# Patient Record
Sex: MALE | Race: WHITE
[De-identification: names, ages, dates, MRNs, and addresses within clinical notes are randomized per-mention and may not be internally consistent; named-entity substitution may affect disease eponyms.]

---

## 2023-06-12 ENCOUNTER — HOSPITAL ENCOUNTER
Age: 58
End: 2023-06-12
Payer: MEDICARE

## 2023-06-12 DIAGNOSIS — M79.672: Primary | ICD-10-CM

## 2023-06-12 DIAGNOSIS — L97.411: ICD-10-CM

## 2023-06-12 DIAGNOSIS — E11.621: ICD-10-CM

## 2023-06-12 DIAGNOSIS — L97.422: ICD-10-CM

## 2023-06-12 PROCEDURE — 11043 DBRDMT MUSC&/FSCA 1ST 20/<: CPT

## 2023-06-12 PROCEDURE — 73630 X-RAY EXAM OF FOOT: CPT

## 2023-06-12 NOTE — XR_ITS
[address] [place] [address] [id number] 
     [phone] 
  
  
Patient Name: 
SYED DIA 
  
MRN: [id number]    YOB: 1965    Sex: M 
Assigned Patient Location:  
Current Patient Location:  
Accession/Order Number: [id number] 
Exam Date: 6/12/2023  14:46    Report Date: 6/13/2023  09:09 
  
At the request of: 
CAROL MOORE   
  
Procedure:  XR foot LT min 3V 
  
PROCEDURE: XR foot LT min 3V 
  
HISTORY: LEFT FOOT PAIN , new soft tissue wounds 
  
COMPARISON: None. 
  
FINDINGS: 
BONES:Prior resection of the distal aspect of the second third toes. Resection  
  
of the fifth toe and majority of the metatarsal.  
SOFT TISSUES:Prominent soft tissue swelling surrounding the ankle and foot.  
Subcutaneous air within plantar soft tissues at base of calcaneus. 
EFFUSION:None visible.  
OTHER: Negative.  
  
IMPRESSION: 
  
1. Marked soft tissue swelling surrounding the ankle and foot along with  
subcutaneous air/ulceration plantar aspect of calcaneus. 
2. Stable surgical changes. No appreciable osteomyelitis. 
  
  
Electronically authenticated by: REUBEN ANGELA   Date: 6/13/2023  09:09

## 2023-06-20 ENCOUNTER — HOSPITAL ENCOUNTER
Dept: HOSPITAL 101 - WC | Age: 58
Discharge: HOME | End: 2023-06-20
Payer: MEDICARE

## 2023-06-20 DIAGNOSIS — M24.571: ICD-10-CM

## 2023-06-20 DIAGNOSIS — R60.9: ICD-10-CM

## 2023-06-20 DIAGNOSIS — E11.42: ICD-10-CM

## 2023-06-20 DIAGNOSIS — T81.89XD: ICD-10-CM

## 2023-06-20 DIAGNOSIS — R09.89: ICD-10-CM

## 2023-06-20 DIAGNOSIS — I82.4Z9: ICD-10-CM

## 2023-06-20 DIAGNOSIS — L97.411: ICD-10-CM

## 2023-06-20 DIAGNOSIS — M86.671: ICD-10-CM

## 2023-06-20 DIAGNOSIS — E11.621: Primary | ICD-10-CM

## 2023-06-20 DIAGNOSIS — R68.89: ICD-10-CM

## 2023-06-20 DIAGNOSIS — M24.574: ICD-10-CM

## 2023-06-20 DIAGNOSIS — M14.679: ICD-10-CM

## 2023-06-20 DIAGNOSIS — G61.82: ICD-10-CM

## 2023-06-20 DIAGNOSIS — E11.65: ICD-10-CM

## 2023-06-20 DIAGNOSIS — L97.422: ICD-10-CM

## 2023-06-20 DIAGNOSIS — M86.471: ICD-10-CM

## 2023-06-20 DIAGNOSIS — M25.571: ICD-10-CM

## 2023-06-20 DIAGNOSIS — M19.071: ICD-10-CM

## 2023-06-20 DIAGNOSIS — M21.541: ICD-10-CM

## 2023-06-20 PROCEDURE — 11042 DBRDMT SUBQ TIS 1ST 20SQCM/<: CPT

## 2023-06-30 ENCOUNTER — HOSPITAL ENCOUNTER
Dept: HOSPITAL 101 - WC | Age: 58
Discharge: HOME | End: 2023-06-30
Payer: MEDICARE

## 2023-06-30 DIAGNOSIS — R09.89: ICD-10-CM

## 2023-06-30 DIAGNOSIS — M24.574: ICD-10-CM

## 2023-06-30 DIAGNOSIS — M19.071: ICD-10-CM

## 2023-06-30 DIAGNOSIS — R68.89: ICD-10-CM

## 2023-06-30 DIAGNOSIS — M21.541: ICD-10-CM

## 2023-06-30 DIAGNOSIS — I82.4Z9: ICD-10-CM

## 2023-06-30 DIAGNOSIS — R60.9: ICD-10-CM

## 2023-06-30 DIAGNOSIS — M25.571: ICD-10-CM

## 2023-06-30 DIAGNOSIS — L97.411: ICD-10-CM

## 2023-06-30 DIAGNOSIS — E11.621: Primary | ICD-10-CM

## 2023-06-30 DIAGNOSIS — L97.422: ICD-10-CM

## 2023-06-30 DIAGNOSIS — E11.42: ICD-10-CM

## 2023-06-30 DIAGNOSIS — M86.671: ICD-10-CM

## 2023-06-30 DIAGNOSIS — M86.471: ICD-10-CM

## 2023-06-30 DIAGNOSIS — M24.571: ICD-10-CM

## 2023-06-30 DIAGNOSIS — E11.65: ICD-10-CM

## 2023-06-30 DIAGNOSIS — M14.679: ICD-10-CM

## 2023-06-30 PROCEDURE — 11042 DBRDMT SUBQ TIS 1ST 20SQCM/<: CPT

## 2023-07-21 ENCOUNTER — HOSPITAL ENCOUNTER
Dept: HOSPITAL 101 - WC | Age: 58
Discharge: HOME | End: 2023-07-21
Payer: MEDICARE

## 2023-07-21 DIAGNOSIS — L97.411: ICD-10-CM

## 2023-07-21 DIAGNOSIS — E11.621: Primary | ICD-10-CM

## 2023-07-21 DIAGNOSIS — E11.69: ICD-10-CM

## 2023-07-21 DIAGNOSIS — M86.471: ICD-10-CM

## 2023-07-21 DIAGNOSIS — L97.422: ICD-10-CM

## 2023-07-21 PROCEDURE — 11042 DBRDMT SUBQ TIS 1ST 20SQCM/<: CPT

## 2023-10-05 ENCOUNTER — APPOINTMENT (OUTPATIENT)
Dept: GENERAL RADIOLOGY | Age: 58
DRG: 871 | End: 2023-10-05
Attending: INTERNAL MEDICINE
Payer: MEDICARE

## 2023-10-05 ENCOUNTER — APPOINTMENT (OUTPATIENT)
Dept: ULTRASOUND IMAGING | Age: 58
DRG: 871 | End: 2023-10-05
Attending: INTERNAL MEDICINE
Payer: MEDICARE

## 2023-10-05 ENCOUNTER — APPOINTMENT (OUTPATIENT)
Age: 58
DRG: 871 | End: 2023-10-05
Attending: INTERNAL MEDICINE
Payer: MEDICARE

## 2023-10-05 ENCOUNTER — HOSPITAL ENCOUNTER (INPATIENT)
Age: 58
LOS: 6 days | Discharge: HOME | DRG: 871 | End: 2023-10-11
Attending: INTERNAL MEDICINE | Admitting: INTERNAL MEDICINE
Payer: MEDICARE

## 2023-10-05 ENCOUNTER — APPOINTMENT (OUTPATIENT)
Dept: VASCULAR LAB | Age: 58
DRG: 871 | End: 2023-10-05
Attending: INTERNAL MEDICINE
Payer: MEDICARE

## 2023-10-05 DIAGNOSIS — I20.0 UNSTABLE ANGINA (HCC): ICD-10-CM

## 2023-10-05 DIAGNOSIS — R79.89 D-DIMER, ELEVATED: ICD-10-CM

## 2023-10-05 DIAGNOSIS — I25.10 CAD, MULTIPLE VESSEL: ICD-10-CM

## 2023-10-05 DIAGNOSIS — I21.4 NSTEMI (NON-ST ELEVATED MYOCARDIAL INFARCTION) (HCC): Primary | ICD-10-CM

## 2023-10-05 PROBLEM — Z86.39 HX OF TYPE 2 DIABETES MELLITUS: Status: ACTIVE | Noted: 2023-10-05

## 2023-10-05 PROBLEM — I10 BENIGN ESSENTIAL HTN: Status: ACTIVE | Noted: 2023-10-05

## 2023-10-05 PROBLEM — G47.33 OSA (OBSTRUCTIVE SLEEP APNEA): Status: ACTIVE | Noted: 2023-10-05

## 2023-10-05 PROBLEM — E11.42 TYPE 2 DM WITH DIABETIC NEUROPATHY AFFECTING BOTH SIDES OF BODY (HCC): Status: ACTIVE | Noted: 2023-10-05

## 2023-10-05 PROBLEM — E66.09 CLASS 2 OBESITY DUE TO EXCESS CALORIES WITH BODY MASS INDEX (BMI) OF 35.0 TO 35.9 IN ADULT: Status: ACTIVE | Noted: 2023-10-05

## 2023-10-05 PROBLEM — F17.200 CURRENT SMOKER: Status: ACTIVE | Noted: 2023-10-05

## 2023-10-05 PROBLEM — E87.20 METABOLIC ACIDOSIS: Status: ACTIVE | Noted: 2023-10-05

## 2023-10-05 PROBLEM — S88.119A BELOW KNEE AMPUTATION (HCC): Status: ACTIVE | Noted: 2023-10-05

## 2023-10-05 PROBLEM — N17.9 AKI (ACUTE KIDNEY INJURY) (HCC): Status: ACTIVE | Noted: 2023-10-05

## 2023-10-05 PROBLEM — E66.812 CLASS 2 OBESITY DUE TO EXCESS CALORIES WITH BODY MASS INDEX (BMI) OF 35.0 TO 35.9 IN ADULT: Status: ACTIVE | Noted: 2023-10-05

## 2023-10-05 PROBLEM — A41.9 SEPSIS (HCC): Status: ACTIVE | Noted: 2023-10-05

## 2023-10-05 PROBLEM — K21.9 GERD (GASTROESOPHAGEAL REFLUX DISEASE): Status: ACTIVE | Noted: 2023-10-05

## 2023-10-05 PROBLEM — Z89.512 HX OF BKA, LEFT (HCC): Status: ACTIVE | Noted: 2023-10-05

## 2023-10-05 LAB
ALBUMIN SERPL-MCNC: 1.6 G/DL (ref 3.5–5.2)
ALBUMIN/GLOB SERPL: 0.5 {RATIO} (ref 1–2.5)
ALP SERPL-CCNC: 60 U/L (ref 40–129)
ALT SERPL-CCNC: 25 U/L (ref 5–41)
AMPHET UR QL SCN: NEGATIVE
ANION GAP SERPL CALCULATED.3IONS-SCNC: 10 MMOL/L (ref 9–17)
ANION GAP SERPL CALCULATED.3IONS-SCNC: 11 MMOL/L (ref 9–17)
ANION GAP SERPL CALCULATED.3IONS-SCNC: 12 MMOL/L (ref 9–17)
ANTI-XA UNFRAC HEPARIN: 0.3 IU/L
ANTI-XA UNFRAC HEPARIN: 0.45 IU/L
ANTI-XA UNFRAC HEPARIN: 0.47 IU/L
AST SERPL-CCNC: 53 U/L
BARBITURATES UR QL SCN: NEGATIVE
BASOPHILS # BLD: 0 K/UL (ref 0–0.2)
BASOPHILS NFR BLD: 0 % (ref 0–2)
BENZODIAZ UR QL: NEGATIVE
BILIRUB SERPL-MCNC: 0.4 MG/DL (ref 0.3–1.2)
BILIRUB UR QL STRIP: NEGATIVE
BNP SERPL-MCNC: 9114 PG/ML
BUN SERPL-MCNC: 35 MG/DL (ref 6–20)
BUN SERPL-MCNC: 37 MG/DL (ref 6–20)
BUN SERPL-MCNC: 37 MG/DL (ref 6–20)
C3 SERPL-MCNC: 104 MG/DL (ref 90–180)
C4 SERPL-MCNC: 27 MG/DL (ref 10–40)
CA-I BLD-SCNC: 1.09 MMOL/L (ref 1.13–1.33)
CALCIUM SERPL-MCNC: 7 MG/DL (ref 8.6–10.4)
CALCIUM SERPL-MCNC: 7 MG/DL (ref 8.6–10.4)
CALCIUM SERPL-MCNC: 7.3 MG/DL (ref 8.6–10.4)
CANNABINOIDS UR QL SCN: NEGATIVE
CASTS #/AREA URNS LPF: NORMAL /LPF (ref 0–2)
CASTS #/AREA URNS LPF: NORMAL /LPF (ref 0–2)
CHLORIDE SERPL-SCNC: 102 MMOL/L (ref 98–107)
CHLORIDE SERPL-SCNC: 104 MMOL/L (ref 98–107)
CHLORIDE SERPL-SCNC: 106 MMOL/L (ref 98–107)
CHLORIDE UR-SCNC: 27 MMOL/L
CHOLEST SERPL-MCNC: 107 MG/DL
CHOLESTEROL/HDL RATIO: 3.3
CK SERPL-CCNC: 1930 U/L (ref 39–308)
CLARITY UR: ABNORMAL
CO2 SERPL-SCNC: 16 MMOL/L (ref 20–31)
CO2 SERPL-SCNC: 18 MMOL/L (ref 20–31)
CO2 SERPL-SCNC: 18 MMOL/L (ref 20–31)
COCAINE UR QL SCN: NEGATIVE
COLOR UR: YELLOW
CREAT SERPL-MCNC: 1.8 MG/DL (ref 0.7–1.2)
CREAT SERPL-MCNC: 1.8 MG/DL (ref 0.7–1.2)
CREAT SERPL-MCNC: 2.1 MG/DL (ref 0.7–1.2)
CREAT UR-MCNC: 103 MG/DL (ref 39–259)
CRP SERPL HS-MCNC: 304.2 MG/L (ref 0–5)
D DIMER PPP FEU-MCNC: 2.23 UG/ML FEU (ref 0–0.57)
ECHO BSA: 2.52 M2
EKG ATRIAL RATE: 93 BPM
EKG P AXIS: 61 DEGREES
EKG P-R INTERVAL: 170 MS
EKG Q-T INTERVAL: 398 MS
EKG QRS DURATION: 146 MS
EKG QTC CALCULATION (BAZETT): 494 MS
EKG R AXIS: -66 DEGREES
EKG T AXIS: 41 DEGREES
EKG VENTRICULAR RATE: 93 BPM
EOSINOPHIL # BLD: 0 K/UL (ref 0–0.4)
EOSINOPHILS RELATIVE PERCENT: 0 % (ref 1–4)
EPI CELLS #/AREA URNS HPF: NORMAL /HPF (ref 0–5)
ERYTHROCYTE [DISTWIDTH] IN BLOOD BY AUTOMATED COUNT: 15.5 % (ref 11.8–14.4)
ERYTHROCYTE [SEDIMENTATION RATE] IN BLOOD BY PHOTOMETRIC METHOD: 56 MM/HR (ref 0–20)
EST. AVERAGE GLUCOSE BLD GHB EST-MCNC: 128 MG/DL
FENTANYL UR QL: POSITIVE
FREE KAPPA/LAMBDA RATIO: 1.28 (ref 0.26–1.65)
GFR SERPL CREATININE-BSD FRML MDRD: 36 ML/MIN/1.73M2
GFR SERPL CREATININE-BSD FRML MDRD: 43 ML/MIN/1.73M2
GFR SERPL CREATININE-BSD FRML MDRD: 43 ML/MIN/1.73M2
GLUCOSE BLD-MCNC: 126 MG/DL (ref 75–110)
GLUCOSE BLD-MCNC: 131 MG/DL (ref 75–110)
GLUCOSE BLD-MCNC: 138 MG/DL (ref 75–110)
GLUCOSE BLD-MCNC: 149 MG/DL (ref 75–110)
GLUCOSE BLD-MCNC: 161 MG/DL (ref 75–110)
GLUCOSE SERPL-MCNC: 133 MG/DL (ref 70–99)
GLUCOSE SERPL-MCNC: 138 MG/DL (ref 70–99)
GLUCOSE SERPL-MCNC: 142 MG/DL (ref 70–99)
GLUCOSE UR STRIP-MCNC: ABNORMAL MG/DL
HBA1C MFR BLD: 6.1 % (ref 4–6)
HCT VFR BLD AUTO: 37 % (ref 40.7–50.3)
HDLC SERPL-MCNC: 32 MG/DL
HGB BLD-MCNC: 12.2 G/DL (ref 13–17)
HGB UR QL STRIP.AUTO: ABNORMAL
IMM GRANULOCYTES # BLD AUTO: 0.18 K/UL (ref 0–0.3)
IMM GRANULOCYTES NFR BLD: 1 %
INR PPP: 1.6
KAPPA LC FREE SER-MCNC: 60.4 MG/L (ref 3.7–19.4)
KETONES UR STRIP-MCNC: NEGATIVE MG/DL
LACTIC ACID, WHOLE BLOOD: 1.3 MMOL/L (ref 0.7–2.1)
LAMBDA LC FREE SERPL-MCNC: 47.2 MG/L (ref 5.7–26.3)
LDLC SERPL CALC-MCNC: 51 MG/DL (ref 0–130)
LEUKOCYTE ESTERASE UR QL STRIP: NEGATIVE
LIPASE SERPL-CCNC: 8 U/L (ref 13–60)
LYMPHOCYTES NFR BLD: 0.7 K/UL (ref 1–4.8)
LYMPHOCYTES RELATIVE PERCENT: 4 % (ref 24–44)
MAGNESIUM SERPL-MCNC: 1.7 MG/DL (ref 1.6–2.6)
MCH RBC QN AUTO: 30 PG (ref 25.2–33.5)
MCHC RBC AUTO-ENTMCNC: 33 G/DL (ref 28.4–34.8)
MCV RBC AUTO: 91.1 FL (ref 82.6–102.9)
METHADONE UR QL: NEGATIVE
MONOCYTES NFR BLD: 0.35 K/UL (ref 0.1–0.8)
MONOCYTES NFR BLD: 2 % (ref 1–7)
MORPHOLOGY: ABNORMAL
MRSA, DNA, NASAL: NEGATIVE
NEUTROPHILS NFR BLD: 93 % (ref 36–66)
NEUTS SEG NFR BLD: 16.37 K/UL (ref 1.8–7.7)
NITRITE UR QL STRIP: NEGATIVE
NRBC BLD-RTO: 0 PER 100 WBC
OPIATES UR QL SCN: NEGATIVE
OSMOLALITY UR: 464 MOSM/KG (ref 80–1300)
OXYCODONE UR QL SCN: POSITIVE
PARTIAL THROMBOPLASTIN TIME: 52 SEC (ref 23–36.5)
PCP UR QL SCN: NEGATIVE
PH UR STRIP: 5.5 [PH] (ref 5–8)
PHOSPHATE SERPL-MCNC: 4.5 MG/DL (ref 2.5–4.5)
PLATELET # BLD AUTO: 171 K/UL (ref 138–453)
PMV BLD AUTO: 9.5 FL (ref 8.1–13.5)
POTASSIUM SERPL-SCNC: 4.1 MMOL/L (ref 3.7–5.3)
POTASSIUM SERPL-SCNC: 4.3 MMOL/L (ref 3.7–5.3)
POTASSIUM SERPL-SCNC: 4.3 MMOL/L (ref 3.7–5.3)
POTASSIUM, UR: 52.2 MMOL/L
PROCALCITONIN SERPL-MCNC: 10.93 NG/ML
PROT SERPL-MCNC: 5.1 G/DL (ref 6.4–8.3)
PROT UR STRIP-MCNC: ABNORMAL MG/DL
PROTHROMBIN TIME: 18.5 SEC (ref 11.7–14.9)
RBC # BLD AUTO: 4.06 M/UL (ref 4.21–5.77)
RBC #/AREA URNS HPF: NORMAL /HPF (ref 0–2)
SODIUM SERPL-SCNC: 130 MMOL/L (ref 135–144)
SODIUM SERPL-SCNC: 133 MMOL/L (ref 135–144)
SODIUM SERPL-SCNC: 134 MMOL/L (ref 135–144)
SODIUM UR-SCNC: 36 MMOL/L
SP GR UR STRIP: 1.02 (ref 1–1.03)
SPECIMEN DESCRIPTION: NORMAL
TEST INFORMATION: ABNORMAL
TOTAL PROTEIN, URINE: 600 MG/DL
TRIGL SERPL-MCNC: 118 MG/DL
TROPONIN I SERPL HS-MCNC: 118 NG/L (ref 0–22)
TROPONIN I SERPL HS-MCNC: 136 NG/L (ref 0–22)
TROPONIN I SERPL HS-MCNC: 149 NG/L (ref 0–22)
TROPONIN I SERPL HS-MCNC: 174 NG/L (ref 0–22)
TSH SERPL DL<=0.05 MIU/L-ACNC: 1.69 UIU/ML (ref 0.3–5)
URINE TOTAL PROTEIN CREATININE RATIO: 5.83 (ref 0–0.2)
UROBILINOGEN UR STRIP-ACNC: NORMAL EU/DL (ref 0–1)
WBC #/AREA URNS HPF: NORMAL /HPF (ref 0–5)
WBC OTHER # BLD: 17.6 K/UL (ref 3.5–11.3)

## 2023-10-05 PROCEDURE — 6360000002 HC RX W HCPCS

## 2023-10-05 PROCEDURE — 86160 COMPLEMENT ANTIGEN: CPT

## 2023-10-05 PROCEDURE — 82330 ASSAY OF CALCIUM: CPT

## 2023-10-05 PROCEDURE — 82550 ASSAY OF CK (CPK): CPT

## 2023-10-05 PROCEDURE — 93005 ELECTROCARDIOGRAM TRACING: CPT

## 2023-10-05 PROCEDURE — 80061 LIPID PANEL: CPT

## 2023-10-05 PROCEDURE — 84484 ASSAY OF TROPONIN QUANT: CPT

## 2023-10-05 PROCEDURE — 99222 1ST HOSP IP/OBS MODERATE 55: CPT | Performed by: INTERNAL MEDICINE

## 2023-10-05 PROCEDURE — 6360000002 HC RX W HCPCS: Performed by: STUDENT IN AN ORGANIZED HEALTH CARE EDUCATION/TRAINING PROGRAM

## 2023-10-05 PROCEDURE — 85610 PROTHROMBIN TIME: CPT

## 2023-10-05 PROCEDURE — 84155 ASSAY OF PROTEIN SERUM: CPT

## 2023-10-05 PROCEDURE — 87641 MR-STAPH DNA AMP PROBE: CPT

## 2023-10-05 PROCEDURE — 76770 US EXAM ABDO BACK WALL COMP: CPT

## 2023-10-05 PROCEDURE — 82436 ASSAY OF URINE CHLORIDE: CPT

## 2023-10-05 PROCEDURE — 51798 US URINE CAPACITY MEASURE: CPT

## 2023-10-05 PROCEDURE — 84300 ASSAY OF URINE SODIUM: CPT

## 2023-10-05 PROCEDURE — 81001 URINALYSIS AUTO W/SCOPE: CPT

## 2023-10-05 PROCEDURE — 86038 ANTINUCLEAR ANTIBODIES: CPT

## 2023-10-05 PROCEDURE — 2500000003 HC RX 250 WO HCPCS

## 2023-10-05 PROCEDURE — C9113 INJ PANTOPRAZOLE SODIUM, VIA: HCPCS

## 2023-10-05 PROCEDURE — 86140 C-REACTIVE PROTEIN: CPT

## 2023-10-05 PROCEDURE — 84165 PROTEIN E-PHORESIS SERUM: CPT

## 2023-10-05 PROCEDURE — 83735 ASSAY OF MAGNESIUM: CPT

## 2023-10-05 PROCEDURE — 86225 DNA ANTIBODY NATIVE: CPT

## 2023-10-05 PROCEDURE — 82570 ASSAY OF URINE CREATININE: CPT

## 2023-10-05 PROCEDURE — 87086 URINE CULTURE/COLONY COUNT: CPT

## 2023-10-05 PROCEDURE — 83521 IG LIGHT CHAINS FREE EACH: CPT

## 2023-10-05 PROCEDURE — 6370000000 HC RX 637 (ALT 250 FOR IP): Performed by: STUDENT IN AN ORGANIZED HEALTH CARE EDUCATION/TRAINING PROGRAM

## 2023-10-05 PROCEDURE — 80048 BASIC METABOLIC PNL TOTAL CA: CPT

## 2023-10-05 PROCEDURE — 84156 ASSAY OF PROTEIN URINE: CPT

## 2023-10-05 PROCEDURE — 84100 ASSAY OF PHOSPHORUS: CPT

## 2023-10-05 PROCEDURE — 86334 IMMUNOFIX E-PHORESIS SERUM: CPT

## 2023-10-05 PROCEDURE — 85652 RBC SED RATE AUTOMATED: CPT

## 2023-10-05 PROCEDURE — 80307 DRUG TEST PRSMV CHEM ANLYZR: CPT

## 2023-10-05 PROCEDURE — 84443 ASSAY THYROID STIM HORMONE: CPT

## 2023-10-05 PROCEDURE — 85379 FIBRIN DEGRADATION QUANT: CPT

## 2023-10-05 PROCEDURE — 2580000003 HC RX 258

## 2023-10-05 PROCEDURE — 80053 COMPREHEN METABOLIC PANEL: CPT

## 2023-10-05 PROCEDURE — 85730 THROMBOPLASTIN TIME PARTIAL: CPT

## 2023-10-05 PROCEDURE — 2000000000 HC ICU R&B

## 2023-10-05 PROCEDURE — 70450 CT HEAD/BRAIN W/O DYE: CPT

## 2023-10-05 PROCEDURE — 84133 ASSAY OF URINE POTASSIUM: CPT

## 2023-10-05 PROCEDURE — 85520 HEPARIN ASSAY: CPT

## 2023-10-05 PROCEDURE — 84145 PROCALCITONIN (PCT): CPT

## 2023-10-05 PROCEDURE — 83690 ASSAY OF LIPASE: CPT

## 2023-10-05 PROCEDURE — 71045 X-RAY EXAM CHEST 1 VIEW: CPT

## 2023-10-05 PROCEDURE — 93010 ELECTROCARDIOGRAM REPORT: CPT | Performed by: INTERNAL MEDICINE

## 2023-10-05 PROCEDURE — 36415 COLL VENOUS BLD VENIPUNCTURE: CPT

## 2023-10-05 PROCEDURE — 83036 HEMOGLOBIN GLYCOSYLATED A1C: CPT

## 2023-10-05 PROCEDURE — 85025 COMPLETE CBC W/AUTO DIFF WBC: CPT

## 2023-10-05 PROCEDURE — 6370000000 HC RX 637 (ALT 250 FOR IP)

## 2023-10-05 PROCEDURE — 99291 CRITICAL CARE FIRST HOUR: CPT | Performed by: INTERNAL MEDICINE

## 2023-10-05 PROCEDURE — 72125 CT NECK SPINE W/O DYE: CPT

## 2023-10-05 PROCEDURE — 83605 ASSAY OF LACTIC ACID: CPT

## 2023-10-05 PROCEDURE — 83880 ASSAY OF NATRIURETIC PEPTIDE: CPT

## 2023-10-05 PROCEDURE — 82947 ASSAY GLUCOSE BLOOD QUANT: CPT

## 2023-10-05 PROCEDURE — 83935 ASSAY OF URINE OSMOLALITY: CPT

## 2023-10-05 PROCEDURE — 93970 EXTREMITY STUDY: CPT

## 2023-10-05 PROCEDURE — 93970 EXTREMITY STUDY: CPT | Performed by: SURGERY

## 2023-10-05 PROCEDURE — 87040 BLOOD CULTURE FOR BACTERIA: CPT

## 2023-10-05 RX ORDER — ONDANSETRON 4 MG/1
4 TABLET, ORALLY DISINTEGRATING ORAL EVERY 8 HOURS PRN
Status: DISCONTINUED | OUTPATIENT
Start: 2023-10-05 | End: 2023-10-11 | Stop reason: HOSPADM

## 2023-10-05 RX ORDER — HEPARIN SODIUM 1000 [USP'U]/ML
4000 INJECTION, SOLUTION INTRAVENOUS; SUBCUTANEOUS ONCE
Status: DISCONTINUED | OUTPATIENT
Start: 2023-10-05 | End: 2023-10-11 | Stop reason: HOSPADM

## 2023-10-05 RX ORDER — SODIUM CHLORIDE 9 MG/ML
INJECTION, SOLUTION INTRAVENOUS CONTINUOUS
Status: DISCONTINUED | OUTPATIENT
Start: 2023-10-05 | End: 2023-10-06

## 2023-10-05 RX ORDER — HEPARIN SODIUM 10000 [USP'U]/100ML
5-30 INJECTION, SOLUTION INTRAVENOUS CONTINUOUS
Status: DISCONTINUED | OUTPATIENT
Start: 2023-10-05 | End: 2023-10-11 | Stop reason: HOSPADM

## 2023-10-05 RX ORDER — INSULIN LISPRO 100 [IU]/ML
0-4 INJECTION, SOLUTION INTRAVENOUS; SUBCUTANEOUS NIGHTLY
Status: DISCONTINUED | OUTPATIENT
Start: 2023-10-05 | End: 2023-10-11 | Stop reason: HOSPADM

## 2023-10-05 RX ORDER — ALBUTEROL SULFATE 2.5 MG/3ML
2.5 SOLUTION RESPIRATORY (INHALATION) EVERY 4 HOURS PRN
Status: DISCONTINUED | OUTPATIENT
Start: 2023-10-05 | End: 2023-10-11 | Stop reason: HOSPADM

## 2023-10-05 RX ORDER — SODIUM CHLORIDE 0.9 % (FLUSH) 0.9 %
5-40 SYRINGE (ML) INJECTION PRN
Status: DISCONTINUED | OUTPATIENT
Start: 2023-10-05 | End: 2023-10-11 | Stop reason: HOSPADM

## 2023-10-05 RX ORDER — ACETAMINOPHEN 650 MG/1
650 SUPPOSITORY RECTAL EVERY 6 HOURS PRN
Status: DISCONTINUED | OUTPATIENT
Start: 2023-10-05 | End: 2023-10-09 | Stop reason: SDUPTHER

## 2023-10-05 RX ORDER — INSULIN LISPRO 100 [IU]/ML
0-8 INJECTION, SOLUTION INTRAVENOUS; SUBCUTANEOUS
Status: DISCONTINUED | OUTPATIENT
Start: 2023-10-05 | End: 2023-10-11 | Stop reason: HOSPADM

## 2023-10-05 RX ORDER — FENTANYL CITRATE 50 UG/ML
25 INJECTION, SOLUTION INTRAMUSCULAR; INTRAVENOUS EVERY 8 HOURS PRN
Status: DISCONTINUED | OUTPATIENT
Start: 2023-10-05 | End: 2023-10-06

## 2023-10-05 RX ORDER — ATORVASTATIN CALCIUM 40 MG/1
40 TABLET, FILM COATED ORAL NIGHTLY
Status: DISCONTINUED | OUTPATIENT
Start: 2023-10-05 | End: 2023-10-11 | Stop reason: HOSPADM

## 2023-10-05 RX ORDER — MAGNESIUM SULFATE 1 G/100ML
1000 INJECTION INTRAVENOUS ONCE
Status: COMPLETED | OUTPATIENT
Start: 2023-10-05 | End: 2023-10-05

## 2023-10-05 RX ORDER — SODIUM CHLORIDE 9 MG/ML
INJECTION, SOLUTION INTRAVENOUS PRN
Status: DISCONTINUED | OUTPATIENT
Start: 2023-10-05 | End: 2023-10-11 | Stop reason: HOSPADM

## 2023-10-05 RX ORDER — POLYETHYLENE GLYCOL 3350 17 G/17G
17 POWDER, FOR SOLUTION ORAL DAILY PRN
Status: DISCONTINUED | OUTPATIENT
Start: 2023-10-05 | End: 2023-10-11 | Stop reason: HOSPADM

## 2023-10-05 RX ORDER — ASPIRIN 81 MG/1
81 TABLET, CHEWABLE ORAL DAILY
Status: DISCONTINUED | OUTPATIENT
Start: 2023-10-05 | End: 2023-10-11 | Stop reason: HOSPADM

## 2023-10-05 RX ORDER — FENTANYL CITRATE 50 UG/ML
25 INJECTION, SOLUTION INTRAMUSCULAR; INTRAVENOUS ONCE
Status: COMPLETED | OUTPATIENT
Start: 2023-10-05 | End: 2023-10-05

## 2023-10-05 RX ORDER — SODIUM CHLORIDE 0.9 % (FLUSH) 0.9 %
5-40 SYRINGE (ML) INJECTION EVERY 12 HOURS SCHEDULED
Status: DISCONTINUED | OUTPATIENT
Start: 2023-10-05 | End: 2023-10-11 | Stop reason: HOSPADM

## 2023-10-05 RX ORDER — ATORVASTATIN CALCIUM 40 MG/1
40 TABLET, FILM COATED ORAL DAILY
Status: DISCONTINUED | OUTPATIENT
Start: 2023-10-05 | End: 2023-10-05

## 2023-10-05 RX ORDER — ACETAMINOPHEN 325 MG/1
650 TABLET ORAL EVERY 6 HOURS PRN
Status: DISCONTINUED | OUTPATIENT
Start: 2023-10-05 | End: 2023-10-09 | Stop reason: SDUPTHER

## 2023-10-05 RX ORDER — FENTANYL CITRATE 50 UG/ML
50 INJECTION, SOLUTION INTRAMUSCULAR; INTRAVENOUS ONCE
Status: COMPLETED | OUTPATIENT
Start: 2023-10-05 | End: 2023-10-05

## 2023-10-05 RX ORDER — HEPARIN SODIUM 1000 [USP'U]/ML
4000 INJECTION, SOLUTION INTRAVENOUS; SUBCUTANEOUS PRN
Status: DISCONTINUED | OUTPATIENT
Start: 2023-10-05 | End: 2023-10-11 | Stop reason: HOSPADM

## 2023-10-05 RX ORDER — CALCIUM GLUCONATE 20 MG/ML
2000 INJECTION, SOLUTION INTRAVENOUS ONCE
Status: DISCONTINUED | OUTPATIENT
Start: 2023-10-05 | End: 2023-10-05

## 2023-10-05 RX ORDER — CALCIUM GLUCONATE 20 MG/ML
1000 INJECTION, SOLUTION INTRAVENOUS ONCE
Status: COMPLETED | OUTPATIENT
Start: 2023-10-05 | End: 2023-10-05

## 2023-10-05 RX ORDER — ONDANSETRON 2 MG/ML
4 INJECTION INTRAMUSCULAR; INTRAVENOUS EVERY 6 HOURS PRN
Status: DISCONTINUED | OUTPATIENT
Start: 2023-10-05 | End: 2023-10-11 | Stop reason: HOSPADM

## 2023-10-05 RX ORDER — METRONIDAZOLE 500 MG/100ML
500 INJECTION, SOLUTION INTRAVENOUS EVERY 8 HOURS
Status: DISCONTINUED | OUTPATIENT
Start: 2023-10-05 | End: 2023-10-06

## 2023-10-05 RX ORDER — HEPARIN SODIUM 1000 [USP'U]/ML
2000 INJECTION, SOLUTION INTRAVENOUS; SUBCUTANEOUS PRN
Status: DISCONTINUED | OUTPATIENT
Start: 2023-10-05 | End: 2023-10-11 | Stop reason: HOSPADM

## 2023-10-05 RX ORDER — MORPHINE SULFATE 2 MG/ML
1 INJECTION, SOLUTION INTRAMUSCULAR; INTRAVENOUS ONCE
Status: COMPLETED | OUTPATIENT
Start: 2023-10-05 | End: 2023-10-05

## 2023-10-05 RX ORDER — DEXTROSE MONOHYDRATE 100 MG/ML
INJECTION, SOLUTION INTRAVENOUS CONTINUOUS PRN
Status: DISCONTINUED | OUTPATIENT
Start: 2023-10-05 | End: 2023-10-11 | Stop reason: HOSPADM

## 2023-10-05 RX ORDER — CIPROFLOXACIN 2 MG/ML
400 INJECTION, SOLUTION INTRAVENOUS EVERY 12 HOURS
Status: DISCONTINUED | OUTPATIENT
Start: 2023-10-05 | End: 2023-10-07

## 2023-10-05 RX ADMIN — SODIUM CHLORIDE: 9 INJECTION, SOLUTION INTRAVENOUS at 21:38

## 2023-10-05 RX ADMIN — SODIUM CHLORIDE: 9 INJECTION, SOLUTION INTRAVENOUS at 23:50

## 2023-10-05 RX ADMIN — CALCIUM GLUCONATE 1000 MG: 20 INJECTION, SOLUTION INTRAVENOUS at 06:20

## 2023-10-05 RX ADMIN — HEPARIN SODIUM 7 UNITS/KG/HR: 10000 INJECTION, SOLUTION INTRAVENOUS at 21:37

## 2023-10-05 RX ADMIN — MAGNESIUM SULFATE HEPTAHYDRATE 1000 MG: 1 INJECTION, SOLUTION INTRAVENOUS at 05:57

## 2023-10-05 RX ADMIN — CIPROFLOXACIN 400 MG: 400 INJECTION, SOLUTION INTRAVENOUS at 03:53

## 2023-10-05 RX ADMIN — MORPHINE SULFATE 1 MG: 2 INJECTION, SOLUTION INTRAMUSCULAR; INTRAVENOUS at 05:54

## 2023-10-05 RX ADMIN — DESMOPRESSIN ACETATE 40 MG: 0.2 TABLET ORAL at 20:23

## 2023-10-05 RX ADMIN — FENTANYL CITRATE 25 MCG: 50 INJECTION, SOLUTION INTRAMUSCULAR; INTRAVENOUS at 03:59

## 2023-10-05 RX ADMIN — METRONIDAZOLE 500 MG: 500 INJECTION, SOLUTION INTRAVENOUS at 14:02

## 2023-10-05 RX ADMIN — SODIUM CHLORIDE: 9 INJECTION, SOLUTION INTRAVENOUS at 03:50

## 2023-10-05 RX ADMIN — SODIUM CHLORIDE, PRESERVATIVE FREE 10 ML: 5 INJECTION INTRAVENOUS at 20:20

## 2023-10-05 RX ADMIN — CIPROFLOXACIN 400 MG: 400 INJECTION, SOLUTION INTRAVENOUS at 17:17

## 2023-10-05 RX ADMIN — SODIUM CHLORIDE: 9 INJECTION, SOLUTION INTRAVENOUS at 21:42

## 2023-10-05 RX ADMIN — SODIUM CHLORIDE, PRESERVATIVE FREE 10 ML: 5 INJECTION INTRAVENOUS at 10:51

## 2023-10-05 RX ADMIN — SODIUM CHLORIDE, PRESERVATIVE FREE 40 MG: 5 INJECTION INTRAVENOUS at 10:51

## 2023-10-05 RX ADMIN — FENTANYL CITRATE 50 MCG: 50 INJECTION, SOLUTION INTRAMUSCULAR; INTRAVENOUS at 20:34

## 2023-10-05 RX ADMIN — FENTANYL CITRATE 25 MCG: 50 INJECTION, SOLUTION INTRAMUSCULAR; INTRAVENOUS at 14:09

## 2023-10-05 RX ADMIN — METOPROLOL TARTRATE 25 MG: 25 TABLET, FILM COATED ORAL at 20:23

## 2023-10-05 RX ADMIN — ACETAMINOPHEN 650 MG: 325 TABLET ORAL at 15:24

## 2023-10-05 RX ADMIN — SODIUM CHLORIDE: 9 INJECTION, SOLUTION INTRAVENOUS at 03:33

## 2023-10-05 RX ADMIN — SODIUM CHLORIDE: 9 INJECTION, SOLUTION INTRAVENOUS at 14:10

## 2023-10-05 RX ADMIN — ASPIRIN 81 MG: 81 TABLET, CHEWABLE ORAL at 04:08

## 2023-10-05 RX ADMIN — HEPARIN SODIUM 7 UNITS/KG/HR: 10000 INJECTION, SOLUTION INTRAVENOUS at 05:22

## 2023-10-05 RX ADMIN — FENTANYL CITRATE 25 MCG: 50 INJECTION, SOLUTION INTRAMUSCULAR; INTRAVENOUS at 10:51

## 2023-10-05 RX ADMIN — METRONIDAZOLE 500 MG: 500 INJECTION, SOLUTION INTRAVENOUS at 05:00

## 2023-10-05 RX ADMIN — METRONIDAZOLE 500 MG: 500 INJECTION, SOLUTION INTRAVENOUS at 20:20

## 2023-10-05 ASSESSMENT — ENCOUNTER SYMPTOMS
BACK PAIN: 0
VOMITING: 0
NAUSEA: 1
SORE THROAT: 0
DIARRHEA: 0
ABDOMINAL PAIN: 1
BLOOD IN STOOL: 0
WHEEZING: 0
COUGH: 0
SHORTNESS OF BREATH: 0

## 2023-10-05 ASSESSMENT — PAIN SCALES - GENERAL
PAINLEVEL_OUTOF10: 8
PAINLEVEL_OUTOF10: 8
PAINLEVEL_OUTOF10: 7

## 2023-10-05 ASSESSMENT — PAIN DESCRIPTION - LOCATION: LOCATION: NECK;HEAD

## 2023-10-05 NOTE — RT PROTOCOL NOTE
Frequency and one order with Frequency of every 4 hours PRN wheezing or increased work of breathing using Per Protocol order mode. 11-13 - enter or revise RT Bronchodilator order(s) to one equivalent RT bronchodilator order with QID Frequency and an Albuterol order with Frequency of every 4 hours PRN wheezing or increased work of breathing using Per Protocol order mode. Greater than 13 - enter or revise RT Bronchodilator order(s) to one equivalent RT bronchodilator order with every 4 hours Frequency and an Albuterol order with Frequency of every 2 hours PRN wheezing or increased work of breathing using Per Protocol order mode. RT to enter RT Home Evaluation for COPD & MDI Assessment order using Per Protocol order mode.     Electronically signed by Dana Darling RCP on 10/5/2023 at 5:15 AM

## 2023-10-05 NOTE — CARE COORDINATION
Case Management Assessment  Initial Evaluation    Date/Time of Evaluation: 10/5/2023 10:42 AM  Assessment Completed by: Gab Iglesias RN    If patient is discharged prior to next notation, then this note serves as note for discharge by case management. Patient Name: Matt Ash                   YOB: 1965  Diagnosis: NSTEMI (non-ST elevated myocardial infarction) ([address]) [I21.4]  Sepsis ([address]) [A41.9]                   Date / Time: 10/5/2023  2:52 AM    Patient Admission Status: Inpatient   Readmission Risk (Low < 19, Mod (19-27), High > 27): Readmission Risk Score: 11.3    Current PCP: No primary care provider on file. PCP verified by CM? (P) Yes (Dr Arcelia Hall in [place])    Chart Reviewed: Yes      History Provided by: (P) Patient  Patient Orientation: (P) Alert and Oriented, Person, Place, Situation, Self    Patient Cognition: (P) Alert    Hospitalization in the last 30 days (Readmission):  No    If yes, Readmission Assessment in  Navigator will be completed.     Advance Directives:      Code Status: Full Code   Patient's Primary Decision Maker is: (P) Legal Next of Kin      Discharge Planning:    Patient lives with: (P) Spouse/Significant Other Type of Home: (P) House  Primary Care Giver: (P) Self  Patient Support Systems include: (P) Spouse/Significant Other, Children   Current Financial resources: (P) Medicare  Current community resources: (P) None  Current services prior to admission: (P) None            Current DME:              Type of Home Care services:  (P) None    ADLS  Prior functional level: (P) Independent in ADLs/IADLs  Current functional level: (P) Independent in ADLs/IADLs    PT AM-PAC:   /24  OT AM-PAC:   /24    Family can provide assistance at DC: (P) Yes  Would you like Case Management to discuss the discharge plan with any other family members/significant others, and if so, who? (P) No  Plans to Return to Present Housing: (P) Yes  Other Identified Issues/Barriers to Receive call from nurse in regard of tachycardia in the 140s with ambulation  Subsequently heart rate in the 120  Patient states lightheadedness and nausea walking  In addition patient states a bubble sensation in her right upper quadrant  I have evaluated the patient  She is stable  Abdomen is benign with normal tenderness for post surgical time  No peritoneal irritative signs  Blood pressures are adequate  Stat CBC has been order  Patient previously type and cross for 2 units  At this time with transfuse 2 units  Patient agreed      Vitals:    12/08/21 2300 12/09/21 0247 12/09/21 0659 12/09/21 1234   BP: 141/72 143/68 144/71 167/86   Pulse: 102 102 (!) 108 (!) 124   Resp: 18 18 18    Temp: 98 9 °F (37 2 °C) 98 7 °F (37 1 °C) 98 4 °F (36 9 °C)    TempSrc:       SpO2: 93% 93% 98% 94%   Weight:       Height:             Discussed with Dr Claudean Dess, MD

## 2023-10-06 ENCOUNTER — APPOINTMENT (OUTPATIENT)
Age: 58
DRG: 871 | End: 2023-10-06
Attending: INTERNAL MEDICINE
Payer: MEDICARE

## 2023-10-06 LAB
ALBUMIN PERCENT: 40 % (ref 45–65)
ALBUMIN SERPL-MCNC: 1.6 G/DL (ref 3.2–5.2)
ALPHA 2 PERCENT: 24 % (ref 6–13)
ALPHA1 GLOB SERPL ELPH-MCNC: 0.3 G/DL (ref 0.1–0.4)
ALPHA1 GLOB SERPL ELPH-MCNC: 6 % (ref 3–6)
ALPHA2 GLOB SERPL ELPH-MCNC: 0.9 G/DL (ref 0.5–0.9)
ANION GAP SERPL CALCULATED.3IONS-SCNC: 10 MMOL/L (ref 9–16)
ANION GAP SERPL CALCULATED.3IONS-SCNC: 12 MMOL/L (ref 9–17)
ANTI-XA UNFRAC HEPARIN: <0.1 IU/L
ANTI-XA UNFRAC HEPARIN: <0.1 IU/L
B-GLOBULIN SERPL ELPH-MCNC: 0.6 G/DL (ref 0.5–1.1)
B-GLOBULIN SERPL ELPH-MCNC: 16 % (ref 11–19)
BASOPHILS # BLD: 0.04 K/UL (ref 0–0.2)
BASOPHILS NFR BLD: 0 % (ref 0–2)
BILIRUB UR QL STRIP: NEGATIVE
BUN SERPL-MCNC: 27 MG/DL (ref 6–20)
BUN SERPL-MCNC: 30 MG/DL (ref 6–20)
CA-I BLD-SCNC: 1.12 MMOL/L (ref 1.13–1.33)
CALCIUM SERPL-MCNC: 7.1 MG/DL (ref 8.6–10.4)
CALCIUM SERPL-MCNC: 7.4 MG/DL (ref 8.6–10.4)
CASTS #/AREA URNS LPF: NORMAL /LPF (ref 0–8)
CHLORIDE SERPL-SCNC: 107 MMOL/L (ref 98–107)
CHLORIDE SERPL-SCNC: 108 MMOL/L (ref 98–107)
CLARITY UR: CLEAR
CO2 SERPL-SCNC: 14 MMOL/L (ref 20–31)
CO2 SERPL-SCNC: 18 MMOL/L (ref 20–31)
COLOR UR: YELLOW
CREAT SERPL-MCNC: 1.5 MG/DL (ref 0.7–1.2)
CREAT SERPL-MCNC: 1.6 MG/DL (ref 0.7–1.2)
ECHO AO ASC DIAM: 4.1 CM
ECHO AO ASCENDING AORTA INDEX: 1.67 CM/M2
ECHO AO ROOT DIAM: 4 CM
ECHO AO ROOT INDEX: 1.63 CM/M2
ECHO AV AREA PEAK VELOCITY: 3.8 CM2
ECHO AV AREA VTI: 4.5 CM2
ECHO AV AREA/BSA PEAK VELOCITY: 1.6 CM2/M2
ECHO AV AREA/BSA VTI: 1.8 CM2/M2
ECHO AV MEAN GRADIENT: 6 MMHG
ECHO AV MEAN VELOCITY: 1.1 M/S
ECHO AV PEAK GRADIENT: 11 MMHG
ECHO AV PEAK VELOCITY: 1.7 M/S
ECHO AV VELOCITY RATIO: 0.76
ECHO AV VTI: 30.4 CM
ECHO BSA: 2.52 M2
ECHO LA AREA 2C: 16.3 CM2
ECHO LA AREA 4C: 21.2 CM2
ECHO LA MAJOR AXIS: 5.1 CM
ECHO LA MINOR AXIS: 4.7 CM
ECHO LA VOL 2C: 46 ML (ref 18–58)
ECHO LA VOL 4C: 70 ML (ref 18–58)
ECHO LA VOL BP: 60 ML (ref 18–58)
ECHO LA VOL/BSA BIPLANE: 24 ML/M2 (ref 16–34)
ECHO LA VOLUME INDEX A2C: 19 ML/M2 (ref 16–34)
ECHO LA VOLUME INDEX A4C: 29 ML/M2 (ref 16–34)
ECHO LV E' LATERAL VELOCITY: 5 CM/S
ECHO LV E' SEPTAL VELOCITY: 5 CM/S
ECHO LV EDV A2C: 327 ML
ECHO LV EDV A4C: 285 ML
ECHO LV EDV INDEX A4C: 116 ML/M2
ECHO LV EDV NDEX A2C: 133 ML/M2
ECHO LV EJECTION FRACTION A2C: 47 %
ECHO LV EJECTION FRACTION A4C: 32 %
ECHO LV EJECTION FRACTION BIPLANE: 39 % (ref 55–100)
ECHO LV ESV A2C: 172 ML
ECHO LV ESV A4C: 196 ML
ECHO LV ESV INDEX A2C: 70 ML/M2
ECHO LV ESV INDEX A4C: 80 ML/M2
ECHO LVOT AREA: 4.9 CM2
ECHO LVOT AV VTI INDEX: 0.91
ECHO LVOT DIAM: 2.5 CM
ECHO LVOT MEAN GRADIENT: 5 MMHG
ECHO LVOT PEAK GRADIENT: 7 MMHG
ECHO LVOT PEAK VELOCITY: 1.3 M/S
ECHO LVOT STROKE VOLUME INDEX: 55.5 ML/M2
ECHO LVOT SV: 135.9 ML
ECHO LVOT VTI: 27.7 CM
ECHO MV A VELOCITY: 1.18 M/S
ECHO MV E DECELERATION TIME (DT): 197 MS
ECHO MV E VELOCITY: 0.84 M/S
ECHO MV E/A RATIO: 0.71
ECHO MV E/E' LATERAL: 16.8
ECHO MV E/E' RATIO (AVERAGED): 16.8
ECHO MV E/E' SEPTAL: 16.8
ECHO PV MAX VELOCITY: 1.2 M/S
ECHO PV PEAK GRADIENT: 6 MMHG
ECHO RV FREE WALL PEAK S': 11 CM/S
ECHO RV TAPSE: 3.2 CM (ref 1.7–?)
EOSINOPHIL # BLD: 0.06 K/UL (ref 0–0.44)
EOSINOPHILS RELATIVE PERCENT: 1 % (ref 1–4)
EPI CELLS #/AREA URNS HPF: NORMAL /HPF (ref 0–5)
ERYTHROCYTE [DISTWIDTH] IN BLOOD BY AUTOMATED COUNT: 15.7 % (ref 11.8–14.4)
GAMMA GLOB SERPL ELPH-MCNC: 0.6 G/DL (ref 0.5–1.5)
GAMMA GLOBULIN %: 14 % (ref 9–20)
GFR SERPL CREATININE-BSD FRML MDRD: 49 ML/MIN/1.73M2
GFR SERPL CREATININE-BSD FRML MDRD: 55 ML/MIN/1.73M2
GLUCOSE BLD-MCNC: 194 MG/DL (ref 75–110)
GLUCOSE BLD-MCNC: 205 MG/DL (ref 75–110)
GLUCOSE BLD-MCNC: 217 MG/DL (ref 75–110)
GLUCOSE SERPL-MCNC: 144 MG/DL (ref 70–99)
GLUCOSE SERPL-MCNC: 161 MG/DL (ref 74–99)
GLUCOSE UR STRIP-MCNC: ABNORMAL MG/DL
HCT VFR BLD AUTO: 33.5 % (ref 40.7–50.3)
HGB BLD-MCNC: 10.6 G/DL (ref 13–17)
HGB UR QL STRIP.AUTO: ABNORMAL
IMM GRANULOCYTES # BLD AUTO: 0.06 K/UL (ref 0–0.3)
IMM GRANULOCYTES NFR BLD: 1 %
INTERPRETATION SERPL IFE-IMP: NORMAL
KETONES UR STRIP-MCNC: NEGATIVE MG/DL
LEUKOCYTE ESTERASE UR QL STRIP: NEGATIVE
LYMPHOCYTES NFR BLD: 0.87 K/UL (ref 1.1–3.7)
LYMPHOCYTES RELATIVE PERCENT: 8 % (ref 24–43)
MAGNESIUM SERPL-MCNC: 2.1 MG/DL (ref 1.6–2.6)
MCH RBC QN AUTO: 30.6 PG (ref 25.2–33.5)
MCHC RBC AUTO-ENTMCNC: 31.6 G/DL (ref 28.4–34.8)
MCV RBC AUTO: 96.8 FL (ref 82.6–102.9)
MICROORGANISM SPEC CULT: NO GROWTH
MONOCYTES NFR BLD: 1 K/UL (ref 0.1–1.2)
MONOCYTES NFR BLD: 9 % (ref 3–12)
NEUTROPHILS NFR BLD: 82 % (ref 36–65)
NEUTS SEG NFR BLD: 9.34 K/UL (ref 1.5–8.1)
NITRITE UR QL STRIP: NEGATIVE
NRBC BLD-RTO: 0 PER 100 WBC
OSMOLALITY SERPL: 299 MOSM/KG (ref 275–295)
PATH REV: NORMAL
PATHOLOGIST: ABNORMAL
PH UR STRIP: 6 [PH] (ref 5–8)
PHOSPHATE SERPL-MCNC: 3.7 MG/DL (ref 2.5–4.5)
PLATELET # BLD AUTO: 173 K/UL (ref 138–453)
PMV BLD AUTO: 10.3 FL (ref 8.1–13.5)
POTASSIUM SERPL-SCNC: 3.7 MMOL/L (ref 3.7–5.3)
POTASSIUM SERPL-SCNC: 3.9 MMOL/L (ref 3.7–5.3)
PROT PATTERN SERPL ELPH-IMP: ABNORMAL
PROT SERPL-MCNC: 3.9 G/DL (ref 6.4–8.3)
PROT UR STRIP-MCNC: ABNORMAL MG/DL
RBC # BLD AUTO: 3.46 M/UL (ref 4.21–5.77)
RBC # BLD: ABNORMAL 10*6/UL
RBC #/AREA URNS HPF: NORMAL /HPF (ref 0–4)
SODIUM SERPL-SCNC: 133 MMOL/L (ref 135–144)
SODIUM SERPL-SCNC: 136 MMOL/L (ref 136–145)
SODIUM UR-SCNC: 63 MMOL/L
SP GR UR STRIP: 1.01 (ref 1–1.03)
SPECIMEN DESCRIPTION: NORMAL
TOTAL PROT. SUM,%: 100 % (ref 98–102)
TOTAL PROT. SUM: 4 G/DL (ref 6.3–8.2)
TROPONIN I SERPL HS-MCNC: 100 NG/L (ref 0–22)
TROPONIN I SERPL HS-MCNC: 108 NG/L (ref 0–22)
UROBILINOGEN UR STRIP-ACNC: NORMAL EU/DL (ref 0–1)
WBC #/AREA URNS HPF: NORMAL /HPF (ref 0–5)
WBC OTHER # BLD: 11.4 K/UL (ref 3.5–11.3)

## 2023-10-06 PROCEDURE — 82330 ASSAY OF CALCIUM: CPT

## 2023-10-06 PROCEDURE — 85025 COMPLETE CBC W/AUTO DIFF WBC: CPT

## 2023-10-06 PROCEDURE — 99232 SBSQ HOSP IP/OBS MODERATE 35: CPT | Performed by: INTERNAL MEDICINE

## 2023-10-06 PROCEDURE — 81001 URINALYSIS AUTO W/SCOPE: CPT

## 2023-10-06 PROCEDURE — 84300 ASSAY OF URINE SODIUM: CPT

## 2023-10-06 PROCEDURE — 93306 TTE W/DOPPLER COMPLETE: CPT

## 2023-10-06 PROCEDURE — 93306 TTE W/DOPPLER COMPLETE: CPT | Performed by: INTERNAL MEDICINE

## 2023-10-06 PROCEDURE — 1200000000 HC SEMI PRIVATE

## 2023-10-06 PROCEDURE — 6360000002 HC RX W HCPCS

## 2023-10-06 PROCEDURE — 80048 BASIC METABOLIC PNL TOTAL CA: CPT

## 2023-10-06 PROCEDURE — 97530 THERAPEUTIC ACTIVITIES: CPT

## 2023-10-06 PROCEDURE — C9113 INJ PANTOPRAZOLE SODIUM, VIA: HCPCS

## 2023-10-06 PROCEDURE — 99291 CRITICAL CARE FIRST HOUR: CPT | Performed by: INTERNAL MEDICINE

## 2023-10-06 PROCEDURE — 36415 COLL VENOUS BLD VENIPUNCTURE: CPT

## 2023-10-06 PROCEDURE — 6370000000 HC RX 637 (ALT 250 FOR IP)

## 2023-10-06 PROCEDURE — A4216 STERILE WATER/SALINE, 10 ML: HCPCS

## 2023-10-06 PROCEDURE — 83735 ASSAY OF MAGNESIUM: CPT

## 2023-10-06 PROCEDURE — 6370000000 HC RX 637 (ALT 250 FOR IP): Performed by: STUDENT IN AN ORGANIZED HEALTH CARE EDUCATION/TRAINING PROGRAM

## 2023-10-06 PROCEDURE — 84484 ASSAY OF TROPONIN QUANT: CPT

## 2023-10-06 PROCEDURE — 99233 SBSQ HOSP IP/OBS HIGH 50: CPT | Performed by: INTERNAL MEDICINE

## 2023-10-06 PROCEDURE — 85520 HEPARIN ASSAY: CPT

## 2023-10-06 PROCEDURE — 83930 ASSAY OF BLOOD OSMOLALITY: CPT

## 2023-10-06 PROCEDURE — 6360000002 HC RX W HCPCS: Performed by: STUDENT IN AN ORGANIZED HEALTH CARE EDUCATION/TRAINING PROGRAM

## 2023-10-06 PROCEDURE — 2580000003 HC RX 258

## 2023-10-06 PROCEDURE — 82947 ASSAY GLUCOSE BLOOD QUANT: CPT

## 2023-10-06 PROCEDURE — 97162 PT EVAL MOD COMPLEX 30 MIN: CPT

## 2023-10-06 PROCEDURE — 99223 1ST HOSP IP/OBS HIGH 75: CPT | Performed by: INTERNAL MEDICINE

## 2023-10-06 PROCEDURE — 84100 ASSAY OF PHOSPHORUS: CPT

## 2023-10-06 RX ORDER — LISINOPRIL 20 MG/1
20 TABLET ORAL DAILY
COMMUNITY
Start: 2023-09-13

## 2023-10-06 RX ORDER — DULAGLUTIDE 1.5 MG/.5ML
1.5 INJECTION, SOLUTION SUBCUTANEOUS WEEKLY
COMMUNITY
Start: 2023-06-30

## 2023-10-06 RX ORDER — FUROSEMIDE 20 MG/1
20 TABLET ORAL DAILY
COMMUNITY
Start: 2023-09-13

## 2023-10-06 RX ORDER — AMLODIPINE BESYLATE 10 MG/1
10 TABLET ORAL DAILY
COMMUNITY
Start: 2023-09-13

## 2023-10-06 RX ORDER — POTASSIUM CHLORIDE 1500 MG/1
20 TABLET, EXTENDED RELEASE ORAL DAILY
COMMUNITY
Start: 2023-09-14

## 2023-10-06 RX ORDER — FENTANYL CITRATE 50 UG/ML
25 INJECTION, SOLUTION INTRAMUSCULAR; INTRAVENOUS ONCE
Status: COMPLETED | OUTPATIENT
Start: 2023-10-06 | End: 2023-10-06

## 2023-10-06 RX ORDER — INSULIN ASPART INJECTION 100 [IU]/ML
100 INJECTION, SOLUTION SUBCUTANEOUS
COMMUNITY

## 2023-10-06 RX ORDER — OXYCODONE HYDROCHLORIDE AND ACETAMINOPHEN 5; 325 MG/1; MG/1
1 TABLET ORAL EVERY 6 HOURS PRN
Status: DISCONTINUED | OUTPATIENT
Start: 2023-10-06 | End: 2023-10-11 | Stop reason: HOSPADM

## 2023-10-06 RX ORDER — CARVEDILOL 25 MG/1
25 TABLET ORAL EVERY 12 HOURS
COMMUNITY
Start: 2023-09-13

## 2023-10-06 RX ORDER — PANTOPRAZOLE SODIUM 40 MG/1
40 TABLET, DELAYED RELEASE ORAL
Status: DISCONTINUED | OUTPATIENT
Start: 2023-10-06 | End: 2023-10-11 | Stop reason: HOSPADM

## 2023-10-06 RX ORDER — OXYCODONE HYDROCHLORIDE 5 MG/1
5 TABLET ORAL EVERY 4 HOURS PRN
COMMUNITY
Start: 2023-07-27

## 2023-10-06 RX ADMIN — SODIUM CHLORIDE, PRESERVATIVE FREE 10 ML: 5 INJECTION INTRAVENOUS at 09:33

## 2023-10-06 RX ADMIN — METOPROLOL TARTRATE 50 MG: 25 TABLET, FILM COATED ORAL at 20:17

## 2023-10-06 RX ADMIN — INSULIN LISPRO 2 UNITS: 100 INJECTION, SOLUTION INTRAVENOUS; SUBCUTANEOUS at 16:30

## 2023-10-06 RX ADMIN — FENTANYL CITRATE 25 MCG: 50 INJECTION, SOLUTION INTRAMUSCULAR; INTRAVENOUS at 05:46

## 2023-10-06 RX ADMIN — HEPARIN SODIUM 15 UNITS/KG/HR: 10000 INJECTION, SOLUTION INTRAVENOUS at 22:13

## 2023-10-06 RX ADMIN — METOPROLOL TARTRATE 25 MG: 25 TABLET, FILM COATED ORAL at 06:42

## 2023-10-06 RX ADMIN — HEPARIN SODIUM 4000 UNITS: 1000 INJECTION INTRAVENOUS; SUBCUTANEOUS at 09:52

## 2023-10-06 RX ADMIN — CIPROFLOXACIN 400 MG: 400 INJECTION, SOLUTION INTRAVENOUS at 04:08

## 2023-10-06 RX ADMIN — FENTANYL CITRATE 25 MCG: 50 INJECTION, SOLUTION INTRAMUSCULAR; INTRAVENOUS at 20:06

## 2023-10-06 RX ADMIN — METOPROLOL TARTRATE 25 MG: 25 TABLET, FILM COATED ORAL at 10:22

## 2023-10-06 RX ADMIN — ASPIRIN 81 MG: 81 TABLET, CHEWABLE ORAL at 09:28

## 2023-10-06 RX ADMIN — HEPARIN SODIUM 4000 UNITS: 1000 INJECTION INTRAVENOUS; SUBCUTANEOUS at 18:37

## 2023-10-06 RX ADMIN — INSULIN LISPRO 2 UNITS: 100 INJECTION, SOLUTION INTRAVENOUS; SUBCUTANEOUS at 12:01

## 2023-10-06 RX ADMIN — METRONIDAZOLE 500 MG: 500 INJECTION, SOLUTION INTRAVENOUS at 04:01

## 2023-10-06 RX ADMIN — SODIUM CHLORIDE, PRESERVATIVE FREE 10 ML: 5 INJECTION INTRAVENOUS at 20:00

## 2023-10-06 RX ADMIN — OXYCODONE AND ACETAMINOPHEN 1 TABLET: 5; 325 TABLET ORAL at 15:30

## 2023-10-06 RX ADMIN — SODIUM CHLORIDE, PRESERVATIVE FREE 40 MG: 5 INJECTION INTRAVENOUS at 09:28

## 2023-10-06 RX ADMIN — DESMOPRESSIN ACETATE 40 MG: 0.2 TABLET ORAL at 20:18

## 2023-10-06 RX ADMIN — CIPROFLOXACIN 400 MG: 400 INJECTION, SOLUTION INTRAVENOUS at 15:34

## 2023-10-06 ASSESSMENT — PAIN SCALES - GENERAL
PAINLEVEL_OUTOF10: 6
PAINLEVEL_OUTOF10: 3
PAINLEVEL_OUTOF10: 8
PAINLEVEL_OUTOF10: 3

## 2023-10-06 ASSESSMENT — PAIN DESCRIPTION - LOCATION
LOCATION: NECK
LOCATION: NECK

## 2023-10-06 ASSESSMENT — PAIN DESCRIPTION - DESCRIPTORS: DESCRIPTORS: ACHING

## 2023-10-06 ASSESSMENT — PAIN DESCRIPTION - ORIENTATION: ORIENTATION: RIGHT

## 2023-10-07 LAB
ANION GAP SERPL CALCULATED.3IONS-SCNC: 9 MMOL/L (ref 9–17)
ANTI-XA UNFRAC HEPARIN: 0.24 IU/L
ANTI-XA UNFRAC HEPARIN: 0.29 IU/L
ANTI-XA UNFRAC HEPARIN: 0.4 IU/L
BASOPHILS # BLD: 0.03 K/UL (ref 0–0.2)
BASOPHILS NFR BLD: 0 % (ref 0–2)
BUN SERPL-MCNC: 25 MG/DL (ref 6–20)
CALCIUM SERPL-MCNC: 7.8 MG/DL (ref 8.6–10.4)
CHLORIDE SERPL-SCNC: 106 MMOL/L (ref 98–107)
CO2 SERPL-SCNC: 18 MMOL/L (ref 20–31)
CREAT SERPL-MCNC: 1.4 MG/DL (ref 0.7–1.2)
EOSINOPHIL # BLD: 0.18 K/UL (ref 0–0.44)
EOSINOPHILS RELATIVE PERCENT: 2 % (ref 1–4)
ERYTHROCYTE [DISTWIDTH] IN BLOOD BY AUTOMATED COUNT: 15.5 % (ref 11.8–14.4)
GFR SERPL CREATININE-BSD FRML MDRD: 58 ML/MIN/1.73M2
GLUCOSE BLD-MCNC: 168 MG/DL (ref 75–110)
GLUCOSE BLD-MCNC: 225 MG/DL (ref 75–110)
GLUCOSE BLD-MCNC: 233 MG/DL (ref 75–110)
GLUCOSE BLD-MCNC: 265 MG/DL (ref 75–110)
GLUCOSE SERPL-MCNC: 254 MG/DL (ref 70–99)
HCT VFR BLD AUTO: 37.3 % (ref 40.7–50.3)
HGB BLD-MCNC: 11.7 G/DL (ref 13–17)
IMM GRANULOCYTES # BLD AUTO: 0.03 K/UL (ref 0–0.3)
IMM GRANULOCYTES NFR BLD: 0 %
LYMPHOCYTES NFR BLD: 1 K/UL (ref 1.1–3.7)
LYMPHOCYTES RELATIVE PERCENT: 13 % (ref 24–43)
MCH RBC QN AUTO: 30.3 PG (ref 25.2–33.5)
MCHC RBC AUTO-ENTMCNC: 31.4 G/DL (ref 28.4–34.8)
MCV RBC AUTO: 96.6 FL (ref 82.6–102.9)
MONOCYTES NFR BLD: 0.62 K/UL (ref 0.1–1.2)
MONOCYTES NFR BLD: 8 % (ref 3–12)
NEUTROPHILS NFR BLD: 77 % (ref 36–65)
NEUTS SEG NFR BLD: 5.92 K/UL (ref 1.5–8.1)
NRBC BLD-RTO: 0 PER 100 WBC
PLATELET # BLD AUTO: 136 K/UL (ref 138–453)
PMV BLD AUTO: 9.6 FL (ref 8.1–13.5)
POTASSIUM SERPL-SCNC: 4.1 MMOL/L (ref 3.7–5.3)
RBC # BLD AUTO: 3.86 M/UL (ref 4.21–5.77)
RBC # BLD: ABNORMAL 10*6/UL
SODIUM SERPL-SCNC: 133 MMOL/L (ref 135–144)
WBC OTHER # BLD: 7.8 K/UL (ref 3.5–11.3)

## 2023-10-07 PROCEDURE — 6370000000 HC RX 637 (ALT 250 FOR IP)

## 2023-10-07 PROCEDURE — 85520 HEPARIN ASSAY: CPT

## 2023-10-07 PROCEDURE — 85025 COMPLETE CBC W/AUTO DIFF WBC: CPT

## 2023-10-07 PROCEDURE — 99233 SBSQ HOSP IP/OBS HIGH 50: CPT | Performed by: INTERNAL MEDICINE

## 2023-10-07 PROCEDURE — 82947 ASSAY GLUCOSE BLOOD QUANT: CPT

## 2023-10-07 PROCEDURE — 99232 SBSQ HOSP IP/OBS MODERATE 35: CPT | Performed by: INTERNAL MEDICINE

## 2023-10-07 PROCEDURE — 1200000000 HC SEMI PRIVATE

## 2023-10-07 PROCEDURE — 99291 CRITICAL CARE FIRST HOUR: CPT | Performed by: INTERNAL MEDICINE

## 2023-10-07 PROCEDURE — 6360000002 HC RX W HCPCS

## 2023-10-07 PROCEDURE — 6370000000 HC RX 637 (ALT 250 FOR IP): Performed by: STUDENT IN AN ORGANIZED HEALTH CARE EDUCATION/TRAINING PROGRAM

## 2023-10-07 PROCEDURE — 2580000003 HC RX 258

## 2023-10-07 PROCEDURE — 36415 COLL VENOUS BLD VENIPUNCTURE: CPT

## 2023-10-07 PROCEDURE — 80048 BASIC METABOLIC PNL TOTAL CA: CPT

## 2023-10-07 RX ORDER — CYCLOBENZAPRINE HCL 10 MG
10 TABLET ORAL ONCE
Status: COMPLETED | OUTPATIENT
Start: 2023-10-07 | End: 2023-10-07

## 2023-10-07 RX ORDER — AMLODIPINE BESYLATE 10 MG/1
10 TABLET ORAL DAILY
Status: DISCONTINUED | OUTPATIENT
Start: 2023-10-07 | End: 2023-10-11 | Stop reason: HOSPADM

## 2023-10-07 RX ORDER — CARVEDILOL 25 MG/1
25 TABLET ORAL 2 TIMES DAILY WITH MEALS
Status: DISCONTINUED | OUTPATIENT
Start: 2023-10-07 | End: 2023-10-11 | Stop reason: HOSPADM

## 2023-10-07 RX ORDER — HYDRALAZINE HYDROCHLORIDE 20 MG/ML
10 INJECTION INTRAMUSCULAR; INTRAVENOUS ONCE
Status: COMPLETED | OUTPATIENT
Start: 2023-10-08 | End: 2023-10-07

## 2023-10-07 RX ORDER — CIPROFLOXACIN 500 MG/1
500 TABLET, FILM COATED ORAL EVERY 12 HOURS SCHEDULED
Status: COMPLETED | OUTPATIENT
Start: 2023-10-07 | End: 2023-10-10

## 2023-10-07 RX ADMIN — SODIUM CHLORIDE, PRESERVATIVE FREE 10 ML: 5 INJECTION INTRAVENOUS at 20:29

## 2023-10-07 RX ADMIN — OXYCODONE AND ACETAMINOPHEN 1 TABLET: 5; 325 TABLET ORAL at 00:07

## 2023-10-07 RX ADMIN — CIPROFLOXACIN 400 MG: 400 INJECTION, SOLUTION INTRAVENOUS at 04:19

## 2023-10-07 RX ADMIN — ACETAMINOPHEN 650 MG: 325 TABLET ORAL at 23:55

## 2023-10-07 RX ADMIN — CARVEDILOL 25 MG: 25 TABLET, FILM COATED ORAL at 09:02

## 2023-10-07 RX ADMIN — ASPIRIN 81 MG: 81 TABLET, CHEWABLE ORAL at 08:11

## 2023-10-07 RX ADMIN — HEPARIN SODIUM 2000 UNITS: 1000 INJECTION INTRAVENOUS; SUBCUTANEOUS at 01:30

## 2023-10-07 RX ADMIN — DESMOPRESSIN ACETATE 40 MG: 0.2 TABLET ORAL at 20:28

## 2023-10-07 RX ADMIN — OXYCODONE AND ACETAMINOPHEN 1 TABLET: 5; 325 TABLET ORAL at 06:19

## 2023-10-07 RX ADMIN — INSULIN LISPRO 2 UNITS: 100 INJECTION, SOLUTION INTRAVENOUS; SUBCUTANEOUS at 16:33

## 2023-10-07 RX ADMIN — AMLODIPINE BESYLATE 10 MG: 10 TABLET ORAL at 08:13

## 2023-10-07 RX ADMIN — ACETAMINOPHEN 650 MG: 325 TABLET ORAL at 18:01

## 2023-10-07 RX ADMIN — CYCLOBENZAPRINE 10 MG: 10 TABLET, FILM COATED ORAL at 05:05

## 2023-10-07 RX ADMIN — SODIUM CHLORIDE, PRESERVATIVE FREE 10 ML: 5 INJECTION INTRAVENOUS at 08:16

## 2023-10-07 RX ADMIN — HYDRALAZINE HYDROCHLORIDE 10 MG: 20 INJECTION, SOLUTION INTRAMUSCULAR; INTRAVENOUS at 23:55

## 2023-10-07 RX ADMIN — HEPARIN SODIUM 19 UNITS/KG/HR: 10000 INJECTION, SOLUTION INTRAVENOUS at 22:23

## 2023-10-07 RX ADMIN — HEPARIN SODIUM 2000 UNITS: 1000 INJECTION INTRAVENOUS; SUBCUTANEOUS at 14:48

## 2023-10-07 RX ADMIN — CARVEDILOL 25 MG: 25 TABLET, FILM COATED ORAL at 16:15

## 2023-10-07 RX ADMIN — INSULIN LISPRO 2 UNITS: 100 INJECTION, SOLUTION INTRAVENOUS; SUBCUTANEOUS at 11:11

## 2023-10-07 RX ADMIN — PANTOPRAZOLE SODIUM 40 MG: 40 TABLET, DELAYED RELEASE ORAL at 08:11

## 2023-10-07 RX ADMIN — HEPARIN SODIUM 17 UNITS/KG/HR: 10000 INJECTION, SOLUTION INTRAVENOUS at 09:42

## 2023-10-07 RX ADMIN — OXYCODONE AND ACETAMINOPHEN 1 TABLET: 5; 325 TABLET ORAL at 14:49

## 2023-10-07 RX ADMIN — OXYCODONE AND ACETAMINOPHEN 1 TABLET: 5; 325 TABLET ORAL at 20:29

## 2023-10-07 RX ADMIN — CIPROFLOXACIN HYDROCHLORIDE 500 MG: 500 TABLET, FILM COATED ORAL at 16:15

## 2023-10-07 ASSESSMENT — PAIN DESCRIPTION - DESCRIPTORS
DESCRIPTORS: ACHING
DESCRIPTORS: ACHING

## 2023-10-07 ASSESSMENT — PAIN DESCRIPTION - LOCATION
LOCATION: NECK

## 2023-10-07 ASSESSMENT — PAIN SCALES - GENERAL
PAINLEVEL_OUTOF10: 8
PAINLEVEL_OUTOF10: 8
PAINLEVEL_OUTOF10: 5
PAINLEVEL_OUTOF10: 8
PAINLEVEL_OUTOF10: 6

## 2023-10-07 NOTE — H&P
[address]     Department of Internal Medicine - Staff Internal Medicine Service   ICU PATIENT TRANSFER NOTE        Patient:  Felecia Mccarty  YOB: 1965  MRN: [id number]     Acct: [de-identified]     Admit date: 10/5/2023    Code Status:-  Full code     Reason for ICU Admission:-       SUPPORT DEVICES: [] Ventilator [] BIPAP  [] Nasal Cannula [x] Room Air    Consultations:- [x] Cardiology [x] Nephrology  [] Hemo onco  [] GI                               [] ID [] ENT  [] Rheum [] Endo   []Physiotherapy                                 Others:-     NUTRITION:  [] NPO [] Tube Feeding (Specify: ) [] TPN  [x] PO    Central Lines:- [x] No   [] Yes           If yes - Days/Date of Insertion. Pt seen,examined and Chart reviewed. ICU COURSE:    The patient is a 60 y.o. male with past medical history significant for HTN, HLD, T2DM, H/O PAD s/p Left BKA, COLT. On 10/4/2023,patient initially presented to [facility] with suprapubic pain and nausea and vomiting. It was accompanied by light headedness and dizziness. Labs there revealed elevated troponins with RBBB on EKG, Cr 2.31, lactic acid 18.6 and WBC 21.3. UA was negative for UTI. CT Abdomen and Pelvis revealed changes concerning for cystitis,mild perinephric stranding and fluid in small bowel and proximal colon which could point towards enterocolitis in the appropriate clinical scenario. [facility] was transferred to [facility] for management of NSTEMI and sepsis likely secondary to cystitis/pyelonephritis/enterocolitis. Cardiology started the patient on heparin drip and cardiac catheterization is planned on 9/10. For sepsis,patient was initially on IV ciprofloxacin and flagyl. As WBC downtrended to normal,flagyl was discontinued and IV cipro was changed to oral ciprofloxacin. Patient had CAMILLE likely secondary to decreased oral intake v/s sepsis. Renal USG did not reveal any obstruction or hydronephrosis.  Patient was
prophylaxis: Protonix        Cleveland Rodriguez MD,  ICU resident   [facility]  10/5/2023 3:25 AM    The critical care team assigned to the patient will be following up the patient in the intensive care unit. I have discussed the current plan with the critical care attending. The above mentioned assessment and plan will be reviewed again in detail by the critical care attending at bedside, and can be further changed or modified accordingly by the attending physician.

## 2023-10-08 LAB
ANION GAP SERPL CALCULATED.3IONS-SCNC: 10 MMOL/L (ref 9–17)
ANTI-XA UNFRAC HEPARIN: 0.66 IU/L
BASOPHILS # BLD: 0.03 K/UL (ref 0–0.2)
BASOPHILS NFR BLD: 1 % (ref 0–2)
BUN SERPL-MCNC: 26 MG/DL (ref 6–20)
CALCIUM SERPL-MCNC: 7.8 MG/DL (ref 8.6–10.4)
CHLORIDE SERPL-SCNC: 106 MMOL/L (ref 98–107)
CO2 SERPL-SCNC: 20 MMOL/L (ref 20–31)
CREAT SERPL-MCNC: 1.5 MG/DL (ref 0.7–1.2)
EOSINOPHIL # BLD: 0.23 K/UL (ref 0–0.44)
EOSINOPHILS RELATIVE PERCENT: 4 % (ref 1–4)
ERYTHROCYTE [DISTWIDTH] IN BLOOD BY AUTOMATED COUNT: 15.3 % (ref 11.8–14.4)
GFR SERPL CREATININE-BSD FRML MDRD: 53 ML/MIN/1.73M2
GLUCOSE BLD-MCNC: 214 MG/DL (ref 75–110)
GLUCOSE BLD-MCNC: 230 MG/DL (ref 75–110)
GLUCOSE BLD-MCNC: 255 MG/DL (ref 75–110)
GLUCOSE BLD-MCNC: 311 MG/DL (ref 75–110)
GLUCOSE SERPL-MCNC: 309 MG/DL (ref 70–99)
HCT VFR BLD AUTO: 35 % (ref 40.7–50.3)
HGB BLD-MCNC: 11.3 G/DL (ref 13–17)
IMM GRANULOCYTES # BLD AUTO: 0.05 K/UL (ref 0–0.3)
IMM GRANULOCYTES NFR BLD: 1 %
LYMPHOCYTES NFR BLD: 1.25 K/UL (ref 1.1–3.7)
LYMPHOCYTES RELATIVE PERCENT: 22 % (ref 24–43)
MCH RBC QN AUTO: 29.6 PG (ref 25.2–33.5)
MCHC RBC AUTO-ENTMCNC: 32.3 G/DL (ref 28.4–34.8)
MCV RBC AUTO: 91.6 FL (ref 82.6–102.9)
MONOCYTES NFR BLD: 0.57 K/UL (ref 0.1–1.2)
MONOCYTES NFR BLD: 10 % (ref 3–12)
NEUTROPHILS NFR BLD: 62 % (ref 36–65)
NEUTS SEG NFR BLD: 3.53 K/UL (ref 1.5–8.1)
NRBC BLD-RTO: 0 PER 100 WBC
PLATELET # BLD AUTO: 129 K/UL (ref 138–453)
PMV BLD AUTO: 9.9 FL (ref 8.1–13.5)
POTASSIUM SERPL-SCNC: 4.1 MMOL/L (ref 3.7–5.3)
RBC # BLD AUTO: 3.82 M/UL (ref 4.21–5.77)
RBC # BLD: ABNORMAL 10*6/UL
SODIUM SERPL-SCNC: 136 MMOL/L (ref 135–144)
WBC OTHER # BLD: 5.7 K/UL (ref 3.5–11.3)

## 2023-10-08 PROCEDURE — 6370000000 HC RX 637 (ALT 250 FOR IP)

## 2023-10-08 PROCEDURE — 2580000003 HC RX 258

## 2023-10-08 PROCEDURE — 99233 SBSQ HOSP IP/OBS HIGH 50: CPT | Performed by: NURSE PRACTITIONER

## 2023-10-08 PROCEDURE — 1200000000 HC SEMI PRIVATE

## 2023-10-08 PROCEDURE — 6370000000 HC RX 637 (ALT 250 FOR IP): Performed by: STUDENT IN AN ORGANIZED HEALTH CARE EDUCATION/TRAINING PROGRAM

## 2023-10-08 PROCEDURE — 82947 ASSAY GLUCOSE BLOOD QUANT: CPT

## 2023-10-08 PROCEDURE — 99233 SBSQ HOSP IP/OBS HIGH 50: CPT | Performed by: INTERNAL MEDICINE

## 2023-10-08 PROCEDURE — 36415 COLL VENOUS BLD VENIPUNCTURE: CPT

## 2023-10-08 PROCEDURE — 85520 HEPARIN ASSAY: CPT

## 2023-10-08 PROCEDURE — 6360000002 HC RX W HCPCS: Performed by: NURSE PRACTITIONER

## 2023-10-08 PROCEDURE — 6360000002 HC RX W HCPCS

## 2023-10-08 PROCEDURE — 99232 SBSQ HOSP IP/OBS MODERATE 35: CPT | Performed by: INTERNAL MEDICINE

## 2023-10-08 PROCEDURE — 85025 COMPLETE CBC W/AUTO DIFF WBC: CPT

## 2023-10-08 PROCEDURE — 80048 BASIC METABOLIC PNL TOTAL CA: CPT

## 2023-10-08 PROCEDURE — 2580000003 HC RX 258: Performed by: NURSE PRACTITIONER

## 2023-10-08 PROCEDURE — 94760 N-INVAS EAR/PLS OXIMETRY 1: CPT

## 2023-10-08 RX ORDER — INSULIN GLARGINE 100 [IU]/ML
60 INJECTION, SOLUTION SUBCUTANEOUS NIGHTLY
Status: DISCONTINUED | OUTPATIENT
Start: 2023-10-08 | End: 2023-10-11 | Stop reason: HOSPADM

## 2023-10-08 RX ORDER — INSULIN GLARGINE 100 [IU]/ML
60 INJECTION, SOLUTION SUBCUTANEOUS NIGHTLY
Status: DISCONTINUED | OUTPATIENT
Start: 2023-10-08 | End: 2023-10-08

## 2023-10-08 RX ORDER — ACETYLCYSTEINE 200 MG/ML
600 SOLUTION ORAL; RESPIRATORY (INHALATION)
Status: DISPENSED | OUTPATIENT
Start: 2023-10-08 | End: 2023-10-10

## 2023-10-08 RX ORDER — SODIUM CHLORIDE 9 MG/ML
INJECTION, SOLUTION INTRAVENOUS CONTINUOUS
Status: DISCONTINUED | OUTPATIENT
Start: 2023-10-08 | End: 2023-10-11

## 2023-10-08 RX ORDER — HYDRALAZINE HYDROCHLORIDE 20 MG/ML
10 INJECTION INTRAMUSCULAR; INTRAVENOUS EVERY 6 HOURS PRN
Status: DISCONTINUED | OUTPATIENT
Start: 2023-10-08 | End: 2023-10-11 | Stop reason: HOSPADM

## 2023-10-08 RX ORDER — ACETYLCYSTEINE 200 MG/ML
600 SOLUTION ORAL; RESPIRATORY (INHALATION)
Status: DISCONTINUED | OUTPATIENT
Start: 2023-10-08 | End: 2023-10-08

## 2023-10-08 RX ADMIN — Medication 600 MG: at 20:17

## 2023-10-08 RX ADMIN — SODIUM CHLORIDE: 9 INJECTION, SOLUTION INTRAVENOUS at 23:25

## 2023-10-08 RX ADMIN — DESMOPRESSIN ACETATE 40 MG: 0.2 TABLET ORAL at 20:17

## 2023-10-08 RX ADMIN — SODIUM CHLORIDE, PRESERVATIVE FREE 10 ML: 5 INJECTION INTRAVENOUS at 20:34

## 2023-10-08 RX ADMIN — CARVEDILOL 25 MG: 25 TABLET, FILM COATED ORAL at 08:08

## 2023-10-08 RX ADMIN — CIPROFLOXACIN HYDROCHLORIDE 500 MG: 500 TABLET, FILM COATED ORAL at 05:22

## 2023-10-08 RX ADMIN — CIPROFLOXACIN HYDROCHLORIDE 500 MG: 500 TABLET, FILM COATED ORAL at 17:18

## 2023-10-08 RX ADMIN — HYDRALAZINE HYDROCHLORIDE 10 MG: 20 INJECTION, SOLUTION INTRAMUSCULAR; INTRAVENOUS at 12:50

## 2023-10-08 RX ADMIN — ACETAMINOPHEN 650 MG: 325 TABLET ORAL at 20:17

## 2023-10-08 RX ADMIN — INSULIN GLARGINE 60 UNITS: 100 INJECTION, SOLUTION SUBCUTANEOUS at 12:33

## 2023-10-08 RX ADMIN — SODIUM CHLORIDE, PRESERVATIVE FREE 10 ML: 5 INJECTION INTRAVENOUS at 08:08

## 2023-10-08 RX ADMIN — ASPIRIN 81 MG: 81 TABLET, CHEWABLE ORAL at 08:21

## 2023-10-08 RX ADMIN — OXYCODONE AND ACETAMINOPHEN 1 TABLET: 5; 325 TABLET ORAL at 02:07

## 2023-10-08 RX ADMIN — AMLODIPINE BESYLATE 10 MG: 10 TABLET ORAL at 08:08

## 2023-10-08 RX ADMIN — INSULIN LISPRO 6 UNITS: 100 INJECTION, SOLUTION INTRAVENOUS; SUBCUTANEOUS at 08:23

## 2023-10-08 RX ADMIN — PANTOPRAZOLE SODIUM 40 MG: 40 TABLET, DELAYED RELEASE ORAL at 08:08

## 2023-10-08 RX ADMIN — OXYCODONE AND ACETAMINOPHEN 1 TABLET: 5; 325 TABLET ORAL at 14:17

## 2023-10-08 RX ADMIN — HEPARIN SODIUM 19 UNITS/KG/HR: 10000 INJECTION, SOLUTION INTRAVENOUS at 20:25

## 2023-10-08 RX ADMIN — HEPARIN SODIUM 19 UNITS/KG/HR: 10000 INJECTION, SOLUTION INTRAVENOUS at 12:40

## 2023-10-08 RX ADMIN — INSULIN LISPRO 2 UNITS: 100 INJECTION, SOLUTION INTRAVENOUS; SUBCUTANEOUS at 17:16

## 2023-10-08 RX ADMIN — INSULIN LISPRO 2 UNITS: 100 INJECTION, SOLUTION INTRAVENOUS; SUBCUTANEOUS at 12:34

## 2023-10-08 RX ADMIN — OXYCODONE AND ACETAMINOPHEN 1 TABLET: 5; 325 TABLET ORAL at 08:21

## 2023-10-08 RX ADMIN — CARVEDILOL 25 MG: 25 TABLET, FILM COATED ORAL at 17:17

## 2023-10-08 RX ADMIN — OXYCODONE AND ACETAMINOPHEN 1 TABLET: 5; 325 TABLET ORAL at 20:17

## 2023-10-08 ASSESSMENT — PAIN DESCRIPTION - LOCATION
LOCATION: NECK

## 2023-10-08 ASSESSMENT — PAIN DESCRIPTION - ORIENTATION: ORIENTATION: RIGHT

## 2023-10-08 ASSESSMENT — PAIN SCALES - GENERAL
PAINLEVEL_OUTOF10: 8
PAINLEVEL_OUTOF10: 7
PAINLEVEL_OUTOF10: 8
PAINLEVEL_OUTOF10: 8

## 2023-10-08 ASSESSMENT — PAIN DESCRIPTION - DESCRIPTORS: DESCRIPTORS: ACHING

## 2023-10-09 PROBLEM — N12 PYELONEPHRITIS: Status: ACTIVE | Noted: 2023-10-09

## 2023-10-09 PROBLEM — I20.0 UNSTABLE ANGINA (HCC): Status: ACTIVE | Noted: 2023-10-05

## 2023-10-09 LAB
ANION GAP SERPL CALCULATED.3IONS-SCNC: 9 MMOL/L (ref 9–17)
ANTI-XA UNFRAC HEPARIN: 0.41 IU/L
BASOPHILS # BLD: <0.03 K/UL (ref 0–0.2)
BASOPHILS NFR BLD: 0 % (ref 0–2)
BUN SERPL-MCNC: 25 MG/DL (ref 6–20)
CALCIUM SERPL-MCNC: 8 MG/DL (ref 8.6–10.4)
CHLORIDE SERPL-SCNC: 106 MMOL/L (ref 98–107)
CO2 SERPL-SCNC: 20 MMOL/L (ref 20–31)
CREAT SERPL-MCNC: 1.3 MG/DL (ref 0.7–1.2)
ECHO BSA: 2.52 M2
EOSINOPHIL # BLD: 0.22 K/UL (ref 0–0.44)
EOSINOPHILS RELATIVE PERCENT: 3 % (ref 1–4)
ERYTHROCYTE [DISTWIDTH] IN BLOOD BY AUTOMATED COUNT: 14.8 % (ref 11.8–14.4)
GFR SERPL CREATININE-BSD FRML MDRD: >60 ML/MIN/1.73M2
GLUCOSE BLD-MCNC: 141 MG/DL (ref 75–110)
GLUCOSE BLD-MCNC: 179 MG/DL (ref 75–110)
GLUCOSE BLD-MCNC: 227 MG/DL (ref 75–110)
GLUCOSE SERPL-MCNC: 229 MG/DL (ref 70–99)
HCT VFR BLD AUTO: 36.1 % (ref 40.7–50.3)
HGB BLD-MCNC: 12 G/DL (ref 13–17)
IMM GRANULOCYTES # BLD AUTO: 0.06 K/UL (ref 0–0.3)
IMM GRANULOCYTES NFR BLD: 1 %
LYMPHOCYTES NFR BLD: 1.22 K/UL (ref 1.1–3.7)
LYMPHOCYTES RELATIVE PERCENT: 17 % (ref 24–43)
MCH RBC QN AUTO: 30 PG (ref 25.2–33.5)
MCHC RBC AUTO-ENTMCNC: 33.2 G/DL (ref 28.4–34.8)
MCV RBC AUTO: 90.3 FL (ref 82.6–102.9)
MONOCYTES NFR BLD: 0.61 K/UL (ref 0.1–1.2)
MONOCYTES NFR BLD: 9 % (ref 3–12)
NEUTROPHILS NFR BLD: 70 % (ref 36–65)
NEUTS SEG NFR BLD: 4.98 K/UL (ref 1.5–8.1)
NRBC BLD-RTO: 0 PER 100 WBC
PLATELET # BLD AUTO: 163 K/UL (ref 138–453)
PMV BLD AUTO: 10 FL (ref 8.1–13.5)
POTASSIUM SERPL-SCNC: 4.2 MMOL/L (ref 3.7–5.3)
RBC # BLD AUTO: 4 M/UL (ref 4.21–5.77)
RBC # BLD: ABNORMAL 10*6/UL
SODIUM SERPL-SCNC: 135 MMOL/L (ref 135–144)
WBC OTHER # BLD: 7.1 K/UL (ref 3.5–11.3)

## 2023-10-09 PROCEDURE — 99152 MOD SED SAME PHYS/QHP 5/>YRS: CPT | Performed by: INTERNAL MEDICINE

## 2023-10-09 PROCEDURE — 36200 PLACE CATHETER IN AORTA: CPT | Performed by: INTERNAL MEDICINE

## 2023-10-09 PROCEDURE — 6360000002 HC RX W HCPCS

## 2023-10-09 PROCEDURE — 6360000004 HC RX CONTRAST MEDICATION: Performed by: INTERNAL MEDICINE

## 2023-10-09 PROCEDURE — 82947 ASSAY GLUCOSE BLOOD QUANT: CPT

## 2023-10-09 PROCEDURE — 93005 ELECTROCARDIOGRAM TRACING: CPT | Performed by: INTERNAL MEDICINE

## 2023-10-09 PROCEDURE — 7100000011 HC PHASE II RECOVERY - ADDTL 15 MIN

## 2023-10-09 PROCEDURE — 2580000003 HC RX 258: Performed by: STUDENT IN AN ORGANIZED HEALTH CARE EDUCATION/TRAINING PROGRAM

## 2023-10-09 PROCEDURE — 99233 SBSQ HOSP IP/OBS HIGH 50: CPT | Performed by: INTERNAL MEDICINE

## 2023-10-09 PROCEDURE — B2111ZZ FLUOROSCOPY OF MULTIPLE CORONARY ARTERIES USING LOW OSMOLAR CONTRAST: ICD-10-PCS | Performed by: INTERNAL MEDICINE

## 2023-10-09 PROCEDURE — 6370000000 HC RX 637 (ALT 250 FOR IP)

## 2023-10-09 PROCEDURE — 2500000003 HC RX 250 WO HCPCS: Performed by: INTERNAL MEDICINE

## 2023-10-09 PROCEDURE — 4A023N7 MEASUREMENT OF CARDIAC SAMPLING AND PRESSURE, LEFT HEART, PERCUTANEOUS APPROACH: ICD-10-PCS | Performed by: INTERNAL MEDICINE

## 2023-10-09 PROCEDURE — 80048 BASIC METABOLIC PNL TOTAL CA: CPT

## 2023-10-09 PROCEDURE — 6370000000 HC RX 637 (ALT 250 FOR IP): Performed by: STUDENT IN AN ORGANIZED HEALTH CARE EDUCATION/TRAINING PROGRAM

## 2023-10-09 PROCEDURE — 1200000000 HC SEMI PRIVATE

## 2023-10-09 PROCEDURE — 93454 CORONARY ARTERY ANGIO S&I: CPT | Performed by: INTERNAL MEDICINE

## 2023-10-09 PROCEDURE — 2709999900 HC NON-CHARGEABLE SUPPLY: Performed by: INTERNAL MEDICINE

## 2023-10-09 PROCEDURE — 36415 COLL VENOUS BLD VENIPUNCTURE: CPT

## 2023-10-09 PROCEDURE — 6360000002 HC RX W HCPCS: Performed by: INTERNAL MEDICINE

## 2023-10-09 PROCEDURE — 85025 COMPLETE CBC W/AUTO DIFF WBC: CPT

## 2023-10-09 PROCEDURE — 93567 NJX CAR CTH SPRVLV AORTGRPHY: CPT | Performed by: INTERNAL MEDICINE

## 2023-10-09 PROCEDURE — 85520 HEPARIN ASSAY: CPT

## 2023-10-09 PROCEDURE — 6360000002 HC RX W HCPCS: Performed by: NURSE PRACTITIONER

## 2023-10-09 PROCEDURE — 99233 SBSQ HOSP IP/OBS HIGH 50: CPT | Performed by: SURGERY

## 2023-10-09 PROCEDURE — 7100000010 HC PHASE II RECOVERY - FIRST 15 MIN

## 2023-10-09 PROCEDURE — 93458 L HRT ARTERY/VENTRICLE ANGIO: CPT | Performed by: INTERNAL MEDICINE

## 2023-10-09 RX ORDER — SODIUM CHLORIDE 9 MG/ML
INJECTION, SOLUTION INTRAVENOUS PRN
Status: DISCONTINUED | OUTPATIENT
Start: 2023-10-09 | End: 2023-10-11 | Stop reason: HOSPADM

## 2023-10-09 RX ORDER — MIDAZOLAM HYDROCHLORIDE 1 MG/ML
INJECTION INTRAMUSCULAR; INTRAVENOUS PRN
Status: DISCONTINUED | OUTPATIENT
Start: 2023-10-09 | End: 2023-10-09 | Stop reason: HOSPADM

## 2023-10-09 RX ORDER — SODIUM CHLORIDE 0.9 % (FLUSH) 0.9 %
5-40 SYRINGE (ML) INJECTION EVERY 12 HOURS SCHEDULED
Status: DISCONTINUED | OUTPATIENT
Start: 2023-10-09 | End: 2023-10-11 | Stop reason: HOSPADM

## 2023-10-09 RX ORDER — VERAPAMIL HYDROCHLORIDE 2.5 MG/ML
INJECTION, SOLUTION INTRAVENOUS PRN
Status: DISCONTINUED | OUTPATIENT
Start: 2023-10-09 | End: 2023-10-09 | Stop reason: HOSPADM

## 2023-10-09 RX ORDER — ACETAMINOPHEN 325 MG/1
650 TABLET ORAL EVERY 4 HOURS PRN
Status: DISCONTINUED | OUTPATIENT
Start: 2023-10-09 | End: 2023-10-11 | Stop reason: HOSPADM

## 2023-10-09 RX ORDER — NITROGLYCERIN 20 MG/100ML
INJECTION INTRAVENOUS PRN
Status: DISCONTINUED | OUTPATIENT
Start: 2023-10-09 | End: 2023-10-09 | Stop reason: HOSPADM

## 2023-10-09 RX ORDER — HEPARIN SODIUM 1000 [USP'U]/ML
INJECTION, SOLUTION INTRAVENOUS; SUBCUTANEOUS PRN
Status: DISCONTINUED | OUTPATIENT
Start: 2023-10-09 | End: 2023-10-09 | Stop reason: HOSPADM

## 2023-10-09 RX ORDER — SODIUM CHLORIDE 0.9 % (FLUSH) 0.9 %
5-40 SYRINGE (ML) INJECTION PRN
Status: DISCONTINUED | OUTPATIENT
Start: 2023-10-09 | End: 2023-10-11 | Stop reason: HOSPADM

## 2023-10-09 RX ORDER — LIDOCAINE 4 G/G
1 PATCH TOPICAL ONCE
Status: COMPLETED | OUTPATIENT
Start: 2023-10-09 | End: 2023-10-10

## 2023-10-09 RX ORDER — OXYCODONE HYDROCHLORIDE 5 MG/1
5 TABLET ORAL ONCE
Status: COMPLETED | OUTPATIENT
Start: 2023-10-09 | End: 2023-10-09

## 2023-10-09 RX ADMIN — PANTOPRAZOLE SODIUM 40 MG: 40 TABLET, DELAYED RELEASE ORAL at 10:00

## 2023-10-09 RX ADMIN — CIPROFLOXACIN HYDROCHLORIDE 500 MG: 500 TABLET, FILM COATED ORAL at 04:16

## 2023-10-09 RX ADMIN — OXYCODONE AND ACETAMINOPHEN 1 TABLET: 5; 325 TABLET ORAL at 18:57

## 2023-10-09 RX ADMIN — CIPROFLOXACIN HYDROCHLORIDE 500 MG: 500 TABLET, FILM COATED ORAL at 18:05

## 2023-10-09 RX ADMIN — HEPARIN SODIUM 19 UNITS/KG/HR: 10000 INJECTION, SOLUTION INTRAVENOUS at 21:56

## 2023-10-09 RX ADMIN — INSULIN GLARGINE 60 UNITS: 100 INJECTION, SOLUTION SUBCUTANEOUS at 22:05

## 2023-10-09 RX ADMIN — SODIUM CHLORIDE, PRESERVATIVE FREE 10 ML: 5 INJECTION INTRAVENOUS at 22:03

## 2023-10-09 RX ADMIN — OXYCODONE AND ACETAMINOPHEN 1 TABLET: 5; 325 TABLET ORAL at 04:11

## 2023-10-09 RX ADMIN — CARVEDILOL 25 MG: 25 TABLET, FILM COATED ORAL at 10:00

## 2023-10-09 RX ADMIN — HEPARIN SODIUM 19 UNITS/KG/HR: 10000 INJECTION, SOLUTION INTRAVENOUS at 07:17

## 2023-10-09 RX ADMIN — OXYCODONE HYDROCHLORIDE 5 MG: 5 TABLET ORAL at 22:42

## 2023-10-09 RX ADMIN — CARVEDILOL 25 MG: 25 TABLET, FILM COATED ORAL at 18:04

## 2023-10-09 RX ADMIN — HYDRALAZINE HYDROCHLORIDE 10 MG: 20 INJECTION, SOLUTION INTRAMUSCULAR; INTRAVENOUS at 22:41

## 2023-10-09 RX ADMIN — ACETAMINOPHEN 650 MG: 325 TABLET ORAL at 22:01

## 2023-10-09 RX ADMIN — DESMOPRESSIN ACETATE 40 MG: 0.2 TABLET ORAL at 22:01

## 2023-10-09 RX ADMIN — AMLODIPINE BESYLATE 10 MG: 10 TABLET ORAL at 10:00

## 2023-10-09 ASSESSMENT — PAIN DESCRIPTION - LOCATION: LOCATION: NECK

## 2023-10-09 ASSESSMENT — PAIN SCALES - GENERAL: PAINLEVEL_OUTOF10: 8

## 2023-10-10 ENCOUNTER — APPOINTMENT (OUTPATIENT)
Dept: VASCULAR LAB | Age: 58
DRG: 871 | End: 2023-10-10
Attending: INTERNAL MEDICINE
Payer: MEDICARE

## 2023-10-10 ENCOUNTER — APPOINTMENT (OUTPATIENT)
Dept: CT IMAGING | Age: 58
DRG: 871 | End: 2023-10-10
Attending: INTERNAL MEDICINE
Payer: MEDICARE

## 2023-10-10 LAB
ANION GAP SERPL CALCULATED.3IONS-SCNC: 9 MMOL/L (ref 9–17)
ANTI-XA UNFRAC HEPARIN: 0.47 IU/L
BASOPHILS # BLD: 0.05 K/UL (ref 0–0.2)
BASOPHILS NFR BLD: 1 % (ref 0–2)
BUN SERPL-MCNC: 19 MG/DL (ref 6–20)
CALCIUM SERPL-MCNC: 8 MG/DL (ref 8.6–10.4)
CHLORIDE SERPL-SCNC: 107 MMOL/L (ref 98–107)
CO2 SERPL-SCNC: 20 MMOL/L (ref 20–31)
CREAT SERPL-MCNC: 1.2 MG/DL (ref 0.7–1.2)
CREAT UR-MCNC: 37.6 MG/DL (ref 39–259)
ECHO BSA: 2.52 M2
EKG ATRIAL RATE: 65 BPM
EKG P AXIS: 48 DEGREES
EKG P-R INTERVAL: 192 MS
EKG Q-T INTERVAL: 476 MS
EKG QRS DURATION: 140 MS
EKG QTC CALCULATION (BAZETT): 495 MS
EKG R AXIS: -56 DEGREES
EKG T AXIS: -3 DEGREES
EKG VENTRICULAR RATE: 65 BPM
EOSINOPHIL # BLD: 0.31 K/UL (ref 0–0.44)
EOSINOPHILS RELATIVE PERCENT: 3 % (ref 1–4)
ERYTHROCYTE [DISTWIDTH] IN BLOOD BY AUTOMATED COUNT: 14.9 % (ref 11.8–14.4)
GFR SERPL CREATININE-BSD FRML MDRD: >60 ML/MIN/1.73M2
GLUCOSE BLD-MCNC: 141 MG/DL (ref 75–110)
GLUCOSE BLD-MCNC: 171 MG/DL (ref 75–110)
GLUCOSE BLD-MCNC: 192 MG/DL (ref 75–110)
GLUCOSE BLD-MCNC: 197 MG/DL (ref 75–110)
GLUCOSE SERPL-MCNC: 179 MG/DL (ref 70–99)
HAV IGM SERPL QL IA: NONREACTIVE
HBV CORE IGM SERPL QL IA: NONREACTIVE
HBV SURFACE AG SERPL QL IA: NONREACTIVE
HCT VFR BLD AUTO: 36.4 % (ref 40.7–50.3)
HCV AB SERPL QL IA: NONREACTIVE
HGB BLD-MCNC: 11.9 G/DL (ref 13–17)
IMM GRANULOCYTES # BLD AUTO: 0.11 K/UL (ref 0–0.3)
IMM GRANULOCYTES NFR BLD: 1 %
LYMPHOCYTES NFR BLD: 1.72 K/UL (ref 1.1–3.7)
LYMPHOCYTES RELATIVE PERCENT: 19 % (ref 24–43)
MCH RBC QN AUTO: 29.5 PG (ref 25.2–33.5)
MCHC RBC AUTO-ENTMCNC: 32.7 G/DL (ref 28.4–34.8)
MCV RBC AUTO: 90.1 FL (ref 82.6–102.9)
MICROORGANISM SPEC CULT: NORMAL
MICROORGANISM SPEC CULT: NORMAL
MONOCYTES NFR BLD: 0.72 K/UL (ref 0.1–1.2)
MONOCYTES NFR BLD: 8 % (ref 3–12)
NEUTROPHILS NFR BLD: 68 % (ref 36–65)
NEUTS SEG NFR BLD: 6.37 K/UL (ref 1.5–8.1)
NRBC BLD-RTO: 0 PER 100 WBC
PLATELET # BLD AUTO: 210 K/UL (ref 138–453)
PMV BLD AUTO: 10 FL (ref 8.1–13.5)
POTASSIUM SERPL-SCNC: 3.7 MMOL/L (ref 3.7–5.3)
RBC # BLD AUTO: 4.04 M/UL (ref 4.21–5.77)
RBC # BLD: ABNORMAL 10*6/UL
SERVICE CMNT-IMP: NORMAL
SERVICE CMNT-IMP: NORMAL
SODIUM SERPL-SCNC: 136 MMOL/L (ref 135–144)
SPECIMEN DESCRIPTION: NORMAL
SPECIMEN DESCRIPTION: NORMAL
TOTAL PROTEIN, URINE: 295 MG/DL
VAS LEFT BRACHIAL A DIST PSV: 122 CM/S
VAS LEFT BULB EDV: 14.3 CM/S
VAS LEFT BULB PSV: 66.4 CM/S
VAS LEFT CCA DIST EDV: 13.2 CM/S
VAS LEFT CCA DIST PSV: 70.2 CM/S
VAS LEFT CCA MID EDV: 16.8 CM/S
VAS LEFT CCA MID PSV: 93.9 CM/S
VAS LEFT CCA PROX EDV: 16.8 CM/S
VAS LEFT CCA PROX PSV: 106 CM/S
VAS LEFT ECA EDV: 11.5 CM/S
VAS LEFT ECA PSV: 95.5 CM/S
VAS LEFT GSV ANKLE DIAM: 4.4 MM
VAS LEFT GSV AT KNEE DIAM: 5.9 MM
VAS LEFT GSV BK MID DIAM: 4.4 MM
VAS LEFT GSV BK PROX DIAM: 4.3 MM
VAS LEFT GSV JUNC DIAM: 9.7 MM
VAS LEFT GSV THIGH MID DIAM: 5.1 MM
VAS LEFT ICA DIST EDV: 24.7 CM/S
VAS LEFT ICA DIST PSV: 80.1 CM/S
VAS LEFT ICA MID EDV: 19.2 CM/S
VAS LEFT ICA MID PSV: 75.2 CM/S
VAS LEFT ICA PROX EDV: 17 CM/S
VAS LEFT ICA PROX PSV: 91.6 CM/S
VAS LEFT RADIAL A DIST PSV: 93.2 CM/S
VAS LEFT RADIAL A MID PSV: 105 CM/S
VAS LEFT RADIAL A PROX PSV: 104 CM/S
VAS LEFT RADIAL DIST AP DIAM: 0.32 CM
VAS LEFT RADIAL DIST TR DIAM: 0.32 CM
VAS LEFT RADIAL MID AP DIAM: 0.3 CM
VAS LEFT RADIAL MID TR DIAM: 0.3 CM
VAS LEFT RADIAL PROX AP DIAM: 0.4 CM
VAS LEFT RADIAL PROX TR DIAM: 0.4 CM
VAS LEFT ULNAR A DIST PSV: 82.7 CM/S
VAS LEFT ULNAR A MID PSV: 66.6 CM/S
VAS LEFT ULNAR A PROX PSV: 118 CM/S
VAS LEFT ULNAR DIST AP DIAM: 0.35 CM
VAS LEFT ULNAR DIST TR DIAM: 0.36 CM
VAS LEFT ULNAR MID AP DIAM: 0.4 CM
VAS LEFT ULNAR MID TR DIAM: 0.44 CM
VAS LEFT ULNAR PROX AP DIAM: 0.52 CM
VAS LEFT ULNAR PROX TR DIAM: 0.53 CM
VAS LEFT VERTEBRAL EDV: 15.3 CM/S
VAS LEFT VERTEBRAL PSV: 47.4 CM/S
VAS RIGHT BRACHIAL A DIST PSV: 117 CM/S
VAS RIGHT BULB EDV: 12.3 CM/S
VAS RIGHT BULB PSV: 56.6 CM/S
VAS RIGHT CCA DIST EDV: 17 CM/S
VAS RIGHT CCA DIST PSV: 93.3 CM/S
VAS RIGHT CCA MID EDV: 21.4 CM/S
VAS RIGHT CCA MID PSV: 90.5 CM/S
VAS RIGHT CCA PROX EDV: 17.4 CM/S
VAS RIGHT CCA PROX PSV: 121 CM/S
VAS RIGHT ECA EDV: 11.6 CM/S
VAS RIGHT ECA PSV: 126 CM/S
VAS RIGHT GSV AT KNEE DIAM: 6.2 MM
VAS RIGHT GSV BK MID DIAM: 4.2 MM
VAS RIGHT GSV BK PROX DIAM: 4.7 MM
VAS RIGHT GSV JUNC DIAM: 8.3 MM
VAS RIGHT GSV THIGH MID DIAM: 5.7 MM
VAS RIGHT ICA DIST EDV: 18.9 CM/S
VAS RIGHT ICA DIST PSV: 46.3 CM/S
VAS RIGHT ICA MID EDV: 20.2 CM/S
VAS RIGHT ICA MID PSV: 75.1 CM/S
VAS RIGHT ICA PROX EDV: 15.4 CM/S
VAS RIGHT ICA PROX PSV: 84.3 CM/S
VAS RIGHT RADIAL A DIST PSV: 137 CM/S
VAS RIGHT RADIAL A MID PSV: 91.1 CM/S
VAS RIGHT RADIAL A PROX PSV: 77.9 CM/S
VAS RIGHT RADIAL DIST AP DIAM: 0.39 CM
VAS RIGHT RADIAL DIST TR DIAM: 0.39 CM
VAS RIGHT RADIAL MID AP DIAM: 0.43 CM
VAS RIGHT RADIAL MID TR DIAM: 0.44 CM
VAS RIGHT RADIAL PROX AP DIAM: 0.43 CM
VAS RIGHT RADIAL PROX TR DIAM: 0.42 CM
VAS RIGHT ULNAR A DIST PSV: 43 CM/S
VAS RIGHT ULNAR A MID PSV: 61.5 CM/S
VAS RIGHT ULNAR A PROX PSV: 79.9 CM/S
VAS RIGHT ULNAR DIST AP DIAM: 0.26 CM
VAS RIGHT ULNAR DIST TR DIAM: 0.26 CM
VAS RIGHT ULNAR MID AP DIAM: 0.27 CM
VAS RIGHT ULNAR MID TR DIAM: 0.27 CM
VAS RIGHT ULNAR PROX AP DIAM: 0.48 CM
VAS RIGHT ULNAR PROX TR DIAM: 0.49 CM
VAS RIGHT VERTEBRAL EDV: 10.1 CM/S
VAS RIGHT VERTEBRAL PSV: 49.2 CM/S
WBC OTHER # BLD: 9.3 K/UL (ref 3.5–11.3)

## 2023-10-10 PROCEDURE — 85520 HEPARIN ASSAY: CPT

## 2023-10-10 PROCEDURE — 1200000000 HC SEMI PRIVATE

## 2023-10-10 PROCEDURE — 99222 1ST HOSP IP/OBS MODERATE 55: CPT | Performed by: NURSE PRACTITIONER

## 2023-10-10 PROCEDURE — 93880 EXTRACRANIAL BILAT STUDY: CPT | Performed by: SURGERY

## 2023-10-10 PROCEDURE — 2580000003 HC RX 258: Performed by: STUDENT IN AN ORGANIZED HEALTH CARE EDUCATION/TRAINING PROGRAM

## 2023-10-10 PROCEDURE — 99232 SBSQ HOSP IP/OBS MODERATE 35: CPT | Performed by: INTERNAL MEDICINE

## 2023-10-10 PROCEDURE — 85025 COMPLETE CBC W/AUTO DIFF WBC: CPT

## 2023-10-10 PROCEDURE — 7100000011 HC PHASE II RECOVERY - ADDTL 15 MIN

## 2023-10-10 PROCEDURE — 6360000002 HC RX W HCPCS

## 2023-10-10 PROCEDURE — 93010 ELECTROCARDIOGRAM REPORT: CPT | Performed by: INTERNAL MEDICINE

## 2023-10-10 PROCEDURE — 7100000010 HC PHASE II RECOVERY - FIRST 15 MIN

## 2023-10-10 PROCEDURE — 2580000003 HC RX 258

## 2023-10-10 PROCEDURE — 6370000000 HC RX 637 (ALT 250 FOR IP): Performed by: STUDENT IN AN ORGANIZED HEALTH CARE EDUCATION/TRAINING PROGRAM

## 2023-10-10 PROCEDURE — 80074 ACUTE HEPATITIS PANEL: CPT

## 2023-10-10 PROCEDURE — 93930 UPPER EXTREMITY STUDY: CPT

## 2023-10-10 PROCEDURE — 82570 ASSAY OF URINE CREATININE: CPT

## 2023-10-10 PROCEDURE — 93880 EXTRACRANIAL BILAT STUDY: CPT

## 2023-10-10 PROCEDURE — 93930 UPPER EXTREMITY STUDY: CPT | Performed by: SURGERY

## 2023-10-10 PROCEDURE — 93970 EXTREMITY STUDY: CPT

## 2023-10-10 PROCEDURE — 82947 ASSAY GLUCOSE BLOOD QUANT: CPT

## 2023-10-10 PROCEDURE — 6370000000 HC RX 637 (ALT 250 FOR IP)

## 2023-10-10 PROCEDURE — 71250 CT THORAX DX C-: CPT

## 2023-10-10 PROCEDURE — 80048 BASIC METABOLIC PNL TOTAL CA: CPT

## 2023-10-10 PROCEDURE — 99233 SBSQ HOSP IP/OBS HIGH 50: CPT | Performed by: SURGERY

## 2023-10-10 PROCEDURE — 93970 EXTREMITY STUDY: CPT | Performed by: SURGERY

## 2023-10-10 PROCEDURE — 84156 ASSAY OF PROTEIN URINE: CPT

## 2023-10-10 PROCEDURE — 6360000002 HC RX W HCPCS: Performed by: NURSE PRACTITIONER

## 2023-10-10 PROCEDURE — 36415 COLL VENOUS BLD VENIPUNCTURE: CPT

## 2023-10-10 RX ADMIN — CIPROFLOXACIN HYDROCHLORIDE 500 MG: 500 TABLET, FILM COATED ORAL at 06:28

## 2023-10-10 RX ADMIN — ONDANSETRON 4 MG: 2 INJECTION INTRAMUSCULAR; INTRAVENOUS at 08:10

## 2023-10-10 RX ADMIN — HYDRALAZINE HYDROCHLORIDE 10 MG: 20 INJECTION, SOLUTION INTRAMUSCULAR; INTRAVENOUS at 08:10

## 2023-10-10 RX ADMIN — CARVEDILOL 25 MG: 25 TABLET, FILM COATED ORAL at 08:10

## 2023-10-10 RX ADMIN — AMLODIPINE BESYLATE 10 MG: 10 TABLET ORAL at 08:10

## 2023-10-10 RX ADMIN — SODIUM CHLORIDE, PRESERVATIVE FREE 5 ML: 5 INJECTION INTRAVENOUS at 08:10

## 2023-10-10 RX ADMIN — PANTOPRAZOLE SODIUM 40 MG: 40 TABLET, DELAYED RELEASE ORAL at 08:09

## 2023-10-10 RX ADMIN — SODIUM CHLORIDE, PRESERVATIVE FREE 10 ML: 5 INJECTION INTRAVENOUS at 20:51

## 2023-10-10 RX ADMIN — CARVEDILOL 25 MG: 25 TABLET, FILM COATED ORAL at 17:21

## 2023-10-10 RX ADMIN — HEPARIN SODIUM 19 UNITS/KG/HR: 10000 INJECTION, SOLUTION INTRAVENOUS at 07:36

## 2023-10-10 RX ADMIN — INSULIN GLARGINE 60 UNITS: 100 INJECTION, SOLUTION SUBCUTANEOUS at 20:51

## 2023-10-10 RX ADMIN — DESMOPRESSIN ACETATE 40 MG: 0.2 TABLET ORAL at 20:51

## 2023-10-10 RX ADMIN — SODIUM CHLORIDE, PRESERVATIVE FREE 10 ML: 5 INJECTION INTRAVENOUS at 20:52

## 2023-10-10 RX ADMIN — ASPIRIN 81 MG: 81 TABLET, CHEWABLE ORAL at 08:10

## 2023-10-10 RX ADMIN — OXYCODONE AND ACETAMINOPHEN 1 TABLET: 5; 325 TABLET ORAL at 20:51

## 2023-10-10 ASSESSMENT — PAIN DESCRIPTION - DESCRIPTORS: DESCRIPTORS: DULL;THROBBING

## 2023-10-10 ASSESSMENT — PAIN DESCRIPTION - LOCATION: LOCATION: NECK

## 2023-10-10 ASSESSMENT — PAIN DESCRIPTION - ORIENTATION: ORIENTATION: RIGHT;POSTERIOR

## 2023-10-10 ASSESSMENT — PAIN SCALES - GENERAL: PAINLEVEL_OUTOF10: 8

## 2023-10-11 VITALS
BODY MASS INDEX: 38.22 KG/M2 | SYSTOLIC BLOOD PRESSURE: 144 MMHG | OXYGEN SATURATION: 98 % | TEMPERATURE: 97.5 F | WEIGHT: 282.19 LBS | DIASTOLIC BLOOD PRESSURE: 70 MMHG | RESPIRATION RATE: 20 BRPM | HEIGHT: 72 IN | HEART RATE: 68 BPM

## 2023-10-11 PROBLEM — R80.1 PERSISTENT PROTEINURIA: Status: ACTIVE | Noted: 2023-10-11

## 2023-10-11 LAB
ANA SER QL IA: POSITIVE
ANION GAP SERPL CALCULATED.3IONS-SCNC: 8 MMOL/L (ref 9–17)
ANTI-XA UNFRAC HEPARIN: 0.8 IU/L
BASOPHILS # BLD: 0.05 K/UL (ref 0–0.2)
BASOPHILS NFR BLD: 1 % (ref 0–2)
BUN SERPL-MCNC: 16 MG/DL (ref 6–20)
CALCIUM SERPL-MCNC: 7.8 MG/DL (ref 8.6–10.4)
CHLORIDE SERPL-SCNC: 109 MMOL/L (ref 98–107)
CO2 SERPL-SCNC: 22 MMOL/L (ref 20–31)
CREAT SERPL-MCNC: 1.2 MG/DL (ref 0.7–1.2)
DSDNA IGG SER QL IA: <0.5 IU/ML
EOSINOPHIL # BLD: 0.33 K/UL (ref 0–0.44)
EOSINOPHILS RELATIVE PERCENT: 4 % (ref 1–4)
ERYTHROCYTE [DISTWIDTH] IN BLOOD BY AUTOMATED COUNT: 15 % (ref 11.8–14.4)
GFR SERPL CREATININE-BSD FRML MDRD: >60 ML/MIN/1.73M2
GLUCOSE BLD-MCNC: 152 MG/DL (ref 75–110)
GLUCOSE BLD-MCNC: 220 MG/DL (ref 75–110)
GLUCOSE SERPL-MCNC: 122 MG/DL (ref 70–99)
HCT VFR BLD AUTO: 35.1 % (ref 40.7–50.3)
HGB BLD-MCNC: 11.4 G/DL (ref 13–17)
IMM GRANULOCYTES # BLD AUTO: 0.14 K/UL (ref 0–0.3)
IMM GRANULOCYTES NFR BLD: 2 %
LYMPHOCYTES NFR BLD: 1.76 K/UL (ref 1.1–3.7)
LYMPHOCYTES RELATIVE PERCENT: 20 % (ref 24–43)
MCH RBC QN AUTO: 30.1 PG (ref 25.2–33.5)
MCHC RBC AUTO-ENTMCNC: 32.5 G/DL (ref 28.4–34.8)
MCV RBC AUTO: 92.6 FL (ref 82.6–102.9)
MONOCYTES NFR BLD: 0.74 K/UL (ref 0.1–1.2)
MONOCYTES NFR BLD: 8 % (ref 3–12)
NEUTROPHILS NFR BLD: 65 % (ref 36–65)
NEUTS SEG NFR BLD: 5.79 K/UL (ref 1.5–8.1)
NRBC BLD-RTO: 0 PER 100 WBC
NUCLEAR IGG SER IA-RTO: 1.4 U/ML
PLATELET # BLD AUTO: 221 K/UL (ref 138–453)
PMV BLD AUTO: 9.9 FL (ref 8.1–13.5)
POTASSIUM SERPL-SCNC: 3.7 MMOL/L (ref 3.7–5.3)
RBC # BLD AUTO: 3.79 M/UL (ref 4.21–5.77)
RBC # BLD: ABNORMAL 10*6/UL
SODIUM SERPL-SCNC: 139 MMOL/L (ref 135–144)
WBC OTHER # BLD: 8.8 K/UL (ref 3.5–11.3)

## 2023-10-11 PROCEDURE — 6370000000 HC RX 637 (ALT 250 FOR IP)

## 2023-10-11 PROCEDURE — 99233 SBSQ HOSP IP/OBS HIGH 50: CPT | Performed by: NURSE PRACTITIONER

## 2023-10-11 PROCEDURE — 6370000000 HC RX 637 (ALT 250 FOR IP): Performed by: STUDENT IN AN ORGANIZED HEALTH CARE EDUCATION/TRAINING PROGRAM

## 2023-10-11 PROCEDURE — 6360000002 HC RX W HCPCS

## 2023-10-11 PROCEDURE — 2580000003 HC RX 258

## 2023-10-11 PROCEDURE — 99232 SBSQ HOSP IP/OBS MODERATE 35: CPT | Performed by: INTERNAL MEDICINE

## 2023-10-11 PROCEDURE — 85520 HEPARIN ASSAY: CPT

## 2023-10-11 PROCEDURE — 85025 COMPLETE CBC W/AUTO DIFF WBC: CPT

## 2023-10-11 PROCEDURE — 6360000002 HC RX W HCPCS: Performed by: NURSE PRACTITIONER

## 2023-10-11 PROCEDURE — 99233 SBSQ HOSP IP/OBS HIGH 50: CPT | Performed by: INTERNAL MEDICINE

## 2023-10-11 PROCEDURE — 2580000003 HC RX 258: Performed by: STUDENT IN AN ORGANIZED HEALTH CARE EDUCATION/TRAINING PROGRAM

## 2023-10-11 PROCEDURE — 80048 BASIC METABOLIC PNL TOTAL CA: CPT

## 2023-10-11 PROCEDURE — 2580000003 HC RX 258: Performed by: NURSE PRACTITIONER

## 2023-10-11 PROCEDURE — 82947 ASSAY GLUCOSE BLOOD QUANT: CPT

## 2023-10-11 PROCEDURE — 36415 COLL VENOUS BLD VENIPUNCTURE: CPT

## 2023-10-11 RX ORDER — ASPIRIN 81 MG/1
81 TABLET, CHEWABLE ORAL DAILY
Qty: 30 TABLET | Refills: 3 | Status: SHIPPED | OUTPATIENT
Start: 2023-10-12

## 2023-10-11 RX ORDER — ALBUTEROL SULFATE 2.5 MG/3ML
2.5 SOLUTION RESPIRATORY (INHALATION) EVERY 4 HOURS PRN
Qty: 120 EACH | Refills: 3 | Status: SHIPPED | OUTPATIENT
Start: 2023-10-11

## 2023-10-11 RX ORDER — CLOPIDOGREL BISULFATE 75 MG/1
75 TABLET ORAL DAILY
Qty: 60 TABLET | Refills: 0 | Status: SHIPPED | OUTPATIENT
Start: 2023-10-11

## 2023-10-11 RX ORDER — ATORVASTATIN CALCIUM 40 MG/1
40 TABLET, FILM COATED ORAL NIGHTLY
Qty: 30 TABLET | Refills: 3 | Status: SHIPPED | OUTPATIENT
Start: 2023-10-11

## 2023-10-11 RX ADMIN — ASPIRIN 81 MG: 81 TABLET, CHEWABLE ORAL at 08:06

## 2023-10-11 RX ADMIN — SODIUM CHLORIDE: 9 INJECTION, SOLUTION INTRAVENOUS at 00:28

## 2023-10-11 RX ADMIN — SODIUM CHLORIDE, PRESERVATIVE FREE 10 ML: 5 INJECTION INTRAVENOUS at 08:07

## 2023-10-11 RX ADMIN — POTASSIUM BICARBONATE 20 MEQ: 782 TABLET, EFFERVESCENT ORAL at 08:13

## 2023-10-11 RX ADMIN — CARVEDILOL 25 MG: 25 TABLET, FILM COATED ORAL at 17:09

## 2023-10-11 RX ADMIN — CARVEDILOL 25 MG: 25 TABLET, FILM COATED ORAL at 08:06

## 2023-10-11 RX ADMIN — AMLODIPINE BESYLATE 10 MG: 10 TABLET ORAL at 08:07

## 2023-10-11 RX ADMIN — HYDRALAZINE HYDROCHLORIDE 10 MG: 20 INJECTION, SOLUTION INTRAMUSCULAR; INTRAVENOUS at 03:55

## 2023-10-11 RX ADMIN — INSULIN LISPRO 2 UNITS: 100 INJECTION, SOLUTION INTRAVENOUS; SUBCUTANEOUS at 17:09

## 2023-10-11 RX ADMIN — HEPARIN SODIUM 19 UNITS/KG/HR: 10000 INJECTION, SOLUTION INTRAVENOUS at 00:03

## 2023-10-11 RX ADMIN — PANTOPRAZOLE SODIUM 40 MG: 40 TABLET, DELAYED RELEASE ORAL at 08:06

## 2023-10-11 NOTE — PLAN OF CARE
Off the floor on rounding for testing     Electronically signed by ADEOLA Jones NP on 10/10/23 at 10:52 AM EDT
Problem: Discharge Planning  Goal: Discharge to home or other facility with appropriate resources  10/5/2023 1710 by Barbie Molina RN  Outcome: [address] [id number] Progressing  10/5/2023 0703 by Kiko Escobar RN  Outcome: Progressing     Problem: Skin/Tissue Integrity  Goal: Absence of new skin breakdown  Description: 1. Monitor for areas of redness and/or skin breakdown  2. Assess vascular access sites hourly  3. Every 4-6 hours minimum:  Change oxygen saturation probe site  4. Every 4-6 hours:  If on nasal continuous positive airway pressure, respiratory therapy assess nares and determine need for appliance change or resting period.   10/5/2023 1710 by Barbie Molina RN  Outcome: [address] [address] Progressing  10/5/2023 0703 by Kiko Escobar RN  Outcome: Progressing     Problem: Safety - Adult  Goal: Free from fall injury  10/5/2023 1710 by Barbie Molina RN  Outcome: [address] [address] Progressing  10/5/2023 0703 by Kiko Escobar RN  Outcome: Progressing     Problem: ABCDS Injury Assessment  Goal: Absence of physical injury  10/5/2023 1710 by Barbie Molina RN  Outcome: [address] [address] Progressing  10/5/2023 0703 by Kiko Escobar RN  Outcome: Progressing     Problem: Pain  Goal: Verbalizes/displays adequate comfort level or baseline comfort level  10/5/2023 1710 by Barbie Molina RN  Outcome: [address] [address] Progressing  10/5/2023 0703 by Kiko Escobar RN  Outcome: Progressing     Problem: Chronic Conditions and Co-morbidities  Goal: Patient's chronic conditions and co-morbidity symptoms are monitored and maintained or improved  Outcome: [address] [address] Progressing
Problem: Discharge Planning  Goal: Discharge to home or other facility with appropriate resources  10/6/2023 1037 by Blaine Whelan RN  Outcome: Progressing     Problem: Skin/Tissue Integrity  Goal: Absence of new skin breakdown  10/6/2023 1037 by Blaine Whelan RN  Outcome: Progressing     Problem: Safety - Adult  Goal: Free from fall injury  10/6/2023 1037 by Blaine Whelan RN  Outcome: Progressing     Problem: ABCDS Injury Assessment  Goal: Absence of physical injury  10/6/2023 1037 by Blaine Whelan RN  Outcome: Progressing     Problem: Pain  Goal: Verbalizes/displays adequate comfort level or baseline comfort level  10/6/2023 1037 by Blaine Whelan RN  Outcome: Progressing     Problem: Chronic Conditions and Co-morbidities  Goal: Patient's chronic conditions and co-morbidity symptoms are monitored and maintained or improved  10/6/2023 1037 by Blaine Whelan RN  Outcome: Progressing
Problem: Discharge Planning  Goal: Discharge to home or other facility with appropriate resources  10/6/2023 2031 by Yesenia Camacho RN  Outcome: Progressing  10/6/2023 1037 by Rajiv Armendariz RN  Outcome: Progressing     Problem: Skin/Tissue Integrity  Goal: Absence of new skin breakdown  Description: 1. Monitor for areas of redness and/or skin breakdown  2. Assess vascular access sites hourly  3. Every 4-6 hours minimum:  Change oxygen saturation probe site  4. Every 4-6 hours:  If on nasal continuous positive airway pressure, respiratory therapy assess nares and determine need for appliance change or resting period.   10/6/2023 2031 by Yesenia Camacho RN  Outcome: Progressing  10/6/2023 1037 by Rajiv Armendariz RN  Outcome: Progressing     Problem: Safety - Adult  Goal: Free from fall injury  10/6/2023 2031 by Yesenia Camacho RN  Outcome: Progressing  10/6/2023 1037 by Rajiv Armendariz RN  Outcome: Progressing     Problem: ABCDS Injury Assessment  Goal: Absence of physical injury  10/6/2023 2031 by Yesenia Camacho RN  Outcome: Progressing  10/6/2023 1037 by Rajiv Armendariz RN  Outcome: Progressing     Problem: Pain  Goal: Verbalizes/displays adequate comfort level or baseline comfort level  10/6/2023 2031 by Yesenia Camacho RN  Outcome: Progressing  10/6/2023 1037 by Rajiv Armendariz RN  Outcome: Progressing     Problem: Chronic Conditions and Co-morbidities  Goal: Patient's chronic conditions and co-morbidity symptoms are monitored and maintained or improved  10/6/2023 2031 by Yesenia Camacho RN  Outcome: Progressing  10/6/2023 1037 by Rajiv Armendariz RN  Outcome: Progressing
Problem: Discharge Planning  Goal: Discharge to home or other facility with appropriate resources  10/7/2023 0840 by Janna Coker RN  Outcome: Progressing     Problem: Skin/Tissue Integrity  Goal: Absence of new skin breakdown  10/7/2023 0840 by Janna Coker RN  Outcome: Progressing     Problem: Safety - Adult  Goal: Free from fall injury  10/7/2023 0840 by Janna Coker RN  Outcome: Progressing     Problem: ABCDS Injury Assessment  Goal: Absence of physical injury  10/7/2023 0840 by Janna Coker RN  Outcome: Progressing     Problem: Pain  Goal: Verbalizes/displays adequate comfort level or baseline comfort level  10/7/2023 0840 by Janna Coker RN  Outcome: Progressing     Problem: Chronic Conditions and Co-morbidities  Goal: Patient's chronic conditions and co-morbidity symptoms are monitored and maintained or improved  10/7/2023 0840 by Janna Coker RN  Outcome: Progressing
Problem: Discharge Planning  Goal: Discharge to home or other facility with appropriate resources  10/7/2023 2155 by Mayela Adams RN  Outcome: Progressing  Flowsheets (Taken 10/7/2023 1710 by Savannah Lassiter RN)  Discharge to home or other facility with appropriate resources: Identify barriers to discharge with patient and caregiver  10/7/2023 0840 by Fran Cruz RN  Outcome: Progressing     Problem: Skin/Tissue Integrity  Goal: Absence of new skin breakdown  Description: 1. Monitor for areas of redness and/or skin breakdown  2. Assess vascular access sites hourly  3. Every 4-6 hours minimum:  Change oxygen saturation probe site  4. Every 4-6 hours:  If on nasal continuous positive airway pressure, respiratory therapy assess nares and determine need for appliance change or resting period.   10/7/2023 2155 by Mayela Adams RN  Outcome: Progressing  10/7/2023 0840 by Fran Cruz RN  Outcome: Progressing     Problem: Safety - Adult  Goal: Free from fall injury  10/7/2023 2155 by Mayela Adams RN  Outcome: Progressing  10/7/2023 0840 by Fran Cruz RN  Outcome: Progressing     Problem: ABCDS Injury Assessment  Goal: Absence of physical injury  10/7/2023 2155 by Mayela Adams RN  Outcome: Progressing  10/7/2023 0840 by Fran Cruz RN  Outcome: Progressing     Problem: Pain  Goal: Verbalizes/displays adequate comfort level or baseline comfort level  10/7/2023 2155 by Mayela Adams RN  Outcome: Progressing  Flowsheets (Taken 10/7/2023 1700 by Savannah Lassiter RN)  Verbalizes/displays adequate comfort level or baseline comfort level: Encourage patient to monitor pain and request assistance  10/7/2023 0840 by Fran Cruz RN  Outcome: Progressing     Problem: Chronic Conditions and Co-morbidities  Goal: Patient's chronic conditions and co-morbidity symptoms are monitored and maintained or improved  10/7/2023 2155 by Mayela Adams RN  Outcome: Progressing  Flowsheets
Problem: Discharge Planning  Goal: Discharge to home or other facility with appropriate resources  Outcome: Progressing     Problem: Skin/Tissue Integrity  Goal: Absence of new skin breakdown  Description: 1. Monitor for areas of redness and/or skin breakdown  2. Assess vascular access sites hourly  3. Every 4-6 hours minimum:  Change oxygen saturation probe site  4. Every 4-6 hours:  If on nasal continuous positive airway pressure, respiratory therapy assess nares and determine need for appliance change or resting period.   Outcome: Progressing     Problem: Safety - Adult  Goal: Free from fall injury  Outcome: Progressing     Problem: ABCDS Injury Assessment  Goal: Absence of physical injury  Outcome: Progressing     Problem: Pain  Goal: Verbalizes/displays adequate comfort level or baseline comfort level  Outcome: Progressing
Problem: Discharge Planning  Goal: Discharge to home or other facility with appropriate resources  Outcome: Progressing     Problem: Skin/Tissue Integrity  Goal: Absence of new skin breakdown  Description: 1. Monitor for areas of redness and/or skin breakdown  2. Assess vascular access sites hourly  3. Every 4-6 hours minimum:  Change oxygen saturation probe site  4. Every 4-6 hours:  If on nasal continuous positive airway pressure, respiratory therapy assess nares and determine need for appliance change or resting period.   Outcome: Progressing     Problem: Safety - Adult  Goal: Free from fall injury  Outcome: Progressing     Problem: ABCDS Injury Assessment  Goal: Absence of physical injury  Outcome: Progressing     Problem: Pain  Goal: Verbalizes/displays adequate comfort level or baseline comfort level  Outcome: Progressing     Problem: Chronic Conditions and Co-morbidities  Goal: Patient's chronic conditions and co-morbidity symptoms are monitored and maintained or improved  Outcome: Progressing     Problem: Respiratory - Adult  Goal: Achieves optimal ventilation and oxygenation  Outcome: Progressing
Problem: Discharge Planning  Goal: Discharge to home or other facility with appropriate resources  Outcome: Progressing     Problem: Skin/Tissue Integrity  Goal: Absence of new skin breakdown  Description: 1. Monitor for areas of redness and/or skin breakdown  2. Assess vascular access sites hourly  3. Every 4-6 hours minimum:  Change oxygen saturation probe site  4. Every 4-6 hours:  If on nasal continuous positive airway pressure, respiratory therapy assess nares and determine need for appliance change or resting period.   Outcome: Progressing     Problem: Safety - Adult  Goal: Free from fall injury  Outcome: Progressing     Problem: ABCDS Injury Assessment  Goal: Absence of physical injury  Outcome: Progressing     Problem: Pain  Goal: Verbalizes/displays adequate comfort level or baseline comfort level  Outcome: Progressing  Flowsheets (Taken 10/10/2023 1130 by Keya Chow RN)  Verbalizes/displays adequate comfort level or baseline comfort level: Encourage patient to monitor pain and request assistance     Problem: Chronic Conditions and Co-morbidities  Goal: Patient's chronic conditions and co-morbidity symptoms are monitored and maintained or improved  Outcome: Progressing     Problem: Respiratory - Adult  Goal: Achieves optimal ventilation and oxygenation  Outcome: Progressing
Problem: Discharge Planning  Goal: Discharge to home or other facility with appropriate resources  Outcome: Progressing     Problem: Skin/Tissue Integrity  Goal: Absence of new skin breakdown  Description: 1. Monitor for areas of redness and/or skin breakdown  2. Assess vascular access sites hourly  3. Every 4-6 hours minimum:  Change oxygen saturation probe site  4. Every 4-6 hours:  If on nasal continuous positive airway pressure, respiratory therapy assess nares and determine need for appliance change or resting period.   Outcome: Progressing     Problem: Safety - Adult  Goal: Free from fall injury  Outcome: Progressing     Problem: ABCDS Injury Assessment  Goal: Absence of physical injury  Outcome: Progressing     Problem: Pain  Goal: Verbalizes/displays adequate comfort level or baseline comfort level  Outcome: Progressing  Flowsheets (Taken 10/9/2023 1030 by Josué Beltrán RN)  Verbalizes/displays adequate comfort level or baseline comfort level: Encourage patient to monitor pain and request assistance     Problem: Chronic Conditions and Co-morbidities  Goal: Patient's chronic conditions and co-morbidity symptoms are monitored and maintained or improved  Outcome: Progressing     Problem: Respiratory - Adult  Goal: Achieves optimal ventilation and oxygenation  Outcome: Progressing
Problem: Respiratory - Adult  Goal: Achieves optimal ventilation and oxygenation  Outcome: Progressing
Problem: Skin/Tissue Integrity  Goal: Absence of new skin breakdown  Description: 1. Monitor for areas of redness and/or skin breakdown  2. Assess vascular access sites hourly  3. Every 4-6 hours minimum:  Change oxygen saturation probe site  4. Every 4-6 hours:  If on nasal continuous positive airway pressure, respiratory therapy assess nares and determine need for appliance change or resting period.   10/5/2023 2227 by Jeronimo Gilliam RN  Outcome: Progressing  10/5/2023 1710 by Ricardo Bell RN  Outcome: [facility] [address] Progressing     Problem: Safety - Adult  Goal: Free from fall injury  10/5/2023 2227 by Jeronimo Gilliam RN  Outcome: Progressing  10/5/2023 1710 by Ricardo Bell RN  Outcome: [facility] [address] Progressing     Problem: ABCDS Injury Assessment  Goal: Absence of physical injury  10/5/2023 2227 by Jeronimo Gilliam RN  Outcome: Progressing  10/5/2023 1710 by Ricardo Bell RN  Outcome: [facility] [address] Progressing     Problem: Pain  Goal: Verbalizes/displays adequate comfort level or baseline comfort level  10/5/2023 2227 by Jeronimo Gilliam RN  Outcome: Progressing  10/5/2023 1710 by Ricardo Bell RN  Outcome: [facility] [address] Progressing     Problem: Chronic Conditions and Co-morbidities  Goal: Patient's chronic conditions and co-morbidity symptoms are monitored and maintained or improved  10/5/2023 2227 by Jeronimo Gilliam RN  Outcome: Progressing  10/5/2023 1710 by Ricardo Bell RN  Outcome: [facility] [address] Progressing
Pt not bedside to complete consult at this time  Please complete preop studies. Plan would be to have patient come back to clinic after preop work or come back directly outpatient as CABG   We still need to discuss with patient.
Reviewed preops   Noted large vein conduit  We will review with Dr Clearnce Osgood and determine surgical date this time.
We want patient to get JO done to eval AV with better imaging. We will then have patient come back to [address] [facility] to eval for CABG once JO done.    This will provide ample time for patient to prep for surgery outpatient  CTS sign follow ups will be made by us
2342 by Aurelio Horne RN  Outcome: Progressing  10/9/2023 1842 by Kevin Henao RN  Outcome: Progressing

## 2023-10-11 NOTE — DISCHARGE INSTRUCTIONS
-Please follow-up with your primary care in 1 week or less. Call them for an appointment. If you do not have a primary care provider please establish with 1 at the [facility] internal medicine clinic  -Please follow-up with cardiothoracic surgery. Phone numbers listed. You will need to have a transesophageal echocardiogram completed and follow-up with cardiothoracic surgery for evaluation for coronary artery bypass grafting.  -Please take medications as prescribed  -If your chest pain worsens please be reevaluated in the emergency room  -Please follow-up with [facility] cardiology consultants.   Call for an appointment

## 2023-10-11 NOTE — CARE COORDINATION
Transitional planning-talked with patient. Plan is to go home with his wife. Has ride. Not wanting any home care at this time.

## 2023-10-13 PROBLEM — I25.10 CAD, MULTIPLE VESSEL: Status: ACTIVE | Noted: 2023-10-13

## 2023-10-13 PROBLEM — R79.89 D-DIMER, ELEVATED: Status: ACTIVE | Noted: 2023-10-13

## 2023-10-18 ENCOUNTER — OFFICE VISIT (OUTPATIENT)
Dept: OPERATING ROOM | Age: 58
End: 2023-10-18
Attending: INTERNAL MEDICINE

## 2023-10-18 ENCOUNTER — HOSPITAL ENCOUNTER (OUTPATIENT)
Age: 58
Setting detail: OUTPATIENT SURGERY
Discharge: HOME | End: 2023-10-20
Payer: MEDICARE

## 2023-10-18 VITALS
HEIGHT: 72 IN | WEIGHT: 315 LBS | OXYGEN SATURATION: 98 % | DIASTOLIC BLOOD PRESSURE: 92 MMHG | HEART RATE: 62 BPM | TEMPERATURE: 97.9 F | BODY MASS INDEX: 42.66 KG/M2 | RESPIRATION RATE: 18 BRPM | SYSTOLIC BLOOD PRESSURE: 170 MMHG

## 2023-10-18 DIAGNOSIS — I35.1 AORTIC INSUFFICIENCY: Primary | ICD-10-CM

## 2023-10-18 DIAGNOSIS — I25.10 CAD, MULTIPLE VESSEL: ICD-10-CM

## 2023-10-18 LAB
BUN BLD-MCNC: 16 MG/DL (ref 8–26)
CHLORIDE BLD-SCNC: 109 MMOL/L (ref 98–107)
ECHO AO ASC DIAM: 4.5 CM
ECHO AO ASCENDING AORTA INDEX: 1.31 CM/M2
ECHO AO ROOT DIAM: 3.8 CM
ECHO AO ROOT INDEX: 1.1 CM/M2
ECHO BSA: 3.77 M2
ECHO BSA: 3.77 M2
ECHO LV EF PHYSICIAN: 45 %
ECHO LVOT AREA: 4.5 CM2
ECHO LVOT DIAM: 2.4 CM
EGFR, POC: >60 ML/MIN/1.73M2
GLUCOSE BLD-MCNC: 153 MG/DL (ref 74–100)
HCT VFR BLD AUTO: 44 % (ref 41–53)
POC CREATININE: 1.1 MG/DL (ref 0.51–1.19)
POC HEMOGLOBIN (CALC): 15.1 G/DL (ref 13.5–17.5)
POTASSIUM BLD-SCNC: 3.9 MMOL/L (ref 3.5–4.5)
SODIUM BLD-SCNC: 142 MMOL/L (ref 138–146)

## 2023-10-18 PROCEDURE — 84295 ASSAY OF SERUM SODIUM: CPT

## 2023-10-18 PROCEDURE — 6370000000 HC RX 637 (ALT 250 FOR IP): Performed by: INTERNAL MEDICINE

## 2023-10-18 PROCEDURE — 93312 ECHO TRANSESOPHAGEAL: CPT

## 2023-10-18 PROCEDURE — 7100000010 HC PHASE II RECOVERY - FIRST 15 MIN: Performed by: STUDENT IN AN ORGANIZED HEALTH CARE EDUCATION/TRAINING PROGRAM

## 2023-10-18 PROCEDURE — 82565 ASSAY OF CREATININE: CPT

## 2023-10-18 PROCEDURE — 84132 ASSAY OF SERUM POTASSIUM: CPT

## 2023-10-18 PROCEDURE — 82435 ASSAY OF BLOOD CHLORIDE: CPT

## 2023-10-18 PROCEDURE — 82947 ASSAY GLUCOSE BLOOD QUANT: CPT

## 2023-10-18 PROCEDURE — 93312 ECHO TRANSESOPHAGEAL: CPT | Performed by: INTERNAL MEDICINE

## 2023-10-18 PROCEDURE — 99152 MOD SED SAME PHYS/QHP 5/>YRS: CPT | Performed by: STUDENT IN AN ORGANIZED HEALTH CARE EDUCATION/TRAINING PROGRAM

## 2023-10-18 PROCEDURE — 85014 HEMATOCRIT: CPT

## 2023-10-18 PROCEDURE — 6360000002 HC RX W HCPCS: Performed by: STUDENT IN AN ORGANIZED HEALTH CARE EDUCATION/TRAINING PROGRAM

## 2023-10-18 PROCEDURE — 84520 ASSAY OF UREA NITROGEN: CPT

## 2023-10-18 PROCEDURE — 7100000011 HC PHASE II RECOVERY - ADDTL 15 MIN: Performed by: STUDENT IN AN ORGANIZED HEALTH CARE EDUCATION/TRAINING PROGRAM

## 2023-10-18 RX ORDER — SODIUM CHLORIDE 9 MG/ML
INJECTION, SOLUTION INTRAVENOUS CONTINUOUS
Status: DISCONTINUED | OUTPATIENT
Start: 2023-10-18 | End: 2023-10-21 | Stop reason: HOSPADM

## 2023-10-18 RX ORDER — MIDAZOLAM HYDROCHLORIDE 1 MG/ML
INJECTION INTRAMUSCULAR; INTRAVENOUS PRN
Status: COMPLETED | OUTPATIENT
Start: 2023-10-18 | End: 2023-10-18

## 2023-10-18 RX ORDER — FENTANYL CITRATE 50 UG/ML
INJECTION, SOLUTION INTRAMUSCULAR; INTRAVENOUS PRN
Status: COMPLETED | OUTPATIENT
Start: 2023-10-18 | End: 2023-10-18

## 2023-10-18 RX ORDER — LIDOCAINE HYDROCHLORIDE 20 MG/ML
SOLUTION OROPHARYNGEAL PRN
Status: COMPLETED | OUTPATIENT
Start: 2023-10-18 | End: 2023-10-18

## 2023-10-18 RX ADMIN — MIDAZOLAM 1 MG: 1 INJECTION INTRAMUSCULAR; INTRAVENOUS at 09:46

## 2023-10-18 RX ADMIN — LIDOCAINE HYDROCHLORIDE 10 ML: 20 SOLUTION ORAL; TOPICAL at 09:42

## 2023-10-18 RX ADMIN — FENTANYL CITRATE 25 MCG: 50 INJECTION, SOLUTION INTRAMUSCULAR; INTRAVENOUS at 09:44

## 2023-10-18 RX ADMIN — BENZOCAINE, BUTAMBEN, AND TETRACAINE HYDROCHLORIDE 3 SPRAY: .028; .004; .004 AEROSOL, SPRAY TOPICAL at 09:42

## 2023-10-18 RX ADMIN — MIDAZOLAM 1 MG: 1 INJECTION INTRAMUSCULAR; INTRAVENOUS at 09:44

## 2023-10-18 RX ADMIN — MIDAZOLAM 1 MG: 1 INJECTION INTRAMUSCULAR; INTRAVENOUS at 09:49

## 2023-10-18 NOTE — DISCHARGE INSTRUCTIONS
TRANSESOPHAGEAL ECHOCARDIOGRAM / JO DISCHARGE INSTRUCTIONS    If the following occurs call your physician or seek help    -trouble with swallowing    -spitting or coughing up blood    -severe pain in the throat region / your throat can be sore for several days but pain should not increase as days continue    Do not drive or operate heavy machinery today due to sedation            Transesophageal Echocardiogram:  What is a transesophageal echocardiogram?     A transesophageal echocardiogram is a test to help your doctor look at the inside of your heart. A small device called a transducer directs sound waves toward your heart. The sound waves make a picture of the heart's valves and chambers. Your doctor may do this test to look for certain types of heart disease. Or it may be done to see how disease is affecting your heart. You will be given medicine to make you sleepy and comfortable during the test.  The doctor puts a small, flexible tube into your throat and guides it to the esophagus. This is the tube that connects your mouth to your stomach. The doctor will ask you to swallow as the tube goes down. The transducer is at the tip of the tube. It gets close to your heart to make clear pictures. The doctor will look at the ultrasound pictures on a screen. You will not be able to eat or drink until the numbness from the throat spray wears off. Your throat may be sore for a few days after the test.  Follow-up care is a key part of your treatment and safety. Be sure to make and go to all appointments, and call your doctor if you are having problems. It's also a good idea to know your test results and keep a list of your current medications. Going home  Be sure you have someone to drive you home. Anesthesia and pain medicine make it unsafe for you to drive. Where can you learn more? Go to https://joseph.[employer]. org and sign in to your [employer] account.  Enter K500 in the [employer]

## 2023-10-18 NOTE — PROCEDURES
[facility]  Transesophageal Echocardiogram       Today's Date:  10/18/2023  Ordering Physician:  Dr. Quiana Zeng  Indication:   AI    Operators:  Primary:   Lesly Darling MD (Attending Physician)  Pre Procedure Conscious Sedation Data:  ASA Class:    [] I [x] II [] III [] IV    Mallampati Class:  [] I [x] II [] III [] IV    Procedure:  Patient seen and examined. History and Physical reviewed. Labs reviewed. After informed consent was obtained with explanation of the risks and benefits, the patient was brought to cardiac cath lab. All sedation was administered by the cardiologist. The oropharynx was pre-anesthesized with viscous lidocaine and cetacaine spray. The ultrasound probe was passed without any difficulty. JO findings:  LA:  Normal  ANAYA:  No thrombus  RA:  Normal  RV:   Normal  LV:  Normal  Estimated LVEF:  40 - 45 %  Aorta:   Mild atheromatous disease arch  Pericardium: No pericardial effusion  Septum:  No intracardiac shunt via color Doppler. Valves:  Mitral Valve: Structurally normal. Mild to moderate  regurgitation is identified. Aortic Valve: The aortic valve is trileaflet and opens adequately. Moderate to severe  regurgitiation is identified. Tricuspid valve: Structurally normal.  regurgitation is identified. Pulmonary valve: Normal. No significant regurgitation    No valvular vegetations or thrombus identified. Summary:   1. A JO was performed without complications. 2. LVEF 40 - 45 %  3. No thrombus or valvular vegetation identified      Ellen Cortez, PGY- 4  Fellow, cardiovascular disease   [facility], [place], [place]  10/18/2023, 9:53 AM  [facility]  Deborah Gates[address] [facility]

## 2023-10-18 NOTE — H&P
[facility]  Procedure History and Physical Update          Patient Name: Aidee Zimmerman  MRN:    [id number]  YOB: 1965  Date of evaluation:  10/18/2023    Procedure:    JO    Indication for procedure:  AI and aortic root dilatation on TTE      Please refer to the progress note  completed by LISETTE Herr on 10/11/2023 in the medical record and note that:    [x] I have examined the patient and reviewed the H&P/Consult and there are no changes to be made to the assessment or plan. [] I have examined the patient and reviewed the H&P/Consult and have noted the following changes:    No past medical history on file. Past Surgical History:   Procedure Laterality Date    CARDIAC PROCEDURE N/A 10/9/2023    Coronary angiography performed by Eren Isbell MD at [address] [facility] N/A 10/9/2023    Aortic root aortogram performed by Eren Isbell MD at [address] [place] [address]       No family history on file. Allergies   Allergen Reactions    Vancomycin Nausea And Vomiting    Zosyn [Piperacillin Sod-Tazobactam So] Anaphylaxis       Prior to Admission medications    Medication Sig Start Date End Date Taking? Authorizing Provider   albuterol (PROVENTIL) (2.5 MG/3ML) 0.083% nebulizer solution Take 3 mLs by nebulization every 4 hours as needed for Wheezing or Shortness of Breath 10/11/23   Juni Arreola, DO   atorvastatin (LIPITOR) 40 MG tablet Take 1 tablet by mouth nightly 10/11/23   Juni Arreola, DO   aspirin 81 MG chewable tablet Take 1 tablet by mouth daily 10/12/23   Juni Arreola, DO   clopidogrel (PLAVIX) 75 MG tablet Take 1 tablet by mouth daily 10/11/23   Juni Arreola, DO   carvedilol (COREG) 25 MG tablet Take 1 tablet by mouth in the morning and 1 tablet in the evening.  9/13/23   Jaylyn Lopez MD   lisinopril (PRINIVIL;ZESTRIL) 20 MG tablet Take 1 tablet by mouth daily 9/13/23   Jaylyn Lopez MD   furosemide (LASIX) 20 MG

## 2023-10-18 NOTE — PROGRESS NOTES
TRANSFER - OUT REPORT:    Verbal report given to vicky EID) on Carolina Smith being transferred to  [facility] for routine post-op       Report consisted of patient's Situation, Background, Assessment and   Recommendations(SBAR). Information from the following report(s) Nurse Handoff Report was reviewed with the receiving nurse. Opportunity for questions and clarification was provided.       Patient transported with:   Registered Nurse

## 2023-10-18 NOTE — PROGRESS NOTES
Patient returned to room posterior JO recovery initiated. Patient awake and alert, denies any complaints. Family at bedside, call light with in reach. Side rails up times 2, cardiac monitor shows SR with PVC's noted. Patient NPO for now.

## 2023-11-07 DIAGNOSIS — I25.84 CORONARY ARTERY DISEASE DUE TO CALCIFIED CORONARY LESION: Primary | ICD-10-CM

## 2023-11-07 DIAGNOSIS — I25.10 CORONARY ARTERY DISEASE DUE TO CALCIFIED CORONARY LESION: Primary | ICD-10-CM

## 2024-01-03 ENCOUNTER — INITIAL CONSULT (OUTPATIENT)
Dept: CARDIOTHORACIC SURGERY | Age: 59
End: 2024-01-03
Payer: MEDICARE

## 2024-01-03 VITALS
SYSTOLIC BLOOD PRESSURE: 179 MMHG | HEIGHT: 72 IN | HEART RATE: 76 BPM | DIASTOLIC BLOOD PRESSURE: 98 MMHG | RESPIRATION RATE: 18 BRPM | WEIGHT: 278 LBS | BODY MASS INDEX: 37.65 KG/M2 | OXYGEN SATURATION: 98 %

## 2024-01-03 DIAGNOSIS — I35.1 AORTIC VALVE INSUFFICIENCY, ETIOLOGY OF CARDIAC VALVE DISEASE UNSPECIFIED: Primary | ICD-10-CM

## 2024-01-03 DIAGNOSIS — R79.9 ABNORMAL FINDING OF BLOOD CHEMISTRY, UNSPECIFIED: ICD-10-CM

## 2024-01-03 PROCEDURE — 4004F PT TOBACCO SCREEN RCVD TLK: CPT

## 2024-01-03 PROCEDURE — 99204 OFFICE O/P NEW MOD 45 MIN: CPT

## 2024-01-03 PROCEDURE — 3080F DIAST BP >= 90 MM HG: CPT

## 2024-01-03 PROCEDURE — 3017F COLORECTAL CA SCREEN DOC REV: CPT

## 2024-01-03 PROCEDURE — G8427 DOCREV CUR MEDS BY ELIG CLIN: HCPCS

## 2024-01-03 PROCEDURE — 3077F SYST BP >= 140 MM HG: CPT

## 2024-01-03 PROCEDURE — G8417 CALC BMI ABV UP PARAM F/U: HCPCS

## 2024-01-03 PROCEDURE — G8484 FLU IMMUNIZE NO ADMIN: HCPCS

## 2024-01-03 NOTE — PROGRESS NOTES
stenosis. The D2 is small with 90% ostial stenosis.      Left Circumflex   Has extreme eccentric angle from the left main with mid 75-80% eccentric stenosis seen in cranial views. It supplies LPDA.      Right Coronary Artery   Is a co-dominant vessel with proximal 75-80% eccentric stenosis.        Assessment :      In summary, Mr. Darden is a 58 y.o. year-old male with MVCAD and severe AI and normal root and ascending.     Plan:   Will plan for CABG AVR with possible radial harvest and sternal plating   Patient has completed pre-op work up in October   Will need updated labs   Hold lisinopril x1 day   Hold plavix x5 days   Half Dose of Tresiba night before     ADEOLA Nicholson, CNP    1/3/2024  11:09 AM

## 2024-01-22 ENCOUNTER — HOSPITAL ENCOUNTER (OUTPATIENT)
Age: 59
Discharge: HOME | End: 2024-01-22
Payer: MEDICARE

## 2024-01-22 DIAGNOSIS — I35.1 AORTIC VALVE INSUFFICIENCY, ETIOLOGY OF CARDIAC VALVE DISEASE UNSPECIFIED: ICD-10-CM

## 2024-01-22 DIAGNOSIS — R79.9 ABNORMAL FINDING OF BLOOD CHEMISTRY, UNSPECIFIED: ICD-10-CM

## 2024-01-22 LAB
ALBUMIN SERPL-MCNC: 2.3 G/DL (ref 3.5–5.2)
ALP SERPL-CCNC: 105 U/L (ref 40–129)
ALT SERPL-CCNC: 10 U/L (ref 5–41)
ANION GAP SERPL CALCULATED.3IONS-SCNC: 6 MMOL/L (ref 9–17)
AST SERPL-CCNC: 13 U/L
BASOPHILS # BLD: 0.1 K/UL (ref 0–0.2)
BASOPHILS NFR BLD: 1 % (ref 0–2)
BILIRUB SERPL-MCNC: 0.3 MG/DL (ref 0.3–1.2)
BUN SERPL-MCNC: 21 MG/DL (ref 6–20)
CALCIUM SERPL-MCNC: 8.5 MG/DL (ref 8.6–10.4)
CHLORIDE SERPL-SCNC: 105 MMOL/L (ref 98–107)
CLOSURE TME COLL+ADP BLD: 182 SEC (ref 67–112)
CO2 SERPL-SCNC: 19 MMOL/L (ref 20–31)
COLLAGEN EPINEPHRINE TIME: >300 SEC (ref 85–172)
CREAT SERPL-MCNC: 1.4 MG/DL (ref 0.7–1.2)
EOSINOPHIL # BLD: 0.2 K/UL (ref 0–0.4)
EOSINOPHILS RELATIVE PERCENT: 2 % (ref 0–4)
ERYTHROCYTE [DISTWIDTH] IN BLOOD BY AUTOMATED COUNT: 14.1 % (ref 11.5–14.9)
EST. AVERAGE GLUCOSE BLD GHB EST-MCNC: 298 MG/DL
GFR SERPL CREATININE-BSD FRML MDRD: 58 ML/MIN/1.73M2
GLUCOSE SERPL-MCNC: 444 MG/DL (ref 70–99)
HBA1C MFR BLD: 12 % (ref 4–6)
HCT VFR BLD AUTO: 44.9 % (ref 41–53)
HGB BLD-MCNC: 14.8 G/DL (ref 13.5–17.5)
INR PPP: 0.9
LYMPHOCYTES NFR BLD: 1.2 K/UL (ref 1–4.8)
LYMPHOCYTES RELATIVE PERCENT: 9 % (ref 24–44)
MCH RBC QN AUTO: 30.1 PG (ref 26–34)
MCHC RBC AUTO-ENTMCNC: 32.9 G/DL (ref 31–37)
MCV RBC AUTO: 91.6 FL (ref 80–100)
MONOCYTES NFR BLD: 0.5 K/UL (ref 0.1–1.3)
MONOCYTES NFR BLD: 4 % (ref 1–7)
NEUTROPHILS NFR BLD: 84 % (ref 36–66)
NEUTS SEG NFR BLD: 11.1 K/UL (ref 1.3–9.1)
PLATELET # BLD AUTO: 296 K/UL (ref 150–450)
PLATELET FUNCTION INTERP: ABNORMAL
PMV BLD AUTO: 7.7 FL (ref 6–12)
POTASSIUM SERPL-SCNC: 4.7 MMOL/L (ref 3.7–5.3)
PROT SERPL-MCNC: 5.7 G/DL (ref 6.4–8.3)
PROTHROMBIN TIME: 12.7 SEC (ref 11.8–14.6)
RBC # BLD AUTO: 4.9 M/UL (ref 4.5–5.9)
SODIUM SERPL-SCNC: 130 MMOL/L (ref 135–144)
WBC OTHER # BLD: 13.1 K/UL (ref 3.5–11)

## 2024-01-22 PROCEDURE — 85610 PROTHROMBIN TIME: CPT

## 2024-01-22 PROCEDURE — 85576 BLOOD PLATELET AGGREGATION: CPT

## 2024-01-22 PROCEDURE — 80053 COMPREHEN METABOLIC PANEL: CPT

## 2024-01-22 PROCEDURE — 83036 HEMOGLOBIN GLYCOSYLATED A1C: CPT

## 2024-01-22 PROCEDURE — 85025 COMPLETE CBC W/AUTO DIFF WBC: CPT

## 2024-01-22 PROCEDURE — 36415 COLL VENOUS BLD VENIPUNCTURE: CPT

## 2024-01-22 NOTE — RESULT ENCOUNTER NOTE
Received BMP results, patient prescribed multiple antidibetic agents, including insulin, patient to follow up with prescriber for more instruction on glycemic control.

## 2024-01-25 ENCOUNTER — TELEPHONE (OUTPATIENT)
Dept: VASCULAR SURGERY | Age: 59
End: 2024-01-25

## 2024-01-25 NOTE — TELEPHONE ENCOUNTER
James called and rescheduled CABG from 1/29/2024 to Monday 2/26/2024 per PCP recommendation.  His blood sugars have been high and needs a few weeks to get them under control.

## 2024-02-20 ENCOUNTER — TELEPHONE (OUTPATIENT)
Dept: VASCULAR SURGERY | Age: 59
End: 2024-02-20

## 2024-02-20 NOTE — TELEPHONE ENCOUNTER
Received a call from Dr. Calix patients PCP stating that patient was admitted to [facility] a couple weeks ago and was discharged on 2/2/2024.  Dr. Calix states his has concerns that patient is scheduled for a CABG x 3 on Monday 02/26/2024 and his group B-strep culture came back positive.  Dr. Calix is concerned for endocarditis.  I informed Dr. Calix we will get the medical records from [facility] and contact Dr. Caputo.     I called and spoke to Medical records at [facility] and the will fax lab results, H&P, ER notes and also the echo that was done. Once that information is received I will call the Cardiothoracic team.

## 2024-02-20 NOTE — TELEPHONE ENCOUNTER
All notes received from [facility] and scanned into the patients chart and also originals were handed to Flavio Mao NP for cardiothoracic.

## 2024-02-21 ENCOUNTER — TELEPHONE (OUTPATIENT)
Dept: VASCULAR SURGERY | Age: 59
End: 2024-02-21

## 2024-02-21 NOTE — TELEPHONE ENCOUNTER
Patient called to let us know that he was getting blood work done at [facility], our fax number was provided for patient so that he can get the lab results sent to us.

## 2024-02-21 NOTE — TELEPHONE ENCOUNTER
----- Message from Karime Mcginnis MA sent at 2/20/2024  2:53 PM EST -----  Regarding: RE: May need to be rescheduled CABG AVR next week  Dr. Calix (PCP) note is in patients EPIC chart and states patient was put on Ieviquin for 7 days post hopital and patient has completed the full dose.  Dr. Calix put in an order for patient to have a repeat blood culture and also a new echo.   I did leave a message for the patient to call the office to find out when he will have this testing done.    ----- Message -----  From: Soy Mao APRN - NP  Sent: 2/20/2024   2:01 PM EST  To: ADEOLA Nicholson CNP; #  Subject: May need to be rescheduled CABG AVR next week    This darren had a positive culture for strep B at [facility]  The provided chart is very vague.  This positive culture was January 30  We need to find out if he has been treated with antibiotics with repeat cultures showing no growth  Patient is a CABG AVR  I have attempted to call him but no one is answered.  Can we try reaching out today and tomorrow to find out further details.  Thank you

## 2024-02-21 NOTE — TELEPHONE ENCOUNTER
Spoke to BLANCA Torres and the patients surgery will be rescheduled in one month as long as patient has negitive culture results.  Flavio was informed that the patient will have his cultures re drawn today at [facility].      Flavio will inform Sabrina, surgery scheduler to reschedule the patient.

## 2024-02-23 ENCOUNTER — ANESTHESIA EVENT (OUTPATIENT)
Dept: OPERATING ROOM | Age: 59
End: 2024-02-23
Payer: MEDICARE

## 2024-02-26 ENCOUNTER — ANESTHESIA (OUTPATIENT)
Dept: OPERATING ROOM | Age: 59
End: 2024-02-26
Payer: MEDICARE

## 2024-02-26 ENCOUNTER — APPOINTMENT (OUTPATIENT)
Dept: GENERAL RADIOLOGY | Age: 59
DRG: 219 | End: 2024-02-26
Attending: THORACIC SURGERY (CARDIOTHORACIC VASCULAR SURGERY)
Payer: MEDICARE

## 2024-02-26 ENCOUNTER — HOSPITAL ENCOUNTER (INPATIENT)
Age: 59
LOS: 6 days | Discharge: HOME HEALTH | DRG: 219 | End: 2024-03-03
Attending: THORACIC SURGERY (CARDIOTHORACIC VASCULAR SURGERY) | Admitting: THORACIC SURGERY (CARDIOTHORACIC VASCULAR SURGERY)
Payer: MEDICARE

## 2024-02-26 DIAGNOSIS — Z95.1 S/P CABG X 3: Primary | ICD-10-CM

## 2024-02-26 DIAGNOSIS — N18.32 STAGE 3B CHRONIC KIDNEY DISEASE (HCC): ICD-10-CM

## 2024-02-26 DIAGNOSIS — I25.10 MULTIPLE VESSEL CORONARY ARTERY DISEASE: ICD-10-CM

## 2024-02-26 DIAGNOSIS — I35.1 AORTIC VALVE INSUFFICIENCY, ETIOLOGY OF CARDIAC VALVE DISEASE UNSPECIFIED: ICD-10-CM

## 2024-02-26 LAB
ALLEN TEST: ABNORMAL
ANION GAP SERPL CALCULATED.3IONS-SCNC: 10 MMOL/L (ref 9–17)
ANION GAP SERPL CALCULATED.3IONS-SCNC: 9 MMOL/L (ref 9–17)
BUN BLD-MCNC: 20 MG/DL (ref 8–26)
BUN SERPL-MCNC: 21 MG/DL (ref 6–20)
BUN SERPL-MCNC: 21 MG/DL (ref 6–20)
CA-I BLD-SCNC: 1.06 MMOL/L (ref 1.13–1.33)
CA-I BLD-SCNC: 1.08 MMOL/L (ref 1.15–1.33)
CA-I BLD-SCNC: 1.1 MMOL/L (ref 1.15–1.33)
CA-I BLD-SCNC: 1.11 MMOL/L (ref 1.15–1.33)
CA-I BLD-SCNC: 1.11 MMOL/L (ref 1.15–1.33)
CA-I BLD-SCNC: 1.13 MMOL/L (ref 1.13–1.33)
CA-I BLD-SCNC: 1.17 MMOL/L (ref 1.15–1.33)
CA-I BLD-SCNC: 1.18 MMOL/L (ref 1.15–1.33)
CA-I BLD-SCNC: 1.18 MMOL/L (ref 1.15–1.33)
CA-I BLD-SCNC: 1.21 MMOL/L (ref 1.15–1.33)
CALCIUM SERPL-MCNC: 7.2 MG/DL (ref 8.6–10.4)
CALCIUM SERPL-MCNC: 7.2 MG/DL (ref 8.6–10.4)
CHLORIDE BLD-SCNC: 116 MMOL/L (ref 98–107)
CHLORIDE SERPL-SCNC: 116 MMOL/L (ref 98–107)
CHLORIDE SERPL-SCNC: 119 MMOL/L (ref 98–107)
CLOT ANGLE.KAOLIN INDUCED BLD RES TEG: 78.2 DEG (ref 63–78)
CLOT ANGLE.KAOLIN INDUCED BLD RES TEG: <39 DEG (ref 63–78)
CO2 BLD CALC-SCNC: 18 MMOL/L (ref 22–30)
CO2 BLD CALC-SCNC: 18 MMOL/L (ref 22–30)
CO2 SERPL-SCNC: 19 MMOL/L (ref 20–31)
CO2 SERPL-SCNC: 20 MMOL/L (ref 20–31)
CREAT SERPL-MCNC: 1.6 MG/DL (ref 0.7–1.2)
CREAT SERPL-MCNC: 1.7 MG/DL (ref 0.7–1.2)
EGFR, POC: 53 ML/MIN/1.73M2
ERYTHROCYTE [DISTWIDTH] IN BLOOD BY AUTOMATED COUNT: 15.4 % (ref 11.8–14.4)
FIBRINOGEN, FUNCTIONAL TEG: 33.5 MM (ref 15–32)
FIBRINOGEN, FUNCTIONAL TEG: 46.6 MM (ref 15–32)
FIO2: 100
FIO2: 40
FIO2: 60
FIO2: 80
GFR SERPL CREATININE-BSD FRML MDRD: 46 ML/MIN/1.73M2
GFR SERPL CREATININE-BSD FRML MDRD: 49 ML/MIN/1.73M2
GLUCOSE BLD-MCNC: 104 MG/DL (ref 74–100)
GLUCOSE BLD-MCNC: 106 MG/DL (ref 75–110)
GLUCOSE BLD-MCNC: 112 MG/DL (ref 74–100)
GLUCOSE BLD-MCNC: 129 MG/DL (ref 75–110)
GLUCOSE BLD-MCNC: 130 MG/DL (ref 74–100)
GLUCOSE BLD-MCNC: 133 MG/DL (ref 74–100)
GLUCOSE BLD-MCNC: 141 MG/DL (ref 74–100)
GLUCOSE BLD-MCNC: 142 MG/DL (ref 74–100)
GLUCOSE BLD-MCNC: 144 MG/DL (ref 74–100)
GLUCOSE BLD-MCNC: 77 MG/DL (ref 74–100)
GLUCOSE BLD-MCNC: 82 MG/DL (ref 74–100)
GLUCOSE BLD-MCNC: 88 MG/DL (ref 74–100)
GLUCOSE BLD-MCNC: 97 MG/DL (ref 74–100)
GLUCOSE BLD-MCNC: 98 MG/DL (ref 75–110)
GLUCOSE SERPL-MCNC: 111 MG/DL (ref 70–99)
GLUCOSE SERPL-MCNC: 151 MG/DL (ref 70–99)
HCT VFR BLD AUTO: 22 % (ref 41–53)
HCT VFR BLD AUTO: 22 % (ref 41–53)
HCT VFR BLD AUTO: 23 % (ref 41–53)
HCT VFR BLD AUTO: 26 % (ref 41–53)
HCT VFR BLD AUTO: 28 % (ref 41–53)
HCT VFR BLD AUTO: 30 % (ref 41–53)
HCT VFR BLD AUTO: 30.9 % (ref 40.7–50.3)
HCT VFR BLD AUTO: 32 % (ref 41–53)
HCT VFR BLD AUTO: 32 % (ref 41–53)
HGB BLD-MCNC: 10.1 G/DL (ref 13–17)
INR PPP: 1.4
KINETICS TEG: 0.9 MIN (ref 0.8–2.1)
KINETICS TEG: 3 MIN (ref 0.8–2.1)
MA (MAX CLOT) TEG: 66.8 MM (ref 52–69)
MA (MAX CLOT) TEG: 70.5 MM (ref 52–69)
MA(MAX CLOT) RAPID TEG: 67 MM (ref 52–70)
MA(MAX CLOT) RAPID TEG: 70.8 MM (ref 52–70)
MAGNESIUM SERPL-MCNC: 2.7 MG/DL (ref 1.6–2.6)
MAGNESIUM SERPL-MCNC: 2.9 MG/DL (ref 1.6–2.6)
MCH RBC QN AUTO: 30.1 PG (ref 25.2–33.5)
MCHC RBC AUTO-ENTMCNC: 32.7 G/DL (ref 28.4–34.8)
MCV RBC AUTO: 92 FL (ref 82.6–102.9)
MODE: ABNORMAL
MODE: ABNORMAL
NEGATIVE BASE EXCESS, ART: 3.8 MMOL/L (ref 0–2)
NEGATIVE BASE EXCESS, ART: 4.4 MMOL/L (ref 0–2)
NEGATIVE BASE EXCESS, ART: 6.1 MMOL/L (ref 0–2)
NEGATIVE BASE EXCESS, ART: 6.5 MMOL/L (ref 0–2)
NEGATIVE BASE EXCESS, ART: 6.6 MMOL/L (ref 0–2)
NEGATIVE BASE EXCESS, ART: 7.5 MMOL/L (ref 0–2)
NEGATIVE BASE EXCESS, ART: 7.9 MMOL/L (ref 0–2)
NEGATIVE BASE EXCESS, ART: 8 MMOL/L (ref 0–2)
NEGATIVE BASE EXCESS, ART: 8.5 MMOL/L (ref 0–2)
NEGATIVE BASE EXCESS, ART: 8.7 MMOL/L (ref 0–2)
NEGATIVE BASE EXCESS, ART: 9 MMOL/L (ref 0–2)
NRBC BLD-RTO: 0 PER 100 WBC
O2 DELIVERY DEVICE: ABNORMAL
PARTIAL THROMBOPLASTIN TIME: 36.3 SEC (ref 23–36.5)
PATIENT TEMP: 35.9
PERFORMING LOCATION: ABNORMAL
PERFORMING LOCATION: ABNORMAL
PLATELET # BLD AUTO: 176 K/UL (ref 138–453)
PMV BLD AUTO: 9.2 FL (ref 8.1–13.5)
POC ANION GAP: 10 MMOL/L (ref 7–16)
POC CREATININE: 1.5 MG/DL (ref 0.51–1.19)
POC HCO3: 17.7 MMOL/L (ref 21–28)
POC HCO3: 17.8 MMOL/L (ref 21–28)
POC HCO3: 18 MMOL/L (ref 21–28)
POC HCO3: 18.5 MMOL/L (ref 21–28)
POC HCO3: 19.2 MMOL/L (ref 21–28)
POC HCO3: 20 MMOL/L (ref 21–28)
POC HCO3: 20.1 MMOL/L (ref 21–28)
POC HCO3: 20.6 MMOL/L (ref 21–28)
POC HCO3: 21.4 MMOL/L (ref 21–28)
POC HEMOGLOBIN (CALC): 10.2 G/DL (ref 13.5–17.5)
POC HEMOGLOBIN (CALC): 10.9 G/DL (ref 13.5–17.5)
POC HEMOGLOBIN (CALC): 10.9 G/DL (ref 13.5–17.5)
POC HEMOGLOBIN (CALC): 7.3 G/DL (ref 13.5–17.5)
POC HEMOGLOBIN (CALC): 7.4 G/DL (ref 13.5–17.5)
POC HEMOGLOBIN (CALC): 7.9 G/DL (ref 13.5–17.5)
POC HEMOGLOBIN (CALC): 8.8 G/DL (ref 13.5–17.5)
POC HEMOGLOBIN (CALC): 9.4 G/DL (ref 13.5–17.5)
POC LACTIC ACID: 0.8 MMOL/L (ref 0.56–1.39)
POC LACTIC ACID: 1.3 MMOL/L (ref 0.56–1.39)
POC O2 SATURATION: 92.1 % (ref 94–98)
POC O2 SATURATION: 94.3 % (ref 94–98)
POC O2 SATURATION: 94.7 % (ref 94–98)
POC O2 SATURATION: 94.7 % (ref 94–98)
POC O2 SATURATION: 96.3 % (ref 94–98)
POC O2 SATURATION: 97.9 % (ref 94–98)
POC O2 SATURATION: 98.1 % (ref 94–98)
POC O2 SATURATION: 99.9 % (ref 94–98)
POC PCO2: 36.2 MM HG (ref 35–48)
POC PCO2: 36.3 MM HG (ref 35–48)
POC PCO2: 36.4 MM HG (ref 35–48)
POC PCO2: 38.2 MM HG (ref 35–48)
POC PCO2: 38.3 MM HG (ref 35–48)
POC PCO2: 38.5 MM HG (ref 35–48)
POC PCO2: 38.7 MM HG (ref 35–48)
POC PCO2: 43 MM HG (ref 35–48)
POC PCO2: 43.3 MM HG (ref 35–48)
POC PCO2: 50.2 MM HG (ref 35–48)
POC PCO2: 50.9 MM HG (ref 35–48)
POC PH: 7.19 (ref 7.35–7.45)
POC PH: 7.2 (ref 7.35–7.45)
POC PH: 7.23 (ref 7.35–7.45)
POC PH: 7.27 (ref 7.35–7.45)
POC PH: 7.27 (ref 7.35–7.45)
POC PH: 7.29 (ref 7.35–7.45)
POC PH: 7.3 (ref 7.35–7.45)
POC PH: 7.31 (ref 7.35–7.45)
POC PH: 7.33 (ref 7.35–7.45)
POC PH: 7.35 (ref 7.35–7.45)
POC PH: 7.36 (ref 7.35–7.45)
POC PO2: 113.3 MM HG (ref 83–108)
POC PO2: 119.1 MM HG (ref 83–108)
POC PO2: 322.7 MM HG (ref 83–108)
POC PO2: 333.8 MM HG (ref 83–108)
POC PO2: 337.8 MM HG (ref 83–108)
POC PO2: 353.7 MM HG (ref 83–108)
POC PO2: 72.5 MM HG (ref 83–108)
POC PO2: 76.1 MM HG (ref 83–108)
POC PO2: 77.3 MM HG (ref 83–108)
POC PO2: 89.3 MM HG (ref 83–108)
POC PO2: 99.7 MM HG (ref 83–108)
POTASSIUM BLD-SCNC: 3.1 MMOL/L (ref 3.5–4.5)
POTASSIUM BLD-SCNC: 3.3 MMOL/L (ref 3.5–4.5)
POTASSIUM BLD-SCNC: 3.6 MMOL/L (ref 3.5–4.5)
POTASSIUM BLD-SCNC: 3.7 MMOL/L (ref 3.5–4.5)
POTASSIUM BLD-SCNC: 3.7 MMOL/L (ref 3.5–4.5)
POTASSIUM BLD-SCNC: 3.9 MMOL/L (ref 3.5–4.5)
POTASSIUM BLD-SCNC: 3.9 MMOL/L (ref 3.5–4.5)
POTASSIUM BLD-SCNC: 4.5 MMOL/L (ref 3.5–4.5)
POTASSIUM SERPL-SCNC: 3.8 MMOL/L (ref 3.7–5.3)
POTASSIUM SERPL-SCNC: 4.3 MMOL/L (ref 3.7–5.3)
PROTHROMBIN TIME: 16.9 SEC (ref 11.7–14.9)
RBC # BLD AUTO: 3.36 M/UL (ref 4.21–5.77)
REACTION TIME TEG W HEPARIN: 7.6 MIN (ref 4.3–8.3)
REACTION TIME TEG W HEPARIN: 8.6 MIN (ref 4.3–8.3)
REACTION TIME TEG: 13.3 MIN (ref 4.6–9.1)
REACTION TIME TEG: 8.9 MIN (ref 4.6–9.1)
SAMPLE SITE: ABNORMAL
SODIUM BLD-SCNC: 143 MMOL/L (ref 138–146)
SODIUM BLD-SCNC: 145 MMOL/L (ref 138–146)
SODIUM SERPL-SCNC: 144 MMOL/L (ref 135–144)
SODIUM SERPL-SCNC: 149 MMOL/L (ref 135–144)
WBC OTHER # BLD: 22.5 K/UL (ref 3.5–11.3)

## 2024-02-26 PROCEDURE — 85347 COAGULATION TIME ACTIVATED: CPT

## 2024-02-26 PROCEDURE — 3700000001 HC ADD 15 MINUTES (ANESTHESIA): Performed by: THORACIC SURGERY (CARDIOTHORACIC VASCULAR SURGERY)

## 2024-02-26 PROCEDURE — 06BQ4ZZ EXCISION OF LEFT SAPHENOUS VEIN, PERCUTANEOUS ENDOSCOPIC APPROACH: ICD-10-PCS | Performed by: THORACIC SURGERY (CARDIOTHORACIC VASCULAR SURGERY)

## 2024-02-26 PROCEDURE — 2580000003 HC RX 258: Performed by: NURSE ANESTHETIST, CERTIFIED REGISTERED

## 2024-02-26 PROCEDURE — 82803 BLOOD GASES ANY COMBINATION: CPT

## 2024-02-26 PROCEDURE — 82947 ASSAY GLUCOSE BLOOD QUANT: CPT

## 2024-02-26 PROCEDURE — 84295 ASSAY OF SERUM SODIUM: CPT

## 2024-02-26 PROCEDURE — 85576 BLOOD PLATELET AGGREGATION: CPT

## 2024-02-26 PROCEDURE — 02100Z9 BYPASS CORONARY ARTERY, ONE ARTERY FROM LEFT INTERNAL MAMMARY, OPEN APPROACH: ICD-10-PCS | Performed by: THORACIC SURGERY (CARDIOTHORACIC VASCULAR SURGERY)

## 2024-02-26 PROCEDURE — 82374 ASSAY BLOOD CARBON DIOXIDE: CPT

## 2024-02-26 PROCEDURE — 86900 BLOOD TYPING SEROLOGIC ABO: CPT

## 2024-02-26 PROCEDURE — 02RF08Z REPLACEMENT OF AORTIC VALVE WITH ZOOPLASTIC TISSUE, OPEN APPROACH: ICD-10-PCS | Performed by: THORACIC SURGERY (CARDIOTHORACIC VASCULAR SURGERY)

## 2024-02-26 PROCEDURE — 6360000002 HC RX W HCPCS: Performed by: NURSE ANESTHETIST, CERTIFIED REGISTERED

## 2024-02-26 PROCEDURE — 37799 UNLISTED PX VASCULAR SURGERY: CPT

## 2024-02-26 PROCEDURE — 6360000002 HC RX W HCPCS: Performed by: THORACIC SURGERY (CARDIOTHORACIC VASCULAR SURGERY)

## 2024-02-26 PROCEDURE — 86901 BLOOD TYPING SEROLOGIC RH(D): CPT

## 2024-02-26 PROCEDURE — 71045 X-RAY EXAM CHEST 1 VIEW: CPT

## 2024-02-26 PROCEDURE — 2580000003 HC RX 258

## 2024-02-26 PROCEDURE — 85384 FIBRINOGEN ACTIVITY: CPT

## 2024-02-26 PROCEDURE — 84520 ASSAY OF UREA NITROGEN: CPT

## 2024-02-26 PROCEDURE — 3600000008 HC SURGERY OHS BASE: Performed by: THORACIC SURGERY (CARDIOTHORACIC VASCULAR SURGERY)

## 2024-02-26 PROCEDURE — 94002 VENT MGMT INPAT INIT DAY: CPT

## 2024-02-26 PROCEDURE — 88305 TISSUE EXAM BY PATHOLOGIST: CPT

## 2024-02-26 PROCEDURE — 021109W BYPASS CORONARY ARTERY, TWO ARTERIES FROM AORTA WITH AUTOLOGOUS VENOUS TISSUE, OPEN APPROACH: ICD-10-PCS | Performed by: THORACIC SURGERY (CARDIOTHORACIC VASCULAR SURGERY)

## 2024-02-26 PROCEDURE — C1729 CATH, DRAINAGE: HCPCS | Performed by: THORACIC SURGERY (CARDIOTHORACIC VASCULAR SURGERY)

## 2024-02-26 PROCEDURE — 2100000001 HC CVICU R&B

## 2024-02-26 PROCEDURE — 2720000010 HC SURG SUPPLY STERILE: Performed by: THORACIC SURGERY (CARDIOTHORACIC VASCULAR SURGERY)

## 2024-02-26 PROCEDURE — P9041 ALBUMIN (HUMAN),5%, 50ML: HCPCS | Performed by: NURSE PRACTITIONER

## 2024-02-26 PROCEDURE — 94761 N-INVAS EAR/PLS OXIMETRY MLT: CPT

## 2024-02-26 PROCEDURE — 6360000002 HC RX W HCPCS: Performed by: PHYSICIAN ASSISTANT

## 2024-02-26 PROCEDURE — 6360000002 HC RX W HCPCS

## 2024-02-26 PROCEDURE — 36415 COLL VENOUS BLD VENIPUNCTURE: CPT

## 2024-02-26 PROCEDURE — 3700000000 HC ANESTHESIA ATTENDED CARE: Performed by: THORACIC SURGERY (CARDIOTHORACIC VASCULAR SURGERY)

## 2024-02-26 PROCEDURE — 80051 ELECTROLYTE PANEL: CPT

## 2024-02-26 PROCEDURE — 2580000003 HC RX 258: Performed by: PHYSICIAN ASSISTANT

## 2024-02-26 PROCEDURE — 84132 ASSAY OF SERUM POTASSIUM: CPT

## 2024-02-26 PROCEDURE — 2580000003 HC RX 258: Performed by: NURSE PRACTITIONER

## 2024-02-26 PROCEDURE — 2500000003 HC RX 250 WO HCPCS: Performed by: THORACIC SURGERY (CARDIOTHORACIC VASCULAR SURGERY)

## 2024-02-26 PROCEDURE — C1713 ANCHOR/SCREW BN/BN,TIS/BN: HCPCS | Performed by: THORACIC SURGERY (CARDIOTHORACIC VASCULAR SURGERY)

## 2024-02-26 PROCEDURE — 6360000002 HC RX W HCPCS: Performed by: NURSE PRACTITIONER

## 2024-02-26 PROCEDURE — 2500000003 HC RX 250 WO HCPCS: Performed by: NURSE PRACTITIONER

## 2024-02-26 PROCEDURE — 2580000003 HC RX 258: Performed by: THORACIC SURGERY (CARDIOTHORACIC VASCULAR SURGERY)

## 2024-02-26 PROCEDURE — 85730 THROMBOPLASTIN TIME PARTIAL: CPT

## 2024-02-26 PROCEDURE — 33405 REPLACEMENT AORTIC VALVE OPN: CPT | Performed by: THORACIC SURGERY (CARDIOTHORACIC VASCULAR SURGERY)

## 2024-02-26 PROCEDURE — 82330 ASSAY OF CALCIUM: CPT

## 2024-02-26 PROCEDURE — 85610 PROTHROMBIN TIME: CPT

## 2024-02-26 PROCEDURE — 33533 CABG ARTERIAL SINGLE: CPT | Performed by: THORACIC SURGERY (CARDIOTHORACIC VASCULAR SURGERY)

## 2024-02-26 PROCEDURE — 86850 RBC ANTIBODY SCREEN: CPT

## 2024-02-26 PROCEDURE — 2700000000 HC OXYGEN THERAPY PER DAY

## 2024-02-26 PROCEDURE — 2500000003 HC RX 250 WO HCPCS: Performed by: NURSE ANESTHETIST, CERTIFIED REGISTERED

## 2024-02-26 PROCEDURE — 86920 COMPATIBILITY TEST SPIN: CPT

## 2024-02-26 PROCEDURE — B24BYZZ ULTRASONOGRAPHY OF HEART WITH AORTA USING OTHER CONTRAST: ICD-10-PCS | Performed by: THORACIC SURGERY (CARDIOTHORACIC VASCULAR SURGERY)

## 2024-02-26 PROCEDURE — 85014 HEMATOCRIT: CPT

## 2024-02-26 PROCEDURE — A4217 STERILE WATER/SALINE, 500 ML: HCPCS | Performed by: THORACIC SURGERY (CARDIOTHORACIC VASCULAR SURGERY)

## 2024-02-26 PROCEDURE — 83605 ASSAY OF LACTIC ACID: CPT

## 2024-02-26 PROCEDURE — 33508 ENDOSCOPIC VEIN HARVEST: CPT | Performed by: THORACIC SURGERY (CARDIOTHORACIC VASCULAR SURGERY)

## 2024-02-26 PROCEDURE — 5A1221Z PERFORMANCE OF CARDIAC OUTPUT, CONTINUOUS: ICD-10-PCS | Performed by: THORACIC SURGERY (CARDIOTHORACIC VASCULAR SURGERY)

## 2024-02-26 PROCEDURE — 80048 BASIC METABOLIC PNL TOTAL CA: CPT

## 2024-02-26 PROCEDURE — 2780000006 HC MISC HEART VALVE: Performed by: THORACIC SURGERY (CARDIOTHORACIC VASCULAR SURGERY)

## 2024-02-26 PROCEDURE — 6370000000 HC RX 637 (ALT 250 FOR IP): Performed by: NURSE PRACTITIONER

## 2024-02-26 PROCEDURE — 3600000018 HC SURGERY OHS ADDTL 15MIN: Performed by: THORACIC SURGERY (CARDIOTHORACIC VASCULAR SURGERY)

## 2024-02-26 PROCEDURE — 2709999900 HC NON-CHARGEABLE SUPPLY: Performed by: THORACIC SURGERY (CARDIOTHORACIC VASCULAR SURGERY)

## 2024-02-26 PROCEDURE — 83735 ASSAY OF MAGNESIUM: CPT

## 2024-02-26 PROCEDURE — 82565 ASSAY OF CREATININE: CPT

## 2024-02-26 PROCEDURE — 33518 CABG ARTERY-VEIN TWO: CPT | Performed by: THORACIC SURGERY (CARDIOTHORACIC VASCULAR SURGERY)

## 2024-02-26 PROCEDURE — 93005 ELECTROCARDIOGRAM TRACING: CPT | Performed by: NURSE PRACTITIONER

## 2024-02-26 PROCEDURE — 85027 COMPLETE CBC AUTOMATED: CPT

## 2024-02-26 DEVICE — IMPLANTABLE DEVICE: Type: IMPLANTABLE DEVICE | Site: STERNUM | Status: FUNCTIONAL

## 2024-02-26 DEVICE — DEVICE STRNL CLSR MULTIIMPLANT STRNLOCK 360: Type: IMPLANTABLE DEVICE | Site: STERNUM | Status: FUNCTIONAL

## 2024-02-26 DEVICE — INSPIRIS RESILIA AORTIC VALVE
Type: IMPLANTABLE DEVICE | Site: HEART | Status: FUNCTIONAL
Brand: INSPIRIS RESILIA AORTIC VALVE

## 2024-02-26 RX ORDER — IPRATROPIUM BROMIDE AND ALBUTEROL SULFATE 2.5; .5 MG/3ML; MG/3ML
1 SOLUTION RESPIRATORY (INHALATION) EVERY 4 HOURS PRN
Status: DISCONTINUED | OUTPATIENT
Start: 2024-02-26 | End: 2024-03-03 | Stop reason: HOSPADM

## 2024-02-26 RX ORDER — ALBUMIN, HUMAN INJ 5% 5 %
SOLUTION INTRAVENOUS
Status: DISCONTINUED
Start: 2024-02-26 | End: 2024-02-26

## 2024-02-26 RX ORDER — AMIODARONE HYDROCHLORIDE 200 MG/1
200 TABLET ORAL ONCE
Status: DISCONTINUED | OUTPATIENT
Start: 2024-02-26 | End: 2024-02-26 | Stop reason: HOSPADM

## 2024-02-26 RX ORDER — SODIUM CHLORIDE 0.9 % (FLUSH) 0.9 %
5-40 SYRINGE (ML) INJECTION EVERY 12 HOURS SCHEDULED
Status: DISCONTINUED | OUTPATIENT
Start: 2024-02-26 | End: 2024-03-03 | Stop reason: HOSPADM

## 2024-02-26 RX ORDER — PROPOFOL 10 MG/ML
10 INJECTION, EMULSION INTRAVENOUS CONTINUOUS
Status: DISCONTINUED | OUTPATIENT
Start: 2024-02-26 | End: 2024-02-28

## 2024-02-26 RX ORDER — ISOFLURANE 1 ML/ML
LIQUID RESPIRATORY (INHALATION)
Status: DISCONTINUED
Start: 2024-02-26 | End: 2024-02-26

## 2024-02-26 RX ORDER — PROTAMINE SULFATE 10 MG/ML
INJECTION, SOLUTION INTRAVENOUS
Status: COMPLETED
Start: 2024-02-26 | End: 2024-02-26

## 2024-02-26 RX ORDER — ALBUMIN (HUMAN) 12.5 G/50ML
25 SOLUTION INTRAVENOUS PRN
Status: DISCONTINUED | OUTPATIENT
Start: 2024-02-26 | End: 2024-03-03 | Stop reason: HOSPADM

## 2024-02-26 RX ORDER — CHLORHEXIDINE GLUCONATE ORAL RINSE 1.2 MG/ML
15 SOLUTION DENTAL ONCE
Status: DISCONTINUED | OUTPATIENT
Start: 2024-02-26 | End: 2024-02-26 | Stop reason: HOSPADM

## 2024-02-26 RX ORDER — PROPOFOL 10 MG/ML
INJECTION, EMULSION INTRAVENOUS
Status: DISCONTINUED
Start: 2024-02-26 | End: 2024-02-26

## 2024-02-26 RX ORDER — CLOPIDOGREL BISULFATE 75 MG/1
75 TABLET ORAL DAILY
Status: DISCONTINUED | OUTPATIENT
Start: 2024-02-27 | End: 2024-03-03 | Stop reason: HOSPADM

## 2024-02-26 RX ORDER — DIPHENHYDRAMINE HCL 25 MG
25 TABLET ORAL NIGHTLY PRN
Status: DISCONTINUED | OUTPATIENT
Start: 2024-02-27 | End: 2024-03-03 | Stop reason: HOSPADM

## 2024-02-26 RX ORDER — NICARDIPINE HYDROCHLORIDE 0.1 MG/ML
INJECTION INTRAVENOUS
Status: DISCONTINUED
Start: 2024-02-26 | End: 2024-02-26

## 2024-02-26 RX ORDER — VANCOMYCIN HYDROCHLORIDE 1 G/20ML
INJECTION, POWDER, LYOPHILIZED, FOR SOLUTION INTRAVENOUS
Status: DISCONTINUED
Start: 2024-02-26 | End: 2024-02-26

## 2024-02-26 RX ORDER — PROTAMINE SULFATE 10 MG/ML
INJECTION, SOLUTION INTRAVENOUS PRN
Status: DISCONTINUED | OUTPATIENT
Start: 2024-02-26 | End: 2024-02-26 | Stop reason: SDUPTHER

## 2024-02-26 RX ORDER — INSULIN LISPRO 100 [IU]/ML
0-6 INJECTION, SOLUTION INTRAVENOUS; SUBCUTANEOUS
Status: DISCONTINUED | OUTPATIENT
Start: 2024-02-27 | End: 2024-02-27

## 2024-02-26 RX ORDER — FENTANYL CITRATE 50 UG/ML
25 INJECTION, SOLUTION INTRAMUSCULAR; INTRAVENOUS
Status: DISCONTINUED | OUTPATIENT
Start: 2024-02-26 | End: 2024-03-03 | Stop reason: HOSPADM

## 2024-02-26 RX ORDER — HYDRALAZINE HYDROCHLORIDE 20 MG/ML
5 INJECTION INTRAMUSCULAR; INTRAVENOUS EVERY 5 MIN PRN
Status: DISCONTINUED | OUTPATIENT
Start: 2024-02-26 | End: 2024-03-03 | Stop reason: HOSPADM

## 2024-02-26 RX ORDER — FENTANYL CITRATE 0.05 MG/ML
INJECTION, SOLUTION INTRAMUSCULAR; INTRAVENOUS PRN
Status: DISCONTINUED | OUTPATIENT
Start: 2024-02-26 | End: 2024-02-26 | Stop reason: SDUPTHER

## 2024-02-26 RX ORDER — ASPIRIN 81 MG/1
81 TABLET ORAL DAILY
Status: DISCONTINUED | OUTPATIENT
Start: 2024-02-26 | End: 2024-03-03 | Stop reason: HOSPADM

## 2024-02-26 RX ORDER — MAGNESIUM SULFATE IN WATER 40 MG/ML
2000 INJECTION, SOLUTION INTRAVENOUS PRN
Status: DISCONTINUED | OUTPATIENT
Start: 2024-02-26 | End: 2024-03-03 | Stop reason: HOSPADM

## 2024-02-26 RX ORDER — VANCOMYCIN HYDROCHLORIDE 1 G/20ML
INJECTION, POWDER, LYOPHILIZED, FOR SOLUTION INTRAVENOUS PRN
Status: DISCONTINUED | OUTPATIENT
Start: 2024-02-26 | End: 2024-02-26 | Stop reason: HOSPADM

## 2024-02-26 RX ORDER — FENTANYL CITRATE 50 UG/ML
50 INJECTION, SOLUTION INTRAMUSCULAR; INTRAVENOUS
Status: DISCONTINUED | OUTPATIENT
Start: 2024-02-26 | End: 2024-03-03 | Stop reason: HOSPADM

## 2024-02-26 RX ORDER — PROPOFOL 10 MG/ML
INJECTION, EMULSION INTRAVENOUS PRN
Status: DISCONTINUED | OUTPATIENT
Start: 2024-02-26 | End: 2024-02-26 | Stop reason: SDUPTHER

## 2024-02-26 RX ORDER — PHENYLEPHRINE HCL IN 0.9% NACL 1 MG/10 ML
SYRINGE (ML) INTRAVENOUS PRN
Status: DISCONTINUED | OUTPATIENT
Start: 2024-02-26 | End: 2024-02-26 | Stop reason: SDUPTHER

## 2024-02-26 RX ORDER — MEPERIDINE HYDROCHLORIDE 50 MG/ML
25 INJECTION INTRAMUSCULAR; INTRAVENOUS; SUBCUTANEOUS
Status: ACTIVE | OUTPATIENT
Start: 2024-02-26 | End: 2024-02-27

## 2024-02-26 RX ORDER — SODIUM CHLORIDE 9 MG/ML
INJECTION, SOLUTION INTRAVENOUS PRN
Status: DISCONTINUED | OUTPATIENT
Start: 2024-02-26 | End: 2024-03-03 | Stop reason: HOSPADM

## 2024-02-26 RX ORDER — METOPROLOL TARTRATE 1 MG/ML
2.5 INJECTION, SOLUTION INTRAVENOUS EVERY 10 MIN PRN
Status: DISCONTINUED | OUTPATIENT
Start: 2024-02-26 | End: 2024-03-03 | Stop reason: HOSPADM

## 2024-02-26 RX ORDER — SODIUM CHLORIDE 0.9 % (FLUSH) 0.9 %
5-40 SYRINGE (ML) INJECTION PRN
Status: DISCONTINUED | OUTPATIENT
Start: 2024-02-26 | End: 2024-03-03 | Stop reason: HOSPADM

## 2024-02-26 RX ORDER — POTASSIUM CHLORIDE 29.8 MG/ML
INJECTION INTRAVENOUS
Status: DISCONTINUED
Start: 2024-02-26 | End: 2024-02-26

## 2024-02-26 RX ORDER — CHLORHEXIDINE GLUCONATE 4 G/100ML
SOLUTION TOPICAL SEE ADMIN INSTRUCTIONS
Status: DISCONTINUED | OUTPATIENT
Start: 2024-02-26 | End: 2024-02-26 | Stop reason: HOSPADM

## 2024-02-26 RX ORDER — LIDOCAINE HYDROCHLORIDE 10 MG/ML
INJECTION, SOLUTION EPIDURAL; INFILTRATION; INTRACAUDAL; PERINEURAL PRN
Status: DISCONTINUED | OUTPATIENT
Start: 2024-02-26 | End: 2024-02-26 | Stop reason: SDUPTHER

## 2024-02-26 RX ORDER — BISACODYL 10 MG
10 SUPPOSITORY, RECTAL RECTAL DAILY PRN
Status: DISCONTINUED | OUTPATIENT
Start: 2024-02-26 | End: 2024-03-03 | Stop reason: HOSPADM

## 2024-02-26 RX ORDER — NOREPINEPHRINE BITARTRATE 0.06 MG/ML
.01-.08 INJECTION, SOLUTION INTRAVENOUS CONTINUOUS PRN
Status: DISCONTINUED | OUTPATIENT
Start: 2024-02-26 | End: 2024-03-03 | Stop reason: HOSPADM

## 2024-02-26 RX ORDER — AMIODARONE HYDROCHLORIDE 50 MG/ML
INJECTION, SOLUTION INTRAVENOUS
Status: COMPLETED
Start: 2024-02-26 | End: 2024-02-26

## 2024-02-26 RX ORDER — ONDANSETRON 4 MG/1
4 TABLET, ORALLY DISINTEGRATING ORAL EVERY 8 HOURS PRN
Status: DISCONTINUED | OUTPATIENT
Start: 2024-02-26 | End: 2024-03-03 | Stop reason: HOSPADM

## 2024-02-26 RX ORDER — MAGNESIUM HYDROXIDE 1200 MG/15ML
LIQUID ORAL CONTINUOUS PRN
Status: COMPLETED | OUTPATIENT
Start: 2024-02-26 | End: 2024-02-26

## 2024-02-26 RX ORDER — PANTOPRAZOLE SODIUM 40 MG/1
40 TABLET, DELAYED RELEASE ORAL DAILY
Status: DISCONTINUED | OUTPATIENT
Start: 2024-02-26 | End: 2024-03-03 | Stop reason: HOSPADM

## 2024-02-26 RX ORDER — DEXTROSE MONOHYDRATE 100 MG/ML
INJECTION, SOLUTION INTRAVENOUS CONTINUOUS PRN
Status: DISCONTINUED | OUTPATIENT
Start: 2024-02-26 | End: 2024-03-03 | Stop reason: HOSPADM

## 2024-02-26 RX ORDER — SODIUM CHLORIDE 9 MG/ML
INJECTION, SOLUTION INTRAVENOUS CONTINUOUS
Status: DISCONTINUED | OUTPATIENT
Start: 2024-02-26 | End: 2024-02-26

## 2024-02-26 RX ORDER — VASOPRESSIN 20 [USP'U]/ML
INJECTION, SOLUTION INTRAMUSCULAR; SUBCUTANEOUS CONTINUOUS PRN
Status: DISCONTINUED | OUTPATIENT
Start: 2024-02-26 | End: 2024-02-26 | Stop reason: SDUPTHER

## 2024-02-26 RX ORDER — MIDAZOLAM HYDROCHLORIDE 1 MG/ML
INJECTION INTRAMUSCULAR; INTRAVENOUS PRN
Status: DISCONTINUED | OUTPATIENT
Start: 2024-02-26 | End: 2024-02-26 | Stop reason: SDUPTHER

## 2024-02-26 RX ORDER — ALBUMIN, HUMAN INJ 5% 5 %
SOLUTION INTRAVENOUS
Status: COMPLETED
Start: 2024-02-26 | End: 2024-02-26

## 2024-02-26 RX ORDER — ASPIRIN 81 MG/1
81 TABLET ORAL ONCE
Status: DISCONTINUED | OUTPATIENT
Start: 2024-02-26 | End: 2024-02-26 | Stop reason: HOSPADM

## 2024-02-26 RX ORDER — OXYCODONE HYDROCHLORIDE AND ACETAMINOPHEN 5; 325 MG/1; MG/1
2 TABLET ORAL EVERY 4 HOURS PRN
Status: DISCONTINUED | OUTPATIENT
Start: 2024-02-26 | End: 2024-03-03 | Stop reason: HOSPADM

## 2024-02-26 RX ORDER — SODIUM CHLORIDE 9 MG/ML
INJECTION, SOLUTION INTRAMUSCULAR; INTRAVENOUS; SUBCUTANEOUS
Status: DISCONTINUED
Start: 2024-02-26 | End: 2024-02-26

## 2024-02-26 RX ORDER — ROCURONIUM BROMIDE 10 MG/ML
INJECTION, SOLUTION INTRAVENOUS PRN
Status: DISCONTINUED | OUTPATIENT
Start: 2024-02-26 | End: 2024-02-26 | Stop reason: SDUPTHER

## 2024-02-26 RX ORDER — OXYCODONE HYDROCHLORIDE AND ACETAMINOPHEN 5; 325 MG/1; MG/1
1 TABLET ORAL EVERY 4 HOURS PRN
Status: DISCONTINUED | OUTPATIENT
Start: 2024-02-26 | End: 2024-03-03 | Stop reason: HOSPADM

## 2024-02-26 RX ORDER — PAPAVERINE HYDROCHLORIDE 30 MG/ML
INJECTION INTRAMUSCULAR; INTRAVENOUS
Status: DISCONTINUED
Start: 2024-02-26 | End: 2024-02-26

## 2024-02-26 RX ORDER — ALBUMIN, HUMAN INJ 5% 5 %
25 SOLUTION INTRAVENOUS PRN
Status: DISCONTINUED | OUTPATIENT
Start: 2024-02-26 | End: 2024-03-03 | Stop reason: HOSPADM

## 2024-02-26 RX ORDER — POTASSIUM CHLORIDE 29.8 MG/ML
20 INJECTION INTRAVENOUS PRN
Status: DISCONTINUED | OUTPATIENT
Start: 2024-02-26 | End: 2024-03-03 | Stop reason: HOSPADM

## 2024-02-26 RX ORDER — ATORVASTATIN CALCIUM 40 MG/1
20 TABLET, FILM COATED ORAL NIGHTLY
Status: DISCONTINUED | OUTPATIENT
Start: 2024-02-27 | End: 2024-03-03 | Stop reason: HOSPADM

## 2024-02-26 RX ORDER — ONDANSETRON 2 MG/ML
4 INJECTION INTRAMUSCULAR; INTRAVENOUS EVERY 6 HOURS PRN
Status: DISCONTINUED | OUTPATIENT
Start: 2024-02-26 | End: 2024-03-03 | Stop reason: HOSPADM

## 2024-02-26 RX ORDER — AMIODARONE HYDROCHLORIDE 200 MG/1
200 TABLET ORAL 3 TIMES DAILY
Status: DISCONTINUED | OUTPATIENT
Start: 2024-02-26 | End: 2024-03-03 | Stop reason: HOSPADM

## 2024-02-26 RX ORDER — SENNA AND DOCUSATE SODIUM 50; 8.6 MG/1; MG/1
1 TABLET, FILM COATED ORAL 2 TIMES DAILY
Status: DISCONTINUED | OUTPATIENT
Start: 2024-02-26 | End: 2024-03-03 | Stop reason: HOSPADM

## 2024-02-26 RX ORDER — POLYETHYLENE GLYCOL 3350 17 G/17G
17 POWDER, FOR SOLUTION ORAL DAILY
Status: DISCONTINUED | OUTPATIENT
Start: 2024-02-26 | End: 2024-03-03 | Stop reason: HOSPADM

## 2024-02-26 RX ORDER — INSULIN LISPRO 100 [IU]/ML
0-12 INJECTION, SOLUTION INTRAVENOUS; SUBCUTANEOUS
Status: DISCONTINUED | OUTPATIENT
Start: 2024-02-27 | End: 2024-03-03 | Stop reason: HOSPADM

## 2024-02-26 RX ORDER — AMIODARONE HYDROCHLORIDE 50 MG/ML
INJECTION, SOLUTION INTRAVENOUS PRN
Status: DISCONTINUED | OUTPATIENT
Start: 2024-02-26 | End: 2024-02-26 | Stop reason: SDUPTHER

## 2024-02-26 RX ORDER — HEPARIN SODIUM 1000 [USP'U]/ML
INJECTION, SOLUTION INTRAVENOUS; SUBCUTANEOUS PRN
Status: DISCONTINUED | OUTPATIENT
Start: 2024-02-26 | End: 2024-02-26 | Stop reason: SDUPTHER

## 2024-02-26 RX ADMIN — FENTANYL CITRATE 100 MCG: 50 INJECTION INTRAVENOUS at 14:56

## 2024-02-26 RX ADMIN — LIDOCAINE HYDROCHLORIDE 100 MG: 10 SOLUTION INTRAVENOUS at 09:28

## 2024-02-26 RX ADMIN — VANCOMYCIN HYDROCHLORIDE 2000 MG: 1 INJECTION, POWDER, LYOPHILIZED, FOR SOLUTION INTRAVENOUS at 21:42

## 2024-02-26 RX ADMIN — ROCURONIUM BROMIDE 50 MG: 10 INJECTION, SOLUTION INTRAVENOUS at 14:30

## 2024-02-26 RX ADMIN — VANCOMYCIN HYDROCHLORIDE 2000 MG: 1 INJECTION, POWDER, LYOPHILIZED, FOR SOLUTION INTRAVENOUS at 10:00

## 2024-02-26 RX ADMIN — ROCURONIUM BROMIDE 50 MG: 10 INJECTION, SOLUTION INTRAVENOUS at 11:29

## 2024-02-26 RX ADMIN — Medication 50 MCG: at 11:17

## 2024-02-26 RX ADMIN — FENTANYL CITRATE 200 MCG: 50 INJECTION INTRAVENOUS at 10:37

## 2024-02-26 RX ADMIN — CALCIUM CHLORIDE 1000 MG: 100 INJECTION, SOLUTION INTRAVENOUS at 22:22

## 2024-02-26 RX ADMIN — SODIUM CHLORIDE 1.62 UNITS/HR: 9 INJECTION, SOLUTION INTRAVENOUS at 16:04

## 2024-02-26 RX ADMIN — FENTANYL CITRATE 50 MCG: 50 INJECTION INTRAMUSCULAR; INTRAVENOUS at 22:09

## 2024-02-26 RX ADMIN — SODIUM CHLORIDE: 9 INJECTION, SOLUTION INTRAVENOUS at 15:30

## 2024-02-26 RX ADMIN — ASPIRIN 81 MG: 81 TABLET, COATED ORAL at 17:17

## 2024-02-26 RX ADMIN — PROPOFOL 200 MG: 10 INJECTION, EMULSION INTRAVENOUS at 09:28

## 2024-02-26 RX ADMIN — DIPHENHYDRAMINE HYDROCHLORIDE 25 MG: 25 TABLET ORAL at 23:07

## 2024-02-26 RX ADMIN — PROPOFOL 10 MCG/KG/MIN: 10 INJECTION, EMULSION INTRAVENOUS at 15:50

## 2024-02-26 RX ADMIN — SODIUM BICARBONATE 50 MEQ: 84 INJECTION, SOLUTION INTRAVENOUS at 17:24

## 2024-02-26 RX ADMIN — FENTANYL CITRATE 100 MCG: 50 INJECTION INTRAVENOUS at 11:29

## 2024-02-26 RX ADMIN — OXYCODONE HYDROCHLORIDE AND ACETAMINOPHEN 2 TABLET: 5; 325 TABLET ORAL at 23:06

## 2024-02-26 RX ADMIN — EPINEPHRINE 0.04 MCG/KG/MIN: 1 INJECTION INTRAMUSCULAR; INTRAVENOUS; SUBCUTANEOUS at 14:12

## 2024-02-26 RX ADMIN — VASOPRESSIN 0.05 UNITS/MIN: 20 INJECTION, SOLUTION INTRAVENOUS at 12:32

## 2024-02-26 RX ADMIN — EPINEPHRINE 0.01 MCG/KG/MIN: 1 INJECTION INTRAMUSCULAR; INTRAVENOUS; SUBCUTANEOUS at 15:50

## 2024-02-26 RX ADMIN — FENTANYL CITRATE 50 MCG: 50 INJECTION INTRAMUSCULAR; INTRAVENOUS at 21:08

## 2024-02-26 RX ADMIN — SODIUM CHLORIDE: 9 INJECTION, SOLUTION INTRAVENOUS at 16:33

## 2024-02-26 RX ADMIN — Medication 3000 MG: at 10:08

## 2024-02-26 RX ADMIN — HEPARIN SODIUM 43000 UNITS: 1000 INJECTION, SOLUTION INTRAVENOUS; SUBCUTANEOUS at 11:13

## 2024-02-26 RX ADMIN — PROTAMINE SULFATE 400 MG: 10 INJECTION, SOLUTION INTRAVENOUS at 14:40

## 2024-02-26 RX ADMIN — POLYETHYLENE GLYCOL 3350 17 G: 17 POWDER, FOR SOLUTION ORAL at 17:17

## 2024-02-26 RX ADMIN — SODIUM BICARBONATE 50 MEQ: 84 INJECTION, SOLUTION INTRAVENOUS at 16:21

## 2024-02-26 RX ADMIN — POTASSIUM CHLORIDE 20 MEQ: 29.8 INJECTION, SOLUTION INTRAVENOUS at 16:31

## 2024-02-26 RX ADMIN — SODIUM CHLORIDE: 9 INJECTION, SOLUTION INTRAVENOUS at 07:01

## 2024-02-26 RX ADMIN — PROPOFOL 15 MCG/KG/MIN: 10 INJECTION, EMULSION INTRAVENOUS at 15:12

## 2024-02-26 RX ADMIN — FENTANYL CITRATE 100 MCG: 50 INJECTION INTRAVENOUS at 10:48

## 2024-02-26 RX ADMIN — AMIODARONE HYDROCHLORIDE 150 MG: 150 INJECTION, SOLUTION INTRAVENOUS at 14:01

## 2024-02-26 RX ADMIN — POTASSIUM CHLORIDE 20 MEQ: 29.8 INJECTION, SOLUTION INTRAVENOUS at 20:31

## 2024-02-26 RX ADMIN — ROCURONIUM BROMIDE 100 MG: 10 INJECTION, SOLUTION INTRAVENOUS at 09:28

## 2024-02-26 RX ADMIN — SODIUM CHLORIDE: 9 INJECTION, SOLUTION INTRAVENOUS at 16:14

## 2024-02-26 RX ADMIN — MIDAZOLAM 2 MG: 1 INJECTION INTRAMUSCULAR; INTRAVENOUS at 14:23

## 2024-02-26 RX ADMIN — PANTOPRAZOLE SODIUM 40 MG: 40 TABLET, DELAYED RELEASE ORAL at 17:17

## 2024-02-26 RX ADMIN — AMIODARONE HYDROCHLORIDE 1 MG/MIN: 150 INJECTION, SOLUTION INTRAVENOUS at 14:07

## 2024-02-26 RX ADMIN — POTASSIUM CHLORIDE 20 MEQ: 29.8 INJECTION, SOLUTION INTRAVENOUS at 18:43

## 2024-02-26 RX ADMIN — Medication 50 MCG: at 11:04

## 2024-02-26 RX ADMIN — FENTANYL CITRATE 300 MCG: 50 INJECTION INTRAVENOUS at 10:26

## 2024-02-26 RX ADMIN — OXYCODONE HYDROCHLORIDE AND ACETAMINOPHEN 2 TABLET: 5; 325 TABLET ORAL at 17:16

## 2024-02-26 RX ADMIN — SODIUM CHLORIDE: 9 INJECTION, SOLUTION INTRAVENOUS at 15:07

## 2024-02-26 RX ADMIN — SODIUM CHLORIDE: 9 INJECTION, SOLUTION INTRAVENOUS at 22:20

## 2024-02-26 RX ADMIN — SODIUM BICARBONATE 50 MEQ: 84 INJECTION, SOLUTION INTRAVENOUS at 16:25

## 2024-02-26 RX ADMIN — Medication 3000 MG: at 14:08

## 2024-02-26 RX ADMIN — ALBUMIN (HUMAN) 25 G: 12.5 INJECTION, SOLUTION INTRAVENOUS at 17:46

## 2024-02-26 RX ADMIN — AMIODARONE HYDROCHLORIDE 0.5 MG/MIN: 50 INJECTION, SOLUTION INTRAVENOUS at 15:50

## 2024-02-26 RX ADMIN — FENTANYL CITRATE 50 MCG: 50 INJECTION INTRAMUSCULAR; INTRAVENOUS at 16:25

## 2024-02-26 RX ADMIN — ALBUMIN (HUMAN) 25 G: 12.5 INJECTION, SOLUTION INTRAVENOUS at 16:17

## 2024-02-26 RX ADMIN — MUPIROCIN: 20 OINTMENT TOPICAL at 20:52

## 2024-02-26 RX ADMIN — ALBUMIN (HUMAN) 25 G: 12.5 INJECTION, SOLUTION INTRAVENOUS at 17:08

## 2024-02-26 RX ADMIN — POTASSIUM CHLORIDE 20 MEQ: 29.8 INJECTION, SOLUTION INTRAVENOUS at 17:40

## 2024-02-26 RX ADMIN — SODIUM CHLORIDE: 9 INJECTION, SOLUTION INTRAVENOUS at 11:30

## 2024-02-26 RX ADMIN — PROTAMINE SULFATE 50 MG: 10 INJECTION, SOLUTION INTRAVENOUS at 15:20

## 2024-02-26 RX ADMIN — MIDAZOLAM 2 MG: 1 INJECTION INTRAMUSCULAR; INTRAVENOUS at 09:27

## 2024-02-26 RX ADMIN — VASOPRESSIN 0.02 UNITS/MIN: 0.2 INJECTION INTRAVENOUS at 15:50

## 2024-02-26 RX ADMIN — Medication 50 MCG: at 11:36

## 2024-02-26 RX ADMIN — Medication 50 MCG: at 11:21

## 2024-02-26 RX ADMIN — Medication 50 MCG: at 11:32

## 2024-02-26 RX ADMIN — SODIUM BICARBONATE 50 MEQ: 84 INJECTION, SOLUTION INTRAVENOUS at 17:12

## 2024-02-26 RX ADMIN — SODIUM CHLORIDE, PRESERVATIVE FREE 10 ML: 5 INJECTION INTRAVENOUS at 21:09

## 2024-02-26 RX ADMIN — SODIUM CHLORIDE, PRESERVATIVE FREE 5 ML: 5 INJECTION INTRAVENOUS at 20:50

## 2024-02-26 RX ADMIN — ALBUMIN (HUMAN) 25 G: 12.5 INJECTION, SOLUTION INTRAVENOUS at 15:08

## 2024-02-26 RX ADMIN — AMINOCAPROIC ACID 10 G/HR: 250 INJECTION, SOLUTION INTRAVENOUS at 09:53

## 2024-02-26 RX ADMIN — FENTANYL CITRATE 200 MCG: 50 INJECTION INTRAVENOUS at 09:27

## 2024-02-26 RX ADMIN — ROCURONIUM BROMIDE 50 MG: 10 INJECTION, SOLUTION INTRAVENOUS at 10:37

## 2024-02-26 ASSESSMENT — PAIN SCALES - GENERAL
PAINLEVEL_OUTOF10: 10
PAINLEVEL_OUTOF10: 10
PAINLEVEL_OUTOF10: 9

## 2024-02-26 ASSESSMENT — PAIN DESCRIPTION - LOCATION
LOCATION: STERNUM;CHEST
LOCATION: STERNUM;CHEST
LOCATION: CHEST

## 2024-02-26 ASSESSMENT — PAIN DESCRIPTION - DESCRIPTORS
DESCRIPTORS: ACHING;DISCOMFORT

## 2024-02-26 ASSESSMENT — PULMONARY FUNCTION TESTS
PIF_VALUE: 26
PIF_VALUE: 15
PIF_VALUE: 17

## 2024-02-26 ASSESSMENT — PAIN DESCRIPTION - ORIENTATION
ORIENTATION: LEFT
ORIENTATION: OTHER (COMMENT)

## 2024-02-26 NOTE — BRIEF OP NOTE
Brief Postoperative Note      Patient: Vincenzo Darden  YOB: 1965  MRN: [id number]    Date of Procedure: 2/26/2024    Pre-Op Diagnosis Codes:     * Multiple vessel coronary artery disease [I25.10]     * Aortic valve insufficiency, etiology of cardiac valve disease unspecified [I35.1]    Post-Op Diagnosis: Same       Procedure(s):  CABG CORONARY ARTERY BYPASS X3, STERNAL PLATING, AORTIC VALVE REPLACEMENT 23MM INSPIRIS AORTIC VALVE, ON PUMP, JO    Surgeon(s):  Andrei Caputo MD    Assistant:  First Assistant: Debbie Zhu RN    Anesthesia: General    Estimated Blood Loss (mL): N/A    Complications: None    Specimens:   ID Type Source Tests Collected by Time Destination   A : AORTIC VALVE Tissue Heart SURGICAL PATHOLOGY Andrei Caputo MD 2/26/2024 1340        Implants:  Implant Name Type Inv. Item Serial No.  Lot No. LRB No. Used Action   VALVE AORT 23MM REPL RESILIENT BOV PERICARD CO CHROMIUM ALLY - [id number] Aortic valves VALVE AORT 23MM REPL RESILIENT BOV PERICARD CO CHROMIUM ALLY [id number] [employer] SNEHA-WD  N/A 1 Implanted   DEVICE STRNL CLSR MULTIIMPLANT STRNLOCK 360 - [id number]  DEVICE STRNL CLSR MULTIIMPLANT STRNLOCK 360  [employer]-WD  N/A 1 Implanted   SCREW BNE L12MM DIA2.4MM G CANC TI SELF DRL DEREJE FULL THRD - [id number]  SCREW BNE L12MM DIA2.4MM G CANC TI SELF DRL DEREJE FULL THRD  JEANIE BIOMET MICROFIXATION-WD  N/A 4 Implanted   SCREW BNE L14MM DIA2.4MM G CANC TI SELF DRL DEREJE FULL THRD - [id number]  SCREW BNE L14MM DIA2.4MM G CANC TI SELF DRL DEREJE FULL THRD  JEANIE BI[employer]ET TRAUMA-WD  N/A 12 Implanted         Drains:   Chest Tube Mediastinal 1 (Active)       Chest Tube Pericardial 2 (Active)       Urinary Catheter 02/26/24 Parada-Temperature (Active)       Findings: CABG X 3 w/ LIMA to LAD, RSVG1 to large high Diag/Ramus, RSVG2 to LPDA. AVR w/ 23mm CE INSPIRIS valve      Electronically signed by Andrei Caputo MD on 2/26/2024 at 3:14 PM

## 2024-02-26 NOTE — ANESTHESIA PROCEDURE NOTES
Arterial Line:    An arterial line was placed using ultrasound guidance, in the OR for the following indication(s): continuous blood pressure monitoring and blood sampling needed.    A  (size) (length), Arrow (type) catheter was placed, Seldinger technique used, into the right brachial artery, secured by Tegaderm.  Anesthesia type: General    Events:  hematoma during insertion.2/26/2024 9:30 AM2/26/2024 9:35 AM  Anesthesiologist: Vic Watters MD  Performed: Anesthesiologist   Preanesthetic Checklist  Completed: patient identified, IV checked, site marked, risks and benefits discussed, surgical/procedural consents, equipment checked, pre-op evaluation, timeout performed, anesthesia consent given, oxygen available, monitors applied/VS acknowledged, fire risk safety assessment completed and verbalized and blood product R/B/A discussed and consented

## 2024-02-26 NOTE — ANESTHESIA PRE PROCEDURE
Department of Anesthesiology  Preprocedure Note       Name:  Vincenzo Darden   Age:  58 y.o.  :  1965                                          MRN:  [id number]         Date:  2024      Surgeon: Surgeon(s):  Andrei James MD    Procedure: Procedure(s):  CABG CORONARY ARTERY BYPASS X3, POSSIBLE RADIAL HARVEST, STERNAL PLATING, AORTIC VALVE REPAIR VS REPLACEMENT, ON PUMP SWAN, JO    Medications prior to admission:   Prior to Admission medications    Medication Sig Start Date End Date Taking? Authorizing Provider   clopidogrel (PLAVIX) 75 MG tablet Take 1 tablet by mouth daily  Patient not taking: Reported on 2024   Katie Lorenzana APRN - CNP   carvedilol (COREG) 25 MG tablet Take 1 tablet by mouth in the morning and 1 tablet in the evening. 23   Katie Lorenzana APRN - CNP   albuterol (PROVENTIL) (2.5 MG/3ML) 0.083% nebulizer solution Take 3 mLs by nebulization every 4 hours as needed for Wheezing or Shortness of Breath  Patient not taking: Reported on 1/3/2024 10/11/23   Marlon Cooper DO   atorvastatin (LIPITOR) 40 MG tablet Take 1 tablet by mouth nightly  Patient not taking: Reported on 2024 10/11/23   Marlon Cooper DO   aspirin 81 MG chewable tablet Take 1 tablet by mouth daily 10/12/23   Marlon Cooper DO   lisinopril (PRINIVIL;ZESTRIL) 20 MG tablet Take 1 tablet by mouth daily 23   Jaylyn Lopez MD   furosemide (LASIX) 20 MG tablet Take 1 tablet by mouth daily 23   Provider, Historical, MD   TRULICITY 1.5 MG/0.5ML SC injection Inject 0.5 mLs into the skin Once a week at 5 PM 23   Jaylyn Lopez MD   amLODIPine (NORVASC) 10 MG tablet Take 1 tablet by mouth daily  Patient not taking: Reported on 2024   Jaylyn Lopez MD   oxyCODONE (ROXICODONE) 5 MG immediate release tablet Take 1 tablet by mouth every 4 hours as needed.  Patient not taking: Reported on 2024   Jaylyn Lopez MD   potassium

## 2024-02-26 NOTE — ANESTHESIA POSTPROCEDURE EVALUATION
Department of Anesthesiology  Postprocedure Note    Patient: Vincenzo Darden  MRN: [id number]  YOB: 1965  Date of evaluation: 2/26/2024    Procedure Summary       Date: 02/26/24 Room / Location: [place] [address] / [facility]    Anesthesia Start: 0913 Anesthesia Stop:     Procedure: CABG CORONARY ARTERY BYPASS X3, STERNAL PLATING, AORTIC VALVE REPLACEMENT 23MM INSPIRIS AORTIC VALVE, ON PUMP, JO Diagnosis:       Multiple vessel coronary artery disease      Aortic valve insufficiency, etiology of cardiac valve disease unspecified      (Multiple vessel coronary artery disease [I25.10])      (Aortic valve insufficiency, etiology of cardiac valve disease unspecified [I35.1])    Surgeons: Andrei James MD Responsible Provider: Vic Watters MD    Anesthesia Type: general ASA Status: 4            Anesthesia Type: No value filed.    Bisi Phase I:      Bisi Phase II:      Anesthesia Post Evaluation    Patient location during evaluation: ICU  Patient participation: complete - patient cannot participate  Level of consciousness: sedated and ventilated  Airway patency: patent  Cardiovascular status: vasoactive/inotropes  Respiratory status: intubated and ventilator    No notable events documented.

## 2024-02-26 NOTE — CARE COORDINATION
Case Management Assessment  Initial Evaluation    Date/Time of Evaluation: 2/26/2024 10:42 AM  Assessment Completed by: DARLINE DE LEON RN    If patient is discharged prior to next notation, then this note serves as note for discharge by case management.    Patient Name: Vincenzo Darden                   YOB: 1965  Diagnosis: Multiple vessel coronary artery disease [I25.10]  Aortic valve insufficiency, etiology of cardiac valve disease unspecified [I35.1]  CAD in native artery [I25.10]                   Date / Time: 2/26/2024  6:12 AM    Patient Admission Status: Inpatient   Readmission Risk (Low < 19, Mod (19-27), High > 27): Readmission Risk Score: 9.5    Current PCP: Soy Mao APRN - NP  PCP verified by CM? (P) Yes    Chart Reviewed: Yes      History Provided by: (P) Patient  Patient Orientation: (P) Alert and Oriented    Patient Cognition: (P) Alert    Hospitalization in the last 30 days (Readmission):  No    If yes, Readmission Assessment in  Navigator will be completed.    Advance Directives:      Code Status: Prior   Patient's Primary Decision Maker is: (P) Legal Next of Kin      Discharge Planning:    Patient lives with: (P) Spouse/Significant Other Type of Home: (P) House  Primary Care Giver: (P) Self  Patient Support Systems include: (P) Spouse/Significant Other, Children   Current Financial resources: (P) Medicare  Current community resources:    Current services prior to admission: (P) None            Current DME:              Type of Home Care services:  (P) None    ADLS  Prior functional level: (P) Independent in ADLs/IADLs  Current functional level: (P) Independent in ADLs/IADLs    PT AM-PAC:   /24  OT AM-PAC:   /24    Family can provide assistance at DC: (P) Yes  Would you like Case Management to discuss the discharge plan with any other family members/significant others, and if so, who? (P) No  Plans to Return to Present Housing: (P) Unknown at present  Other Identified

## 2024-02-26 NOTE — ANESTHESIA PROCEDURE NOTES
Central Venous Line:    A central venous line was placed using ultrasound guidance, in the OR for the following indication(s): central venous access and CVP monitoring.2/26/2024 9:55 AM2/26/2024 10:00 AM    Sterility preparation included the following: provider used sterile gloves, gown, hat and mask, hand hygiene performed prior to central venous catheter insertion, sterile gel and probe cover used in ultrasound-guided central venous catheter insertion and maximum sterile barriers used during central venous catheter insertion.    The patient was placed in Trendelenburg position.The right internal jugular vein was prepped.    The site was prepped with Chloraprep.  A 9 Fr (size), 16 (length), introducer double lumen was placed.  Anesthesia type: general  Staffing  Performed: Other   Anesthesiologist: Vic Watters MD  Other anesthesia staff: Ronda Madera RN  Performed by: Ronda Madera RN  Authorized by: Vic Watters MD    Preanesthetic Checklist  Completed: patient identified, IV checked, site marked, risks and benefits discussed, surgical/procedural consents, equipment checked, pre-op evaluation, timeout performed, anesthesia consent given, oxygen available, monitors applied/VS acknowledged, fire risk safety assessment completed and verbalized and blood product R/B/A discussed and consented

## 2024-02-26 NOTE — ANESTHESIA PROCEDURE NOTES
Procedure Performed: JO       Start Time:  2/26/2024 10:00 AM       End Time:   2/26/2024 10:30 AM    Preanesthesia Checklist:  Patient identified, IV assessed, risks and benefits discussed, monitors and equipment assessed, procedure being performed at surgeon's request and anesthesia consent obtained.    General Procedure Information  Diagnostic Indications for Echo:  assessment of ascending aorta, assessment of surgical repair, hemodynamic monitoring and assessment of valve function  Physician Requesting Echo: Andrei James MD  Location performed:  OR  Intubated  Heart visualized  Probe Insertion:  Easy  Probe Type:  3D  Modalities:  2D only, 3D, color flow mapping, continuous wave Doppler and pulse wave Doppler    Echocardiographic and Doppler Measurements    Ventricles    Right Ventricle:  Cavity size normal.  Hypertrophy not present.  Thrombus not present.  Global function normal.    Left Ventricle:  Cavity size normal.  Hypertrophy not present.  Thrombus not present.  Global Function mildly impaired.  Ejection Fraction 45%.      Ventricular Regional Function:  1- Basal Anteroseptal:  normal  2- Basal Anterior:  normal  3- Basal Anterolateral:  normal  4- Basal Inferolateral:  normal  5- Basal Inferior:  normal  6- Basal Inferoseptal:  normal  7- Mid Anteroseptal:  normal  8- Mid Anterior:  normal  9- Mid Anterolateral:  normal  10- Mid Inferolateral:  normal  11- Mid Inferior:  normal  12- Mid Inferoseptal:  normal  13- Apical Anterior:  normal  14- Apical Lateral:  normal  15- Apical Inferior:  normal  16- Apical Septal:  normal  17- Delta:  normal      Valves    Aortic Valve:  Annulus normal.  Stenosis not present.  Pressure Half-time: 350 ms.  Regurgitation moderate.  Leaflets normal.  Leaflet motions normal.      Mitral Valve:  Annulus normal.  Stenosis not present.  Regurgitation none.  Leaflets normal.  Leaflet motions normal.      Tricuspid Valve:  Annulus normal.  Stenosis not present.

## 2024-02-27 ENCOUNTER — APPOINTMENT (OUTPATIENT)
Dept: GENERAL RADIOLOGY | Age: 59
DRG: 219 | End: 2024-02-27
Attending: THORACIC SURGERY (CARDIOTHORACIC VASCULAR SURGERY)
Payer: MEDICARE

## 2024-02-27 PROBLEM — Z95.1 S/P CABG X 3: Status: ACTIVE | Noted: 2024-02-27

## 2024-02-27 LAB
ABO/RH: NORMAL
ALLEN TEST: ABNORMAL
ANION GAP SERPL CALCULATED.3IONS-SCNC: 9 MMOL/L (ref 9–17)
ANTIBODY SCREEN: NEGATIVE
ARM BAND NUMBER: NORMAL
BLOOD BANK DISPENSE STATUS: NORMAL
BLOOD BANK DISPENSE STATUS: NORMAL
BLOOD BANK SAMPLE EXPIRATION: NORMAL
BPU ID: NORMAL
BPU ID: NORMAL
BUN SERPL-MCNC: 21 MG/DL (ref 6–20)
CA-I BLD-SCNC: 1.12 MMOL/L (ref 1.13–1.33)
CALCIUM SERPL-MCNC: 7.7 MG/DL (ref 8.6–10.4)
CHLORIDE SERPL-SCNC: 116 MMOL/L (ref 98–107)
CO2 SERPL-SCNC: 20 MMOL/L (ref 20–31)
COMPONENT: NORMAL
COMPONENT: NORMAL
CREAT SERPL-MCNC: 1.8 MG/DL (ref 0.7–1.2)
CROSSMATCH RESULT: NORMAL
CROSSMATCH RESULT: NORMAL
ERYTHROCYTE [DISTWIDTH] IN BLOOD BY AUTOMATED COUNT: 15.7 % (ref 11.8–14.4)
FIO2: 40
GFR SERPL CREATININE-BSD FRML MDRD: 43 ML/MIN/1.73M2
GLUCOSE BLD-MCNC: 100 MG/DL (ref 75–110)
GLUCOSE BLD-MCNC: 82 MG/DL (ref 75–110)
GLUCOSE BLD-MCNC: 85 MG/DL (ref 75–110)
GLUCOSE BLD-MCNC: 86 MG/DL (ref 75–110)
GLUCOSE BLD-MCNC: 88 MG/DL (ref 75–110)
GLUCOSE BLD-MCNC: 95 MG/DL (ref 75–110)
GLUCOSE BLD-MCNC: 96 MG/DL (ref 74–100)
GLUCOSE SERPL-MCNC: 102 MG/DL (ref 70–99)
HCT VFR BLD AUTO: 30.1 % (ref 40.7–50.3)
HGB BLD-MCNC: 9.6 G/DL (ref 13–17)
INR PPP: 1.2
MAGNESIUM SERPL-MCNC: 2.6 MG/DL (ref 1.6–2.6)
MCH RBC QN AUTO: 29.9 PG (ref 25.2–33.5)
MCHC RBC AUTO-ENTMCNC: 31.9 G/DL (ref 28.4–34.8)
MCV RBC AUTO: 93.8 FL (ref 82.6–102.9)
MODE: ABNORMAL
NEGATIVE BASE EXCESS, ART: 4.4 MMOL/L (ref 0–2)
NRBC BLD-RTO: 0 PER 100 WBC
O2 DELIVERY DEVICE: ABNORMAL
PLATELET # BLD AUTO: 187 K/UL (ref 138–453)
PMV BLD AUTO: 9.4 FL (ref 8.1–13.5)
POC HCO3: 20.5 MMOL/L (ref 21–28)
POC O2 SATURATION: 95.9 % (ref 94–98)
POC PCO2: 35.9 MM HG (ref 35–48)
POC PH: 7.37 (ref 7.35–7.45)
POC PO2: 83 MM HG (ref 83–108)
POTASSIUM SERPL-SCNC: 3.9 MMOL/L (ref 3.7–5.3)
POTASSIUM SERPL-SCNC: 4.6 MMOL/L (ref 3.7–5.3)
PROTHROMBIN TIME: 14.8 SEC (ref 11.7–14.9)
RBC # BLD AUTO: 3.21 M/UL (ref 4.21–5.77)
SAMPLE SITE: ABNORMAL
SODIUM SERPL-SCNC: 145 MMOL/L (ref 135–144)
TRANSFUSION STATUS: NORMAL
TRANSFUSION STATUS: NORMAL
UNIT DIVISION: 0
UNIT DIVISION: 0
WBC OTHER # BLD: 13.2 K/UL (ref 3.5–11.3)

## 2024-02-27 PROCEDURE — 82330 ASSAY OF CALCIUM: CPT

## 2024-02-27 PROCEDURE — 99233 SBSQ HOSP IP/OBS HIGH 50: CPT | Performed by: INTERNAL MEDICINE

## 2024-02-27 PROCEDURE — 2100000001 HC CVICU R&B

## 2024-02-27 PROCEDURE — 6370000000 HC RX 637 (ALT 250 FOR IP)

## 2024-02-27 PROCEDURE — 97535 SELF CARE MNGMENT TRAINING: CPT

## 2024-02-27 PROCEDURE — 80048 BASIC METABOLIC PNL TOTAL CA: CPT

## 2024-02-27 PROCEDURE — 2580000003 HC RX 258: Performed by: NURSE PRACTITIONER

## 2024-02-27 PROCEDURE — 97166 OT EVAL MOD COMPLEX 45 MIN: CPT

## 2024-02-27 PROCEDURE — 94761 N-INVAS EAR/PLS OXIMETRY MLT: CPT

## 2024-02-27 PROCEDURE — 97530 THERAPEUTIC ACTIVITIES: CPT

## 2024-02-27 PROCEDURE — 97162 PT EVAL MOD COMPLEX 30 MIN: CPT

## 2024-02-27 PROCEDURE — 6360000002 HC RX W HCPCS: Performed by: PHYSICIAN ASSISTANT

## 2024-02-27 PROCEDURE — 6360000002 HC RX W HCPCS

## 2024-02-27 PROCEDURE — 84132 ASSAY OF SERUM POTASSIUM: CPT

## 2024-02-27 PROCEDURE — 83735 ASSAY OF MAGNESIUM: CPT

## 2024-02-27 PROCEDURE — 2580000003 HC RX 258: Performed by: PHYSICIAN ASSISTANT

## 2024-02-27 PROCEDURE — 2700000000 HC OXYGEN THERAPY PER DAY

## 2024-02-27 PROCEDURE — 85027 COMPLETE CBC AUTOMATED: CPT

## 2024-02-27 PROCEDURE — 6370000000 HC RX 637 (ALT 250 FOR IP): Performed by: NURSE PRACTITIONER

## 2024-02-27 PROCEDURE — 71045 X-RAY EXAM CHEST 1 VIEW: CPT

## 2024-02-27 PROCEDURE — 85610 PROTHROMBIN TIME: CPT

## 2024-02-27 PROCEDURE — 36415 COLL VENOUS BLD VENIPUNCTURE: CPT

## 2024-02-27 PROCEDURE — 6360000002 HC RX W HCPCS: Performed by: NURSE PRACTITIONER

## 2024-02-27 PROCEDURE — 99024 POSTOP FOLLOW-UP VISIT: CPT

## 2024-02-27 RX ORDER — KETOROLAC TROMETHAMINE 15 MG/ML
15 INJECTION, SOLUTION INTRAMUSCULAR; INTRAVENOUS EVERY 6 HOURS PRN
Status: COMPLETED | OUTPATIENT
Start: 2024-02-27 | End: 2024-02-29

## 2024-02-27 RX ORDER — WARFARIN SODIUM 5 MG/1
5 TABLET ORAL
Status: COMPLETED | OUTPATIENT
Start: 2024-02-27 | End: 2024-02-27

## 2024-02-27 RX ADMIN — FENTANYL CITRATE 50 MCG: 50 INJECTION INTRAMUSCULAR; INTRAVENOUS at 15:09

## 2024-02-27 RX ADMIN — FENTANYL CITRATE 50 MCG: 50 INJECTION INTRAMUSCULAR; INTRAVENOUS at 01:00

## 2024-02-27 RX ADMIN — FENTANYL CITRATE 50 MCG: 50 INJECTION INTRAMUSCULAR; INTRAVENOUS at 14:10

## 2024-02-27 RX ADMIN — VANCOMYCIN HYDROCHLORIDE 2000 MG: 1 INJECTION, POWDER, LYOPHILIZED, FOR SOLUTION INTRAVENOUS at 22:15

## 2024-02-27 RX ADMIN — FENTANYL CITRATE 50 MCG: 50 INJECTION INTRAMUSCULAR; INTRAVENOUS at 08:47

## 2024-02-27 RX ADMIN — FENTANYL CITRATE 50 MCG: 50 INJECTION INTRAMUSCULAR; INTRAVENOUS at 05:44

## 2024-02-27 RX ADMIN — OXYCODONE HYDROCHLORIDE AND ACETAMINOPHEN 2 TABLET: 5; 325 TABLET ORAL at 16:12

## 2024-02-27 RX ADMIN — METOPROLOL TARTRATE 12.5 MG: 25 TABLET, FILM COATED ORAL at 08:07

## 2024-02-27 RX ADMIN — WARFARIN SODIUM 5 MG: 5 TABLET ORAL at 18:12

## 2024-02-27 RX ADMIN — FENTANYL CITRATE 50 MCG: 50 INJECTION INTRAMUSCULAR; INTRAVENOUS at 23:16

## 2024-02-27 RX ADMIN — PANTOPRAZOLE SODIUM 40 MG: 40 TABLET, DELAYED RELEASE ORAL at 08:07

## 2024-02-27 RX ADMIN — AMIODARONE HYDROCHLORIDE 0.5 MG/MIN: 50 INJECTION, SOLUTION INTRAVENOUS at 05:48

## 2024-02-27 RX ADMIN — FENTANYL CITRATE 50 MCG: 50 INJECTION INTRAMUSCULAR; INTRAVENOUS at 19:39

## 2024-02-27 RX ADMIN — ATORVASTATIN CALCIUM 20 MG: 40 TABLET, FILM COATED ORAL at 19:40

## 2024-02-27 RX ADMIN — OXYCODONE HYDROCHLORIDE AND ACETAMINOPHEN 2 TABLET: 5; 325 TABLET ORAL at 12:07

## 2024-02-27 RX ADMIN — FENTANYL CITRATE 50 MCG: 50 INJECTION INTRAMUSCULAR; INTRAVENOUS at 09:50

## 2024-02-27 RX ADMIN — MUPIROCIN: 20 OINTMENT TOPICAL at 08:54

## 2024-02-27 RX ADMIN — FENTANYL CITRATE 50 MCG: 50 INJECTION INTRAMUSCULAR; INTRAVENOUS at 11:08

## 2024-02-27 RX ADMIN — KETOROLAC TROMETHAMINE 15 MG: 15 INJECTION, SOLUTION INTRAMUSCULAR; INTRAVENOUS at 13:05

## 2024-02-27 RX ADMIN — SODIUM CHLORIDE, PRESERVATIVE FREE 10 ML: 5 INJECTION INTRAVENOUS at 19:40

## 2024-02-27 RX ADMIN — DOCUSATE SODIUM 50 MG AND SENNOSIDES 8.6 MG 1 TABLET: 8.6; 5 TABLET, FILM COATED ORAL at 08:07

## 2024-02-27 RX ADMIN — POLYETHYLENE GLYCOL 3350 17 G: 17 POWDER, FOR SOLUTION ORAL at 08:09

## 2024-02-27 RX ADMIN — OXYCODONE HYDROCHLORIDE AND ACETAMINOPHEN 2 TABLET: 5; 325 TABLET ORAL at 03:06

## 2024-02-27 RX ADMIN — FENTANYL CITRATE 50 MCG: 50 INJECTION INTRAMUSCULAR; INTRAVENOUS at 18:12

## 2024-02-27 RX ADMIN — DOCUSATE SODIUM 50 MG AND SENNOSIDES 8.6 MG 1 TABLET: 8.6; 5 TABLET, FILM COATED ORAL at 19:40

## 2024-02-27 RX ADMIN — SODIUM CHLORIDE, PRESERVATIVE FREE 10 ML: 5 INJECTION INTRAVENOUS at 08:54

## 2024-02-27 RX ADMIN — FENTANYL CITRATE 50 MCG: 50 INJECTION INTRAMUSCULAR; INTRAVENOUS at 17:14

## 2024-02-27 RX ADMIN — CLOPIDOGREL BISULFATE 75 MG: 75 TABLET ORAL at 08:07

## 2024-02-27 RX ADMIN — POTASSIUM CHLORIDE 20 MEQ: 29.8 INJECTION, SOLUTION INTRAVENOUS at 08:13

## 2024-02-27 RX ADMIN — OXYCODONE HYDROCHLORIDE AND ACETAMINOPHEN 2 TABLET: 5; 325 TABLET ORAL at 21:27

## 2024-02-27 RX ADMIN — POTASSIUM CHLORIDE 20 MEQ: 29.8 INJECTION, SOLUTION INTRAVENOUS at 06:53

## 2024-02-27 RX ADMIN — AMIODARONE HYDROCHLORIDE 200 MG: 200 TABLET ORAL at 19:40

## 2024-02-27 RX ADMIN — MUPIROCIN: 20 OINTMENT TOPICAL at 19:44

## 2024-02-27 RX ADMIN — OXYCODONE HYDROCHLORIDE AND ACETAMINOPHEN 2 TABLET: 5; 325 TABLET ORAL at 07:37

## 2024-02-27 RX ADMIN — ASPIRIN 81 MG: 81 TABLET, COATED ORAL at 08:07

## 2024-02-27 ASSESSMENT — PAIN SCALES - GENERAL
PAINLEVEL_OUTOF10: 10
PAINLEVEL_OUTOF10: 9
PAINLEVEL_OUTOF10: 10
PAINLEVEL_OUTOF10: 8
PAINLEVEL_OUTOF10: 6
PAINLEVEL_OUTOF10: 9
PAINLEVEL_OUTOF10: 9
PAINLEVEL_OUTOF10: 7
PAINLEVEL_OUTOF10: 10
PAINLEVEL_OUTOF10: 9
PAINLEVEL_OUTOF10: 10
PAINLEVEL_OUTOF10: 9

## 2024-02-27 ASSESSMENT — PAIN DESCRIPTION - LOCATION
LOCATION: STERNUM
LOCATION: CHEST
LOCATION: STERNUM

## 2024-02-27 ASSESSMENT — PAIN SCALES - WONG BAKER: WONGBAKER_NUMERICALRESPONSE: 0

## 2024-02-27 ASSESSMENT — PAIN DESCRIPTION - DESCRIPTORS
DESCRIPTORS: THROBBING;ACHING
DESCRIPTORS: ACHING;THROBBING
DESCRIPTORS: THROBBING

## 2024-02-27 ASSESSMENT — PAIN DESCRIPTION - ORIENTATION
ORIENTATION: MID
ORIENTATION: MID;ANTERIOR

## 2024-02-27 NOTE — OP NOTE
[address] [address] [place] [address]                            OPERATIVE REPORT      PATIENT NAME: ANASTASIYA DACOSTA              : 1965  MED REC NO: [id number]                         ROOM: [facility]  ACCOUNT NO: [id number]                       ADMIT DATE: 2024  PROVIDER: Andrei Jamse MD      DATE OF PROCEDURE:  2024    SURGEON:  Andrei James MD    OTHER ASSISTANT:  Tisha Mondragon RN, RFA    PREOPERATIVE DIAGNOSES:  Severe multivessel coronary artery disease, status post non-ST segment elevation myocardial infarction with low ejection fraction of 35% to 40%, congestive heart failure, volume overload, moderate to severe aortic insufficiency.    POSTOPERATIVE DIAGNOSES:  Severe multivessel coronary artery disease, status post non-ST segment elevation myocardial infarction with low ejection fraction of 35% to 40%, congestive heart failure, volume overload, moderate to severe aortic insufficiency.    PROCEDURES:  Median sternotomy, transesophageal echocardiogram, endoscopic vein harvest, aorto-atrial cardiopulmonary bypass, coronary artery bypass grafting x3 with left internal mammary artery to left anterior descending artery, reverse saphenous vein graft to large diagonal 1/ramus, reverse saphenous vein graft to left posterior descending artery, aortic valve replacement with what was measured to be 23 mm Reese-Ayers Inspiris aortic valve.    COMPLICATIONS:  None.    CONDITION:  Stable.    DISPOSITION:  To CVICU.    ESTIMATED BLOOD LOSS:  Not applicable.    ANESTHESIA:  General endotracheal.    Cardiopulmonary bypass time was 173 minutes.  The cross-clamp time was 127 minutes.  Lowest temperature was 34.7 degrees.    INDICATIONS FOR SURGERY:  The patient is only a 58-year-old gentleman who was referred to me in an overall poor state of health, being morbidly obese with having had multiple amputations,

## 2024-02-27 NOTE — CARE COORDINATION
Post Acute Facility/Agency List     Provided patient with the following list, the list includes the overall star ratings obtained from CMS per the Medicare Web site (www.Medicare.gov):     [] Long Term Acute Care Facilities  [x] Acute Inpatient Rehabilitation Facilities  [] Skilled Nursing Facilities  [] Home Care    Provided verbal instructions on how to utilize the QR Code to obtain additional detailed star ratings from www.Medicare.gov    Pt's choice for ARU referral [facility]. Referral sent. HIPAA compliant VM left.

## 2024-02-27 NOTE — H&P
drug use.    Family History:     History reviewed. No pertinent family history.    Review of Systems:     Positive and Negative as described in HPI.    CONSTITUTIONAL:  negative for fevers, chills, sweats, fatigue, weight loss  RESPIRATORY:  positive for shortness of breath, negative for cough, congestion, wheezing.  CARDIOVASCULAR:  positive for chest pain, negative for palpitations.  GASTROINTESTINAL:  negative for nausea, vomiting, diarrhea, constipation, change in bowel habits, abdominal pain   GENITOURINARY:  negative for difficulty of urination, burning with urination, frequency   INTEGUMENT:  negative for rash, skin lesions, easy bruising   HEMATOLOGIC/LYMPHATIC:  positive for swelling/edema   MUSCULOSKELETAL:  negative joint pains, muscle aches, swelling of joints  NEUROLOGICAL:  negative for headaches, dizziness, lightheadedness, numbness, pain, tingling extremities  BEHAVIOR/PSYCH:  negative for depression, anxiety    Physical Exam:     /80   Pulse 61   Temp 98.8 °F (37.1 °C) (Bladder)   Resp 15   Ht 1.829 m (6' 0.01\")   Wt 124.1 kg (273 lb 9.5 oz)   SpO2 96%   BMI 37.10 kg/m²     General Appearance:  alert, well appearing, and in no acute distress  Mental status: oriented to person, place, and time with normal affect  Lungs: Bilateral equal air entry, clear to ausculation, no wheezing, rales or rhonchi, normal effort  Cardiovascular: normal rate, regular rhythm, + murmur, gallop, rub.  Abdomen: Soft, nontender, nondistended, normal bowel sounds, no hepatomegaly or splenomegaly  Neurologic: There are no new focal motor or sensory deficits, normal muscle tone and bulk, no abnormal sensation, normal speech, cranial nerves II through XII grossly intact  Skin: No gross lesions, rashes, bruising or bleeding on exposed skin area  Extremities:  peripheral pulses palpable, BKA right leg, no pedal edema or calf pain with palpation  Psych: normal affect    Investigations:      Imaging/Diagonstics:  CT

## 2024-02-28 ENCOUNTER — APPOINTMENT (OUTPATIENT)
Dept: GENERAL RADIOLOGY | Age: 59
DRG: 219 | End: 2024-02-28
Attending: THORACIC SURGERY (CARDIOTHORACIC VASCULAR SURGERY)
Payer: MEDICARE

## 2024-02-28 LAB
ANION GAP SERPL CALCULATED.3IONS-SCNC: 10 MMOL/L (ref 9–17)
BUN SERPL-MCNC: 26 MG/DL (ref 6–20)
CA-I BLD-SCNC: 1.1 MMOL/L (ref 1.13–1.33)
CALCIUM SERPL-MCNC: 7.7 MG/DL (ref 8.6–10.4)
CHLORIDE SERPL-SCNC: 109 MMOL/L (ref 98–107)
CO2 SERPL-SCNC: 19 MMOL/L (ref 20–31)
CREAT SERPL-MCNC: 2.5 MG/DL (ref 0.7–1.2)
EKG ATRIAL RATE: 74 BPM
EKG P AXIS: 54 DEGREES
EKG P-R INTERVAL: 196 MS
EKG Q-T INTERVAL: 454 MS
EKG QRS DURATION: 154 MS
EKG QTC CALCULATION (BAZETT): 503 MS
EKG R AXIS: -74 DEGREES
EKG T AXIS: -31 DEGREES
EKG VENTRICULAR RATE: 74 BPM
ERYTHROCYTE [DISTWIDTH] IN BLOOD BY AUTOMATED COUNT: 16.2 % (ref 11.8–14.4)
GFR SERPL CREATININE-BSD FRML MDRD: 29 ML/MIN/1.73M2
GLUCOSE BLD-MCNC: 103 MG/DL (ref 75–110)
GLUCOSE BLD-MCNC: 63 MG/DL (ref 75–110)
GLUCOSE BLD-MCNC: 72 MG/DL (ref 75–110)
GLUCOSE BLD-MCNC: 82 MG/DL (ref 75–110)
GLUCOSE BLD-MCNC: 94 MG/DL (ref 75–110)
GLUCOSE SERPL-MCNC: 82 MG/DL (ref 70–99)
HCT VFR BLD AUTO: 31.2 % (ref 40.7–50.3)
HGB BLD-MCNC: 9.4 G/DL (ref 13–17)
INR PPP: 1.1
MAGNESIUM SERPL-MCNC: 2.6 MG/DL (ref 1.6–2.6)
MCH RBC QN AUTO: 29.9 PG (ref 25.2–33.5)
MCHC RBC AUTO-ENTMCNC: 30.1 G/DL (ref 28.4–34.8)
MCV RBC AUTO: 99.4 FL (ref 82.6–102.9)
NRBC BLD-RTO: 0 PER 100 WBC
PLATELET # BLD AUTO: 165 K/UL (ref 138–453)
PMV BLD AUTO: 9.5 FL (ref 8.1–13.5)
POTASSIUM SERPL-SCNC: 4.7 MMOL/L (ref 3.7–5.3)
PROTHROMBIN TIME: 14.3 SEC (ref 11.7–14.9)
RBC # BLD AUTO: 3.14 M/UL (ref 4.21–5.77)
SODIUM SERPL-SCNC: 138 MMOL/L (ref 135–144)
SURGICAL PATHOLOGY REPORT: NORMAL
WBC OTHER # BLD: 12.9 K/UL (ref 3.5–11.3)

## 2024-02-28 PROCEDURE — 97116 GAIT TRAINING THERAPY: CPT

## 2024-02-28 PROCEDURE — 83735 ASSAY OF MAGNESIUM: CPT

## 2024-02-28 PROCEDURE — 6370000000 HC RX 637 (ALT 250 FOR IP): Performed by: NURSE PRACTITIONER

## 2024-02-28 PROCEDURE — 71045 X-RAY EXAM CHEST 1 VIEW: CPT

## 2024-02-28 PROCEDURE — 6360000002 HC RX W HCPCS: Performed by: NURSE PRACTITIONER

## 2024-02-28 PROCEDURE — 6370000000 HC RX 637 (ALT 250 FOR IP): Performed by: STUDENT IN AN ORGANIZED HEALTH CARE EDUCATION/TRAINING PROGRAM

## 2024-02-28 PROCEDURE — 6360000002 HC RX W HCPCS

## 2024-02-28 PROCEDURE — 97535 SELF CARE MNGMENT TRAINING: CPT

## 2024-02-28 PROCEDURE — 2580000003 HC RX 258: Performed by: NURSE PRACTITIONER

## 2024-02-28 PROCEDURE — 2060000000 HC ICU INTERMEDIATE R&B

## 2024-02-28 PROCEDURE — 85027 COMPLETE CBC AUTOMATED: CPT

## 2024-02-28 PROCEDURE — 97110 THERAPEUTIC EXERCISES: CPT

## 2024-02-28 PROCEDURE — 99024 POSTOP FOLLOW-UP VISIT: CPT

## 2024-02-28 PROCEDURE — 2580000003 HC RX 258: Performed by: PHYSICIAN ASSISTANT

## 2024-02-28 PROCEDURE — 97530 THERAPEUTIC ACTIVITIES: CPT

## 2024-02-28 PROCEDURE — 6360000002 HC RX W HCPCS: Performed by: PHYSICIAN ASSISTANT

## 2024-02-28 PROCEDURE — 82330 ASSAY OF CALCIUM: CPT

## 2024-02-28 PROCEDURE — 85610 PROTHROMBIN TIME: CPT

## 2024-02-28 PROCEDURE — 6370000000 HC RX 637 (ALT 250 FOR IP)

## 2024-02-28 PROCEDURE — 80048 BASIC METABOLIC PNL TOTAL CA: CPT

## 2024-02-28 PROCEDURE — 82947 ASSAY GLUCOSE BLOOD QUANT: CPT

## 2024-02-28 PROCEDURE — 36415 COLL VENOUS BLD VENIPUNCTURE: CPT

## 2024-02-28 PROCEDURE — 99233 SBSQ HOSP IP/OBS HIGH 50: CPT | Performed by: INTERNAL MEDICINE

## 2024-02-28 PROCEDURE — 2500000003 HC RX 250 WO HCPCS: Performed by: NURSE PRACTITIONER

## 2024-02-28 PROCEDURE — 2140000001 HC CVICU INTERMEDIATE R&B

## 2024-02-28 PROCEDURE — 93010 ELECTROCARDIOGRAM REPORT: CPT | Performed by: INTERNAL MEDICINE

## 2024-02-28 RX ORDER — FUROSEMIDE 10 MG/ML
20 INJECTION INTRAMUSCULAR; INTRAVENOUS 2 TIMES DAILY
Status: DISCONTINUED | OUTPATIENT
Start: 2024-02-28 | End: 2024-03-03

## 2024-02-28 RX ORDER — AMLODIPINE BESYLATE 10 MG/1
10 TABLET ORAL DAILY
Status: DISCONTINUED | OUTPATIENT
Start: 2024-02-28 | End: 2024-03-03 | Stop reason: HOSPADM

## 2024-02-28 RX ORDER — WARFARIN SODIUM 5 MG/1
5 TABLET ORAL
Status: COMPLETED | OUTPATIENT
Start: 2024-02-28 | End: 2024-02-28

## 2024-02-28 RX ADMIN — OXYCODONE HYDROCHLORIDE AND ACETAMINOPHEN 2 TABLET: 5; 325 TABLET ORAL at 20:48

## 2024-02-28 RX ADMIN — CALCIUM CHLORIDE 1000 MG: 100 INJECTION, SOLUTION INTRAVENOUS at 08:33

## 2024-02-28 RX ADMIN — DOCUSATE SODIUM 50 MG AND SENNOSIDES 8.6 MG 1 TABLET: 8.6; 5 TABLET, FILM COATED ORAL at 20:48

## 2024-02-28 RX ADMIN — SODIUM CHLORIDE, PRESERVATIVE FREE 10 ML: 5 INJECTION INTRAVENOUS at 20:48

## 2024-02-28 RX ADMIN — FENTANYL CITRATE 50 MCG: 50 INJECTION INTRAMUSCULAR; INTRAVENOUS at 06:04

## 2024-02-28 RX ADMIN — FENTANYL CITRATE 25 MCG: 50 INJECTION INTRAMUSCULAR; INTRAVENOUS at 22:32

## 2024-02-28 RX ADMIN — AMIODARONE HYDROCHLORIDE 200 MG: 200 TABLET ORAL at 08:28

## 2024-02-28 RX ADMIN — AMIODARONE HYDROCHLORIDE 200 MG: 200 TABLET ORAL at 20:48

## 2024-02-28 RX ADMIN — POLYETHYLENE GLYCOL 3350 17 G: 17 POWDER, FOR SOLUTION ORAL at 08:27

## 2024-02-28 RX ADMIN — FENTANYL CITRATE 50 MCG: 50 INJECTION INTRAMUSCULAR; INTRAVENOUS at 00:53

## 2024-02-28 RX ADMIN — ASPIRIN 81 MG: 81 TABLET, COATED ORAL at 08:28

## 2024-02-28 RX ADMIN — OXYCODONE HYDROCHLORIDE AND ACETAMINOPHEN 2 TABLET: 5; 325 TABLET ORAL at 08:28

## 2024-02-28 RX ADMIN — DIPHENHYDRAMINE HYDROCHLORIDE 25 MG: 25 TABLET ORAL at 00:54

## 2024-02-28 RX ADMIN — PANTOPRAZOLE SODIUM 40 MG: 40 TABLET, DELAYED RELEASE ORAL at 08:28

## 2024-02-28 RX ADMIN — METOPROLOL TARTRATE 25 MG: 25 TABLET, FILM COATED ORAL at 20:48

## 2024-02-28 RX ADMIN — FUROSEMIDE 20 MG: 10 INJECTION, SOLUTION INTRAMUSCULAR; INTRAVENOUS at 17:15

## 2024-02-28 RX ADMIN — AMIODARONE HYDROCHLORIDE 0.5 MG/MIN: 50 INJECTION, SOLUTION INTRAVENOUS at 01:56

## 2024-02-28 RX ADMIN — SODIUM CHLORIDE, PRESERVATIVE FREE 10 ML: 5 INJECTION INTRAVENOUS at 08:28

## 2024-02-28 RX ADMIN — OXYCODONE HYDROCHLORIDE AND ACETAMINOPHEN 2 TABLET: 5; 325 TABLET ORAL at 12:41

## 2024-02-28 RX ADMIN — METOPROLOL TARTRATE 25 MG: 25 TABLET, FILM COATED ORAL at 08:28

## 2024-02-28 RX ADMIN — ATORVASTATIN CALCIUM 20 MG: 40 TABLET, FILM COATED ORAL at 20:48

## 2024-02-28 RX ADMIN — OXYCODONE HYDROCHLORIDE AND ACETAMINOPHEN 2 TABLET: 5; 325 TABLET ORAL at 04:00

## 2024-02-28 RX ADMIN — DOCUSATE SODIUM 50 MG AND SENNOSIDES 8.6 MG 1 TABLET: 8.6; 5 TABLET, FILM COATED ORAL at 08:28

## 2024-02-28 RX ADMIN — OXYCODONE HYDROCHLORIDE AND ACETAMINOPHEN 2 TABLET: 5; 325 TABLET ORAL at 17:15

## 2024-02-28 RX ADMIN — WARFARIN SODIUM 5 MG: 5 TABLET ORAL at 17:25

## 2024-02-28 RX ADMIN — CLOPIDOGREL BISULFATE 75 MG: 75 TABLET ORAL at 08:28

## 2024-02-28 RX ADMIN — AMLODIPINE BESYLATE 10 MG: 10 TABLET ORAL at 16:08

## 2024-02-28 RX ADMIN — AMIODARONE HYDROCHLORIDE 200 MG: 200 TABLET ORAL at 13:43

## 2024-02-28 RX ADMIN — KETOROLAC TROMETHAMINE 15 MG: 15 INJECTION, SOLUTION INTRAMUSCULAR; INTRAVENOUS at 18:11

## 2024-02-28 RX ADMIN — MUPIROCIN: 20 OINTMENT TOPICAL at 08:29

## 2024-02-28 ASSESSMENT — PAIN DESCRIPTION - DESCRIPTORS
DESCRIPTORS: ACHING;THROBBING

## 2024-02-28 ASSESSMENT — PAIN DESCRIPTION - LOCATION
LOCATION: INCISION
LOCATION: STERNUM
LOCATION: CHEST;STERNUM
LOCATION: STERNUM
LOCATION: STERNUM

## 2024-02-28 ASSESSMENT — PAIN SCALES - GENERAL
PAINLEVEL_OUTOF10: 7
PAINLEVEL_OUTOF10: 9
PAINLEVEL_OUTOF10: 8
PAINLEVEL_OUTOF10: 9
PAINLEVEL_OUTOF10: 7
PAINLEVEL_OUTOF10: 8
PAINLEVEL_OUTOF10: 9
PAINLEVEL_OUTOF10: 5
PAINLEVEL_OUTOF10: 7
PAINLEVEL_OUTOF10: 8
PAINLEVEL_OUTOF10: 9
PAINLEVEL_OUTOF10: 8
PAINLEVEL_OUTOF10: 5
PAINLEVEL_OUTOF10: 9

## 2024-02-28 ASSESSMENT — PAIN DESCRIPTION - ORIENTATION
ORIENTATION: MID
ORIENTATION: MID

## 2024-02-28 ASSESSMENT — PAIN DESCRIPTION - FREQUENCY: FREQUENCY: CONTINUOUS

## 2024-02-28 ASSESSMENT — PAIN DESCRIPTION - PAIN TYPE: TYPE: SURGICAL PAIN

## 2024-02-28 NOTE — CARE COORDINATION
Transitional planning. Latanya ashraf/ [facility], PM & R Dr. Colby thinks pt is appropriate. Wants to review after Nephology consult for final acceptance. [phone]

## 2024-02-29 ENCOUNTER — APPOINTMENT (OUTPATIENT)
Dept: GENERAL RADIOLOGY | Age: 59
DRG: 219 | End: 2024-02-29
Attending: THORACIC SURGERY (CARDIOTHORACIC VASCULAR SURGERY)
Payer: MEDICARE

## 2024-02-29 ENCOUNTER — APPOINTMENT (OUTPATIENT)
Dept: ULTRASOUND IMAGING | Age: 59
DRG: 219 | End: 2024-02-29
Attending: THORACIC SURGERY (CARDIOTHORACIC VASCULAR SURGERY)
Payer: MEDICARE

## 2024-02-29 PROBLEM — N18.32 STAGE 3B CHRONIC KIDNEY DISEASE (HCC): Status: ACTIVE | Noted: 2024-02-29

## 2024-02-29 LAB
ANION GAP SERPL CALCULATED.3IONS-SCNC: 11 MMOL/L (ref 9–17)
BUN SERPL-MCNC: 33 MG/DL (ref 6–20)
C3 SERPL-MCNC: 117 MG/DL (ref 90–180)
C4 SERPL-MCNC: 25 MG/DL (ref 10–40)
CALCIUM SERPL-MCNC: 7.7 MG/DL (ref 8.6–10.4)
CHLORIDE SERPL-SCNC: 107 MMOL/L (ref 98–107)
CO2 SERPL-SCNC: 18 MMOL/L (ref 20–31)
CREAT SERPL-MCNC: 2.4 MG/DL (ref 0.7–1.2)
CREAT UR-MCNC: 39.4 MG/DL (ref 39–259)
ERYTHROCYTE [DISTWIDTH] IN BLOOD BY AUTOMATED COUNT: 15.7 % (ref 11.8–14.4)
GFR SERPL CREATININE-BSD FRML MDRD: 30 ML/MIN/1.73M2
GLUCOSE BLD-MCNC: 112 MG/DL (ref 75–110)
GLUCOSE BLD-MCNC: 150 MG/DL (ref 75–110)
GLUCOSE BLD-MCNC: 173 MG/DL (ref 75–110)
GLUCOSE BLD-MCNC: 99 MG/DL (ref 75–110)
GLUCOSE SERPL-MCNC: 107 MG/DL (ref 70–99)
HCT VFR BLD AUTO: 30.6 % (ref 40.7–50.3)
HGB BLD-MCNC: 9.5 G/DL (ref 13–17)
INR PPP: 1.8
MAGNESIUM SERPL-MCNC: 2.6 MG/DL (ref 1.6–2.6)
MCH RBC QN AUTO: 30.3 PG (ref 25.2–33.5)
MCHC RBC AUTO-ENTMCNC: 31 G/DL (ref 28.4–34.8)
MCV RBC AUTO: 97.5 FL (ref 82.6–102.9)
NRBC BLD-RTO: 0 PER 100 WBC
PLATELET # BLD AUTO: 177 K/UL (ref 138–453)
PMV BLD AUTO: 9.7 FL (ref 8.1–13.5)
POTASSIUM SERPL-SCNC: 4.3 MMOL/L (ref 3.7–5.3)
PROTHROMBIN TIME: 20.8 SEC (ref 11.7–14.9)
RBC # BLD AUTO: 3.14 M/UL (ref 4.21–5.77)
SODIUM SERPL-SCNC: 136 MMOL/L (ref 135–144)
SODIUM UR-SCNC: 83 MMOL/L
TOTAL PROTEIN, URINE: 218 MG/DL
WBC OTHER # BLD: 10.5 K/UL (ref 3.5–11.3)

## 2024-02-29 PROCEDURE — 6370000000 HC RX 637 (ALT 250 FOR IP)

## 2024-02-29 PROCEDURE — 99222 1ST HOSP IP/OBS MODERATE 55: CPT | Performed by: INTERNAL MEDICINE

## 2024-02-29 PROCEDURE — 83735 ASSAY OF MAGNESIUM: CPT

## 2024-02-29 PROCEDURE — 2580000003 HC RX 258: Performed by: NURSE PRACTITIONER

## 2024-02-29 PROCEDURE — 99024 POSTOP FOLLOW-UP VISIT: CPT | Performed by: PHYSICIAN ASSISTANT

## 2024-02-29 PROCEDURE — 82947 ASSAY GLUCOSE BLOOD QUANT: CPT

## 2024-02-29 PROCEDURE — 99233 SBSQ HOSP IP/OBS HIGH 50: CPT

## 2024-02-29 PROCEDURE — 97530 THERAPEUTIC ACTIVITIES: CPT

## 2024-02-29 PROCEDURE — 84300 ASSAY OF URINE SODIUM: CPT

## 2024-02-29 PROCEDURE — 2140000001 HC CVICU INTERMEDIATE R&B

## 2024-02-29 PROCEDURE — 97110 THERAPEUTIC EXERCISES: CPT

## 2024-02-29 PROCEDURE — 97116 GAIT TRAINING THERAPY: CPT

## 2024-02-29 PROCEDURE — 82570 ASSAY OF URINE CREATININE: CPT

## 2024-02-29 PROCEDURE — 6360000002 HC RX W HCPCS: Performed by: INTERNAL MEDICINE

## 2024-02-29 PROCEDURE — 80048 BASIC METABOLIC PNL TOTAL CA: CPT

## 2024-02-29 PROCEDURE — 84156 ASSAY OF PROTEIN URINE: CPT

## 2024-02-29 PROCEDURE — 6370000000 HC RX 637 (ALT 250 FOR IP): Performed by: STUDENT IN AN ORGANIZED HEALTH CARE EDUCATION/TRAINING PROGRAM

## 2024-02-29 PROCEDURE — 85027 COMPLETE CBC AUTOMATED: CPT

## 2024-02-29 PROCEDURE — 6360000002 HC RX W HCPCS: Performed by: NURSE PRACTITIONER

## 2024-02-29 PROCEDURE — 71045 X-RAY EXAM CHEST 1 VIEW: CPT

## 2024-02-29 PROCEDURE — 36415 COLL VENOUS BLD VENIPUNCTURE: CPT

## 2024-02-29 PROCEDURE — 6370000000 HC RX 637 (ALT 250 FOR IP): Performed by: NURSE PRACTITIONER

## 2024-02-29 PROCEDURE — 86160 COMPLEMENT ANTIGEN: CPT

## 2024-02-29 PROCEDURE — 85610 PROTHROMBIN TIME: CPT

## 2024-02-29 PROCEDURE — 76770 US EXAM ABDO BACK WALL COMP: CPT

## 2024-02-29 PROCEDURE — 6360000002 HC RX W HCPCS

## 2024-02-29 RX ORDER — FUROSEMIDE 10 MG/ML
40 INJECTION INTRAMUSCULAR; INTRAVENOUS ONCE
Status: COMPLETED | OUTPATIENT
Start: 2024-02-29 | End: 2024-02-29

## 2024-02-29 RX ORDER — WARFARIN SODIUM 2.5 MG/1
2.5 TABLET ORAL
Status: COMPLETED | OUTPATIENT
Start: 2024-02-29 | End: 2024-02-29

## 2024-02-29 RX ADMIN — AMIODARONE HYDROCHLORIDE 200 MG: 200 TABLET ORAL at 08:50

## 2024-02-29 RX ADMIN — AMLODIPINE BESYLATE 10 MG: 10 TABLET ORAL at 08:50

## 2024-02-29 RX ADMIN — AMIODARONE HYDROCHLORIDE 200 MG: 200 TABLET ORAL at 21:12

## 2024-02-29 RX ADMIN — AMIODARONE HYDROCHLORIDE 200 MG: 200 TABLET ORAL at 15:23

## 2024-02-29 RX ADMIN — FUROSEMIDE 40 MG: 10 INJECTION, SOLUTION INTRAMUSCULAR; INTRAVENOUS at 15:24

## 2024-02-29 RX ADMIN — OXYCODONE HYDROCHLORIDE AND ACETAMINOPHEN 2 TABLET: 5; 325 TABLET ORAL at 00:46

## 2024-02-29 RX ADMIN — ASPIRIN 81 MG: 81 TABLET, COATED ORAL at 08:49

## 2024-02-29 RX ADMIN — METOPROLOL TARTRATE 25 MG: 25 TABLET, FILM COATED ORAL at 08:50

## 2024-02-29 RX ADMIN — FUROSEMIDE 20 MG: 10 INJECTION, SOLUTION INTRAMUSCULAR; INTRAVENOUS at 18:38

## 2024-02-29 RX ADMIN — MUPIROCIN: 20 OINTMENT TOPICAL at 17:23

## 2024-02-29 RX ADMIN — OXYCODONE HYDROCHLORIDE AND ACETAMINOPHEN 2 TABLET: 5; 325 TABLET ORAL at 13:49

## 2024-02-29 RX ADMIN — PANTOPRAZOLE SODIUM 40 MG: 40 TABLET, DELAYED RELEASE ORAL at 08:50

## 2024-02-29 RX ADMIN — POLYETHYLENE GLYCOL 3350 17 G: 17 POWDER, FOR SOLUTION ORAL at 08:50

## 2024-02-29 RX ADMIN — WARFARIN SODIUM 2.5 MG: 2.5 TABLET ORAL at 18:38

## 2024-02-29 RX ADMIN — OXYCODONE HYDROCHLORIDE AND ACETAMINOPHEN 2 TABLET: 5; 325 TABLET ORAL at 08:49

## 2024-02-29 RX ADMIN — FUROSEMIDE 20 MG: 10 INJECTION, SOLUTION INTRAMUSCULAR; INTRAVENOUS at 08:49

## 2024-02-29 RX ADMIN — METOPROLOL TARTRATE 25 MG: 25 TABLET, FILM COATED ORAL at 21:12

## 2024-02-29 RX ADMIN — ATORVASTATIN CALCIUM 20 MG: 40 TABLET, FILM COATED ORAL at 21:11

## 2024-02-29 RX ADMIN — FENTANYL CITRATE 25 MCG: 50 INJECTION INTRAMUSCULAR; INTRAVENOUS at 03:43

## 2024-02-29 RX ADMIN — OXYCODONE HYDROCHLORIDE AND ACETAMINOPHEN 2 TABLET: 5; 325 TABLET ORAL at 21:17

## 2024-02-29 RX ADMIN — KETOROLAC TROMETHAMINE 15 MG: 15 INJECTION, SOLUTION INTRAMUSCULAR; INTRAVENOUS at 22:50

## 2024-02-29 RX ADMIN — KETOROLAC TROMETHAMINE 15 MG: 15 INJECTION, SOLUTION INTRAMUSCULAR; INTRAVENOUS at 01:53

## 2024-02-29 RX ADMIN — DOCUSATE SODIUM 50 MG AND SENNOSIDES 8.6 MG 1 TABLET: 8.6; 5 TABLET, FILM COATED ORAL at 21:12

## 2024-02-29 RX ADMIN — SODIUM CHLORIDE, PRESERVATIVE FREE 10 ML: 5 INJECTION INTRAVENOUS at 21:12

## 2024-02-29 RX ADMIN — CLOPIDOGREL BISULFATE 75 MG: 75 TABLET ORAL at 08:50

## 2024-02-29 RX ADMIN — DOCUSATE SODIUM 50 MG AND SENNOSIDES 8.6 MG 1 TABLET: 8.6; 5 TABLET, FILM COATED ORAL at 08:50

## 2024-02-29 RX ADMIN — KETOROLAC TROMETHAMINE 15 MG: 15 INJECTION, SOLUTION INTRAMUSCULAR; INTRAVENOUS at 17:31

## 2024-02-29 ASSESSMENT — PAIN SCALES - GENERAL
PAINLEVEL_OUTOF10: 7
PAINLEVEL_OUTOF10: 5
PAINLEVEL_OUTOF10: 9
PAINLEVEL_OUTOF10: 8

## 2024-02-29 ASSESSMENT — PAIN DESCRIPTION - ORIENTATION
ORIENTATION: MID
ORIENTATION: MID
ORIENTATION: MID;ANTERIOR

## 2024-02-29 ASSESSMENT — PAIN DESCRIPTION - LOCATION
LOCATION: CHEST
LOCATION: CHEST
LOCATION: STERNUM
LOCATION: STERNUM

## 2024-02-29 ASSESSMENT — PAIN DESCRIPTION - DESCRIPTORS
DESCRIPTORS: SHOOTING
DESCRIPTORS: SHOOTING

## 2024-02-29 ASSESSMENT — PAIN DESCRIPTION - FREQUENCY: FREQUENCY: CONTINUOUS

## 2024-02-29 NOTE — CONSULTS
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    History Obtained From: patient and chart review     History of Present Illness:    This patient 58 y.o. years old with past medical history given below.  Who was electively scheduled for CABG and AVR, underwent both yesterday without any acute event.  Patient extubated overnight.  Hemodynamically stable.  Normal sinus rhythm.  Currently on IV amiodarone.    Past Medical History:   has a past medical history of Diabetes mellitus (HCC) and Hernia, abdominal.    Past Surgical History:   has a past surgical history that includes Cardiac procedure (N/A, 10/9/2023) and Cardiac procedure (N/A, 10/9/2023).     Home Medications:    Prior to Admission medications    Medication Sig Start Date End Date Taking? Authorizing Provider   clopidogrel (PLAVIX) 75 MG tablet Take 1 tablet by mouth daily  Patient not taking: Reported on 2/26/2024 11/7/23   Katie Lorenzana APRN - CNP   carvedilol (COREG) 25 MG tablet Take 1 tablet by mouth in the morning and 1 tablet in the evening. 11/7/23   Katie Lorenzana APRN - CNP   albuterol (PROVENTIL) (2.5 MG/3ML) 0.083% nebulizer solution Take 3 mLs by nebulization every 4 hours as needed for Wheezing or Shortness of Breath  Patient not taking: Reported on 1/3/2024 10/11/23   Marlon Cooper DO   atorvastatin (LIPITOR) 40 MG tablet Take 1 tablet by mouth nightly  Patient not taking: Reported on 2/26/2024 10/11/23   Marlon Cooper DO   aspirin 81 MG chewable tablet Take 1 tablet by mouth daily 10/12/23   Kenneth 
Not on file       FAMILY HISTORY     History reviewed. No pertinent family history.       REVIEW OF SYSTEM   Straining to empty bladder  Constitutional: No asthenia/weight loss/anorexia    HEENT : No epistaxis/visual blurriness/rhinorrhoea/sorethroat/trauma  Cardiovascular:No chest pain/palpitation/SOB  Respiratory: No cough/fever/SOB/Wheezing    Gastrointestinal: No abdominal pain/nausea/vomiting/diarrhoea/constipation  Genitourinary: No dysuria/pyuria/hematuria/incomplete emptying of bladder  Musculoskeletal:  No gait disturbance/weakness or joint complaints  Integumentary: No rash or pruritis.  Neurological: No headache/diplopia/change in muscle strength/numbness or tingling. No change in gait, balance, coordination, mood, affect, memory, mentation, behavior.  Psychiatric: No anxiety/depression.  Endocrine: No temperature intolerance. No excessive thirst, fluid intake, or urination. No tremor.  Hematologic/Lymphatic: No abnormal bruising or bleeding, blood clots or swolle lymph nodes.  Allergic/Immunologic: No nasal congestion or hives      PHYSICAL EXAM     Vitals:    02/29/24 0015 02/29/24 0345 02/29/24 0745 02/29/24 0849   BP: (!) 147/84 139/81 (!) 142/86 (!) 142/86   Pulse: 68 61 65 65   Resp: 20 20 20 18   Temp: 97.8 °F (36.6 °C) 97.8 °F (36.6 °C) 97.7 °F (36.5 °C)    TempSrc: Oral Oral Oral    SpO2: 93% 90%     Weight:       Height:         24HR INTAKE/OUTPUT:    Intake/Output Summary (Last 24 hours) at 2/29/2024 1310  Last data filed at 2/29/2024 0156  Gross per 24 hour   Intake 586 ml   Output 1425 ml   Net -839 ml       General appearance:Awake, alert, in no acute distress  Skin: warm and dry, no rash or erythema  Eyes: conjunctivae normal and sclera anicteric  ENT: no thrush no pharyngeal congestion  orodental hygiene   Neck: No JVD, Lymphadenopathty or thyromegaly  Respiratory: vesicular breath sounds,no wheeze/crackles  Cardiovascular: S1 S2 normal,no gallop or organic murmur.No carotid

## 2024-02-29 NOTE — CARE COORDINATION
Notified Aye from [facility] that nephrology consult note is completed. She will review

## 2024-03-01 ENCOUNTER — APPOINTMENT (OUTPATIENT)
Dept: GENERAL RADIOLOGY | Age: 59
DRG: 219 | End: 2024-03-01
Attending: THORACIC SURGERY (CARDIOTHORACIC VASCULAR SURGERY)
Payer: MEDICARE

## 2024-03-01 PROBLEM — R33.9 URINARY RETENTION: Status: ACTIVE | Noted: 2024-03-01

## 2024-03-01 LAB
ANION GAP SERPL CALCULATED.3IONS-SCNC: 10 MMOL/L (ref 9–17)
BUN SERPL-MCNC: 34 MG/DL (ref 6–20)
CALCIUM SERPL-MCNC: 7.7 MG/DL (ref 8.6–10.4)
CHLORIDE SERPL-SCNC: 105 MMOL/L (ref 98–107)
CO2 SERPL-SCNC: 20 MMOL/L (ref 20–31)
CREAT SERPL-MCNC: 2.1 MG/DL (ref 0.7–1.2)
ERYTHROCYTE [DISTWIDTH] IN BLOOD BY AUTOMATED COUNT: 15.6 % (ref 11.8–14.4)
GFR SERPL CREATININE-BSD FRML MDRD: 36 ML/MIN/1.73M2
GLUCOSE BLD-MCNC: 128 MG/DL (ref 75–110)
GLUCOSE BLD-MCNC: 145 MG/DL (ref 75–110)
GLUCOSE BLD-MCNC: 160 MG/DL (ref 75–110)
GLUCOSE BLD-MCNC: 162 MG/DL (ref 75–110)
GLUCOSE SERPL-MCNC: 155 MG/DL (ref 70–99)
HCT VFR BLD AUTO: 28.4 % (ref 40.7–50.3)
HGB BLD-MCNC: 9.2 G/DL (ref 13–17)
INR PPP: 2.1
MAGNESIUM SERPL-MCNC: 2.3 MG/DL (ref 1.6–2.6)
MCH RBC QN AUTO: 30.8 PG (ref 25.2–33.5)
MCHC RBC AUTO-ENTMCNC: 32.4 G/DL (ref 28.4–34.8)
MCV RBC AUTO: 95 FL (ref 82.6–102.9)
NRBC BLD-RTO: 0 PER 100 WBC
PLATELET # BLD AUTO: 160 K/UL (ref 138–453)
PMV BLD AUTO: 9.5 FL (ref 8.1–13.5)
POTASSIUM SERPL-SCNC: 4 MMOL/L (ref 3.7–5.3)
PROTHROMBIN TIME: 23 SEC (ref 11.7–14.9)
RBC # BLD AUTO: 2.99 M/UL (ref 4.21–5.77)
SODIUM SERPL-SCNC: 135 MMOL/L (ref 135–144)
WBC OTHER # BLD: 9.1 K/UL (ref 3.5–11.3)

## 2024-03-01 PROCEDURE — 6370000000 HC RX 637 (ALT 250 FOR IP): Performed by: NURSE PRACTITIONER

## 2024-03-01 PROCEDURE — 71045 X-RAY EXAM CHEST 1 VIEW: CPT

## 2024-03-01 PROCEDURE — 97110 THERAPEUTIC EXERCISES: CPT

## 2024-03-01 PROCEDURE — 2140000001 HC CVICU INTERMEDIATE R&B

## 2024-03-01 PROCEDURE — 99213 OFFICE O/P EST LOW 20 MIN: CPT

## 2024-03-01 PROCEDURE — 6370000000 HC RX 637 (ALT 250 FOR IP)

## 2024-03-01 PROCEDURE — 97535 SELF CARE MNGMENT TRAINING: CPT

## 2024-03-01 PROCEDURE — 82947 ASSAY GLUCOSE BLOOD QUANT: CPT

## 2024-03-01 PROCEDURE — 97530 THERAPEUTIC ACTIVITIES: CPT

## 2024-03-01 PROCEDURE — 85610 PROTHROMBIN TIME: CPT

## 2024-03-01 PROCEDURE — 6360000002 HC RX W HCPCS: Performed by: NURSE PRACTITIONER

## 2024-03-01 PROCEDURE — 6370000000 HC RX 637 (ALT 250 FOR IP): Performed by: STUDENT IN AN ORGANIZED HEALTH CARE EDUCATION/TRAINING PROGRAM

## 2024-03-01 PROCEDURE — 99024 POSTOP FOLLOW-UP VISIT: CPT

## 2024-03-01 PROCEDURE — 51798 US URINE CAPACITY MEASURE: CPT

## 2024-03-01 PROCEDURE — 85027 COMPLETE CBC AUTOMATED: CPT

## 2024-03-01 PROCEDURE — 6360000002 HC RX W HCPCS

## 2024-03-01 PROCEDURE — 99232 SBSQ HOSP IP/OBS MODERATE 35: CPT | Performed by: INTERNAL MEDICINE

## 2024-03-01 PROCEDURE — 83735 ASSAY OF MAGNESIUM: CPT

## 2024-03-01 PROCEDURE — 2580000003 HC RX 258: Performed by: NURSE PRACTITIONER

## 2024-03-01 PROCEDURE — 36415 COLL VENOUS BLD VENIPUNCTURE: CPT

## 2024-03-01 PROCEDURE — 80048 BASIC METABOLIC PNL TOTAL CA: CPT

## 2024-03-01 PROCEDURE — 97116 GAIT TRAINING THERAPY: CPT

## 2024-03-01 RX ORDER — LOSARTAN POTASSIUM 50 MG/1
25 TABLET ORAL DAILY
Status: DISCONTINUED | OUTPATIENT
Start: 2024-03-01 | End: 2024-03-01

## 2024-03-01 RX ORDER — TAMSULOSIN HYDROCHLORIDE 0.4 MG/1
0.4 CAPSULE ORAL DAILY
Status: DISCONTINUED | OUTPATIENT
Start: 2024-03-01 | End: 2024-03-03 | Stop reason: HOSPADM

## 2024-03-01 RX ORDER — WARFARIN SODIUM 2.5 MG/1
2.5 TABLET ORAL
Status: COMPLETED | OUTPATIENT
Start: 2024-03-01 | End: 2024-03-01

## 2024-03-01 RX ADMIN — DOCUSATE SODIUM 50 MG AND SENNOSIDES 8.6 MG 1 TABLET: 8.6; 5 TABLET, FILM COATED ORAL at 09:28

## 2024-03-01 RX ADMIN — CLOPIDOGREL BISULFATE 75 MG: 75 TABLET ORAL at 09:27

## 2024-03-01 RX ADMIN — OXYCODONE HYDROCHLORIDE AND ACETAMINOPHEN 2 TABLET: 5; 325 TABLET ORAL at 02:03

## 2024-03-01 RX ADMIN — POLYETHYLENE GLYCOL 3350 17 G: 17 POWDER, FOR SOLUTION ORAL at 09:28

## 2024-03-01 RX ADMIN — INSULIN LISPRO 2 UNITS: 100 INJECTION, SOLUTION INTRAVENOUS; SUBCUTANEOUS at 17:22

## 2024-03-01 RX ADMIN — FUROSEMIDE 20 MG: 10 INJECTION, SOLUTION INTRAMUSCULAR; INTRAVENOUS at 09:28

## 2024-03-01 RX ADMIN — AMIODARONE HYDROCHLORIDE 200 MG: 200 TABLET ORAL at 21:31

## 2024-03-01 RX ADMIN — WARFARIN SODIUM 2.5 MG: 2.5 TABLET ORAL at 22:19

## 2024-03-01 RX ADMIN — OXYCODONE HYDROCHLORIDE AND ACETAMINOPHEN 2 TABLET: 5; 325 TABLET ORAL at 09:27

## 2024-03-01 RX ADMIN — FUROSEMIDE 20 MG: 10 INJECTION, SOLUTION INTRAMUSCULAR; INTRAVENOUS at 17:22

## 2024-03-01 RX ADMIN — HYDRALAZINE HYDROCHLORIDE 5 MG: 20 INJECTION INTRAMUSCULAR; INTRAVENOUS at 21:39

## 2024-03-01 RX ADMIN — AMLODIPINE BESYLATE 10 MG: 10 TABLET ORAL at 09:28

## 2024-03-01 RX ADMIN — ATORVASTATIN CALCIUM 20 MG: 40 TABLET, FILM COATED ORAL at 21:31

## 2024-03-01 RX ADMIN — OXYCODONE HYDROCHLORIDE AND ACETAMINOPHEN 2 TABLET: 5; 325 TABLET ORAL at 21:32

## 2024-03-01 RX ADMIN — METOPROLOL TARTRATE 25 MG: 25 TABLET, FILM COATED ORAL at 09:39

## 2024-03-01 RX ADMIN — FENTANYL CITRATE 25 MCG: 50 INJECTION INTRAMUSCULAR; INTRAVENOUS at 23:01

## 2024-03-01 RX ADMIN — SODIUM CHLORIDE, PRESERVATIVE FREE 10 ML: 5 INJECTION INTRAVENOUS at 09:36

## 2024-03-01 RX ADMIN — AMIODARONE HYDROCHLORIDE 200 MG: 200 TABLET ORAL at 14:41

## 2024-03-01 RX ADMIN — INSULIN LISPRO 2 UNITS: 100 INJECTION, SOLUTION INTRAVENOUS; SUBCUTANEOUS at 12:11

## 2024-03-01 RX ADMIN — PANTOPRAZOLE SODIUM 40 MG: 40 TABLET, DELAYED RELEASE ORAL at 09:27

## 2024-03-01 RX ADMIN — ASPIRIN 81 MG: 81 TABLET, COATED ORAL at 09:27

## 2024-03-01 RX ADMIN — TAMSULOSIN HYDROCHLORIDE 0.4 MG: 0.4 CAPSULE ORAL at 17:28

## 2024-03-01 RX ADMIN — DOCUSATE SODIUM 50 MG AND SENNOSIDES 8.6 MG 1 TABLET: 8.6; 5 TABLET, FILM COATED ORAL at 21:32

## 2024-03-01 RX ADMIN — SODIUM CHLORIDE, PRESERVATIVE FREE 10 ML: 5 INJECTION INTRAVENOUS at 21:32

## 2024-03-01 RX ADMIN — OXYCODONE HYDROCHLORIDE AND ACETAMINOPHEN 2 TABLET: 5; 325 TABLET ORAL at 16:05

## 2024-03-01 RX ADMIN — AMIODARONE HYDROCHLORIDE 200 MG: 200 TABLET ORAL at 09:27

## 2024-03-01 ASSESSMENT — PAIN SCALES - GENERAL
PAINLEVEL_OUTOF10: 8
PAINLEVEL_OUTOF10: 5
PAINLEVEL_OUTOF10: 6
PAINLEVEL_OUTOF10: 8
PAINLEVEL_OUTOF10: 0
PAINLEVEL_OUTOF10: 0

## 2024-03-01 ASSESSMENT — PAIN DESCRIPTION - ORIENTATION
ORIENTATION: MID
ORIENTATION: MID
ORIENTATION: RIGHT

## 2024-03-01 ASSESSMENT — PAIN DESCRIPTION - PAIN TYPE: TYPE: SURGICAL PAIN

## 2024-03-01 ASSESSMENT — PAIN DESCRIPTION - LOCATION
LOCATION: CHEST
LOCATION: STERNUM
LOCATION: CHEST
LOCATION: BACK;SHOULDER

## 2024-03-01 ASSESSMENT — PAIN DESCRIPTION - FREQUENCY
FREQUENCY: CONTINUOUS
FREQUENCY: CONTINUOUS

## 2024-03-01 NOTE — DISCHARGE INSTR - COC
1.7-2.3          Physician Certification: I certify the above information and transfer of Vincenzo Darden  is necessary for the continuing treatment of the diagnosis listed and that he requires Acute Rehab for less 30 days.     Update Admission H&P: No change in H&P    PHYSICIAN SIGNATURE:  Electronically signed by ADEOLA DAILEY NP on 3/2/24 at 9:58 AM EST

## 2024-03-01 NOTE — CARE COORDINATION
Transitional planning. Left HIPAA compliant message for Latanya Nguyen requesting CB concerning referral and acceptance     1254 LM with [facility] after receiving forwarded message from CAR 1 requesting CB from . Requested CB to  and provided CAR 2 CM number.     13[facility], pt is approved and they can start pre-cert today. Informed her of today's PT note, she stated they could still try if pt wants ARU.     1345 spoke to pt, informed him that per his PT note he most likely would get denied for ARU by his insurance. He asked that we still try (start pre-cert), he lives with his wife and will talk to her about it when she visits. If he decides he would rather go home he will inform CM.     LM for OT to see pt today for note needed for pre-cert    1552 received call from [facility], pt's insurance denied d/t lack of support for ARU level. P2P offered call [phone]  Deadline is  @ 4pm.   Will pt name,  and insurance ID number  Reference [id number]  PS Soy Mao NP to inform of above information and inform him of today's PT note.   He PS that pt will be dc'd in the next day or two and that CM should ask about HC.    0682 informed pt of above information, he asked that referral be faxed to [facility]. Referral faxed as requested. Pt does have walker at home.

## 2024-03-01 NOTE — DISCHARGE INSTRUCTIONS
Left plantar foot diabetic ulcer: irrigate with saline, fill with a strip of silver alginate, cover with a 4x4\" bordered silicone foam. Change every other day  Left plantar #1 toe wound: cleanse with saline, cover with a band-aid. Change daily.  Left anterior ankle: cleanse with saline, cover with a 4x4\" bordered silicone foam.  Change every 3 days.         ACTIVITY/EXERCISE   ?    It will take 2 to 3 months to feel like you did before surgery in terms of energy and strength.  ?    Slowly increase your activity after you go home. Pace yourself, listen to your body. If it hurts, stop.    During the first 2 months, no digging, outside bike riding, or using arm movement on  a stationary bike for sternal precautions.   ?   The limit on how much you can lift, push or pull is 10 pounds.    For the first 4 weeks after surgery, you must not lift anything over 10 pounds. (You cannot lift anything heavier than a gallon of  milk).   Beginning the 5th week after surgery, you may lift, push or pull up to 20 pounds.   ?    Begin your walking program on the first day you are home and record how many times per day and number of minutes on your log. You may climb stairs; there are no limits to stair climbing.   ?    Continue to do your cough and deep breathing exercises and the incentive spirometer 6 times a day as you did in the hospital.   ?    Do not use arms to get up from a seated position. Instead, rock in your chair to give yourself momentum to sit up.   ?    You may begin light house hold chores, washing a few dishes, dusting, setting the table or folding laundry.    ?    You can have sex when it is comfortable for you.  The amount of stress on the heart during sex is about the same as climbing 2 flights of stairs.    APPETITE   ?   Your appetite might be decreased at first.  It should slowly improve.   ?   Small, frequent meals, as well as cold foods, may help.   ?   The dietitian will assist you with a low fat, low

## 2024-03-02 ENCOUNTER — APPOINTMENT (OUTPATIENT)
Dept: GENERAL RADIOLOGY | Age: 59
DRG: 219 | End: 2024-03-02
Attending: THORACIC SURGERY (CARDIOTHORACIC VASCULAR SURGERY)
Payer: MEDICARE

## 2024-03-02 LAB
ANION GAP SERPL CALCULATED.3IONS-SCNC: 10 MMOL/L (ref 9–17)
BUN SERPL-MCNC: 33 MG/DL (ref 6–20)
CALCIUM SERPL-MCNC: 8 MG/DL (ref 8.6–10.4)
CHLORIDE SERPL-SCNC: 107 MMOL/L (ref 98–107)
CO2 SERPL-SCNC: 21 MMOL/L (ref 20–31)
CREAT SERPL-MCNC: 1.6 MG/DL (ref 0.7–1.2)
ERYTHROCYTE [DISTWIDTH] IN BLOOD BY AUTOMATED COUNT: 15.2 % (ref 11.8–14.4)
GFR SERPL CREATININE-BSD FRML MDRD: 49 ML/MIN/1.73M2
GLUCOSE BLD-MCNC: 133 MG/DL (ref 75–110)
GLUCOSE BLD-MCNC: 140 MG/DL (ref 75–110)
GLUCOSE BLD-MCNC: 179 MG/DL (ref 75–110)
GLUCOSE BLD-MCNC: 180 MG/DL (ref 75–110)
GLUCOSE SERPL-MCNC: 163 MG/DL (ref 70–99)
HCT VFR BLD AUTO: 30.2 % (ref 40.7–50.3)
HGB BLD-MCNC: 9.5 G/DL (ref 13–17)
INR PPP: 1.8
MAGNESIUM SERPL-MCNC: 2.2 MG/DL (ref 1.6–2.6)
MCH RBC QN AUTO: 29.7 PG (ref 25.2–33.5)
MCHC RBC AUTO-ENTMCNC: 31.5 G/DL (ref 28.4–34.8)
MCV RBC AUTO: 94.4 FL (ref 82.6–102.9)
NRBC BLD-RTO: 0 PER 100 WBC
PLATELET # BLD AUTO: 190 K/UL (ref 138–453)
PMV BLD AUTO: 9.2 FL (ref 8.1–13.5)
POTASSIUM SERPL-SCNC: 4.5 MMOL/L (ref 3.7–5.3)
PROTHROMBIN TIME: 20.6 SEC (ref 11.7–14.9)
RBC # BLD AUTO: 3.2 M/UL (ref 4.21–5.77)
SODIUM SERPL-SCNC: 138 MMOL/L (ref 135–144)
WBC OTHER # BLD: 9.8 K/UL (ref 3.5–11.3)

## 2024-03-02 PROCEDURE — 6360000002 HC RX W HCPCS: Performed by: NURSE PRACTITIONER

## 2024-03-02 PROCEDURE — 85610 PROTHROMBIN TIME: CPT

## 2024-03-02 PROCEDURE — 71045 X-RAY EXAM CHEST 1 VIEW: CPT

## 2024-03-02 PROCEDURE — 6370000000 HC RX 637 (ALT 250 FOR IP): Performed by: STUDENT IN AN ORGANIZED HEALTH CARE EDUCATION/TRAINING PROGRAM

## 2024-03-02 PROCEDURE — 99232 SBSQ HOSP IP/OBS MODERATE 35: CPT | Performed by: INTERNAL MEDICINE

## 2024-03-02 PROCEDURE — 36415 COLL VENOUS BLD VENIPUNCTURE: CPT

## 2024-03-02 PROCEDURE — 6360000002 HC RX W HCPCS: Performed by: INTERNAL MEDICINE

## 2024-03-02 PROCEDURE — 80048 BASIC METABOLIC PNL TOTAL CA: CPT

## 2024-03-02 PROCEDURE — 6370000000 HC RX 637 (ALT 250 FOR IP): Performed by: THORACIC SURGERY (CARDIOTHORACIC VASCULAR SURGERY)

## 2024-03-02 PROCEDURE — 83735 ASSAY OF MAGNESIUM: CPT

## 2024-03-02 PROCEDURE — 85027 COMPLETE CBC AUTOMATED: CPT

## 2024-03-02 PROCEDURE — 6360000002 HC RX W HCPCS

## 2024-03-02 PROCEDURE — 6370000000 HC RX 637 (ALT 250 FOR IP)

## 2024-03-02 PROCEDURE — 6370000000 HC RX 637 (ALT 250 FOR IP): Performed by: NURSE PRACTITIONER

## 2024-03-02 PROCEDURE — 99024 POSTOP FOLLOW-UP VISIT: CPT | Performed by: NURSE PRACTITIONER

## 2024-03-02 PROCEDURE — 2140000001 HC CVICU INTERMEDIATE R&B

## 2024-03-02 PROCEDURE — 82947 ASSAY GLUCOSE BLOOD QUANT: CPT

## 2024-03-02 PROCEDURE — 2580000003 HC RX 258: Performed by: NURSE PRACTITIONER

## 2024-03-02 RX ORDER — TAMSULOSIN HYDROCHLORIDE 0.4 MG/1
0.4 CAPSULE ORAL DAILY
Qty: 30 CAPSULE | Refills: 3 | Status: SHIPPED | OUTPATIENT
Start: 2024-03-03

## 2024-03-02 RX ORDER — WARFARIN SODIUM 2.5 MG/1
5 TABLET ORAL DAILY
Qty: 30 TABLET | Refills: 1 | Status: SHIPPED | OUTPATIENT
Start: 2024-03-02

## 2024-03-02 RX ORDER — WARFARIN SODIUM 5 MG/1
5 TABLET ORAL
Status: COMPLETED | OUTPATIENT
Start: 2024-03-02 | End: 2024-03-02

## 2024-03-02 RX ORDER — OXYCODONE HYDROCHLORIDE AND ACETAMINOPHEN 5; 325 MG/1; MG/1
1 TABLET ORAL EVERY 8 HOURS PRN
Qty: 21 TABLET | Refills: 0 | Status: SHIPPED | OUTPATIENT
Start: 2024-03-02 | End: 2024-03-09

## 2024-03-02 RX ORDER — FUROSEMIDE 20 MG/1
20 TABLET ORAL 2 TIMES DAILY
Qty: 30 TABLET | Refills: 0 | Status: SHIPPED | OUTPATIENT
Start: 2024-03-02 | End: 2024-03-03 | Stop reason: HOSPADM

## 2024-03-02 RX ORDER — OXYCODONE HYDROCHLORIDE AND ACETAMINOPHEN 5; 325 MG/1; MG/1
2 TABLET ORAL ONCE
Status: COMPLETED | OUTPATIENT
Start: 2024-03-02 | End: 2024-03-02

## 2024-03-02 RX ORDER — ATORVASTATIN CALCIUM 20 MG/1
20 TABLET, FILM COATED ORAL NIGHTLY
Qty: 30 TABLET | Refills: 3 | Status: SHIPPED | OUTPATIENT
Start: 2024-03-02

## 2024-03-02 RX ORDER — FUROSEMIDE 10 MG/ML
20 INJECTION INTRAMUSCULAR; INTRAVENOUS ONCE
Status: COMPLETED | OUTPATIENT
Start: 2024-03-02 | End: 2024-03-02

## 2024-03-02 RX ORDER — PANTOPRAZOLE SODIUM 40 MG/1
40 TABLET, DELAYED RELEASE ORAL DAILY
Qty: 30 TABLET | Refills: 3 | Status: SHIPPED | OUTPATIENT
Start: 2024-03-03

## 2024-03-02 RX ORDER — ONDANSETRON 4 MG/1
4 TABLET, ORALLY DISINTEGRATING ORAL EVERY 8 HOURS PRN
Qty: 30 TABLET | Refills: 0 | Status: SHIPPED | OUTPATIENT
Start: 2024-03-02

## 2024-03-02 RX ORDER — AMIODARONE HYDROCHLORIDE 200 MG/1
200 TABLET ORAL 2 TIMES DAILY
Qty: 30 TABLET | Refills: 0 | Status: SHIPPED | OUTPATIENT
Start: 2024-03-02 | End: 2024-03-17

## 2024-03-02 RX ORDER — POTASSIUM CHLORIDE 20 MEQ/1
20 TABLET, EXTENDED RELEASE ORAL DAILY
Qty: 30 TABLET | Refills: 0 | Status: SHIPPED | OUTPATIENT
Start: 2024-03-02

## 2024-03-02 RX ADMIN — SODIUM CHLORIDE, PRESERVATIVE FREE 10 ML: 5 INJECTION INTRAVENOUS at 20:55

## 2024-03-02 RX ADMIN — ASPIRIN 81 MG: 81 TABLET, COATED ORAL at 09:38

## 2024-03-02 RX ADMIN — CLOPIDOGREL BISULFATE 75 MG: 75 TABLET ORAL at 09:37

## 2024-03-02 RX ADMIN — DOCUSATE SODIUM 50 MG AND SENNOSIDES 8.6 MG 1 TABLET: 8.6; 5 TABLET, FILM COATED ORAL at 20:54

## 2024-03-02 RX ADMIN — INSULIN LISPRO 2 UNITS: 100 INJECTION, SOLUTION INTRAVENOUS; SUBCUTANEOUS at 17:26

## 2024-03-02 RX ADMIN — AMLODIPINE BESYLATE 10 MG: 10 TABLET ORAL at 09:37

## 2024-03-02 RX ADMIN — AMIODARONE HYDROCHLORIDE 200 MG: 200 TABLET ORAL at 09:37

## 2024-03-02 RX ADMIN — OXYCODONE HYDROCHLORIDE AND ACETAMINOPHEN 2 TABLET: 5; 325 TABLET ORAL at 23:19

## 2024-03-02 RX ADMIN — HYDRALAZINE HYDROCHLORIDE 5 MG: 20 INJECTION INTRAMUSCULAR; INTRAVENOUS at 07:11

## 2024-03-02 RX ADMIN — PANTOPRAZOLE SODIUM 40 MG: 40 TABLET, DELAYED RELEASE ORAL at 09:37

## 2024-03-02 RX ADMIN — AMIODARONE HYDROCHLORIDE 200 MG: 200 TABLET ORAL at 20:54

## 2024-03-02 RX ADMIN — OXYCODONE HYDROCHLORIDE AND ACETAMINOPHEN 2 TABLET: 5; 325 TABLET ORAL at 03:20

## 2024-03-02 RX ADMIN — ATORVASTATIN CALCIUM 20 MG: 40 TABLET, FILM COATED ORAL at 20:54

## 2024-03-02 RX ADMIN — FUROSEMIDE 20 MG: 10 INJECTION, SOLUTION INTRAMUSCULAR; INTRAVENOUS at 17:02

## 2024-03-02 RX ADMIN — SODIUM CHLORIDE, PRESERVATIVE FREE 10 ML: 5 INJECTION INTRAVENOUS at 09:38

## 2024-03-02 RX ADMIN — FUROSEMIDE 20 MG: 10 INJECTION, SOLUTION INTRAMUSCULAR; INTRAVENOUS at 09:38

## 2024-03-02 RX ADMIN — FUROSEMIDE 20 MG: 10 INJECTION, SOLUTION INTRAMUSCULAR; INTRAVENOUS at 19:35

## 2024-03-02 RX ADMIN — WARFARIN SODIUM 5 MG: 5 TABLET ORAL at 17:26

## 2024-03-02 RX ADMIN — AMIODARONE HYDROCHLORIDE 200 MG: 200 TABLET ORAL at 13:32

## 2024-03-02 RX ADMIN — MUPIROCIN: 20 OINTMENT TOPICAL at 09:39

## 2024-03-02 RX ADMIN — OXYCODONE HYDROCHLORIDE AND ACETAMINOPHEN 2 TABLET: 5; 325 TABLET ORAL at 17:02

## 2024-03-02 RX ADMIN — DOCUSATE SODIUM 50 MG AND SENNOSIDES 8.6 MG 1 TABLET: 8.6; 5 TABLET, FILM COATED ORAL at 09:41

## 2024-03-02 RX ADMIN — OXYCODONE HYDROCHLORIDE AND ACETAMINOPHEN 2 TABLET: 5; 325 TABLET ORAL at 13:32

## 2024-03-02 RX ADMIN — TAMSULOSIN HYDROCHLORIDE 0.4 MG: 0.4 CAPSULE ORAL at 09:42

## 2024-03-02 RX ADMIN — OXYCODONE HYDROCHLORIDE AND ACETAMINOPHEN 2 TABLET: 5; 325 TABLET ORAL at 09:37

## 2024-03-02 RX ADMIN — METOPROLOL TARTRATE 25 MG: 25 TABLET, FILM COATED ORAL at 09:37

## 2024-03-02 RX ADMIN — METOPROLOL TARTRATE 25 MG: 25 TABLET, FILM COATED ORAL at 20:54

## 2024-03-02 RX ADMIN — OXYCODONE HYDROCHLORIDE AND ACETAMINOPHEN 2 TABLET: 5; 325 TABLET ORAL at 20:54

## 2024-03-02 RX ADMIN — POLYETHYLENE GLYCOL 3350 17 G: 17 POWDER, FOR SOLUTION ORAL at 09:41

## 2024-03-02 ASSESSMENT — PAIN DESCRIPTION - LOCATION
LOCATION: ABDOMEN;CHEST
LOCATION: CHEST

## 2024-03-02 ASSESSMENT — PAIN SCALES - GENERAL
PAINLEVEL_OUTOF10: 9
PAINLEVEL_OUTOF10: 7
PAINLEVEL_OUTOF10: 6
PAINLEVEL_OUTOF10: 0
PAINLEVEL_OUTOF10: 8
PAINLEVEL_OUTOF10: 6

## 2024-03-02 ASSESSMENT — PAIN DESCRIPTION - ORIENTATION
ORIENTATION: UPPER

## 2024-03-03 ENCOUNTER — APPOINTMENT (OUTPATIENT)
Dept: GENERAL RADIOLOGY | Age: 59
DRG: 219 | End: 2024-03-03
Attending: THORACIC SURGERY (CARDIOTHORACIC VASCULAR SURGERY)
Payer: MEDICARE

## 2024-03-03 VITALS
SYSTOLIC BLOOD PRESSURE: 167 MMHG | RESPIRATION RATE: 16 BRPM | DIASTOLIC BLOOD PRESSURE: 92 MMHG | HEIGHT: 72 IN | BODY MASS INDEX: 38.88 KG/M2 | OXYGEN SATURATION: 91 % | TEMPERATURE: 97.7 F | HEART RATE: 69 BPM | WEIGHT: 287.04 LBS

## 2024-03-03 LAB
ANION GAP SERPL CALCULATED.3IONS-SCNC: 10 MMOL/L (ref 9–17)
BUN SERPL-MCNC: 33 MG/DL (ref 6–20)
CALCIUM SERPL-MCNC: 8.3 MG/DL (ref 8.6–10.4)
CHLORIDE SERPL-SCNC: 107 MMOL/L (ref 98–107)
CO2 SERPL-SCNC: 20 MMOL/L (ref 20–31)
CREAT SERPL-MCNC: 1.8 MG/DL (ref 0.7–1.2)
ERYTHROCYTE [DISTWIDTH] IN BLOOD BY AUTOMATED COUNT: 15.2 % (ref 11.8–14.4)
GFR SERPL CREATININE-BSD FRML MDRD: 43 ML/MIN/1.73M2
GLUCOSE BLD-MCNC: 113 MG/DL (ref 75–110)
GLUCOSE BLD-MCNC: 178 MG/DL (ref 75–110)
GLUCOSE SERPL-MCNC: 137 MG/DL (ref 70–99)
HCT VFR BLD AUTO: 30.8 % (ref 40.7–50.3)
HGB BLD-MCNC: 9.9 G/DL (ref 13–17)
INR PPP: 2.5
MAGNESIUM SERPL-MCNC: 2.2 MG/DL (ref 1.6–2.6)
MCH RBC QN AUTO: 30.5 PG (ref 25.2–33.5)
MCHC RBC AUTO-ENTMCNC: 32.1 G/DL (ref 28.4–34.8)
MCV RBC AUTO: 94.8 FL (ref 82.6–102.9)
NRBC BLD-RTO: 0 PER 100 WBC
PLATELET # BLD AUTO: 218 K/UL (ref 138–453)
PMV BLD AUTO: 9.2 FL (ref 8.1–13.5)
POTASSIUM SERPL-SCNC: 4.7 MMOL/L (ref 3.7–5.3)
PROTHROMBIN TIME: 26 SEC (ref 11.7–14.9)
RBC # BLD AUTO: 3.25 M/UL (ref 4.21–5.77)
SODIUM SERPL-SCNC: 137 MMOL/L (ref 135–144)
WBC OTHER # BLD: 9.9 K/UL (ref 3.5–11.3)

## 2024-03-03 PROCEDURE — 6360000002 HC RX W HCPCS

## 2024-03-03 PROCEDURE — 6370000000 HC RX 637 (ALT 250 FOR IP): Performed by: NURSE PRACTITIONER

## 2024-03-03 PROCEDURE — 6370000000 HC RX 637 (ALT 250 FOR IP)

## 2024-03-03 PROCEDURE — 99232 SBSQ HOSP IP/OBS MODERATE 35: CPT | Performed by: INTERNAL MEDICINE

## 2024-03-03 PROCEDURE — 80048 BASIC METABOLIC PNL TOTAL CA: CPT

## 2024-03-03 PROCEDURE — 2580000003 HC RX 258: Performed by: NURSE PRACTITIONER

## 2024-03-03 PROCEDURE — 85027 COMPLETE CBC AUTOMATED: CPT

## 2024-03-03 PROCEDURE — 36415 COLL VENOUS BLD VENIPUNCTURE: CPT

## 2024-03-03 PROCEDURE — 85610 PROTHROMBIN TIME: CPT

## 2024-03-03 PROCEDURE — 71045 X-RAY EXAM CHEST 1 VIEW: CPT

## 2024-03-03 PROCEDURE — 83735 ASSAY OF MAGNESIUM: CPT

## 2024-03-03 PROCEDURE — 82947 ASSAY GLUCOSE BLOOD QUANT: CPT

## 2024-03-03 PROCEDURE — 6370000000 HC RX 637 (ALT 250 FOR IP): Performed by: STUDENT IN AN ORGANIZED HEALTH CARE EDUCATION/TRAINING PROGRAM

## 2024-03-03 RX ORDER — FUROSEMIDE 40 MG/1
40 TABLET ORAL 2 TIMES DAILY
Status: DISCONTINUED | OUTPATIENT
Start: 2024-03-04 | End: 2024-03-03 | Stop reason: HOSPADM

## 2024-03-03 RX ORDER — FUROSEMIDE 40 MG/1
40 TABLET ORAL 2 TIMES DAILY
Qty: 60 TABLET | Refills: 3 | Status: SHIPPED | OUTPATIENT
Start: 2024-03-04

## 2024-03-03 RX ORDER — WARFARIN SODIUM 2.5 MG/1
2.5 TABLET ORAL
Status: DISCONTINUED | OUTPATIENT
Start: 2024-03-03 | End: 2024-03-03 | Stop reason: HOSPADM

## 2024-03-03 RX ADMIN — CLOPIDOGREL BISULFATE 75 MG: 75 TABLET ORAL at 07:59

## 2024-03-03 RX ADMIN — OXYCODONE HYDROCHLORIDE AND ACETAMINOPHEN 2 TABLET: 5; 325 TABLET ORAL at 03:21

## 2024-03-03 RX ADMIN — AMIODARONE HYDROCHLORIDE 200 MG: 200 TABLET ORAL at 13:48

## 2024-03-03 RX ADMIN — FUROSEMIDE 20 MG: 10 INJECTION, SOLUTION INTRAMUSCULAR; INTRAVENOUS at 07:59

## 2024-03-03 RX ADMIN — OXYCODONE HYDROCHLORIDE AND ACETAMINOPHEN 2 TABLET: 5; 325 TABLET ORAL at 07:17

## 2024-03-03 RX ADMIN — PANTOPRAZOLE SODIUM 40 MG: 40 TABLET, DELAYED RELEASE ORAL at 07:59

## 2024-03-03 RX ADMIN — DOCUSATE SODIUM 50 MG AND SENNOSIDES 8.6 MG 1 TABLET: 8.6; 5 TABLET, FILM COATED ORAL at 07:59

## 2024-03-03 RX ADMIN — ASPIRIN 81 MG: 81 TABLET, COATED ORAL at 07:59

## 2024-03-03 RX ADMIN — SODIUM CHLORIDE, PRESERVATIVE FREE 10 ML: 5 INJECTION INTRAVENOUS at 08:03

## 2024-03-03 RX ADMIN — AMLODIPINE BESYLATE 10 MG: 10 TABLET ORAL at 07:59

## 2024-03-03 RX ADMIN — AMIODARONE HYDROCHLORIDE 200 MG: 200 TABLET ORAL at 07:59

## 2024-03-03 RX ADMIN — INSULIN LISPRO 2 UNITS: 100 INJECTION, SOLUTION INTRAVENOUS; SUBCUTANEOUS at 13:48

## 2024-03-03 RX ADMIN — POLYETHYLENE GLYCOL 3350 17 G: 17 POWDER, FOR SOLUTION ORAL at 07:58

## 2024-03-03 RX ADMIN — TAMSULOSIN HYDROCHLORIDE 0.4 MG: 0.4 CAPSULE ORAL at 07:59

## 2024-03-03 RX ADMIN — METOPROLOL TARTRATE 25 MG: 25 TABLET, FILM COATED ORAL at 07:59

## 2024-03-03 ASSESSMENT — PAIN SCALES - GENERAL
PAINLEVEL_OUTOF10: 9
PAINLEVEL_OUTOF10: 8
PAINLEVEL_OUTOF10: 5

## 2024-03-03 ASSESSMENT — PAIN DESCRIPTION - LOCATION: LOCATION: CHEST

## 2024-03-03 NOTE — PROGRESS NOTES
02/26/24 1556   Care Plan - Respiratory Goals   Achieves optimal ventilation and oxygenation Assess for changes in respiratory status;Assess for changes in mentation and behavior;Position to facilitate oxygenation and minimize respiratory effort;Initiate smoking cessation protocol as indicated;Encourage broncho-pulmonary hygiene including cough, deep breathe, incentive spirometry;Assess the need for suctioning and aspirate as needed;Assess and instruct to report shortness of breath or any respiratory difficulty;Respiratory therapy support as indicated       
  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 Pt noted to have a hx of CHF.   Cardiothoracic surgery documenting, \"appears edematous with vascular   congestion and effusions on XR, will start Lasix 20mg BID\". CXR  in part,   reads; Stable cardiomegaly. Mild pulmonary vascular congestion\". CABG op note   states, \"low ejection fraction of 35% to 40%\" with moderate to severe Aortic   insufficiency. After placement of aortic valve it states \"improved ejection   fraction of approximately 40% or better\". Pt arrives with edema in LLE +2 non   pitting.  If possible, please document in progress notes a    The medical record reflects the following:  Risk Factors: Hx CHF  Clinical Indicators: Cardiothoracic surgery documenting, \"appears edematous   with vascular congestion and effusions on XR, will start Lasix 20mg BID\". CXR    in part, reads; Stable cardiomegaly. Mild pulmonary vascular congestion\".   CABG op note states, \"low ejection fraction of 35% to 40%\" with moderate to   severe Aortic insufficiency. After placement of aortic valve it states   \"improved ejection fraction of approximately 40% or better\". Pt arrives with   edema in LLE +2 non pitting.  Treatment: Lasix 20mg IV BID, I & O's, Daily weights and vital signs.    Thank you for your time, Sharonda FRANK, RN CDS. If you have questions, please   call [phone] (M-F 7a-3p).  Options provided:  -- Acute on Chronic Systolic CHF/HFrEF  -- Acute on Chronic Diastolic CHF/HFpEF  -- Acute on Chronic Systolic and Diastolic CHF  -- Other - I will add my own diagnosis  -- Disagree - Not applicable / Not valid  -- Disagree - Clinically unable to determine / Unknown  -- Refer to Clinical Documentation 
 [facility] Nurse  Consult Note       NAME:  Vincenzo Draden  MEDICAL RECORD NUMBER:  [id number]  AGE: 58 y.o.   GENDER: male  : 1965  TODAY'S DATE:  3/1/2024    Subjective:     Reason for WOCN Evaluation and Assessment: \"diabetic foot ulceration\"      Vincenzo Darden is a 58 y.o. male referred by:   [x] Physician  [] Nursing  [] Other:     Wound Identification:  Wound Type: diabetic and traumatic  Contributing Factors: edema, diabetes, poor glucose control, chronic pressure, decreased mobility, shear force, obesity, and decreased tissue oxygenation        Chronic left plantar DFU. Patient using medical grade honey to the wound at home. He is not currently connected with a podiatrist or a wound care center.   He also reports a left ankle abrasion which has a thick eschar crust overlying caused by ill-fitting boots. Also noted an abrasion to the left planar hallux he was unaware of.  Right BKA wearing prosthesis    Objective:      BP (!) 142/89   Pulse 62   Temp 98.1 °F (36.7 °C) (Oral)   Resp 16   Ht 1.829 m (6' 0.01\")   Wt 124.1 kg (273 lb 9.5 oz)   SpO2 94%   BMI 37.10 kg/m²   Satish Risk Score: Satish Scale Score: 19    LABS    CBC:   Lab Results   Component Value Date/Time    WBC 9.1 2024 05:41 AM    RBC 2.99 2024 05:41 AM    HGB 9.2 2024 05:41 AM    HCT 28.4 2024 05:41 AM     CMP:  Albumin:    Lab Results   Component Value Date/Time    LABALBU 2.3 2024 10:51 AM     PT/INR:    Lab Results   Component Value Date/Time    PROTIME 23.0 2024 05:41 AM    INR 2.1 2024 05:41 AM     HgBA1c:    Lab Results   Component Value Date/Time    LABA1C 12.0 2024 10:51 AM     PTT: No components found for: \"LABPTT\"      Assessment:     Left foot and leg edema  Foot is warm. The skin is dry, toe amputations with well healed scars.   Denies peripheral neuropathy but c/o \"shooting\" intermittent pain of the left foot and discusses use of gabapentin. 
 attempted to visit with patient per Spiritual Care Consult for \"Post-Op Visit.\" Patient had been \"extubated\" last night and was moved to recliner, this morning, per nurse. Patient was sleeping at time of 's visit. Chaplains will make follow-up visit, per Spiritual Care Consult. Chaplains can be reached 24/7 via [employer].    Chaplain Hogan     02/27/24 0559   Encounter Summary   Encounter Overview/Reason  Attempted Encounter   Service Provided For: Patient not available   Referral/Consult From: Multi-disciplinary team  (Spiritual Care Consult)   Complexity of Encounter Low   Spiritual/Emotional needs   Type Spiritual Support       
Cardiothoracic Surgery  OUT-PATIENT SERVICE   [facility]    CONSULTATION / HISTORY AND PHYSICAL EXAMINATION            Date:   2/27/2024  Patient name:  Vincenzo Darden  MRN:   [id number]  YOB: 1965  PCP:    Shaikh Calix MD    Physician Requesting Consult: Andrei James MD    Reason for Consult:  MVCAD, severe AI     Chief Complaint:     No chief complaint on file.      History Obtained From:     patient    History of Present Illness:     Mr Darden is a 59yo male with a pmhx of DM, HTN, HLD, CAD, who presents to [facility] for consideration for CABG, AVR. He denies history of CVA, MI, AF, Liver disease, kidney dx, or cancers. He had recent BKA due to multiple infections in his foot due to his diabetes. 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 He is on plavix for his MVCAD. He denies etoh use, he is a current active smoker.     Past Medical History:     Past Medical History:   Diagnosis Date    Diabetes mellitus (HCC)     Hernia, abdominal         Past Surgical History:     Past Surgical History:   Procedure Laterality Date    CABG WITH AORTIC VALVE REPLACEMENT N/A 2/26/2024    CABG CORONARY ARTERY BYPASS X3, STERNAL PLATING, AORTIC VALVE REPLACEMENT 23MM INSPIRIS AORTIC VALVE, ON PUMP, JO performed by Andrei James MD at [facility] CVOR    CARDIAC PROCEDURE N/A 10/9/2023    Coronary angiography performed by Louis Ramirez MD at [facility] CARDIAC CATH LAB    CARDIAC PROCEDURE N/A 10/9/2023    Aortic root aortogram performed by Louis Ramirez MD at [facility] CARDIAC CATH LAB        Medications Prior to Admission:     Prior to Admission medications    Medication Sig Start Date End Date Taking? Authorizing Provider   clopidogrel (PLAVIX) 75 MG tablet Take 1 tablet by mouth daily  Patient not taking: Reported on 2/26/2024 11/7/23   Katie Lorenzana, APRN - CNP   carvedilol (COREG) 25 MG tablet Take 1 tablet by mouth in the morning and 1 tablet in the evening. 11/7/23   Harriett 
Comprehensive Nutrition Assessment    Type and Reason for Visit:  Positive Nutrition Screen (wound)    Nutrition Recommendations/Plan:   Modify diet to CCHO-High with HH and 1.2 L FR  Monitor/encourage PO intake     Malnutrition Assessment:  Malnutrition Status:  No malnutrition (02/27/24 1350)    Context:  Acute Illness     Findings of the 6 clinical characteristics of malnutrition:  Energy Intake:  Mild decrease in energy intake (Comment)  Weight Loss:  No significant weight loss     Body Fat Loss:  No significant body fat loss     Muscle Mass Loss:  No significant muscle mass loss    Fluid Accumulation:  Mild Extremities   Strength:  Not Performed    Nutrition Assessment:    59 yo M adm s/p CABG (2/26). PMH significant for T2DM, CAD, L BKA, HTN. Pt endorses some appetite, particularly thirsty at visit, has not recieved a meal on diet (advanced this morning). Pt reports disliking all ONS, declined all options offered. Encouraged pt to focus on protein containing foods at meals. No BM noted. +1 BUE, +2 LLE edema noted.    Nutrition Related Findings:    labs/meds reviewed Wound Type: Pressure Injury, Stage II, Surgical Incision, Wound Vac       Current Nutrition Intake & Therapies:    Average Meal Intake: Unable to assess  Average Supplements Intake: None Ordered  ADULT DIET; Regular; 4 carb choices (60 gm/meal); Low Fat/Low Chol/High Fiber/GÉNESIS; 1200 ml    Anthropometric Measures:  Height: 182.9 cm (6' 0.01\")  Ideal Body Weight (IBW): 178 lbs (81 kg)       Current Body Weight: 124.1 kg (273 lb 9.5 oz) (2/26/24), 153.7 % IBW.    Current BMI (kg/m2): 37.1  Usual Body Weight: 127 kg (279 lb 15.8 oz) (11/7/23)  % Weight Change (Calculated): -2.3  Weight Adjustment For: Amputation  Total Adjusted Percentage (Calculated): 5.9  Adjusted Ideal Body Weight (lbs) (Calculated): 167.5 lbs  Adjusted Ideal Body Weight (kg) (Calculated): 76.14 kg  Adjusted % Ideal Body Weight (Calculated): 163.3  Adjusted BMI (kg/m2) 
[facility]   Progress Note                   Date:   2/28/2024  Patient name: Vincenzo Dadren  Date of admission:  2/26/2024  6:12 AM  MRN:   [id number]  YOB: 1965  PCP: Shaikh Calix MD    Reason for Admission: Status post CABG    Subjective:   Patient was seen and examined.  Blood pressure on the higher side.  Creatinine worsened to 2.5.    Medications:   Scheduled Meds:   warfarin placeholder: dosing by pharmacy   Other RX Placeholder    sodium chloride flush  5-40 mL IntraVENous 2 times per day    aspirin  81 mg Oral Daily    clopidogrel  75 mg Oral Daily    amiodarone  200 mg Oral TID    mupirocin   Each Nostril BID    polyethylene glycol  17 g Oral Daily    sennosides-docusate sodium  1 tablet Oral BID    metoprolol tartrate  12.5 mg Oral BID    atorvastatin  20 mg Oral Nightly    pantoprazole  40 mg Oral Daily    Or    pantoprazole (PROTONIX) 40 mg in sodium chloride (PF) 0.9 % 10 mL injection  40 mg IntraVENous Daily    vancomycin (VANCOCIN) IV  2,000 mg IntraVENous Q12H    insulin lispro  0-12 Units SubCUTAneous TID WC    sodium bicarbonate  50 mEq IntraVENous Once     Continuous Infusions:   sodium chloride Stopped (02/27/24 0456)    sodium chloride Stopped (02/27/24 0457)    propofol Stopped (02/26/24 2058)    norepinephrine      EPINEPHrine Stopped (02/26/24 1731)    vasopressin Stopped (02/26/24 2139)    insulin Stopped (02/27/24 0308)    dextrose      amiodarone (CORDARONE) 450 mg in dextrose 5 % 250 mL infusion 0.5 mg/min (02/28/24 0156)     CBC:   Recent Labs     02/26/24  1611 02/27/24  0612 02/28/24  0403   WBC 22.5* 13.2* 12.9*   HGB 10.1* 9.6* 9.4*    187 165     BMP:    Recent Labs     02/26/24  2203 02/27/24  0612 02/27/24  0954 02/28/24  0403   * 145*  --  138   K 4.3 3.9 4.6 4.7   * 116*  --  109*   CO2 20 20  --  19*   BUN 21* 21*  --  26*   CREATININE 1.7* 1.8*  --  2.5*   GLUCOSE 111* 102*  --  82     Hepatic: No results for input(s): 
[facility]   Progress Note                   Date:   2/29/2024  Patient name: Vincenzo Darden  Date of admission:  2/26/2024  6:12 AM  MRN:   [id number]  YOB: 1965  PCP: Shaikh Calix MD    Reason for Admission: Status post CABG    Subjective:   Patient seen and examined in room. Denies CP or SOB. No acute CV events overnight. Labs, vitals, and tele reviewed.   Patient upset with amount of physicians in and out of his room disturbing his lunch. He understands why but he still upset due to not being able to eat.     Medications:   Scheduled Meds:   warfarin  2.5 mg Oral Once    metoprolol tartrate  25 mg Oral BID    furosemide  20 mg IntraVENous BID    amLODIPine  10 mg Oral Daily    warfarin placeholder: dosing by pharmacy   Other RX Placeholder    sodium chloride flush  5-40 mL IntraVENous 2 times per day    aspirin  81 mg Oral Daily    clopidogrel  75 mg Oral Daily    amiodarone  200 mg Oral TID    mupirocin   Each Nostril BID    polyethylene glycol  17 g Oral Daily    sennosides-docusate sodium  1 tablet Oral BID    atorvastatin  20 mg Oral Nightly    pantoprazole  40 mg Oral Daily    Or    pantoprazole (PROTONIX) 40 mg in sodium chloride (PF) 0.9 % 10 mL injection  40 mg IntraVENous Daily    insulin lispro  0-12 Units SubCUTAneous TID WC    sodium bicarbonate  50 mEq IntraVENous Once     Continuous Infusions:   sodium chloride Stopped (02/27/24 0456)    sodium chloride Stopped (02/27/24 0457)    norepinephrine      EPINEPHrine Stopped (02/26/24 1731)    vasopressin Stopped (02/26/24 2139)    insulin Stopped (02/27/24 0308)    dextrose       CBC:   Recent Labs     02/27/24  0612 02/28/24  0403 02/29/24  0600   WBC 13.2* 12.9* 10.5   HGB 9.6* 9.4* 9.5*    165 177       BMP:    Recent Labs     02/27/24  0612 02/27/24  0954 02/28/24  0403 02/29/24  0600   *  --  138 136   K 3.9 4.6 4.7 4.3   *  --  109* 107   CO2 20  --  19* 18*   BUN 21*  --  26* 33*   CREATININE 
[facility]   Progress Note                   Date:   3/1/2024  Patient name: Vincenzo Darden  Date of admission:  2/26/2024  6:12 AM  MRN:   [id number]  YOB: 1965  PCP: Shaikh Calix MD    Reason for Admission: Status post CABG    Subjective:   Patient seen and examined in room. Denies CP or SOB. No acute CV events overnight. Labs, vitals, and tele reviewed.       Medications:   Scheduled Meds:   metoprolol tartrate  25 mg Oral BID    furosemide  20 mg IntraVENous BID    amLODIPine  10 mg Oral Daily    warfarin placeholder: dosing by pharmacy   Other RX Placeholder    sodium chloride flush  5-40 mL IntraVENous 2 times per day    aspirin  81 mg Oral Daily    clopidogrel  75 mg Oral Daily    amiodarone  200 mg Oral TID    mupirocin   Each Nostril BID    polyethylene glycol  17 g Oral Daily    sennosides-docusate sodium  1 tablet Oral BID    atorvastatin  20 mg Oral Nightly    pantoprazole  40 mg Oral Daily    Or    pantoprazole (PROTONIX) 40 mg in sodium chloride (PF) 0.9 % 10 mL injection  40 mg IntraVENous Daily    insulin lispro  0-12 Units SubCUTAneous TID WC    sodium bicarbonate  50 mEq IntraVENous Once     Continuous Infusions:   sodium chloride Stopped (02/27/24 0456)    sodium chloride Stopped (02/27/24 0457)    norepinephrine      EPINEPHrine Stopped (02/26/24 1731)    vasopressin Stopped (02/26/24 2139)    insulin Stopped (02/27/24 0308)    dextrose       CBC:   Recent Labs     02/28/24  0403 02/29/24  0600 03/01/24  0541   WBC 12.9* 10.5 9.1   HGB 9.4* 9.5* 9.2*    177 160       BMP:    Recent Labs     02/28/24  0403 02/29/24  0600 03/01/24  0541    136 135   K 4.7 4.3 4.0   * 107 105   CO2 19* 18* 20   BUN 26* 33* 34*   CREATININE 2.5* 2.4* 2.1*   GLUCOSE 82 107* 155*       Hepatic: No results for input(s): \"AST\", \"ALT\", \"ALB\", \"BILITOT\", \"ALKPHOS\" in the last 72 hours.  Troponin: No results for input(s): \"TROPONINI\" in the last 72 hours.  BNP: No 
[facility] Cardiothoracic Surgery  Daily Progress Note    Surgeon: Dr Andrei Caputo     S/P :  coronary artery bypass grafting x3 with left internal mammary artery to left anterior descending artery, reverse saphenous vein graft to large diagonal 1/ramus, reverse saphenous vein graft to left posterior descending artery, aortic valve replacement 23 mm Reese-Ayers Inspiris aortic valve.     Subjective:  Mr. Darden is a 58 y.o. year-old male status post CABGx3, AVR (bio) on 2/26/24.  Patient was seen and examined at bedside this morning without any complaints.      Vital Signs: BP (!) 142/86   Pulse 65   Temp 97.7 °F (36.5 °C) (Oral)   Resp 18   Ht 1.829 m (6' 0.01\")   Wt 124.1 kg (273 lb 9.5 oz)   SpO2 90%   BMI 37.10 kg/m²  O2 Flow Rate (L/min): 1.5 L/min   Admit Weight: Weight - Scale: 124.1 kg (273 lb 9.5 oz)   WEIGHTWeight - Scale: 124.1 kg (273 lb 9.5 oz)     I/O's:  I/O last 3 completed shifts:  In: 2758.6 [P.O.:1656; I.V.:404.7; IV Piggyback:697.9]  Out: 2810 [Urine:2660; Chest Tube:150]    Data:    CBC:   Recent Labs     02/27/24  0612 02/28/24  0403 02/29/24  0600   WBC 13.2* 12.9* 10.5   HGB 9.6* 9.4* 9.5*   HCT 30.1* 31.2* 30.6*   MCV 93.8 99.4 97.5    165 177     BMP:   Recent Labs     02/27/24  0612 02/27/24  0954 02/28/24  0403 02/29/24  0600   *  --  138 136   K 3.9 4.6 4.7 4.3   *  --  109* 107   CO2 20  --  19* 18*   BUN 21*  --  26* 33*   CREATININE 1.8*  --  2.5* 2.4*     PT/INR:   Recent Labs     02/27/24  0612 02/28/24  0403 02/29/24  0600   PROTIME 14.8 14.3 20.8*   INR 1.2 1.1 1.8     APTT:   Recent Labs     02/26/24  1611   APTT 36.3       Chest X-Ray:   FINDINGS:  Right jugular central venous catheter and left chest tube remain in place.  Endotracheal tube and NG tube removed.  Heart size stable.  No pneumothorax.  Left basilar opacity not changed.     IMPRESSION:  No pneumothorax     Persistent left basilar atelectasis    Scheduled Meds: 
[facility] Cardiothoracic Surgery  Daily Progress Note    Surgeon: Dr Andrei Caputo    POD# 2    S/P :  coronary artery bypass grafting x3 with left internal mammary artery to left anterior descending artery, reverse saphenous vein graft to large diagonal 1/ramus, reverse saphenous vein graft to left posterior descending artery, aortic valve replacement 23 mm Reese-Ayers Inspiris aortic valve.     Subjective:  Mr. Darden is a 58 y.o. year-old male status post CABGx3, AVR (bio) on 2/26/24.  Patient was seen and examined at bedside this morning without any complaints.      Vital Signs: BP (!) 155/92   Pulse 63   Temp 99 °F (37.2 °C) (Bladder)   Resp 15   Ht 1.829 m (6' 0.01\")   Wt 124.1 kg (273 lb 9.5 oz)   SpO2 91%   BMI 37.10 kg/m²  O2 Flow Rate (L/min): 4 L/min   Admit Weight: Weight - Scale: 124.1 kg (273 lb 9.5 oz)   WEIGHTWeight - Scale: 124.1 kg (273 lb 9.5 oz)     I/O's:  I/O last 3 completed shifts:  In: 2713.9 [P.O.:1200; I.V.:815.8; IV Piggyback:698.1]  Out: 2195 [Urine:1625; Chest Tube:570]    Data:    CBC:   Recent Labs     02/26/24  1611 02/27/24  0612 02/28/24  0403   WBC 22.5* 13.2* 12.9*   HGB 10.1* 9.6* 9.4*   HCT 30.9* 30.1* 31.2*   MCV 92.0 93.8 99.4    187 165       BMP:   Recent Labs     02/26/24  2203 02/27/24  0612 02/27/24  0954 02/28/24  0403   * 145*  --  138   K 4.3 3.9 4.6 4.7   * 116*  --  109*   CO2 20 20  --  19*   BUN 21* 21*  --  26*   CREATININE 1.7* 1.8*  --  2.5*       PT/INR:   Recent Labs     02/26/24  1611 02/27/24  0612 02/28/24  0403   PROTIME 16.9* 14.8 14.3   INR 1.4 1.2 1.1       APTT:   Recent Labs     02/26/24  1611   APTT 36.3         Chest X-Ray:   FINDINGS:  Right jugular central venous catheter and left chest tube remain in place.  Endotracheal tube and NG tube removed.  Heart size stable.  No pneumothorax.  Left basilar opacity not changed.     IMPRESSION:  No pneumothorax     Persistent left basilar 
Home meds returned to patient's daughter to take home. They are unable to be verified until after the patient's procedure. Writer informed family to bring the medications back with them tomorrow 2/27.    .Electronically signed by Myriam Green RN on 2/26/2024 at 9:34 AM    
Occupational Therapy  Facility/Department: [facility] CAR 1- SICU  Occupational Therapy Initial Assessment    Name: Vincenzo Darden  : 1965  MRN: [id number]  Date of Service: 2024      Copied from Cardiothoracic:  Mr. Darden is a 58 y.o. year-old male status post CABGx3, AVR (bio) on 24. Doing well this AM. On Amio gtt, currently in SR. S/p AVR, start coumadin today. EF45%On 4L NC, left effusion. Needs to cough and deep/breathe        Discharge Recommendations:  Further therapy recommended at discharge.The patient should be able to tolerate at least 3 hours of therapy per day over 5 days or 15 hours over 7 days.   This patient may benefit from a Physical Medicine and Rehab consult.   Patient would benefit from continued therapy after discharge  OT Equipment Recommendations  Equipment Needed: Yes  Mobility Devices: Walker;ADL Assistive Devices  Walker: Rolling  ADL Assistive Devices: Sock-Aid Hard;Hand-held Shower;Shower Chair with back;Long-handled Sponge       Patient Diagnosis(es): The primary encounter diagnosis was S/P CABG x 3. Diagnoses of Multiple vessel coronary artery disease and Aortic valve insufficiency, etiology of cardiac valve disease unspecified were also pertinent to this visit.  Past Medical History:  has a past medical history of Diabetes mellitus (HCC) and Hernia, abdominal.  Past Surgical History:  has a past surgical history that includes Cardiac procedure (N/A, 10/9/2023); Cardiac procedure (N/A, 10/9/2023); and Coronary artery bypass graft (N/A, 2024).      Assessment   Performance deficits / Impairments: Decreased functional mobility ;Decreased endurance;Decreased ADL status;Decreased strength;Decreased safe awareness;Decreased cognition;Decreased high-level IADLs;Decreased balance      Assessment: Pt seated in reclining chair upon OT and agreeable. Pt requires increase encouragement to engage and presents with questionable efforts in beginning however demonstrates increase 
Occupational Therapy  Facility/Department: [facility] CAR 2- STEPDOWN  Occupational Therapy Daily Treatment Note    Name: Vincenzo Darden  : 1965  MRN: [id number]  Date of Service: 3/1/2024    Discharge Recommendations:  Patient would benefit from continued therapy after discharge  OT Equipment Recommendations  Walker: Rolling  ADL Assistive Devices: Shower Chair with back;Long-handled Sponge;Sock-Aid Soft       Patient Diagnosis(es): The primary encounter diagnosis was S/P CABG x 3. Diagnoses of Multiple vessel coronary artery disease and Aortic valve insufficiency, etiology of cardiac valve disease unspecified were also pertinent to this visit.  Past Medical History:  has a past medical history of Diabetes mellitus (HCC) and Hernia, abdominal.  Past Surgical History:  has a past surgical history that includes Cardiac procedure (N/A, 10/9/2023); Cardiac procedure (N/A, 10/9/2023); and Coronary artery bypass graft (N/A, 2024).    Treatment Diagnosis: CABG X3      Assessment   Performance deficits / Impairments: Decreased functional mobility ;Decreased ADL status;Decreased safe awareness;Decreased endurance;Decreased balance  Assessment: Pt would benefit from continued acute care OT to address above listed deficits.  Treatment Diagnosis: CABG X3  Prognosis: Good  REQUIRES OT FOLLOW-UP: Yes  Activity Tolerance  Activity Tolerance: Patient Tolerated treatment well;Patient limited by pain;Patient limited by fatigue        Plan   Occupational Therapy Plan  Times Per Week: 4-6 x/wk (CABG)  Current Treatment Recommendations: Strengthening, Balance training, Functional mobility training, Endurance training, Equipment evaluation, education, & procurement, Patient/Caregiver education & training, Safety education & training, Pain management, Self-Care / ADL, Home management training     Restrictions  Restrictions/Precautions  Restrictions/Precautions: Fall Risk, Surgical Protocols, Up as Tolerated, Cardiac  Required Braces 
Occupational Therapy  Facility/Department: [facility] CAR 1- SICU  Occupational Therapy Daily Treatment Note        Name: Vincenzo Darden  : 1965  MRN: [id number]  Date of Service: 2024    Discharge Recommendations: Further therapy recommended at discharge.The patient should be able to tolerate at least 3 hours of therapy per day over 5 days or 15 hours over 7 days.   This patient may benefit from a Physical Medicine and Rehab consult.   Patient would benefit from continued therapy after discharge  OT Equipment Recommendations  Walker: Rolling  ADL Assistive Devices: Shower Chair with back;Long-handled Sponge;Sock-Aid Soft       Patient Diagnosis(es): The primary encounter diagnosis was S/P CABG x 3. Diagnoses of Multiple vessel coronary artery disease and Aortic valve insufficiency, etiology of cardiac valve disease unspecified were also pertinent to this visit.  Past Medical History:  has a past medical history of Diabetes mellitus (HCC) and Hernia, abdominal.  Past Surgical History:  has a past surgical history that includes Cardiac procedure (N/A, 10/9/2023); Cardiac procedure (N/A, 10/9/2023); and Coronary artery bypass graft (N/A, 2024).    Treatment Diagnosis: CABG X3      Assessment   Performance deficits / Impairments: Decreased functional mobility ;Decreased ADL status;Decreased safe awareness;Decreased endurance;Decreased balance  Assessment: Pt would benefit from continued acute care OT to address above listed deficits.  Treatment Diagnosis: CABG X3  Prognosis: Good  Activity Tolerance  Activity Tolerance: Patient Tolerated treatment well;Patient limited by pain;Patient limited by fatigue        Plan   Occupational Therapy Plan  Times Per Week: 4-6 x/wk (CABG)  Current Treatment Recommendations: Strengthening, Balance training, Functional mobility training, Endurance training, Equipment evaluation, education, & procurement, Patient/Caregiver education & training, Safety education & training, 
Order obtained for extubation.  SpO2 of 97 on 40% FiO2.   Patient extubated and placed on 2 liters/min via nasal cannula.   Post extubation SpO2 is 96% with HR  71 bpm and RR 20 breaths/min.    Patient had strong cough that was productive of clear  and white sputum.  Extubation Well tolerated by patient..   Breath Sounds: clear, diminished    Lolly Donohue RCP   9:10 PM  
[facility] Cardiothoracic Surgery  Daily Progress Note    Surgeon: Dr Andrei Caputo     S/P :  coronary artery bypass grafting x3 with left internal mammary artery to left anterior descending artery, reverse saphenous vein graft to large diagonal 1/ramus, reverse saphenous vein graft to left posterior descending artery, aortic valve replacement 23 mm Reese-Ayers Inspiris aortic valve.     Subjective:  Mr. Darden is a 58 y.o. year-old male status post CABGx3, AVR (bio) on 2/26/24.  Patient was seen and examined at bedside this morning without any complaints.      Vital Signs: BP (!) 147/85   Pulse 65   Temp 97.8 °F (36.6 °C) (Oral)   Resp 16   Ht 1.829 m (6' 0.01\")   Wt 124.1 kg (273 lb 9.5 oz)   SpO2 94%   BMI 37.10 kg/m²  O2 Flow Rate (L/min): 1.5 L/min   Admit Weight: Weight - Scale: 124.1 kg (273 lb 9.5 oz)   WEIGHTWeight - Scale: 124.1 kg (273 lb 9.5 oz)     I/O's:  I/O last 3 completed shifts:  In: 738 [P.O.:738]  Out: 2975 [Urine:2975]    Data:    CBC:   Recent Labs     02/28/24 0403 02/29/24  0600 03/01/24  0541   WBC 12.9* 10.5 9.1   HGB 9.4* 9.5* 9.2*   HCT 31.2* 30.6* 28.4*   MCV 99.4 97.5 95.0    177 160       BMP:   Recent Labs     02/28/24 0403 02/29/24  0600 03/01/24  0541    136 135   K 4.7 4.3 4.0   * 107 105   CO2 19* 18* 20   BUN 26* 33* 34*   CREATININE 2.5* 2.4* 2.1*       PT/INR:   Recent Labs     02/28/24 0403 02/29/24  0600 03/01/24  0541   PROTIME 14.3 20.8* 23.0*   INR 1.1 1.8 2.1       APTT:   No results for input(s): \"APTT\" in the last 72 hours.      Chest X-Ray:   FINDINGS:  There are bibasilar infiltrates.  There is no pneumothorax.  The mediastinal  structures are stable.  The upper abdomen unremarkable.  The extrathoracic  soft tissues are unremarkable.  There is a right internal jugular central  line that is stable.     IMPRESSION:  Bibasilar infiltrates.     Right internal jugular central line that is stable.    Scheduled Meds: 
Patient awake following commands, RCP to bedside to switch patient to CPAP.      RCP back to bedside, ABG obtained and WNL.  Patient still following commands, responding to questions by nodding appropriately, able to lift head off bed and give thumb up.  RCP and writer at bedside.  Patient extubated at to 2L NC.  Tolerated well, VSS and recorded.  
Pharmacy Note  Warfarin Consult    Vincenzo Darden is a 58 y.o. male for whom pharmacy has been consulted to manage warfarin therapy.     Consulting Provider: Mishel Vicente NP    Warfarin dose prior to admission: new start  Warfarin indication: bioprosthetic aortic valve   Target INR range: 2-3     Past Medical History:   Diagnosis Date    Diabetes mellitus (HCC)     Hernia, abdominal           Recent Labs     02/27/24  0612   INR 1.2     Recent Labs     02/26/24  1611 02/27/24  0612   HGB 10.1* 9.6*   HCT 30.9* 30.1*    187       Current warfarin drug-drug interactions: amiodarone, aspirin, clopidogrel, ketorolac     Date INR Dose   2/26 1.4 -   2/27 1.2      Note:  - New start, will order Warfarin 5 mg x1 today    Daily PT/INR while inpatient.    Thank you for the consult.  Will continue to follow.    Jovanny Almaguer, PharmD, 2/27/2024 2:26 PM     
Pharmacy Note  Warfarin Consult follow-up      Recent Labs     02/29/24  0600   INR 1.8     Recent Labs     02/27/24  0612 02/28/24  0403 02/29/24  0600   HGB 9.6* 9.4* 9.5*   HCT 30.1* 31.2* 30.6*    165 177       Significant Drug-Drug Interactions:  New warfarin drug-drug interactions: None  Discontinued drug-drug interactions: None      Notes:                   Warfarin 2.5 mg  Daily PT/INR while inpatient.     Augustin Wright, PharmD, BCPS  2/29/2024  11:18 AM    
Pharmacy Note  Warfarin Consult follow-up      Recent Labs     03/01/24  0541   INR 2.1     Recent Labs     02/28/24  0403 02/29/24  0600 03/01/24  0541   HGB 9.4* 9.5* 9.2*   HCT 31.2* 30.6* 28.4*    177 160       Significant Drug-Drug Interactions:  New warfarin drug-drug interactions: none  Discontinued drug-drug interactions: none      Notes:                   Warfarin 2.5 mg today  Daily PT/INR while inpatient.      Augustin Wright, PharmD, [facility]  3/1/2024  11:06 AM    
Pharmacy Note  Warfarin Consult follow-up      Recent Labs     03/02/24  0624   INR 1.8     Recent Labs     02/29/24  0600 03/01/24  0541 03/02/24  0624   HGB 9.5* 9.2* 9.5*   HCT 30.6* 28.4* 30.2*    160 190       Significant Drug-Drug Interactions:  New warfarin drug-drug interactions: None  Discontinued drug-drug interactions: None      Notes:                   Warfarin 5 mg today  Daily PT/INR while inpatient.     
Pharmacy Note  Warfarin Consult follow-up      Recent Labs     03/03/24  0843   INR 2.5     Recent Labs     03/01/24  0541 03/02/24  0624 03/03/24  0843   HGB 9.2* 9.5* 9.9*   HCT 28.4* 30.2* 30.8*    190 218       Significant Drug-Drug Interactions:  New warfarin drug-drug interactions: None  Discontinued drug-drug interactions: None      Notes:                   Warfarin 2.5 mg today  Daily PT/INR while inpatient.     
Pharmacy Note  Warfarin Consult follow-up    Warfarin dose prior to admission: new start  Warfarin indication: bioprosthetic aortic valve   Target INR range: 2-3    Recent Labs     02/28/24  0403   INR 1.1     Recent Labs     02/26/24  1611 02/27/24  0612 02/28/24  0403   HGB 10.1* 9.6* 9.4*   HCT 30.9* 30.1* 31.2*    187 165       Current warfarin drug-drug interactions: amiodarone, aspirin, clopidogrel, ketorolac    Date INR Dose   2/26 1.4 -   2/27 1.2 5 mg    2/28 1.1        Notes:  - INR remains subtherapeutic   - Will order Warfarin 5 mg x1 today                 Daily PT/INR while inpatient.     Jovanny Almaguer, PharmD, 2/28/2024 12:44 PM     
Physical Therapy        Physical Therapy Cancel Note      DATE: 3/2/2024    NAME: Vincenzo Darden  MRN: [id number]   : 1965      Patient not seen this date for Physical Therapy due to:    Patient Declined: pt reports that he already walked & defers to participate with therapy @ this time. Pt agreeable to amb with therapy tomorrow.      Electronically signed by REBECCA MIRANDA PTA on 3/2/2024 at 1329 PM      
Physical Therapy  Facility/Department: [facility]- STEPDOWN  Physical Therapy Treatment Note    Name: Vincenzo Darden  : 1965  MRN: [id number]  Date of Service: 3/1/2024    Discharge Recommendations:  Patient would benefit from continued therapy after discharge   PT Equipment Recommendations  Equipment Needed: Yes  Mobility Devices: Walker  Walker: Rolling      Patient Diagnosis(es): The primary encounter diagnosis was S/P CABG x 3. Diagnoses of Multiple vessel coronary artery disease and Aortic valve insufficiency, etiology of cardiac valve disease unspecified were also pertinent to this visit.  Past Medical History:  has a past medical history of Diabetes mellitus (HCC) and Hernia, abdominal.  Past Surgical History:  has a past surgical history that includes Cardiac procedure (N/A, 10/9/2023); Cardiac procedure (N/A, 10/9/2023); and Coronary artery bypass graft (N/A, 2024).    Assessment   Body Structures, Functions, Activity Limitations Requiring Skilled Therapeutic Intervention: Decreased functional mobility ;Decreased ADL status;Decreased body mechanics;Decreased strength;Decreased balance;Decreased endurance;Decreased coordination;Decreased high-level IADLs;Decreased safe awareness  Assessment: Pt required CGA to perform STS transfers and ambulated ~250 ft with RW and CGA, fair stability with no true LOB. Pt most limited by decreased endurance and also increased pain with mobility. Recommending continued PT to address deficits and maximize safety and independence with mobility.  Therapy Prognosis: Good  Barriers to Learning: none  Activity Tolerance  Activity Tolerance: Patient limited by endurance;Patient limited by fatigue;Patient limited by pain     Plan   Physical Therapy Plan  General Plan: 6-7 times per week  Current Treatment Recommendations: Strengthening, Balance training, Functional mobility training, Transfer training, ADL/Self-care training, IADL training, Equipment evaluation, education, 
[facility] [place]  PROGRESS NOTE    Shift date: 2/27/2024  Shift day: Tuesday   Shift # 2    Room # 1008/1008-01   Name: Vincenzo Darden                Religious:    Place of [employer]:     Referral:  Spiritual Service Post Op Consult    Admit Date & Time: 2/26/2024  6:12 AM    Assessment:  Vincenzo Darden is a 58 y.o. male in the hospital      Intervention:  Writer introduced self and title as      Outcome:  Patient declined visit.    Plan:  Chaplains will remain available to offer spiritual and emotional support as needed.      Electronically signed by Chaplain Cassia, on 2/27/2024 at 11:30 PM.  [facility]  [facility]  [phone]      
Pt's home medication insulin was dropped off to the pharmacy on [facility] for verification.    .Electronically signed by Myriam Green RN on 2/26/2024 at 8:30 AM    
Renal Progress Note    Patient :  Vincenzo Darden; 58 y.o. MRN# [id number]  Location:  2018/2018-01  Attending:  Andrei James MD  Admit Date:  2/26/2024   Hospital Day: 5    Subjective:     Patient was seen and examined.  No new issues reported overnight.  Up to bedside.   Using urinal and making sure it's measured. Had a BM this am.    BMP results from today reviewed sodium 138, potassium 4.5, chloride 107, bicarb 21, calcium 8.0, BUN 33, creatinine did improve to 1.6 mg/dl. Hgb 9.5.    Patient was found to have urine retention and had high PVRs, refused Sorto catheter placement and also straight catheterization. UROP 24 hrs 3.9 L and 1.6 L out today, negative 2.4 L since admission. Currently on Lasix 20 mg IV twice daily. NO SORTO OR STRAIGHT CATH NEEDED.    Outpatient Medications:     Medications Prior to Admission: carvedilol (COREG) 25 MG tablet, Take 1 tablet by mouth in the morning and 1 tablet in the evening.  [DISCONTINUED] clopidogrel (PLAVIX) 75 MG tablet, Take 1 tablet by mouth daily (Patient not taking: Reported on 2/26/2024)  aspirin 81 MG chewable tablet, Take 1 tablet by mouth daily  [DISCONTINUED] albuterol (PROVENTIL) (2.5 MG/3ML) 0.083% nebulizer solution, Take 3 mLs by nebulization every 4 hours as needed for Wheezing or Shortness of Breath (Patient not taking: Reported on 1/3/2024)  [DISCONTINUED] atorvastatin (LIPITOR) 40 MG tablet, Take 1 tablet by mouth nightly (Patient not taking: Reported on 2/26/2024)  TRULICITY 1.5 MG/0.5ML SC injection, Inject 0.5 mLs into the skin Once a week at 5 PM  Insulin Degludec (TRESIBA FLEXTOUCH) 200 UNIT/ML SOPN, Inject 40 Units into the skin nightly  Insulin Aspart, w/Niacinamide, (FIASP FLEXTOUCH) 100 UNIT/ML SOPN, Inject 20 Units into the skin 3 times daily (with meals) Pt has his sliding scale at home  [DISCONTINUED] lisinopril (PRINIVIL;ZESTRIL) 20 MG tablet, Take 1 tablet by mouth daily  [DISCONTINUED] furosemide (LASIX) 20 MG tablet, Take 1 tablet by 
Renal Progress Note    Patient :  Vincenzo Darden; 58 y.o. MRN# [id number]  Location:  2018/2018-01  Attending:  Andrei James MD  Admit Date:  2/26/2024   Hospital Day: 4    Subjective:     Patient was seen and examined.  No new issues reported overnight.  BMP results from today reviewed sodium 145, potassium 4.0, chloride 105, bicarb 20, calcium 7.7, BUN 34, creatinine did improve to 2.1 mg/dl.  Patient was found to have urine retention and had high PVRs, refused Parada catheter placement and also straight catheterization.  He mentions that he is able to go to the bathroom today.  Currently on Lasix 20 mg IV twice daily.  Urine output documented as about 1.7 L in the last 24 hours.  Outpatient Medications:     Medications Prior to Admission: clopidogrel (PLAVIX) 75 MG tablet, Take 1 tablet by mouth daily (Patient not taking: Reported on 2/26/2024)  carvedilol (COREG) 25 MG tablet, Take 1 tablet by mouth in the morning and 1 tablet in the evening.  albuterol (PROVENTIL) (2.5 MG/3ML) 0.083% nebulizer solution, Take 3 mLs by nebulization every 4 hours as needed for Wheezing or Shortness of Breath (Patient not taking: Reported on 1/3/2024)  atorvastatin (LIPITOR) 40 MG tablet, Take 1 tablet by mouth nightly (Patient not taking: Reported on 2/26/2024)  aspirin 81 MG chewable tablet, Take 1 tablet by mouth daily  lisinopril (PRINIVIL;ZESTRIL) 20 MG tablet, Take 1 tablet by mouth daily  furosemide (LASIX) 20 MG tablet, Take 1 tablet by mouth daily  TRULICITY 1.5 MG/0.5ML SC injection, Inject 0.5 mLs into the skin Once a week at 5 PM  amLODIPine (NORVASC) 10 MG tablet, Take 1 tablet by mouth daily (Patient not taking: Reported on 2/26/2024)  oxyCODONE (ROXICODONE) 5 MG immediate release tablet, Take 1 tablet by mouth every 4 hours as needed. (Patient not taking: Reported on 2/26/2024)  potassium chloride (KLOR-CON M) 20 MEQ TBCR extended release tablet, Take 1 tablet by mouth daily  Insulin Degludec (TRESIBA FLEXTOUCH) 
Renal Progress Note    Patient :  Vincenzo Darden; 58 y.o. MRN# [id number]  Location:  2018/2018-01  Attending:  Andrei James MD  Admit Date:  2/26/2024   Hospital Day: 6    Subjective:     Patient was seen and examined.  He is status post elective CABG.  His postoperative course has been complicated by ventricular tachycardia/acute urine retention and worsening renal function.  No new issues reported overnight.  Sitting up at bedside.  BM again this am. No reported urination problems.    Labs reviewed.  Creatinine 1.8 was 1.6 yesterday.  BUN 33.  Bicarb 20.  K4.7.  Hemoglobin 9.9    UROP 24 hrs 2.4 L, negative 2.3 L since admission.   Currently on Lasix 20 mg IV twice daily. .    Outpatient Medications:     Medications Prior to Admission: carvedilol (COREG) 25 MG tablet, Take 1 tablet by mouth in the morning and 1 tablet in the evening.  [DISCONTINUED] clopidogrel (PLAVIX) 75 MG tablet, Take 1 tablet by mouth daily (Patient not taking: Reported on 2/26/2024)  aspirin 81 MG chewable tablet, Take 1 tablet by mouth daily  [DISCONTINUED] albuterol (PROVENTIL) (2.5 MG/3ML) 0.083% nebulizer solution, Take 3 mLs by nebulization every 4 hours as needed for Wheezing or Shortness of Breath (Patient not taking: Reported on 1/3/2024)  [DISCONTINUED] atorvastatin (LIPITOR) 40 MG tablet, Take 1 tablet by mouth nightly (Patient not taking: Reported on 2/26/2024)  TRULICITY 1.5 MG/0.5ML SC injection, Inject 0.5 mLs into the skin Once a week at 5 PM  Insulin Degludec (TRESIBA FLEXTOUCH) 200 UNIT/ML SOPN, Inject 40 Units into the skin nightly  Insulin Aspart, w/Niacinamide, (FIASP FLEXTOUCH) 100 UNIT/ML SOPN, Inject 20 Units into the skin 3 times daily (with meals) Pt has his sliding scale at home  [DISCONTINUED] lisinopril (PRINIVIL;ZESTRIL) 20 MG tablet, Take 1 tablet by mouth daily  [DISCONTINUED] furosemide (LASIX) 20 MG tablet, Take 1 tablet by mouth daily  [DISCONTINUED] amLODIPine (NORVASC) 10 MG tablet, Take 1 tablet by 
[facility] Cardiothoracic Surgery  Daily Progress Note    Surgeon: Dr Andrei Caputo    POD# 1    S/P :  coronary artery bypass grafting x3 with left internal mammary artery to left anterior descending artery, reverse saphenous vein graft to large diagonal 1/ramus, reverse saphenous vein graft to left posterior descending artery, aortic valve replacement 23 mm Reese-Ayers Inspiris aortic valve.     Subjective:  Mr. Darden is a 58 y.o. year-old male status post CABGx3, AVR (bio) on 2/26/24.  Patient was seen and examined at bedside this morning without any complaints.      Vital Signs: /73   Pulse 62   Temp 98.1 °F (36.7 °C)   Resp 17   Wt 124.1 kg (273 lb 9.5 oz)   SpO2 95%   BMI 37.11 kg/m²  O2 Flow Rate (L/min): 4 L/min   Admit Weight: Weight - Scale: 124.1 kg (273 lb 9.5 oz)   WEIGHTWeight - Scale: 124.1 kg (273 lb 9.5 oz)     I/O's:  I/O last 3 completed shifts:  In: 6854.1 [I.V.:3052.5; Blood:1002; NG/GT:100; IV Piggyback:2699.7]  Out: 4425 [Urine:1785; Blood:2250; Chest Tube:390]    Data:    CBC:   Recent Labs     02/26/24  1611 02/27/24  0612   WBC 22.5* 13.2*   HGB 10.1* 9.6*   HCT 30.9* 30.1*   MCV 92.0 93.8    187     BMP:   Recent Labs     02/26/24  1611 02/26/24  2203 02/27/24  0612    149* 145*   K 3.8 4.3 3.9   * 119* 116*   CO2 19* 20 20   BUN 21* 21* 21*   CREATININE 1.6* 1.7* 1.8*     PT/INR:   Recent Labs     02/26/24  1611 02/27/24  0612   PROTIME 16.9* 14.8   INR 1.4 1.2     APTT:   Recent Labs     02/26/24 1611   APTT 36.3       Chest X-Ray:   FINDINGS:  Right jugular central venous catheter and left chest tube remain in place.  Endotracheal tube and NG tube removed.  Heart size stable.  No pneumothorax.  Left basilar opacity not changed.     IMPRESSION:  No pneumothorax     Persistent left basilar atelectasis    Scheduled Meds:    sodium chloride flush  5-40 mL IntraVENous 2 times per day    aspirin  81 mg Oral Daily    clopidogrel  75 
Writer verified the ordered vancomycin for the patient's CABG procedure today due to the patient's listed allergy to vancomycin. Pharmacist verified that the ordered medication should be okay to give for the procedure.    .Electronically signed by Myriam Green RN on 2/26/2024 at 8:24 AM    
tamsulosin  0.4 mg Oral Daily    metoprolol tartrate  25 mg Oral BID    furosemide  20 mg IntraVENous BID    amLODIPine  10 mg Oral Daily    warfarin placeholder: dosing by pharmacy   Other RX Placeholder    sodium chloride flush  5-40 mL IntraVENous 2 times per day    aspirin  81 mg Oral Daily    clopidogrel  75 mg Oral Daily    amiodarone  200 mg Oral TID    mupirocin   Each Nostril BID    polyethylene glycol  17 g Oral Daily    sennosides-docusate sodium  1 tablet Oral BID    atorvastatin  20 mg Oral Nightly    pantoprazole  40 mg Oral Daily    Or    pantoprazole (PROTONIX) 40 mg in sodium chloride (PF) 0.9 % 10 mL injection  40 mg IntraVENous Daily    insulin lispro  0-12 Units SubCUTAneous TID WC    sodium bicarbonate  50 mEq IntraVENous Once     Continuous Infusions:    sodium chloride Stopped (02/27/24 3096)    sodium chloride Stopped (02/27/24 1187)    norepinephrine      EPINEPHrine Stopped (02/26/24 1938)    dextrose       Assessment & Plan:    3-2-24  Pt doing well.   Pending placement for rehab-P2P called and initated  Eval what patient what's to do regarding DC  Clip pacer wires please  DC today or tomorrow  3/1/24  Creatine decrease today, nephrology on board, receiving lasix 20mg BID. CXR appears to have stable bibasilar infiltrates, on RA, afebrile, continue IS/PT. HTN holding home lisinopril for renal protection, cardiology to increase BP medications, currently receiving metoprolol 25mg BID, and amlodipine 10mg daily. INR 2.1   Plan for [facility].     2/29/24:  Doing well. Creatine trending up 2.4-2.5. Consult nephrology.    2/28  Doing well, HDS, off amio gtt, on PO amio, no AF today. Minimal O2 requirements, continue IS/PT and wean O2 as tolerated. sCr increased overnight, UOP adequate, appears edematous with vascular congestion and effusions on XR, will start lasix 20mg BID, remove oneal catheter. INR 1.1   2/27  Mr. Darden is a 58 y.o. year-old male status post 
continue IS/PT and wean O2 as tolerated. sCr increased overnight, UOP adequate, appears edematous with vascular congestion and effusions on XR, will start lasix 20mg BID, remove oneal catheter. INR 1.1   2/27  Mr. Darden is a 58 y.o. year-old male status post CABGx3, AVR (bio) on 2/26/24. Doing well this AM. On Amio gtt, currently in SR. S/p AVR, start coumadin today. EF45%On 4L NC, left effusion. Needs to cough and deep/breathe. sCr 1.8 making good urine, will CTM, remove oneal today. Up and walk with PT/OT      ADEOLA Nicholson-CNP  
made;Assistance required to identify errors made  Insights: Decreased awareness of deficits  Initiation: Requires cues for some  Sequencing: Requires cues for some     Objective   Bed mobility  Supine to Sit: Contact guard assistance  Sit to Supine: Contact guard assistance  Scooting: Stand by assistance  Bed Mobility Comments: HOB elevated at 35 degrees with pt requiring time to progress BLE and trunk to get EOB with pt being heavy on the hand rails. Pt had to be cued to follow sternal precautions.  Transfers  Sit to Stand: Moderate Assistance  Stand to Sit: Minimal Assistance  Comment: Pt performed initial stand from EOB attempting to don prosthetic requiring modA from writer w/o a device. Pt performed additional stand from EOB with RW requiring CGA. Pt required cueing for hand placement and use of heart hugger brace for all transfers.  Ambulation  Surface: Level tile  Device: Rolling Walker  Assistance: Contact guard assistance  Quality of Gait: wide DOMINIC, fair stability, decreased endurance, heavy reliance on RW.  Gait Deviations: Slow Annel;Decreased step length;Decreased step height  Distance: 80ft x2  Comments: Pt with no true LOB throughout ambulation, significant noted SOB. Pt's SpO2 checked when back in room and read 85% andpt given verbal cues on pursed lip breathing with good return. Pt has a tendency to breath in and out through mouth.  More Ambulation?: No  Stairs/Curb  Stairs?: No     Balance  Posture: Fair  Sitting - Static: Fair  Sitting - Dynamic: +;Fair  Standing - Static: Fair  Comments: standing balance assessed with RW, able to sit unsupported at edge of bed with supervision.  Exercise Treatment: Seated LE exercise program: Long Arc Quads, hip abduction/adduction, heel/toe raises, and marches. Reps: 20       AM-PAC - Mobility    AM-PAC Basic Mobility - Inpatient   How much help is needed turning from your back to your side while in a flat bed without using bedrails?: A Little  How much help is 
on the side of a flat bed without using bedrails?: A Lot  How much help is needed moving to and from a bed to a chair?: A Little  How much help is needed standing up from a chair using your arms?: A Lot  How much help is needed walking in hospital room?: A Little  How much help is needed climbing 3-5 steps with a railing?: A Lot  AM-PAC Inpatient Mobility Raw Score : 14  AM-PAC Inpatient T-Scale Score : 38.1  Mobility Inpatient CMS 0-100% Score: 61.29  Mobility Inpatient CMS G-Code Modifier : CL      Goals  Short Term Goals  Time Frame for Short Term Goals: 14 visits  Short Term Goal 1: Complete transfers with RW and SBA  Short Term Goal 2: Complete 300 ft of gait with RW and SBA  Short Term Goal 3: Complete 2 steps with R handrail and SBA  Short Term Goal 4: Participate in 30 minutes of therapy to promote endurance       Education  Patient Education  Education Given To: Patient  Education Provided: Role of Therapy;Plan of Care;Home Exercise Program;Precautions;Transfer Training  Education Provided Comments: educated on sternal precautions, use of heart hugger brace, given and educated on cardiac HEP  Education Method: Printed Information/Hand-outs;Verbal;Demonstration  Barriers to Learning: None  Education Outcome: Verbalized understanding;Continued education needed      Therapy Time   Individual Concurrent Group Co-treatment   Time In 1010         Time Out 1118         Minutes 68         Timed Code Treatment Minutes: 38 Minutes     Co- treatment with OT warranted secondary to decreased patient safety and independence with functional mobility requiring skilled physical assistance of two professionals to simultaneously address individualized discipline goals. PT is addressing ambulation, sitting/standing balance, exercises, education, while OT is addressing their individualized functional mobility/self-care task.        Ludmila Blood, PTA         
to promote endurance       Education  Patient Education  Education Given To: Patient  Education Provided: Role of Therapy;Plan of Care  Education Method: Verbal  Barriers to Learning: None  Education Outcome: Verbalized understanding;Demonstrated understanding      Therapy Time   Individual Concurrent Group Co-treatment   Time In 0942         Time Out 1027         Minutes 45         Timed Code Treatment Minutes: 15 Minutes       Miguelina Barrera PT

## 2024-03-03 NOTE — PLAN OF CARE
Problem: Chronic Conditions and Co-morbidities  Goal: Patient's chronic conditions and co-morbidity symptoms are monitored and maintained or improved  2/27/2024 0044 by Han Bledsoe RN  Outcome: Progressing  2/26/2024 1852 by Jitendra Grimes RN  Outcome: Progressing  Flowsheets (Taken 2/26/2024 1550)  Care Plan - Patient's Chronic Conditions and Co-Morbidity Symptoms are Monitored and Maintained or Improved: Collaborate with multidisciplinary team to address chronic and comorbid conditions and prevent exacerbation or deterioration     Problem: Discharge Planning  Goal: Discharge to home or other facility with appropriate resources  2/27/2024 0044 by Han Bledsoe RN  Outcome: Progressing  2/26/2024 1852 by Jitendra Grimes RN  Outcome: Progressing  Flowsheets (Taken 2/26/2024 1550)  Discharge to home or other facility with appropriate resources: Identify barriers to discharge with patient and caregiver     Problem: Safety - Adult  Goal: Free from fall injury  2/27/2024 0044 by Han Bledsoe RN  Outcome: Progressing  Flowsheets (Taken 2/26/2024 1949 by Jitendra Grimes RN)  Free From Fall Injury: Based on caregiver fall risk screen, instruct family/caregiver to ask for assistance with transferring infant if caregiver noted to have fall risk factors  2/26/2024 1852 by Jitendra Grimes RN  Outcome: Progressing     Problem: Skin/Tissue Integrity  Goal: Absence of new skin breakdown  Description: 1.  Monitor for areas of redness and/or skin breakdown  2.  Assess vascular access sites hourly  3.  Every 4-6 hours minimum:  Change oxygen saturation probe site  4.  Every 4-6 hours:  If on nasal continuous positive airway pressure, respiratory therapy assess nares and determine need for appliance change or resting period.  Outcome: Progressing     Problem: ABCDS Injury Assessment  Goal: Absence of physical injury  Outcome: Progressing     Problem: Pain  Goal: Verbalizes/displays adequate comfort level or baseline comfort 
  Problem: Chronic Conditions and Co-morbidities  Goal: Patient's chronic conditions and co-morbidity symptoms are monitored and maintained or improved  3/1/2024 1202 by Nancy Stinson RN  Outcome: Progressing  3/1/2024 0620 by Eliana Orta RN  Outcome: Progressing  Flowsheets (Taken 2/29/2024 2000)  Care Plan - Patient's Chronic Conditions and Co-Morbidity Symptoms are Monitored and Maintained or Improved: Monitor and assess patient's chronic conditions and comorbid symptoms for stability, deterioration, or improvement     Problem: Discharge Planning  Goal: Discharge to home or other facility with appropriate resources  3/1/2024 1202 by Nancy Stinson RN  Outcome: Progressing  3/1/2024 0620 by Eliana Orta RN  Outcome: Progressing  Flowsheets (Taken 2/29/2024 2000)  Discharge to home or other facility with appropriate resources: Identify barriers to discharge with patient and caregiver     Problem: Safety - Adult  Goal: Free from fall injury  3/1/2024 1202 by Nancy Stinson RN  Outcome: Progressing  3/1/2024 0620 by Eliana Orta RN  Outcome: Progressing     Problem: Skin/Tissue Integrity  Goal: Absence of new skin breakdown  Description: 1.  Monitor for areas of redness and/or skin breakdown  2.  Assess vascular access sites hourly  3.  Every 4-6 hours minimum:  Change oxygen saturation probe site  4.  Every 4-6 hours:  If on nasal continuous positive airway pressure, respiratory therapy assess nares and determine need for appliance change or resting period.  3/1/2024 1202 by Nancy Stinson RN  Outcome: Progressing  3/1/2024 0620 by Eliana Orta RN  Outcome: Progressing     Problem: ABCDS Injury Assessment  Goal: Absence of physical injury  3/1/2024 0620 by Eliana Orta RN  Outcome: Progressing     Problem: Pain  Goal: Verbalizes/displays adequate comfort level or baseline comfort level  3/1/2024 1202 by Nancy Stinson RN  Outcome: Progressing  3/1/2024 0620 by Eliana Orta RN  Outcome: 
  Problem: Chronic Conditions and Co-morbidities  Goal: Patient's chronic conditions and co-morbidity symptoms are monitored and maintained or improved  Outcome: Progressing     Problem: Discharge Planning  Goal: Discharge to home or other facility with appropriate resources  Outcome: Progressing     Problem: Safety - Adult  Goal: Free from fall injury  Outcome: Progressing     Problem: Respiratory - Adult  Goal: Achieves optimal ventilation and oxygenation  2/26/2024 1852 by Jitendra Grimes, RN  Outcome: Progressing  2/26/2024 1601 by Meli Melendez, RCP  Outcome: Progressing  Flowsheets (Taken 2/26/2024 1556)  Achieves optimal ventilation and oxygenation:   Assess for changes in respiratory status   Assess for changes in mentation and behavior   Position to facilitate oxygenation and minimize respiratory effort   Initiate smoking cessation protocol as indicated   Encourage broncho-pulmonary hygiene including cough, deep breathe, incentive spirometry   Assess the need for suctioning and aspirate as needed   Assess and instruct to report shortness of breath or any respiratory difficulty   Respiratory therapy support as indicated     
  Problem: Chronic Conditions and Co-morbidities  Goal: Patient's chronic conditions and co-morbidity symptoms are monitored and maintained or improved  Outcome: Progressing     Problem: Discharge Planning  Goal: Discharge to home or other facility with appropriate resources  Outcome: Progressing     Problem: Safety - Adult  Goal: Free from fall injury  Outcome: Progressing     Problem: Skin/Tissue Integrity  Goal: Absence of new skin breakdown  Description: 1.  Monitor for areas of redness and/or skin breakdown  2.  Assess vascular access sites hourly  3.  Every 4-6 hours minimum:  Change oxygen saturation probe site  4.  Every 4-6 hours:  If on nasal continuous positive airway pressure, respiratory therapy assess nares and determine need for appliance change or resting period.  Outcome: Progressing     Problem: ABCDS Injury Assessment  Goal: Absence of physical injury  Outcome: Progressing     Problem: Pain  Goal: Verbalizes/displays adequate comfort level or baseline comfort level  Outcome: Progressing  Flowsheets (Taken 3/1/2024 1729 by Nancy Stinson, RN)  Verbalizes/displays adequate comfort level or baseline comfort level:   Encourage patient to monitor pain and request assistance   Assess pain using appropriate pain scale   Administer analgesics based on type and severity of pain and evaluate response   Implement non-pharmacological measures as appropriate and evaluate response   Consider cultural and social influences on pain and pain management     Problem: Nutrition Deficit:  Goal: Optimize nutritional status  Outcome: Progressing     
  Problem: Chronic Conditions and Co-morbidities  Goal: Patient's chronic conditions and co-morbidity symptoms are monitored and maintained or improved  Outcome: Progressing     Problem: Discharge Planning  Goal: Discharge to home or other facility with appropriate resources  Outcome: Progressing     Problem: Safety - Adult  Goal: Free from fall injury  Outcome: Progressing  Flowsheets (Taken 3/2/2024 3008)  Free From Fall Injury: Instruct family/caregiver on patient safety     Problem: Skin/Tissue Integrity  Goal: Absence of new skin breakdown  Description: 1.  Monitor for areas of redness and/or skin breakdown  2.  Assess vascular access sites hourly  3.  Every 4-6 hours minimum:  Change oxygen saturation probe site  4.  Every 4-6 hours:  If on nasal continuous positive airway pressure, respiratory therapy assess nares and determine need for appliance change or resting period.  Outcome: Progressing     Problem: ABCDS Injury Assessment  Goal: Absence of physical injury  Outcome: Progressing     Problem: Pain  Goal: Verbalizes/displays adequate comfort level or baseline comfort level  Outcome: Progressing     Problem: Nutrition Deficit:  Goal: Optimize nutritional status  Outcome: Progressing     
  Problem: Chronic Conditions and Co-morbidities  Goal: Patient's chronic conditions and co-morbidity symptoms are monitored and maintained or improved  Outcome: Progressing  Flowsheets (Taken 2/27/2024 0800)  Care Plan - Patient's Chronic Conditions and Co-Morbidity Symptoms are Monitored and Maintained or Improved:   Monitor and assess patient's chronic conditions and comorbid symptoms for stability, deterioration, or improvement   Collaborate with multidisciplinary team to address chronic and comorbid conditions and prevent exacerbation or deterioration     Problem: Discharge Planning  Goal: Discharge to home or other facility with appropriate resources  Outcome: Progressing  Flowsheets (Taken 2/27/2024 0800)  Discharge to home or other facility with appropriate resources:   Identify barriers to discharge with patient and caregiver   Arrange for needed discharge resources and transportation as appropriate   Identify discharge learning needs (meds, wound care, etc)     Problem: Safety - Adult  Goal: Free from fall injury  Outcome: Progressing     Problem: Respiratory - Adult  Goal: Achieves optimal ventilation and oxygenation  Recent Flowsheet Documentation  Taken 2/27/2024 0800 by Robina Martin RN  Achieves optimal ventilation and oxygenation:   Assess for changes in respiratory status   Assess for changes in mentation and behavior   Position to facilitate oxygenation and minimize respiratory effort   Oxygen supplementation based on oxygen saturation or arterial blood gases     Problem: Skin/Tissue Integrity  Goal: Absence of new skin breakdown  Description: 1.  Monitor for areas of redness and/or skin breakdown  2.  Assess vascular access sites hourly  3.  Every 4-6 hours minimum:  Change oxygen saturation probe site  4.  Every 4-6 hours:  If on nasal continuous positive airway pressure, respiratory therapy assess nares and determine need for appliance change or resting period.  Outcome: Progressing   
  Problem: Chronic Conditions and Co-morbidities  Goal: Patient's chronic conditions and co-morbidity symptoms are monitored and maintained or improved  Outcome: Progressing  Flowsheets (Taken 2/29/2024 2000)  Care Plan - Patient's Chronic Conditions and Co-Morbidity Symptoms are Monitored and Maintained or Improved: Monitor and assess patient's chronic conditions and comorbid symptoms for stability, deterioration, or improvement     Problem: Discharge Planning  Goal: Discharge to home or other facility with appropriate resources  Outcome: Progressing  Flowsheets (Taken 2/29/2024 2000)  Discharge to home or other facility with appropriate resources: Identify barriers to discharge with patient and caregiver     Problem: Safety - Adult  Goal: Free from fall injury  Outcome: Progressing     Problem: Respiratory - Adult  Goal: Achieves optimal ventilation and oxygenation  Recent Flowsheet Documentation  Taken 2/29/2024 2000 by Eliana Orta RN  Achieves optimal ventilation and oxygenation:   Assess for changes in respiratory status   Assess for changes in mentation and behavior   Position to facilitate oxygenation and minimize respiratory effort     Problem: Skin/Tissue Integrity  Goal: Absence of new skin breakdown  Description: 1.  Monitor for areas of redness and/or skin breakdown  2.  Assess vascular access sites hourly  3.  Every 4-6 hours minimum:  Change oxygen saturation probe site  4.  Every 4-6 hours:  If on nasal continuous positive airway pressure, respiratory therapy assess nares and determine need for appliance change or resting period.  Outcome: Progressing     Problem: ABCDS Injury Assessment  Goal: Absence of physical injury  Outcome: Progressing     Problem: Pain  Goal: Verbalizes/displays adequate comfort level or baseline comfort level  Outcome: Progressing     Problem: Nutrition Deficit:  Goal: Optimize nutritional status  Outcome: Progressing     
  Problem: Respiratory - Adult  Goal: Achieves optimal ventilation and oxygenation  2/26/2024 1948 by Damaris Tidwell RCP  Outcome: Progressing   BRONCHOSPASM/BRONCHOCONSTRICTION     [x]         IMPROVE AERATION/BREATH SOUNDS  [x]   ADMINISTER BRONCHODILATOR THERAPY AS APPROPRIATE  [x]   ASSESS BREATH SOUNDS  []   IMPLEMENT AEROSOL/MDI PROTOCOL  [x]   PATIENT EDUCATION AS NEEDED  Problem: OXYGENATION/RESPIRATORY FUNCTION  Goal: Patient will maintain patent airway  Outcome: Ongoing  Goal: Patient will achieve/maintain normal respiratory rate/effort  Respiratory rate and effort will be within normal limits for the patient  Outcome: Ongoing    Problem: MECHANICAL VENTILATION  Goal: Patient will maintain patent airway  Outcome: Ongoing  Goal: Oral health is maintained or improved  Outcome: Ongoing  Goal: ET tube will be managed safely  Outcome: Ongoing  Goal: Ability to express needs and understand communication  Outcome: Ongoing  Goal: Mobility/activity is maintained at optimum level for patient  Outcome: Ongoing    Problem: ASPIRATION PRECAUTIONS  Goal: Patient’s risk of aspiration is minimized  Outcome: Ongoing    Problem: SKIN INTEGRITY  Goal: Skin integrity is maintained or improved  Outcome: Ongoing                    
  Problem: Respiratory - Adult  Goal: Achieves optimal ventilation and oxygenation  2/26/2024 2110 by Lolly Donohue RCP  Outcome: Completed     
  Problem: Respiratory - Adult  Goal: Achieves optimal ventilation and oxygenation  Outcome: Progressing     
Free from fall injury  2/28/2024 0117 by Kristie Rodriguez RN  Outcome: Progressing  Flowsheets (Taken 2/26/2024 1949 by Jitendra Grimes, RN)  Free From Fall Injury: Based on caregiver fall risk screen, instruct family/caregiver to ask for assistance with transferring infant if caregiver noted to have fall risk factors  2/27/2024 1555 by Robina Martin RN  Outcome: Progressing     Problem: Skin/Tissue Integrity  Goal: Absence of new skin breakdown  Description: 1.  Monitor for areas of redness and/or skin breakdown  2.  Assess vascular access sites hourly  3.  Every 4-6 hours minimum:  Change oxygen saturation probe site  4.  Every 4-6 hours:  If on nasal continuous positive airway pressure, respiratory therapy assess nares and determine need for appliance change or resting period.  2/28/2024 0117 by Kristie Rodriguez RN  Outcome: Progressing  2/27/2024 1555 by Robina Martin RN  Outcome: Progressing     Problem: ABCDS Injury Assessment  Goal: Absence of physical injury  2/28/2024 0117 by Kristie Rodriguez RN  Outcome: Progressing  Flowsheets (Taken 2/28/2024 0117)  Absence of Physical Injury: Implement safety measures based on patient assessment  2/27/2024 1555 by Robina Martin RN  Outcome: Progressing     Problem: Pain  Goal: Verbalizes/displays adequate comfort level or baseline comfort level  2/28/2024 0117 by Kristie Rodriguez RN  Outcome: Progressing  Flowsheets (Taken 2/26/2024 2000 by Jitendra Grimes, RN)  Verbalizes/displays adequate comfort level or baseline comfort level: Assess pain using appropriate pain scale  2/27/2024 1555 by Robina Martin RN  Outcome: Progressing     Problem: Nutrition Deficit:  Goal: Optimize nutritional status  2/28/2024 0117 by Kristie Rodriguez RN  Outcome: Progressing  Flowsheets (Taken 2/28/2024 0117)  Nutrient intake appropriate for improving, restoring, or maintaining nutritional needs:   Assess nutritional status and recommend course of action   Recommend

## 2024-03-03 NOTE — CARE COORDINATION
Transitional Planning      If patient is discharged prior to next notation, then this note serves as note for discharge by case managment.    Called Kiki at [facility], accepting patient.  Kiki notified patient is being discharged today.  Spoke with patient and wife.  Agreeable with plan.

## 2024-03-04 ENCOUNTER — TELEPHONE (OUTPATIENT)
Dept: PHARMACY | Age: 59
End: 2024-03-04

## 2024-03-05 ENCOUNTER — TELEPHONE (OUTPATIENT)
Dept: PHARMACY | Age: 59
End: 2024-03-05

## 2024-03-05 NOTE — TELEPHONE ENCOUNTER
9:30 am  Spoke with patient this morning in regards to new referral for warfarin management from Soy Mao CNP. Patient reports he has a nurse from [facility] coming out today to check his labs. Patient states he will be able to come to clinic in a week or so for INR monitoring. Understands homecare INR draws will be temporary. Patient confirms he has warfarin 2.5 mg tablets at home. Patient thinks he has been taking 2 tablets, but wife gives him the medication and is not home. He will ask and report information to clinic.     Called [facility] and spoke with Kiki. Confirmed that patient will be having INR draw today.       4:41 PM Still no INR results. Will call patient with results in the morning when available.     Jeniffer Santos, Pharm D, [facility]  [facility]  3/5/2024 4:42 PM

## 2024-03-06 NOTE — TELEPHONE ENCOUNTER
Called [facility] regarding result. As of today (3/6) at 5:05 p.m., results have not been received. Spoke with Nurys at [facility] who said the lab was drawn yesterday at 11 but no results have been received.    Enedina Ochoa RPH, PharmD, BCACP  3/6/2024  5:06 PM

## 2024-03-07 ENCOUNTER — HOSPITAL ENCOUNTER (OUTPATIENT)
Dept: PHARMACY | Age: 59
Setting detail: THERAPIES SERIES
Discharge: HOME | End: 2024-03-07

## 2024-03-07 DIAGNOSIS — Z95.1 S/P CABG X 3: Primary | ICD-10-CM

## 2024-03-07 LAB — INR BLD: 3.55

## 2024-03-07 NOTE — PROGRESS NOTES
patient compliant all of the time with regimen.  No bleeding or thromboembolic side effects noted/reported.  No significant med or dietary changes reported.  No significant recent illness or disease state changes reported.    Patient reports no cranberries, no antibiotics, no changes in leafy greens, and no alcohol.   Patient was taking 5 mg every day and was discharged home on 3/3.     Home care agency is [facility].  Contact is Crystal at the following phone number [phone].  PT/INR done via home care per protocol.  INR was supratherapeutic at 3.55 (goal 2-3).    Warfarin regimen will be held for 2 days and then 2.5 mg on Sunday and Saturday.  Will retest in 4 days.  Order fax[facility] to [facility]     Patient understood dosing instructions. Counseled patient on risks of elevated INR and to monitor for signs and symptoms of bleeding and when to seek medical attention.  Voicemail left with RN.     Intended duration of INR monitoring via home care for this patient is 1-2 weeks.  INR monitoring by [facility] via home INR draws is not a standard service and is intended to be short term (1 month or less) until patients are able to return to clinic in person.    RN at home care agency understands dosing directions and information discussed.  Dosing schedule and follow up INR schedule to be discussed with patient by home care agency. Progress note routed to referring physicians office.    For Pharmacy Admin Tracking Only    Intervention Detail: Dose Adjustment: 1, reason: Therapy Optimization  Total # of Interventions Recommended: 1  Total # of Interventions Accepted: 1  Time Spent (min): 20     Antionette Chopra, PharmD  PGY-1 Pharmacy Resident    3/7/2024   4:43 PM

## 2024-03-08 NOTE — DISCHARGE SUMMARY
[facility] Cardiothoracic Surgery  Discharge Summary    Patient's Name/Date of Birth: Vincenzo Darden / 1965 (58 y.o.)    Admission Date: 2/26/2024  6:12 AM  Discharge Date: 3/3/2024  5:00 PM  Discharge Physician: Dr. 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   male who presented to cardiothoracic surgery with multivessel CAD and aortic insufficiency.  Patient had an uneventful postop recovery from bypass surgery.  Chest tubes removed and appropriate date and time.  Evaluation for rehab versus home care.  Patient decided to go home with home care.  Met qualifications for safe discharge.  Acute blood loss anemia was due to surgery and history of kidney disease..    Procedures completed in hospital:Median sternotomy, transesophageal echocardiogram, endoscopic vein harvest, aorto-atrial cardiopulmonary bypass, coronary artery bypass grafting x3 with left internal mammary artery to left anterior descending artery, reverse saphenous vein graft to large diagonal 1/ramus, reverse saphenous vein graft to left posterior descending artery, aortic valve replacement with what was measured to be 23 mm Reese-Ayers Inspiris aortic valve.       Physical Exam:  Vitals:    03/03/24 0755   BP: (!) 167/92   Pulse: 69   Resp: 16   Temp: 97.7 °F (36.5 °C)   SpO2: 91%     Weight: Weight - Scale: 130.2 kg (287 lb 0.6 oz)    Weight - Scale: 124.1 kg (273 lb 9.5 oz)    No intake/output data recorded.    General: . Up in chair, No apparent distress.  Heart:Normal S1 and S2.  Regular rhythm. No murmurs, gallops, or rubs.  Pacing Wires No   Lungs: clear to auscultation bilaterally  Abdomen: soft, non tender, non distended, BSx4  Extremities: negative  Wounds: clean and dry, healing appropriately.     Past Medical History:   Diagnosis Date

## 2024-03-13 ENCOUNTER — HOSPITAL ENCOUNTER (OUTPATIENT)
Dept: PHARMACY | Age: 59
Setting detail: THERAPIES SERIES
Discharge: HOME | End: 2024-03-13
Payer: MEDICARE

## 2024-03-13 ENCOUNTER — APPOINTMENT (OUTPATIENT)
Dept: PHARMACY | Age: 59
End: 2024-03-13
Payer: MEDICARE

## 2024-03-13 DIAGNOSIS — Z95.1 S/P CABG X 3: Primary | ICD-10-CM

## 2024-03-13 LAB
INR BLD: 1.1
PROTIME: 13.3 SECONDS

## 2024-03-13 PROCEDURE — 99213 OFFICE O/P EST LOW 20 MIN: CPT

## 2024-03-13 PROCEDURE — 85610 PROTHROMBIN TIME: CPT

## 2024-03-13 PROCEDURE — 99204 OFFICE O/P NEW MOD 45 MIN: CPT

## 2024-03-13 NOTE — PROGRESS NOTES
Consult Agreement with Pharmacist as referred by his/her physician/provider.      For Pharmacy Admin Tracking Only    Intervention Detail: Adherence Monitorin and Dose Adjustment: 1, reason: Therapy Optimization  Total # of Interventions Recommended: 4  Total # of Interventions Accepted: 4  Time Spent (min): 30

## 2024-03-14 ENCOUNTER — TELEPHONE (OUTPATIENT)
Dept: VASCULAR SURGERY | Age: 59
End: 2024-03-14

## 2024-03-14 NOTE — TELEPHONE ENCOUNTER
----- Message from Cassandra Litten, MA sent at 3/12/2024  3:57 PM EDT -----  LVM to set up post-op appt    ----- Message -----  From: Soy Mao APRN - NP  Sent: 3/8/2024   1:12 PM EDT  To: [facility]    Can we set this darren up with a postop appointment for Wednesday with Ricardo and let him know

## 2024-03-14 NOTE — TELEPHONE ENCOUNTER
Patient called into the office to schedule Post Op.  Dr. Caputo is not in the office 03/20/2024, I spoke to LISETTE Florentino and approved to put him on with Dr. Galo on Thursday 03/21/2024.   Patient informed to come in on 03/21/2024 @ 11:45 am

## 2024-03-18 ENCOUNTER — TELEPHONE (OUTPATIENT)
Dept: PHARMACY | Age: 59
End: 2024-03-18

## 2024-03-21 ENCOUNTER — OFFICE VISIT (OUTPATIENT)
Dept: CARDIOTHORACIC SURGERY | Age: 59
End: 2024-03-21

## 2024-03-21 VITALS
BODY MASS INDEX: 35.51 KG/M2 | SYSTOLIC BLOOD PRESSURE: 173 MMHG | WEIGHT: 262.2 LBS | HEART RATE: 80 BPM | TEMPERATURE: 98 F | HEIGHT: 72 IN | OXYGEN SATURATION: 98 % | DIASTOLIC BLOOD PRESSURE: 99 MMHG

## 2024-03-21 DIAGNOSIS — I35.1 AORTIC VALVE INSUFFICIENCY, ETIOLOGY OF CARDIAC VALVE DISEASE UNSPECIFIED: Primary | ICD-10-CM

## 2024-03-21 PROCEDURE — 99024 POSTOP FOLLOW-UP VISIT: CPT | Performed by: PHYSICIAN ASSISTANT

## 2024-03-25 NOTE — PROGRESS NOTES
Subjective:     Vincenzo Darden returns today status post AVR. Complained of surgical sorenes but doing better. No respiratory or cardiac complaints    Objective:     BP (!) 173/99 (Site: Left Upper Arm, Position: Sitting, Cuff Size: Large Adult)   Pulse 80   Temp 98 °F (36.7 °C) (Temporal)   Ht 1.829 m (6')   Wt 118.9 kg (262 lb 3.2 oz)   SpO2 98%   BMI 35.56 kg/m²   Chest: + mild clicking mid sternum  CV: RR  Lungs: clear to IPPA  Lower extremities: 1+ edema    Medications:     Current Outpatient Medications on File Prior to Visit   Medication Sig Dispense Refill    furosemide (LASIX) 40 MG tablet Take 1 tablet by mouth in the morning and 1 tablet in the evening. 60 tablet 3    atorvastatin (LIPITOR) 20 MG tablet Take 1 tablet by mouth nightly 30 tablet 3    metoprolol tartrate (LOPRESSOR) 25 MG tablet Take 1 tablet by mouth 2 times daily 60 tablet 3    tamsulosin (FLOMAX) 0.4 MG capsule Take 1 capsule by mouth daily 30 capsule 3    potassium chloride (KLOR-CON M) 20 MEQ extended release tablet Take 1 tablet by mouth daily 30 tablet 0    warfarin (COUMADIN) 2.5 MG tablet Take 2 tablets by mouth daily 30 tablet 1    carvedilol (COREG) 25 MG tablet Take 1 tablet by mouth in the morning and 1 tablet in the evening. 60 tablet 11    aspirin 81 MG chewable tablet Take 1 tablet by mouth daily 30 tablet 3    TRULICITY 1.5 MG/0.5ML SC injection Inject 0.5 mLs into the skin Once a week at 5 PM      Insulin Degludec (TRESIBA FLEXTOUCH) 200 UNIT/ML SOPN Inject 40 Units into the skin nightly      Insulin Aspart, w/Niacinamide, (FIASP FLEXTOUCH) 100 UNIT/ML SOPN Inject 20 Units into the skin 3 times daily (with meals) Pt has his sliding scale at home      amiodarone (CORDARONE) 200 MG tablet Take 1 tablet by mouth 2 times daily for 15 days 30 tablet 0    ondansetron (ZOFRAN-ODT) 4 MG disintegrating tablet Take 1 tablet by mouth every 8 hours as needed for Nausea or Vomiting (Patient not taking: Reported on 3/21/2024) 30

## 2024-06-02 ENCOUNTER — APPOINTMENT (OUTPATIENT)
Dept: CT IMAGING | Age: 59
DRG: 871 | End: 2024-06-02
Payer: MEDICARE

## 2024-06-02 ENCOUNTER — HOSPITAL ENCOUNTER (INPATIENT)
Age: 59
LOS: 9 days | Discharge: HOME HEALTH | DRG: 871 | End: 2024-06-11
Attending: EMERGENCY MEDICINE | Admitting: STUDENT IN AN ORGANIZED HEALTH CARE EDUCATION/TRAINING PROGRAM
Payer: MEDICARE

## 2024-06-02 ENCOUNTER — APPOINTMENT (OUTPATIENT)
Dept: GENERAL RADIOLOGY | Age: 59
DRG: 871 | End: 2024-06-02
Payer: MEDICARE

## 2024-06-02 DIAGNOSIS — L98.499 SKIN ULCER DUE TO DIABETES MELLITUS (HCC): ICD-10-CM

## 2024-06-02 DIAGNOSIS — N17.9 AKI (ACUTE KIDNEY INJURY) (HCC): ICD-10-CM

## 2024-06-02 DIAGNOSIS — L03.90 CELLULITIS, UNSPECIFIED CELLULITIS SITE: ICD-10-CM

## 2024-06-02 DIAGNOSIS — E11.622 SKIN ULCER DUE TO DIABETES MELLITUS (HCC): ICD-10-CM

## 2024-06-02 DIAGNOSIS — R07.9 CHEST PAIN, UNSPECIFIED TYPE: Primary | ICD-10-CM

## 2024-06-02 DIAGNOSIS — E87.1 HYPONATREMIA: ICD-10-CM

## 2024-06-02 DIAGNOSIS — E87.20 LACTIC ACIDOSIS: ICD-10-CM

## 2024-06-02 DIAGNOSIS — I25.3 CARDIAC ANEURYSM: ICD-10-CM

## 2024-06-02 LAB
ALBUMIN SERPL-MCNC: 2.8 G/DL (ref 3.5–5.2)
ALBUMIN/GLOB SERPL: 1 {RATIO} (ref 1–2.5)
ALP SERPL-CCNC: 108 U/L (ref 40–129)
ALT SERPL-CCNC: 8 U/L (ref 10–50)
ANION GAP SERPL CALCULATED.3IONS-SCNC: 9 MMOL/L (ref 9–16)
ANTI-XA UNFRAC HEPARIN: <0.1 IU/L
AST SERPL-CCNC: 23 U/L (ref 10–50)
BASOPHILS # BLD: 0 K/UL (ref 0–0.2)
BASOPHILS NFR BLD: 0 % (ref 0–2)
BILIRUB SERPL-MCNC: 0.4 MG/DL (ref 0–1.2)
BNP SERPL-MCNC: 1415 PG/ML (ref 0–300)
BUN SERPL-MCNC: 33 MG/DL (ref 6–20)
CALCIUM SERPL-MCNC: 8.1 MG/DL (ref 8.6–10.4)
CHLORIDE SERPL-SCNC: 102 MMOL/L (ref 98–107)
CO2 SERPL-SCNC: 18 MMOL/L (ref 20–31)
CREAT SERPL-MCNC: 2.2 MG/DL (ref 0.7–1.2)
D DIMER PPP FEU-MCNC: 1.37 UG/ML FEU (ref 0–0.57)
EOSINOPHIL # BLD: 0 K/UL (ref 0–0.4)
EOSINOPHILS RELATIVE PERCENT: 0 % (ref 1–4)
ERYTHROCYTE [DISTWIDTH] IN BLOOD BY AUTOMATED COUNT: 13.6 % (ref 11.8–14.4)
GFR, ESTIMATED: 34 ML/MIN/1.73M2
GLUCOSE BLD-MCNC: 211 MG/DL (ref 75–110)
GLUCOSE SERPL-MCNC: 217 MG/DL (ref 74–99)
HCT VFR BLD AUTO: 40.4 % (ref 40.7–50.3)
HGB BLD-MCNC: 13.6 G/DL (ref 13–17)
IMM GRANULOCYTES # BLD AUTO: 0 K/UL (ref 0–0.3)
IMM GRANULOCYTES NFR BLD: 0 %
INR PPP: 1.1
LACTIC ACID, SEPSIS WHOLE BLOOD: 4.8 MMOL/L (ref 0.5–1.9)
LACTIC ACID, WHOLE BLOOD: 1.8 MMOL/L (ref 0.7–2.1)
LYMPHOCYTES NFR BLD: 0.3 K/UL (ref 1–4.8)
LYMPHOCYTES RELATIVE PERCENT: 4 % (ref 24–44)
MAGNESIUM SERPL-MCNC: 1.7 MG/DL (ref 1.6–2.6)
MCH RBC QN AUTO: 29.8 PG (ref 25.2–33.5)
MCHC RBC AUTO-ENTMCNC: 33.7 G/DL (ref 28.4–34.8)
MCV RBC AUTO: 88.4 FL (ref 82.6–102.9)
MONOCYTES NFR BLD: 0.52 K/UL (ref 0.1–0.8)
MONOCYTES NFR BLD: 7 % (ref 1–7)
MORPHOLOGY: NORMAL
NEUTROPHILS NFR BLD: 89 % (ref 36–66)
NEUTS SEG NFR BLD: 6.58 K/UL (ref 1.8–7.7)
NRBC BLD-RTO: 0 PER 100 WBC
PARTIAL THROMBOPLASTIN TIME: 31.6 SEC (ref 23–36.5)
PLATELET # BLD AUTO: 209 K/UL (ref 138–453)
PMV BLD AUTO: 9.2 FL (ref 8.1–13.5)
POTASSIUM SERPL-SCNC: 3.8 MMOL/L (ref 3.7–5.3)
PROT SERPL-MCNC: 6.2 G/DL (ref 6.6–8.7)
PROTHROMBIN TIME: 13.7 SEC (ref 11.7–14.9)
RBC # BLD AUTO: 4.57 M/UL (ref 4.21–5.77)
SODIUM SERPL-SCNC: 129 MMOL/L (ref 136–145)
TROPONIN I SERPL HS-MCNC: 58 NG/L (ref 0–22)
TROPONIN I SERPL HS-MCNC: 65 NG/L (ref 0–22)
WBC OTHER # BLD: 7.4 K/UL (ref 3.5–11.3)

## 2024-06-02 PROCEDURE — 85730 THROMBOPLASTIN TIME PARTIAL: CPT

## 2024-06-02 PROCEDURE — 93005 ELECTROCARDIOGRAM TRACING: CPT | Performed by: STUDENT IN AN ORGANIZED HEALTH CARE EDUCATION/TRAINING PROGRAM

## 2024-06-02 PROCEDURE — 2060000000 HC ICU INTERMEDIATE R&B

## 2024-06-02 PROCEDURE — 96375 TX/PRO/DX INJ NEW DRUG ADDON: CPT

## 2024-06-02 PROCEDURE — 83880 ASSAY OF NATRIURETIC PEPTIDE: CPT

## 2024-06-02 PROCEDURE — 87186 SC STD MICRODIL/AGAR DIL: CPT

## 2024-06-02 PROCEDURE — 71045 X-RAY EXAM CHEST 1 VIEW: CPT

## 2024-06-02 PROCEDURE — 85379 FIBRIN DEGRADATION QUANT: CPT

## 2024-06-02 PROCEDURE — 36415 COLL VENOUS BLD VENIPUNCTURE: CPT

## 2024-06-02 PROCEDURE — 99285 EMERGENCY DEPT VISIT HI MDM: CPT

## 2024-06-02 PROCEDURE — 83605 ASSAY OF LACTIC ACID: CPT

## 2024-06-02 PROCEDURE — 85610 PROTHROMBIN TIME: CPT

## 2024-06-02 PROCEDURE — 86403 PARTICLE AGGLUT ANTBDY SCRN: CPT

## 2024-06-02 PROCEDURE — 87205 SMEAR GRAM STAIN: CPT

## 2024-06-02 PROCEDURE — 2580000003 HC RX 258: Performed by: STUDENT IN AN ORGANIZED HEALTH CARE EDUCATION/TRAINING PROGRAM

## 2024-06-02 PROCEDURE — 82947 ASSAY GLUCOSE BLOOD QUANT: CPT

## 2024-06-02 PROCEDURE — 96374 THER/PROPH/DIAG INJ IV PUSH: CPT

## 2024-06-02 PROCEDURE — 6370000000 HC RX 637 (ALT 250 FOR IP)

## 2024-06-02 PROCEDURE — 99223 1ST HOSP IP/OBS HIGH 75: CPT | Performed by: INTERNAL MEDICINE

## 2024-06-02 PROCEDURE — 6360000002 HC RX W HCPCS: Performed by: STUDENT IN AN ORGANIZED HEALTH CARE EDUCATION/TRAINING PROGRAM

## 2024-06-02 PROCEDURE — 87040 BLOOD CULTURE FOR BACTERIA: CPT

## 2024-06-02 PROCEDURE — 73630 X-RAY EXAM OF FOOT: CPT

## 2024-06-02 PROCEDURE — 87154 CUL TYP ID BLD PTHGN 6+ TRGT: CPT

## 2024-06-02 PROCEDURE — 71275 CT ANGIOGRAPHY CHEST: CPT

## 2024-06-02 PROCEDURE — 85520 HEPARIN ASSAY: CPT

## 2024-06-02 PROCEDURE — 85025 COMPLETE CBC W/AUTO DIFF WBC: CPT

## 2024-06-02 PROCEDURE — 84484 ASSAY OF TROPONIN QUANT: CPT

## 2024-06-02 PROCEDURE — 6360000004 HC RX CONTRAST MEDICATION: Performed by: STUDENT IN AN ORGANIZED HEALTH CARE EDUCATION/TRAINING PROGRAM

## 2024-06-02 PROCEDURE — 80053 COMPREHEN METABOLIC PANEL: CPT

## 2024-06-02 PROCEDURE — 83735 ASSAY OF MAGNESIUM: CPT

## 2024-06-02 RX ORDER — INSULIN LISPRO 100 [IU]/ML
0-16 INJECTION, SOLUTION INTRAVENOUS; SUBCUTANEOUS
Status: DISCONTINUED | OUTPATIENT
Start: 2024-06-03 | End: 2024-06-03

## 2024-06-02 RX ORDER — HEPARIN SODIUM 10000 [USP'U]/100ML
5-30 INJECTION, SOLUTION INTRAVENOUS CONTINUOUS
Status: DISCONTINUED | OUTPATIENT
Start: 2024-06-02 | End: 2024-06-03

## 2024-06-02 RX ORDER — ASPIRIN 81 MG/1
TABLET, CHEWABLE ORAL
Status: COMPLETED
Start: 2024-06-02 | End: 2024-06-02

## 2024-06-02 RX ORDER — ASPIRIN 81 MG/1
TABLET, CHEWABLE ORAL
Status: DISPENSED
Start: 2024-06-02 | End: 2024-06-03

## 2024-06-02 RX ORDER — LEVOFLOXACIN 5 MG/ML
750 INJECTION, SOLUTION INTRAVENOUS ONCE
Status: COMPLETED | OUTPATIENT
Start: 2024-06-02 | End: 2024-06-03

## 2024-06-02 RX ORDER — HEPARIN SODIUM 1000 [USP'U]/ML
4000 INJECTION, SOLUTION INTRAVENOUS; SUBCUTANEOUS ONCE
Status: COMPLETED | OUTPATIENT
Start: 2024-06-02 | End: 2024-06-02

## 2024-06-02 RX ORDER — MORPHINE SULFATE 4 MG/ML
4 INJECTION INTRAVENOUS ONCE
Status: COMPLETED | OUTPATIENT
Start: 2024-06-02 | End: 2024-06-02

## 2024-06-02 RX ORDER — 0.9 % SODIUM CHLORIDE 0.9 %
500 INTRAVENOUS SOLUTION INTRAVENOUS ONCE
Status: COMPLETED | OUTPATIENT
Start: 2024-06-02 | End: 2024-06-03

## 2024-06-02 RX ORDER — ASPIRIN 81 MG/1
324 TABLET, CHEWABLE ORAL ONCE
Status: COMPLETED | OUTPATIENT
Start: 2024-06-02 | End: 2024-06-02

## 2024-06-02 RX ORDER — DEXTROSE MONOHYDRATE 100 MG/ML
INJECTION, SOLUTION INTRAVENOUS CONTINUOUS PRN
Status: DISCONTINUED | OUTPATIENT
Start: 2024-06-02 | End: 2024-06-11 | Stop reason: HOSPADM

## 2024-06-02 RX ORDER — HEPARIN SODIUM 1000 [USP'U]/ML
4000 INJECTION, SOLUTION INTRAVENOUS; SUBCUTANEOUS PRN
Status: DISCONTINUED | OUTPATIENT
Start: 2024-06-02 | End: 2024-06-03

## 2024-06-02 RX ORDER — 0.9 % SODIUM CHLORIDE 0.9 %
500 INTRAVENOUS SOLUTION INTRAVENOUS ONCE
Status: DISCONTINUED | OUTPATIENT
Start: 2024-06-02 | End: 2024-06-02

## 2024-06-02 RX ORDER — INSULIN LISPRO 100 [IU]/ML
0-4 INJECTION, SOLUTION INTRAVENOUS; SUBCUTANEOUS NIGHTLY
Status: DISCONTINUED | OUTPATIENT
Start: 2024-06-02 | End: 2024-06-03 | Stop reason: ALTCHOICE

## 2024-06-02 RX ORDER — GLUCAGON 1 MG/ML
1 KIT INJECTION PRN
Status: DISCONTINUED | OUTPATIENT
Start: 2024-06-02 | End: 2024-06-11 | Stop reason: HOSPADM

## 2024-06-02 RX ORDER — HEPARIN SODIUM 1000 [USP'U]/ML
2000 INJECTION, SOLUTION INTRAVENOUS; SUBCUTANEOUS PRN
Status: DISCONTINUED | OUTPATIENT
Start: 2024-06-02 | End: 2024-06-03

## 2024-06-02 RX ADMIN — HEPARIN SODIUM 8 UNITS/KG/HR: 10000 INJECTION, SOLUTION INTRAVENOUS at 22:34

## 2024-06-02 RX ADMIN — ASPIRIN 324 MG: 81 TABLET, CHEWABLE ORAL at 22:19

## 2024-06-02 RX ADMIN — MORPHINE SULFATE 4 MG: 4 INJECTION INTRAVENOUS at 20:26

## 2024-06-02 RX ADMIN — IOPAMIDOL 100 ML: 755 INJECTION, SOLUTION INTRAVENOUS at 21:50

## 2024-06-02 RX ADMIN — LEVOFLOXACIN 750 MG: 5 INJECTION, SOLUTION INTRAVENOUS at 22:29

## 2024-06-02 RX ADMIN — ASPIRIN 81 MG 324 MG: 81 TABLET ORAL at 22:19

## 2024-06-02 RX ADMIN — SODIUM CHLORIDE 500 ML: 0.9 INJECTION, SOLUTION INTRAVENOUS at 22:31

## 2024-06-02 RX ADMIN — HEPARIN SODIUM 4000 UNITS: 1000 INJECTION INTRAVENOUS; SUBCUTANEOUS at 22:33

## 2024-06-02 ASSESSMENT — PAIN DESCRIPTION - LOCATION: LOCATION: CHEST

## 2024-06-02 ASSESSMENT — PAIN SCALES - GENERAL
PAINLEVEL_OUTOF10: 8
PAINLEVEL_OUTOF10: 10

## 2024-06-03 ENCOUNTER — APPOINTMENT (OUTPATIENT)
Dept: CT IMAGING | Age: 59
DRG: 871 | End: 2024-06-03
Payer: MEDICARE

## 2024-06-03 PROBLEM — R07.89 MUSCULOSKELETAL CHEST PAIN: Status: ACTIVE | Noted: 2024-06-03

## 2024-06-03 PROBLEM — B95.61 STAPHYLOCOCCUS AUREUS BACTEREMIA: Status: ACTIVE | Noted: 2024-06-03

## 2024-06-03 PROBLEM — R07.9 CHEST PAIN: Status: ACTIVE | Noted: 2024-06-03

## 2024-06-03 PROBLEM — G93.40 ACUTE ENCEPHALOPATHY: Status: ACTIVE | Noted: 2024-06-03

## 2024-06-03 PROBLEM — N18.9 ACUTE KIDNEY INJURY SUPERIMPOSED ON CKD (HCC): Status: ACTIVE | Noted: 2023-10-05

## 2024-06-03 PROBLEM — R78.81 STAPHYLOCOCCUS AUREUS BACTEREMIA: Status: ACTIVE | Noted: 2024-06-03

## 2024-06-03 PROBLEM — E87.1 HYPONATREMIA: Status: ACTIVE | Noted: 2024-06-03

## 2024-06-03 PROBLEM — R10.84 DIFFUSE ABDOMINAL PAIN: Status: ACTIVE | Noted: 2024-06-03

## 2024-06-03 PROBLEM — I25.3 CARDIAC ANEURYSM: Status: ACTIVE | Noted: 2024-06-03

## 2024-06-03 LAB
ANION GAP SERPL CALCULATED.3IONS-SCNC: 12 MMOL/L (ref 9–16)
ANION GAP SERPL CALCULATED.3IONS-SCNC: 13 MMOL/L (ref 9–16)
ANTI-XA UNFRAC HEPARIN: <0.1 IU/L
BILIRUB UR QL STRIP: NEGATIVE
BODY TEMPERATURE: 37
BUN SERPL-MCNC: 33 MG/DL (ref 6–20)
BUN SERPL-MCNC: 33 MG/DL (ref 6–20)
C3 SERPL-MCNC: 159 MG/DL (ref 90–180)
C4 SERPL-MCNC: 32 MG/DL (ref 10–40)
CALCIUM SERPL-MCNC: 7.3 MG/DL (ref 8.6–10.4)
CALCIUM SERPL-MCNC: 7.8 MG/DL (ref 8.6–10.4)
CASTS #/AREA URNS LPF: NORMAL /LPF (ref 0–2)
CASTS #/AREA URNS LPF: NORMAL /LPF (ref 0–2)
CHLORIDE SERPL-SCNC: 100 MMOL/L (ref 98–107)
CHLORIDE SERPL-SCNC: 100 MMOL/L (ref 98–107)
CHOLEST SERPL-MCNC: 98 MG/DL (ref 0–199)
CHOLESTEROL/HDL RATIO: 3
CLARITY UR: ABNORMAL
CO2 SERPL-SCNC: 16 MMOL/L (ref 20–31)
CO2 SERPL-SCNC: 16 MMOL/L (ref 20–31)
COHGB MFR BLD: 2.6 % (ref 0–5)
COLOR UR: YELLOW
CREAT SERPL-MCNC: 2.4 MG/DL (ref 0.7–1.2)
CREAT SERPL-MCNC: 2.9 MG/DL (ref 0.7–1.2)
CREAT UR-MCNC: 173 MG/DL (ref 39–259)
CREAT UR-MCNC: 187 MG/DL (ref 39–259)
EOSINOPHIL,URINE: NORMAL
EPI CELLS #/AREA URNS HPF: NORMAL /HPF (ref 0–5)
ERYTHROCYTE [DISTWIDTH] IN BLOOD BY AUTOMATED COUNT: 13.7 % (ref 11.8–14.4)
FIO2 ON VENT: ABNORMAL %
GFR, ESTIMATED: 25 ML/MIN/1.73M2
GFR, ESTIMATED: 30 ML/MIN/1.73M2
GLUCOSE BLD-MCNC: 141 MG/DL (ref 75–110)
GLUCOSE BLD-MCNC: 150 MG/DL (ref 75–110)
GLUCOSE BLD-MCNC: 151 MG/DL (ref 75–110)
GLUCOSE SERPL-MCNC: 169 MG/DL (ref 74–99)
GLUCOSE SERPL-MCNC: 187 MG/DL (ref 74–99)
GLUCOSE UR STRIP-MCNC: ABNORMAL MG/DL
HCO3 VENOUS: 18.1 MMOL/L (ref 24–30)
HCT VFR BLD AUTO: 39.5 % (ref 40.7–50.3)
HDLC SERPL-MCNC: 31 MG/DL
HGB BLD-MCNC: 13 G/DL (ref 13–17)
HGB UR QL STRIP.AUTO: ABNORMAL
KETONES UR STRIP-MCNC: NEGATIVE MG/DL
LACTIC ACID, WHOLE BLOOD: 1.1 MMOL/L (ref 0.7–2.1)
LACTIC ACID, WHOLE BLOOD: 1.3 MMOL/L (ref 0.7–2.1)
LDLC SERPL CALC-MCNC: 42 MG/DL (ref 0–100)
LEUKOCYTE ESTERASE UR QL STRIP: NEGATIVE
LIPASE SERPL-CCNC: 9 U/L (ref 13–60)
MAGNESIUM SERPL-MCNC: 1.9 MG/DL (ref 1.6–2.6)
MCH RBC QN AUTO: 29.4 PG (ref 25.2–33.5)
MCHC RBC AUTO-ENTMCNC: 32.9 G/DL (ref 28.4–34.8)
MCV RBC AUTO: 89.4 FL (ref 82.6–102.9)
NEGATIVE BASE EXCESS, VEN: 6.7 MMOL/L (ref 0–2)
NITRITE UR QL STRIP: NEGATIVE
NRBC BLD-RTO: 0 PER 100 WBC
O2 SAT, VEN: 89 % (ref 60–85)
OSMOLALITY SERPL: 293 MOSM/KG (ref 275–295)
OSMOLALITY UR: 469 MOSM/KG (ref 80–1300)
PCO2, VEN: 35.9 MM HG (ref 39–55)
PH UR STRIP: 5.5 [PH] (ref 5–8)
PH VENOUS: 7.32 (ref 7.32–7.42)
PLATELET # BLD AUTO: 175 K/UL (ref 138–453)
PMV BLD AUTO: 9.6 FL (ref 8.1–13.5)
PO2, VEN: 59.8 MM HG (ref 30–50)
POTASSIUM SERPL-SCNC: 4 MMOL/L (ref 3.7–5.3)
POTASSIUM SERPL-SCNC: 4.1 MMOL/L (ref 3.7–5.3)
PROT UR STRIP-MCNC: ABNORMAL MG/DL
RBC # BLD AUTO: 4.42 M/UL (ref 4.21–5.77)
RBC #/AREA URNS HPF: NORMAL /HPF (ref 0–2)
SODIUM SERPL-SCNC: 128 MMOL/L (ref 136–145)
SODIUM SERPL-SCNC: 129 MMOL/L (ref 136–145)
SODIUM UR-SCNC: <20 MMOL/L
SP GR UR STRIP: 1.05 (ref 1–1.03)
TOTAL PROTEIN, URINE: 1080 MG/DL
TOTAL PROTEIN, URINE: 1235 MG/DL
TRIGL SERPL-MCNC: 124 MG/DL
TROPONIN I SERPL HS-MCNC: 64 NG/L (ref 0–22)
TROPONIN I SERPL HS-MCNC: 67 NG/L (ref 0–22)
UROBILINOGEN UR STRIP-ACNC: NORMAL EU/DL (ref 0–1)
VLDLC SERPL CALC-MCNC: 25 MG/DL
WBC #/AREA URNS HPF: NORMAL /HPF (ref 0–5)
WBC OTHER # BLD: 10.5 K/UL (ref 3.5–11.3)

## 2024-06-03 PROCEDURE — 85027 COMPLETE CBC AUTOMATED: CPT

## 2024-06-03 PROCEDURE — 81001 URINALYSIS AUTO W/SCOPE: CPT

## 2024-06-03 PROCEDURE — 82570 ASSAY OF URINE CREATININE: CPT

## 2024-06-03 PROCEDURE — 83930 ASSAY OF BLOOD OSMOLALITY: CPT

## 2024-06-03 PROCEDURE — 85520 HEPARIN ASSAY: CPT

## 2024-06-03 PROCEDURE — 6370000000 HC RX 637 (ALT 250 FOR IP): Performed by: STUDENT IN AN ORGANIZED HEALTH CARE EDUCATION/TRAINING PROGRAM

## 2024-06-03 PROCEDURE — 6370000000 HC RX 637 (ALT 250 FOR IP): Performed by: INTERNAL MEDICINE

## 2024-06-03 PROCEDURE — 6360000004 HC RX CONTRAST MEDICATION: Performed by: INTERNAL MEDICINE

## 2024-06-03 PROCEDURE — 36415 COLL VENOUS BLD VENIPUNCTURE: CPT

## 2024-06-03 PROCEDURE — 80048 BASIC METABOLIC PNL TOTAL CA: CPT

## 2024-06-03 PROCEDURE — 83735 ASSAY OF MAGNESIUM: CPT

## 2024-06-03 PROCEDURE — 82805 BLOOD GASES W/O2 SATURATION: CPT

## 2024-06-03 PROCEDURE — 84156 ASSAY OF PROTEIN URINE: CPT

## 2024-06-03 PROCEDURE — 6360000002 HC RX W HCPCS: Performed by: INTERNAL MEDICINE

## 2024-06-03 PROCEDURE — 6360000002 HC RX W HCPCS: Performed by: STUDENT IN AN ORGANIZED HEALTH CARE EDUCATION/TRAINING PROGRAM

## 2024-06-03 PROCEDURE — 84300 ASSAY OF URINE SODIUM: CPT

## 2024-06-03 PROCEDURE — 86160 COMPLEMENT ANTIGEN: CPT

## 2024-06-03 PROCEDURE — 93005 ELECTROCARDIOGRAM TRACING: CPT

## 2024-06-03 PROCEDURE — 99232 SBSQ HOSP IP/OBS MODERATE 35: CPT | Performed by: STUDENT IN AN ORGANIZED HEALTH CARE EDUCATION/TRAINING PROGRAM

## 2024-06-03 PROCEDURE — 87186 SC STD MICRODIL/AGAR DIL: CPT

## 2024-06-03 PROCEDURE — 51798 US URINE CAPACITY MEASURE: CPT

## 2024-06-03 PROCEDURE — 84484 ASSAY OF TROPONIN QUANT: CPT

## 2024-06-03 PROCEDURE — 99232 SBSQ HOSP IP/OBS MODERATE 35: CPT | Performed by: INTERNAL MEDICINE

## 2024-06-03 PROCEDURE — 86403 PARTICLE AGGLUT ANTBDY SCRN: CPT

## 2024-06-03 PROCEDURE — 80061 LIPID PANEL: CPT

## 2024-06-03 PROCEDURE — 2580000003 HC RX 258: Performed by: INTERNAL MEDICINE

## 2024-06-03 PROCEDURE — 6360000002 HC RX W HCPCS: Performed by: NURSE PRACTITIONER

## 2024-06-03 PROCEDURE — A4216 STERILE WATER/SALINE, 10 ML: HCPCS | Performed by: INTERNAL MEDICINE

## 2024-06-03 PROCEDURE — 87205 SMEAR GRAM STAIN: CPT

## 2024-06-03 PROCEDURE — 99223 1ST HOSP IP/OBS HIGH 75: CPT | Performed by: INTERNAL MEDICINE

## 2024-06-03 PROCEDURE — 74176 CT ABD & PELVIS W/O CONTRAST: CPT

## 2024-06-03 PROCEDURE — 83605 ASSAY OF LACTIC ACID: CPT

## 2024-06-03 PROCEDURE — 2580000003 HC RX 258: Performed by: NURSE PRACTITIONER

## 2024-06-03 PROCEDURE — 99213 OFFICE O/P EST LOW 20 MIN: CPT

## 2024-06-03 PROCEDURE — 83690 ASSAY OF LIPASE: CPT

## 2024-06-03 PROCEDURE — 2060000000 HC ICU INTERMEDIATE R&B

## 2024-06-03 PROCEDURE — 94761 N-INVAS EAR/PLS OXIMETRY MLT: CPT

## 2024-06-03 PROCEDURE — C9113 INJ PANTOPRAZOLE SODIUM, VIA: HCPCS | Performed by: INTERNAL MEDICINE

## 2024-06-03 PROCEDURE — 82947 ASSAY GLUCOSE BLOOD QUANT: CPT

## 2024-06-03 PROCEDURE — 87040 BLOOD CULTURE FOR BACTERIA: CPT

## 2024-06-03 PROCEDURE — 83935 ASSAY OF URINE OSMOLALITY: CPT

## 2024-06-03 PROCEDURE — 6370000000 HC RX 637 (ALT 250 FOR IP): Performed by: NURSE PRACTITIONER

## 2024-06-03 RX ORDER — MORPHINE SULFATE 2 MG/ML
2 INJECTION, SOLUTION INTRAMUSCULAR; INTRAVENOUS
Status: DISCONTINUED | OUTPATIENT
Start: 2024-06-03 | End: 2024-06-03

## 2024-06-03 RX ORDER — NITROGLYCERIN 0.4 MG/1
0.4 TABLET SUBLINGUAL EVERY 5 MIN PRN
Status: DISCONTINUED | OUTPATIENT
Start: 2024-06-03 | End: 2024-06-11 | Stop reason: HOSPADM

## 2024-06-03 RX ORDER — ONDANSETRON 4 MG/1
4 TABLET, ORALLY DISINTEGRATING ORAL EVERY 8 HOURS PRN
Status: DISCONTINUED | OUTPATIENT
Start: 2024-06-03 | End: 2024-06-11 | Stop reason: HOSPADM

## 2024-06-03 RX ORDER — FINASTERIDE 5 MG/1
5 TABLET, FILM COATED ORAL DAILY
Status: DISCONTINUED | OUTPATIENT
Start: 2024-06-03 | End: 2024-06-03

## 2024-06-03 RX ORDER — ACETAMINOPHEN 325 MG/1
650 TABLET ORAL EVERY 6 HOURS PRN
Status: DISCONTINUED | OUTPATIENT
Start: 2024-06-03 | End: 2024-06-11 | Stop reason: HOSPADM

## 2024-06-03 RX ORDER — LORAZEPAM 2 MG/ML
1 INJECTION INTRAMUSCULAR ONCE
Status: COMPLETED | OUTPATIENT
Start: 2024-06-04 | End: 2024-06-03

## 2024-06-03 RX ORDER — ASPIRIN 81 MG/1
81 TABLET, CHEWABLE ORAL DAILY
Status: DISCONTINUED | OUTPATIENT
Start: 2024-06-03 | End: 2024-06-11 | Stop reason: HOSPADM

## 2024-06-03 RX ORDER — METRONIDAZOLE 500 MG/100ML
500 INJECTION, SOLUTION INTRAVENOUS EVERY 8 HOURS
Status: DISCONTINUED | OUTPATIENT
Start: 2024-06-03 | End: 2024-06-04

## 2024-06-03 RX ORDER — 0.9 % SODIUM CHLORIDE 0.9 %
1000 INTRAVENOUS SOLUTION INTRAVENOUS ONCE
Status: COMPLETED | OUTPATIENT
Start: 2024-06-03 | End: 2024-06-03

## 2024-06-03 RX ORDER — MORPHINE SULFATE 4 MG/ML
4 INJECTION INTRAVENOUS EVERY 4 HOURS PRN
Status: DISCONTINUED | OUTPATIENT
Start: 2024-06-03 | End: 2024-06-03

## 2024-06-03 RX ORDER — ONDANSETRON 2 MG/ML
4 INJECTION INTRAMUSCULAR; INTRAVENOUS EVERY 6 HOURS PRN
Status: DISCONTINUED | OUTPATIENT
Start: 2024-06-03 | End: 2024-06-11 | Stop reason: HOSPADM

## 2024-06-03 RX ORDER — MORPHINE SULFATE 4 MG/ML
4 INJECTION, SOLUTION INTRAMUSCULAR; INTRAVENOUS
Status: DISCONTINUED | OUTPATIENT
Start: 2024-06-03 | End: 2024-06-03 | Stop reason: SDUPTHER

## 2024-06-03 RX ORDER — FUROSEMIDE 10 MG/ML
40 INJECTION INTRAMUSCULAR; INTRAVENOUS DAILY
Status: DISCONTINUED | OUTPATIENT
Start: 2024-06-03 | End: 2024-06-07

## 2024-06-03 RX ORDER — SODIUM CHLORIDE 0.9 % (FLUSH) 0.9 %
10 SYRINGE (ML) INJECTION PRN
Status: DISCONTINUED | OUTPATIENT
Start: 2024-06-03 | End: 2024-06-11 | Stop reason: HOSPADM

## 2024-06-03 RX ORDER — SODIUM BICARBONATE 650 MG/1
650 TABLET ORAL 4 TIMES DAILY
Status: DISCONTINUED | OUTPATIENT
Start: 2024-06-03 | End: 2024-06-04

## 2024-06-03 RX ORDER — SODIUM CHLORIDE 9 MG/ML
INJECTION, SOLUTION INTRAVENOUS PRN
Status: DISCONTINUED | OUTPATIENT
Start: 2024-06-03 | End: 2024-06-11 | Stop reason: HOSPADM

## 2024-06-03 RX ORDER — POTASSIUM CHLORIDE 20 MEQ/1
40 TABLET, EXTENDED RELEASE ORAL PRN
Status: DISCONTINUED | OUTPATIENT
Start: 2024-06-03 | End: 2024-06-11

## 2024-06-03 RX ORDER — TAMSULOSIN HYDROCHLORIDE 0.4 MG/1
0.4 CAPSULE ORAL DAILY
Status: DISCONTINUED | OUTPATIENT
Start: 2024-06-03 | End: 2024-06-11 | Stop reason: HOSPADM

## 2024-06-03 RX ORDER — SODIUM CHLORIDE 9 MG/ML
INJECTION, SOLUTION INTRAVENOUS CONTINUOUS
Status: DISCONTINUED | OUTPATIENT
Start: 2024-06-03 | End: 2024-06-03

## 2024-06-03 RX ORDER — ATORVASTATIN CALCIUM 20 MG/1
20 TABLET, FILM COATED ORAL NIGHTLY
Status: DISCONTINUED | OUTPATIENT
Start: 2024-06-03 | End: 2024-06-03

## 2024-06-03 RX ORDER — SODIUM CHLORIDE 0.9 % (FLUSH) 0.9 %
5-40 SYRINGE (ML) INJECTION EVERY 12 HOURS SCHEDULED
Status: DISCONTINUED | OUTPATIENT
Start: 2024-06-03 | End: 2024-06-11 | Stop reason: HOSPADM

## 2024-06-03 RX ORDER — INSULIN LISPRO 100 [IU]/ML
0-16 INJECTION, SOLUTION INTRAVENOUS; SUBCUTANEOUS EVERY 4 HOURS
Status: DISCONTINUED | OUTPATIENT
Start: 2024-06-03 | End: 2024-06-04

## 2024-06-03 RX ORDER — CARVEDILOL 25 MG/1
25 TABLET ORAL EVERY 12 HOURS
Status: DISCONTINUED | OUTPATIENT
Start: 2024-06-03 | End: 2024-06-03

## 2024-06-03 RX ORDER — LEVOFLOXACIN 5 MG/ML
750 INJECTION, SOLUTION INTRAVENOUS
Status: DISCONTINUED | OUTPATIENT
Start: 2024-06-04 | End: 2024-06-04

## 2024-06-03 RX ORDER — FUROSEMIDE 10 MG/ML
40 INJECTION INTRAMUSCULAR; INTRAVENOUS DAILY
Status: DISCONTINUED | OUTPATIENT
Start: 2024-06-03 | End: 2024-06-03

## 2024-06-03 RX ORDER — LINEZOLID 2 MG/ML
600 INJECTION, SOLUTION INTRAVENOUS EVERY 12 HOURS
Status: DISCONTINUED | OUTPATIENT
Start: 2024-06-03 | End: 2024-06-04

## 2024-06-03 RX ORDER — INSULIN GLARGINE 100 [IU]/ML
32 INJECTION, SOLUTION SUBCUTANEOUS NIGHTLY
Status: DISCONTINUED | OUTPATIENT
Start: 2024-06-03 | End: 2024-06-06

## 2024-06-03 RX ORDER — HEPARIN SODIUM 5000 [USP'U]/ML
5000 INJECTION, SOLUTION INTRAVENOUS; SUBCUTANEOUS EVERY 8 HOURS SCHEDULED
Status: DISCONTINUED | OUTPATIENT
Start: 2024-06-03 | End: 2024-06-11 | Stop reason: HOSPADM

## 2024-06-03 RX ORDER — MORPHINE SULFATE 2 MG/ML
2 INJECTION, SOLUTION INTRAMUSCULAR; INTRAVENOUS EVERY 4 HOURS PRN
Status: DISCONTINUED | OUTPATIENT
Start: 2024-06-03 | End: 2024-06-03

## 2024-06-03 RX ORDER — MAGNESIUM SULFATE 1 G/100ML
1000 INJECTION INTRAVENOUS PRN
Status: DISCONTINUED | OUTPATIENT
Start: 2024-06-03 | End: 2024-06-11

## 2024-06-03 RX ORDER — POTASSIUM CHLORIDE 7.45 MG/ML
10 INJECTION INTRAVENOUS PRN
Status: DISCONTINUED | OUTPATIENT
Start: 2024-06-03 | End: 2024-06-11

## 2024-06-03 RX ORDER — ACETAMINOPHEN 650 MG/1
650 SUPPOSITORY RECTAL EVERY 6 HOURS PRN
Status: DISCONTINUED | OUTPATIENT
Start: 2024-06-03 | End: 2024-06-11 | Stop reason: HOSPADM

## 2024-06-03 RX ORDER — ATORVASTATIN CALCIUM 40 MG/1
40 TABLET, FILM COATED ORAL NIGHTLY
Status: DISCONTINUED | OUTPATIENT
Start: 2024-06-03 | End: 2024-06-11 | Stop reason: HOSPADM

## 2024-06-03 RX ADMIN — METRONIDAZOLE 500 MG: 500 INJECTION, SOLUTION INTRAVENOUS at 23:58

## 2024-06-03 RX ADMIN — HEPARIN SODIUM 5000 UNITS: 5000 INJECTION INTRAVENOUS; SUBCUTANEOUS at 20:48

## 2024-06-03 RX ADMIN — MORPHINE SULFATE 4 MG: 4 INJECTION INTRAVENOUS at 04:47

## 2024-06-03 RX ADMIN — HYDROMORPHONE HYDROCHLORIDE 0.25 MG: 1 INJECTION, SOLUTION INTRAMUSCULAR; INTRAVENOUS; SUBCUTANEOUS at 18:47

## 2024-06-03 RX ADMIN — SODIUM CHLORIDE, PRESERVATIVE FREE 10 ML: 5 INJECTION INTRAVENOUS at 08:36

## 2024-06-03 RX ADMIN — ATORVASTATIN CALCIUM 20 MG: 20 TABLET, FILM COATED ORAL at 01:53

## 2024-06-03 RX ADMIN — TAMSULOSIN HYDROCHLORIDE 0.4 MG: 0.4 CAPSULE ORAL at 08:35

## 2024-06-03 RX ADMIN — SODIUM CHLORIDE, PRESERVATIVE FREE 40 MG: 5 INJECTION INTRAVENOUS at 03:31

## 2024-06-03 RX ADMIN — SODIUM CHLORIDE, PRESERVATIVE FREE 40 MG: 5 INJECTION INTRAVENOUS at 17:12

## 2024-06-03 RX ADMIN — MORPHINE SULFATE 4 MG: 4 INJECTION INTRAVENOUS at 02:43

## 2024-06-03 RX ADMIN — HEPARIN SODIUM 2000 UNITS: 1000 INJECTION INTRAVENOUS; SUBCUTANEOUS at 11:28

## 2024-06-03 RX ADMIN — NITROGLYCERIN 0.4 MG: 0.4 TABLET SUBLINGUAL at 03:52

## 2024-06-03 RX ADMIN — ALUMINUM HYDROXIDE, MAGNESIUM HYDROXIDE, AND SIMETHICONE: 1200; 120; 1200 SUSPENSION ORAL at 03:08

## 2024-06-03 RX ADMIN — METRONIDAZOLE 500 MG: 500 INJECTION, SOLUTION INTRAVENOUS at 17:19

## 2024-06-03 RX ADMIN — HYDROMORPHONE HYDROCHLORIDE 0.25 MG: 1 INJECTION, SOLUTION INTRAMUSCULAR; INTRAVENOUS; SUBCUTANEOUS at 22:42

## 2024-06-03 RX ADMIN — Medication 1 MG: at 23:54

## 2024-06-03 RX ADMIN — METOPROLOL TARTRATE 25 MG: 25 TABLET, FILM COATED ORAL at 08:36

## 2024-06-03 RX ADMIN — SODIUM CHLORIDE, PRESERVATIVE FREE 10 ML: 5 INJECTION INTRAVENOUS at 20:49

## 2024-06-03 RX ADMIN — MORPHINE SULFATE 4 MG: 4 INJECTION INTRAVENOUS at 00:15

## 2024-06-03 RX ADMIN — METOPROLOL TARTRATE 25 MG: 25 TABLET, FILM COATED ORAL at 20:48

## 2024-06-03 RX ADMIN — SODIUM CHLORIDE 1000 ML: 9 INJECTION, SOLUTION INTRAVENOUS at 03:36

## 2024-06-03 RX ADMIN — MORPHINE SULFATE 4 MG: 4 INJECTION INTRAVENOUS at 06:51

## 2024-06-03 RX ADMIN — LINEZOLID 600 MG: 600 INJECTION, SOLUTION INTRAVENOUS at 17:00

## 2024-06-03 RX ADMIN — ACETAMINOPHEN 650 MG: 325 TABLET ORAL at 04:41

## 2024-06-03 RX ADMIN — FINASTERIDE 5 MG: 5 TABLET, FILM COATED ORAL at 09:02

## 2024-06-03 RX ADMIN — METOPROLOL TARTRATE 25 MG: 25 TABLET, FILM COATED ORAL at 01:53

## 2024-06-03 RX ADMIN — METRONIDAZOLE 500 MG: 500 INJECTION, SOLUTION INTRAVENOUS at 08:30

## 2024-06-03 RX ADMIN — MORPHINE SULFATE 4 MG: 4 INJECTION INTRAVENOUS at 09:02

## 2024-06-03 RX ADMIN — SODIUM BICARBONATE 650 MG: 648 TABLET ORAL at 23:54

## 2024-06-03 RX ADMIN — SODIUM CHLORIDE: 9 INJECTION, SOLUTION INTRAVENOUS at 04:42

## 2024-06-03 RX ADMIN — ASPIRIN 81 MG 81 MG: 81 TABLET ORAL at 08:37

## 2024-06-03 RX ADMIN — FUROSEMIDE 40 MG: 10 INJECTION, SOLUTION INTRAMUSCULAR; INTRAVENOUS at 14:36

## 2024-06-03 RX ADMIN — ATORVASTATIN CALCIUM 40 MG: 40 TABLET, FILM COATED ORAL at 20:48

## 2024-06-03 RX ADMIN — INSULIN GLARGINE 32 UNITS: 100 INJECTION, SOLUTION SUBCUTANEOUS at 20:48

## 2024-06-03 RX ADMIN — IOHEXOL 50 ML: 240 INJECTION, SOLUTION INTRATHECAL; INTRAVASCULAR; INTRAVENOUS; ORAL at 15:24

## 2024-06-03 ASSESSMENT — PAIN DESCRIPTION - LOCATION
LOCATION: CHEST

## 2024-06-03 ASSESSMENT — PAIN SCALES - GENERAL
PAINLEVEL_OUTOF10: 10
PAINLEVEL_OUTOF10: 8
PAINLEVEL_OUTOF10: 10
PAINLEVEL_OUTOF10: 10
PAINLEVEL_OUTOF10: 8
PAINLEVEL_OUTOF10: 7
PAINLEVEL_OUTOF10: 8
PAINLEVEL_OUTOF10: 4
PAINLEVEL_OUTOF10: 9
PAINLEVEL_OUTOF10: 5
PAINLEVEL_OUTOF10: 7

## 2024-06-03 ASSESSMENT — PAIN DESCRIPTION - DESCRIPTORS
DESCRIPTORS: SHARP;STABBING
DESCRIPTORS: SHARP;STABBING;DISCOMFORT
DESCRIPTORS: SHARP;STABBING
DESCRIPTORS: SHARP
DESCRIPTORS: SHARP
DESCRIPTORS: SHARP;STABBING
DESCRIPTORS: SHARP
DESCRIPTORS: STABBING
DESCRIPTORS: SHARP;STABBING

## 2024-06-03 NOTE — ED NOTES
The following labs were labeled with appropriate pt sticker and tubed to lab:     [] Blue     [] Lavender   [] on ice  [] Green/yellow  [] Green/black [] on ice  [] Grey  [] on ice  [] Yellow  [] Red  [] Pink  [] Type/ Screen  [] ABG  [] VBG    [] COVID-19 swab    [] Rapid  [] PCR  [] Flu swab  [] Peds Viral Panel     [] Urine Sample  [] Fecal Sample  [] Pelvic Cultures  [x] Blood Cultures  [x] X 2  [] STREP Cultures  [] Wound Cultures

## 2024-06-03 NOTE — ED PROVIDER NOTES
Faculty Sign-Out Attestation  Handoff taken on the following patient from prior Attending Physician: Frank  Note Started: 10:56 PM EDT    I was available and discussed any additional care issues that arose and coordinated the management plans with the resident(s) caring for the patient during my duty period. Any areas of disagreement with resident’s documentation of care or procedures are noted on the chart. I was personally present for the key portions of any/all procedures during my duty period. I have documented in the chart those procedures where I was not present during the key portions.    Cp, vascular issue, cellulitis, > abx, needing admit    Pranav Ferreira DO  Attending Physician       Pranav Ferreira DO  06/02/24 0315       Pranav Ferreira DO  06/02/24 2060    
[facility]     Emergency Department     Faculty Attestation    I performed a history and physical examination of the patient and discussed management with the resident. I reviewed the resident´s note and agree with the documented findings and plan of care. Any areas of disagreement are noted on the chart. I was personally present for the key portions of any procedures. I have documented in the chart those procedures where I was not present during the key portions. I have reviewed the emergency nurses triage note. I agree with the chief complaint, past medical history, past surgical history, allergies, medications, social and family history as documented unless otherwise noted below. For Physician Assistant/ Nurse Practitioner cases/documentation I have personally evaluated this patient and have completed at least one if not all key elements of the E/M (history, physical exam, and MDM). Additional findings are as noted.      History of CABG in February, pain is present around the inferior portion of the incision over the mid sternum, no cellulitis there, the patient does have cellulitis to the left leg.  Equal breath sounds, heart tones normal, abdomen soft and nontender.       EKG Interpretation    Interpreted by emergency department physician    Rhythm: normal sinus   Rate: 105  Axis: Right 142 degrees  Ectopy: none  Conduction: Right bundle branch block  ST Segments: no acute change  T Waves: no acute change  Q Waves: Lateral    Clinical Impression: Abnormal EKG    Horacio Marie, III     Horacio Marie MD  06/02/24 2031    
Efforts were made to edit the dictations but occasionally words are mis-transcribed.)

## 2024-06-03 NOTE — H&P
neck supple, thyroid not palpable  Cardiovascular: regular rate and regular rhythm, normal S1 and S2,  no murmur, gallop, rub, no JVD  Lungs: Limited anterior exam per patient insistence, mild shallow resting tachypnea with poor respiratory effort, no wheezing or rhonchi, non labored.   No accessory muscle use  GI: Soft, tenderness palpation that localizes to epigastrium, obese nondistended, normal bowel sounds no rebound or involuntary guarding  Neurologic: Limited with poor effort, grossly non-focal, normal speech and cognition, able to raise arms and legs against gravity, sensation grossly intact.     Derm: Gangrenous discoloration to left first toe.  Chest wall sternal incision well-healed   vascular: Chronic trophic changes status post right BKA with diminished pedal pulses in left foot, trace pedal edema in left foot and ankle  Musculoskeletal: Limited with poor cooperation however grossly adequate adequate passive range of motion of upper and lower limbs.  No reproduction of symptoms with palpation of chest wall or range of motion of upper extremities.  no synovitis, no joint effusions.  Limited evaluation of thoracolumbar spine with poor cooperation.       Investigations:      Laboratory Testing:  Recent Results (from the past 24 hour(s))   CBC with Auto Differential    Collection Time: 06/02/24  8:17 PM   Result Value Ref Range    WBC 7.4 3.5 - 11.3 k/uL    RBC 4.57 4.21 - 5.77 m/uL    Hemoglobin 13.6 13.0 - 17.0 g/dL    Hematocrit 40.4 (L) 40.7 - 50.3 %    MCV 88.4 82.6 - 102.9 fL    MCH 29.8 25.2 - 33.5 pg    MCHC 33.7 28.4 - 34.8 g/dL    RDW 13.6 11.8 - 14.4 %    Platelets 209 138 - 453 k/uL    MPV 9.2 8.1 - 13.5 fL    NRBC Automated 0.0 0.0 per 100 WBC    Immature Granulocytes % 0 0 %    Neutrophils % 89 (H) 36 - 66 %    Lymphocytes % 4 (L) 24 - 44 %    Monocytes % 7 1 - 7 %    Eosinophils % 0 (L) 1 - 4 %    Basophils % 0 0 - 2 %    Immature Granulocytes Absolute 0.00 0.00 - 0.30 k/uL    Neutrophils

## 2024-06-03 NOTE — ED NOTES
Admitting team notified of patient's request for pain medication. EKG completed and troponin sent per chest pain protocol.

## 2024-06-03 NOTE — CARE COORDINATION
Case Management Assessment  Initial Evaluation    Date/Time of Evaluation: 6/3/2024 5:55 PM  Assessment Completed by: Fadia Darden RN    If patient is discharged prior to next notation, then this note serves as note for discharge by case management.    Patient Name: Vincenzo Darden                   YOB: 1965  Diagnosis: Lactic acidosis [E87.20]  Cardiac aneurysm [I25.3]  NSTEMI (non-ST elevated myocardial infarction) (HCC) [I21.4]  Cellulitis, unspecified cellulitis site [L03.90]  Chest pain, unspecified type [R07.9]                   Date / Time: 6/2/2024  8:06 PM    Patient Admission Status: Inpatient   Readmission Risk (Low < 19, Mod (19-27), High > 27): Readmission Risk Score: 17.7    Current PCP: Shaikh Calix MD  PCP verified by CM? Yes    Chart Reviewed: Yes      History Provided by: Patient  Patient Orientation: Alert and Oriented, Person, Place, Situation    Patient Cognition: Alert    Hospitalization in the last 30 days (Readmission):  Yes    If yes, Readmission Assessment in CM Navigator will be completed.    Advance Directives:      Code Status: Full Code   Patient's Primary Decision Maker is: Legal Next of Kin      Discharge Planning:    Patient lives with: Spouse/Significant Other Type of Home: House  Primary Care Giver: Self  Patient Support Systems include: Spouse/Significant Other   Current Financial resources: Medicare  Current community resources:    Current services prior to admission: None            Current DME:              Type of Home Care services:  None    ADLS  Prior functional level: Independent in ADLs/IADLs  Current functional level: Independent in ADLs/IADLs    PT AM-PAC:   /24  OT AM-PAC:   /24    Family can provide assistance at DC: Yes  Would you like Case Management to discuss the discharge plan with any other family members/significant others, and if so, who? No  Plans to Return to Present Housing: Yes  Other Identified Issues/Barriers to RETURNING to

## 2024-06-03 NOTE — ED NOTES
The following labs were labeled with appropriate pt sticker and tubed to lab:     [] Blue     [] Lavender   [] on ice  [] Green/yellow  [x] Green/black [x] on ice  [] Grey  [] on ice  [] Yellow  [] Red  [] Pink  [] Type/ Screen  [] ABG  [] VBG    [] COVID-19 swab    [] Rapid  [] PCR  [] Flu swab  [] Peds Viral Panel     [] Urine Sample  [] Fecal Sample  [] Pelvic Cultures  [] Blood Cultures  [] X 2  [] STREP Cultures  [] Wound Cultures
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
## 2024-06-04 ENCOUNTER — APPOINTMENT (OUTPATIENT)
Dept: ULTRASOUND IMAGING | Age: 59
DRG: 871 | End: 2024-06-04
Payer: MEDICARE

## 2024-06-04 PROBLEM — A41.01 MSSA (METHICILLIN SUSCEPTIBLE STAPHYLOCOCCUS AUREUS) SEPTICEMIA (HCC): Status: ACTIVE | Noted: 2024-06-04

## 2024-06-04 PROBLEM — I25.810 CORONARY ARTERY DISEASE INVOLVING CORONARY BYPASS GRAFT OF NATIVE HEART WITHOUT ANGINA PECTORIS: Status: ACTIVE | Noted: 2024-06-04

## 2024-06-04 PROBLEM — T81.328A DISRUPTION OR DEHISCENCE OF CLOSURE OF STERNUM OR STERNOTOMY: Status: ACTIVE | Noted: 2024-06-04

## 2024-06-04 PROBLEM — I50.33 HEART FAILURE, DIASTOLIC, WITH ACUTE DECOMPENSATION (HCC): Status: ACTIVE | Noted: 2024-06-04

## 2024-06-04 PROBLEM — L98.499 SKIN ULCER DUE TO DIABETES MELLITUS (HCC): Status: ACTIVE | Noted: 2024-06-04

## 2024-06-04 PROBLEM — E11.622 SKIN ULCER DUE TO DIABETES MELLITUS (HCC): Status: ACTIVE | Noted: 2024-06-04

## 2024-06-04 PROBLEM — I73.9 PAD (PERIPHERAL ARTERY DISEASE) (HCC): Status: ACTIVE | Noted: 2024-06-04

## 2024-06-04 PROBLEM — T81.32XA DISRUPTION OR DEHISCENCE OF CLOSURE OF STERNUM OR STERNOTOMY: Status: ACTIVE | Noted: 2024-06-04

## 2024-06-04 LAB
ANION GAP SERPL CALCULATED.3IONS-SCNC: 17 MMOL/L (ref 9–16)
BUN SERPL-MCNC: 39 MG/DL (ref 6–20)
CALCIUM SERPL-MCNC: 7.8 MG/DL (ref 8.6–10.4)
CHLORIDE SERPL-SCNC: 100 MMOL/L (ref 98–107)
CO2 SERPL-SCNC: 14 MMOL/L (ref 20–31)
CREAT SERPL-MCNC: 3.5 MG/DL (ref 0.7–1.2)
EKG ATRIAL RATE: 105 BPM
EKG ATRIAL RATE: 108 BPM
EKG ATRIAL RATE: 109 BPM
EKG ATRIAL RATE: 84 BPM
EKG ATRIAL RATE: 98 BPM
EKG P AXIS: -9 DEGREES
EKG P AXIS: 26 DEGREES
EKG P AXIS: 62 DEGREES
EKG P AXIS: 72 DEGREES
EKG P AXIS: 73 DEGREES
EKG P-R INTERVAL: 190 MS
EKG P-R INTERVAL: 196 MS
EKG P-R INTERVAL: 196 MS
EKG P-R INTERVAL: 204 MS
EKG P-R INTERVAL: 206 MS
EKG Q-T INTERVAL: 350 MS
EKG Q-T INTERVAL: 368 MS
EKG Q-T INTERVAL: 380 MS
EKG Q-T INTERVAL: 384 MS
EKG Q-T INTERVAL: 392 MS
EKG QRS DURATION: 136 MS
EKG QRS DURATION: 158 MS
EKG QRS DURATION: 158 MS
EKG QRS DURATION: 162 MS
EKG QTC CALCULATION (BAZETT): 449 MS
EKG QTC CALCULATION (BAZETT): 471 MS
EKG QTC CALCULATION (BAZETT): 486 MS
EKG QTC CALCULATION (BAZETT): 500 MS
EKG QTC CALCULATION (BAZETT): 514 MS
EKG R AXIS: -60 DEGREES
EKG R AXIS: -73 DEGREES
EKG R AXIS: -76 DEGREES
EKG R AXIS: -76 DEGREES
EKG R AXIS: 142 DEGREES
EKG T AXIS: 18 DEGREES
EKG T AXIS: 49 DEGREES
EKG T AXIS: 49 DEGREES
EKG T AXIS: 71 DEGREES
EKG T AXIS: 9 DEGREES
EKG VENTRICULAR RATE: 105 BPM
EKG VENTRICULAR RATE: 108 BPM
EKG VENTRICULAR RATE: 109 BPM
EKG VENTRICULAR RATE: 84 BPM
EKG VENTRICULAR RATE: 98 BPM
ERYTHROCYTE [DISTWIDTH] IN BLOOD BY AUTOMATED COUNT: 14.1 % (ref 11.8–14.4)
GFR, ESTIMATED: 19 ML/MIN/1.73M2
GLUCOSE BLD-MCNC: 120 MG/DL (ref 75–110)
GLUCOSE BLD-MCNC: 168 MG/DL (ref 75–110)
GLUCOSE BLD-MCNC: 212 MG/DL (ref 75–110)
GLUCOSE BLD-MCNC: 224 MG/DL (ref 75–110)
GLUCOSE SERPL-MCNC: 132 MG/DL (ref 74–99)
HCT VFR BLD AUTO: 39 % (ref 40.7–50.3)
HGB BLD-MCNC: 12.4 G/DL (ref 13–17)
MCH RBC QN AUTO: 29.4 PG (ref 25.2–33.5)
MCHC RBC AUTO-ENTMCNC: 31.8 G/DL (ref 28.4–34.8)
MCV RBC AUTO: 92.4 FL (ref 82.6–102.9)
NRBC BLD-RTO: 0 PER 100 WBC
PLATELET # BLD AUTO: 171 K/UL (ref 138–453)
PMV BLD AUTO: 9.9 FL (ref 8.1–13.5)
POTASSIUM SERPL-SCNC: 4.1 MMOL/L (ref 3.7–5.3)
RBC # BLD AUTO: 4.22 M/UL (ref 4.21–5.77)
SODIUM SERPL-SCNC: 131 MMOL/L (ref 136–145)
WBC OTHER # BLD: 14.8 K/UL (ref 3.5–11.3)

## 2024-06-04 PROCEDURE — 6370000000 HC RX 637 (ALT 250 FOR IP): Performed by: INTERNAL MEDICINE

## 2024-06-04 PROCEDURE — 2580000003 HC RX 258: Performed by: INTERNAL MEDICINE

## 2024-06-04 PROCEDURE — 6370000000 HC RX 637 (ALT 250 FOR IP): Performed by: NURSE PRACTITIONER

## 2024-06-04 PROCEDURE — 6360000002 HC RX W HCPCS: Performed by: STUDENT IN AN ORGANIZED HEALTH CARE EDUCATION/TRAINING PROGRAM

## 2024-06-04 PROCEDURE — 6370000000 HC RX 637 (ALT 250 FOR IP): Performed by: STUDENT IN AN ORGANIZED HEALTH CARE EDUCATION/TRAINING PROGRAM

## 2024-06-04 PROCEDURE — 2580000003 HC RX 258: Performed by: NURSE PRACTITIONER

## 2024-06-04 PROCEDURE — 99232 SBSQ HOSP IP/OBS MODERATE 35: CPT | Performed by: STUDENT IN AN ORGANIZED HEALTH CARE EDUCATION/TRAINING PROGRAM

## 2024-06-04 PROCEDURE — 2060000000 HC ICU INTERMEDIATE R&B

## 2024-06-04 PROCEDURE — 76705 ECHO EXAM OF ABDOMEN: CPT

## 2024-06-04 PROCEDURE — 94761 N-INVAS EAR/PLS OXIMETRY MLT: CPT

## 2024-06-04 PROCEDURE — 99233 SBSQ HOSP IP/OBS HIGH 50: CPT | Performed by: NURSE PRACTITIONER

## 2024-06-04 PROCEDURE — A4216 STERILE WATER/SALINE, 10 ML: HCPCS | Performed by: INTERNAL MEDICINE

## 2024-06-04 PROCEDURE — C9113 INJ PANTOPRAZOLE SODIUM, VIA: HCPCS | Performed by: INTERNAL MEDICINE

## 2024-06-04 PROCEDURE — 99223 1ST HOSP IP/OBS HIGH 75: CPT | Performed by: INTERNAL MEDICINE

## 2024-06-04 PROCEDURE — 87040 BLOOD CULTURE FOR BACTERIA: CPT

## 2024-06-04 PROCEDURE — 6360000002 HC RX W HCPCS: Performed by: INTERNAL MEDICINE

## 2024-06-04 PROCEDURE — 80048 BASIC METABOLIC PNL TOTAL CA: CPT

## 2024-06-04 PROCEDURE — 85027 COMPLETE CBC AUTOMATED: CPT

## 2024-06-04 PROCEDURE — 99232 SBSQ HOSP IP/OBS MODERATE 35: CPT | Performed by: INTERNAL MEDICINE

## 2024-06-04 PROCEDURE — 6360000002 HC RX W HCPCS: Performed by: NURSE PRACTITIONER

## 2024-06-04 PROCEDURE — 82947 ASSAY GLUCOSE BLOOD QUANT: CPT

## 2024-06-04 PROCEDURE — 99222 1ST HOSP IP/OBS MODERATE 55: CPT | Performed by: NURSE PRACTITIONER

## 2024-06-04 PROCEDURE — 36415 COLL VENOUS BLD VENIPUNCTURE: CPT

## 2024-06-04 RX ORDER — SENNA AND DOCUSATE SODIUM 50; 8.6 MG/1; MG/1
2 TABLET, FILM COATED ORAL DAILY PRN
Status: DISCONTINUED | OUTPATIENT
Start: 2024-06-04 | End: 2024-06-11 | Stop reason: HOSPADM

## 2024-06-04 RX ORDER — OXYCODONE HYDROCHLORIDE AND ACETAMINOPHEN 5; 325 MG/1; MG/1
1 TABLET ORAL ONCE
Status: COMPLETED | OUTPATIENT
Start: 2024-06-04 | End: 2024-06-04

## 2024-06-04 RX ORDER — SODIUM BICARBONATE 650 MG/1
1300 TABLET ORAL 2 TIMES DAILY
Status: DISCONTINUED | OUTPATIENT
Start: 2024-06-05 | End: 2024-06-11 | Stop reason: HOSPADM

## 2024-06-04 RX ORDER — INSULIN LISPRO 100 [IU]/ML
0-16 INJECTION, SOLUTION INTRAVENOUS; SUBCUTANEOUS
Status: DISCONTINUED | OUTPATIENT
Start: 2024-06-04 | End: 2024-06-11 | Stop reason: HOSPADM

## 2024-06-04 RX ORDER — POLYETHYLENE GLYCOL 3350 17 G/17G
17 POWDER, FOR SOLUTION ORAL DAILY
Status: DISCONTINUED | OUTPATIENT
Start: 2024-06-04 | End: 2024-06-11 | Stop reason: HOSPADM

## 2024-06-04 RX ADMIN — SODIUM BICARBONATE 650 MG: 648 TABLET ORAL at 17:10

## 2024-06-04 RX ADMIN — SODIUM CHLORIDE, PRESERVATIVE FREE 40 MG: 5 INJECTION INTRAVENOUS at 14:55

## 2024-06-04 RX ADMIN — METOPROLOL TARTRATE 25 MG: 25 TABLET, FILM COATED ORAL at 08:32

## 2024-06-04 RX ADMIN — HYDROMORPHONE HYDROCHLORIDE 0.25 MG: 1 INJECTION, SOLUTION INTRAMUSCULAR; INTRAVENOUS; SUBCUTANEOUS at 08:58

## 2024-06-04 RX ADMIN — HYDROMORPHONE HYDROCHLORIDE 0.25 MG: 1 INJECTION, SOLUTION INTRAMUSCULAR; INTRAVENOUS; SUBCUTANEOUS at 12:56

## 2024-06-04 RX ADMIN — POLYETHYLENE GLYCOL 3350 17 G: 17 POWDER, FOR SOLUTION ORAL at 14:55

## 2024-06-04 RX ADMIN — SODIUM BICARBONATE 650 MG: 648 TABLET ORAL at 12:46

## 2024-06-04 RX ADMIN — OXYCODONE HYDROCHLORIDE AND ACETAMINOPHEN 1 TABLET: 5; 325 TABLET ORAL at 05:42

## 2024-06-04 RX ADMIN — INSULIN GLARGINE 32 UNITS: 100 INJECTION, SOLUTION SUBCUTANEOUS at 20:50

## 2024-06-04 RX ADMIN — SODIUM BICARBONATE 650 MG: 648 TABLET ORAL at 09:00

## 2024-06-04 RX ADMIN — INSULIN LISPRO 4 UNITS: 100 INJECTION, SOLUTION INTRAVENOUS; SUBCUTANEOUS at 12:46

## 2024-06-04 RX ADMIN — ASPIRIN 81 MG 81 MG: 81 TABLET ORAL at 08:32

## 2024-06-04 RX ADMIN — METOPROLOL TARTRATE 25 MG: 25 TABLET, FILM COATED ORAL at 20:49

## 2024-06-04 RX ADMIN — FUROSEMIDE 40 MG: 10 INJECTION, SOLUTION INTRAMUSCULAR; INTRAVENOUS at 09:01

## 2024-06-04 RX ADMIN — METRONIDAZOLE 500 MG: 500 INJECTION, SOLUTION INTRAVENOUS at 09:02

## 2024-06-04 RX ADMIN — HYDROMORPHONE HYDROCHLORIDE 0.25 MG: 1 INJECTION, SOLUTION INTRAMUSCULAR; INTRAVENOUS; SUBCUTANEOUS at 17:13

## 2024-06-04 RX ADMIN — HEPARIN SODIUM 5000 UNITS: 5000 INJECTION INTRAVENOUS; SUBCUTANEOUS at 05:43

## 2024-06-04 RX ADMIN — SODIUM CHLORIDE, PRESERVATIVE FREE 10 ML: 5 INJECTION INTRAVENOUS at 20:49

## 2024-06-04 RX ADMIN — HYDROMORPHONE HYDROCHLORIDE 0.5 MG: 1 INJECTION, SOLUTION INTRAMUSCULAR; INTRAVENOUS; SUBCUTANEOUS at 23:30

## 2024-06-04 RX ADMIN — HYDROMORPHONE HYDROCHLORIDE 0.25 MG: 1 INJECTION, SOLUTION INTRAMUSCULAR; INTRAVENOUS; SUBCUTANEOUS at 20:57

## 2024-06-04 RX ADMIN — SODIUM CHLORIDE, PRESERVATIVE FREE 40 MG: 5 INJECTION INTRAVENOUS at 03:20

## 2024-06-04 RX ADMIN — SODIUM CHLORIDE, PRESERVATIVE FREE 10 ML: 5 INJECTION INTRAVENOUS at 09:09

## 2024-06-04 RX ADMIN — DAPTOMYCIN 550 MG: 500 INJECTION, POWDER, LYOPHILIZED, FOR SOLUTION INTRAVENOUS at 12:53

## 2024-06-04 RX ADMIN — HEPARIN SODIUM 5000 UNITS: 5000 INJECTION INTRAVENOUS; SUBCUTANEOUS at 13:13

## 2024-06-04 RX ADMIN — HEPARIN SODIUM 5000 UNITS: 5000 INJECTION INTRAVENOUS; SUBCUTANEOUS at 09:00

## 2024-06-04 RX ADMIN — TAMSULOSIN HYDROCHLORIDE 0.4 MG: 0.4 CAPSULE ORAL at 08:29

## 2024-06-04 RX ADMIN — INSULIN LISPRO 4 UNITS: 100 INJECTION, SOLUTION INTRAVENOUS; SUBCUTANEOUS at 17:10

## 2024-06-04 ASSESSMENT — PAIN DESCRIPTION - ORIENTATION
ORIENTATION: RIGHT

## 2024-06-04 ASSESSMENT — PAIN SCALES - GENERAL
PAINLEVEL_OUTOF10: 6
PAINLEVEL_OUTOF10: 7
PAINLEVEL_OUTOF10: 0
PAINLEVEL_OUTOF10: 10
PAINLEVEL_OUTOF10: 6
PAINLEVEL_OUTOF10: 0
PAINLEVEL_OUTOF10: 1
PAINLEVEL_OUTOF10: 10
PAINLEVEL_OUTOF10: 9
PAINLEVEL_OUTOF10: 10
PAINLEVEL_OUTOF10: 2

## 2024-06-04 ASSESSMENT — PAIN DESCRIPTION - LOCATION
LOCATION: CHEST
LOCATION: ABDOMEN;CHEST
LOCATION: CHEST

## 2024-06-04 ASSESSMENT — PAIN DESCRIPTION - DESCRIPTORS
DESCRIPTORS: SHARP
DESCRIPTORS: SHARP
DESCRIPTORS: ACHING
DESCRIPTORS: DISCOMFORT
DESCRIPTORS: SHARP
DESCRIPTORS: ACHING
DESCRIPTORS: ACHING

## 2024-06-04 ASSESSMENT — PAIN SCALES - WONG BAKER: WONGBAKER_NUMERICALRESPONSE: NO HURT

## 2024-06-04 NOTE — CARE COORDINATION
Met with patient to discuss transitional planning. Plan remains home with spouse and [facility] (patient is current with them for wound care to foot). Discussed possibility of IV ATB's at home to which patient is agreeable. CM discussed possible referral to cardiac rehab. Patient reports he has not done cardiac rehab yet post open heart but he declines order and referral to cardiac rehab at this time. Patient currently utilizing home healthcare. Patient agreeable to plan.

## 2024-06-04 NOTE — CONSULTS
Dorian Miranda, Verna, Raffi, Jonh, John, Kristian Jr.  Urology Consult      Patient:  Vincenzo Darden  MRN: [id number]  YOB: 1965    CHIEF COMPLAINT:  Urinary retention    HISTORY OF PRESENT ILLNESS:   The patient is a 58 y.o. male admitted for acute encephalopathy and NSTEMI, urology consult for urinary retention.  Patient was bladder scanned for 700 mL, Parada catheter was placed, currently draining clear yellow urine.  Patient denies any history of retention in the past.  He states that he urinates well at home with good urinary stream, denies any incomplete bladder emptying. Cr 2.9 (baseline 1.2), WBC 14.8. UA negative nitrites, negative leukocyte esterase. CT abdomen/pelvis shows no hydronephrosis or ureteral stones.     Patient's old records, notes and chart reviewed and summarized above.    Past Medical History:    Past Medical History:   Diagnosis Date    Diabetes mellitus (HCC)     Hernia, abdominal        Past Surgical History:    Past Surgical History:   Procedure Laterality Date    CABG WITH AORTIC VALVE REPLACEMENT N/A 2/26/2024    CABG CORONARY ARTERY BYPASS X3, STERNAL PLATING, AORTIC VALVE REPLACEMENT 23MM INSPIRIS AORTIC VALVE, ON PUMP, JO performed by Andrei James MD at [facility] CVOR    CARDIAC PROCEDURE N/A 10/9/2023    Coronary angiography performed by Louis Ramirez MD at [facility] CARDIAC CATH LAB    CARDIAC PROCEDURE N/A 10/9/2023    Aortic root aortogram performed by Louis Ramirez MD at [facility] CARDIAC CATH LAB       Medications:      Current Facility-Administered Medications:     aspirin chewable tablet 81 mg, 81 mg, Oral, Daily, Alivia Melchor APRN - CNP, 81 mg at 06/03/24 0837    metoprolol tartrate (LOPRESSOR) tablet 25 mg, 25 mg, Oral, BID, Alivia Melchor APRN - CNP, 25 mg at 06/03/24 2048    tamsulosin (FLOMAX) capsule 0.4 mg, 0.4 mg, Oral, Daily, Alivia Melchor APRN - CNP, 0.4 mg at 06/03/24 0835    sodium chloride flush 0.9 % injection 5-40 mL, 5-40 mL, 
[facility]    Consult               Today's Date: 6/3/2024  Patient Name: Vincenzo Darden  Date of admission: 6/2/2024  8:06 PM  Patient's age: 58 y.o., 1965  Admission Dx: Lactic acidosis [E87.20]  Cardiac aneurysm [I25.3]  NSTEMI (non-ST elevated myocardial infarction) (HCC) [I21.4]  Cellulitis, unspecified cellulitis site [L03.90]  Chest pain, unspecified type [R07.9]    Requesting Physician: Moncho Gilbert MD    Cardiac Evaluation Reason:  NSTEMI     History Obtained From: patient and chart review     History of Present Illness:    This patient 58 y.o. years old with past medical history CKD, history of CABG times 3 February 2024, COLT, type 2 diabetes, history of ischemic cardiomyopathy with ejection fraction 35 to 40%, PAD status post right BKA, and hypertension presents due to chest pain.  Patient has had central/right-sided chest pain for the past 2 days.  Patient states this initially started when patient was coming back home from patient.  This chest pain has constant and pressure-like.  This chest pain is also reproducible.  This chest pain is not associated with any shortness of breath.    Of note, patient had CABG in February 2024.  Since that time patient has not had chest pain like this.  He is compliant with his medications at home of Coreg 25 mg twice daily, aspirin, Lipitor 20 mg nightly, and Lasix 40 mg twice daily.    Patient's troponins on this visit have been 58 then 65 and 64.  proBNP is 1415 which is much lower than it was in October at that time was over 9000    CT chest shows concerns for left ventricular apical aneurysm with severe thinning of left ventricular apex wall.  Chest x-ray concerning for cardiomegaly with pulmonary edema.      Past Medical History:   has a past medical history of Diabetes mellitus (HCC) and Hernia, abdominal.    Past Surgical History:   has a past surgical history that includes Cardiac procedure (N/A, 10/9/2023); Cardiac procedure (N/A, 
NEPHROLOGY     REASON FOR CONSULT:     CAMILLE (BUN/Creat 2.4          with baseline creatinine 1.6-1.8              )  Hyponatremia  Metabolic Acidosis    Risk Factors for CAMILLE:  Cellulitis lower extremity  CHF  Hyperkalemia  CKD stage IIIb  Suspect atheroembolic disease of endovascular procedure in the month of February    ASSESSMENT     Acute kidney injury nonoliguric secondary to nephrotoxic ATN from contrast exposure coupled with urinary retention/CHF-creatinine peaked at 2.4 involving  Hyponatremia secondary to appropriate ADH excess from CHF and inappropriate ADH excess related to chest pain  High anion gap metabolic acidosis secondary to acute kidney injury  Chronic kidney disease stage IIIb secondary to diabetic nephropathy with baseline creatinine 1.6-1.8.  Never seen a nephrologist as an outpatient clinics.  Nephrotic range proteinuria from diabetic nephropathy  Decompensated diastolic congestive heart failure  Status post CABG in the month of February  Type 2 diabetes with micro and macrovascular complications  Essential hypertension     PLAN     Fluid/salt restriction  Discontinue IV fluids  Start IV Lasix daily  High risk of needing dialysis  Recheck UPC      HISTORY OF PRESENTING ILLNESS                 This is a 58 y.o. male who presents with chief complaints of 1 week duration  Left lower extremity swelling and pain.   Chest pain at the site of surgical incision for CABG during the month of February this year  Is here shortness of breath on exertion  No reported history of fever/cough/abdominal pain nausea vomiting.  He carries a diagnosis of CABG/type 2 diabetes/CKD 3 hypertension    Initial assessment revealed stable vitals with investigations showing evidence of pulmonary edema elevated BNP and elevated creatinine.  CT CT abdomen with contrast excluded pulmonary embolism.  Given consulted for renal insufficiency.    Patient has CKD stage IIIb with baseline creatinine 1.6-1.8 since last hospitalization 
Infection Control Recommendations section  Administer medications as ordered:  Review of Antimicrobial Stewardship Measures:  Targeted therapy  PK dosing   Please see daily details in Antimicrobial Stewardship Recommendations section   Prognosis:  Guarded  Review of Discharge Planning:  Please see daily details in Coordination of Outpatient Care section  Review of need for outpatient follow up.    Josesito Richardson MD 6/4/2024       Infection Control Recommendations   [place] Precautions  Parada Catheter      Antimicrobial Stewardship Recommendations     Simplification of therapy  Targeted therapy  PK dosing    Coordination of Outpatient Care:   Estimated Length of IV antimicrobials:TBD  Patient will need Midline Catheter Insertion: TBD  Patient will need PICC line Insertion:TBD  Patient will need: Home IV , Infusion Center,  SNF,  LTAC: TBD  Patient will need outpatient wound care: Yes    Chief complaint/reason for consultation:   MSSA septicemia  Sternal dehiscence      History of Present Illness:   Vincenzo Darden is a 58 y.o.-year-old male who was initially admitted on 6/2/2024. Patient seen at the request of     INITIAL HISTORY:    Patient with multiple past medical problems, including DM2, DM neuropathy, PVD s/p right BKA w/ chronic left 1st toe gangrene, CKD3, COLT, multi vessel CAD s/p CABG 02/2024.    Patient presented through ER with complaints of chest pain, flu like symptoms, anorexia, diarrhea.    Found to have elevated glucose levels. Sternum with dehiscence. Blood cultures: MSSA x 2 but obtained at same time.    ID service asked to evaluate and advice on treatment.     Patient has allergy to vancomycin (nausea) and allergy to Zosyn (anaphylaxis)    CURRENT EVALUATION- DAILY INTERVAL CHANGES 6/4/2024  /60   Pulse 87   Temp 97.9 °F (36.6 °C) (Oral)   Resp 18   Ht 1.829 m (6' 0.01\")   Wt 118 kg (260 lb 2.3 oz)   SpO2 90%   BMI 35.27 kg/m²     Afebrile  VS stable    Patient feels 
18   Temp:    SpO2:      Weight: Weight - Scale: 118 kg (260 lb 2.3 oz)    Weight - Scale: 118 kg (260 lb 2.3 oz)        General: Alert and Oriented x3. Sitting up in bed. No apparent distress.  HEENT:  Normocephalic and atraumatic. PERRL. EOMI. Lips and oral mucosa moist and without lesions.  Neck:  Supple. Trachea midline.  Chest:  No abnormality.  Equal and symmetric expansion with respiration.  Lungs:  Clear to auscultation.   Cardiac:  Regular rate and rhythm without murmurs, rubs or gallops.   Abdomen:  Soft, non-tender, normoactive bowel sounds. No masses or organomegaly.  Extremities:  No cyanosis, clubbing, or edema.  Intact pulses in all four extremities.  Musculoskeletal: Below the knee amputation of right leg.  Left foot has open sores with bandages.  Diffuse erythema.  No bleeding present.  Toes have open sores present.  Clicking and crepitus noted with deep breathing and coughing palpation.  The incision is well intact with no open sores.  Neurologic:  Cranial nerves are grossly intact. Non-focal sensory deficits on exam.  Psychiatric: Mood and affect are appropriate.      Imaging Studies:    CT chest--FINDINGS:  Aorta: TAVR no evidence of thoracic aortic aneurysm or dissection.  No acute  abnormality of the aorta.  No central pulmonary embolism.     Mediastinum: Mild cardiomegaly.  No pericardial effusion.  Axial images  demonstrate a severe thinning of the left ventricular apex wall.     Lungs/Pleura: Right basilar atelectatic/dependent changes..  No focal  consolidation or pulmonary edema.  No evidence of pleural effusion or  pneumothorax.     Upper Abdomen: Limited images of the upper abdomen are unremarkable.     Soft Tissues/Bones: No acute bone or soft tissue abnormality.     IMPRESSION:  1. No evidence of acute aortic syndrome no central pulmonary artery embolism.  2. Apparent severe thinning of the left ventricular apex wall concern for a  left ventricular apical aneurysm.  Correlate with

## 2024-06-05 LAB
ANION GAP SERPL CALCULATED.3IONS-SCNC: 15 MMOL/L (ref 9–16)
BUN SERPL-MCNC: 46 MG/DL (ref 6–20)
CALCIUM SERPL-MCNC: 8.2 MG/DL (ref 8.6–10.4)
CHLORIDE SERPL-SCNC: 97 MMOL/L (ref 98–107)
CK SERPL-CCNC: 921 U/L (ref 39–308)
CO2 SERPL-SCNC: 18 MMOL/L (ref 20–31)
CREAT SERPL-MCNC: 3.5 MG/DL (ref 0.7–1.2)
GFR, ESTIMATED: 19 ML/MIN/1.73M2
GLUCOSE BLD-MCNC: 221 MG/DL (ref 75–110)
GLUCOSE BLD-MCNC: 298 MG/DL (ref 75–110)
GLUCOSE BLD-MCNC: 325 MG/DL (ref 75–110)
GLUCOSE SERPL-MCNC: 178 MG/DL (ref 74–99)
MICROORGANISM SPEC CULT: ABNORMAL
POTASSIUM SERPL-SCNC: 4.1 MMOL/L (ref 3.7–5.3)
SERVICE CMNT-IMP: ABNORMAL
SERVICE CMNT-IMP: ABNORMAL
SODIUM SERPL-SCNC: 130 MMOL/L (ref 136–145)
SPECIMEN DESCRIPTION: ABNORMAL
SPECIMEN DESCRIPTION: ABNORMAL

## 2024-06-05 PROCEDURE — 6360000002 HC RX W HCPCS: Performed by: INTERNAL MEDICINE

## 2024-06-05 PROCEDURE — 6370000000 HC RX 637 (ALT 250 FOR IP): Performed by: INTERNAL MEDICINE

## 2024-06-05 PROCEDURE — 99233 SBSQ HOSP IP/OBS HIGH 50: CPT | Performed by: NURSE PRACTITIONER

## 2024-06-05 PROCEDURE — 82947 ASSAY GLUCOSE BLOOD QUANT: CPT

## 2024-06-05 PROCEDURE — 6370000000 HC RX 637 (ALT 250 FOR IP): Performed by: STUDENT IN AN ORGANIZED HEALTH CARE EDUCATION/TRAINING PROGRAM

## 2024-06-05 PROCEDURE — 2580000003 HC RX 258: Performed by: NURSE PRACTITIONER

## 2024-06-05 PROCEDURE — 2580000003 HC RX 258: Performed by: INTERNAL MEDICINE

## 2024-06-05 PROCEDURE — 36415 COLL VENOUS BLD VENIPUNCTURE: CPT

## 2024-06-05 PROCEDURE — 99232 SBSQ HOSP IP/OBS MODERATE 35: CPT | Performed by: INTERNAL MEDICINE

## 2024-06-05 PROCEDURE — 99233 SBSQ HOSP IP/OBS HIGH 50: CPT | Performed by: STUDENT IN AN ORGANIZED HEALTH CARE EDUCATION/TRAINING PROGRAM

## 2024-06-05 PROCEDURE — 6360000002 HC RX W HCPCS: Performed by: NURSE PRACTITIONER

## 2024-06-05 PROCEDURE — 82550 ASSAY OF CK (CPK): CPT

## 2024-06-05 PROCEDURE — 2060000000 HC ICU INTERMEDIATE R&B

## 2024-06-05 PROCEDURE — 6360000002 HC RX W HCPCS: Performed by: STUDENT IN AN ORGANIZED HEALTH CARE EDUCATION/TRAINING PROGRAM

## 2024-06-05 PROCEDURE — 80048 BASIC METABOLIC PNL TOTAL CA: CPT

## 2024-06-05 PROCEDURE — C9113 INJ PANTOPRAZOLE SODIUM, VIA: HCPCS | Performed by: INTERNAL MEDICINE

## 2024-06-05 PROCEDURE — 6370000000 HC RX 637 (ALT 250 FOR IP): Performed by: NURSE PRACTITIONER

## 2024-06-05 RX ORDER — OXYCODONE HYDROCHLORIDE 5 MG/1
5 TABLET ORAL EVERY 6 HOURS PRN
Status: DISCONTINUED | OUTPATIENT
Start: 2024-06-05 | End: 2024-06-05

## 2024-06-05 RX ORDER — OXYCODONE HYDROCHLORIDE 5 MG/1
5 TABLET ORAL EVERY 4 HOURS PRN
Status: DISCONTINUED | OUTPATIENT
Start: 2024-06-05 | End: 2024-06-11 | Stop reason: HOSPADM

## 2024-06-05 RX ADMIN — SODIUM BICARBONATE 1300 MG: 648 TABLET ORAL at 09:10

## 2024-06-05 RX ADMIN — HYDROMORPHONE HYDROCHLORIDE 0.5 MG: 1 INJECTION, SOLUTION INTRAMUSCULAR; INTRAVENOUS; SUBCUTANEOUS at 10:39

## 2024-06-05 RX ADMIN — SODIUM CHLORIDE, PRESERVATIVE FREE 10 ML: 5 INJECTION INTRAVENOUS at 21:18

## 2024-06-05 RX ADMIN — HEPARIN SODIUM 5000 UNITS: 5000 INJECTION INTRAVENOUS; SUBCUTANEOUS at 14:55

## 2024-06-05 RX ADMIN — METOPROLOL TARTRATE 25 MG: 25 TABLET, FILM COATED ORAL at 09:10

## 2024-06-05 RX ADMIN — POLYETHYLENE GLYCOL 3350 17 G: 17 POWDER, FOR SOLUTION ORAL at 09:10

## 2024-06-05 RX ADMIN — HYDROMORPHONE HYDROCHLORIDE 0.5 MG: 1 INJECTION, SOLUTION INTRAMUSCULAR; INTRAVENOUS; SUBCUTANEOUS at 18:52

## 2024-06-05 RX ADMIN — SODIUM BICARBONATE 1300 MG: 648 TABLET ORAL at 21:14

## 2024-06-05 RX ADMIN — OXYCODONE HYDROCHLORIDE 5 MG: 5 TABLET ORAL at 11:59

## 2024-06-05 RX ADMIN — HYDROMORPHONE HYDROCHLORIDE 0.5 MG: 1 INJECTION, SOLUTION INTRAMUSCULAR; INTRAVENOUS; SUBCUTANEOUS at 14:56

## 2024-06-05 RX ADMIN — HEPARIN SODIUM 5000 UNITS: 5000 INJECTION INTRAVENOUS; SUBCUTANEOUS at 06:14

## 2024-06-05 RX ADMIN — SODIUM CHLORIDE, PRESERVATIVE FREE 40 MG: 5 INJECTION INTRAVENOUS at 03:10

## 2024-06-05 RX ADMIN — SODIUM CHLORIDE, PRESERVATIVE FREE 10 ML: 5 INJECTION INTRAVENOUS at 09:11

## 2024-06-05 RX ADMIN — INSULIN LISPRO 4 UNITS: 100 INJECTION, SOLUTION INTRAVENOUS; SUBCUTANEOUS at 12:27

## 2024-06-05 RX ADMIN — OXYCODONE HYDROCHLORIDE 5 MG: 5 TABLET ORAL at 21:14

## 2024-06-05 RX ADMIN — HYDROMORPHONE HYDROCHLORIDE 0.5 MG: 1 INJECTION, SOLUTION INTRAMUSCULAR; INTRAVENOUS; SUBCUTANEOUS at 03:09

## 2024-06-05 RX ADMIN — INSULIN GLARGINE 32 UNITS: 100 INJECTION, SOLUTION SUBCUTANEOUS at 21:14

## 2024-06-05 RX ADMIN — HEPARIN SODIUM 5000 UNITS: 5000 INJECTION INTRAVENOUS; SUBCUTANEOUS at 21:17

## 2024-06-05 RX ADMIN — ASPIRIN 81 MG 81 MG: 81 TABLET ORAL at 09:11

## 2024-06-05 RX ADMIN — HYDROMORPHONE HYDROCHLORIDE 0.5 MG: 1 INJECTION, SOLUTION INTRAMUSCULAR; INTRAVENOUS; SUBCUTANEOUS at 22:51

## 2024-06-05 RX ADMIN — INSULIN LISPRO 8 UNITS: 100 INJECTION, SOLUTION INTRAVENOUS; SUBCUTANEOUS at 17:28

## 2024-06-05 RX ADMIN — OXYCODONE HYDROCHLORIDE 5 MG: 5 TABLET ORAL at 17:28

## 2024-06-05 RX ADMIN — SODIUM CHLORIDE, PRESERVATIVE FREE 40 MG: 5 INJECTION INTRAVENOUS at 14:55

## 2024-06-05 RX ADMIN — TAMSULOSIN HYDROCHLORIDE 0.4 MG: 0.4 CAPSULE ORAL at 09:10

## 2024-06-05 RX ADMIN — FUROSEMIDE 40 MG: 10 INJECTION, SOLUTION INTRAMUSCULAR; INTRAVENOUS at 09:10

## 2024-06-05 RX ADMIN — HYDROMORPHONE HYDROCHLORIDE 0.5 MG: 1 INJECTION, SOLUTION INTRAMUSCULAR; INTRAVENOUS; SUBCUTANEOUS at 06:51

## 2024-06-05 RX ADMIN — INSULIN LISPRO 12 UNITS: 100 INJECTION, SOLUTION INTRAVENOUS; SUBCUTANEOUS at 21:14

## 2024-06-05 RX ADMIN — METOPROLOL TARTRATE 25 MG: 25 TABLET, FILM COATED ORAL at 21:15

## 2024-06-05 ASSESSMENT — PAIN SCALES - GENERAL
PAINLEVEL_OUTOF10: 0
PAINLEVEL_OUTOF10: 10
PAINLEVEL_OUTOF10: 10
PAINLEVEL_OUTOF10: 0
PAINLEVEL_OUTOF10: 0
PAINLEVEL_OUTOF10: 8
PAINLEVEL_OUTOF10: 10
PAINLEVEL_OUTOF10: 0
PAINLEVEL_OUTOF10: 10
PAINLEVEL_OUTOF10: 10
PAINLEVEL_OUTOF10: 8
PAINLEVEL_OUTOF10: 0
PAINLEVEL_OUTOF10: 8

## 2024-06-05 ASSESSMENT — PAIN DESCRIPTION - DESCRIPTORS
DESCRIPTORS: SHARP
DESCRIPTORS: ACHING
DESCRIPTORS: SHARP
DESCRIPTORS: ACHING
DESCRIPTORS: SHARP

## 2024-06-05 ASSESSMENT — PAIN DESCRIPTION - ORIENTATION
ORIENTATION: RIGHT
ORIENTATION: RIGHT
ORIENTATION: RIGHT;LEFT
ORIENTATION: RIGHT;LEFT

## 2024-06-05 ASSESSMENT — PAIN DESCRIPTION - LOCATION
LOCATION: CHEST

## 2024-06-05 NOTE — CARE COORDINATION
[facility] Quality Flow/Interdisciplinary Rounds Progress Note    Quality Flow Rounds held on June 5, 2024 at 0930    Disciplines Attending:  Bedside Nurse, , and Nursing Unit Leadership      Readmission Risk              Risk of Unplanned Readmission:  25           Discussed patient goal for the day, patient clinical progression, and barriers to discharge.  The following Goal(s) of the Day/Commitment(s) have been identified:   transition planning   Home with [place] and [facility]  [facility]ed jacinto with [facility] for potential need for iv abx    AISHA CHAKRABORTY RN  June 5, 2024

## 2024-06-06 ENCOUNTER — APPOINTMENT (OUTPATIENT)
Age: 59
DRG: 871 | End: 2024-06-06
Payer: MEDICARE

## 2024-06-06 LAB
ANION GAP SERPL CALCULATED.3IONS-SCNC: 13 MMOL/L (ref 9–16)
ANION GAP SERPL CALCULATED.3IONS-SCNC: 14 MMOL/L (ref 9–16)
ANION GAP SERPL CALCULATED.3IONS-SCNC: 14 MMOL/L (ref 9–16)
BUN SERPL-MCNC: 50 MG/DL (ref 6–20)
CALCIUM SERPL-MCNC: 8 MG/DL (ref 8.6–10.4)
CHLORIDE SERPL-SCNC: 95 MMOL/L (ref 98–107)
CHLORIDE SERPL-SCNC: 95 MMOL/L (ref 98–107)
CHLORIDE SERPL-SCNC: 98 MMOL/L (ref 98–107)
CO2 SERPL-SCNC: 14 MMOL/L (ref 20–31)
CO2 SERPL-SCNC: 16 MMOL/L (ref 20–31)
CO2 SERPL-SCNC: 18 MMOL/L (ref 20–31)
CREAT SERPL-MCNC: 3.3 MG/DL (ref 0.7–1.2)
ECHO AO ROOT DIAM: 3.6 CM
ECHO AO ROOT INDEX: 1.51 CM/M2
ECHO AV AREA PEAK VELOCITY: 1.8 CM2
ECHO AV AREA VTI: 1.8 CM2
ECHO AV AREA/BSA PEAK VELOCITY: 0.8 CM2/M2
ECHO AV AREA/BSA VTI: 0.8 CM2/M2
ECHO AV MEAN GRADIENT: 13 MMHG
ECHO AV MEAN VELOCITY: 1.7 M/S
ECHO AV PEAK GRADIENT: 25 MMHG
ECHO AV VELOCITY RATIO: 0.56
ECHO AV VTI: 45 CM
ECHO BSA: 2.45 M2
ECHO LA AREA 2C: 25.8 CM2
ECHO LA AREA 4C: 18.7 CM2
ECHO LA DIAMETER INDEX: 1.93 CM/M2
ECHO LA DIAMETER: 4.6 CM
ECHO LA MAJOR AXIS: 5.8 CM
ECHO LA MINOR AXIS: 6.1 CM
ECHO LA TO AORTIC ROOT RATIO: 1.28
ECHO LA VOL BP: 68 ML (ref 18–58)
ECHO LA VOL MOD A2C: 88 ML (ref 18–58)
ECHO LA VOL MOD A4C: 49 ML (ref 18–58)
ECHO LA VOL/BSA BIPLANE: 29 ML/M2 (ref 16–34)
ECHO LA VOLUME INDEX MOD A2C: 37 ML/M2 (ref 16–34)
ECHO LA VOLUME INDEX MOD A4C: 21 ML/M2 (ref 16–34)
ECHO LV E' LATERAL VELOCITY: 5 CM/S
ECHO LV E' SEPTAL VELOCITY: 7 CM/S
ECHO LV EDV A2C: 38 ML
ECHO LV EDV A4C: 28 ML
ECHO LV EDV INDEX A4C: 12 ML/M2
ECHO LV EDV NDEX A2C: 16 ML/M2
ECHO LV EJECTION FRACTION A2C: 49 %
ECHO LV EJECTION FRACTION A4C: 60 %
ECHO LV EJECTION FRACTION BIPLANE: 55 % (ref 55–100)
ECHO LV ESV A2C: 20 ML
ECHO LV ESV A4C: 11 ML
ECHO LV ESV INDEX A2C: 8 ML/M2
ECHO LV ESV INDEX A4C: 5 ML/M2
ECHO LV FRACTIONAL SHORTENING: 20 % (ref 28–44)
ECHO LV GLOBAL LONGITUDINAL STRAIN (GLS): -12.5 %
ECHO LV INTERNAL DIMENSION DIASTOLE INDEX: 1.85 CM/M2
ECHO LV INTERNAL DIMENSION DIASTOLIC: 4.4 CM (ref 4.2–5.9)
ECHO LV INTERNAL DIMENSION SYSTOLIC INDEX: 1.47 CM/M2
ECHO LV INTERNAL DIMENSION SYSTOLIC: 3.5 CM
ECHO LV IVSD: 2 CM (ref 0.6–1)
ECHO LV MASS 2D: 405.5 G (ref 88–224)
ECHO LV MASS INDEX 2D: 170.4 G/M2 (ref 49–115)
ECHO LV POSTERIOR WALL DIASTOLIC: 1.9 CM (ref 0.6–1)
ECHO LV RELATIVE WALL THICKNESS RATIO: 0.86
ECHO LVOT AREA: 3.1 CM2
ECHO LVOT AV VTI INDEX: 0.57
ECHO LVOT DIAM: 2 CM
ECHO LVOT MEAN GRADIENT: 4 MMHG
ECHO LVOT PEAK GRADIENT: 8 MMHG
ECHO LVOT PEAK VELOCITY: 1.4 M/S
ECHO LVOT STROKE VOLUME INDEX: 33.8 ML/M2
ECHO LVOT SV: 80.4 ML
ECHO LVOT VTI: 25.6 CM
ECHO MV A VELOCITY: 1.14 M/S
ECHO MV AREA VTI: 2.6 CM2
ECHO MV E VELOCITY: 0.76 M/S
ECHO MV E/A RATIO: 0.67
ECHO MV E/E' LATERAL: 15.2
ECHO MV E/E' RATIO (AVERAGED): 13.03
ECHO MV E/E' SEPTAL: 10.86
ECHO MV LVOT VTI INDEX: 1.23
ECHO MV MAX VELOCITY: 1.2 M/S
ECHO MV MEAN GRADIENT: 4 MMHG
ECHO MV MEAN VELOCITY: 0.9 M/S
ECHO MV PEAK GRADIENT: 6 MMHG
ECHO MV VTI: 31.4 CM
ECHO PV MAX VELOCITY: 1 M/S
ECHO PV PEAK GRADIENT: 4 MMHG
ECHO RA AREA 4C: 16 CM2
ECHO RA END SYSTOLIC VOLUME APICAL 4 CHAMBER INDEX BSA: 17 ML/M2
ECHO RA VOLUME: 40 ML
ECHO RV BASAL DIMENSION: 3.9 CM
ERYTHROCYTE [DISTWIDTH] IN BLOOD BY AUTOMATED COUNT: 14.2 % (ref 11.8–14.4)
GFR, ESTIMATED: 21 ML/MIN/1.73M2
GLUCOSE BLD-MCNC: 263 MG/DL (ref 75–110)
GLUCOSE BLD-MCNC: 308 MG/DL (ref 75–110)
GLUCOSE BLD-MCNC: 315 MG/DL (ref 75–110)
GLUCOSE BLD-MCNC: 317 MG/DL (ref 75–110)
GLUCOSE BLD-MCNC: 319 MG/DL (ref 75–110)
GLUCOSE SERPL-MCNC: 377 MG/DL (ref 74–99)
HCT VFR BLD AUTO: 37.9 % (ref 40.7–50.3)
HGB BLD-MCNC: 12.2 G/DL (ref 13–17)
MCH RBC QN AUTO: 29 PG (ref 25.2–33.5)
MCHC RBC AUTO-ENTMCNC: 32.2 G/DL (ref 28.4–34.8)
MCV RBC AUTO: 90.2 FL (ref 82.6–102.9)
NRBC BLD-RTO: 0 PER 100 WBC
OSMOLALITY UR: 290 MOSM/KG (ref 80–1300)
PLATELET # BLD AUTO: 179 K/UL (ref 138–453)
PMV BLD AUTO: 9.8 FL (ref 8.1–13.5)
POTASSIUM SERPL-SCNC: 4 MMOL/L (ref 3.7–5.3)
POTASSIUM SERPL-SCNC: 4.2 MMOL/L (ref 3.7–5.3)
POTASSIUM SERPL-SCNC: 4.9 MMOL/L (ref 3.7–5.3)
RBC # BLD AUTO: 4.2 M/UL (ref 4.21–5.77)
SODIUM SERPL-SCNC: 125 MMOL/L (ref 136–145)
SODIUM SERPL-SCNC: 126 MMOL/L (ref 136–145)
SODIUM SERPL-SCNC: 126 MMOL/L (ref 136–145)
WBC OTHER # BLD: 16 K/UL (ref 3.5–11.3)

## 2024-06-06 PROCEDURE — 2580000003 HC RX 258: Performed by: INTERNAL MEDICINE

## 2024-06-06 PROCEDURE — 93356 MYOCRD STRAIN IMG SPCKL TRCK: CPT | Performed by: INTERNAL MEDICINE

## 2024-06-06 PROCEDURE — A4216 STERILE WATER/SALINE, 10 ML: HCPCS | Performed by: INTERNAL MEDICINE

## 2024-06-06 PROCEDURE — 6360000002 HC RX W HCPCS: Performed by: STUDENT IN AN ORGANIZED HEALTH CARE EDUCATION/TRAINING PROGRAM

## 2024-06-06 PROCEDURE — 6370000000 HC RX 637 (ALT 250 FOR IP): Performed by: STUDENT IN AN ORGANIZED HEALTH CARE EDUCATION/TRAINING PROGRAM

## 2024-06-06 PROCEDURE — 6360000002 HC RX W HCPCS: Performed by: NURSE PRACTITIONER

## 2024-06-06 PROCEDURE — 6370000000 HC RX 637 (ALT 250 FOR IP): Performed by: INTERNAL MEDICINE

## 2024-06-06 PROCEDURE — 6360000002 HC RX W HCPCS: Performed by: INTERNAL MEDICINE

## 2024-06-06 PROCEDURE — 99232 SBSQ HOSP IP/OBS MODERATE 35: CPT | Performed by: INTERNAL MEDICINE

## 2024-06-06 PROCEDURE — 36415 COLL VENOUS BLD VENIPUNCTURE: CPT

## 2024-06-06 PROCEDURE — 2060000000 HC ICU INTERMEDIATE R&B

## 2024-06-06 PROCEDURE — 93306 TTE W/DOPPLER COMPLETE: CPT | Performed by: INTERNAL MEDICINE

## 2024-06-06 PROCEDURE — C9113 INJ PANTOPRAZOLE SODIUM, VIA: HCPCS | Performed by: INTERNAL MEDICINE

## 2024-06-06 PROCEDURE — 2580000003 HC RX 258: Performed by: NURSE PRACTITIONER

## 2024-06-06 PROCEDURE — 99233 SBSQ HOSP IP/OBS HIGH 50: CPT | Performed by: NURSE PRACTITIONER

## 2024-06-06 PROCEDURE — 6370000000 HC RX 637 (ALT 250 FOR IP): Performed by: NURSE PRACTITIONER

## 2024-06-06 PROCEDURE — 83935 ASSAY OF URINE OSMOLALITY: CPT

## 2024-06-06 PROCEDURE — 80051 ELECTROLYTE PANEL: CPT

## 2024-06-06 PROCEDURE — 80048 BASIC METABOLIC PNL TOTAL CA: CPT

## 2024-06-06 PROCEDURE — 85027 COMPLETE CBC AUTOMATED: CPT

## 2024-06-06 PROCEDURE — 84300 ASSAY OF URINE SODIUM: CPT

## 2024-06-06 PROCEDURE — 82947 ASSAY GLUCOSE BLOOD QUANT: CPT

## 2024-06-06 PROCEDURE — 99233 SBSQ HOSP IP/OBS HIGH 50: CPT | Performed by: STUDENT IN AN ORGANIZED HEALTH CARE EDUCATION/TRAINING PROGRAM

## 2024-06-06 PROCEDURE — 93306 TTE W/DOPPLER COMPLETE: CPT

## 2024-06-06 RX ORDER — INSULIN GLARGINE 100 [IU]/ML
40 INJECTION, SOLUTION SUBCUTANEOUS NIGHTLY
Status: DISCONTINUED | OUTPATIENT
Start: 2024-06-06 | End: 2024-06-09

## 2024-06-06 RX ADMIN — OXYCODONE HYDROCHLORIDE 5 MG: 5 TABLET ORAL at 21:58

## 2024-06-06 RX ADMIN — HEPARIN SODIUM 5000 UNITS: 5000 INJECTION INTRAVENOUS; SUBCUTANEOUS at 14:01

## 2024-06-06 RX ADMIN — HYDROMORPHONE HYDROCHLORIDE 0.5 MG: 1 INJECTION, SOLUTION INTRAMUSCULAR; INTRAVENOUS; SUBCUTANEOUS at 20:07

## 2024-06-06 RX ADMIN — INSULIN LISPRO 12 UNITS: 100 INJECTION, SOLUTION INTRAVENOUS; SUBCUTANEOUS at 20:45

## 2024-06-06 RX ADMIN — HEPARIN SODIUM 5000 UNITS: 5000 INJECTION INTRAVENOUS; SUBCUTANEOUS at 20:43

## 2024-06-06 RX ADMIN — OXYCODONE HYDROCHLORIDE 5 MG: 5 TABLET ORAL at 13:59

## 2024-06-06 RX ADMIN — SODIUM CHLORIDE, PRESERVATIVE FREE 40 MG: 5 INJECTION INTRAVENOUS at 02:51

## 2024-06-06 RX ADMIN — OXYCODONE HYDROCHLORIDE 5 MG: 5 TABLET ORAL at 01:36

## 2024-06-06 RX ADMIN — METOPROLOL TARTRATE 25 MG: 25 TABLET, FILM COATED ORAL at 20:41

## 2024-06-06 RX ADMIN — SODIUM CHLORIDE, PRESERVATIVE FREE 10 ML: 5 INJECTION INTRAVENOUS at 20:45

## 2024-06-06 RX ADMIN — HYDROMORPHONE HYDROCHLORIDE 0.5 MG: 1 INJECTION, SOLUTION INTRAMUSCULAR; INTRAVENOUS; SUBCUTANEOUS at 11:58

## 2024-06-06 RX ADMIN — INSULIN LISPRO 12 UNITS: 100 INJECTION, SOLUTION INTRAVENOUS; SUBCUTANEOUS at 12:36

## 2024-06-06 RX ADMIN — OXYCODONE HYDROCHLORIDE 5 MG: 5 TABLET ORAL at 05:21

## 2024-06-06 RX ADMIN — DAPTOMYCIN 550 MG: 500 INJECTION, POWDER, LYOPHILIZED, FOR SOLUTION INTRAVENOUS at 12:51

## 2024-06-06 RX ADMIN — INSULIN LISPRO 8 UNITS: 100 INJECTION, SOLUTION INTRAVENOUS; SUBCUTANEOUS at 17:42

## 2024-06-06 RX ADMIN — POLYETHYLENE GLYCOL 3350 17 G: 17 POWDER, FOR SOLUTION ORAL at 08:19

## 2024-06-06 RX ADMIN — HEPARIN SODIUM 5000 UNITS: 5000 INJECTION INTRAVENOUS; SUBCUTANEOUS at 06:23

## 2024-06-06 RX ADMIN — HYDROMORPHONE HYDROCHLORIDE 0.5 MG: 1 INJECTION, SOLUTION INTRAMUSCULAR; INTRAVENOUS; SUBCUTANEOUS at 08:16

## 2024-06-06 RX ADMIN — METOPROLOL TARTRATE 25 MG: 25 TABLET, FILM COATED ORAL at 08:21

## 2024-06-06 RX ADMIN — OXYCODONE HYDROCHLORIDE 5 MG: 5 TABLET ORAL at 17:42

## 2024-06-06 RX ADMIN — SODIUM BICARBONATE 1300 MG: 648 TABLET ORAL at 20:42

## 2024-06-06 RX ADMIN — SODIUM BICARBONATE 1300 MG: 648 TABLET ORAL at 08:19

## 2024-06-06 RX ADMIN — SODIUM CHLORIDE, PRESERVATIVE FREE 40 MG: 5 INJECTION INTRAVENOUS at 14:01

## 2024-06-06 RX ADMIN — FUROSEMIDE 40 MG: 10 INJECTION, SOLUTION INTRAMUSCULAR; INTRAVENOUS at 08:20

## 2024-06-06 RX ADMIN — HYDROMORPHONE HYDROCHLORIDE 0.5 MG: 1 INJECTION, SOLUTION INTRAMUSCULAR; INTRAVENOUS; SUBCUTANEOUS at 16:03

## 2024-06-06 RX ADMIN — TAMSULOSIN HYDROCHLORIDE 0.4 MG: 0.4 CAPSULE ORAL at 08:20

## 2024-06-06 RX ADMIN — HYDROMORPHONE HYDROCHLORIDE 0.5 MG: 1 INJECTION, SOLUTION INTRAMUSCULAR; INTRAVENOUS; SUBCUTANEOUS at 03:18

## 2024-06-06 RX ADMIN — Medication 7.5 MG: at 11:57

## 2024-06-06 RX ADMIN — OXYCODONE HYDROCHLORIDE 5 MG: 5 TABLET ORAL at 09:40

## 2024-06-06 RX ADMIN — ASPIRIN 81 MG 81 MG: 81 TABLET ORAL at 08:20

## 2024-06-06 RX ADMIN — INSULIN GLARGINE 40 UNITS: 100 INJECTION, SOLUTION SUBCUTANEOUS at 20:45

## 2024-06-06 RX ADMIN — SODIUM CHLORIDE, PRESERVATIVE FREE 10 ML: 5 INJECTION INTRAVENOUS at 08:21

## 2024-06-06 RX ADMIN — INSULIN LISPRO 12 UNITS: 100 INJECTION, SOLUTION INTRAVENOUS; SUBCUTANEOUS at 08:19

## 2024-06-06 ASSESSMENT — PAIN DESCRIPTION - LOCATION
LOCATION: CHEST

## 2024-06-06 ASSESSMENT — PAIN DESCRIPTION - ORIENTATION
ORIENTATION: RIGHT;LEFT

## 2024-06-06 ASSESSMENT — PAIN SCALES - GENERAL
PAINLEVEL_OUTOF10: 9
PAINLEVEL_OUTOF10: 0
PAINLEVEL_OUTOF10: 10
PAINLEVEL_OUTOF10: 10
PAINLEVEL_OUTOF10: 0
PAINLEVEL_OUTOF10: 9
PAINLEVEL_OUTOF10: 9
PAINLEVEL_OUTOF10: 10
PAINLEVEL_OUTOF10: 0
PAINLEVEL_OUTOF10: 10
PAINLEVEL_OUTOF10: 10
PAINLEVEL_OUTOF10: 0
PAINLEVEL_OUTOF10: 10

## 2024-06-06 ASSESSMENT — PAIN DESCRIPTION - DESCRIPTORS
DESCRIPTORS: ACHING

## 2024-06-06 NOTE — CARE COORDINATION
Met with patient to discuss transitional planning. Plan is home with spouse and [facility] (current per kiki). Patient will require IV daptomycin Q48hrs through 7/1/24 per infectious disease. [facility] will supply IV antibiotics. [facility] cost is $120 to which patient is agreeable. Patient's next antibiotic dose is due on Saturday 6/8/24. Per Kiki with [facility], they will plan for start of care on Saturday unless otherwise notified by CM. Patient will have transportation home. Patient denies further needs. Patient agreeable to plan.

## 2024-06-07 LAB
ANION GAP SERPL CALCULATED.3IONS-SCNC: 12 MMOL/L (ref 9–16)
ANION GAP SERPL CALCULATED.3IONS-SCNC: 14 MMOL/L (ref 9–16)
BUN SERPL-MCNC: 50 MG/DL (ref 6–20)
CALCIUM SERPL-MCNC: 8 MG/DL (ref 8.6–10.4)
CHLORIDE SERPL-SCNC: 96 MMOL/L (ref 98–107)
CHLORIDE SERPL-SCNC: 97 MMOL/L (ref 98–107)
CHLORIDE SERPL-SCNC: 98 MMOL/L (ref 98–107)
CHLORIDE SERPL-SCNC: 98 MMOL/L (ref 98–107)
CK SERPL-CCNC: 177 U/L (ref 39–308)
CO2 SERPL-SCNC: 17 MMOL/L (ref 20–31)
CO2 SERPL-SCNC: 18 MMOL/L (ref 20–31)
CO2 SERPL-SCNC: 20 MMOL/L (ref 20–31)
CO2 SERPL-SCNC: 20 MMOL/L (ref 20–31)
CREAT SERPL-MCNC: 3.2 MG/DL (ref 0.7–1.2)
GFR, ESTIMATED: 22 ML/MIN/1.73M2
GLUCOSE BLD-MCNC: 189 MG/DL (ref 75–110)
GLUCOSE BLD-MCNC: 230 MG/DL (ref 75–110)
GLUCOSE BLD-MCNC: 248 MG/DL (ref 75–110)
GLUCOSE BLD-MCNC: 320 MG/DL (ref 75–110)
GLUCOSE SERPL-MCNC: 224 MG/DL (ref 74–99)
POTASSIUM SERPL-SCNC: 4 MMOL/L (ref 3.7–5.3)
POTASSIUM SERPL-SCNC: 4.2 MMOL/L (ref 3.7–5.3)
POTASSIUM SERPL-SCNC: 4.2 MMOL/L (ref 3.7–5.3)
POTASSIUM SERPL-SCNC: 4.3 MMOL/L (ref 3.7–5.3)
SODIUM SERPL-SCNC: 128 MMOL/L (ref 136–145)
SODIUM SERPL-SCNC: 130 MMOL/L (ref 136–145)

## 2024-06-07 PROCEDURE — 6370000000 HC RX 637 (ALT 250 FOR IP): Performed by: STUDENT IN AN ORGANIZED HEALTH CARE EDUCATION/TRAINING PROGRAM

## 2024-06-07 PROCEDURE — C9113 INJ PANTOPRAZOLE SODIUM, VIA: HCPCS | Performed by: INTERNAL MEDICINE

## 2024-06-07 PROCEDURE — 36415 COLL VENOUS BLD VENIPUNCTURE: CPT

## 2024-06-07 PROCEDURE — A4216 STERILE WATER/SALINE, 10 ML: HCPCS | Performed by: INTERNAL MEDICINE

## 2024-06-07 PROCEDURE — 6370000000 HC RX 637 (ALT 250 FOR IP): Performed by: INTERNAL MEDICINE

## 2024-06-07 PROCEDURE — 99232 SBSQ HOSP IP/OBS MODERATE 35: CPT | Performed by: INTERNAL MEDICINE

## 2024-06-07 PROCEDURE — 82550 ASSAY OF CK (CPK): CPT

## 2024-06-07 PROCEDURE — 6360000002 HC RX W HCPCS: Performed by: STUDENT IN AN ORGANIZED HEALTH CARE EDUCATION/TRAINING PROGRAM

## 2024-06-07 PROCEDURE — 82947 ASSAY GLUCOSE BLOOD QUANT: CPT

## 2024-06-07 PROCEDURE — 6370000000 HC RX 637 (ALT 250 FOR IP): Performed by: NURSE PRACTITIONER

## 2024-06-07 PROCEDURE — 2580000003 HC RX 258: Performed by: INTERNAL MEDICINE

## 2024-06-07 PROCEDURE — 87040 BLOOD CULTURE FOR BACTERIA: CPT

## 2024-06-07 PROCEDURE — 80051 ELECTROLYTE PANEL: CPT

## 2024-06-07 PROCEDURE — 80048 BASIC METABOLIC PNL TOTAL CA: CPT

## 2024-06-07 PROCEDURE — 6360000002 HC RX W HCPCS: Performed by: INTERNAL MEDICINE

## 2024-06-07 PROCEDURE — 2060000000 HC ICU INTERMEDIATE R&B

## 2024-06-07 PROCEDURE — 2580000003 HC RX 258: Performed by: NURSE PRACTITIONER

## 2024-06-07 PROCEDURE — 6360000002 HC RX W HCPCS: Performed by: NURSE PRACTITIONER

## 2024-06-07 PROCEDURE — 99232 SBSQ HOSP IP/OBS MODERATE 35: CPT | Performed by: STUDENT IN AN ORGANIZED HEALTH CARE EDUCATION/TRAINING PROGRAM

## 2024-06-07 RX ORDER — FUROSEMIDE 40 MG/1
80 TABLET ORAL 2 TIMES DAILY
Status: DISCONTINUED | OUTPATIENT
Start: 2024-06-07 | End: 2024-06-09

## 2024-06-07 RX ADMIN — OXYCODONE HYDROCHLORIDE 5 MG: 5 TABLET ORAL at 10:57

## 2024-06-07 RX ADMIN — SODIUM BICARBONATE 1300 MG: 648 TABLET ORAL at 20:55

## 2024-06-07 RX ADMIN — ASPIRIN 81 MG 81 MG: 81 TABLET ORAL at 08:13

## 2024-06-07 RX ADMIN — INSULIN LISPRO 4 UNITS: 100 INJECTION, SOLUTION INTRAVENOUS; SUBCUTANEOUS at 18:11

## 2024-06-07 RX ADMIN — OXYCODONE HYDROCHLORIDE 5 MG: 5 TABLET ORAL at 14:43

## 2024-06-07 RX ADMIN — SODIUM CHLORIDE, PRESERVATIVE FREE 10 ML: 5 INJECTION INTRAVENOUS at 08:14

## 2024-06-07 RX ADMIN — FUROSEMIDE 40 MG: 10 INJECTION, SOLUTION INTRAMUSCULAR; INTRAVENOUS at 08:13

## 2024-06-07 RX ADMIN — HYDROMORPHONE HYDROCHLORIDE 0.5 MG: 1 INJECTION, SOLUTION INTRAMUSCULAR; INTRAVENOUS; SUBCUTANEOUS at 16:13

## 2024-06-07 RX ADMIN — SODIUM CHLORIDE, PRESERVATIVE FREE 40 MG: 5 INJECTION INTRAVENOUS at 02:31

## 2024-06-07 RX ADMIN — SODIUM CHLORIDE, PRESERVATIVE FREE 10 ML: 5 INJECTION INTRAVENOUS at 20:56

## 2024-06-07 RX ADMIN — OXYCODONE HYDROCHLORIDE 5 MG: 5 TABLET ORAL at 06:52

## 2024-06-07 RX ADMIN — OXYCODONE HYDROCHLORIDE 5 MG: 5 TABLET ORAL at 22:55

## 2024-06-07 RX ADMIN — INSULIN GLARGINE 40 UNITS: 100 INJECTION, SOLUTION SUBCUTANEOUS at 20:55

## 2024-06-07 RX ADMIN — HYDROMORPHONE HYDROCHLORIDE 0.5 MG: 1 INJECTION, SOLUTION INTRAMUSCULAR; INTRAVENOUS; SUBCUTANEOUS at 20:57

## 2024-06-07 RX ADMIN — INSULIN LISPRO 4 UNITS: 100 INJECTION, SOLUTION INTRAVENOUS; SUBCUTANEOUS at 11:45

## 2024-06-07 RX ADMIN — SODIUM CHLORIDE, PRESERVATIVE FREE 40 MG: 5 INJECTION INTRAVENOUS at 14:44

## 2024-06-07 RX ADMIN — HEPARIN SODIUM 5000 UNITS: 5000 INJECTION INTRAVENOUS; SUBCUTANEOUS at 06:15

## 2024-06-07 RX ADMIN — HYDROMORPHONE HYDROCHLORIDE 0.5 MG: 1 INJECTION, SOLUTION INTRAMUSCULAR; INTRAVENOUS; SUBCUTANEOUS at 03:57

## 2024-06-07 RX ADMIN — POLYETHYLENE GLYCOL 3350 17 G: 17 POWDER, FOR SOLUTION ORAL at 08:14

## 2024-06-07 RX ADMIN — OXYCODONE HYDROCHLORIDE 5 MG: 5 TABLET ORAL at 02:30

## 2024-06-07 RX ADMIN — FUROSEMIDE 80 MG: 40 TABLET ORAL at 18:10

## 2024-06-07 RX ADMIN — HYDROMORPHONE HYDROCHLORIDE 0.5 MG: 1 INJECTION, SOLUTION INTRAMUSCULAR; INTRAVENOUS; SUBCUTANEOUS at 00:04

## 2024-06-07 RX ADMIN — HEPARIN SODIUM 5000 UNITS: 5000 INJECTION INTRAVENOUS; SUBCUTANEOUS at 14:44

## 2024-06-07 RX ADMIN — INSULIN LISPRO 12 UNITS: 100 INJECTION, SOLUTION INTRAVENOUS; SUBCUTANEOUS at 20:55

## 2024-06-07 RX ADMIN — METOPROLOL TARTRATE 25 MG: 25 TABLET, FILM COATED ORAL at 08:13

## 2024-06-07 RX ADMIN — HYDROMORPHONE HYDROCHLORIDE 0.5 MG: 1 INJECTION, SOLUTION INTRAMUSCULAR; INTRAVENOUS; SUBCUTANEOUS at 08:14

## 2024-06-07 RX ADMIN — TAMSULOSIN HYDROCHLORIDE 0.4 MG: 0.4 CAPSULE ORAL at 08:13

## 2024-06-07 RX ADMIN — METOPROLOL TARTRATE 25 MG: 25 TABLET, FILM COATED ORAL at 20:55

## 2024-06-07 RX ADMIN — HEPARIN SODIUM 5000 UNITS: 5000 INJECTION INTRAVENOUS; SUBCUTANEOUS at 21:04

## 2024-06-07 RX ADMIN — OXYCODONE HYDROCHLORIDE 5 MG: 5 TABLET ORAL at 19:10

## 2024-06-07 RX ADMIN — HYDROMORPHONE HYDROCHLORIDE 0.5 MG: 1 INJECTION, SOLUTION INTRAMUSCULAR; INTRAVENOUS; SUBCUTANEOUS at 11:54

## 2024-06-07 RX ADMIN — SODIUM BICARBONATE 1300 MG: 648 TABLET ORAL at 08:13

## 2024-06-07 ASSESSMENT — PAIN DESCRIPTION - LOCATION
LOCATION: CHEST

## 2024-06-07 ASSESSMENT — PAIN SCALES - GENERAL
PAINLEVEL_OUTOF10: 10
PAINLEVEL_OUTOF10: 9
PAINLEVEL_OUTOF10: 9
PAINLEVEL_OUTOF10: 10
PAINLEVEL_OUTOF10: 10
PAINLEVEL_OUTOF10: 9
PAINLEVEL_OUTOF10: 10

## 2024-06-07 ASSESSMENT — PAIN DESCRIPTION - DESCRIPTORS
DESCRIPTORS: ACHING;DISCOMFORT
DESCRIPTORS: DISCOMFORT
DESCRIPTORS: ACHING;DISCOMFORT
DESCRIPTORS: ACHING
DESCRIPTORS: ACHING;DISCOMFORT
DESCRIPTORS: ACHING;DISCOMFORT

## 2024-06-07 NOTE — CARE COORDINATION
Transitional planning note: plan is home with spouse and [facility] (accepted). Patient will require IV daptomycin through 7/1 on discharge. [facility] will be supplying IV ATB's. Patient next dose due is 6/8 as he is on Q48hr dosing for daptomycin. [facility] has planned start of care for tomorrow if patient discharges today. Patient will have transportation home. Patient agreeable to plan. Patient will need picc or midline for discharge. ID has printed script for IV ATB's but needs signed and faxed to [facility].     1450: signed script for IV daptomycin received and faxed to [facility]    1506: spoke with nephrology. Likely ok for discharge tomorrow. Updated hema with [facility] that patient will likely discharge tomorrow which would mean patient will be due for next dose of IV dapto on Monday 6/10/24. Per hema, she will watch for discharge in [facility] so that antibiotics can be shipped in time for Monday dose if discharged over weekend.

## 2024-06-08 LAB
ANION GAP SERPL CALCULATED.3IONS-SCNC: 14 MMOL/L (ref 9–16)
ANION GAP SERPL CALCULATED.3IONS-SCNC: 15 MMOL/L (ref 9–16)
CHLORIDE SERPL-SCNC: 95 MMOL/L (ref 98–107)
CHLORIDE SERPL-SCNC: 96 MMOL/L (ref 98–107)
CO2 SERPL-SCNC: 18 MMOL/L (ref 20–31)
CO2 SERPL-SCNC: 19 MMOL/L (ref 20–31)
ERYTHROCYTE [DISTWIDTH] IN BLOOD BY AUTOMATED COUNT: 14.2 % (ref 11.8–14.4)
GLUCOSE BLD-MCNC: 196 MG/DL (ref 75–110)
GLUCOSE BLD-MCNC: 234 MG/DL (ref 75–110)
GLUCOSE BLD-MCNC: 235 MG/DL (ref 75–110)
GLUCOSE BLD-MCNC: 251 MG/DL (ref 75–110)
HCT VFR BLD AUTO: 36.1 % (ref 40.7–50.3)
HGB BLD-MCNC: 11.6 G/DL (ref 13–17)
MCH RBC QN AUTO: 29.7 PG (ref 25.2–33.5)
MCHC RBC AUTO-ENTMCNC: 32.1 G/DL (ref 28.4–34.8)
MCV RBC AUTO: 92.3 FL (ref 82.6–102.9)
MICROORGANISM SPEC CULT: ABNORMAL
NRBC BLD-RTO: 0 PER 100 WBC
PLATELET # BLD AUTO: 242 K/UL (ref 138–453)
PMV BLD AUTO: 9.5 FL (ref 8.1–13.5)
POTASSIUM SERPL-SCNC: 3.9 MMOL/L (ref 3.7–5.3)
POTASSIUM SERPL-SCNC: 4.4 MMOL/L (ref 3.7–5.3)
RBC # BLD AUTO: 3.91 M/UL (ref 4.21–5.77)
SERVICE CMNT-IMP: ABNORMAL
SODIUM SERPL-SCNC: 127 MMOL/L (ref 136–145)
SODIUM SERPL-SCNC: 130 MMOL/L (ref 136–145)
SPECIMEN DESCRIPTION: ABNORMAL
WBC OTHER # BLD: 18.4 K/UL (ref 3.5–11.3)

## 2024-06-08 PROCEDURE — 2580000003 HC RX 258: Performed by: INTERNAL MEDICINE

## 2024-06-08 PROCEDURE — 6370000000 HC RX 637 (ALT 250 FOR IP): Performed by: NURSE PRACTITIONER

## 2024-06-08 PROCEDURE — 99232 SBSQ HOSP IP/OBS MODERATE 35: CPT | Performed by: INTERNAL MEDICINE

## 2024-06-08 PROCEDURE — 85027 COMPLETE CBC AUTOMATED: CPT

## 2024-06-08 PROCEDURE — 36415 COLL VENOUS BLD VENIPUNCTURE: CPT

## 2024-06-08 PROCEDURE — 6360000002 HC RX W HCPCS: Performed by: STUDENT IN AN ORGANIZED HEALTH CARE EDUCATION/TRAINING PROGRAM

## 2024-06-08 PROCEDURE — 6360000002 HC RX W HCPCS: Performed by: NURSE PRACTITIONER

## 2024-06-08 PROCEDURE — C9113 INJ PANTOPRAZOLE SODIUM, VIA: HCPCS | Performed by: INTERNAL MEDICINE

## 2024-06-08 PROCEDURE — 6370000000 HC RX 637 (ALT 250 FOR IP): Performed by: INTERNAL MEDICINE

## 2024-06-08 PROCEDURE — 99232 SBSQ HOSP IP/OBS MODERATE 35: CPT | Performed by: STUDENT IN AN ORGANIZED HEALTH CARE EDUCATION/TRAINING PROGRAM

## 2024-06-08 PROCEDURE — 80051 ELECTROLYTE PANEL: CPT

## 2024-06-08 PROCEDURE — 6370000000 HC RX 637 (ALT 250 FOR IP): Performed by: STUDENT IN AN ORGANIZED HEALTH CARE EDUCATION/TRAINING PROGRAM

## 2024-06-08 PROCEDURE — 6360000002 HC RX W HCPCS: Performed by: INTERNAL MEDICINE

## 2024-06-08 PROCEDURE — 2060000000 HC ICU INTERMEDIATE R&B

## 2024-06-08 PROCEDURE — 87040 BLOOD CULTURE FOR BACTERIA: CPT

## 2024-06-08 PROCEDURE — 2580000003 HC RX 258: Performed by: NURSE PRACTITIONER

## 2024-06-08 PROCEDURE — 82947 ASSAY GLUCOSE BLOOD QUANT: CPT

## 2024-06-08 RX ORDER — MORPHINE SULFATE 2 MG/ML
2 INJECTION, SOLUTION INTRAMUSCULAR; INTRAVENOUS EVERY 4 HOURS PRN
Status: DISCONTINUED | OUTPATIENT
Start: 2024-06-08 | End: 2024-06-11 | Stop reason: HOSPADM

## 2024-06-08 RX ORDER — PANTOPRAZOLE SODIUM 40 MG/1
40 TABLET, DELAYED RELEASE ORAL
Status: DISCONTINUED | OUTPATIENT
Start: 2024-06-08 | End: 2024-06-11 | Stop reason: HOSPADM

## 2024-06-08 RX ADMIN — HEPARIN SODIUM 5000 UNITS: 5000 INJECTION INTRAVENOUS; SUBCUTANEOUS at 15:40

## 2024-06-08 RX ADMIN — SODIUM CHLORIDE, PRESERVATIVE FREE 40 MG: 5 INJECTION INTRAVENOUS at 02:59

## 2024-06-08 RX ADMIN — INSULIN LISPRO 4 UNITS: 100 INJECTION, SOLUTION INTRAVENOUS; SUBCUTANEOUS at 17:14

## 2024-06-08 RX ADMIN — INSULIN GLARGINE 40 UNITS: 100 INJECTION, SOLUTION SUBCUTANEOUS at 20:17

## 2024-06-08 RX ADMIN — POLYETHYLENE GLYCOL 3350 17 G: 17 POWDER, FOR SOLUTION ORAL at 08:34

## 2024-06-08 RX ADMIN — OXYCODONE HYDROCHLORIDE 5 MG: 5 TABLET ORAL at 12:14

## 2024-06-08 RX ADMIN — PANTOPRAZOLE SODIUM 40 MG: 40 TABLET, DELAYED RELEASE ORAL at 15:49

## 2024-06-08 RX ADMIN — HYDROMORPHONE HYDROCHLORIDE 0.5 MG: 1 INJECTION, SOLUTION INTRAMUSCULAR; INTRAVENOUS; SUBCUTANEOUS at 15:40

## 2024-06-08 RX ADMIN — FUROSEMIDE 80 MG: 40 TABLET ORAL at 08:34

## 2024-06-08 RX ADMIN — HYDROMORPHONE HYDROCHLORIDE 0.5 MG: 1 INJECTION, SOLUTION INTRAMUSCULAR; INTRAVENOUS; SUBCUTANEOUS at 08:35

## 2024-06-08 RX ADMIN — SODIUM CHLORIDE, PRESERVATIVE FREE 10 ML: 5 INJECTION INTRAVENOUS at 20:17

## 2024-06-08 RX ADMIN — METOPROLOL TARTRATE 25 MG: 25 TABLET, FILM COATED ORAL at 08:34

## 2024-06-08 RX ADMIN — INSULIN LISPRO 4 UNITS: 100 INJECTION, SOLUTION INTRAVENOUS; SUBCUTANEOUS at 12:15

## 2024-06-08 RX ADMIN — INSULIN LISPRO 8 UNITS: 100 INJECTION, SOLUTION INTRAVENOUS; SUBCUTANEOUS at 20:26

## 2024-06-08 RX ADMIN — HYDROMORPHONE HYDROCHLORIDE 0.5 MG: 1 INJECTION, SOLUTION INTRAMUSCULAR; INTRAVENOUS; SUBCUTANEOUS at 00:58

## 2024-06-08 RX ADMIN — TAMSULOSIN HYDROCHLORIDE 0.4 MG: 0.4 CAPSULE ORAL at 08:34

## 2024-06-08 RX ADMIN — HYDROMORPHONE HYDROCHLORIDE 0.5 MG: 1 INJECTION, SOLUTION INTRAMUSCULAR; INTRAVENOUS; SUBCUTANEOUS at 20:17

## 2024-06-08 RX ADMIN — OXYCODONE HYDROCHLORIDE 5 MG: 5 TABLET ORAL at 06:58

## 2024-06-08 RX ADMIN — HYDROMORPHONE HYDROCHLORIDE 0.5 MG: 1 INJECTION, SOLUTION INTRAMUSCULAR; INTRAVENOUS; SUBCUTANEOUS at 05:05

## 2024-06-08 RX ADMIN — OXYCODONE HYDROCHLORIDE 5 MG: 5 TABLET ORAL at 03:00

## 2024-06-08 RX ADMIN — SODIUM BICARBONATE 1300 MG: 648 TABLET ORAL at 20:16

## 2024-06-08 RX ADMIN — HEPARIN SODIUM 5000 UNITS: 5000 INJECTION INTRAVENOUS; SUBCUTANEOUS at 05:05

## 2024-06-08 RX ADMIN — SODIUM CHLORIDE, PRESERVATIVE FREE 10 ML: 5 INJECTION INTRAVENOUS at 08:35

## 2024-06-08 RX ADMIN — SODIUM BICARBONATE 1300 MG: 648 TABLET ORAL at 08:34

## 2024-06-08 RX ADMIN — OXYCODONE HYDROCHLORIDE 5 MG: 5 TABLET ORAL at 17:13

## 2024-06-08 RX ADMIN — METOPROLOL TARTRATE 25 MG: 25 TABLET, FILM COATED ORAL at 20:16

## 2024-06-08 RX ADMIN — FUROSEMIDE 80 MG: 40 TABLET ORAL at 17:13

## 2024-06-08 RX ADMIN — OXYCODONE HYDROCHLORIDE 5 MG: 5 TABLET ORAL at 22:21

## 2024-06-08 RX ADMIN — DAPTOMYCIN 550 MG: 500 INJECTION, POWDER, LYOPHILIZED, FOR SOLUTION INTRAVENOUS at 12:57

## 2024-06-08 RX ADMIN — ASPIRIN 81 MG 81 MG: 81 TABLET ORAL at 08:34

## 2024-06-08 RX ADMIN — HEPARIN SODIUM 5000 UNITS: 5000 INJECTION INTRAVENOUS; SUBCUTANEOUS at 21:00

## 2024-06-08 ASSESSMENT — PAIN DESCRIPTION - DESCRIPTORS
DESCRIPTORS: DISCOMFORT;ACHING
DESCRIPTORS: DISCOMFORT

## 2024-06-08 ASSESSMENT — PAIN SCALES - GENERAL
PAINLEVEL_OUTOF10: 8
PAINLEVEL_OUTOF10: 10
PAINLEVEL_OUTOF10: 9
PAINLEVEL_OUTOF10: 9
PAINLEVEL_OUTOF10: 10
PAINLEVEL_OUTOF10: 9

## 2024-06-08 ASSESSMENT — PAIN DESCRIPTION - LOCATION
LOCATION: CHEST
LOCATION: ABDOMEN
LOCATION: CHEST
LOCATION: ABDOMEN
LOCATION: CHEST
LOCATION: CHEST

## 2024-06-08 NOTE — DISCHARGE INSTR - COC
Continuity of Care Form    Patient Name: Vincenzo Darden   :  1965  MRN:  [id number]    Admit date:  2024  Discharge date:  6/10/2024    Code Status Order: Full Code   Advance Directives:     Admitting Physician:  No admitting provider for patient encounter.  PCP: Shaikh Calix MD    Discharging Nurse: Cecilio Romeo  Discharging Hospital Unit/Room#: 0422/0422-02  Discharging Unit Phone Number: [phone]    Emergency Contact:   Extended Emergency Contact Information  Primary Emergency Contact: Ashtyn Forde  Home Phone: [phone]  Relation: Spouse    Past Surgical History:  Past Surgical History:   Procedure Laterality Date    CABG WITH AORTIC VALVE REPLACEMENT N/A 2024    CABG CORONARY ARTERY BYPASS X3, STERNAL PLATING, AORTIC VALVE REPLACEMENT 23MM INSPIRIS AORTIC VALVE, ON PUMP, JO performed by Andrei James MD at [facility] CVOR    CARDIAC PROCEDURE N/A 10/9/2023    Coronary angiography performed by Louis Ramirez MD at [facility] CARDIAC CATH LAB    CARDIAC PROCEDURE N/A 10/9/2023    Aortic root aortogram performed by Louis Ramirez MD at [facility] CARDIAC CATH LAB       Immunization History:     There is no immunization history on file for this patient.    Active Problems:  Patient Active Problem List   Diagnosis Code    NSTEMI (non-ST elevated myocardial infarction) ([facility]) I21.4    Sepsis ([facility]) A41.9    Type 2 DM with diabetic neuropathy affecting both sides of body ([facility]) E11.42    Hypertension, essential I10    Smoker F17.200    Hx of BKA, left ([facility]) Z89.512    GERD (gastroesophageal reflux disease) K21.9    COLT (obstructive sleep apnea) G47.33    Class 2 obesity due to excess calories with body mass index (BMI) of 35.0 to 35.9 in adult E66.09, Z68.35    CAMILLE (acute kidney injury) ([facility]) N17.9    Metabolic acidosis E87.20    Hx of type 2 diabetes mellitus Z86.39    Below knee amputation ([facility]) S88.119A    Unstable angina ([facility]) I20.0    Pyelonephritis N12    Persistent proteinuria R80.1    Multiple

## 2024-06-08 NOTE — CARE COORDINATION
Transitional planning      [facility] home[facility],Patient will need PICC line for outpatient antibiotics once patient has negative blood cultures x 2 days .

## 2024-06-09 LAB
ANION GAP SERPL CALCULATED.3IONS-SCNC: 14 MMOL/L (ref 9–16)
BUN SERPL-MCNC: 57 MG/DL (ref 6–20)
CALCIUM SERPL-MCNC: 7.9 MG/DL (ref 8.6–10.4)
CHLORIDE SERPL-SCNC: 94 MMOL/L (ref 98–107)
CO2 SERPL-SCNC: 18 MMOL/L (ref 20–31)
CREAT SERPL-MCNC: 3.4 MG/DL (ref 0.7–1.2)
GFR, ESTIMATED: 20 ML/MIN/1.73M2
GLUCOSE BLD-MCNC: 173 MG/DL (ref 75–110)
GLUCOSE BLD-MCNC: 174 MG/DL (ref 75–110)
GLUCOSE BLD-MCNC: 233 MG/DL (ref 75–110)
GLUCOSE BLD-MCNC: 237 MG/DL (ref 75–110)
GLUCOSE SERPL-MCNC: 256 MG/DL (ref 74–99)
MICROORGANISM SPEC CULT: NORMAL
POTASSIUM SERPL-SCNC: 4 MMOL/L (ref 3.7–5.3)
SERVICE CMNT-IMP: NORMAL
SODIUM SERPL-SCNC: 126 MMOL/L (ref 136–145)
SPECIMEN DESCRIPTION: NORMAL

## 2024-06-09 PROCEDURE — 80048 BASIC METABOLIC PNL TOTAL CA: CPT

## 2024-06-09 PROCEDURE — 99232 SBSQ HOSP IP/OBS MODERATE 35: CPT | Performed by: INTERNAL MEDICINE

## 2024-06-09 PROCEDURE — 6370000000 HC RX 637 (ALT 250 FOR IP): Performed by: NURSE PRACTITIONER

## 2024-06-09 PROCEDURE — 6370000000 HC RX 637 (ALT 250 FOR IP): Performed by: INTERNAL MEDICINE

## 2024-06-09 PROCEDURE — 2580000003 HC RX 258: Performed by: NURSE PRACTITIONER

## 2024-06-09 PROCEDURE — 6360000002 HC RX W HCPCS: Performed by: NURSE PRACTITIONER

## 2024-06-09 PROCEDURE — C1751 CATH, INF, PER/CENT/MIDLINE: HCPCS

## 2024-06-09 PROCEDURE — 82947 ASSAY GLUCOSE BLOOD QUANT: CPT

## 2024-06-09 PROCEDURE — 99232 SBSQ HOSP IP/OBS MODERATE 35: CPT | Performed by: STUDENT IN AN ORGANIZED HEALTH CARE EDUCATION/TRAINING PROGRAM

## 2024-06-09 PROCEDURE — 6370000000 HC RX 637 (ALT 250 FOR IP): Performed by: STUDENT IN AN ORGANIZED HEALTH CARE EDUCATION/TRAINING PROGRAM

## 2024-06-09 PROCEDURE — 36569 INSJ PICC 5 YR+ W/O IMAGING: CPT

## 2024-06-09 PROCEDURE — 02HV33Z INSERTION OF INFUSION DEVICE INTO SUPERIOR VENA CAVA, PERCUTANEOUS APPROACH: ICD-10-PCS | Performed by: STUDENT IN AN ORGANIZED HEALTH CARE EDUCATION/TRAINING PROGRAM

## 2024-06-09 PROCEDURE — 36415 COLL VENOUS BLD VENIPUNCTURE: CPT

## 2024-06-09 PROCEDURE — 6360000002 HC RX W HCPCS: Performed by: STUDENT IN AN ORGANIZED HEALTH CARE EDUCATION/TRAINING PROGRAM

## 2024-06-09 PROCEDURE — 2060000000 HC ICU INTERMEDIATE R&B

## 2024-06-09 PROCEDURE — 2580000003 HC RX 258: Performed by: INTERNAL MEDICINE

## 2024-06-09 PROCEDURE — 76937 US GUIDE VASCULAR ACCESS: CPT

## 2024-06-09 RX ORDER — INSULIN GLARGINE 100 [IU]/ML
45 INJECTION, SOLUTION SUBCUTANEOUS NIGHTLY
Status: DISCONTINUED | OUTPATIENT
Start: 2024-06-09 | End: 2024-06-11 | Stop reason: HOSPADM

## 2024-06-09 RX ORDER — LIDOCAINE HYDROCHLORIDE 10 MG/ML
5 INJECTION, SOLUTION EPIDURAL; INFILTRATION; INTRACAUDAL; PERINEURAL ONCE
Status: DISCONTINUED | OUTPATIENT
Start: 2024-06-09 | End: 2024-06-11 | Stop reason: HOSPADM

## 2024-06-09 RX ORDER — SODIUM CHLORIDE 0.9 % (FLUSH) 0.9 %
5-40 SYRINGE (ML) INJECTION EVERY 12 HOURS SCHEDULED
Status: DISCONTINUED | OUTPATIENT
Start: 2024-06-09 | End: 2024-06-11 | Stop reason: HOSPADM

## 2024-06-09 RX ORDER — SODIUM CHLORIDE 0.9 % (FLUSH) 0.9 %
5-40 SYRINGE (ML) INJECTION PRN
Status: DISCONTINUED | OUTPATIENT
Start: 2024-06-09 | End: 2024-06-11 | Stop reason: HOSPADM

## 2024-06-09 RX ORDER — SODIUM CHLORIDE 9 MG/ML
25 INJECTION, SOLUTION INTRAVENOUS PRN
Status: DISCONTINUED | OUTPATIENT
Start: 2024-06-09 | End: 2024-06-11 | Stop reason: HOSPADM

## 2024-06-09 RX ORDER — FUROSEMIDE 40 MG/1
80 TABLET ORAL 3 TIMES DAILY
Status: DISCONTINUED | OUTPATIENT
Start: 2024-06-09 | End: 2024-06-09

## 2024-06-09 RX ORDER — FUROSEMIDE 40 MG/1
80 TABLET ORAL 2 TIMES DAILY
Status: DISCONTINUED | OUTPATIENT
Start: 2024-06-09 | End: 2024-06-11 | Stop reason: HOSPADM

## 2024-06-09 RX ADMIN — INSULIN LISPRO 4 UNITS: 100 INJECTION, SOLUTION INTRAVENOUS; SUBCUTANEOUS at 15:58

## 2024-06-09 RX ADMIN — HYDROMORPHONE HYDROCHLORIDE 0.5 MG: 1 INJECTION, SOLUTION INTRAMUSCULAR; INTRAVENOUS; SUBCUTANEOUS at 05:05

## 2024-06-09 RX ADMIN — SODIUM CHLORIDE, PRESERVATIVE FREE 10 ML: 5 INJECTION INTRAVENOUS at 08:08

## 2024-06-09 RX ADMIN — OXYCODONE HYDROCHLORIDE 5 MG: 5 TABLET ORAL at 20:10

## 2024-06-09 RX ADMIN — OXYCODONE HYDROCHLORIDE 5 MG: 5 TABLET ORAL at 02:30

## 2024-06-09 RX ADMIN — INSULIN LISPRO 4 UNITS: 100 INJECTION, SOLUTION INTRAVENOUS; SUBCUTANEOUS at 12:05

## 2024-06-09 RX ADMIN — HYDROMORPHONE HYDROCHLORIDE 0.5 MG: 1 INJECTION, SOLUTION INTRAMUSCULAR; INTRAVENOUS; SUBCUTANEOUS at 18:54

## 2024-06-09 RX ADMIN — OXYCODONE HYDROCHLORIDE 5 MG: 5 TABLET ORAL at 15:48

## 2024-06-09 RX ADMIN — FUROSEMIDE 80 MG: 40 TABLET ORAL at 08:06

## 2024-06-09 RX ADMIN — HEPARIN SODIUM 5000 UNITS: 5000 INJECTION INTRAVENOUS; SUBCUTANEOUS at 05:05

## 2024-06-09 RX ADMIN — HEPARIN SODIUM 5000 UNITS: 5000 INJECTION INTRAVENOUS; SUBCUTANEOUS at 12:06

## 2024-06-09 RX ADMIN — HYDROMORPHONE HYDROCHLORIDE 0.5 MG: 1 INJECTION, SOLUTION INTRAMUSCULAR; INTRAVENOUS; SUBCUTANEOUS at 10:06

## 2024-06-09 RX ADMIN — METOPROLOL TARTRATE 25 MG: 25 TABLET, FILM COATED ORAL at 08:06

## 2024-06-09 RX ADMIN — HEPARIN SODIUM 5000 UNITS: 5000 INJECTION INTRAVENOUS; SUBCUTANEOUS at 21:46

## 2024-06-09 RX ADMIN — FUROSEMIDE 80 MG: 40 TABLET ORAL at 13:54

## 2024-06-09 RX ADMIN — OXYCODONE HYDROCHLORIDE 5 MG: 5 TABLET ORAL at 08:06

## 2024-06-09 RX ADMIN — HYDROMORPHONE HYDROCHLORIDE 0.5 MG: 1 INJECTION, SOLUTION INTRAMUSCULAR; INTRAVENOUS; SUBCUTANEOUS at 00:26

## 2024-06-09 RX ADMIN — SODIUM CHLORIDE, PRESERVATIVE FREE 10 ML: 5 INJECTION INTRAVENOUS at 21:49

## 2024-06-09 RX ADMIN — PANTOPRAZOLE SODIUM 40 MG: 40 TABLET, DELAYED RELEASE ORAL at 05:05

## 2024-06-09 RX ADMIN — OXYCODONE HYDROCHLORIDE 5 MG: 5 TABLET ORAL at 12:06

## 2024-06-09 RX ADMIN — SODIUM BICARBONATE 1300 MG: 648 TABLET ORAL at 08:06

## 2024-06-09 RX ADMIN — PANTOPRAZOLE SODIUM 40 MG: 40 TABLET, DELAYED RELEASE ORAL at 15:48

## 2024-06-09 RX ADMIN — OXYCODONE HYDROCHLORIDE 5 MG: 5 TABLET ORAL at 23:59

## 2024-06-09 RX ADMIN — METOPROLOL TARTRATE 25 MG: 25 TABLET, FILM COATED ORAL at 20:09

## 2024-06-09 RX ADMIN — SODIUM BICARBONATE 1300 MG: 648 TABLET ORAL at 20:10

## 2024-06-09 RX ADMIN — HYDROMORPHONE HYDROCHLORIDE 0.5 MG: 1 INJECTION, SOLUTION INTRAMUSCULAR; INTRAVENOUS; SUBCUTANEOUS at 13:54

## 2024-06-09 RX ADMIN — FUROSEMIDE 80 MG: 40 TABLET ORAL at 18:54

## 2024-06-09 RX ADMIN — TAMSULOSIN HYDROCHLORIDE 0.4 MG: 0.4 CAPSULE ORAL at 08:06

## 2024-06-09 RX ADMIN — ASPIRIN 81 MG 81 MG: 81 TABLET ORAL at 08:06

## 2024-06-09 RX ADMIN — INSULIN GLARGINE 45 UNITS: 100 INJECTION, SOLUTION SUBCUTANEOUS at 21:46

## 2024-06-09 RX ADMIN — POLYETHYLENE GLYCOL 3350 17 G: 17 POWDER, FOR SOLUTION ORAL at 08:08

## 2024-06-09 RX ADMIN — HYDROMORPHONE HYDROCHLORIDE 0.5 MG: 1 INJECTION, SOLUTION INTRAMUSCULAR; INTRAVENOUS; SUBCUTANEOUS at 23:02

## 2024-06-09 ASSESSMENT — PAIN SCALES - GENERAL
PAINLEVEL_OUTOF10: 10
PAINLEVEL_OUTOF10: 8
PAINLEVEL_OUTOF10: 8
PAINLEVEL_OUTOF10: 9
PAINLEVEL_OUTOF10: 9
PAINLEVEL_OUTOF10: 8
PAINLEVEL_OUTOF10: 10
PAINLEVEL_OUTOF10: 7
PAINLEVEL_OUTOF10: 9
PAINLEVEL_OUTOF10: 9
PAINLEVEL_OUTOF10: 10
PAINLEVEL_OUTOF10: 9
PAINLEVEL_OUTOF10: 10

## 2024-06-09 ASSESSMENT — PAIN DESCRIPTION - ORIENTATION
ORIENTATION: RIGHT
ORIENTATION: RIGHT;UPPER
ORIENTATION: RIGHT;UPPER

## 2024-06-09 ASSESSMENT — PAIN DESCRIPTION - LOCATION
LOCATION: ABDOMEN
LOCATION: ABDOMEN
LOCATION: CHEST;ABDOMEN
LOCATION: CHEST
LOCATION: CHEST
LOCATION: ABDOMEN
LOCATION: CHEST
LOCATION: ABDOMEN

## 2024-06-09 ASSESSMENT — PAIN DESCRIPTION - DESCRIPTORS
DESCRIPTORS: ACHING;DISCOMFORT
DESCRIPTORS: ACHING;DISCOMFORT
DESCRIPTORS: DISCOMFORT;ACHING
DESCRIPTORS: ACHING;DISCOMFORT

## 2024-06-09 NOTE — PROCEDURES
[facility]   Intensive Care Unit Attending Daily Note    Name: Nabila (Baby 1) Gogo Browning  Parents: Patricia Rodriguez and Anant Villalevy  Date of Birth/Admission: 2018  7:14 PM      History of Present Illness   600 gm, appropriate for gestational age, 24w4, twin A, female infant born by  due to PROM and  labor. The infant was then brought to the NICU for further evaluation, monitoring and management of prematurity, RDS and possible sepsis    Patient Active Problem List   Diagnosis     Extreme prematurity, birth weight 600 grams, 24w4d gestational age     Respiratory distress syndrome in      Breech delivery, fetus 1     Dichorionic diamniotic twin gestation      difficulty in feeding at breast      respiratory failure - requiring mechanical ventilation     Need for observation and evaluation of  for sepsis     Hyperbilirubinemia,      On total parenteral nutrition (TPN)      Interval History  No acute concerns overnight.      Assessment and Plan  Overall Status:  8 days  ELBW twin B female infant, now at 25w5d PMA with respiratory failure and possible sepsis. This patient is critically ill with respiratory failure requiring mechanical ventilation.      Access: UAC - appropriate position confirmed radiographically.   UVC out; PICC placed     A/P by systems:  FEN:    Filed Vitals:    17 0000 17 0000 17 0000   Weight: 0.6 kg (1 lb 5.2 oz) 0.55 kg (1 lb 3.4 oz) 0.56 kg (1 lb 3.8 oz)   Weight change: -0.05 kg (-1.8 oz)  -7% change from BW    ~180 mls/kg/day and ~100 kcals/kg/day  Adequate UOP and     Malnutrition.   - TF goal to 160 ml/kg/day.  - Continue to slowly advance feeds of BM/DBM 24kcal with HMF and monitor tolerance closely. Support with IVF/TPN as needed.   - Consult lactation specialist and dietician.  - Monitor fluid status, glucose and electrolytes.  PROCEDURE NOTE  Date: 6/9/2024   Name: Vincenzo Darden  YOB: 1965    Procedures    Picc placement order:    Benefits include stable, long term intravenous access. Blood draws. Peripheral vein preservation. Safely deliver vesicant medications.    Risks include failure to obtain the desired result(s) of the procedure, discomfort, injury, the need for additional procedures/therapies, permanent loss of body function, bleeding/bruising, arterial puncture, air embolism, nerve damage, hematoma, phlebitis, catheter fracture/rupture, catheter embolism, catheter occlusion, catheter migration, catheter site infection, unintentional/accidental removal of catheter, bloodstream infection, infiltration, cardiac arrhythmia, vein thrombosis, difficult catheter removal.   Alternatives discussed including centrally inserted central catheter, as well as less invasive procedures such as multiple peripheral IVs, extended dwell catheters and midline placements.     Consent obtained by proceduralist, signed by Patient/DPOA.      Prescribed therapy: Dapto thru July 1  Peripheral ultrasound assessment done. Plan for  right brachialvein insertion.   Vein measurement = 0.54 cm and area based CVR= 6%, preferable CVR based on area would be less than 20% (which correlates to less than 45% linear CVR).  Product type: Antimicrobial/Antithrombogenic Arrow non tapered power injectable PICC  History/Labs/Allergies Reviewed  Placed By: Dudley - RN IV Team  Assistant - Staff-RN  Time out Performed using Two Identifiers  Lot #: [id number]  Expiration date: 11/30/2024  Catheter info: 4french - single lumen  Total length: 45 cm  External catheter length: 2 cm  Extremity circumference at site: 33 cm  Number of attempts: 1  Estimated blood loss: 3 ml  Placement verified by: positive blood return & flushes easily  Special equipment used: [facility] ECG Tip tracker system, ultrasound, MST, and micro-introducer needle.   Catheter securement: Adhesive      Mild hyperglycemia: Has not received insulin. Monitor closely.     Respiratory: Failure requiring high frequency oscillatory ventilation intially. CXR c/w RDS s/p surfactant. Blood gas on admission is acceptable.   - Monitor respiratory status closely with blood gases q12 and serial CXR. S/p 3 doses of Curosurf. Transitioned to conventional on 1/15.   Currently on SIMV: TV 6/kg R 35 PEEP 6 with FiO2 21-25%.  - Wean as tolerated.  - Was on Vitamin A supplementation. Discontinued when fortified .    Apnea of Prematurity:  At risk due to PMA <34 weeks.    - Caffeine administration continues, and continue with monitoring.    Cardiovascular:  Stable - good perfusion and BP.   No murmur present.  - Goal mBP > 30   - Routine CR monitoring.    ID:  Potential for sepsis due to PROM, PTL and RDS. Mother's GBS status unknown.   - s/p 48 hr of Ampicillin and gentamicin   - antifungal prophylaxis with fluconazole while on BSA and central lines in place (for <1kg).     - Monitor for signs of infection.    Hematology:   > Risk for anemia of prematurity/phlebotomy.      Recent Labs  Lab 17  0600 17  1200 17  1811 17  0555 17  1450   HGB 10.7* 13.2* 13.3* 15.0 14.9*   - Monitor hemoglobin and transfuse to maintain Hgb > 12. Last on .    > Mild Neutropenia   Resolved.  Coags acceptable on , recheck if clinically indicated.    Jaundice:  At risk for hyperbilirubinemia due to prematurity and NPO status. Maternal blood type O positive, BBT A+.   Bilirubin results:    Recent Labs  Lab 17  0600 17  0351 17  0600 01/15/17  0600 17  0555 17  0555   BILITOTAL 3.8 3.4 2.6 4.2 3.1 1.9       No results for input(s): TCBIL in the last 168 hours.   Has required intermittent phototherapy. Off  with mild rebound. Recheck in AM.     CNS:  Exam wnl. Initial OFC deferred until 72 hours. At risk for IVH/PVL due to GA <34 weeks.   - S/p prophylactic Indocin (BW  <1250)   - Screening head ultrasounds on 1/15 and  with right sided cerebellar germinal matrix hemorrhage. Plan to followup as indicated, with final at ~36 wks PMA (eval for PVL).  - Monitor clinical status.    Sedation/ Pain Control: No concerns at present.  - Fentanyl and Ativan prn.    ROP:  At risk due to prematurity (<31 weeks BGA).    - Schedule ROP exam with Peds Ophthalmology per protocol.    Thermoregulation: Stable in isolette.  - Monitor temperature and provide thermal support as indicated.    HCM: First NMS was done at 24 hours - pending.   - Send repeat NMS at 14 & 30 days old (given prematurity)  - Obtain hearing/CCHD if no ECHO done/carseat screens PTD.  - Consider input from OT.  - will need long term f/u of hip exam with u/s, due to breech presentation.   - Continue standard NICU cares and family education plan.    Immunizations  - Give Hep B immunization at 21-30 days old (BW <2000 gm).  There is no immunization history for the selected administration types on file for this patient.       Physical Exam  GENERAL: NAD, female infant. Immature skin  RESPIRATORY: Chest CTA with equal breath sounds, no retractions.   CV: RRR, no murmur, strong/sym pulses in UE/LE, good perfusion.   ABDOMEN: soft, +BS, no HSM.   CNS: Tone appropriate for GA. AFOF. MAEE.   Rest of exam unchanged.        Medications  Current Facility-Administered Medications   Medication     lipids 20% for neonates (Daily dose divided into 2 doses - each infused over 10 hours)     mineral oil external liquid     parenteral nutrition -  compounded formula     sodium chloride 0.45% lock flush 0.7 mL     LORazepam (ATIVAN) injection 0.03 mg     sodium acetate 0.45 % with heparin 0.5 Units/mL infusion     glycerin (laxative) Suppository 0.125 suppository     fentaNYL (SUBLIMAZE) PEDS/NICU injection 0.3 mcg     sucrose (SWEET-EASE) solution 0.1-2 mL     caffeine citrated (CAFCIT) injection 6 mg     [START ON 2017] hepatitis b  vaccine recombinant (RECOMBIVAX-HB) injection 5 mcg     sodium chloride 0.45% lock flush 1 mL     breast milk for bar code scanning verification 1 Bottle     fluconazole (DIFLUCAN) PEDS/NICU injection 2 mg        Communication                                                                                                                                   Parents: Patricia Rodriguez and Anant Nallely. Updated after rounds.   2 older half-sibs that are 14 and 19.  Patricia is a family and housing advocate at [facility].     PCPs:   Infant PCP: MYESHA MCNULTY Admission note routed 1/10  Maternal OB PCP: Arianna Orozco CNM- last updated 1/12 via phone call  MFM:Dr. Tristan & Dr. Valles ([facility]) Admission note routed 1/10  Delivering Provider: Dr. Valles    Health Care Team:  Patient discussed with the care team. A/P, imaging studies, laboratory data, medications and family situation reviewed.    Mckenzie Lozano MD

## 2024-06-10 LAB
ANION GAP SERPL CALCULATED.3IONS-SCNC: 12 MMOL/L (ref 9–16)
ANION GAP SERPL CALCULATED.3IONS-SCNC: 19 MMOL/L (ref 9–16)
BUN SERPL-MCNC: 62 MG/DL (ref 6–20)
BUN SERPL-MCNC: 62 MG/DL (ref 6–20)
CALCIUM SERPL-MCNC: 8.1 MG/DL (ref 8.6–10.4)
CALCIUM SERPL-MCNC: 8.1 MG/DL (ref 8.6–10.4)
CHLORIDE SERPL-SCNC: 94 MMOL/L (ref 98–107)
CHLORIDE SERPL-SCNC: 94 MMOL/L (ref 98–107)
CK SERPL-CCNC: 73 U/L (ref 39–308)
CO2 SERPL-SCNC: 15 MMOL/L (ref 20–31)
CO2 SERPL-SCNC: 18 MMOL/L (ref 20–31)
CREAT SERPL-MCNC: 3.6 MG/DL (ref 0.7–1.2)
CREAT SERPL-MCNC: 3.6 MG/DL (ref 0.7–1.2)
ERYTHROCYTE [DISTWIDTH] IN BLOOD BY AUTOMATED COUNT: 14.3 % (ref 11.8–14.4)
GFR, ESTIMATED: 18 ML/MIN/1.73M2
GFR, ESTIMATED: 18 ML/MIN/1.73M2
GLUCOSE BLD-MCNC: 168 MG/DL (ref 75–110)
GLUCOSE BLD-MCNC: 171 MG/DL (ref 75–110)
GLUCOSE BLD-MCNC: 179 MG/DL (ref 75–110)
GLUCOSE BLD-MCNC: 215 MG/DL (ref 75–110)
GLUCOSE SERPL-MCNC: 215 MG/DL (ref 74–99)
GLUCOSE SERPL-MCNC: 215 MG/DL (ref 74–99)
HCT VFR BLD AUTO: 34.1 % (ref 40.7–50.3)
HGB BLD-MCNC: 11 G/DL (ref 13–17)
INTERVENTION: NORMAL
MCH RBC QN AUTO: 29.6 PG (ref 25.2–33.5)
MCHC RBC AUTO-ENTMCNC: 32.3 G/DL (ref 28.4–34.8)
MCV RBC AUTO: 91.9 FL (ref 82.6–102.9)
NRBC BLD-RTO: 0 PER 100 WBC
PLATELET # BLD AUTO: 330 K/UL (ref 138–453)
PMV BLD AUTO: 9.1 FL (ref 8.1–13.5)
POTASSIUM SERPL-SCNC: 4.4 MMOL/L (ref 3.7–5.3)
POTASSIUM SERPL-SCNC: 4.6 MMOL/L (ref 3.7–5.3)
RBC # BLD AUTO: 3.71 M/UL (ref 4.21–5.77)
SODIUM SERPL-SCNC: 124 MMOL/L (ref 136–145)
SODIUM SERPL-SCNC: 128 MMOL/L (ref 136–145)
WBC OTHER # BLD: 18.8 K/UL (ref 3.5–11.3)

## 2024-06-10 PROCEDURE — 6370000000 HC RX 637 (ALT 250 FOR IP): Performed by: NURSE PRACTITIONER

## 2024-06-10 PROCEDURE — 2060000000 HC ICU INTERMEDIATE R&B

## 2024-06-10 PROCEDURE — 2580000003 HC RX 258: Performed by: NURSE PRACTITIONER

## 2024-06-10 PROCEDURE — 99232 SBSQ HOSP IP/OBS MODERATE 35: CPT | Performed by: INTERNAL MEDICINE

## 2024-06-10 PROCEDURE — 51798 US URINE CAPACITY MEASURE: CPT

## 2024-06-10 PROCEDURE — 6360000002 HC RX W HCPCS: Performed by: NURSE PRACTITIONER

## 2024-06-10 PROCEDURE — 6370000000 HC RX 637 (ALT 250 FOR IP): Performed by: INTERNAL MEDICINE

## 2024-06-10 PROCEDURE — 6370000000 HC RX 637 (ALT 250 FOR IP): Performed by: STUDENT IN AN ORGANIZED HEALTH CARE EDUCATION/TRAINING PROGRAM

## 2024-06-10 PROCEDURE — 2580000003 HC RX 258: Performed by: INTERNAL MEDICINE

## 2024-06-10 PROCEDURE — 82947 ASSAY GLUCOSE BLOOD QUANT: CPT

## 2024-06-10 PROCEDURE — 6360000002 HC RX W HCPCS: Performed by: INTERNAL MEDICINE

## 2024-06-10 PROCEDURE — 85027 COMPLETE CBC AUTOMATED: CPT

## 2024-06-10 PROCEDURE — 6360000002 HC RX W HCPCS: Performed by: STUDENT IN AN ORGANIZED HEALTH CARE EDUCATION/TRAINING PROGRAM

## 2024-06-10 PROCEDURE — 80048 BASIC METABOLIC PNL TOTAL CA: CPT

## 2024-06-10 PROCEDURE — 36415 COLL VENOUS BLD VENIPUNCTURE: CPT

## 2024-06-10 PROCEDURE — 99239 HOSP IP/OBS DSCHRG MGMT >30: CPT | Performed by: STUDENT IN AN ORGANIZED HEALTH CARE EDUCATION/TRAINING PROGRAM

## 2024-06-10 PROCEDURE — 82550 ASSAY OF CK (CPK): CPT

## 2024-06-10 RX ORDER — OXYCODONE HYDROCHLORIDE 5 MG/1
5 TABLET ORAL EVERY 8 HOURS PRN
Qty: 15 TABLET | Refills: 0 | Status: SHIPPED | OUTPATIENT
Start: 2024-06-10 | End: 2024-06-15

## 2024-06-10 RX ORDER — ATORVASTATIN CALCIUM 40 MG/1
40 TABLET, FILM COATED ORAL NIGHTLY
Qty: 30 TABLET | Refills: 3 | Status: SHIPPED | OUTPATIENT
Start: 2024-06-10

## 2024-06-10 RX ORDER — FUROSEMIDE 80 MG
80 TABLET ORAL 2 TIMES DAILY
Qty: 60 TABLET | Refills: 3 | Status: SHIPPED | OUTPATIENT
Start: 2024-06-10

## 2024-06-10 RX ADMIN — HYDROMORPHONE HYDROCHLORIDE 0.5 MG: 1 INJECTION, SOLUTION INTRAMUSCULAR; INTRAVENOUS; SUBCUTANEOUS at 12:17

## 2024-06-10 RX ADMIN — SODIUM CHLORIDE, PRESERVATIVE FREE 10 ML: 5 INJECTION INTRAVENOUS at 08:28

## 2024-06-10 RX ADMIN — INSULIN GLARGINE 45 UNITS: 100 INJECTION, SOLUTION SUBCUTANEOUS at 20:57

## 2024-06-10 RX ADMIN — OXYCODONE HYDROCHLORIDE 5 MG: 5 TABLET ORAL at 23:48

## 2024-06-10 RX ADMIN — OXYCODONE HYDROCHLORIDE 5 MG: 5 TABLET ORAL at 08:26

## 2024-06-10 RX ADMIN — SODIUM CHLORIDE, PRESERVATIVE FREE 10 ML: 5 INJECTION INTRAVENOUS at 21:02

## 2024-06-10 RX ADMIN — HYDROMORPHONE HYDROCHLORIDE 0.5 MG: 1 INJECTION, SOLUTION INTRAMUSCULAR; INTRAVENOUS; SUBCUTANEOUS at 03:00

## 2024-06-10 RX ADMIN — HEPARIN SODIUM 5000 UNITS: 5000 INJECTION INTRAVENOUS; SUBCUTANEOUS at 04:24

## 2024-06-10 RX ADMIN — SODIUM CHLORIDE, PRESERVATIVE FREE 10 ML: 5 INJECTION INTRAVENOUS at 20:58

## 2024-06-10 RX ADMIN — FUROSEMIDE 80 MG: 40 TABLET ORAL at 08:26

## 2024-06-10 RX ADMIN — METOPROLOL TARTRATE 25 MG: 25 TABLET, FILM COATED ORAL at 20:57

## 2024-06-10 RX ADMIN — INSULIN LISPRO 4 UNITS: 100 INJECTION, SOLUTION INTRAVENOUS; SUBCUTANEOUS at 16:50

## 2024-06-10 RX ADMIN — POLYETHYLENE GLYCOL 3350 17 G: 17 POWDER, FOR SOLUTION ORAL at 08:27

## 2024-06-10 RX ADMIN — TAMSULOSIN HYDROCHLORIDE 0.4 MG: 0.4 CAPSULE ORAL at 08:26

## 2024-06-10 RX ADMIN — PANTOPRAZOLE SODIUM 40 MG: 40 TABLET, DELAYED RELEASE ORAL at 16:50

## 2024-06-10 RX ADMIN — SODIUM BICARBONATE 1300 MG: 648 TABLET ORAL at 08:26

## 2024-06-10 RX ADMIN — SODIUM BICARBONATE 1300 MG: 648 TABLET ORAL at 20:56

## 2024-06-10 RX ADMIN — PANTOPRAZOLE SODIUM 40 MG: 40 TABLET, DELAYED RELEASE ORAL at 07:01

## 2024-06-10 RX ADMIN — HEPARIN SODIUM 5000 UNITS: 5000 INJECTION INTRAVENOUS; SUBCUTANEOUS at 20:56

## 2024-06-10 RX ADMIN — HYDROMORPHONE HYDROCHLORIDE 0.5 MG: 1 INJECTION, SOLUTION INTRAMUSCULAR; INTRAVENOUS; SUBCUTANEOUS at 07:01

## 2024-06-10 RX ADMIN — METOPROLOL TARTRATE 25 MG: 25 TABLET, FILM COATED ORAL at 08:26

## 2024-06-10 RX ADMIN — OXYCODONE HYDROCHLORIDE 5 MG: 5 TABLET ORAL at 04:25

## 2024-06-10 RX ADMIN — ASPIRIN 81 MG 81 MG: 81 TABLET ORAL at 08:26

## 2024-06-10 RX ADMIN — DAPTOMYCIN 550 MG: 500 INJECTION, POWDER, LYOPHILIZED, FOR SOLUTION INTRAVENOUS at 12:17

## 2024-06-10 RX ADMIN — FUROSEMIDE 80 MG: 40 TABLET ORAL at 16:50

## 2024-06-10 RX ADMIN — HYDROMORPHONE HYDROCHLORIDE 0.5 MG: 1 INJECTION, SOLUTION INTRAMUSCULAR; INTRAVENOUS; SUBCUTANEOUS at 19:09

## 2024-06-10 ASSESSMENT — PAIN DESCRIPTION - ORIENTATION
ORIENTATION: RIGHT;LOWER
ORIENTATION: LEFT;UPPER

## 2024-06-10 ASSESSMENT — PAIN SCALES - GENERAL
PAINLEVEL_OUTOF10: 9
PAINLEVEL_OUTOF10: 10
PAINLEVEL_OUTOF10: 0
PAINLEVEL_OUTOF10: 9
PAINLEVEL_OUTOF10: 6
PAINLEVEL_OUTOF10: 10

## 2024-06-10 ASSESSMENT — PAIN DESCRIPTION - DESCRIPTORS
DESCRIPTORS: ACHING;DISCOMFORT

## 2024-06-10 ASSESSMENT — PAIN DESCRIPTION - LOCATION
LOCATION: CHEST
LOCATION: ABDOMEN

## 2024-06-10 ASSESSMENT — PAIN SCALES - WONG BAKER
WONGBAKER_NUMERICALRESPONSE: HURTS LITTLE MORE
WONGBAKER_NUMERICALRESPONSE: NO HURT

## 2024-06-10 NOTE — DISCHARGE SUMMARY
[facility]  Office: [phone]  Alexi Draper DO, Sam Wharton DO, Matthew Kirkpatrick DO, Shane Dennis DO, Lisbeth Owusu MD, Nurys Crisostomo MD, Jena Xie MD, Susan Danielle MD,  Shubham Friedman MD, Mireya Nugent MD, Aleida Rincon MD,  Aliza Saavedra DO, Hector Romero MD, Moncho Gilbert MD, Marlon Draper DO, Liliana Painting MD,  Soy Mccullough DO, Amparo Kong MD, Shila Arana MD, Kristy Wei MD, Kelly Vogel MD,  Morris Sheets MD, Chapincito Rebolledo MD, Andrea Fabian MD, Lisset Mccracken MD, René Holland MD, Nishi Ojeda MD, Fer Hernandez DO, Tejas Paulino DO, Luis M Reardon MD,  Bruce Greenfield MD, Shirley Waterhouse, CNP,  Mariela Gomez CNP, Andrei Polanco, CNP,  Nan Ray, DNP, Liss Lu, CNP, Olya Kaba, CNP, Alivia Melchor CNP, An Rosenbaum, CNP, Deena Carmona, PA-C, Corinna Mcdaniel PA-C, Kiera Holland, CNP, Gladys Carter, CNP, Deja Patterson, CNP, Christa Ojeda, CNP, Maddi Sun, CNP, Amanda Gallardo, CNS, Yamilex Latif, CNP, Zoe Conner CNP, Tracy Schwab, CNP         [facility]   IN-PATIENT SERVICE   [facility]    Discharge Summary     Patient ID: Vincenzo Darden  :  1965   MRN: [id number]     ACCOUNT:  [id number]   Patient's PCP: Shaikh Calix MD  Admit Date: 2024   Discharge Date: 2024     Length of Stay: 8  Code Status:  Full Code  Admitting Physician: No admitting provider for patient encounter.  Discharge Physician: Nishi Ojeda MD     Active Discharge Diagnoses:     Hospital Problem Lists:  Principal Problem:    NSTEMI (non-ST elevated myocardial infarction) ([facility])  Active Problems:    Troponin level elevated    Chest pain    Cardiac aneurysm    CAMILLE (acute kidney injury) ([facility])    Coronary artery disease involving native coronary artery of native heart without angina pectoris    Diffuse abdominal pain    Acute encephalopathy    Staphylococcus aureus bacteremia    Hyponatremia    MSSA

## 2024-06-10 NOTE — CARE COORDINATION
Transitional planning note: plan is home with spouse and [facility]. IV ATB's through [facility]. Call [facility] and she will arrange for delivery of IV ATB's to home. Patient is on Q48hr IV daptomycin with dose due today and next dose Wednesday 6/12.. Call placed to alfredo with [facility] and arranged start of care for Wednesday 6/12 at noon. Patient will have transportation home.     1240: hema from "[facility] called and their pharmacist is requesting a CK level be added on to patient's labs today. Perfect serve message to dr pineda

## 2024-06-11 VITALS
BODY MASS INDEX: 35.27 KG/M2 | WEIGHT: 260.36 LBS | DIASTOLIC BLOOD PRESSURE: 81 MMHG | TEMPERATURE: 98.1 F | HEART RATE: 80 BPM | OXYGEN SATURATION: 92 % | RESPIRATION RATE: 16 BRPM | HEIGHT: 72 IN | SYSTOLIC BLOOD PRESSURE: 127 MMHG

## 2024-06-11 LAB
ANION GAP SERPL CALCULATED.3IONS-SCNC: 16 MMOL/L (ref 9–16)
BUN SERPL-MCNC: 69 MG/DL (ref 6–20)
CALCIUM SERPL-MCNC: 8.2 MG/DL (ref 8.6–10.4)
CHLORIDE SERPL-SCNC: 94 MMOL/L (ref 98–107)
CO2 SERPL-SCNC: 18 MMOL/L (ref 20–31)
CREAT SERPL-MCNC: 4.1 MG/DL (ref 0.7–1.2)
GFR, ESTIMATED: 16 ML/MIN/1.73M2
GLUCOSE BLD-MCNC: 80 MG/DL (ref 75–110)
GLUCOSE BLD-MCNC: 85 MG/DL (ref 75–110)
GLUCOSE SERPL-MCNC: 121 MG/DL (ref 74–99)
POTASSIUM SERPL-SCNC: 3.9 MMOL/L (ref 3.7–5.3)
SODIUM SERPL-SCNC: 128 MMOL/L (ref 136–145)

## 2024-06-11 PROCEDURE — 6370000000 HC RX 637 (ALT 250 FOR IP): Performed by: STUDENT IN AN ORGANIZED HEALTH CARE EDUCATION/TRAINING PROGRAM

## 2024-06-11 PROCEDURE — 80048 BASIC METABOLIC PNL TOTAL CA: CPT

## 2024-06-11 PROCEDURE — 6370000000 HC RX 637 (ALT 250 FOR IP): Performed by: INTERNAL MEDICINE

## 2024-06-11 PROCEDURE — 6360000002 HC RX W HCPCS: Performed by: NURSE PRACTITIONER

## 2024-06-11 PROCEDURE — 82947 ASSAY GLUCOSE BLOOD QUANT: CPT

## 2024-06-11 PROCEDURE — 2580000003 HC RX 258: Performed by: INTERNAL MEDICINE

## 2024-06-11 PROCEDURE — 6370000000 HC RX 637 (ALT 250 FOR IP): Performed by: NURSE PRACTITIONER

## 2024-06-11 PROCEDURE — 36415 COLL VENOUS BLD VENIPUNCTURE: CPT

## 2024-06-11 RX ORDER — SODIUM BICARBONATE 650 MG/1
1300 TABLET ORAL 2 TIMES DAILY
Qty: 120 TABLET | Refills: 0 | Status: SHIPPED | OUTPATIENT
Start: 2024-06-11 | End: 2024-07-11

## 2024-06-11 RX ORDER — SODIUM BICARBONATE 650 MG/1
1300 TABLET ORAL 2 TIMES DAILY
Qty: 120 TABLET | Refills: 0 | Status: SHIPPED | OUTPATIENT
Start: 2024-06-11 | End: 2024-06-11

## 2024-06-11 RX ADMIN — PANTOPRAZOLE SODIUM 40 MG: 40 TABLET, DELAYED RELEASE ORAL at 07:54

## 2024-06-11 RX ADMIN — METOPROLOL TARTRATE 25 MG: 25 TABLET, FILM COATED ORAL at 07:55

## 2024-06-11 RX ADMIN — HYDROMORPHONE HYDROCHLORIDE 0.5 MG: 1 INJECTION, SOLUTION INTRAMUSCULAR; INTRAVENOUS; SUBCUTANEOUS at 00:47

## 2024-06-11 RX ADMIN — ASPIRIN 81 MG 81 MG: 81 TABLET ORAL at 07:54

## 2024-06-11 RX ADMIN — OXYCODONE HYDROCHLORIDE 5 MG: 5 TABLET ORAL at 03:59

## 2024-06-11 RX ADMIN — FUROSEMIDE 80 MG: 40 TABLET ORAL at 07:55

## 2024-06-11 RX ADMIN — TAMSULOSIN HYDROCHLORIDE 0.4 MG: 0.4 CAPSULE ORAL at 07:55

## 2024-06-11 RX ADMIN — OXYCODONE HYDROCHLORIDE 5 MG: 5 TABLET ORAL at 07:55

## 2024-06-11 RX ADMIN — POLYETHYLENE GLYCOL 3350 17 G: 17 POWDER, FOR SOLUTION ORAL at 07:57

## 2024-06-11 RX ADMIN — SODIUM BICARBONATE 1300 MG: 648 TABLET ORAL at 07:54

## 2024-06-11 RX ADMIN — SODIUM CHLORIDE, PRESERVATIVE FREE 10 ML: 5 INJECTION INTRAVENOUS at 08:00

## 2024-06-11 ASSESSMENT — PAIN SCALES - WONG BAKER
WONGBAKER_NUMERICALRESPONSE: NO HURT
WONGBAKER_NUMERICALRESPONSE: HURTS A LITTLE BIT
WONGBAKER_NUMERICALRESPONSE: HURTS LITTLE MORE

## 2024-06-11 ASSESSMENT — PAIN SCALES - GENERAL
PAINLEVEL_OUTOF10: 10
PAINLEVEL_OUTOF10: 8
PAINLEVEL_OUTOF10: 6
PAINLEVEL_OUTOF10: 0
PAINLEVEL_OUTOF10: 10
PAINLEVEL_OUTOF10: 8
PAINLEVEL_OUTOF10: 3

## 2024-06-11 ASSESSMENT — PAIN DESCRIPTION - LOCATION: LOCATION: ABDOMEN

## 2024-06-11 NOTE — PROGRESS NOTES
Pharmacy Note     Renal Dose Adjustment    Vincenzo Darden is a 58 y.o. male. Pharmacist assessment of renally cleared medications.    Recent Labs     06/02/24  2017   BUN 33*       Recent Labs     06/02/24  2017   CREATININE 2.2*       Estimated Creatinine Clearance: 48 mL/min (A) (based on SCr of 2.2 mg/dL (H)).  Estimated CrCl using Ideal Body Weight: 39 mL/min (based on IBW 77 kg)    Height:   Ht Readings from Last 1 Encounters:   03/21/24 1.829 m (6')     Weight:  Wt Readings from Last 1 Encounters:   06/02/24 118 kg (260 lb 2.3 oz)       The following medication dose has been adjusted based upon renal function per P&T Guidelines:             Levofloxacin 750mg IVPB every 24 hours changed to every 48 hours.      
2012: writer in to assess patient. Patient refusing to wear telemetry unless it is for vital signs. Writer reached out to on call NP. No new orders at this time  
[facility]  Office: [phone]  Alexi Draper DO, Sam Wharton DO, Matthew Kirkpatrick DO, Shane Dennis, DO, Lisbeth Owusu MD, Nurys Crisostomo MD, Jena Xie MD, Susan Danielle MD,  Shubham Friedman MD, Mireya Nugent MD, Aleida Rincon MD,  Aliza Saavedra DO, Hector Romero MD, Moncho Gilbert MD, Marlon Draper DO, Liliana Painting MD,  Soy Mccullough DO, Amparo Kong MD, Shila Arana MD, Kristy Wei MD, Kelly Vogel MD,  Morris Sheets MD, Chapincito Rebolledo MD, Andrea Fabian MD, Lisset Mccracken MD, René Holland MD, Nishi Ojeda MD, Fer Hernandez DO, Tejas Paulino DO, Luis M Reardon MD,  Bruce Greenfield MD, Shirley Waterhouse, CNP,  Mariela Gomez CNP, Andrei Polanco, CNP,  Nan Ray, DNP, Liss Lu, CNP, Olya Kaba, CNP, Alivia Melchor, CNP, An Rosenbaum, CNP, Deena Carmona, PA-C, Corinna Mcdaniel PA-C, Kiera Holland, CNP, Gladys Carter, CNP, Deja Patterson, CNP, Christa Ojeda, CNP, Maddi Sun, CNP, Amanda Gallardo, CNS, Yamilex Latif, CNP, Zoe Conner CNP, Tracy Schwab, CNP         [facility]   IN-PATIENT SERVICE   [facility]    Progress Note    6/3/2024    11:42 AM    Name:   Vincenzo Darden  MRN:     [id number]     Acct:      [id number]   Room:   0422/0422-02   Day:  1  Admit Date:  6/2/2024  8:06 PM    PCP:   Shaikh Calix MD  Code Status:  Full Code    Subjective:     C/C:   Chief Complaint   Patient presents with    Chest Pain     Interval History Status: not changed.     Seen and examined  Patient complains of abdominal pain  Patient is confused, AO x 2  Was not able to give much history  Per patient he did not have any nausea vomiting and diarrhea  Does not know when was the last bowel movement  Discussed with nursing he does not have any bowel movement, nausea and vomiting  Seen by cardiology, chest pain is likely musculoskeletal per cards  Heparin drip d/c  Nephrology was consulted  CT abdomen pelvis ordered with 
After speaking with Dr Rebolledo, 1 time order of Percocet is being placed until ultrasound guided IV can be placed. Consult for IV team placed.   
CLINICAL PHARMACY NOTE: MEDS TO BEDS    Total # of Prescriptions Filled: 3   The following medications were delivered to the patient:  Atorvastatin  Furosemide  oxycodone    Additional Documentation:    
Charge RN has made multiple attempts to reach Nursing Supervisor in regard to obtaining IV access for patient with no response. This RN reached out to on call physician explaining this matter, requesting assistance for pain management at this time. Awaiting response from physician.   
Comprehensive Nutrition Assessment    Type and Reason for Visit:  Initial, RD Nutrition Re-Screen/LOS    Nutrition Recommendations/Plan:   Recommend a 5 CHO Choices diet r/t PMH of DM.  Recommend Diabetic ONS once/day.  To monitor nutrition intake/tolerance, labs, weight, and plan of care, as appropriate.     Malnutrition Assessment:  Malnutrition Status:  Mild malnutrition (06/10/24 1031)    Context:  Acute Illness     Findings of the 6 clinical characteristics of malnutrition:  Energy Intake:  No significant decrease in energy intake  Weight Loss:  No significant weight loss     Body Fat Loss:  No significant body fat loss     Muscle Mass Loss:  No significant muscle mass loss    Fluid Accumulation:  Mild     Strength:  Not Performed    Nutrition Assessment:    57 yo male admitted for NSTEMI. PMH includes: DM2, DM neuropathy, PVD s/p right BKA w/ chronic left 1st toe gangrene, CKD3, COLT (not on CPAP), multi vessel CAD s/p CABG 02/2024. Patient sitting at bedside at visit, no visitors present. Patient reported to have a normal appetite prior to and during admission, consuming 100% of meals. Agreeable to consume an ONS as needed. Patient reported usual weight of 260-270#, and weight fluctuation within this range is normal. Docomented weight history reviewed. Per chart, no nausea/emesis, last BM 6/5- on bowel regimen. Labs include:  MG/dL (H), BUN 62 MG/dL (H), CR 3.6 MG/dL (H), GFR 18 ML/MIN/1.73 m2 (L), POC -237 MG/dL (past 36 hrs). Meds include: lasix, lantus, humalog, glycolax.    Nutrition Related Findings:    Generalized, +2 edema LLE per chart review. Wound Type: Diabetic Ulcer (traumatic wound)       Current Nutrition Intake & Therapies:    Average Meal Intake: %  Average Supplements Intake: None Ordered  ADULT DIET; Regular; 1800 ml    Anthropometric Measures:  Height: 182.9 cm (6' 0.01\")  Ideal Body Weight (IBW): 178 lbs (81 kg)       Current Body Weight: 118.1 kg (260 lb 5.8 oz), 
Discussion by this RN with patient about disruption of unit by yelling and not using call light for needs will not be tolerated. Patient is alert and oriented x4, call light has been in his reach all shift. Patient states he understands but this discussion has happened by this RN multiple times between tonight and last nights shift with patient stating each time he understands.   
[facility]   Progress Note                   Date:   6/4/2024  Patient name: Vincenzo Darden  Date of admission:  6/2/2024  8:06 PM  MRN:   [id number]  YOB: 1965  PCP: Shaikh Calix MD    Reason for Admission: Lactic acidosis [E87.20]  Cardiac aneurysm [I25.3]  NSTEMI (non-ST elevated myocardial infarction) (HCC) [I21.4]  Cellulitis, unspecified cellulitis site [L03.90]  Chest pain, unspecified type [R07.9]    Subjective:       Clinical Changes / Abnormalities: Pt seen and examined in the room.  Patient sitting up in chair. Pt denies any or sob.  Patient reports chest pain that is worse with coughing. Labs, vitals and tele reviewed- SR 79    Medications:   Scheduled Meds:   DAPTOmycin (CUBICIN) 700 mg in sodium chloride 0.9 % 50 mL IVPB  6 mg/kg IntraVENous Q48H    aspirin  81 mg Oral Daily    metoprolol tartrate  25 mg Oral BID    tamsulosin  0.4 mg Oral Daily    sodium chloride flush  5-40 mL IntraVENous 2 times per day    pantoprazole (PROTONIX) 40 mg in sodium chloride (PF) 0.9 % 10 mL injection  40 mg IntraVENous Q12H    atorvastatin  40 mg Oral Nightly    insulin glargine  32 Units SubCUTAneous Nightly    furosemide  40 mg IntraVENous Daily    insulin lispro  0-16 Units SubCUTAneous Q4H    heparin (porcine)  5,000 Units SubCUTAneous 3 times per day    sodium bicarbonate  650 mg Oral 4x Daily     Continuous Infusions:   sodium chloride      dextrose       CBC:   Recent Labs     06/02/24 2017 06/03/24  0339 06/04/24  0157   WBC 7.4 10.5 14.8*   HGB 13.6 13.0 12.4*    175 171     BMP:    Recent Labs     06/03/24 0339 06/03/24  1822 06/04/24  0313   * 128* 131*   K 4.1 4.0 4.1    100 100   CO2 16* 16* 14*   BUN 33* 33* 39*   CREATININE 2.4* 2.9* 3.5*   GLUCOSE 187* 169* 132*     Hepatic:   Recent Labs     06/02/24 2017   AST 23   ALT 8*   BILITOT 0.4   ALKPHOS 108     Troponin:   Recent Labs     06/02/24 2103 06/03/24  0006 06/03/24  0822   TROPHS 65* 64* 
[facility]   Progress Note                   Date:   6/6/2024  Patient name: Vincenzo Darden  Date of admission:  6/2/2024  8:06 PM  MRN:   [id number]  YOB: 1965  PCP: Shaikh Calix MD    Reason for Admission: Lactic acidosis [E87.20]  Cardiac aneurysm [I25.3]  NSTEMI (non-ST elevated myocardial infarction) (HCC) [I21.4]  Cellulitis, unspecified cellulitis site [L03.90]  Chest pain, unspecified type [R07.9]    Subjective:       Clinical Changes / Abnormalities: Pt seen and examined in the room.  Patient resting in bed. Patient reports chest pain that is worse with coughing as well as chronic shortness of breath. Labs, vitals and tele reviewed- SR    Medications:   Scheduled Meds:   insulin glargine  40 Units SubCUTAneous Nightly    DAPTOmycin (CUBICIN) 550 mg in sodium chloride 0.9 % 50 mL IVPB  6 mg/kg (Adjusted) IntraVENous Q48H    insulin lispro  0-16 Units SubCUTAneous 4x Daily AC & HS    polyethylene glycol  17 g Oral Daily    sodium bicarbonate  1,300 mg Oral BID    aspirin  81 mg Oral Daily    metoprolol tartrate  25 mg Oral BID    tamsulosin  0.4 mg Oral Daily    sodium chloride flush  5-40 mL IntraVENous 2 times per day    pantoprazole (PROTONIX) 40 mg in sodium chloride (PF) 0.9 % 10 mL injection  40 mg IntraVENous Q12H    [Held by provider] atorvastatin  40 mg Oral Nightly    furosemide  40 mg IntraVENous Daily    heparin (porcine)  5,000 Units SubCUTAneous 3 times per day     Continuous Infusions:   sodium chloride      dextrose       CBC:   Recent Labs     06/04/24  0157 06/06/24  0342   WBC 14.8* 16.0*   HGB 12.4* 12.2*    179       BMP:    Recent Labs     06/04/24  0313 06/05/24  0256 06/06/24  0342   * 130* 125*   K 4.1 4.1 4.2    97* 95*   CO2 14* 18* 16*   BUN 39* 46* 50*   CREATININE 3.5* 3.5* 3.3*   GLUCOSE 132* 178* 377*       Hepatic:   No results for input(s): \"AST\", \"ALT\", \"BILITOT\", \"ALKPHOS\" in the last 72 hours.    Invalid input(s): 
[facility]  Office: [phone]  Alexi Draper DO, Sam Wharton DO, Matthew Kirkpatrick DO, Shane Dennis DO, Lisbeth Owusu MD, Nurys Crisostomo MD, Jena Xie MD, Susan Danielle MD,  Shubham Friedman MD, Mireya Nugent MD, Aleida Rincon MD,  Aliza Saavedra DO, Hector Romero MD, Moncho Gilbert MD, Marlon Draper DO, Liliana Painting MD,  Soy Mccullough DO, Amparo Kong MD, Shila Arana MD, Kristy Wei MD, Kelly Vogel MD,  Morris Sheets MD, Chapincito Rebolledo MD, Andrea Fabian MD, Lisset Mccracken MD, René Holland MD, Nishi Ojeda MD, Fer Hernandez DO, Tejas Paulino DO, Luis M Reardon MD,  Bruce Greenfield MD, Shirley Waterhouse, CNP,  Mariela Gomez CNP, Andrei Polanco, CNP,  Nan Ray, DNP, Liss Lu, CNP, Olya Kaba, CNP, Alivia Melchor, CNP, An Rosenbaum, CNP, Deena Carmona, PA-C, Corinna Mcdaniel PA-C, Kiera Holland, CNP, Gladys Carter, CNP, Deja Patterson, CNP, Christa Ojeda, CNP, Maddi Sun, CNP, Amanda Gallardo, CNS, Yamilex Latif, CNP, Zoe Conner CNP, Tracy Schwab, CNP         [facility]   IN-PATIENT SERVICE   [facility]    Progress Note    6/7/2024    7:45 AM    Name:   Vincenzo Darden  MRN:     [id number]     Acct:      [id number]   Room:   0422/0422-02   Day:  5  Admit Date:  6/2/2024  8:06 PM    PCP:   Shaikh Calix MD  Code Status:  Full Code    Subjective:     C/C:   Chief Complaint   Patient presents with    Chest Pain     Interval History Status: stable    Seen and examined.   Hemodynamically stable.  Still continues to complain about pain in the chest.  Was very frustrated from pain.  Sternotomy site clean.  Creatinine 3.2 today.  \  Echo completed.  Cardiology okay for discharge and follow-up as outpatient for staged PCI.  Awaited nephrology recommendations and clearance for discharge.    Brief History:   per documentation    58 y.o.  male w/ DM2, DM neuropathy, PVD s/p right BKA w/ chronic left 1st toe 
Multiple attempts by 2 RN's on this unit made to start new IV. All attempts have infiltrated. Dilaudid was already scanned but not given due to loss of IV access. Dilaudid 0.25 was wasted with 2nd RN in [employer] and disposed of properly. Awaiting HS response for IV start.   
NEPHROLOGY PROGRESS NOTE      ASSESSMENT     Acute kidney injury nonoliguric secondary to nephrotoxic ATN from contrast exposure coupled with urinary retention/CHF-creatinine peaked at 23.5  Hyponatremia secondary to appropriate ADH excess from CHF and inappropriate ADH excess related to chest pain  High anion gap metabolic acidosis secondary to acute kidney injury  Chronic kidney disease stage IIIb secondary to diabetic nephropathy with baseline creatinine 1.6-1.8.  Never seen a nephrologist as an outpatient clinics.  Nephrotic range proteinuria from diabetic nephropathy  Decompensated diastolic congestive heart failure.improved  Status post CABG in the month of February  Type 2 diabetes with micro and macrovascular complications  Essential hypertension    PLAN     Fluid/salt restriction  Continue Lasix.  1 extra dose today  1 dose of Samsca  On hold ACE inhibitor or ARB  Will follow      SUBJECTIVE     Known case of CKD stage IIIb with baseline creatinine 1.6-1.8/CABG/type 2 diabetes presented with left lower extremity swelling and pain from cellulitis along with intermittent chest pain episodes with exertional shortness of breath suspected NSTEMI/CHF.    Continues to have chest pains on walking short distance along with shortness of breath.  Currently on IV Lasix daily.  Urine output excellent.  Maintaining good negative balance.  Sodium continues to be low.  Will give 1 dose of Samsca today.  Appetite is decent.  Keen to be discharged    OBJECTIVE     Vitals:    06/06/24 0802 06/06/24 0816 06/06/24 0821 06/06/24 0940   BP: (!) 157/97      Pulse:   82    Resp:  20  16   Temp: 98.2 °F (36.8 °C)      TempSrc: Oral      SpO2: 91%      Weight:       Height:         24HR INTAKE/OUTPUT:    Intake/Output Summary (Last 24 hours) at 6/6/2024 1114  Last data filed at 6/6/2024 0649  Gross per 24 hour   Intake 615 ml   Output 1450 ml   Net -835 ml       General appearance:Awake, alert, in no acute distress  HEENT: 
PATIENT REFUSES TO WEAR BIPAP     [x] Risks and benefits explained to patient   [x] Patient refuses to wear Bipap stating he will not wear it.  [x] Patient verbalizes understanding of information presented.   
PATIENT REFUSES TO WEAR BIPAP, RN in room and notified.     [x] Risks and benefits explained to patient   [x] Patient refuses to wear Bipap stating he will not wear one here and doesn't wear one at home.  [x] Patient verbalizes understanding of information presented.   
Patient down to CT for scan per transport.   
Patient finally urinated 200 and bladder scan of 135. He tried calling his wife for ride and cannot get a hold of wife. Pharmacy did not bring up meds so if patient says overnight will call pharmacy about meds or have MD send meds to [facility] pharmacy.   
Patient refused vitals at this time.    
Patient self removed one IV, other IV infiltrated. Awaiting IV supplies to unit to give pain medication.   
[facility]  Office: [phone]  Alexi Draper DO, Sam Wharton DO, Matthew Kirkpatrick DO, Shane Dennis, DO, Lisbeth Owusu MD, Nurys Crisostomo MD, Jena Xie MD, Susan Danielle MD,  Shubham Friedman MD, Mireya Nugent MD, Aleida Rincon MD,  Aliza Saavedra DO, Hector Romero MD, Moncho Gilbert MD, Marlon Draper DO, Liliana Painting MD,  Soy Mccullough DO, Amparo Kong MD, Shila Arana MD, Kristy Wei MD, Kelly Vogel MD,  Morris Sheets MD, Chapincito Rebolledo MD, Andrea Fabian MD, Lisset Mccracken MD, René Holland MD, Nishi jOeda MD, Fer Hernandez DO, Tejas Paulino DO, Luis M Reardon MD,  Bruce Greenfield MD, Shirley Waterhouse, CNP,  Mariela Gomez CNP, Andrei Polanco, CNP,  Nan Ray, DNP, Liss Lu, CNP, Olya Kaba, CNP, Alivia Melchor, CNP, An Rosenbaum, CNP, Deena Carmona, PA-C, Corinna Mcdaniel PA-C, Kiera Holland, CNP, Gladys Carter, CNP, Deja Patterson, CNP, Christa Ojeda, CNP, Maddi Sun, CNP, Amanda Gallardo, CNS, Yamilex Latif, CNP, Zoe Conner CNP, Tracy Schwab, CNP         [facility]   IN-PATIENT SERVICE   [facility]    Progress Note    6/4/2024    2:29 PM    Name:   Vincenzo Darden  MRN:     [id number]     Acct:      [id number]   Room:   0422/0422-02   Day:  2  Admit Date:  6/2/2024  8:06 PM    PCP:   Shaikh Calix MD  Code Status:  Full Code    Subjective:     C/C:   Chief Complaint   Patient presents with    Chest Pain     Interval History Status: not changed.     Seen and examined  Abdominal pain is improved  More alert and awake AOx3  Abdominal pain is improved  Complain of chest pain at the site of sternotomy  Discussed with nursing he does not have any bowel movement  per patient last bowel movement was on Friday  Seen by cardiology, chest pain is likely musculoskeletal per cards  CT abdomen pelvis done yesterday  Blood culture came positive for Staph aureus  Seen by ID and antibiotics changed to 
Renal Progress Note    Patient :  Vincenzo Darden; 58 y.o. MRN# [id number]  Location:  0422/0422-02  Attending:  Kelly Vogel MD  Admit Date:  6/2/2024   Hospital Day: 7    Subjective:     58-year-old  male with a history of CKD stage IIIb with baseline creatinine 1.6-1.8/CABG/type 2 diabetes presented with left lower extremity swelling and pain from cellulitis along with intermittent chest pain episodes with exertional shortness of breath suspected NSTEMI/CHF.    Patient is sitting up in chair.  He received pain medication.  And he was sleeping well sitting on the chair.  He was able to answer question in yes and no.  He continues to have increasing leg swelling  There is no significant improvement in his serum creatinine  His most recent sodium is 127 from last night.  His sodium was fluctuating 128-130 range yesterday.  He is receiving Lasix 80 mg twice daily and sodium bicarbonate.  He had 1.8L urine output over the past 24 hours.  He has a Parada catheter in place.  Blood pressure stable  ID is planning PICC line placement for outpatient antibiotics after 2 days of negative blood cultures.  He is currently receiving daptomycin.  Last 2 blood cultures are negative thus far.  Cardiology is planning outpatient work up.    Outpatient Medications:     Medications Prior to Admission: furosemide (LASIX) 40 MG tablet, Take 1 tablet by mouth in the morning and 1 tablet in the evening.  atorvastatin (LIPITOR) 20 MG tablet, Take 1 tablet by mouth nightly  amiodarone (CORDARONE) 200 MG tablet, Take 1 tablet by mouth 2 times daily for 15 days  metoprolol tartrate (LOPRESSOR) 25 MG tablet, Take 1 tablet by mouth 2 times daily  ondansetron (ZOFRAN-ODT) 4 MG disintegrating tablet, Take 1 tablet by mouth every 8 hours as needed for Nausea or Vomiting (Patient not taking: Reported on 3/21/2024)  pantoprazole (PROTONIX) 40 MG tablet, Take 1 tablet by mouth daily (Patient not taking: Reported on 3/13/2024)  tamsulosin 
Renal Progress Note    Patient :  Vincenzo Darden; 58 y.o. MRN# [id number]  Location:  0422/0422-02  Attending:  Nishi Ojeda MD  Admit Date:  6/2/2024   Hospital Day: 8    Subjective:     58-year-old  male with a history of CKD stage IIIb with baseline creatinine 1.6-1.8/CABG/type 2 diabetes presented with left lower extremity swelling and pain from cellulitis along with intermittent chest pain episodes with exertional shortness of breath suspected NSTEMI/CHF.  He had coronary artery bypass graft and aortic valve replacement in February 2024.  Still needs RCA to be stented.  Underwent CT PE on 6/2/2024, creatinine was 2.2 on presentation which is now gone up to 3.6 and plateaued.  Is been placed on IV daptomycin which is to continue till 7/1/2024.    Urine output was good until Parada was taken out.  Has not peed since then.  Bladder scan showed 75 mL of urine.  Denies any shortness of breath or orthopnea.  Patient is sleepy lethargic not fully conversant.  Oral Lasix continues.  Left lower extremity edema persists.  Creatinine appears to have plateaued around 3.6 sodium stable at 126 -128 range.  Creatinine was 2.0 on presentation, then received CT PE since then has developed acute kidney injury.  Oral sodium bicarbonate continues.    Blood pressure stable  ID is planning PICC line placement for outpatient antibiotics after 2 days of negative blood cultures.  He is currently receiving daptomycin.  Last 2 blood cultures are negative thus far.  Cardiology is planning outpatient work up.  Labs today showed a sodium of 128 potassium 4.6 chloride 94 bicarb 15 BUN 32 creatinine 3.6 calcium 8.1 glucose 215    Outpatient Medications:     Medications Prior to Admission: [DISCONTINUED] furosemide (LASIX) 40 MG tablet, Take 1 tablet by mouth in the morning and 1 tablet in the evening.  amiodarone (CORDARONE) 200 MG tablet, Take 1 tablet by mouth 2 times daily for 15 days  metoprolol tartrate (LOPRESSOR) 25 MG tablet, 
Renal Progress Note    Patient :  Vincenzo Darden; 58 y.o. MRN# [id number]  Location:  0422/0422-02  Attending:  Kristy Wei MD  Admit Date:  6/2/2024   Hospital Day: 2    Subjective:     Patient admitted for NSTEMI management. Patient has a history of CKD 3 and type 2 diabetes. Nephrology consulted due to CAMILLE with increased BUN/Cr  Hyponatremia is improving, most recent 131, rest of the BMP results showed potassium 4.1, chloride 100, bicarb 14, calcium 7.8, BUN 39, creatinine 3.5 mg/dl.  Urine output documented as about 800 cc in the last 24 hours.  BUN/Cr increasing with new creatinine peak at 3.5. baseline is 1.6-1.8  Elevated WBC from yesterday 10.5-14.8  Patient had a oneal catheter placed due to urinary retention. Drained clear yellow urine with negative UA.   CT abdomen/pelvis shows no hydronephrosis or urethral stones     Outpatient Medications:     Medications Prior to Admission: furosemide (LASIX) 40 MG tablet, Take 1 tablet by mouth in the morning and 1 tablet in the evening.  atorvastatin (LIPITOR) 20 MG tablet, Take 1 tablet by mouth nightly  amiodarone (CORDARONE) 200 MG tablet, Take 1 tablet by mouth 2 times daily for 15 days  metoprolol tartrate (LOPRESSOR) 25 MG tablet, Take 1 tablet by mouth 2 times daily  ondansetron (ZOFRAN-ODT) 4 MG disintegrating tablet, Take 1 tablet by mouth every 8 hours as needed for Nausea or Vomiting (Patient not taking: Reported on 3/21/2024)  pantoprazole (PROTONIX) 40 MG tablet, Take 1 tablet by mouth daily (Patient not taking: Reported on 3/13/2024)  tamsulosin (FLOMAX) 0.4 MG capsule, Take 1 capsule by mouth daily  potassium chloride (KLOR-CON M) 20 MEQ extended release tablet, Take 1 tablet by mouth daily  warfarin (COUMADIN) 2.5 MG tablet, Take 2 tablets by mouth daily (Patient not taking: Reported on 6/2/2024)  carvedilol (COREG) 25 MG tablet, Take 1 tablet by mouth in the morning and 1 tablet in the evening.  aspirin 81 MG chewable tablet, Take 1 tablet by mouth 
Renal Progress Note    Patient :  Vincenzo Darden; 58 y.o. MRN# [id number]  Location:  0422/0422-02  Attending:  Kelly Vogel MD  Admit Date:  6/2/2024   Hospital Day: 5    Subjective:     58-year-old  male with a history of CKD stage IIIb with baseline creatinine 1.6-1.8/CABG/type 2 diabetes presented with left lower extremity swelling and pain from cellulitis along with intermittent chest pain episodes with exertional shortness of breath suspected NSTEMI/CHF.     Continues to have chest pains on walking short distance along with shortness of breath.  Currently on IV Lasix daily and a dose of 40 mg.  Sodium continues to be low. 1 dose of Samsca given yesterday.       BMP 06/07/2024: Na 128, K 4.0, Cl 98, BUN/Cr 3.2, Ca 8.0. serum creatinine peaked at 3.5 on 6/4/2024 and is down to 3.2 today.  Patient is having improving serum sodium levels with frequent check  Patient has oneal catheter with 800cc out in 24 hours. Maintaining good negative balance.    Patient says that he is feeling improved. Chest pain and shortness of breath have decreased. However, he continues on high dose pain medications.  He likely will be discharged tomorrow with IV antibiotics.Inquiring about discharge     Outpatient Medications:     Medications Prior to Admission: furosemide (LASIX) 40 MG tablet, Take 1 tablet by mouth in the morning and 1 tablet in the evening.  atorvastatin (LIPITOR) 20 MG tablet, Take 1 tablet by mouth nightly  amiodarone (CORDARONE) 200 MG tablet, Take 1 tablet by mouth 2 times daily for 15 days  metoprolol tartrate (LOPRESSOR) 25 MG tablet, Take 1 tablet by mouth 2 times daily  ondansetron (ZOFRAN-ODT) 4 MG disintegrating tablet, Take 1 tablet by mouth every 8 hours as needed for Nausea or Vomiting (Patient not taking: Reported on 3/21/2024)  pantoprazole (PROTONIX) 40 MG tablet, Take 1 tablet by mouth daily (Patient not taking: Reported on 3/13/2024)  tamsulosin (FLOMAX) 0.4 MG capsule, Take 1 capsule by 
review of radiologic studies  Laboratory  Radiology  Microbiology  Please see Details in daily Interval Changes Section devoted to Lab, Micro and Radiology and in Medical Decision Laboratory, Imaging and Cultures sections.  Review of Infection Control and Prevention measures:  Universal precautions   Please see daily details in Infection Control Recommendations section  Administer medications as ordered:  Review of Antimicrobial Stewardship Measures:  Targeted therapy  PK dosing   Please see daily details in Antimicrobial Stewardship Recommendations section   Prognosis:  Guarded  Review of Discharge Planning:  Please see daily details in Coordination of Outpatient Care section  Review of need for outpatient follow up.    Josesito Richardson MD 6/9/2024       Infection Control Recommendations   [place] Precautions  Parada Catheter    Antimicrobial Stewardship Recommendations     Simplification of therapy  Targeted therapy  PK dosing    Coordination of Outpatient Care:   Estimated Length of IV antimicrobials:7/1/24  Patient will need Midline Catheter Insertion: no  Patient will need PICC line Insertion:Yes  Patient will need: Home IV , Infusion Center,  SNF,  LTAC: Yes  Patient will need outpatient wound care: Yes    Chief complaint/reason for consultation:   MSSA septicemia  Sternal dehiscence    History of Present Illness:   Vincenzo Darden is a 58 y.o.-year-old male who was initially admitted on 6/2/2024. Patient seen at the request of Dr. Wei    INITIAL HISTORY:    Patient with multiple past medical problems, including DM2, DM neuropathy, PVD s/p right BKA w/ chronic left 1st toe gangrene, CKD3, COLT, multi vessel CAD s/p CABG 02/2024.    Patient presented through ER with complaints of chest pain, flu like symptoms, anorexia, diarrhea.    Found to have elevated glucose levels. Sternum with dehiscence. Blood cultures: MSSA x 2 but obtained at same time.    ID service asked to evaluate and advice on treatment. 
not assessed.   Pericardium    No pericardial effusion.      TTE:  10/6/23     Left Ventricle: Moderately reduced left ventricular systolic function with a visually estimated EF of 35 - 40%. Left ventricle is dilated. Normal wall thickness. Moderate global hypokinesis present with seom regional variation. . Abnormal diastolic function.    Right Ventricle: Moderately reduced systolic function.    Aortic Valve: Mild to moderate regurgitation with a centrally directed jet.    Tricuspid Valve: Mildly thickened leaflets. Mild annular calcification.    Left Atrium: Left atrium is dilated.    Aorta: Dilated aortic root. Ao root diameter is 4.0 cm. Dilated ascending aorta. Ao ascending diameter is 4.1 cm.    IVC/SVC: IVC diameter is greater than 21 mm and decreases greater than 50% during inspiration; therefore the estimated right atrial pressure is intermediate (~8 mmHg). IVC is dilated.      Cardiac Angiography:      10/9/23  Left Main   Has 20% stenosis.      Left Anterior Descending   Has eccentric 75-80% stenosis. The apical LAD has 60% stenosis. The D1 has proximal 90% stenosis. The D2 is small with 90% ostial stenosis.      Left Circumflex   Has extreme eccentric angle from the left main with mid 75-80% eccentric stenosis seen in cranial views. It supplies LPDA.      Right Coronary Artery   Is a co-dominant vessel with proximal 75-80% eccentric stenosis.        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 RCA not bypassed  Aspirin, metoprolol 25mg BID   Severe AI s/p bioprosthetic AVR with 23 mm CE Inspiris valve on 2/26/2024  EKG with bifascicular block, left axis deviation, prolonged QRS, poor R-wave progression   Heart failure with reduced EF   Dilated aortic root, 4.2 cm, ascending aorta 4.1 cm  History of PAD s/p 
sennosides-docusate sodium, HYDROmorphone, sodium chloride flush, sodium chloride, potassium chloride **OR** potassium alternative oral replacement **OR** potassium chloride, magnesium sulfate, ondansetron **OR** ondansetron, acetaminophen **OR** acetaminophen, magnesium hydroxide, nitroGLYCERIN, glucose, dextrose bolus **OR** dextrose bolus, glucagon (rDNA), dextrose    Data:     Past Medical History:   has a past medical history of Diabetes mellitus (HCC) and Hernia, abdominal.    Social History:   reports that he has been smoking cigarettes. He started smoking about 29 years ago. He has a 44.2 pack-year smoking history. He has never used smokeless tobacco. He reports that he does not currently use alcohol.     Family History: No family history on file.    Vitals:  /87   Pulse 76   Temp 97.9 °F (36.6 °C) (Oral)   Resp 20   Ht 1.829 m (6' 0.01\")   Wt 118.1 kg (260 lb 5.8 oz)   SpO2 98%   BMI 35.30 kg/m²   Temp (24hrs), Av.7 °F (36.5 °C), Min:97 °F (36.1 °C), Max:98.2 °F (36.8 °C)    Recent Labs     24  0804 24  1138 24  1523 24   POCGLU 189* 248* 230* 320*         I/O (24Hr):    Intake/Output Summary (Last 24 hours) at 2024 0748  Last data filed at 2024 0516  Gross per 24 hour   Intake 260 ml   Output 2350 ml   Net -2090 ml         Labs:  Hematology:  Recent Labs     24  0342 24  0317   WBC 16.0* 18.4*   RBC 4.20* 3.91*   HGB 12.2* 11.6*   HCT 37.9* 36.1*   MCV 90.2 92.3   MCH 29.0 29.7   MCHC 32.2 32.1   RDW 14.2 14.2    242   MPV 9.8 9.5       Chemistry:  Recent Labs     24  0342 24  1639 24  0351 24  1058 24  1733 24  2135 24  0317   *   < > 128* 128* 130* 128* 130*   K 4.2   < > 4.0 4.3 4.2 4.2 3.9   CL 95*   < > 98 96* 98 97* 96*   CO2 16*   < > 18* 20 20 17* 19*   GLUCOSE 377*  --  224*  --   --   --   --    BUN 50*  --  50*  --   --   --   --    CREATININE 3.3*  --  3.2*  --   --   --   
  Electronically signed by Nilsa Car RN, CWON on 6/3/2024 at 1:40 PM     
   Troponin level elevated 6/3/2024 Yes    Chest pain 6/3/2024 Yes    Cardiac aneurysm 6/3/2024 Yes    CAMILLE (acute kidney injury) ([facility]) 6/4/2024 Yes    Coronary artery disease involving native coronary artery of native heart without angina pectoris 6/3/2024 Yes    Diffuse abdominal pain 6/3/2024 Yes    Acute encephalopathy 6/3/2024 Yes    Staphylococcus aureus bacteremia 6/3/2024 Yes    Hyponatremia 6/3/2024 Yes    MSSA (methicillin susceptible Staphylococcus aureus) septicemia ([facility]) 6/4/2024 Yes    Disruption or dehiscence of closure of sternum or sternotomy 6/4/2024 Yes    PAD (peripheral artery disease) ([facility]) 6/4/2024 Yes    Skin ulcer due to diabetes mellitus ([facility]) 6/4/2024 Yes    Coronary artery disease involving coronary bypass graft of native heart without angina pectoris 6/4/2024 Yes    Heart failure, diastolic, with acute decompensation ([facility]) 6/4/2024 Yes   Plan:        Acute encephalopathy-all workup including imaging negative.  Mentation back to baseline.      2.  MSSA septicemia-continue on daptomycin managed by ID.  Echo shows preserved EF, no vegetation evident on echo.   Awaiting negative blood cultures for 2 days for PICC line placement for outpatient antibiotics.    3. Atypical chest pain with CAD, s/p CABG and AVR 2/24-evaluated by cardiology and CT surgery.  Cardiology plan staged PCI to RCA when hyponatremia and CAMILLE is resolved.  Advised outpatient follow-up with cardiology in 2 weeks.  Evaluated by CT surgery as well and advised outpatient follow-up with CT surgery in 4 weeks.     4.  Sternal wound dehiscence with thickening and paradoxical motion -evaluated by CT surgery.  No further concerns.  On daptomycin managed by ID.      -CTA concerning for LV thinning with possible left ventricular apex aneurysm.  Echo normal.      5 ..CAMILLE with CKD 3 with Anion gap metabolic acidosis secondary to nephrotoxic ATN from contrast exposure complicated by urinary retention.:  Baseline 1.6-1.8 creatinine 
macrovascular complications  Essential hypertension  MSSA septicemia   Left lower extremity leg and foot ulcers  Prior right BKA    Plan:   Fluid/salt restriction  Continue Lasix 80 mg oral twice daily  Follow serial electrolytes   Patient is stable for discharge on IV antibiotics when arranged  He will require a return visit to [facility] 2-3 weeks post discharge    Nutrition    Avoid nephrotoxic drugs/contrast exposure.    ADEOLA Krishnamurthy  [facility]  6/8/2024  Attending Physician Statement  I have discussed the care of Vincenzo Darden, including pertinent history and exam findings with the NP I have reviewed the key elements of all parts of the encounter with the np.  Note was updated and recorded changes were made I have seen and examined the patient with the np.  I agree with the assessment and plan and status of the problem list as documented.  Patient stated that he was told that he will be going home today.  I had a long discussion with him and told him that he needs to have a PICC line placed for home antibiotic.  His creatinine is still elevated.  And he still has significant edema.  His blood pressure is under fair control  Addiitionally I recommend cut down his fluid intake  Will continue Lasix.  80 mg p.o. twice daily  Patient is on Flomax since 3 Christy.  Will try to remove Parada and give him a post void trial  Continue to follow renal function.    Louis Monte MD   This note is created with the assistance of a speech-recognition program. While intending to generate a document that actually reflects the content of the visit, no guarantees can be provided that every mistake has been identified and corrected by editing.   
Cardiology on board    5 ..CAMILLE with CKD 3 with Anion gap metabolic acidosis secondary to nephrotoxic ATN from contrast exposure complicated by urinary retention.:  Baseline 1.6-1.8 creatinine 3.5.  Continue sodium bicarb tablets.  On Lasix IV 40 daily. nephrology following.  At high risk of RRT daily BMP.    6.. Hyponatremia likely 2/2 to SIADH: Worse.  Nephrology following.  Fluid management per nephrology.    7.  Acute urinary retention-continue Flomax.  Parada is in place.  Voiding trial before discharge.    8. Type 2 diabetes with diabetic neuropathy: Controlled   Continue BGS and SSI Q4H    9. COLT encourage CPAP.    10- PVD status post right BKA with left first toe-wound care following.     Kelly Vogel MD  6/6/2024  8:00 AM    
noted  Heart: Positive reproducible chest tenderness.  Sternotomy site clean.  Regular rate and rhythm, no murmur  Abdomen: Distended , positive for abdominal tenderness   Extremities: Left lower extremity is wrapped    Assessment:        Hospital Problems             Last Modified POA    * (Principal) NSTEMI (non-ST elevated myocardial infarction) ([facility]) 6/2/2024 Yes    Troponin level elevated 6/3/2024 Yes    Chest pain 6/3/2024 Yes    Cardiac aneurysm 6/3/2024 Yes    CAMILLE (acute kidney injury) ([facility]) 6/4/2024 Yes    Coronary artery disease involving native coronary artery of native heart without angina pectoris 6/3/2024 Yes    Diffuse abdominal pain 6/3/2024 Yes    Acute encephalopathy 6/3/2024 Yes    Staphylococcus aureus bacteremia 6/3/2024 Yes    Hyponatremia 6/3/2024 Yes    MSSA (methicillin susceptible Staphylococcus aureus) septicemia ([facility]) 6/4/2024 Yes    Disruption or dehiscence of closure of sternum or sternotomy 6/4/2024 Yes    PAD (peripheral artery disease) ([facility]) 6/4/2024 Yes    Skin ulcer due to diabetes mellitus ([facility]) 6/4/2024 Yes    Coronary artery disease involving coronary bypass graft of native heart without angina pectoris 6/4/2024 Yes    Heart failure, diastolic, with acute decompensation ([facility]) 6/4/2024 Yes     Plan:        Acute encephalopathy with diffuse abdominal Pain-all workup including imaging negative.  Mentation back to baseline.  Continues to complain about abdominal tenderness.     2.  MSSA septicemia-continue on daptomycin managed by ID.  Echo pending    3. Atypical chest pain with CAD, s/p CABG and AVR 2/24-evaluated by cardiology and CT surgery.  Cardiology plan staged PCI to RCA when he can hyponatremia resolved.   outpatient follow-up with CT surgery in 4 weeks.     4.  Sternal wound dehiscence with thickening and paradoxical motion -evaluated by CT surgery.  No further concerns.  On daptomycin managed by ID.      -CTA concerning for LV thinning with possible left ventricular apex 
phenomena.  ENT: Oropharynx clear, without erythema, exudate, or thrush. No tenderness of sinuses. Mouth/throat: mucosa pink and moist. No lesions. Dentition in good repair.  Neck:Supple, without lymphadenopathy. Thyroid normal, No bruits.  Pulmonary/Chest: Clear to auscultation, without wheezes, rales, or rhonchi.  No dullness to percussion.   Cardiovascular: Regular rate and rhythm without murmurs, rubs, or gallops.  Sternal dehiscence with paradoxical motion and crepitus on deep breathing. Skin intact  Abdomen: Soft, non tender. Bowel sounds normal. No organomegaly  All four Extremities: No cyanosis, clubbing, edema, or effusions.Rt BKA  Lt foot with open ulcers, including on toes  Neurologic: No gross sensory or motor deficits.  Skin: Warm and dry with good turgor.No signs of peripheral arterial or venous insufficiency. No ulcerations. No open wounds.    Medical Decision Making -Laboratory:   I have independently reviewed/ordered the following labs:    CBC with Differential:   Recent Labs     06/06/24  0342 06/08/24  0317   WBC 16.0* 18.4*   HGB 12.2* 11.6*   HCT 37.9* 36.1*    242       BMP:   Recent Labs     06/06/24  0342 06/06/24  1639 06/07/24  0351 06/07/24  1058 06/08/24  0317 06/08/24  1114   *   < > 128*   < > 130* 127*   K 4.2   < > 4.0   < > 3.9 4.4   CL 95*   < > 98   < > 96* 95*   CO2 16*   < > 18*   < > 19* 18*   BUN 50*  --  50*  --   --   --    CREATININE 3.3*  --  3.2*  --   --   --     < > = values in this interval not displayed.       Hepatic Function Panel:   No results for input(s): \"PROT\", \"LABALBU\", \"BILIDIR\", \"IBILI\", \"BILITOT\", \"ALKPHOS\", \"ALT\", \"AST\" in the last 72 hours.    No results for input(s): \"RPR\" in the last 72 hours.  No results for input(s): \"HIV\" in the last 72 hours.  No results for input(s): \"BC\" in the last 72 hours.  Lab Results   Component Value Date/Time    RBC 3.91 06/08/2024 03:17 AM    WBC 18.4 06/08/2024 03:17 AM    TURBIDITY Cloudy 06/03/2024 12:31 PM 
speech-recognition program. While intending to generate a document that actually reflects the content of the visit, no guarantees can be provided that every mistake has been identified and corrected by editing.    
amputation of the 2nd and 3rd toes is noted as well as amputation of the mid and distal 5th metatarsal.  There is a small ulceration within the soft tissues of the distal lateral forefoot. There is no evidence of fracture, malalignment or osseous destruction.     No radiographic evidence of osteomyelitis.       Medical Decision [id number]-Fsfch:     Results       Procedure Component Value Units Date/Time    Infectious Disease Intervention [[id number]] Collected: 06/04/24 1115    Order Status: Canceled     Infectious Disease Intervention [[id number]]     Order Status: Sent     Culture, Blood 1 [[id number]] Collected: 06/04/24 0157    Order Status: Completed Specimen: Blood Updated: 06/05/24 0407     Specimen Description .BLOOD     Special Requests L HAND7.5ML     Culture NO GROWTH 1 DAY    Culture, Blood 1 [[id number]] Collected: 06/03/24 2305    Order Status: Completed Specimen: Blood Updated: 06/05/24 0114     Specimen Description .BLOOD     Special Requests LEFT HAND 8ML     Culture NO GROWTH 1 DAY    Gastrointestinal Panel, Molecular [[id number]]     Order Status: No result Specimen: Stool     Culture, Blood 1 [[id number]]  (Abnormal) Collected: 06/02/24 2022    Order Status: Completed Specimen: Blood Updated: 06/04/24 0822     Specimen Description .BLOOD     Special Requests          Culture POSITIVE Blood Culture      DIRECT GRAM STAIN FROM BOTTLE: GRAM POSITIVE COCCI IN CLUSTERS      STAPHYLOCOCCUS AUREUS      (NOTE) Direct Gram Stain from bottle result called to and read back by:STEVENSON KEITH AT 1118 ON 06/03/2024    Culture, Blood 1 [[id number]]  (Abnormal) Collected: 06/02/24 2020    Order Status: Completed Specimen: Blood Updated: 06/04/24 0818     Specimen Description .BLOOD     Special Requests RT FA 10ML     Culture POSITIVE Blood Culture      DIRECT GRAM STAIN FROM BOTTLE: GRAM POSITIVE COCCI IN CLUSTERS      Staphylococcus aureus Detected: mecA/C and MREJ Not Detected Methodology- Polymerase Chain 
pulmonary edema.     XR FOOT LEFT (MIN 3 VIEWS)    Result Date: 6/2/2024  EXAMINATION: THREE XRAY VIEWS OF THE LEFT FOOT 6/2/2024 8:52 pm COMPARISON: None. HISTORY: ORDERING SYSTEM PROVIDED HISTORY: diabetic foot ulcer TECHNOLOGIST PROVIDED HISTORY: diabetic foot ulcer Reason for Exam: ulcer  diabetic foot FINDINGS: Postop changes of partial 2nd, partial amputation of the 2nd and 3rd toes is noted as well as amputation of the mid and distal 5th metatarsal.  There is a small ulceration within the soft tissues of the distal lateral forefoot. There is no evidence of fracture, malalignment or osseous destruction.     No radiographic evidence of osteomyelitis.       Medical Decision Balbyu-Ultcajlv-Spsiy:     Results       Procedure Component Value Units Date/Time    Culture, Blood 1 [[id number]] Collected: 06/07/24 0442    Order Status: Completed Specimen: Blood Updated: 06/07/24 0515     Specimen Description .BLOOD     Special Requests RIGHT FOREARM 6ML     Culture NO GROWTH <24 HRS    Culture, Blood 1 [[id number]] Collected: 06/07/24 0442    Order Status: Completed Specimen: Blood Updated: 06/07/24 0516     Specimen Description .BLOOD     Special Requests RIGHT HAND 6ML     Culture NO GROWTH <24 HRS    Infectious Disease Intervention [[id number]] Collected: 06/04/24 1115    Order Status: Canceled     Infectious Disease Intervention [[id number]]     Order Status: Sent     Culture, Blood 1 [[id number]] Collected: 06/04/24 0157    Order Status: Completed Specimen: Blood Updated: 06/07/24 0407     Specimen Description .BLOOD     Special Requests L HAND7.5ML     Culture NO GROWTH 3 DAYS    Culture, Blood 1 [[id number]]  (Abnormal) Collected: 06/03/24 2305    Order Status: Completed Specimen: Blood Updated: 06/06/24 1003     Specimen Description .BLOOD     Special Requests LEFT HAND 8ML     Culture POSITIVE Blood Culture      DIRECT GRAM STAIN FROM BOTTLE: GRAM POSITIVE COCCI IN CLUSTERS      STAPHYLOCOCCUS AUREUS      
Attending Physician, are included in the Diagnostic and Decision Making Section of the text.    Josesito Richardson MD               Office: [phone]

## 2024-06-11 NOTE — PLAN OF CARE
Problem: Discharge Planning  Goal: Discharge to home or other facility with appropriate resources  6/10/2024 0755 by Cecilio Romeo RN  Outcome: Progressing  6/9/2024 2158 by Corinna Cline RN  Outcome: Progressing     Problem: Pain  Goal: Verbalizes/displays adequate comfort level or baseline comfort level  6/10/2024 0755 by Cecilio Romeo RN  Outcome: Progressing  6/9/2024 2158 by Corinna Cline RN  Outcome: Progressing     Problem: Skin/Tissue Integrity  Goal: Absence of new skin breakdown  Description: 1.  Monitor for areas of redness and/or skin breakdown  2.  Assess vascular access sites hourly  3.  Every 4-6 hours minimum:  Change oxygen saturation probe site  4.  Every 4-6 hours:  If on nasal continuous positive airway pressure, respiratory therapy assess nares and determine need for appliance change or resting period.  6/10/2024 0755 by Cecilio Romeo RN  Outcome: Progressing  6/9/2024 2158 by Corinna Cline, RN  Outcome: Progressing     
  Problem: Discharge Planning  Goal: Discharge to home or other facility with appropriate resources  6/11/2024 1009 by Michelle Rivera RN  Outcome: Progressing  6/10/2024 2135 by Devon Mejía RN  Outcome: Progressing  Flowsheets (Taken 6/10/2024 2000)  Discharge to home or other facility with appropriate resources: Identify barriers to discharge with patient and caregiver     Problem: Pain  Goal: Verbalizes/displays adequate comfort level or baseline comfort level  6/11/2024 1009 by Michelle Rivera RN  Outcome: Progressing  6/10/2024 2135 by Devon Mejía RN  Outcome: Progressing     Problem: Skin/Tissue Integrity  Goal: Absence of new skin breakdown  Description: 1.  Monitor for areas of redness and/or skin breakdown  2.  Assess vascular access sites hourly  3.  Every 4-6 hours minimum:  Change oxygen saturation probe site  4.  Every 4-6 hours:  If on nasal continuous positive airway pressure, respiratory therapy assess nares and determine need for appliance change or resting period.  6/11/2024 1009 by Michelle Rivera RN  Outcome: Progressing  6/10/2024 2135 by Devon Mejía RN  Outcome: Progressing     Problem: ABCDS Injury Assessment  Goal: Absence of physical injury  6/11/2024 1009 by Michelle Rivera RN  Outcome: Progressing  6/10/2024 2135 by Devon Mejía RN  Outcome: Progressing     Problem: Safety - Adult  Goal: Free from fall injury  6/11/2024 1009 by Michelle Rivera RN  Outcome: Progressing  6/10/2024 2135 by Devon Mejía RN  Outcome: Progressing  Flowsheets (Taken 6/10/2024 2132)  Free From Fall Injury: Instruct family/caregiver on patient safety     Problem: Chronic Conditions and Co-morbidities  Goal: Patient's chronic conditions and co-morbidity symptoms are monitored and maintained or improved  6/11/2024 1009 by Michelle Rivera RN  Outcome: Progressing  6/10/2024 2135 by Devon Mejía RN  Outcome: Progressing  Flowsheets (Taken 6/10/2024 2000)  Care Plan - Patient's Chronic Conditions 
  Problem: Discharge Planning  Goal: Discharge to home or other facility with appropriate resources  6/11/2024 1148 by Michelle Rivera RN  Outcome: Adequate for Discharge  6/11/2024 1009 by Michelle Rivera RN  Outcome: Progressing     Problem: Pain  Goal: Verbalizes/displays adequate comfort level or baseline comfort level  6/11/2024 1148 by Michelle Rivera RN  Outcome: Adequate for Discharge  6/11/2024 1009 by Michelle Rivera RN  Outcome: Progressing     Problem: Skin/Tissue Integrity  Goal: Absence of new skin breakdown  Description: 1.  Monitor for areas of redness and/or skin breakdown  2.  Assess vascular access sites hourly  3.  Every 4-6 hours minimum:  Change oxygen saturation probe site  4.  Every 4-6 hours:  If on nasal continuous positive airway pressure, respiratory therapy assess nares and determine need for appliance change or resting period.  6/11/2024 1148 by Michelle Rivera RN  Outcome: Adequate for Discharge  6/11/2024 1009 by Michelle Rivera RN  Outcome: Progressing     Problem: ABCDS Injury Assessment  Goal: Absence of physical injury  6/11/2024 1148 by Michelle Rivera RN  Outcome: Adequate for Discharge  6/11/2024 1009 by Michelle Rivera RN  Outcome: Progressing     Problem: Safety - Adult  Goal: Free from fall injury  6/11/2024 1148 by Michelle Rivera RN  Outcome: Adequate for Discharge  6/11/2024 1009 by Michelle Rivera RN  Outcome: Progressing     Problem: Chronic Conditions and Co-morbidities  Goal: Patient's chronic conditions and co-morbidity symptoms are monitored and maintained or improved  6/11/2024 1148 by Michelle Rivera RN  Outcome: Adequate for Discharge  6/11/2024 1009 by Michelle Rivera RN  Outcome: Progressing     Problem: Nutrition Deficit:  Goal: Optimize nutritional status  6/11/2024 1148 by Michelle Rivera RN  Outcome: Adequate for Discharge  6/11/2024 1009 by Michelle Rivera RN  Outcome: Progressing     
  Problem: Discharge Planning  Goal: Discharge to home or other facility with appropriate resources  6/5/2024 0030 by Kristi Brown RN  Outcome: Progressing  6/4/2024 1118 by Makenna Gallardo RN  Outcome: Progressing     Problem: Pain  Goal: Verbalizes/displays adequate comfort level or baseline comfort level  6/5/2024 0030 by Kristi Brown RN  Outcome: Progressing  6/4/2024 1118 by Makenna Gallardo RN  Outcome: Progressing     Problem: Skin/Tissue Integrity  Goal: Absence of new skin breakdown  Description: 1.  Monitor for areas of redness and/or skin breakdown  2.  Assess vascular access sites hourly  3.  Every 4-6 hours minimum:  Change oxygen saturation probe site  4.  Every 4-6 hours:  If on nasal continuous positive airway pressure, respiratory therapy assess nares and determine need for appliance change or resting period.  6/5/2024 0030 by Kristi Brown RN  Outcome: Progressing  6/4/2024 1118 by Makenna Gallardo RN  Outcome: Progressing     Problem: ABCDS Injury Assessment  Goal: Absence of physical injury  6/5/2024 0030 by Kristi Brown RN  Outcome: Progressing  6/4/2024 1118 by Makenna Gallardo RN  Outcome: Progressing     Problem: Safety - Adult  Goal: Free from fall injury  6/5/2024 0030 by Kristi Brown RN  Outcome: Progressing  6/4/2024 1118 by Makenna Gallardo RN  Outcome: Progressing     Problem: Chronic Conditions and Co-morbidities  Goal: Patient's chronic conditions and co-morbidity symptoms are monitored and maintained or improved  6/5/2024 0030 by Kristi Brown RN  Outcome: Progressing  6/4/2024 1118 by Makenna Gallardo RN  Outcome: Progressing     
  Problem: Discharge Planning  Goal: Discharge to home or other facility with appropriate resources  6/6/2024 0142 by Dany Fernando RN  Outcome: Progressing  6/5/2024 1717 by Aurelia Coleman RN  Outcome: Progressing     Problem: Pain  Goal: Verbalizes/displays adequate comfort level or baseline comfort level  6/6/2024 0142 by Dany Fernando RN  Outcome: Progressing  6/5/2024 1717 by Aurelia Coleman RN  Outcome: Progressing     Problem: Skin/Tissue Integrity  Goal: Absence of new skin breakdown  Description: 1.  Monitor for areas of redness and/or skin breakdown  2.  Assess vascular access sites hourly  3.  Every 4-6 hours minimum:  Change oxygen saturation probe site  4.  Every 4-6 hours:  If on nasal continuous positive airway pressure, respiratory therapy assess nares and determine need for appliance change or resting period.  6/6/2024 0142 by Dany Fernando RN  Outcome: Progressing  6/5/2024 1717 by Aurelia Coleman RN  Outcome: Progressing     Problem: ABCDS Injury Assessment  Goal: Absence of physical injury  6/6/2024 0142 by Dany Fernando RN  Outcome: Progressing  6/5/2024 1717 by Aurelia Coleman RN  Outcome: Progressing     Problem: Safety - Adult  Goal: Free from fall injury  6/6/2024 0142 by Dany Fernando RN  Outcome: Progressing  6/5/2024 1717 by Aurelia Coleman RN  Outcome: Progressing     Problem: Chronic Conditions and Co-morbidities  Goal: Patient's chronic conditions and co-morbidity symptoms are monitored and maintained or improved  6/6/2024 0142 by Dany Fernando RN  Outcome: Progressing  6/5/2024 1717 by Aurelia Coleman RN  Outcome: Progressing     
  Problem: Discharge Planning  Goal: Discharge to home or other facility with appropriate resources  6/7/2024 0555 by Jose Monroe RN  Outcome: Progressing  6/6/2024 1839 by Aurelia Coleman RN  Outcome: Progressing     Problem: Pain  Goal: Verbalizes/displays adequate comfort level or baseline comfort level  6/7/2024 0555 by Jose Monroe RN  Outcome: Progressing  6/6/2024 1839 by Aurelia Coleman RN  Outcome: Progressing     Problem: Skin/Tissue Integrity  Goal: Absence of new skin breakdown  Description: 1.  Monitor for areas of redness and/or skin breakdown  2.  Assess vascular access sites hourly  3.  Every 4-6 hours minimum:  Change oxygen saturation probe site  4.  Every 4-6 hours:  If on nasal continuous positive airway pressure, respiratory therapy assess nares and determine need for appliance change or resting period.  6/7/2024 0555 by Jose Monroe RN  Outcome: Progressing  6/6/2024 1839 by Aurelia Coleman RN  Outcome: Progressing     Problem: ABCDS Injury Assessment  Goal: Absence of physical injury  6/7/2024 0555 by Jose Monroe RN  Outcome: Progressing  6/6/2024 1839 by Aurelia Coleman RN  Outcome: Progressing     Problem: Safety - Adult  Goal: Free from fall injury  6/7/2024 0555 by Jose Monroe RN  Outcome: Progressing  6/6/2024 1839 by Aurelia Coleman RN  Outcome: Progressing     Problem: Chronic Conditions and Co-morbidities  Goal: Patient's chronic conditions and co-morbidity symptoms are monitored and maintained or improved  6/7/2024 0555 by Jose Monroe RN  Outcome: Progressing  6/6/2024 1839 by Aurelia Coleman RN  Outcome: Progressing     
  Problem: Discharge Planning  Goal: Discharge to home or other facility with appropriate resources  6/7/2024 1216 by Cecilio Romeo RN  Outcome: Progressing  6/7/2024 0555 by Jose Monroe RN  Outcome: Progressing     Problem: Pain  Goal: Verbalizes/displays adequate comfort level or baseline comfort level  6/7/2024 1216 by Cecilio Romeo RN  Outcome: Progressing  6/7/2024 0555 by Jose Monroe RN  Outcome: Progressing     Problem: Skin/Tissue Integrity  Goal: Absence of new skin breakdown  Description: 1.  Monitor for areas of redness and/or skin breakdown  2.  Assess vascular access sites hourly  3.  Every 4-6 hours minimum:  Change oxygen saturation probe site  4.  Every 4-6 hours:  If on nasal continuous positive airway pressure, respiratory therapy assess nares and determine need for appliance change or resting period.  6/7/2024 1216 by Cecilio Romeo RN  Outcome: Progressing  6/7/2024 0555 by Jose Monroe RN  Outcome: Progressing     
  Problem: Discharge Planning  Goal: Discharge to home or other facility with appropriate resources  6/9/2024 0717 by Cecilio Romeo RN  Outcome: Progressing  6/9/2024 0223 by Radha Basurto RN  Outcome: Progressing     Problem: Pain  Goal: Verbalizes/displays adequate comfort level or baseline comfort level  6/9/2024 0717 by Cecilio Romeo RN  Outcome: Progressing  6/9/2024 0223 by Radha Basurto RN  Outcome: Progressing     Problem: Skin/Tissue Integrity  Goal: Absence of new skin breakdown  Description: 1.  Monitor for areas of redness and/or skin breakdown  2.  Assess vascular access sites hourly  3.  Every 4-6 hours minimum:  Change oxygen saturation probe site  4.  Every 4-6 hours:  If on nasal continuous positive airway pressure, respiratory therapy assess nares and determine need for appliance change or resting period.  6/9/2024 0717 by Cecilio Romeo RN  Outcome: Progressing  6/9/2024 0223 by Radha Basurto RN  Outcome: Progressing     
  Problem: Discharge Planning  Goal: Discharge to home or other facility with appropriate resources  Outcome: Progressing     Problem: Pain  Goal: Verbalizes/displays adequate comfort level or baseline comfort level  Outcome: Progressing     Problem: Skin/Tissue Integrity  Goal: Absence of new skin breakdown  Description: 1.  Monitor for areas of redness and/or skin breakdown  2.  Assess vascular access sites hourly  3.  Every 4-6 hours minimum:  Change oxygen saturation probe site  4.  Every 4-6 hours:  If on nasal continuous positive airway pressure, respiratory therapy assess nares and determine need for appliance change or resting period.  Outcome: Progressing     
  Problem: Discharge Planning  Goal: Discharge to home or other facility with appropriate resources  Outcome: Progressing     Problem: Pain  Goal: Verbalizes/displays adequate comfort level or baseline comfort level  Outcome: Progressing     Problem: Skin/Tissue Integrity  Goal: Absence of new skin breakdown  Description: 1.  Monitor for areas of redness and/or skin breakdown  2.  Assess vascular access sites hourly  3.  Every 4-6 hours minimum:  Change oxygen saturation probe site  4.  Every 4-6 hours:  If on nasal continuous positive airway pressure, respiratory therapy assess nares and determine need for appliance change or resting period.  Outcome: Progressing     Problem: ABCDS Injury Assessment  Goal: Absence of physical injury  Outcome: Progressing     Problem: Safety - Adult  Goal: Free from fall injury  Outcome: Progressing     Problem: Chronic Conditions and Co-morbidities  Goal: Patient's chronic conditions and co-morbidity symptoms are monitored and maintained or improved  Outcome: Progressing     
ECHO reviewed.  Ok to d/c and follow up for for staged PCI    Please call with further questions or concerns   
Progressing  6/10/2024 1045 by Mirlande Cotton RD  Flowsheets (Taken 6/10/2024 1045)  Nutrient intake appropriate for improving, restoring, or maintaining nutritional needs:   Assess nutritional status and recommend course of action   Monitor oral intake, labs, and treatment plans   Recommend appropriate diets, oral nutritional supplements, and vitamin/mineral supplements

## 2024-06-12 LAB
MICROORGANISM SPEC CULT: NORMAL
MICROORGANISM SPEC CULT: NORMAL
SERVICE CMNT-IMP: NORMAL
SERVICE CMNT-IMP: NORMAL
SPECIMEN DESCRIPTION: NORMAL
SPECIMEN DESCRIPTION: NORMAL

## 2024-06-17 ENCOUNTER — HOSPITAL ENCOUNTER (INPATIENT)
Age: 59
LOS: 3 days | Discharge: HOME | DRG: 674 | End: 2024-06-20
Attending: EMERGENCY MEDICINE
Payer: MEDICARE

## 2024-06-17 ENCOUNTER — APPOINTMENT (OUTPATIENT)
Dept: VASCULAR LAB | Age: 59
DRG: 674 | End: 2024-06-17
Payer: MEDICARE

## 2024-06-17 ENCOUNTER — APPOINTMENT (OUTPATIENT)
Dept: ULTRASOUND IMAGING | Age: 59
DRG: 674 | End: 2024-06-17
Payer: MEDICARE

## 2024-06-17 ENCOUNTER — APPOINTMENT (OUTPATIENT)
Dept: GENERAL RADIOLOGY | Age: 59
DRG: 674 | End: 2024-06-17
Payer: MEDICARE

## 2024-06-17 DIAGNOSIS — I73.9 PVD (PERIPHERAL VASCULAR DISEASE) (HCC): ICD-10-CM

## 2024-06-17 DIAGNOSIS — L97.519 ULCER OF RIGHT FOOT, UNSPECIFIED ULCER STAGE (HCC): ICD-10-CM

## 2024-06-17 DIAGNOSIS — N18.9 ACUTE KIDNEY INJURY SUPERIMPOSED ON CKD (HCC): Primary | ICD-10-CM

## 2024-06-17 DIAGNOSIS — N17.9 AKI (ACUTE KIDNEY INJURY) (HCC): ICD-10-CM

## 2024-06-17 DIAGNOSIS — Z95.2 HISTORY OF AORTIC VALVE REPLACEMENT: ICD-10-CM

## 2024-06-17 DIAGNOSIS — R23.4 WOUND ESCHAR OF FOOT: ICD-10-CM

## 2024-06-17 DIAGNOSIS — S91.302A WOUND, OPEN, FOOT, LEFT, INITIAL ENCOUNTER: ICD-10-CM

## 2024-06-17 DIAGNOSIS — N17.9 ACUTE KIDNEY INJURY SUPERIMPOSED ON CKD (HCC): Primary | ICD-10-CM

## 2024-06-17 DIAGNOSIS — I73.9 PAD (PERIPHERAL ARTERY DISEASE) (HCC): ICD-10-CM

## 2024-06-17 PROBLEM — E87.5 HYPERKALEMIA: Status: ACTIVE | Noted: 2024-06-17

## 2024-06-17 LAB
ALBUMIN SERPL-MCNC: 2.1 G/DL (ref 3.5–5.2)
ALBUMIN/GLOB SERPL: ABNORMAL {RATIO} (ref 1–2.5)
ALP SERPL-CCNC: 125 U/L (ref 40–129)
ALT SERPL-CCNC: 12 U/L (ref 10–50)
ANION GAP SERPL CALCULATED.3IONS-SCNC: 17 MMOL/L (ref 9–16)
ANTI-XA UNFRAC HEPARIN: 0.97 IU/L
AST SERPL-CCNC: 23 U/L (ref 10–50)
BACTERIA URNS QL MICRO: ABNORMAL
BACTERIA URNS QL MICRO: NORMAL
BASOPHILS # BLD: 0 K/UL (ref 0–0.2)
BASOPHILS NFR BLD: 0 % (ref 0–2)
BILIRUB SERPL-MCNC: 0.3 MG/DL (ref 0–1.2)
BILIRUB UR QL STRIP: NEGATIVE
BILIRUB UR QL STRIP: NEGATIVE
BNP SERPL-MCNC: ABNORMAL PG/ML (ref 0–300)
BUN SERPL-MCNC: 110 MG/DL (ref 6–20)
C3 SERPL-MCNC: 45 MG/DL (ref 90–180)
C4 SERPL-MCNC: 24 MG/DL (ref 10–40)
CALCIUM SERPL-MCNC: 7.9 MG/DL (ref 8.6–10.4)
CASTS #/AREA URNS LPF: ABNORMAL /LPF (ref 0–8)
CASTS #/AREA URNS LPF: NORMAL /LPF (ref 0–8)
CHLORIDE SERPL-SCNC: 96 MMOL/L (ref 98–107)
CLARITY UR: ABNORMAL
CLARITY UR: ABNORMAL
CO2 SERPL-SCNC: 17 MMOL/L (ref 20–31)
COLOR UR: YELLOW
COLOR UR: YELLOW
CREAT SERPL-MCNC: 8.7 MG/DL (ref 0.7–1.2)
CREAT UR-MCNC: 134 MG/DL (ref 39–259)
CRP SERPL HS-MCNC: 73.4 MG/L (ref 0–5)
ECHO BSA: 2.4 M2
EOSINOPHIL # BLD: 0.15 K/UL (ref 0–0.4)
EOSINOPHILS RELATIVE PERCENT: 1 % (ref 1–4)
EPI CELLS #/AREA URNS HPF: ABNORMAL /HPF (ref 0–5)
EPI CELLS #/AREA URNS HPF: NORMAL /HPF (ref 0–5)
ERYTHROCYTE [DISTWIDTH] IN BLOOD BY AUTOMATED COUNT: 13.6 % (ref 11.8–14.4)
ERYTHROCYTE [DISTWIDTH] IN BLOOD BY AUTOMATED COUNT: 14 % (ref 11.8–14.4)
ERYTHROCYTE [SEDIMENTATION RATE] IN BLOOD BY PHOTOMETRIC METHOD: 79 MM/HR (ref 0–20)
FREE KAPPA/LAMBDA RATIO: 0.78 (ref 0.22–1.74)
GFR, ESTIMATED: 6 ML/MIN/1.73M2
GLUCOSE BLD-MCNC: 132 MG/DL (ref 75–110)
GLUCOSE SERPL-MCNC: 251 MG/DL (ref 74–99)
GLUCOSE UR STRIP-MCNC: ABNORMAL MG/DL
GLUCOSE UR STRIP-MCNC: ABNORMAL MG/DL
HCT VFR BLD AUTO: 30.1 % (ref 40.7–50.3)
HCT VFR BLD AUTO: 31.7 % (ref 40.7–50.3)
HGB BLD-MCNC: 10 G/DL (ref 13–17)
HGB BLD-MCNC: 9.7 G/DL (ref 13–17)
HGB UR QL STRIP.AUTO: ABNORMAL
HGB UR QL STRIP.AUTO: ABNORMAL
IMM GRANULOCYTES # BLD AUTO: 0 K/UL (ref 0–0.3)
IMM GRANULOCYTES NFR BLD: 0 %
INR PPP: 1.4
KAPPA LC FREE SER-MCNC: 515 MG/L
KETONES UR STRIP-MCNC: NEGATIVE MG/DL
KETONES UR STRIP-MCNC: NEGATIVE MG/DL
LACTIC ACID, WHOLE BLOOD: 1.2 MMOL/L (ref 0.7–2.1)
LAMBDA LC FREE SERPL-MCNC: 663 MG/L (ref 4.2–27.7)
LEUKOCYTE ESTERASE UR QL STRIP: ABNORMAL
LEUKOCYTE ESTERASE UR QL STRIP: ABNORMAL
LYMPHOCYTES NFR BLD: 1.04 K/UL (ref 1–4.8)
LYMPHOCYTES RELATIVE PERCENT: 7 % (ref 24–44)
MCH RBC QN AUTO: 28.7 PG (ref 25.2–33.5)
MCH RBC QN AUTO: 28.8 PG (ref 25.2–33.5)
MCHC RBC AUTO-ENTMCNC: 31.5 G/DL (ref 28.4–34.8)
MCHC RBC AUTO-ENTMCNC: 32.2 G/DL (ref 28.4–34.8)
MCV RBC AUTO: 89.1 FL (ref 82.6–102.9)
MCV RBC AUTO: 91.4 FL (ref 82.6–102.9)
MONOCYTES NFR BLD: 0.59 K/UL (ref 0.1–0.8)
MONOCYTES NFR BLD: 4 % (ref 1–7)
MORPHOLOGY: NORMAL
NEUTROPHILS NFR BLD: 88 % (ref 36–66)
NEUTS SEG NFR BLD: 13.02 K/UL (ref 1.8–7.7)
NITRITE UR QL STRIP: NEGATIVE
NITRITE UR QL STRIP: NEGATIVE
NRBC BLD-RTO: 0 PER 100 WBC
NRBC BLD-RTO: 0 PER 100 WBC
PARTIAL THROMBOPLASTIN TIME: >180 SEC (ref 23–36.5)
PH UR STRIP: 5.5 [PH] (ref 5–8)
PH UR STRIP: 5.5 [PH] (ref 5–8)
PLATELET # BLD AUTO: 408 K/UL (ref 138–453)
PLATELET # BLD AUTO: 430 K/UL (ref 138–453)
PMV BLD AUTO: 9.1 FL (ref 8.1–13.5)
PMV BLD AUTO: 9.2 FL (ref 8.1–13.5)
POTASSIUM SERPL-SCNC: 6 MMOL/L (ref 3.7–5.3)
PROT SERPL-MCNC: 7.2 G/DL (ref 6.6–8.7)
PROT UR STRIP-MCNC: ABNORMAL MG/DL
PROT UR STRIP-MCNC: ABNORMAL MG/DL
PROTHROMBIN TIME: 17.3 SEC (ref 11.7–14.9)
RBC # BLD AUTO: 3.38 M/UL (ref 4.21–5.77)
RBC # BLD AUTO: 3.47 M/UL (ref 4.21–5.77)
RBC #/AREA URNS HPF: ABNORMAL /HPF (ref 0–4)
RBC #/AREA URNS HPF: NORMAL /HPF (ref 0–4)
SODIUM SERPL-SCNC: 130 MMOL/L (ref 136–145)
SODIUM UR-SCNC: 26 MMOL/L
SP GR UR STRIP: 1.02 (ref 1–1.03)
SP GR UR STRIP: 1.02 (ref 1–1.03)
TROPONIN I SERPL HS-MCNC: 128 NG/L (ref 0–22)
TROPONIN I SERPL HS-MCNC: 137 NG/L (ref 0–22)
UROBILINOGEN UR STRIP-ACNC: NORMAL EU/DL (ref 0–1)
UROBILINOGEN UR STRIP-ACNC: NORMAL EU/DL (ref 0–1)
WBC #/AREA URNS HPF: ABNORMAL /HPF (ref 0–5)
WBC #/AREA URNS HPF: NORMAL /HPF (ref 0–5)
WBC OTHER # BLD: 13.4 K/UL (ref 3.5–11.3)
WBC OTHER # BLD: 14.8 K/UL (ref 3.5–11.3)

## 2024-06-17 PROCEDURE — 84165 PROTEIN E-PHORESIS SERUM: CPT

## 2024-06-17 PROCEDURE — 2580000003 HC RX 258

## 2024-06-17 PROCEDURE — 82947 ASSAY GLUCOSE BLOOD QUANT: CPT

## 2024-06-17 PROCEDURE — 82570 ASSAY OF URINE CREATININE: CPT

## 2024-06-17 PROCEDURE — 86140 C-REACTIVE PROTEIN: CPT

## 2024-06-17 PROCEDURE — 99223 1ST HOSP IP/OBS HIGH 75: CPT | Performed by: PHYSICIAN ASSISTANT

## 2024-06-17 PROCEDURE — 85520 HEPARIN ASSAY: CPT

## 2024-06-17 PROCEDURE — 93005 ELECTROCARDIOGRAM TRACING: CPT

## 2024-06-17 PROCEDURE — 85730 THROMBOPLASTIN TIME PARTIAL: CPT

## 2024-06-17 PROCEDURE — 81001 URINALYSIS AUTO W/SCOPE: CPT

## 2024-06-17 PROCEDURE — 83521 IG LIGHT CHAINS FREE EACH: CPT

## 2024-06-17 PROCEDURE — 80053 COMPREHEN METABOLIC PANEL: CPT

## 2024-06-17 PROCEDURE — 84484 ASSAY OF TROPONIN QUANT: CPT

## 2024-06-17 PROCEDURE — 93970 EXTREMITY STUDY: CPT | Performed by: STUDENT IN AN ORGANIZED HEALTH CARE EDUCATION/TRAINING PROGRAM

## 2024-06-17 PROCEDURE — 99285 EMERGENCY DEPT VISIT HI MDM: CPT

## 2024-06-17 PROCEDURE — 85025 COMPLETE CBC W/AUTO DIFF WBC: CPT

## 2024-06-17 PROCEDURE — 83880 ASSAY OF NATRIURETIC PEPTIDE: CPT

## 2024-06-17 PROCEDURE — 83516 IMMUNOASSAY NONANTIBODY: CPT

## 2024-06-17 PROCEDURE — 36415 COLL VENOUS BLD VENIPUNCTURE: CPT

## 2024-06-17 PROCEDURE — 6370000000 HC RX 637 (ALT 250 FOR IP)

## 2024-06-17 PROCEDURE — 85027 COMPLETE CBC AUTOMATED: CPT

## 2024-06-17 PROCEDURE — 87076 CULTURE ANAEROBE IDENT EACH: CPT

## 2024-06-17 PROCEDURE — 84300 ASSAY OF URINE SODIUM: CPT

## 2024-06-17 PROCEDURE — 2060000000 HC ICU INTERMEDIATE R&B

## 2024-06-17 PROCEDURE — 87185 SC STD ENZYME DETCJ PER NZM: CPT

## 2024-06-17 PROCEDURE — 86160 COMPLEMENT ANTIGEN: CPT

## 2024-06-17 PROCEDURE — 73630 X-RAY EXAM OF FOOT: CPT

## 2024-06-17 PROCEDURE — 87086 URINE CULTURE/COLONY COUNT: CPT

## 2024-06-17 PROCEDURE — 2500000003 HC RX 250 WO HCPCS

## 2024-06-17 PROCEDURE — 83605 ASSAY OF LACTIC ACID: CPT

## 2024-06-17 PROCEDURE — 86334 IMMUNOFIX E-PHORESIS SERUM: CPT

## 2024-06-17 PROCEDURE — 87070 CULTURE OTHR SPECIMN AEROBIC: CPT

## 2024-06-17 PROCEDURE — 87075 CULTR BACTERIA EXCEPT BLOOD: CPT

## 2024-06-17 PROCEDURE — 96374 THER/PROPH/DIAG INJ IV PUSH: CPT

## 2024-06-17 PROCEDURE — 6360000002 HC RX W HCPCS

## 2024-06-17 PROCEDURE — 99222 1ST HOSP IP/OBS MODERATE 55: CPT | Performed by: INTERNAL MEDICINE

## 2024-06-17 PROCEDURE — 76770 US EXAM ABDO BACK WALL COMP: CPT

## 2024-06-17 PROCEDURE — 85610 PROTHROMBIN TIME: CPT

## 2024-06-17 PROCEDURE — 84155 ASSAY OF PROTEIN SERUM: CPT

## 2024-06-17 PROCEDURE — 6370000000 HC RX 637 (ALT 250 FOR IP): Performed by: STUDENT IN AN ORGANIZED HEALTH CARE EDUCATION/TRAINING PROGRAM

## 2024-06-17 PROCEDURE — 87077 CULTURE AEROBIC IDENTIFY: CPT

## 2024-06-17 PROCEDURE — 85652 RBC SED RATE AUTOMATED: CPT

## 2024-06-17 PROCEDURE — 93970 EXTREMITY STUDY: CPT

## 2024-06-17 PROCEDURE — 71046 X-RAY EXAM CHEST 2 VIEWS: CPT

## 2024-06-17 PROCEDURE — 87040 BLOOD CULTURE FOR BACTERIA: CPT

## 2024-06-17 PROCEDURE — 87205 SMEAR GRAM STAIN: CPT

## 2024-06-17 RX ORDER — 0.9 % SODIUM CHLORIDE 0.9 %
500 INTRAVENOUS SOLUTION INTRAVENOUS ONCE
Status: COMPLETED | OUTPATIENT
Start: 2024-06-17 | End: 2024-06-17

## 2024-06-17 RX ORDER — ATORVASTATIN CALCIUM 40 MG/1
40 TABLET, FILM COATED ORAL NIGHTLY
Status: DISCONTINUED | OUTPATIENT
Start: 2024-06-17 | End: 2024-06-20 | Stop reason: HOSPADM

## 2024-06-17 RX ORDER — HYDROCODONE BITARTRATE AND ACETAMINOPHEN 5; 325 MG/1; MG/1
2 TABLET ORAL EVERY 4 HOURS PRN
Status: DISCONTINUED | OUTPATIENT
Start: 2024-06-17 | End: 2024-06-20 | Stop reason: HOSPADM

## 2024-06-17 RX ORDER — POLYETHYLENE GLYCOL 3350 17 G/17G
17 POWDER, FOR SOLUTION ORAL DAILY PRN
Status: DISCONTINUED | OUTPATIENT
Start: 2024-06-17 | End: 2024-06-20 | Stop reason: HOSPADM

## 2024-06-17 RX ORDER — HYDROCODONE BITARTRATE AND ACETAMINOPHEN 5; 325 MG/1; MG/1
1 TABLET ORAL EVERY 4 HOURS PRN
Status: DISCONTINUED | OUTPATIENT
Start: 2024-06-17 | End: 2024-06-20 | Stop reason: HOSPADM

## 2024-06-17 RX ORDER — ONDANSETRON 2 MG/ML
4 INJECTION INTRAMUSCULAR; INTRAVENOUS EVERY 6 HOURS PRN
Status: DISCONTINUED | OUTPATIENT
Start: 2024-06-17 | End: 2024-06-20 | Stop reason: HOSPADM

## 2024-06-17 RX ORDER — INSULIN LISPRO 100 [IU]/ML
0-4 INJECTION, SOLUTION INTRAVENOUS; SUBCUTANEOUS NIGHTLY
Status: DISCONTINUED | OUTPATIENT
Start: 2024-06-17 | End: 2024-06-20 | Stop reason: HOSPADM

## 2024-06-17 RX ORDER — DEXTROSE MONOHYDRATE 25 G/50ML
25 INJECTION, SOLUTION INTRAVENOUS ONCE
Status: COMPLETED | OUTPATIENT
Start: 2024-06-17 | End: 2024-06-17

## 2024-06-17 RX ORDER — SODIUM CHLORIDE 9 MG/ML
INJECTION, SOLUTION INTRAVENOUS PRN
Status: DISCONTINUED | OUTPATIENT
Start: 2024-06-17 | End: 2024-06-20 | Stop reason: HOSPADM

## 2024-06-17 RX ORDER — SODIUM CHLORIDE 0.9 % (FLUSH) 0.9 %
10 SYRINGE (ML) INJECTION PRN
Status: DISCONTINUED | OUTPATIENT
Start: 2024-06-17 | End: 2024-06-20 | Stop reason: HOSPADM

## 2024-06-17 RX ORDER — ONDANSETRON 4 MG/1
4 TABLET, ORALLY DISINTEGRATING ORAL EVERY 8 HOURS PRN
Status: DISCONTINUED | OUTPATIENT
Start: 2024-06-17 | End: 2024-06-20 | Stop reason: HOSPADM

## 2024-06-17 RX ORDER — HEPARIN SODIUM 1000 [USP'U]/ML
80 INJECTION, SOLUTION INTRAVENOUS; SUBCUTANEOUS ONCE
Status: COMPLETED | OUTPATIENT
Start: 2024-06-17 | End: 2024-06-17

## 2024-06-17 RX ORDER — INSULIN LISPRO 100 [IU]/ML
0-8 INJECTION, SOLUTION INTRAVENOUS; SUBCUTANEOUS
Status: DISCONTINUED | OUTPATIENT
Start: 2024-06-17 | End: 2024-06-20 | Stop reason: HOSPADM

## 2024-06-17 RX ORDER — HEPARIN SODIUM 1000 [USP'U]/ML
80 INJECTION, SOLUTION INTRAVENOUS; SUBCUTANEOUS PRN
Status: DISCONTINUED | OUTPATIENT
Start: 2024-06-17 | End: 2024-06-18

## 2024-06-17 RX ORDER — ACETAMINOPHEN 325 MG/1
650 TABLET ORAL EVERY 6 HOURS PRN
Status: DISCONTINUED | OUTPATIENT
Start: 2024-06-17 | End: 2024-06-20 | Stop reason: HOSPADM

## 2024-06-17 RX ORDER — HEPARIN SODIUM 1000 [USP'U]/ML
40 INJECTION, SOLUTION INTRAVENOUS; SUBCUTANEOUS PRN
Status: DISCONTINUED | OUTPATIENT
Start: 2024-06-17 | End: 2024-06-18

## 2024-06-17 RX ORDER — HEPARIN SODIUM 10000 [USP'U]/100ML
5-30 INJECTION, SOLUTION INTRAVENOUS CONTINUOUS
Status: DISCONTINUED | OUTPATIENT
Start: 2024-06-17 | End: 2024-06-18

## 2024-06-17 RX ORDER — HEPARIN SODIUM 10000 [USP'U]/100ML
5-30 INJECTION, SOLUTION INTRAVENOUS CONTINUOUS
Status: DISCONTINUED | OUTPATIENT
Start: 2024-06-17 | End: 2024-06-17

## 2024-06-17 RX ORDER — SODIUM CHLORIDE 0.9 % (FLUSH) 0.9 %
5-40 SYRINGE (ML) INJECTION EVERY 12 HOURS SCHEDULED
Status: DISCONTINUED | OUTPATIENT
Start: 2024-06-17 | End: 2024-06-20 | Stop reason: HOSPADM

## 2024-06-17 RX ORDER — HEPARIN SODIUM 1000 [USP'U]/ML
4000 INJECTION, SOLUTION INTRAVENOUS; SUBCUTANEOUS ONCE
Status: DISCONTINUED | OUTPATIENT
Start: 2024-06-17 | End: 2024-06-17

## 2024-06-17 RX ORDER — ACETAMINOPHEN 650 MG/1
650 SUPPOSITORY RECTAL EVERY 6 HOURS PRN
Status: DISCONTINUED | OUTPATIENT
Start: 2024-06-17 | End: 2024-06-20 | Stop reason: HOSPADM

## 2024-06-17 RX ORDER — HEPARIN SODIUM 1000 [USP'U]/ML
2000 INJECTION, SOLUTION INTRAVENOUS; SUBCUTANEOUS PRN
Status: DISCONTINUED | OUTPATIENT
Start: 2024-06-17 | End: 2024-06-17

## 2024-06-17 RX ORDER — TAMSULOSIN HYDROCHLORIDE 0.4 MG/1
0.4 CAPSULE ORAL DAILY
Status: DISCONTINUED | OUTPATIENT
Start: 2024-06-17 | End: 2024-06-20 | Stop reason: HOSPADM

## 2024-06-17 RX ORDER — INSULIN GLARGINE 100 [IU]/ML
40 INJECTION, SOLUTION SUBCUTANEOUS NIGHTLY
Status: DISCONTINUED | OUTPATIENT
Start: 2024-06-17 | End: 2024-06-20 | Stop reason: HOSPADM

## 2024-06-17 RX ORDER — HEPARIN SODIUM 1000 [USP'U]/ML
4000 INJECTION, SOLUTION INTRAVENOUS; SUBCUTANEOUS PRN
Status: DISCONTINUED | OUTPATIENT
Start: 2024-06-17 | End: 2024-06-17

## 2024-06-17 RX ADMIN — HYDROCODONE BITARTRATE AND ACETAMINOPHEN 2 TABLET: 5; 325 TABLET ORAL at 21:24

## 2024-06-17 RX ADMIN — INSULIN HUMAN 10 UNITS: 100 INJECTION, SOLUTION PARENTERAL at 16:30

## 2024-06-17 RX ADMIN — METOPROLOL TARTRATE 25 MG: 25 TABLET, FILM COATED ORAL at 21:24

## 2024-06-17 RX ADMIN — SODIUM ZIRCONIUM CYCLOSILICATE 10 G: 5 POWDER, FOR SUSPENSION ORAL at 16:35

## 2024-06-17 RX ADMIN — SODIUM CHLORIDE 500 ML: 9 INJECTION, SOLUTION INTRAVENOUS at 15:01

## 2024-06-17 RX ADMIN — DEXTROSE MONOHYDRATE 25 G: 25 INJECTION, SOLUTION INTRAVENOUS at 16:30

## 2024-06-17 RX ADMIN — HEPARIN SODIUM 18 UNITS/KG/HR: 10000 INJECTION, SOLUTION INTRAVENOUS at 18:48

## 2024-06-17 RX ADMIN — HEPARIN SODIUM 9100 UNITS: 1000 INJECTION INTRAVENOUS; SUBCUTANEOUS at 18:46

## 2024-06-17 RX ADMIN — ATORVASTATIN CALCIUM 40 MG: 40 TABLET, FILM COATED ORAL at 21:24

## 2024-06-17 ASSESSMENT — LIFESTYLE VARIABLES
HOW MANY STANDARD DRINKS CONTAINING ALCOHOL DO YOU HAVE ON A TYPICAL DAY: PATIENT DOES NOT DRINK
HOW OFTEN DO YOU HAVE A DRINK CONTAINING ALCOHOL: NEVER

## 2024-06-17 ASSESSMENT — ENCOUNTER SYMPTOMS
WHEEZING: 0
RHINORRHEA: 0
EYES NEGATIVE: 1
VOMITING: 0
PHOTOPHOBIA: 0
RESPIRATORY NEGATIVE: 1
ABDOMINAL PAIN: 0
SHORTNESS OF BREATH: 0
ALLERGIC/IMMUNOLOGIC NEGATIVE: 1
COLOR CHANGE: 1
SORE THROAT: 0
NAUSEA: 0
COUGH: 0
GASTROINTESTINAL NEGATIVE: 1
EYE PAIN: 0

## 2024-06-17 ASSESSMENT — PAIN DESCRIPTION - LOCATION
LOCATION: BACK;FLANK
LOCATION: FLANK

## 2024-06-17 ASSESSMENT — PAIN DESCRIPTION - ORIENTATION
ORIENTATION: RIGHT
ORIENTATION: RIGHT

## 2024-06-17 ASSESSMENT — PAIN - FUNCTIONAL ASSESSMENT: PAIN_FUNCTIONAL_ASSESSMENT: 0-10

## 2024-06-17 ASSESSMENT — PAIN SCALES - GENERAL
PAINLEVEL_OUTOF10: 8
PAINLEVEL_OUTOF10: 8

## 2024-06-17 NOTE — ED NOTES
Pt arrives to ED 36 via triage.  Pt co side pain and abnormal creatinine level.  Pt states that he was recently in the hospital for 10 days.  Pt unsure of what exactly he was diagnosed with.  Pt states that he does have a cardiac hx.   Pt denies any chest pain or SOB.  Pt states that he goes and gets IV antibiotics every other day.  Pt states that he is also diabetic.  Pt denies any dialysis.   Pt respirations are even and unlabored, pt is alert and oriented X 4, speaking in complete sentences, bed is in the lowest position, call light is within reach, NAD noted.   Will continue to follow plan of care.

## 2024-06-17 NOTE — H&P
[facility]  Office: [phone]  Alexi Draper DO, Sam Wharton DO, Matthew Kirkpatrick DO, Shane Dennis DO, Lisbeth Owusu MD, Nurys Crisostomo MD, Jena Xie MD, Susan Danielle MD,  Shubham Friedman MD, Mireya Nugent MD, Aleida Rincon MD,  Aliza Saavedra DO, Hector Romero MD, Moncho Gilbert MD, Marlon Draper DO, Liliana Painting MD,  Soy Mccullough DO, Amparo Kong MD, Shila Arana MD, Kristy Wei MD, Kelly Vogel MD,  Morris Sheets MD, Chapincito Rebolledo MD, Andrea Fabian MD, Lisset Mccracken MD, René Holland MD, Nishi Ojeda MD, Fer Hernandez DO, Tejas Paulino DO, Luis M Reardon MD,  Bruce Greenfield MD, Shirley Waterhouse, CNP,  Mariela Gomez CNP, Andrei Polanco, CNP,  Nan Ray, DNP, Liss Lu, CNP, Olya Kaba, CNP, Alivia Melchor, CNP, An Rosenbaum, CNP, Deena Carmona, PA-C, Corinna Mcdaniel PA-C, Kiera Holland, CNP, Gladys Carter, CNP, Deja Patterson, CNP, Christa Ojeda, CNP, Maddi Sun, CNP, Amanda Gallardo, CNS, Yamilex Latif, CNP, Zoe Conner CNP, Tracy Schwab, CNP         [facility]   IN-PATIENT SERVICE   [facility]    HISTORY AND PHYSICAL EXAMINATION            Date:   6/17/2024  Patient name:  Vincenzo Darden  Date of admission:  6/17/2024  2:12 PM  MRN:   [id number]  Account:  [id number]  YOB: 1965  PCP:    Shaikh Calix MD  Room:   [facility]  Code Status:    Full Code    Chief Complaint:     Chief Complaint   Patient presents with    Abnormal Labs     History Obtained From:     patient, electronic medical record    History of Present Illness:     Vincenzo Darden is a 58 y.o. 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  Patient was recently discharged on 6/10/2024 for  septic emboli - Continue on heparin drip. Podiatry and ID consulted. MRI of foot ordered, hold Daptomycin for now pending ID recommendations.     Elevated troponin in the setting of CAD with need for staged PCI - Cardiology consulted, had plans to do this outpatient. Continue heparin drip and trend troponins.    DM2 - Continue long-acting insulin 40 units nightly, POC glucose ACHS, medium dose insulin sliding scale.    Plan discussed with patient at bedside, all questions answered.    Consultations:   IP CONSULT TO NEPHROLOGY  IP CONSULT TO CARDIOLOGY  IP CONSULT TO PODIATRY  IP CONSULT TO HOSPITALIST  IP CONSULT TO INFECTIOUS DISEASES    Patient is admitted as inpatient status because of co-morbidities listed above, severity of signs and symptoms as outlined, requirement for current medical therapies and most importantly because of direct risk to patient if care not provided in a hospital setting.  Expected length of stay > 48 hours.    HERRERA Gonzalez  6/17/2024  6:37 PM    Copy sent to Shaikh Nieves MD

## 2024-06-17 NOTE — CARE COORDINATION
Case Management Assessment  Initial Evaluation    Date/Time of Evaluation: 6/17/2024 5:11 PM  Assessment Completed by: GOOD ENRIQUE RN    If patient is discharged prior to next notation, then this note serves as note for discharge by case management.    Patient Name: Vincenzo Darden                   YOB: 1965  Diagnosis: No admission diagnoses are documented for this encounter.                   Date / Time: 6/17/2024  2:12 PM    Patient Admission Status: Emergency   Readmission Risk (Low < 19, Mod (19-27), High > 27): Readmission Risk Score: 20.5    Current PCP: Shaikh Calix MD  PCP verified by CM? Yes    Chart Reviewed: Yes      History Provided by: Patient  Patient Orientation: Alert and Oriented    Patient Cognition: Alert    Hospitalization in the last 30 days (Readmission):  Yes    If yes, Readmission Assessment in  Navigator will be completed.    Advance Directives:      Code Status: Prior   Patient's Primary Decision Maker is:        Discharge Planning:    Patient lives with: Spouse/Significant Other Type of Home: House  Primary Care Giver: Self  Patient Support Systems include: Spouse/Significant Other   Current Financial resources:    Current community resources:    Current services prior to admission: Durable Medical Equipment, Home Care            Current DME: Wheelchair            Type of Home Care services:  Skilled Therapy    ADLS  Prior functional level: Assistance with the following:  Current functional level: Assistance with the following:    PT AM-PAC:   /24  OT AM-PAC:   /24    Family can provide assistance at DC: Yes  Would you like Case Management to discuss the discharge plan with any other family members/significant others, and if so, who? No  Plans to Return to Present Housing: Yes  Other Identified Issues/Barriers to RETURNING to current housing: none  Potential Assistance needed at discharge: Outpatient IV, Transitional Care, Home Care            Potential DME:

## 2024-06-17 NOTE — H&P
years ago. He has a 44.2 pack-year smoking history. He has never used smokeless tobacco.  Alcohol:      reports that he does not currently use alcohol.  Drug Use:  has no history on file for drug use.    Family History:     No family history on file.    Review of Systems:     Positive and Negative as described in HPI.    Review of Systems   Constitutional:  Negative for chills and fever.   HENT:  Negative for congestion, rhinorrhea and sore throat.    Eyes:  Negative for photophobia and pain.   Respiratory:  Negative for cough, shortness of breath and wheezing.    Cardiovascular:  Negative for chest pain and palpitations.   Gastrointestinal:  Negative for abdominal pain, nausea and vomiting.   Genitourinary:  Negative for frequency and urgency.   Musculoskeletal:  Negative for arthralgias and myalgias.   Neurological:  Negative for dizziness, light-headedness and headaches.     Physical Exam:   BP (!) 144/80   Pulse 77   Temp 97.8 °F (36.6 °C) (Oral)   Resp 16   Ht 1.829 m (6')   Wt 113.4 kg (250 lb)   SpO2 94%   BMI 33.91 kg/m²   Temp (24hrs), Av.2 °F (36.2 °C), Min:96.6 °F (35.9 °C), Max:97.8 °F (36.6 °C)    No results for input(s): \"POCGLU\" in the last 72 hours.  No intake or output data in the 24 hours ending 24 1755    Physical Exam  Constitutional:       General: He is not in acute distress.     Appearance: He is well-developed.   HENT:      Head: Normocephalic and atraumatic.   Neck:      Trachea: No tracheal deviation.   Cardiovascular:      Rate and Rhythm: Normal rate and regular rhythm.      Heart sounds: No murmur heard.     No friction rub. No gallop.   Pulmonary:      Effort: Pulmonary effort is normal.      Breath sounds: Normal breath sounds. No wheezing or rales.   Abdominal:      General: There is no distension.      Palpations: Abdomen is soft. There is no mass.      Tenderness: There is no abdominal tenderness.   Musculoskeletal:         General: No tenderness or deformity.

## 2024-06-17 NOTE — ED PROVIDER NOTES
[facility] ED  Emergency Department Encounter  Emergency Medicine Resident     Pt Name:Vincenzo Darden  MRN: [id number]  Birthdate 1965  Date of evaluation: 6/17/24  PCP:  Shaikh Calix MD  Note Started: 2:35 PM EDT      CHIEF COMPLAINT       Chief Complaint   Patient presents with    Abnormal Labs       HISTORY OF PRESENT ILLNESS  (Location/Symptom, Timing/Onset, Context/Setting, Quality, Duration, Modifying Factors, Severity.)      Vincenzo Darden is a 58 y.o. male PMH T2DM, diabetic neuropathy, PVD s/p right BKA, chronic left first toe gangrene, CKD stage III, COLT, CAD s/p CABG 2/24 who presents with normal labs.  Patient has elevated creatinine and was at his PCP today and was told to come to the emergency department.  Patient's creatinine was 4.1, on chart review patient's creatinine has been steadily rising.  Patient is on daptomycin due to infection in his left big toe and cellulitis.  Patient states he is having Mild amount of chest pain, shortness of breath.  Denies any fever, chills, nausea, vomiting, abdominal pain.    Patient was admitted to the hospital on 6/2 for NSTEMI.  Was also concern for sternal wound dehiscence, however CT surgery evaluate the patient and there is no concern.  Patient was also seen by cardiology who plan for PCI to the RCA, stated that he needed to follow-up outpatient.  Patient is also stating that his left lower extremity is significantly more swollen than normal and states that this is been going on for the past few weeks.  Patient states he has not been taking his anticoagulation for an unknown amount of time.    PAST MEDICAL / SURGICAL / SOCIAL / FAMILY HISTORY      has a past medical history of Diabetes mellitus (HCC) and Hernia, abdominal.       has a past surgical history that includes Cardiac procedure (N/A, 10/9/2023); Cardiac procedure (N/A, 10/9/2023); and Coronary artery bypass graft (N/A, 2/26/2024).      Social History     Socioeconomic  MEDICATIONS:  New Prescriptions    No medications on file       Cristofer Meehan MD  Emergency Medicine Resident    (Please note that portions of this note were completed with a voice recognition program.  Efforts were made to edit the dictations but occasionally words are mis-transcribed.)

## 2024-06-17 NOTE — CONSULTS
foot along with ulcer on the base of the foot.               Pt denies any hx of heavy or prolonged NSAID use. There is no history of blood or bone marrow disorders. There is no hx of jaundice or hepatitis or sexually transmitted disease. Pt has no hx of collagen vascular disease or vasculitis.   No history of dysuria or frequency.No recent procedures involving IV contrast.   There is no hx of paraprotein disease. No hx of recurrent UTI , incontinence or recurrent nephrolithiasis.     Past Medical History:        Diagnosis Date    Diabetes mellitus (HCC)     Hernia, abdominal        Past Surgical History:        Procedure Laterality Date    CABG WITH AORTIC VALVE REPLACEMENT N/A 2/26/2024    CABG CORONARY ARTERY BYPASS X3, STERNAL PLATING, AORTIC VALVE REPLACEMENT 23MM INSPIRIS AORTIC VALVE, ON PUMP, JO performed by Andrei James MD at [facility] CVOR    CARDIAC PROCEDURE N/A 10/9/2023    Coronary angiography performed by Louis Ramirez MD at [facility] CARDIAC CATH LAB    CARDIAC PROCEDURE N/A 10/9/2023    Aortic root aortogram performed by Louis Ramirez MD at [facility] CARDIAC CATH LAB       Current Medications:    No current facility-administered medications for this encounter.      Allergies:  Vancomycin and Zosyn [piperacillin sod-tazobactam so]    Social History:   Social History     Socioeconomic History    Marital status:      Spouse name: Not on file    Number of children: Not on file    Years of education: Not on file    Highest education level: Not on file   Occupational History    Not on file   Tobacco Use    Smoking status: Every Day     Current packs/day: 1.50     Average packs/day: 1.5 packs/day for 29.5 years (44.2 ttl pk-yrs)     Types: Cigarettes     Start date: 01/1995    Smokeless tobacco: Never   Substance and Sexual Activity    Alcohol use: Not Currently    Drug use: Not on file    Sexual activity: Not on file   Other Topics Concern    Not on file   Social History Narrative    Not on file      Social Determinants of Health     Financial Resource Strain: Not on file   Food Insecurity: No Food Insecurity (6/3/2024)    Hunger Vital Sign     Worried About Running Out of Food in the Last Year: Never true     Ran Out of Food in the Last Year: Never true   Transportation Needs: No Transportation Needs (6/3/2024)    PRAPARE - Transportation     Lack of Transportation (Medical): No     Lack of Transportation (Non-Medical): No   Physical Activity: Not on file   Stress: Not on file   Social Connections: Not on file   Intimate Partner Violence: Not on file   Housing Stability: Low Risk  (6/3/2024)    Housing Stability Vital Sign     Unable to Pay for Housing in the Last Year: No     Number of Places Lived in the Last Year: 1     Unstable Housing in the Last Year: No       Family History:   No family history on file.    Review of Systems:    Constitutional: No fever, no chills, + lethargy, + weakness.  HEENT:  No headache, otalgia, itchy eyes, nasal discharge or sore throat.  Cardiac:  No chest pain, dyspnea, orthopnea or PND.  Chest:              No cough, phlegm or wheezing.  Abdomen:  No abdominal pain, nausea or vomiting.  Neuro:  No focal weakness, abnormal movements orseizure like activity.  Skin:   No rashes, no itching.  :   No hematuria, no pyuria, no dysuria, no flank pain.  Extremities:  + swelling .      Objective:  CURRENT TEMPERATURE:  Temp: 97.8 °F (36.6 °C)  MAXIMUM TEMPERATURE OVER 24HRS:  Temp (24hrs), Av.2 °F (36.2 °C), Min:96.6 °F (35.9 °C), Max:97.8 °F (36.6 °C)    CURRENT RESPIRATORY RATE:  Respirations: 16  CURRENT PULSE:  Pulse: 77  CURRENT BLOOD PRESSURE:  BP: (!) 144/80  24HR BLOOD PRESSURE RANGE:  Systolic (24hrs), Av , Min:144 , Max:165   ; Diastolic (24hrs), Av, Min:80, Max:95    24HR INTAKE/OUTPUT:  No intake or output data in the 24 hours ending 24 1702  Patient Vitals for the past 96 hrs (Last 3 readings):   Weight   24 1410 113.4 kg (250 lb)     Physical

## 2024-06-17 NOTE — ED NOTES
ED to inpatient nurses report      Chief Complaint:  Chief Complaint   Patient presents with    Abnormal Labs     Present to ED from: Home    MOA:     LOC: alert and orientated to name, place, date  Mobility: Requires assistance * 1  Oxygen Baseline: 94%    Current needs required: None   Pending ED orders: None  Present condition: A&O x 4, stable    Why did the patient come to the ED? Pt arrives to ED 36 via triage.  Pt co side pain and abnormal creatinine level.  Pt states that he was recently in the hospital for 10 days.  Pt unsure of what exactly he was diagnosed with.  Pt states that he does have a cardiac hx.   Pt denies any chest pain or SOB.  Pt states that he goes and gets IV antibiotics every other day.  Pt states that he is also diabetic.  Pt denies any dialysis.   Pt respirations are even and unlabored, pt is alert and oriented X 4, speaking in complete sentences, bed is in the lowest position, call light is within reach, NAD noted.   Will continue to follow plan of care.   What is the plan? Monitor labs  Any procedures or intervention occur? Labs, Medications, fluids  Any safety concerns??    Mental Status:  Level of Consciousness: Alert (0)    Psych Assessment:   Psychosocial  Psychosocial (WDL): Within Defined Limits  Vital signs   Vitals:    06/17/24 1430 06/17/24 1445 06/17/24 1501 06/17/24 1516   BP: (!) 158/92 (!) 144/80     Pulse: 79 78  77   Resp: 20 21  16   Temp:   97.8 °F (36.6 °C)    TempSrc:   Oral    SpO2: 95% 94%  94%   Weight:       Height:            Vitals:  Patient Vitals for the past 24 hrs:   BP Temp Temp src Pulse Resp SpO2 Height Weight   06/17/24 1516 -- -- -- 77 16 94 % -- --   06/17/24 1501 -- 97.8 °F (36.6 °C) Oral -- -- -- -- --   06/17/24 1445 (!) 144/80 -- -- 78 21 94 % -- --   06/17/24 1430 (!) 158/92 -- -- 79 20 95 % -- --   06/17/24 1426 (!) 165/95 -- -- 80 17 94 % -- --   06/17/24 1410 (!) 151/88 (!) 96.6 °F (35.9 °C) -- 86 18 93 % 1.829 m (6') 113.4 kg (250 lb)     CAPSULE    Take 1 capsule by mouth daily    TRULICITY 1.5 MG/0.5ML SC INJECTION    Inject 0.5 mLs into the skin Once a week at 5 PM    WARFARIN (COUMADIN) 2.5 MG TABLET    Take 2 tablets by mouth daily     Orders Placed This Encounter   Medications    sodium chloride 0.9 % bolus 500 mL    insulin regular (HUMULIN R;NOVOLIN R) injection 10 Units    dextrose 50 % IV solution    sodium zirconium cyclosilicate (LOKELMA) oral suspension 10 g       SURGICAL HISTORY       Past Surgical History:   Procedure Laterality Date    CABG WITH AORTIC VALVE REPLACEMENT N/A 2/26/2024    CABG CORONARY ARTERY BYPASS X3, STERNAL PLATING, AORTIC VALVE REPLACEMENT 23MM INSPIRIS AORTIC VALVE, ON PUMP, JO performed by Andrei James MD at [facility] CVOR    CARDIAC PROCEDURE N/A 10/9/2023    Coronary angiography performed by Louis Ramirez MD at [facility] CARDIAC CATH LAB    CARDIAC PROCEDURE N/A 10/9/2023    Aortic root aortogram performed by Louis Ramirez MD at [facility] CARDIAC CATH LAB       PAST MEDICAL HISTORY       Past Medical History:   Diagnosis Date    Diabetes mellitus (HCC)     Hernia, abdominal        Labs:  Labs Reviewed   CBC WITH AUTO DIFFERENTIAL - Abnormal; Notable for the following components:       Result Value    WBC 14.8 (*)     RBC 3.47 (*)     Hemoglobin 10.0 (*)     Hematocrit 31.7 (*)     Neutrophils % 88 (*)     Lymphocytes % 7 (*)     Neutrophils Absolute 13.02 (*)     All other components within normal limits   COMPREHENSIVE METABOLIC PANEL - Abnormal; Notable for the following components:    Sodium 130 (*)     Potassium 6.0 (*)     Chloride 96 (*)     CO2 17 (*)     Anion Gap 17 (*)     Glucose 251 (*)      (*)     Creatinine 8.7 (*)     Est, Glom Filt Rate 6 (*)     Calcium 7.9 (*)     Albumin 2.1 (*)     All other components within normal limits   SEDIMENTATION RATE - Abnormal; Notable for the following components:    Sed Rate, Automated 79 (*)     All other components within normal limits   C-REACTIVE

## 2024-06-17 NOTE — PLAN OF CARE
Plan of care note  Foot and Ankle Surgery     Plan of care      Patient in ultrasound when attempted to see patient for consult in ED.     Soraida Gorman DPM  Foot and Ankle Surgery   06/17/24 at 5:55 PM

## 2024-06-17 NOTE — CONSULTS
Consultation Note  Foot and Ankle Surgery     Subjective     Chief Complaint: Left foot wound and gangrene    HPI:  Vincenzo Darden is a 58 y.o. male diabetic patient seen at [facility] for foot wounds to the left lower extremity. Patient has been following up with unknown podiatrist in [place] for routine foot care and debridement. They have had this wound for 3 weeks. Patient says he has seen a vascular surgeon before, but has never had any procedures done because he has good bloodflow. They recently noticed increased swelling, redness, pain and came to the ED. Patient denies constitutional symptoms.  Podiatry was consulted for further wound care intervention.     PCP is Shaikh Calix MD     ROS:   Review of Systems   Constitutional: Negative.    HENT: Negative.     Eyes: Negative.    Respiratory: Negative.     Cardiovascular: Negative.    Gastrointestinal: Negative.    Endocrine: Negative.    Genitourinary: Negative.    Musculoskeletal:  Positive for gait problem and myalgias.   Skin:  Positive for color change and wound.   Allergic/Immunologic: Negative.    Neurological:  Negative for numbness.   Hematological: Negative.    Psychiatric/Behavioral: Negative.         Past Medical History   has a past medical history of Diabetes mellitus (HCC) and Hernia, abdominal.    Past Surgical History   has a past surgical history that includes Cardiac procedure (N/A, 10/9/2023); Cardiac procedure (N/A, 10/9/2023); and Coronary artery bypass graft (N/A, 2/26/2024).    Medications  Prior to Admission medications    Medication Sig Start Date End Date Taking? Authorizing Provider   sodium bicarbonate 650 MG tablet Take 2 tablets by mouth 2 times daily 6/11/24 7/11/24  Andrea Fabian MD   atorvastatin (LIPITOR) 40 MG tablet Take 1 tablet by mouth nightly 6/10/24   Nishi Ojeda MD   furosemide (LASIX) 80 MG tablet Take 1 tablet by mouth in the morning and 1 tablet in the evening. 6/10/24   Nishi Ojeda MD  to dry, 4x4, kerlix, ace  Order placed for surgical shoe  WBAT to Left lower extremity with surgical shoe.  Will discuss with  Dr. Pavel Gorman DPM   Foot and Ankle Surgery  6/17/2024 at 5:43 PM     I performed a history and physical examination of the patient and discussed management with the resident. I reviewed the resident’s note and agree with the documented findings and plan of care. Any areas of disagreement are noted on the chart. I was personally present for the key portions of any procedures. I have documented in the chart those procedures where I was not present during the key portions. I have reviewed the Podiatry Resident progress note. I agree with the chief complaint, past medical history, past surgical history, allergies, medications, social and family history as documented unless otherwise noted below. Documentation of the HPI, Physical Exam and Medical Decision Making performed by medical students or scribes is based on my personal performance of the HPI, PE and MDM. I have personally evaluated this patient and have completed at least one if not all key elements of the E/M (history, physical exam, and MDM). Additional findings are as noted.     Quinton Cline DPM on 6/24/2024 at 7:48 AM  [facility]  Associate, [employer]    This note was generated with the assistance of a speech recognition program. While intending to generate a timely document that accurately reflects the content of the visit, no guarantee can be provided that every grammatical or spelling mistake has been or will be identified or corrected.  In such instances, actual meaning can be extrapolated by context.Thank you for your understanding.

## 2024-06-17 NOTE — CARE COORDINATION
06/17/24 1710   Readmission Assessment   Number of Days since last admission? 8-30 days   Previous Disposition Home with Home Health   Who is being Interviewed Patient   What was the patient's/caregiver's perception as to why they think they needed to return back to the hospital? Other (Comment)  (symptoms)   Did you visit your Primary Care Physician after you left the hospital, before you returned this time? Yes   Did you see a specialist, such as Cardiac, Pulmonary, Orthopedic Physician, etc. after you left the hospital? Yes   Who advised the patient to return to the hospital? Physician   Does the patient report anything that got in the way of taking their medications? No   In our efforts to provide the best possible care to you and others like you, can you think of anything that we could have done to help you after you left the hospital the first time, so that you might not have needed to return so soon? Discharge instructions that are concise, clear, and non contradictory

## 2024-06-17 NOTE — ED PROVIDER NOTES
[facility]     Emergency Department     Faculty Attestation  5:26 PM EDT      I performed a history and physical examination of the patient and discussed management with the resident. I have reviewed and agree with the resident’s findings including all diagnostic interpretations, and treatment plans as written. Any areas of disagreement are noted on the chart. I was personally present for the key portions of any procedures. I have documented in the chart those procedures where I was not present during the key portions. I have reviewed the emergency nurses triage note. I agree with the chief complaint, past medical history, past surgical history, allergies, medications, social and family history as documented unless otherwise noted below. Documentation of the HPI, Physical Exam and Medical Decision Making performed by scribtraci is based on my personal performance of the HPI, PE and MDM. For Physician Assistant/ Nurse Practitioner cases/documentation I have personally evaluated this patient and have completed at least one if not all key elements of the E/M (history, physical exam, and MDM). Additional findings are as noted.    Patient with multiple chronic comorbidities, recent hospitalization.  Noted to have wound to his tip of his left toe.  On PICC line IV antibiotics daptomycin by infectious disease.  Also had positive blood cultures.  Patient does have a previous CABG surgery as well as aortic valve replacement he is not taking any anticoagulants at this time.  Was having worsening renal function last checked outpatient which was 4.1 and was told to come to the emergency room.  Patient otherwise has no complaints.  On exam patient has a left big toe that is necrotic.  And the second and third stub of his toes do have the distal tip appears to be ecchymotic.  Patient has a BMI of 33, his abdomen is protuberant but nontender to palpation patient is a otherwise  Secondary Intention Text (Leave Blank If You Do Not Want): The defect will heal with secondary intention.

## 2024-06-18 ENCOUNTER — APPOINTMENT (OUTPATIENT)
Dept: INTERVENTIONAL RADIOLOGY/VASCULAR | Age: 59
DRG: 674 | End: 2024-06-18
Payer: MEDICARE

## 2024-06-18 ENCOUNTER — APPOINTMENT (OUTPATIENT)
Dept: MRI IMAGING | Age: 59
DRG: 674 | End: 2024-06-18
Payer: MEDICARE

## 2024-06-18 ENCOUNTER — APPOINTMENT (OUTPATIENT)
Dept: VASCULAR LAB | Age: 59
DRG: 674 | End: 2024-06-18
Payer: MEDICARE

## 2024-06-18 ENCOUNTER — APPOINTMENT (OUTPATIENT)
Dept: DIALYSIS | Age: 59
DRG: 674 | End: 2024-06-18
Payer: MEDICARE

## 2024-06-18 PROBLEM — I73.9 PVD (PERIPHERAL VASCULAR DISEASE) (HCC): Status: ACTIVE | Noted: 2024-06-18

## 2024-06-18 PROBLEM — S91.302A WOUND, OPEN, FOOT, LEFT, INITIAL ENCOUNTER: Status: ACTIVE | Noted: 2024-06-18

## 2024-06-18 PROBLEM — R23.4 WOUND ESCHAR OF FOOT: Status: ACTIVE | Noted: 2024-06-18

## 2024-06-18 LAB
ALBUMIN PERCENT: 28 % (ref 45–65)
ALBUMIN SERPL-MCNC: 1.7 G/DL (ref 3.2–5.2)
ALPHA 2 PERCENT: 14 % (ref 6–13)
ALPHA1 GLOB SERPL ELPH-MCNC: 0.5 G/DL (ref 0.1–0.4)
ALPHA1 GLOB SERPL ELPH-MCNC: 9 % (ref 3–6)
ALPHA2 GLOB SERPL ELPH-MCNC: 0.9 G/DL (ref 0.5–0.9)
ANION GAP SERPL CALCULATED.3IONS-SCNC: 19 MMOL/L (ref 9–16)
ANTI-XA UNFRAC HEPARIN: 0.29 IU/L
ANTI-XA UNFRAC HEPARIN: 0.37 IU/L
B-GLOBULIN SERPL ELPH-MCNC: 0.8 G/DL (ref 0.5–1.1)
B-GLOBULIN SERPL ELPH-MCNC: 13 % (ref 11–19)
BASOPHILS # BLD: 0.06 K/UL (ref 0–0.2)
BASOPHILS NFR BLD: 1 % (ref 0–2)
BUN SERPL-MCNC: 109 MG/DL (ref 6–20)
C3 SERPL-MCNC: 35 MG/DL (ref 90–180)
C4 SERPL-MCNC: 24 MG/DL (ref 10–40)
CALCIUM SERPL-MCNC: 7.7 MG/DL (ref 8.6–10.4)
CHLORIDE SERPL-SCNC: 97 MMOL/L (ref 98–107)
CK SERPL-CCNC: 115 U/L (ref 39–308)
CO2 SERPL-SCNC: 14 MMOL/L (ref 20–31)
CREAT SERPL-MCNC: 8.9 MG/DL (ref 0.7–1.2)
CRP SERPL HS-MCNC: 70.8 MG/L (ref 0–5)
ECHO BSA: 2.4 M2
EOSINOPHIL # BLD: 0.22 K/UL (ref 0–0.44)
EOSINOPHIL,URINE: ABNORMAL
EOSINOPHILS RELATIVE PERCENT: 2 % (ref 1–4)
ERYTHROCYTE [DISTWIDTH] IN BLOOD BY AUTOMATED COUNT: 13.8 % (ref 11.8–14.4)
ERYTHROCYTE [SEDIMENTATION RATE] IN BLOOD BY PHOTOMETRIC METHOD: 75 MM/HR (ref 0–20)
GAMMA GLOB SERPL ELPH-MCNC: 2.2 G/DL (ref 0.5–1.5)
GAMMA GLOBULIN %: 36 % (ref 9–20)
GFR, ESTIMATED: 6 ML/MIN/1.73M2
GLUCOSE BLD-MCNC: 136 MG/DL (ref 75–110)
GLUCOSE BLD-MCNC: 155 MG/DL (ref 75–110)
GLUCOSE BLD-MCNC: 207 MG/DL (ref 75–110)
GLUCOSE BLD-MCNC: 236 MG/DL (ref 75–110)
GLUCOSE BLD-MCNC: 239 MG/DL (ref 75–110)
GLUCOSE SERPL-MCNC: 161 MG/DL (ref 74–99)
HBV CORE AB SER QL: NONREACTIVE
HBV SURFACE AB SERPL IA-ACNC: <3.5 MIU/ML
HBV SURFACE AG SERPL QL IA: NONREACTIVE
HCT VFR BLD AUTO: 30.1 % (ref 40.7–50.3)
HCV AB SERPL QL IA: NONREACTIVE
HGB BLD-MCNC: 9.5 G/DL (ref 13–17)
IMM GRANULOCYTES # BLD AUTO: 0.07 K/UL (ref 0–0.3)
IMM GRANULOCYTES NFR BLD: 1 %
INR PPP: 1.3
ITYP INTERPRETATION: NORMAL
LYMPHOCYTES NFR BLD: 0.92 K/UL (ref 1.1–3.7)
LYMPHOCYTES RELATIVE PERCENT: 8 % (ref 24–43)
MAGNESIUM SERPL-MCNC: 2.4 MG/DL (ref 1.6–2.6)
MCH RBC QN AUTO: 28.5 PG (ref 25.2–33.5)
MCHC RBC AUTO-ENTMCNC: 31.6 G/DL (ref 28.4–34.8)
MCV RBC AUTO: 90.4 FL (ref 82.6–102.9)
MICROORGANISM SPEC CULT: NO GROWTH
MONOCYTES NFR BLD: 0.79 K/UL (ref 0.1–1.2)
MONOCYTES NFR BLD: 7 % (ref 3–12)
NEUTROPHILS NFR BLD: 81 % (ref 36–65)
NEUTS SEG NFR BLD: 8.85 K/UL (ref 1.5–8.1)
NRBC BLD-RTO: 0 PER 100 WBC
PARTIAL THROMBOPLASTIN TIME: 37 SEC (ref 23–36.5)
PATH REV: NORMAL
PATHOLOGIST: ABNORMAL
PLATELET # BLD AUTO: 368 K/UL (ref 138–453)
PMV BLD AUTO: 9 FL (ref 8.1–13.5)
POTASSIUM SERPL-SCNC: 5.5 MMOL/L (ref 3.7–5.3)
POTASSIUM SERPL-SCNC: 5.7 MMOL/L (ref 3.7–5.3)
PROT PATTERN SERPL ELPH-IMP: ABNORMAL
PROT SERPL-MCNC: 6.1 G/DL (ref 6.6–8.7)
PROTHROMBIN TIME: 15.5 SEC (ref 11.7–14.9)
RBC # BLD AUTO: 3.33 M/UL (ref 4.21–5.77)
SODIUM SERPL-SCNC: 130 MMOL/L (ref 136–145)
SPECIMEN DESCRIPTION: NORMAL
TOTAL PROT. SUM,%: 100 % (ref 98–102)
TOTAL PROT. SUM: 6.1 G/DL (ref 6.3–8.2)
TROPONIN I SERPL HS-MCNC: 126 NG/L (ref 0–22)
VAS LEFT ABI: 1.23
VAS LEFT ARM BP: 160 MMHG
VAS LEFT DORSALIS PEDIS BP: 185 MMHG
VAS LEFT PTA BP: 197 MMHG
VAS LEFT TBI: 1.28
VAS LEFT TOE PRESSURE: 205 MMHG
WBC OTHER # BLD: 10.9 K/UL (ref 3.5–11.3)

## 2024-06-18 PROCEDURE — 6360000002 HC RX W HCPCS: Performed by: NURSE PRACTITIONER

## 2024-06-18 PROCEDURE — 85730 THROMBOPLASTIN TIME PARTIAL: CPT

## 2024-06-18 PROCEDURE — 6360000002 HC RX W HCPCS: Performed by: INTERNAL MEDICINE

## 2024-06-18 PROCEDURE — 6370000000 HC RX 637 (ALT 250 FOR IP): Performed by: INTERNAL MEDICINE

## 2024-06-18 PROCEDURE — 99232 SBSQ HOSP IP/OBS MODERATE 35: CPT | Performed by: STUDENT IN AN ORGANIZED HEALTH CARE EDUCATION/TRAINING PROGRAM

## 2024-06-18 PROCEDURE — 82947 ASSAY GLUCOSE BLOOD QUANT: CPT

## 2024-06-18 PROCEDURE — 86140 C-REACTIVE PROTEIN: CPT

## 2024-06-18 PROCEDURE — 6370000000 HC RX 637 (ALT 250 FOR IP): Performed by: STUDENT IN AN ORGANIZED HEALTH CARE EDUCATION/TRAINING PROGRAM

## 2024-06-18 PROCEDURE — 84132 ASSAY OF SERUM POTASSIUM: CPT

## 2024-06-18 PROCEDURE — 82550 ASSAY OF CK (CPK): CPT

## 2024-06-18 PROCEDURE — 86225 DNA ANTIBODY NATIVE: CPT

## 2024-06-18 PROCEDURE — 85610 PROTHROMBIN TIME: CPT

## 2024-06-18 PROCEDURE — 86704 HEP B CORE ANTIBODY TOTAL: CPT

## 2024-06-18 PROCEDURE — 77001 FLUOROGUIDE FOR VEIN DEVICE: CPT

## 2024-06-18 PROCEDURE — 36415 COLL VENOUS BLD VENIPUNCTURE: CPT

## 2024-06-18 PROCEDURE — 6370000000 HC RX 637 (ALT 250 FOR IP): Performed by: NURSE PRACTITIONER

## 2024-06-18 PROCEDURE — 05HM33Z INSERTION OF INFUSION DEVICE INTO RIGHT INTERNAL JUGULAR VEIN, PERCUTANEOUS APPROACH: ICD-10-PCS | Performed by: RADIOLOGY

## 2024-06-18 PROCEDURE — 80048 BASIC METABOLIC PNL TOTAL CA: CPT

## 2024-06-18 PROCEDURE — 85025 COMPLETE CBC W/AUTO DIFF WBC: CPT

## 2024-06-18 PROCEDURE — 87340 HEPATITIS B SURFACE AG IA: CPT

## 2024-06-18 PROCEDURE — 2060000000 HC ICU INTERMEDIATE R&B

## 2024-06-18 PROCEDURE — 99223 1ST HOSP IP/OBS HIGH 75: CPT | Performed by: INTERNAL MEDICINE

## 2024-06-18 PROCEDURE — 86803 HEPATITIS C AB TEST: CPT

## 2024-06-18 PROCEDURE — 2580000003 HC RX 258: Performed by: STUDENT IN AN ORGANIZED HEALTH CARE EDUCATION/TRAINING PROGRAM

## 2024-06-18 PROCEDURE — 85652 RBC SED RATE AUTOMATED: CPT

## 2024-06-18 PROCEDURE — 6360000002 HC RX W HCPCS: Performed by: PHYSICIAN ASSISTANT

## 2024-06-18 PROCEDURE — 90935 HEMODIALYSIS ONE EVALUATION: CPT

## 2024-06-18 PROCEDURE — 85520 HEPARIN ASSAY: CPT

## 2024-06-18 PROCEDURE — 83735 ASSAY OF MAGNESIUM: CPT

## 2024-06-18 PROCEDURE — C1769 GUIDE WIRE: HCPCS

## 2024-06-18 PROCEDURE — 76937 US GUIDE VASCULAR ACCESS: CPT

## 2024-06-18 PROCEDURE — 86038 ANTINUCLEAR ANTIBODIES: CPT

## 2024-06-18 PROCEDURE — 99232 SBSQ HOSP IP/OBS MODERATE 35: CPT | Performed by: INTERNAL MEDICINE

## 2024-06-18 PROCEDURE — 6360000002 HC RX W HCPCS

## 2024-06-18 PROCEDURE — 93923 UPR/LXTR ART STDY 3+ LVLS: CPT | Performed by: SURGERY

## 2024-06-18 PROCEDURE — 93923 UPR/LXTR ART STDY 3+ LVLS: CPT

## 2024-06-18 PROCEDURE — 86160 COMPLEMENT ANTIGEN: CPT

## 2024-06-18 PROCEDURE — 2500000003 HC RX 250 WO HCPCS: Performed by: INTERNAL MEDICINE

## 2024-06-18 PROCEDURE — 86317 IMMUNOASSAY INFECTIOUS AGENT: CPT

## 2024-06-18 PROCEDURE — 84484 ASSAY OF TROPONIN QUANT: CPT

## 2024-06-18 PROCEDURE — 36556 INSERT NON-TUNNEL CV CATH: CPT

## 2024-06-18 RX ORDER — ASPIRIN 81 MG/1
81 TABLET, CHEWABLE ORAL DAILY
Status: DISCONTINUED | OUTPATIENT
Start: 2024-06-18 | End: 2024-06-20 | Stop reason: HOSPADM

## 2024-06-18 RX ORDER — GABAPENTIN 100 MG/1
100 CAPSULE ORAL EVERY 8 HOURS SCHEDULED
Status: DISCONTINUED | OUTPATIENT
Start: 2024-06-18 | End: 2024-06-20 | Stop reason: HOSPADM

## 2024-06-18 RX ORDER — DEXTROSE MONOHYDRATE 25 G/50ML
12.5 INJECTION, SOLUTION INTRAVENOUS ONCE
Status: COMPLETED | OUTPATIENT
Start: 2024-06-18 | End: 2024-06-18

## 2024-06-18 RX ORDER — LINEZOLID 2 MG/ML
600 INJECTION, SOLUTION INTRAVENOUS EVERY 12 HOURS
Status: DISCONTINUED | OUTPATIENT
Start: 2024-06-18 | End: 2024-06-20

## 2024-06-18 RX ORDER — HEPARIN SODIUM 1000 [USP'U]/ML
INJECTION, SOLUTION INTRAVENOUS; SUBCUTANEOUS PRN
Status: COMPLETED | OUTPATIENT
Start: 2024-06-18 | End: 2024-06-18

## 2024-06-18 RX ORDER — HEPARIN SODIUM 1000 [USP'U]/ML
1100 INJECTION, SOLUTION INTRAVENOUS; SUBCUTANEOUS PRN
Status: DISCONTINUED | OUTPATIENT
Start: 2024-06-18 | End: 2024-06-20 | Stop reason: HOSPADM

## 2024-06-18 RX ORDER — MORPHINE SULFATE 2 MG/ML
1 INJECTION, SOLUTION INTRAMUSCULAR; INTRAVENOUS ONCE
Status: COMPLETED | OUTPATIENT
Start: 2024-06-18 | End: 2024-06-18

## 2024-06-18 RX ORDER — HEPARIN SODIUM 1000 [USP'U]/ML
1400 INJECTION, SOLUTION INTRAVENOUS; SUBCUTANEOUS PRN
Status: DISCONTINUED | OUTPATIENT
Start: 2024-06-18 | End: 2024-06-20 | Stop reason: HOSPADM

## 2024-06-18 RX ADMIN — HYDROCODONE BITARTRATE AND ACETAMINOPHEN 2 TABLET: 5; 325 TABLET ORAL at 19:37

## 2024-06-18 RX ADMIN — HYDROCODONE BITARTRATE AND ACETAMINOPHEN 2 TABLET: 5; 325 TABLET ORAL at 06:42

## 2024-06-18 RX ADMIN — GABAPENTIN 100 MG: 100 CAPSULE ORAL at 01:02

## 2024-06-18 RX ADMIN — HEPARIN SODIUM 20 UNITS/KG/HR: 10000 INJECTION, SOLUTION INTRAVENOUS at 06:44

## 2024-06-18 RX ADMIN — LINEZOLID 600 MG: 600 INJECTION, SOLUTION INTRAVENOUS at 15:21

## 2024-06-18 RX ADMIN — LINEZOLID 600 MG: 600 INJECTION, SOLUTION INTRAVENOUS at 23:10

## 2024-06-18 RX ADMIN — HEPARIN SODIUM 1400 UNITS: 1000 INJECTION INTRAVENOUS; SUBCUTANEOUS at 14:00

## 2024-06-18 RX ADMIN — SODIUM CHLORIDE, PRESERVATIVE FREE 10 ML: 5 INJECTION INTRAVENOUS at 19:40

## 2024-06-18 RX ADMIN — HEPARIN SODIUM 1400 UNITS: 1000 INJECTION, SOLUTION INTRAVENOUS; SUBCUTANEOUS at 10:37

## 2024-06-18 RX ADMIN — INSULIN LISPRO 2 UNITS: 100 INJECTION, SOLUTION INTRAVENOUS; SUBCUTANEOUS at 17:31

## 2024-06-18 RX ADMIN — HYDROCODONE BITARTRATE AND ACETAMINOPHEN 2 TABLET: 5; 325 TABLET ORAL at 15:15

## 2024-06-18 RX ADMIN — HYDROCODONE BITARTRATE AND ACETAMINOPHEN 2 TABLET: 5; 325 TABLET ORAL at 01:32

## 2024-06-18 RX ADMIN — GABAPENTIN 100 MG: 100 CAPSULE ORAL at 15:15

## 2024-06-18 RX ADMIN — GABAPENTIN 100 MG: 100 CAPSULE ORAL at 22:08

## 2024-06-18 RX ADMIN — METOPROLOL TARTRATE 25 MG: 25 TABLET, FILM COATED ORAL at 19:37

## 2024-06-18 RX ADMIN — METOPROLOL TARTRATE 25 MG: 25 TABLET, FILM COATED ORAL at 08:20

## 2024-06-18 RX ADMIN — HEPARIN SODIUM 1100 UNITS: 1000 INJECTION, SOLUTION INTRAVENOUS; SUBCUTANEOUS at 10:37

## 2024-06-18 RX ADMIN — HEPARIN SODIUM 4500 UNITS: 1000 INJECTION INTRAVENOUS; SUBCUTANEOUS at 01:34

## 2024-06-18 RX ADMIN — INSULIN HUMAN 10 UNITS: 100 INJECTION, SOLUTION PARENTERAL at 08:20

## 2024-06-18 RX ADMIN — INSULIN GLARGINE 40 UNITS: 100 INJECTION, SOLUTION SUBCUTANEOUS at 19:39

## 2024-06-18 RX ADMIN — ASPIRIN 81 MG 81 MG: 81 TABLET ORAL at 15:15

## 2024-06-18 RX ADMIN — TAMSULOSIN HYDROCHLORIDE 0.4 MG: 0.4 CAPSULE ORAL at 08:20

## 2024-06-18 RX ADMIN — HEPARIN SODIUM 20 UNITS/KG/HR: 10000 INJECTION, SOLUTION INTRAVENOUS at 12:45

## 2024-06-18 RX ADMIN — MORPHINE SULFATE 1 MG: 2 INJECTION, SOLUTION INTRAMUSCULAR; INTRAVENOUS at 04:35

## 2024-06-18 RX ADMIN — HYDROCODONE BITARTRATE AND ACETAMINOPHEN 2 TABLET: 5; 325 TABLET ORAL at 23:26

## 2024-06-18 RX ADMIN — HEPARIN SODIUM 1100 UNITS: 1000 INJECTION INTRAVENOUS; SUBCUTANEOUS at 13:59

## 2024-06-18 RX ADMIN — ATORVASTATIN CALCIUM 40 MG: 40 TABLET, FILM COATED ORAL at 19:40

## 2024-06-18 RX ADMIN — DEXTROSE MONOHYDRATE 12.5 G: 25 INJECTION, SOLUTION INTRAVENOUS at 08:20

## 2024-06-18 ASSESSMENT — PAIN SCALES - GENERAL
PAINLEVEL_OUTOF10: 7
PAINLEVEL_OUTOF10: 9
PAINLEVEL_OUTOF10: 7
PAINLEVEL_OUTOF10: 8
PAINLEVEL_OUTOF10: 7
PAINLEVEL_OUTOF10: 3
PAINLEVEL_OUTOF10: 7
PAINLEVEL_OUTOF10: 4
PAINLEVEL_OUTOF10: 7
PAINLEVEL_OUTOF10: 4

## 2024-06-18 ASSESSMENT — PAIN DESCRIPTION - PAIN TYPE
TYPE: ACUTE PAIN
TYPE: ACUTE PAIN

## 2024-06-18 ASSESSMENT — PAIN DESCRIPTION - ORIENTATION
ORIENTATION: RIGHT
ORIENTATION: LEFT
ORIENTATION: RIGHT

## 2024-06-18 ASSESSMENT — ENCOUNTER SYMPTOMS
NAUSEA: 0
SHORTNESS OF BREATH: 0
BACK PAIN: 0
EYE REDNESS: 0
DIARRHEA: 0
ABDOMINAL PAIN: 0
APNEA: 0
PHOTOPHOBIA: 0
ABDOMINAL DISTENTION: 0
COUGH: 0

## 2024-06-18 ASSESSMENT — PAIN DESCRIPTION - FREQUENCY
FREQUENCY: CONTINUOUS
FREQUENCY: CONTINUOUS

## 2024-06-18 ASSESSMENT — PAIN DESCRIPTION - ONSET
ONSET: ON-GOING
ONSET: ON-GOING

## 2024-06-18 ASSESSMENT — PAIN DESCRIPTION - DESCRIPTORS
DESCRIPTORS: ACHING;DISCOMFORT
DESCRIPTORS: ACHING
DESCRIPTORS: DISCOMFORT

## 2024-06-18 ASSESSMENT — PAIN - FUNCTIONAL ASSESSMENT
PAIN_FUNCTIONAL_ASSESSMENT: ACTIVITIES ARE NOT PREVENTED
PAIN_FUNCTIONAL_ASSESSMENT: ACTIVITIES ARE NOT PREVENTED

## 2024-06-18 ASSESSMENT — PAIN DESCRIPTION - LOCATION
LOCATION: FOOT
LOCATION: FOOT
LOCATION: FLANK
LOCATION: FLANK
LOCATION: FLANK;BACK

## 2024-06-18 NOTE — PLAN OF CARE
Problem: Pain  Goal: Verbalizes/displays adequate comfort level or baseline comfort level  6/18/2024 1226 by Emilia Cline RN  Outcome: Progressing  6/18/2024 0454 by Shubham Parker RN  Outcome: Progressing     Problem: Skin/Tissue Integrity  Goal: Absence of new skin breakdown  Description: 1.  Monitor for areas of redness and/or skin breakdown  2.  Assess vascular access sites hourly  3.  Every 4-6 hours minimum:  Change oxygen saturation probe site  4.  Every 4-6 hours:  If on nasal continuous positive airway pressure, respiratory therapy assess nares and determine need for appliance change or resting period.  6/18/2024 1226 by Emilia Cline RN  Outcome: Progressing  6/18/2024 0454 by Shubham Parker RN  Outcome: Progressing     Problem: Safety - Adult  Goal: Free from fall injury  6/18/2024 1226 by Emilia Cline RN  Outcome: Progressing  6/18/2024 0454 by Shubham Parker RN  Outcome: Progressing     Problem: ABCDS Injury Assessment  Goal: Absence of physical injury  6/18/2024 1226 by Emilia Cline RN  Outcome: Progressing  6/18/2024 0454 by Shubham Parker RN  Outcome: Progressing     Problem: Chronic Conditions and Co-morbidities  Goal: Patient's chronic conditions and co-morbidity symptoms are monitored and maintained or improved  Outcome: Progressing

## 2024-06-18 NOTE — PROGRESS NOTES
NEPHROLOGY PROGRESS NOTE      ASSESSMENT     Acute kidney injury nonoliguric secondary to likely AED vs AIN (infection/antibiotic) or residual ATN damage from contrast exposure since the last admission -evolving.  His PVR was 135  Hyperkalemia secondary to acute kidney injury/reduced distal sodium delivery  Metabolic acidosis secondary to acute kidney injury  Chronic kidney disease stage IIIb secondary to diabetic nephropathy with baseline creatinine 1.6-1.8  Nephrotic range proteinuria  Left lower extremity cellulitis /MSSA bacteremia on antibiotics  Status post CABG in February (45%  Type 2 diabetes    PLAN     Discussed with him the option of initiating hemodialysis in view of acidosis/hyperkalemia/no improvement in the creatinine.  The procedure and all the complications including cardiac events explained.  Verbalized understanding willing to proceed.  Temporary dialysis catheter and hemodialysis   Avoid nephrotoxins/check MARIA DEL CARMEN and complement levels  Watch for renal recovery.  Chance of recovery not the greatest    SUBJECTIVE     Readmitted when noted to have abnormal labs with elevated creatinine and higH potassium IN Asymptomatic individual who was recently discharged a week prior with a diagnosis of MSSA bacteremia/lower extremity cellulitis and acute kidney injury from  contrast nephropathy.  PVR is around 135 cc reported.  Continues to be hyperkalemic/acidotic as well as no improvement in creatinine.  Urine output around 350 cc recorded.  Not accurate.  Blood pressure stable.  Denies any chest pain shortness of breath.  Appetite decent.  No evidence of obstruction.    OBJECTIVE     Vitals:    06/18/24 0400 06/18/24 0505 06/18/24 0712 06/18/24 0716   BP: (!) 134/93   (!) 143/97   Pulse: 70   71   Resp: 20 18 14 14   Temp: 98 °F (36.7 °C)   98.2 °F (36.8 °C)   TempSrc: Oral   Oral   SpO2: 93%   93%   Weight:       Height:         24HR INTAKE/OUTPUT:    Intake/Output Summary (Last 24 hours) at 6/18/2024

## 2024-06-18 NOTE — PROGRESS NOTES
[facility]  Office: [phone]  Alexi Draper DO, Sam Wharton DO, Matthew Kirkpatrick DO, Shane Dennis DO, Lisbeth Owusu MD, Nurys Crisostomo MD, Jena Xie MD, Susan Danielle MD,  Shubham Friedman MD, Mireya Nugent MD, Aleida Rincon MD,  Aliza Saavedra DO, Hector Romero MD, Moncho Gilbert MD, Marlon Draper DO, Liliana Painting MD,  Soy Mccullough DO, Amparo Kong MD, Shila Arana MD, Kristy Wei MD, Kelly Vogel MD,  Morris Sheets MD, Chapincito Rebolledo MD, Andrea Fabian MD, Lisset Mccracken MD, René Holland MD, Nishi Ojeda MD, Fer Hernandez DO, Tejas Paulino DO, Luis M Reardon MD,  Bruce Greenfield MD, Shirley Waterhouse, CNP,  Mariela Gomez CNP, Andrei Polanco, CNP,  Nan Ray, DNP, Liss Lu, CNP, Olya Kaba, CNP, Alivia Melchor, CNP, An Rosenbaum, CNP, Deena Carmona, PA-C, Corinna Mcdaniel PA-C, Kiera Holland, CNP, Gladys Carter, CNP, Deja Patterson, CNP, Christa Ojeda, CNP, Maddi Sun, CNP, Amanda Gallardo, CNS, Yamilex Latif, CNP, Zoe Conner CNP, Tracy Schwab, CNP         [facility]   IN-PATIENT SERVICE   [facility]    Progress Note    6/18/2024    3:24 PM    Name:   Vincenzo Darden  MRN:     [id number]     Acct:      [id number]   Room:   0433/0433-01   Day:  1  Admit Date:  6/17/2024  2:12 PM    PCP:   Shaikh Calix MD  Code Status:  Full Code    Subjective:     C/C:   Chief Complaint   Patient presents with    Abnormal Labs     Interval History Status: not changed.     Patient seen and examined  He is hungry  Dialysis went okay  Reports some rt flank pain  Bp elevated  Blood sugar 155.  Hemoglobin 9.9, white count 10.9    Brief History:     58-year-old male with past medical history of type 2 diabetes mellitus, neuropathy, PVD s/p right BKA, chronic left first toe gangrene presents to the hospital with concern for CAMILLE and hyperkalemia.  Patient was recently admitted to the hospital with sternal wound  dehiscence and CT surgery, cardiology, ID saw the patient.  Patient at that time was positive for MSSA and was on daptomycin.  Patient saw his PCP and was evaluated for left toe wound.  Labs showed worsening renal function he was advised to come to ER.  Patient was evaluated by nephrology and recommended dialysis.      Medications:     Allergies:    Allergies   Allergen Reactions    Vancomycin Nausea And Vomiting     States got levofloxicin and was ok    Zosyn [Piperacillin Sod-Tazobactam So] Anaphylaxis       Current Meds:   Scheduled Meds:    gabapentin  100 mg Oral 3 times per day    aspirin  81 mg Oral Daily    linezolid  600 mg IntraVENous Q12H    atorvastatin  40 mg Oral Nightly    insulin glargine  40 Units SubCUTAneous Nightly    metoprolol tartrate  25 mg Oral BID    tamsulosin  0.4 mg Oral Daily    sodium chloride flush  5-40 mL IntraVENous 2 times per day    insulin lispro  0-8 Units SubCUTAneous TID WC    insulin lispro  0-4 Units SubCUTAneous Nightly     Continuous Infusions:    heparin (PORCINE) Infusion 20 Units/kg/hr (06/18/24 1245)    sodium chloride       PRN Meds: heparin (porcine), heparin (porcine), heparin (porcine), heparin (porcine), sodium chloride flush, sodium chloride, ondansetron **OR** ondansetron, polyethylene glycol, acetaminophen **OR** acetaminophen, HYDROcodone 5 mg - acetaminophen **OR** HYDROcodone 5 mg - acetaminophen    Data:     Past Medical History:   has a past medical history of Diabetes mellitus (HCC) and Hernia, abdominal.    Social History:   reports that he has been smoking cigarettes. He started smoking about 29 years ago. He has a 44.2 pack-year smoking history. He has never used smokeless tobacco. He reports that he does not currently use alcohol.     Family History: No family history on file.    Vitals:  BP (!) 171/94   Pulse 72   Temp 97.5 °F (36.4 °C)   Resp 20   Ht 1.829 m (6')   Wt 134.6 kg (296 lb 11.8 oz)   SpO2 92%   BMI 40.25 kg/m²   Temp (24hrs),

## 2024-06-18 NOTE — CONSULTS
[facility]    Consult               Today's Date: 6/18/2024  Patient Name: Vincenzo Darden  Date of admission: 6/17/2024  2:12 PM  Patient's age: 58 y.o., 1965  Admission Dx: PAD (peripheral artery disease) (HCC) [I73.9]  CAMILLE (acute kidney injury) (HCC) [N17.9]  Wound eschar of foot [R23.4]  Wound, open, foot, left, initial encounter [S91.302A]  Acute kidney injury superimposed on CKD (HCC) [N17.9, N18.9]    Requesting Physician: No admitting provider for patient encounter.    Cardiac Evaluation Reason:  elevated troponins    History Obtained From: patient and chart review     History of Present Illness:    This patient 58 y.o. years old with past medical history of CKD, history of CABG x3 with AVR on 2/26/2024, hx of ischemic cardiomyopathy with EF of 35-40%, HTN, COLT, type 2 diabetes, PAD s/p right BKA. Patient was previously admitted to the hospital on 6/2/24 for NSTEMI. Cardiology previously planned a PCI to the RCA when CAMILLE resolved but creatinine has worsened since.     Patient presents to the ED on 6/17/24 with elevated creatinine which was 8.7 and has been rising. Patient was recently started on daptomycin due to cellulitis and infection of the left big toe. Troponins were elevated 137 > 126 and BNP was 21,660. Patient does not complain of chest pain, palpitations feeling of racing heart, SOB, cough or wheeze. There is edema of the Left leg below.      Past Medical History:   has a past medical history of Diabetes mellitus (HCC) and Hernia, abdominal.    Past Surgical History:   has a past surgical history that includes Cardiac procedure (N/A, 10/9/2023); Cardiac procedure (N/A, 10/9/2023); and Coronary artery bypass graft (N/A, 2/26/2024).     Home Medications:    Prior to Admission medications    Medication Sig Start Date End Date Taking? Authorizing Provider   sodium bicarbonate 650 MG tablet Take 2 tablets by mouth 2 times daily 6/11/24 7/11/24  Andrea Fabian MD    atorvastatin (LIPITOR) 40 MG tablet Take 1 tablet by mouth nightly 6/10/24   Nishi Ojeda MD   furosemide (LASIX) 80 MG tablet Take 1 tablet by mouth in the morning and 1 tablet in the evening. 6/10/24   Nishi Ojeda MD   DAPTOmycin (CUBICIN) infusion Infuse 700 mg intravenously every 48 hours for 24 days Compound per protocol. 6/7/24 7/1/24  Robina Chu APRN - CNP   metoprolol tartrate (LOPRESSOR) 25 MG tablet Take 1 tablet by mouth 2 times daily 3/2/24   Soy Mao APRN - NP   tamsulosin (FLOMAX) 0.4 MG capsule Take 1 capsule by mouth daily 3/3/24   Soy Mao APRN - NP   warfarin (COUMADIN) 2.5 MG tablet Take 2 tablets by mouth daily  Patient not taking: Reported on 6/2/2024 3/2/24   Soy Mao APRN - NP   aspirin 81 MG chewable tablet Take 1 tablet by mouth daily 10/12/23   Marlon Cooper DO   TRULICITY 1.5 MG/0.5ML SC injection Inject 0.5 mLs into the skin Once a week at 5 PM 6/30/23   ProviderJaylyn MD   Insulin Degludec (TRESIBA FLEXTOUCH) 200 UNIT/ML SOPN Inject 40 Units into the skin nightly    ProviderJaylyn MD   Insulin Aspart, w/Niacinamide, (FIASP FLEXTOUCH) 100 UNIT/ML SOPN Inject 20 Units into the skin 3 times daily (with meals) Pt has his sliding scale at home    Provider, MD Jaylyn       Allergies:  Vancomycin and Zosyn [piperacillin sod-tazobactam so]    Social History:   reports that he has been smoking cigarettes. He started smoking about 29 years ago. He has a 44.2 pack-year smoking history. He has never used smokeless tobacco. He reports that he does not currently use alcohol.     Family History: family history is not on file.     REVIEW OF SYSTEMS:    Constitutional: Negative for fevers, fatigue, weight loss  Cardiovascular: as per HPI  Respiratory: as per HPI  Gastrointestinal: Negative for abdominal pain, N/V, diarrhea or constipation  Genitourinary: No dysuria, trouble voiding  Neurological: No headache, lightheadedness,

## 2024-06-18 NOTE — PROGRESS NOTES
As a note from earlier this morning wrote by writer, this patient cannot have an MRI due to epicardial leads per radiologist dr. Cm. Writer to cancel second MRI order. If any questions, please call [phone]. Thank you.

## 2024-06-18 NOTE — PROGRESS NOTES
Occupational Therapy    [facility]  Occupational Therapy Not Seen Note    DATE: 2024    NAME: Vincenzo Darden  MRN: [id number]   : 1965      Patient not seen this date for Occupational Therapy due to:    Hemodialysis    Next Scheduled Treatment: Attempt again  as appropriate    Electronically signed by HARI LOVE S/NEHA on 2024 at 12:14 PM

## 2024-06-18 NOTE — PROGRESS NOTES
Progress Note  Podiatric Medicine and Surgery     Subjective     CC: Left foot wound and gangrene     - Patient in dialysis during rounding  - No acute events overnight  - Afebrile, vital signs stable     Labs:  CRP: 73.4->70.8  ESR: 79->75  WBC: 14.8->13.4->10.9    HPI :  Vincenzo Darden is a 58 y.o. male diabetic patient seen at [facility] for foot wounds to the left lower extremity. Patient has been following up with unknown podiatrist in [place] for routine foot care and debridement. They have had this wound for 3 weeks. Patient says he has seen a vascular surgeon before, but has never had any procedures done because he has good bloodflow. They recently noticed increased swelling, redness, pain and came to the ED. Patient denies constitutional symptoms.  Podiatry was consulted for further wound care intervention.      PCP is Shaikh Calix MD    ROS: Denies N/V/F/C/SOB/CP.  Otherwise negative except at stated in the HPI.     Medications:  Scheduled Meds:   gabapentin  100 mg Oral 3 times per day    aspirin  81 mg Oral Daily    linezolid  600 mg IntraVENous Q12H    atorvastatin  40 mg Oral Nightly    insulin glargine  40 Units SubCUTAneous Nightly    metoprolol tartrate  25 mg Oral BID    tamsulosin  0.4 mg Oral Daily    sodium chloride flush  5-40 mL IntraVENous 2 times per day    insulin lispro  0-8 Units SubCUTAneous TID WC    insulin lispro  0-4 Units SubCUTAneous Nightly       Continuous Infusions:   sodium chloride         PRN Meds:heparin (porcine), heparin (porcine), sodium chloride flush, sodium chloride, ondansetron **OR** ondansetron, polyethylene glycol, acetaminophen **OR** acetaminophen, HYDROcodone 5 mg - acetaminophen **OR** HYDROcodone 5 mg - acetaminophen    Objective     Vitals:  Patient Vitals for the past 8 hrs:   BP Temp Temp src Pulse Resp SpO2 Weight   06/18/24 1545 -- -- -- -- 16 -- --   06/18/24 1533 (!) 166/82 97.7 °F (36.5 °C) Oral 77 16 92 % --   06/18/24 1415 (!) 171/94 97.5 °F

## 2024-06-18 NOTE — CONSULTS
Nishi Ojeda MD   2 tablet at 06/18/24 0642       Social History:     Tobacco:    reports that he has been smoking cigarettes. He started smoking about 29 years ago. He has a 44.2 pack-year smoking history. He has never used smokeless tobacco.  Alcohol:      reports that he does not currently use alcohol.  Drug Use:  has no history on file for drug use.      Review of Systems:     Review of Systems   Constitutional:  Negative for chills, fatigue and fever.   Eyes:  Negative for photophobia, redness and visual disturbance.   Respiratory:  Negative for apnea, cough and shortness of breath.    Cardiovascular:  Positive for leg swelling. Negative for chest pain and palpitations.   Gastrointestinal:  Negative for abdominal distention, abdominal pain, diarrhea and nausea.   Genitourinary:  Negative for difficulty urinating, flank pain and frequency.   Musculoskeletal:  Negative for arthralgias, back pain and myalgias.   Skin:  Positive for wound.   Neurological:  Negative for dizziness, light-headedness and numbness.   Psychiatric/Behavioral:  Negative for agitation, behavioral problems and confusion.        Physical Exam:     Vitals:  BP (!) 143/97   Pulse 71   Temp 98.2 °F (36.8 °C) (Oral)   Resp 14   Ht 1.829 m (6')   Wt 113.4 kg (250 lb)   SpO2 93%   BMI 33.91 kg/m²     Physical Exam  Constitutional:       General: He is not in acute distress.     Appearance: He is obese.   HENT:      Head: Normocephalic and atraumatic.      Right Ear: External ear normal.      Left Ear: External ear normal.      Nose: Nose normal.      Mouth/Throat:      Mouth: Mucous membranes are moist.      Pharynx: Oropharynx is clear.   Eyes:      Extraocular Movements: Extraocular movements intact.      Pupils: Pupils are equal, round, and reactive to light.   Cardiovascular:      Rate and Rhythm: Normal rate and regular rhythm.      Comments: Palpable DP PT pulse  Pulmonary:      Effort: Pulmonary effort is normal. No respiratory  distress.      Breath sounds: No wheezing.   Abdominal:      General: There is no distension.      Palpations: Abdomen is soft.      Tenderness: There is no abdominal tenderness.   Musculoskeletal:         General: No deformity.      Left lower leg: Edema present.   Skin:     General: Skin is warm.      Capillary Refill: Capillary refill takes less than 2 seconds.      Comments: Wounds to left foot   Neurological:      General: No focal deficit present.      Mental Status: He is alert.   Psychiatric:         Mood and Affect: Mood normal.         Behavior: Behavior normal.         Imaging/Labs:     US RENAL COMPLETE    Result Date: 6/17/2024  Unremarkable bilateral renal ultrasound. Probable bladder diverticulum.  Otherwise unremarkable urinary bladder ultrasound     XR FOOT LEFT (MIN 3 VIEWS)    Result Date: 6/17/2024  Demineralization involving the tuft of the 1st distal phalanx which can be seen in the setting of osteomyelitis.     XR CHEST (2 VW)    Result Date: 6/17/2024  Small to moderate right pleural effusion with adjacent compressive atelectasis.      Assessment and Plan:     Obtain NING PVR  Smoking cessation  No symptoms of acute ischemia.  No acute vascular surgical intervention.  Patient denies symptoms of claudication.  He has palpable DP and PT pulses.  Although he has wounds low suspicion for arterial cause of nonhealing.      Electronically signed by Amada Alarcon DO on 6/18/24 at 7:55 AM EDT

## 2024-06-18 NOTE — PROGRESS NOTES
Dialysis Time Out  To be done by RN and tech or 2 RNs  Staff Names Micaela CURRIE Rn    [x]  Identity of the patient using 2 patient identifiers  [x]  Consent for treatment  [x]  Equipment-proper machine and dialyzer  [x]  B-Hep B status Hep B AG Neg on 6/18/24  [x]  Orders- to include bath, blood flow, dialyzer, time and fluid removal  [x]  Access-Correct site and in working order  [x]  Time for patient to ask questions.

## 2024-06-18 NOTE — CONSULTS
[facility] - Initial Consult Note  Today's Date and Time: 6/18/2024, 7:22 AM    Impression :   CAMILLE  Hyperkalemia  Hx of MSSA septicemia 6-2-24 x 2  Concern for left great toe osteomyelitis   CAD  S/P CABG and AVR Feb 2024  Hx of Sternal wound dehiscence-fully healed  DM 2  PVD with prior Rt BKA  Left leg and foot ulcers.  Allergy to vancomycin (nausea)  Allergy to Zosyn (anaphylaxis)      Recommendations:   6/18/24: IV Zyvox 600 mg BID initiated  Tentative End date 7/1/24  Daptomycin 700 mg IV q 48 hr. Stopped on 6/17/24 because of renal failure      Prior blood cultures:   6/3/24 with 1/1 staph aureus  6-4-24: No growth   6/7/24: No growth   6/17/24: No growth thus far    TTE with no evidence of endocarditis from 6/6/24  MRI left foot unable to be completed because of PPM    Medical Decision Making/Summary/Discussion:6/18/2024     Daily Elements of Decision Making provided by Consulting Physician:    Note: I have independently performed the steps listed below as part of the medical decision making and evaluation.    Review of current Problems:  Today I am seeing the patient for the following problems:  CAMILLE  Hyperkalemia  Hx of MSSA septicemia 6-2-24 x 2  Concern for left great toe osteomyelitis   CAD  S/P CABG and AVR Feb 2024  Hx of Sternal wound dehiscence-fully healed  DM 2  PVD with prior Rt BKA  Left leg and foot ulcers.  Allergy to vancomycin (nausea)  Allergy to Zosyn (anaphylaxis)  Evaluation of Patient:  Evaluation for MSSA septicemia  Renal failure  Please see daily details in Interval Changes Section  Changes in physical exam:  Afebrile  VS stable  CAMILLE  Please see daily details in Physical Exam section and in Interval Changes Section  Changes in ROS:  Unchanged  Please see daily details in Review of Symptoms section and in Interval Changes Section  Discussion with Referring Physician or Service  Dr. Romero  Laboratory and Radiologic Studies with personal review of  of L toe FINDINGS: Bones: There is demineralization of the tuft of the 1st distal phalanx. 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 Joints: Normal alignment. Soft Tissues: Soft tissue irregularity involving the distal 1st phalanx.     Demineralization involving the tuft of the 1st distal phalanx which can be seen in the setting of osteomyelitis.     Vascular duplex lower extremity venous bilateral    Result Date: 6/17/2024    No evidence of deep vein or superficial vein thrombosis in the right lower extremity.   No evidence of deep vein or superficial vein thrombosis in the left lower extremity.     XR CHEST (2 VW)    Result Date: 6/17/2024  EXAMINATION: TWO XRAY VIEWS OF THE CHEST 6/17/2024 2:45 pm COMPARISON: Chest x-ray dated 06/02/2024.  Chest x-ray dated 06/02/2024. HISTORY: ORDERING SYSTEM PROVIDED HISTORY: Chest pain, SOB TECHNOLOGIST PROVIDED HISTORY: Chest pain, SOB FINDINGS: MEDICAL DEVICES: There is a right arm PICC. HEART/MEDIASTINUM: The cardiac silhouette is enlarged, but stable. PLEURA/LUNGS: There is a small to moderate right pleural effusion with adjacent compressive atelectasis.  There is no appreciable pneumothorax. BONES/SOFT TISSUE: No acute abnormality.     Small to moderate right pleural effusion with adjacent compressive atelectasis.       Medical Decision Prdkim-Fkugekpm-Tdwbx:     Results       Procedure Component Value Units Date/Time    Culture, Anaerobic and Aerobic [[id number]]  (Abnormal) Collected: 06/17/24 2208    Order Status: Completed Specimen: Foot Updated: 06/17/24 2210     Specimen Description .FOOT     Direct Exam MANY NEUTROPHILS      MANY GRAM POSITIVE COCCI IN PAIRS      FEW GRAM POSITIVE RODS     Culture PENDING    Culture, Urine [[id number]] Collected: 06/17/24 1932    Order Status: No result Specimen: Urine Updated: 06/17/24 2032    Culture, Blood 1 [[id number]] Collected: 06/17/24 1749    Order Status: Completed Specimen:

## 2024-06-18 NOTE — PROGRESS NOTES
Dialysis Post Treatment Note  Vitals:    06/18/24 1415   BP: (!) 171/94   Pulse: 72   Resp: (!) 0   Temp: 97.5 °F (36.4 °C)   SpO2:      Pre-Weight = 135.7kg  Post-weight = Weight - Scale: 134.6 kg (296 lb 11.8 oz)  Total Liters Processed = Blood Volume Processed (Liters): 25.85 L  Rinseback Volume (mL) = Rinseback Volume (ml): 270 ml  Net Removal (mL) =  1000  Patient's dry weight= n/a  Type of access used= R temp cath  Length of treatment=2hrs     Pt tolerated tx well with no complaints. Report called to Emilia JARAMILLO.

## 2024-06-18 NOTE — PLAN OF CARE
Problem: Pain  Goal: Verbalizes/displays adequate comfort level or baseline comfort level  Outcome: Progressing     Problem: Skin/Tissue Integrity  Goal: Absence of new skin breakdown  Description: 1.  Monitor for areas of redness and/or skin breakdown  2.  Assess vascular access sites hourly  3.  Every 4-6 hours minimum:  Change oxygen saturation probe site  4.  Every 4-6 hours:  If on nasal continuous positive airway pressure, respiratory therapy assess nares and determine need for appliance change or resting period.  Outcome: Progressing     Problem: Safety - Adult  Goal: Free from fall injury  Outcome: Progressing     Problem: ABCDS Injury Assessment  Goal: Absence of physical injury  Outcome: Progressing      numerical 0-10

## 2024-06-18 NOTE — BRIEF OP NOTE
Brief Postoperative Note Non Tunneled HD Catheter    Vincenzo Darden  YOB: 1965  [id number]    Pre-operative Diagnosis: Acute Renal Failure      Post-operative Diagnosis: Same    Procedure: Non-Tunneled Dialysis Catheter    Anesthesia: 1% Lidocaine     Surgeons/Assistants: Siva Calle PA-C    Estimated Blood Loss: Minimal    Complications: none    14 Fr x 20 cm non tunneled HD Catheter placed via Site:  Right Internal Jugular Vein.  Sutured in place and sterile dressing applied. Locked with Heparin  May use catheter.    Electronically signed by HERRERA Wagoner on 6/18/2024 at 10:44 AM

## 2024-06-19 ENCOUNTER — APPOINTMENT (OUTPATIENT)
Dept: GENERAL RADIOLOGY | Age: 59
DRG: 674 | End: 2024-06-19
Payer: MEDICARE

## 2024-06-19 LAB
ANCA MYELOPEROXIDASE: <0.3 AU/ML (ref 0–3.5)
ANCA PROTEINASE 3: <0.7 AU/ML (ref 0–2)
ANION GAP SERPL CALCULATED.3IONS-SCNC: 17 MMOL/L (ref 9–16)
BASOPHILS # BLD: 0.05 K/UL (ref 0–0.2)
BASOPHILS NFR BLD: 1 % (ref 0–2)
BUN SERPL-MCNC: 88 MG/DL (ref 6–20)
CALCIUM SERPL-MCNC: 7.4 MG/DL (ref 8.6–10.4)
CHLORIDE SERPL-SCNC: 93 MMOL/L (ref 98–107)
CO2 SERPL-SCNC: 17 MMOL/L (ref 20–31)
CREAT SERPL-MCNC: 7.4 MG/DL (ref 0.7–1.2)
CRP SERPL HS-MCNC: 72.7 MG/L (ref 0–5)
EKG ATRIAL RATE: 78 BPM
EKG P AXIS: 48 DEGREES
EKG P-R INTERVAL: 210 MS
EKG Q-T INTERVAL: 430 MS
EKG QRS DURATION: 162 MS
EKG QTC CALCULATION (BAZETT): 490 MS
EKG R AXIS: -66 DEGREES
EKG T AXIS: 35 DEGREES
EKG VENTRICULAR RATE: 78 BPM
EOSINOPHIL # BLD: 0.24 K/UL (ref 0–0.44)
EOSINOPHILS RELATIVE PERCENT: 3 % (ref 1–4)
ERYTHROCYTE [DISTWIDTH] IN BLOOD BY AUTOMATED COUNT: 13.9 % (ref 11.8–14.4)
ERYTHROCYTE [SEDIMENTATION RATE] IN BLOOD BY PHOTOMETRIC METHOD: 62 MM/HR (ref 0–20)
GFR, ESTIMATED: 8 ML/MIN/1.73M2
GLUCOSE BLD-MCNC: 158 MG/DL (ref 75–110)
GLUCOSE BLD-MCNC: 169 MG/DL (ref 75–110)
GLUCOSE BLD-MCNC: 196 MG/DL (ref 75–110)
GLUCOSE BLD-MCNC: 214 MG/DL (ref 75–110)
GLUCOSE SERPL-MCNC: 177 MG/DL (ref 74–99)
HCT VFR BLD AUTO: 29.4 % (ref 40.7–50.3)
HGB BLD-MCNC: 9.4 G/DL (ref 13–17)
IMM GRANULOCYTES # BLD AUTO: 0.07 K/UL (ref 0–0.3)
IMM GRANULOCYTES NFR BLD: 1 %
LYMPHOCYTES NFR BLD: 0.99 K/UL (ref 1.1–3.7)
LYMPHOCYTES RELATIVE PERCENT: 11 % (ref 24–43)
MCH RBC QN AUTO: 28.6 PG (ref 25.2–33.5)
MCHC RBC AUTO-ENTMCNC: 32 G/DL (ref 28.4–34.8)
MCV RBC AUTO: 89.4 FL (ref 82.6–102.9)
MONOCYTES NFR BLD: 0.79 K/UL (ref 0.1–1.2)
MONOCYTES NFR BLD: 8 % (ref 3–12)
NEUTROPHILS NFR BLD: 76 % (ref 36–65)
NEUTS SEG NFR BLD: 7.23 K/UL (ref 1.5–8.1)
NRBC BLD-RTO: 0 PER 100 WBC
PLATELET # BLD AUTO: 395 K/UL (ref 138–453)
PMV BLD AUTO: 8.9 FL (ref 8.1–13.5)
POTASSIUM SERPL-SCNC: 5.2 MMOL/L (ref 3.7–5.3)
RBC # BLD AUTO: 3.29 M/UL (ref 4.21–5.77)
SODIUM SERPL-SCNC: 127 MMOL/L (ref 136–145)
WBC OTHER # BLD: 9.4 K/UL (ref 3.5–11.3)

## 2024-06-19 PROCEDURE — 97162 PT EVAL MOD COMPLEX 30 MIN: CPT

## 2024-06-19 PROCEDURE — 02H633Z INSERTION OF INFUSION DEVICE INTO RIGHT ATRIUM, PERCUTANEOUS APPROACH: ICD-10-PCS | Performed by: RADIOLOGY

## 2024-06-19 PROCEDURE — 80048 BASIC METABOLIC PNL TOTAL CA: CPT

## 2024-06-19 PROCEDURE — 97166 OT EVAL MOD COMPLEX 45 MIN: CPT

## 2024-06-19 PROCEDURE — 90935 HEMODIALYSIS ONE EVALUATION: CPT | Performed by: INTERNAL MEDICINE

## 2024-06-19 PROCEDURE — 2060000000 HC ICU INTERMEDIATE R&B

## 2024-06-19 PROCEDURE — 71045 X-RAY EXAM CHEST 1 VIEW: CPT

## 2024-06-19 PROCEDURE — 0JH63XZ INSERTION OF TUNNELED VASCULAR ACCESS DEVICE INTO CHEST SUBCUTANEOUS TISSUE AND FASCIA, PERCUTANEOUS APPROACH: ICD-10-PCS | Performed by: RADIOLOGY

## 2024-06-19 PROCEDURE — 2580000003 HC RX 258: Performed by: STUDENT IN AN ORGANIZED HEALTH CARE EDUCATION/TRAINING PROGRAM

## 2024-06-19 PROCEDURE — 6360000002 HC RX W HCPCS: Performed by: NURSE PRACTITIONER

## 2024-06-19 PROCEDURE — 6370000000 HC RX 637 (ALT 250 FOR IP): Performed by: STUDENT IN AN ORGANIZED HEALTH CARE EDUCATION/TRAINING PROGRAM

## 2024-06-19 PROCEDURE — 86140 C-REACTIVE PROTEIN: CPT

## 2024-06-19 PROCEDURE — 97535 SELF CARE MNGMENT TRAINING: CPT

## 2024-06-19 PROCEDURE — 85652 RBC SED RATE AUTOMATED: CPT

## 2024-06-19 PROCEDURE — 90935 HEMODIALYSIS ONE EVALUATION: CPT

## 2024-06-19 PROCEDURE — 99232 SBSQ HOSP IP/OBS MODERATE 35: CPT | Performed by: INTERNAL MEDICINE

## 2024-06-19 PROCEDURE — 6370000000 HC RX 637 (ALT 250 FOR IP): Performed by: NURSE PRACTITIONER

## 2024-06-19 PROCEDURE — 85025 COMPLETE CBC W/AUTO DIFF WBC: CPT

## 2024-06-19 PROCEDURE — 6370000000 HC RX 637 (ALT 250 FOR IP): Performed by: INTERNAL MEDICINE

## 2024-06-19 PROCEDURE — 82947 ASSAY GLUCOSE BLOOD QUANT: CPT

## 2024-06-19 PROCEDURE — 99232 SBSQ HOSP IP/OBS MODERATE 35: CPT | Performed by: STUDENT IN AN ORGANIZED HEALTH CARE EDUCATION/TRAINING PROGRAM

## 2024-06-19 PROCEDURE — 6360000002 HC RX W HCPCS: Performed by: INTERNAL MEDICINE

## 2024-06-19 PROCEDURE — 97530 THERAPEUTIC ACTIVITIES: CPT

## 2024-06-19 PROCEDURE — 99233 SBSQ HOSP IP/OBS HIGH 50: CPT | Performed by: NURSE PRACTITIONER

## 2024-06-19 PROCEDURE — 36415 COLL VENOUS BLD VENIPUNCTURE: CPT

## 2024-06-19 RX ORDER — AMLODIPINE BESYLATE 5 MG/1
5 TABLET ORAL DAILY
Status: DISCONTINUED | OUTPATIENT
Start: 2024-06-19 | End: 2024-06-20

## 2024-06-19 RX ADMIN — INSULIN LISPRO 2 UNITS: 100 INJECTION, SOLUTION INTRAVENOUS; SUBCUTANEOUS at 18:15

## 2024-06-19 RX ADMIN — HYDROCODONE BITARTRATE AND ACETAMINOPHEN 2 TABLET: 5; 325 TABLET ORAL at 19:59

## 2024-06-19 RX ADMIN — AMLODIPINE BESYLATE 5 MG: 5 TABLET ORAL at 14:29

## 2024-06-19 RX ADMIN — TAMSULOSIN HYDROCHLORIDE 0.4 MG: 0.4 CAPSULE ORAL at 14:29

## 2024-06-19 RX ADMIN — LINEZOLID 600 MG: 600 INJECTION, SOLUTION INTRAVENOUS at 15:15

## 2024-06-19 RX ADMIN — METOPROLOL TARTRATE 25 MG: 25 TABLET, FILM COATED ORAL at 19:59

## 2024-06-19 RX ADMIN — GABAPENTIN 100 MG: 100 CAPSULE ORAL at 14:29

## 2024-06-19 RX ADMIN — HYDROCODONE BITARTRATE AND ACETAMINOPHEN 2 TABLET: 5; 325 TABLET ORAL at 09:15

## 2024-06-19 RX ADMIN — GABAPENTIN 100 MG: 100 CAPSULE ORAL at 05:46

## 2024-06-19 RX ADMIN — HYDROCODONE BITARTRATE AND ACETAMINOPHEN 2 TABLET: 5; 325 TABLET ORAL at 14:29

## 2024-06-19 RX ADMIN — METOPROLOL TARTRATE 25 MG: 25 TABLET, FILM COATED ORAL at 14:30

## 2024-06-19 RX ADMIN — HYDROCODONE BITARTRATE AND ACETAMINOPHEN 2 TABLET: 5; 325 TABLET ORAL at 05:46

## 2024-06-19 RX ADMIN — GABAPENTIN 100 MG: 100 CAPSULE ORAL at 21:20

## 2024-06-19 RX ADMIN — HEPARIN SODIUM 1100 UNITS: 1000 INJECTION INTRAVENOUS; SUBCUTANEOUS at 13:44

## 2024-06-19 RX ADMIN — SODIUM CHLORIDE, PRESERVATIVE FREE 10 ML: 5 INJECTION INTRAVENOUS at 14:30

## 2024-06-19 RX ADMIN — SODIUM CHLORIDE, PRESERVATIVE FREE 10 ML: 5 INJECTION INTRAVENOUS at 20:00

## 2024-06-19 RX ADMIN — SODIUM CHLORIDE: 9 INJECTION, SOLUTION INTRAVENOUS at 15:14

## 2024-06-19 RX ADMIN — ATORVASTATIN CALCIUM 40 MG: 40 TABLET, FILM COATED ORAL at 20:00

## 2024-06-19 RX ADMIN — ASPIRIN 81 MG 81 MG: 81 TABLET ORAL at 14:30

## 2024-06-19 RX ADMIN — HEPARIN SODIUM 1400 UNITS: 1000 INJECTION INTRAVENOUS; SUBCUTANEOUS at 13:44

## 2024-06-19 RX ADMIN — INSULIN GLARGINE 40 UNITS: 100 INJECTION, SOLUTION SUBCUTANEOUS at 20:00

## 2024-06-19 ASSESSMENT — PAIN SCALES - GENERAL
PAINLEVEL_OUTOF10: 8
PAINLEVEL_OUTOF10: 7
PAINLEVEL_OUTOF10: 0
PAINLEVEL_OUTOF10: 0
PAINLEVEL_OUTOF10: 8
PAINLEVEL_OUTOF10: 2
PAINLEVEL_OUTOF10: 3
PAINLEVEL_OUTOF10: 0
PAINLEVEL_OUTOF10: 7
PAINLEVEL_OUTOF10: 0

## 2024-06-19 ASSESSMENT — PAIN DESCRIPTION - DESCRIPTORS
DESCRIPTORS: ACHING;DISCOMFORT
DESCRIPTORS: DISCOMFORT
DESCRIPTORS: ACHING;DISCOMFORT
DESCRIPTORS: DISCOMFORT

## 2024-06-19 ASSESSMENT — PAIN - FUNCTIONAL ASSESSMENT
PAIN_FUNCTIONAL_ASSESSMENT: ACTIVITIES ARE NOT PREVENTED
PAIN_FUNCTIONAL_ASSESSMENT: ACTIVITIES ARE NOT PREVENTED
PAIN_FUNCTIONAL_ASSESSMENT: PREVENTS OR INTERFERES SOME ACTIVE ACTIVITIES AND ADLS
PAIN_FUNCTIONAL_ASSESSMENT: PREVENTS OR INTERFERES SOME ACTIVE ACTIVITIES AND ADLS

## 2024-06-19 ASSESSMENT — PAIN DESCRIPTION - FREQUENCY: FREQUENCY: CONTINUOUS

## 2024-06-19 ASSESSMENT — PAIN DESCRIPTION - LOCATION
LOCATION: ABDOMEN;FLANK
LOCATION: FOOT
LOCATION: FOOT
LOCATION: ABDOMEN

## 2024-06-19 ASSESSMENT — PAIN DESCRIPTION - ORIENTATION
ORIENTATION: RIGHT
ORIENTATION: LEFT
ORIENTATION: RIGHT
ORIENTATION: LEFT

## 2024-06-19 ASSESSMENT — PAIN DESCRIPTION - PAIN TYPE: TYPE: CHRONIC PAIN

## 2024-06-19 ASSESSMENT — PAIN DESCRIPTION - ONSET: ONSET: ON-GOING

## 2024-06-19 NOTE — PLAN OF CARE
Problem: Pain  Goal: Verbalizes/displays adequate comfort level or baseline comfort level  Outcome: Progressing     Problem: Skin/Tissue Integrity  Goal: Absence of new skin breakdown  Description: 1.  Monitor for areas of redness and/or skin breakdown  2.  Assess vascular access sites hourly  3.  Every 4-6 hours minimum:  Change oxygen saturation probe site  4.  Every 4-6 hours:  If on nasal continuous positive airway pressure, respiratory therapy assess nares and determine need for appliance change or resting period.  Outcome: Progressing     Problem: Safety - Adult  Goal: Free from fall injury  Outcome: Progressing     Problem: ABCDS Injury Assessment  Goal: Absence of physical injury  Outcome: Progressing     Problem: Chronic Conditions and Co-morbidities  Goal: Patient's chronic conditions and co-morbidity symptoms are monitored and maintained or improved  Outcome: Progressing

## 2024-06-19 NOTE — PROGRESS NOTES
Dialysis Time Out  To be done by RN and tech or 2 RNs  Staff Names Micaela RN and Mary OCDT    [x]  Identity of the patient using 2 patient identifiers  [x]  Consent for treatment  [x]  Equipment-proper machine and dialyzer  [x]  B-Hep B status AG Neg on 6/18/24  [x]  Orders- to include bath, blood flow, dialyzer, time and fluid removal  [x]  Access-Correct site and in working order  [x]  Time for patient to ask questions.

## 2024-06-19 NOTE — PROGRESS NOTES
[facility]   Progress Note                   Date:   6/19/2024  Patient name: Vincenzo Darden  Date of admission:  6/17/2024  2:12 PM  MRN:   [id number]  YOB: 1965  PCP: Shaikh Calix MD    Reason for Admission: PAD (peripheral artery disease) ([facility]) [I73.9]  CAMILLE (acute kidney injury) (HCC) [N17.9]  Wound eschar of foot [R23.4]  Wound, open, foot, left, initial encounter [S91.302A]  Acute kidney injury superimposed on CKD (HCC) [N17.9, N18.9]    Subjective:       Clinical Changes / Abnormalities: Pt seen and examined in the dialysis.  Patient resting in bed. Pt denies any CP or sob.  Labs, vitals and tele reviewed- SR    Medications:   Scheduled Meds:   amLODIPine  5 mg Oral Daily    gabapentin  100 mg Oral 3 times per day    aspirin  81 mg Oral Daily    linezolid  600 mg IntraVENous Q12H    atorvastatin  40 mg Oral Nightly    insulin glargine  40 Units SubCUTAneous Nightly    metoprolol tartrate  25 mg Oral BID    tamsulosin  0.4 mg Oral Daily    sodium chloride flush  5-40 mL IntraVENous 2 times per day    insulin lispro  0-8 Units SubCUTAneous TID WC    insulin lispro  0-4 Units SubCUTAneous Nightly     Continuous Infusions:   sodium chloride       CBC:   Recent Labs     06/17/24  1923 06/18/24  0531 06/19/24  0548   WBC 13.4* 10.9 9.4   HGB 9.7* 9.5* 9.4*    368 395     BMP:    Recent Labs     06/17/24  1453 06/18/24  0208 06/18/24  0531 06/19/24  0548   *  --  130* 127*   K 6.0* 5.5* 5.7* 5.2   CL 96*  --  97* 93*   CO2 17*  --  14* 17*   *  --  109* 88*   CREATININE 8.7*  --  8.9* 7.4*   GLUCOSE 251*  --  161* 177*     Hepatic:   Recent Labs     06/17/24  1453   AST 23   ALT 12   BILITOT 0.3   ALKPHOS 125     Troponin:   Recent Labs     06/17/24  1453 06/17/24  1750 06/18/24  0208   TROPHS 137* 128* 126*     BNP: No results for input(s): \"BNP\" in the last 72 hours.  Lipids: No results for input(s): \"CHOL\", \"HDL\" in the last 72 hours.    Invalid input(s):

## 2024-06-19 NOTE — CARE COORDINATION
Met with pt to discuss outpatient dialysis.    Hemodialysis Initial Assessment    Information provided by: Patient     New or Current with HD: New    Unit: [facility] the closest - referral sent this date    Schedule:  Pt has no preference currently    Physician:  [facility]    Transportation:  Pt states that he does not need assistance with transportation    Insurance:  [employer]    DME:      Discharge Plan: Home with wife, [facility]

## 2024-06-19 NOTE — CARE COORDINATION
Met with Dr Romero. States the patient is nearing discharge. Wants to verify he has outpatient hemodialysis set up. Writer to review  note.

## 2024-06-19 NOTE — PROGRESS NOTES
Dialysis Post Treatment Note  Vitals:    06/19/24 1346   BP: (!) 178/99   Pulse: 78   Resp: 16   Temp: 98 °F (36.7 °C)   SpO2:      Pre-Weight = 136.4 kg weight with prosthetic on  Post-weight = Weight - Scale: 135 kg (297 lb 9.9 oz) (weight with prosthetic on)  Total Liters Processed = Blood Volume Processed (Liters): 40.29 L  Rinseback Volume (mL) = Rinseback Volume (ml): 300 ml  Net Removal (mL) =  1200  Type of access used= temp catheter  Length of treatment=137    High venous pressures throughout treatment. Arterial port won't aspirate and venous port flushes hard. TX ended with 45 min's to go d/t machine clotting.Order placed for tunneled catheter tomorrow 6/20/24 per . RN Charge Jada notified. Report called to Floor RN.

## 2024-06-19 NOTE — PROGRESS NOTES
Physical Therapy  Facility/Department: [address] ONC/MED SURG  Physical Therapy Initial Assessment    Name: Vincenzo Darden  : 1965  MRN: [id number]  Date of Service: 2024  Chief Complaint   Patient presents with    Abnormal Labs      Discharge Recommendations:  Patient would benefit from continued therapy after discharge   PT Equipment Recommendations  Equipment Needed: No (CTA)      Patient Diagnosis(es): The primary encounter diagnosis was Acute kidney injury superimposed on CKD (HCC). Diagnoses of PAD (peripheral artery disease) (HCC), Wound eschar of foot, Wound, open, foot, left, initial encounter, and Ulcer of right foot, unspecified ulcer stage (HCC) were also pertinent to this visit.  Past Medical History:  has a past medical history of Diabetes mellitus (HCC) and Hernia, abdominal.  Past Surgical History:  has a past surgical history that includes Cardiac procedure (N/A, 10/09/2023); Cardiac procedure (N/A, 10/09/2023); Coronary artery bypass graft (N/A, 2024); and IR NONTUNNELED VASCULAR CATHETER > 5 YEARS (2024).    Assessment   Body Structures, Functions, Activity Limitations Requiring Skilled Therapeutic Intervention: Decreased functional mobility ;Decreased endurance;Decreased balance;Decreased strength;Increased pain  Assessment: Pt with mobility deficits requiring CGA to ambulate 25 feet with no AD.  Pt complains of significant pain throughout today's session which reduces activity tolerance.  Pt is unsteady with ambulation this date and would benefit from additional PT upon discharge to further reduce fall risk and maximize safety with functional mobility.  Pt would benefit from assistance with mobility upon discharge.  Therapy Prognosis: Good  Decision Making: Medium Complexity  Requires PT Follow-Up: Yes  Activity Tolerance  Activity Tolerance: Patient limited by pain     Plan   Physical Therapy Plan  General Plan: 3-5 times per week  Current Treatment Recommendations:  is needed moving from lying on your back to sitting on the side of a flat bed without using bedrails?: A Little  How much help is needed moving to and from a bed to a chair?: A Little  How much help is needed standing up from a chair using your arms?: A Little  How much help is needed walking in hospital room?: A Little  How much help is needed climbing 3-5 steps with a railing?: A Lot  AM-PAC Inpatient Mobility Raw Score : 18  AM-PAC Inpatient T-Scale Score : 43.63  Mobility Inpatient CMS 0-100% Score: 46.58  Mobility Inpatient CMS G-Code Modifier : CK        Goals  Short Term Goals  Time Frame for Short Term Goals: 14 visits  Short Term Goal 1: Pt will perform sit<>stand transfer with supervision.  Short Term Goal 2: Pt will ambulate 225 feet with least restrictive AD and supervision.  Short Term Goal 3: Pt will demonstrate good- dynamic standing balance to decrease fall risk.  Short Term Goal 4: Pt will negotiate 2 stairs with a R sided handrail and SBA to allow the pt to enter prior living arrangements.  Short Term Goal 5: Pt will perform supine<>sit transfer with supervision.       Education  Patient Education  Education Given To: Patient  Education Provided: Role of Therapy;Plan of Care;Fall Prevention Strategies  Education Method: Verbal  Barriers to Learning: None  Education Outcome: Verbalized understanding      Therapy Time   Individual Concurrent Group Co-treatment   Time In 1442         Time Out 1507         Minutes 25         Timed Code Treatment Minutes: 8 Minutes       Forest Canales PT

## 2024-06-19 NOTE — PROGRESS NOTES
Reached out to Podiatry Dr. Enid Gorman to clarify if podiatry is doing dressing. Was informed they will do dressing today and if anything changed they will place order for nursing.

## 2024-06-19 NOTE — PROGRESS NOTES
[facility]  Office: [phone]  Alexi Draper DO, Sam Wharton DO, Matthew Kirkpatrick DO, Shane Dennis DO, Lisbeth Owusu MD, Nurys Crisostomo MD, Jena Xie MD, Susan Danielle MD,  Shubham Friedman MD, Mireya Nugent MD, Aleida Rincon MD,  Aliza Saavedra DO, Hector Romero MD, Moncho Gilbert MD, Marlon Draper DO, Liliana Painting MD,  Soy Mccullough DO, Amparo Kong MD, Shila Arana MD, Kristy Wei MD, Kelly Vogel MD,  Morris Sheets MD, Chapincito Rebolledo MD, Andrea Fabian MD, Lisset Mccracken MD, René Holland MD, Nishi Ojeda MD, Fer Hernandez DO, Tejas Paulino DO, Luis M Reardon MD,  Bruce Greenfield MD, Shirley Waterhouse, CNP,  Mariela Gomez CNP, Andrei Polanco, CNP,  Nan Ray, DNP, Liss Lu, CNP, Olya Kaba, CNP, Alivia Melchor, CNP, An Rosenbaum, CNP, Deena Carmona, PA-C, Corinna Mcdaniel PA-C, Kiera Holland, CNP, Gladys Carter, CNP, Deja Patterson, CNP, Christa Ojeda, CNP, Maddi Sun, CNP, Amanda Gallardo, CNS, Yamilex Latif, CNP, Zoe Conner CNP, Tracy Schwab, CNP         [facility]   IN-PATIENT SERVICE   [facility]    Progress Note    6/19/2024    4:50 PM    Name:   Vincenzo Darden  MRN:     [id number]     Acct:      [id number]   Room:   0433/0433-01   Day:  2  Admit Date:  6/17/2024  2:12 PM    PCP:   Shaikh Calix MD  Code Status:  Full Code    Subjective:     C/C:   Chief Complaint   Patient presents with    Abnormal Labs     Interval History Status: not changed.     Patient seen and examined  He is in dialysis.  No issues with dialysis.  He complains of pain in his abdomen.  No chest pain or shortness of breath.  No leg pain today.  Plan to get tunnel catheter tomorrow.  Patient cannot get MRI due to epicardial leads.  Discussed with podiatry to consider CT scan.    Brief History:     58-year-old male with past medical history of type 2 diabetes mellitus, neuropathy, PVD s/p right BKA, chronic left first

## 2024-06-19 NOTE — PROGRESS NOTES
Nephrology Progress Note    Patient:  Vincenzo Darden; 58 y.o. MRN# [id number]  Location:  0433/0433-01  Attending:  Hector Romero MD  Admit Date:  2024   Hospital Day: 2    Subjective   Patient  readmitted on 2024 for with abnormal labs with an elevated creatinine and high potassium.  We have been consulted for CAMILLE from contrast nephropathy       Patient seen and evaluated in room, no acute events overnight  Vital signs stable   Patient denies shortness of breath, chest tightness or pain, nausea, bloating, or vomiting.  Hyperkalemia improving 5.7-->5.2  Creatinine remains elevated 8.9-->7.4  Urine output over the last 24 hours     Patient had temporary dialysis catheter placement and first dialysis yesterday with 1000mL net removal. Patient tolerated well with no complaints. Planned second dialysis treatment for today       Recent Labs     24  1453 24  0208 24  0531 24  0548   *  --  130* 127*   K 6.0* 5.5* 5.7* 5.2   CL 96*  --  97* 93*   CO2 17*  --  14* 17*   *  --  109* 88*   CREATININE 8.7*  --  8.9* 7.4*   GLUCOSE 251*  --  161* 177*   CALCIUM 7.9*  --  7.7* 7.4*       Objective   VS: BP (!) 158/96   Pulse 72   Temp 98.1 °F (36.7 °C) (Oral)   Resp 14   Ht 1.829 m (6')   Wt 133.8 kg (294 lb 15.6 oz)   SpO2 93%   BMI 40.01 kg/m²   MAXIMUM TEMPERATURE OVER 24 HRS:  Temp (24hrs), Av.8 °F (36.6 °C), Min:97.5 °F (36.4 °C), Max:98.1 °F (36.7 °C)    24 HR BLOOD PRESSURE RANGE:  Systolic (24hrs), Av , Min:144 , Max:171   ; Diastolic (24hrs), Av, Min:81, Max:100    24 HR INTAKE/OUTPUT:    Intake/Output Summary (Last 24 hours) at 2024 0852  Last data filed at 2024 2227  Gross per 24 hour   Intake 920 ml   Output 1750 ml   Net -830 ml     WEIGHT: Patient Vitals for the past 96 hrs (Last 3 readings):   Weight   24 0530 133.8 kg (294 lb 15.6 oz)   24 1415 134.6 kg (296 lb 11.8 oz)   24 1130 135.7 kg (299 lb 2.6 oz)       Current    Renal Diet/TF      Thank you. Please call with any questions.    Marley Plummer, OMS-III  Medical Student  06/19/2024

## 2024-06-19 NOTE — PROGRESS NOTES
Nephrology Progress Note      SUBJECTIVE       Pt was seen and examined. No acute issues overnite. Stable hemodynamics .   He was evaluated a couple days ago because of CAMILLE.  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  His complement level C3 is depressed.  He does have a couple macular lesions on his toes on the left foot as well.  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  Hemodialysis was initiated yesterday because of ongoing azotemia.  Currently has a temporary dialysis catheter in place.    He does have a history of chronic kidney disease with a baseline creatinine of 1.8-2.0 range.  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    OBJECTIVE      CURRENT TEMPERATURE:  Temp: 98.2 °F (36.8 °C)  MAXIMUM TEMPERATURE OVER 24HRS:  Temp (24hrs), Av.8 °F (36.6 °C), Min:97.5 °F (36.4 °C), Max:98.2 °F (36.8 °C)    CURRENT RESPIRATORY RATE:  Respirations: 18  CURRENT PULSE:  Pulse: 68  CURRENT BLOOD PRESSURE:  BP: (!) 172/97  24HR BLOOD PRESSURE RANGE:  Systolic (24hrs), Avg:160 , Min:144 , Max:172   ; Diastolic (24hrs), Av, Min:81, Max:100    24HR INTAKE/OUTPUT:    Intake/Output Summary (Last 24 hours) at 2024 1152  Last data filed at 2024 2227  Gross per 24 hour   Intake 800 ml   Output 1750 ml   Net -950 ml     WEIGHT :Patient Vitals for the past 96 hrs (Last 3 readings):   Weight   24 1120 (!) 136.4 kg (300 lb 11.3 oz)   24 0530 133.8 kg (294 lb 15.6 oz)   24 1415 134.6 kg (296 lb 11.8 oz)     PHYSICAL EXAM      GENERAL APPEARANCE:Awake, alert, in no acute distress  SKIN: warm and dry, no rash or erythema  EYES: conjunctivae normal and sclera anicteric  ENT: no thrush no pharyngeal congestion   NECK:   No JVD. No carotid bruits and no carotid lymphadenopathy .  PULMONARY: lungs are clear on auscultation. No Wheezing, no ronchi .   CADRDIOVASCULAR: S1 and S2 normal NO S3 and NO S4 . No  History of aortic valve replacement in February of this year  6.  History of diabetes mellitus type 2  7.  History of BKA, right    PLAN      1.  Hemodialysis orders were reviewed with nurse at bedside.  Remove 1.5 L off.   2.  Will watch for any renal recovery although that seems less likely.  3. Follow up labs ordered.   4. Following along       Please do not hesitate to call with questions.    This note is created with the assistance of a speech-recognition program. While intending to generate a document that actually reflects the content of the visit, no guarantees can be provided that every mistake has been identified and corrected by editing    Electronically signed by Myles Núñez MD on 6/19/2024 at 11:52 AM

## 2024-06-19 NOTE — PROGRESS NOTES
patient did not have the ureter to void.     Unremarkable bilateral renal ultrasound. Probable bladder diverticulum.  Otherwise unremarkable urinary bladder ultrasound     XR FOOT LEFT (MIN 3 VIEWS)    Result Date: 6/17/2024  EXAMINATION: THREE XRAY VIEWS OF THE LEFT FOOT 6/17/2024 11:45 am COMPARISON: None. HISTORY: ORDERING SYSTEM PROVIDED HISTORY: gangrene of L toe TECHNOLOGIST PROVIDED HISTORY: gangrene of L toe FINDINGS: Bones: There is demineralization of the tuft of the 1st distal phalanx. 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 Joints: Normal alignment. Soft Tissues: Soft tissue irregularity involving the distal 1st phalanx.     Demineralization involving the tuft of the 1st distal phalanx which can be seen in the setting of osteomyelitis.     Vascular duplex lower extremity venous bilateral    Result Date: 6/17/2024    No evidence of deep vein or superficial vein thrombosis in the right lower extremity.   No evidence of deep vein or superficial vein thrombosis in the left lower extremity.     XR CHEST (2 VW)    Result Date: 6/17/2024  EXAMINATION: TWO XRAY VIEWS OF THE CHEST 6/17/2024 2:45 pm COMPARISON: Chest x-ray dated 06/02/2024.  Chest x-ray dated 06/02/2024. HISTORY: ORDERING SYSTEM PROVIDED HISTORY: Chest pain, SOB TECHNOLOGIST PROVIDED HISTORY: Chest pain, SOB FINDINGS: MEDICAL DEVICES: There is a right arm PICC. HEART/MEDIASTINUM: The cardiac silhouette is enlarged, but stable. PLEURA/LUNGS: There is a small to moderate right pleural effusion with adjacent compressive atelectasis.  There is no appreciable pneumothorax. BONES/SOFT TISSUE: No acute abnormality.     Small to moderate right pleural effusion with adjacent compressive atelectasis.          Assessment:     58 year old male with chronic left foot wounds, consulted to evaluate vascularity       Plan:     Will obtain PVR's of right lower extremity. NING non-diagnostic   Wound care per podiatry

## 2024-06-19 NOTE — PROGRESS NOTES
Writer at pt bedside to help transport pt to dialysis and HD line was exposed. Sutures were still intact. Line was secured to prevent dislodging. RN called dialysis RN to notify and was informed to send pt and unit will order CXR to confirm placement.

## 2024-06-19 NOTE — PLAN OF CARE
Problem: Pain  Goal: Verbalizes/displays adequate comfort level or baseline comfort level  Outcome: Progressing  Flowsheets (Taken 6/18/2024 1936)  Verbalizes/displays adequate comfort level or baseline comfort level: Encourage patient to monitor pain and request assistance     Problem: Skin/Tissue Integrity  Goal: Absence of new skin breakdown  Description: 1.  Monitor for areas of redness and/or skin breakdown  2.  Assess vascular access sites hourly  3.  Every 4-6 hours minimum:  Change oxygen saturation probe site  4.  Every 4-6 hours:  If on nasal continuous positive airway pressure, respiratory therapy assess nares and determine need for appliance change or resting period.  Outcome: Progressing     Problem: Safety - Adult  Goal: Free from fall injury  Outcome: Progressing  Flowsheets (Taken 6/18/2024 2030)  Free From Fall Injury: Instruct family/caregiver on patient safety     Problem: ABCDS Injury Assessment  Goal: Absence of physical injury  Outcome: Progressing  Flowsheets (Taken 6/18/2024 2030)  Absence of Physical Injury: Implement safety measures based on patient assessment     Problem: Chronic Conditions and Co-morbidities  Goal: Patient's chronic conditions and co-morbidity symptoms are monitored and maintained or improved  Outcome: Progressing  Flowsheets  Taken 6/18/2024 2300  Care Plan - Patient's Chronic Conditions and Co-Morbidity Symptoms are Monitored and Maintained or Improved: Monitor and assess patient's chronic conditions and comorbid symptoms for stability, deterioration, or improvement  Taken 6/18/2024 1900  Care Plan - Patient's Chronic Conditions and Co-Morbidity Symptoms are Monitored and Maintained or Improved: Monitor and assess patient's chronic conditions and comorbid symptoms for stability, deterioration, or improvement

## 2024-06-20 ENCOUNTER — APPOINTMENT (OUTPATIENT)
Dept: INTERVENTIONAL RADIOLOGY/VASCULAR | Age: 59
DRG: 674 | End: 2024-06-20
Payer: MEDICARE

## 2024-06-20 ENCOUNTER — APPOINTMENT (OUTPATIENT)
Dept: DIALYSIS | Age: 59
DRG: 674 | End: 2024-06-20
Payer: MEDICARE

## 2024-06-20 VITALS
SYSTOLIC BLOOD PRESSURE: 145 MMHG | HEART RATE: 80 BPM | HEIGHT: 72 IN | WEIGHT: 297.84 LBS | TEMPERATURE: 98 F | BODY MASS INDEX: 40.34 KG/M2 | OXYGEN SATURATION: 92 % | RESPIRATION RATE: 18 BRPM | DIASTOLIC BLOOD PRESSURE: 87 MMHG

## 2024-06-20 LAB
ANA SER QL IA: NEGATIVE
ANION GAP SERPL CALCULATED.3IONS-SCNC: 14 MMOL/L (ref 9–16)
BASOPHILS # BLD: 0 K/UL (ref 0–0.2)
BASOPHILS NFR BLD: 0 % (ref 0–2)
BUN SERPL-MCNC: 75 MG/DL (ref 6–20)
CALCIUM SERPL-MCNC: 7.8 MG/DL (ref 8.6–10.4)
CHLORIDE SERPL-SCNC: 90 MMOL/L (ref 98–107)
CO2 SERPL-SCNC: 20 MMOL/L (ref 20–31)
CREAT SERPL-MCNC: 7.2 MG/DL (ref 0.7–1.2)
CRP SERPL HS-MCNC: 74.5 MG/L (ref 0–5)
DSDNA IGG SER QL IA: <0.5 IU/ML
EOSINOPHIL # BLD: 0.23 K/UL (ref 0–0.44)
EOSINOPHILS RELATIVE PERCENT: 2 % (ref 1–4)
ERYTHROCYTE [DISTWIDTH] IN BLOOD BY AUTOMATED COUNT: 13.6 % (ref 11.8–14.4)
ERYTHROCYTE [SEDIMENTATION RATE] IN BLOOD BY PHOTOMETRIC METHOD: 119 MM/HR (ref 0–20)
GFR, ESTIMATED: 8 ML/MIN/1.73M2
GLUCOSE BLD-MCNC: 145 MG/DL (ref 75–110)
GLUCOSE BLD-MCNC: 159 MG/DL (ref 75–110)
GLUCOSE BLD-MCNC: 196 MG/DL (ref 75–110)
GLUCOSE SERPL-MCNC: 182 MG/DL (ref 74–99)
HCT VFR BLD AUTO: 30.5 % (ref 40.7–50.3)
HGB BLD-MCNC: 9.5 G/DL (ref 13–17)
IMM GRANULOCYTES # BLD AUTO: 0.11 K/UL (ref 0–0.3)
IMM GRANULOCYTES NFR BLD: 1 %
LYMPHOCYTES NFR BLD: 0.57 K/UL (ref 1.1–3.7)
LYMPHOCYTES RELATIVE PERCENT: 5 % (ref 24–43)
MCH RBC QN AUTO: 29 PG (ref 25.2–33.5)
MCHC RBC AUTO-ENTMCNC: 31.1 G/DL (ref 28.4–34.8)
MCV RBC AUTO: 93 FL (ref 82.6–102.9)
MONOCYTES NFR BLD: 0.8 K/UL (ref 0.1–1.2)
MONOCYTES NFR BLD: 7 % (ref 3–12)
MORPHOLOGY: NORMAL
NEUTROPHILS NFR BLD: 85 % (ref 36–65)
NEUTS SEG NFR BLD: 9.69 K/UL (ref 1.5–8.1)
NRBC BLD-RTO: 0 PER 100 WBC
NUCLEAR IGG SER IA-RTO: 0.6 U/ML
PLATELET # BLD AUTO: 338 K/UL (ref 138–453)
PMV BLD AUTO: 8.8 FL (ref 8.1–13.5)
POTASSIUM SERPL-SCNC: 5.4 MMOL/L (ref 3.7–5.3)
RBC # BLD AUTO: 3.28 M/UL (ref 4.21–5.77)
SODIUM SERPL-SCNC: 124 MMOL/L (ref 136–145)
WBC OTHER # BLD: 11.4 K/UL (ref 3.5–11.3)

## 2024-06-20 PROCEDURE — 85652 RBC SED RATE AUTOMATED: CPT

## 2024-06-20 PROCEDURE — 6360000002 HC RX W HCPCS: Performed by: PHYSICIAN ASSISTANT

## 2024-06-20 PROCEDURE — 6370000000 HC RX 637 (ALT 250 FOR IP): Performed by: NURSE PRACTITIONER

## 2024-06-20 PROCEDURE — 99232 SBSQ HOSP IP/OBS MODERATE 35: CPT | Performed by: INTERNAL MEDICINE

## 2024-06-20 PROCEDURE — 6360000002 HC RX W HCPCS: Performed by: NURSE PRACTITIONER

## 2024-06-20 PROCEDURE — 6370000000 HC RX 637 (ALT 250 FOR IP): Performed by: STUDENT IN AN ORGANIZED HEALTH CARE EDUCATION/TRAINING PROGRAM

## 2024-06-20 PROCEDURE — 36558 INSERT TUNNELED CV CATH: CPT

## 2024-06-20 PROCEDURE — 99233 SBSQ HOSP IP/OBS HIGH 50: CPT | Performed by: NURSE PRACTITIONER

## 2024-06-20 PROCEDURE — C1769 GUIDE WIRE: HCPCS

## 2024-06-20 PROCEDURE — 5A1D70Z PERFORMANCE OF URINARY FILTRATION, INTERMITTENT, LESS THAN 6 HOURS PER DAY: ICD-10-PCS | Performed by: INTERNAL MEDICINE

## 2024-06-20 PROCEDURE — 99239 HOSP IP/OBS DSCHRG MGMT >30: CPT | Performed by: STUDENT IN AN ORGANIZED HEALTH CARE EDUCATION/TRAINING PROGRAM

## 2024-06-20 PROCEDURE — 86140 C-REACTIVE PROTEIN: CPT

## 2024-06-20 PROCEDURE — 85025 COMPLETE CBC W/AUTO DIFF WBC: CPT

## 2024-06-20 PROCEDURE — 82947 ASSAY GLUCOSE BLOOD QUANT: CPT

## 2024-06-20 PROCEDURE — 2580000003 HC RX 258: Performed by: STUDENT IN AN ORGANIZED HEALTH CARE EDUCATION/TRAINING PROGRAM

## 2024-06-20 PROCEDURE — 77001 FLUOROGUIDE FOR VEIN DEVICE: CPT

## 2024-06-20 PROCEDURE — 6360000002 HC RX W HCPCS: Performed by: INTERNAL MEDICINE

## 2024-06-20 PROCEDURE — 80048 BASIC METABOLIC PNL TOTAL CA: CPT

## 2024-06-20 PROCEDURE — 36415 COLL VENOUS BLD VENIPUNCTURE: CPT

## 2024-06-20 PROCEDURE — 90935 HEMODIALYSIS ONE EVALUATION: CPT

## 2024-06-20 PROCEDURE — 6360000002 HC RX W HCPCS: Performed by: RADIOLOGY

## 2024-06-20 RX ORDER — HYDROCODONE BITARTRATE AND ACETAMINOPHEN 5; 325 MG/1; MG/1
1 TABLET ORAL EVERY 4 HOURS PRN
Qty: 10 TABLET | Refills: 0 | Status: SHIPPED | OUTPATIENT
Start: 2024-06-20 | End: 2024-06-20

## 2024-06-20 RX ORDER — LINEZOLID 600 MG/1
600 TABLET, FILM COATED ORAL EVERY 12 HOURS SCHEDULED
Status: DISCONTINUED | OUTPATIENT
Start: 2024-06-20 | End: 2024-06-20 | Stop reason: HOSPADM

## 2024-06-20 RX ORDER — LINEZOLID 600 MG/1
600 TABLET, FILM COATED ORAL EVERY 12 HOURS SCHEDULED
Qty: 24 TABLET | Refills: 0 | Status: ON HOLD | OUTPATIENT
Start: 2024-06-20 | End: 2024-07-02

## 2024-06-20 RX ORDER — GABAPENTIN 100 MG/1
100 CAPSULE ORAL EVERY 8 HOURS SCHEDULED
Qty: 90 CAPSULE | Refills: 0 | Status: ON HOLD | OUTPATIENT
Start: 2024-06-20 | End: 2024-07-20

## 2024-06-20 RX ORDER — GABAPENTIN 100 MG/1
100 CAPSULE ORAL EVERY 8 HOURS SCHEDULED
Qty: 90 CAPSULE | Refills: 0 | Status: SHIPPED | OUTPATIENT
Start: 2024-06-20 | End: 2024-06-20

## 2024-06-20 RX ORDER — HYDROCODONE BITARTRATE AND ACETAMINOPHEN 5; 325 MG/1; MG/1
1 TABLET ORAL EVERY 4 HOURS PRN
Qty: 10 TABLET | Refills: 0 | Status: SHIPPED | OUTPATIENT
Start: 2024-06-20 | End: 2024-06-23

## 2024-06-20 RX ORDER — LINEZOLID 600 MG/1
600 TABLET, FILM COATED ORAL EVERY 12 HOURS SCHEDULED
Qty: 24 TABLET | Refills: 0 | Status: SHIPPED | OUTPATIENT
Start: 2024-06-20 | End: 2024-06-20

## 2024-06-20 RX ORDER — AMLODIPINE BESYLATE 10 MG/1
10 TABLET ORAL DAILY
Qty: 30 TABLET | Refills: 3 | Status: SHIPPED | OUTPATIENT
Start: 2024-06-21 | End: 2024-06-20

## 2024-06-20 RX ORDER — HEPARIN SODIUM 1000 [USP'U]/ML
1900 INJECTION, SOLUTION INTRAVENOUS; SUBCUTANEOUS ONCE
Status: COMPLETED | OUTPATIENT
Start: 2024-06-20 | End: 2024-06-20

## 2024-06-20 RX ORDER — HEPARIN SODIUM 1000 [USP'U]/ML
INJECTION, SOLUTION INTRAVENOUS; SUBCUTANEOUS PRN
Status: COMPLETED | OUTPATIENT
Start: 2024-06-20 | End: 2024-06-20

## 2024-06-20 RX ORDER — HEPARIN SODIUM 5000 [USP'U]/ML
INJECTION, SOLUTION INTRAVENOUS; SUBCUTANEOUS PRN
Status: COMPLETED | OUTPATIENT
Start: 2024-06-20 | End: 2024-06-20

## 2024-06-20 RX ORDER — AMLODIPINE BESYLATE 10 MG/1
10 TABLET ORAL DAILY
Status: DISCONTINUED | OUTPATIENT
Start: 2024-06-21 | End: 2024-06-20 | Stop reason: HOSPADM

## 2024-06-20 RX ORDER — AMLODIPINE BESYLATE 10 MG/1
10 TABLET ORAL DAILY
Qty: 30 TABLET | Refills: 3 | Status: ON HOLD | OUTPATIENT
Start: 2024-06-21

## 2024-06-20 RX ORDER — CLINDAMYCIN PHOSPHATE 300 MG/50ML
INJECTION, SOLUTION INTRAVENOUS CONTINUOUS PRN
Status: COMPLETED | OUTPATIENT
Start: 2024-06-20 | End: 2024-06-20

## 2024-06-20 RX ADMIN — HEPARIN SODIUM 1900 UNITS: 5000 INJECTION, SOLUTION INTRAVENOUS; SUBCUTANEOUS at 13:21

## 2024-06-20 RX ADMIN — HEPARIN SODIUM 1900 UNITS: 1000 INJECTION INTRAVENOUS; SUBCUTANEOUS at 17:58

## 2024-06-20 RX ADMIN — SODIUM CHLORIDE, PRESERVATIVE FREE 10 ML: 5 INJECTION INTRAVENOUS at 08:41

## 2024-06-20 RX ADMIN — CLINDAMYCIN IN 5 PERCENT DEXTROSE 600 MG: 6 INJECTION, SOLUTION INTRAVENOUS at 12:40

## 2024-06-20 RX ADMIN — HEPARIN SODIUM 1900 UNITS: 1000 INJECTION INTRAVENOUS; SUBCUTANEOUS at 17:59

## 2024-06-20 RX ADMIN — METOPROLOL TARTRATE 25 MG: 25 TABLET, FILM COATED ORAL at 10:03

## 2024-06-20 RX ADMIN — HYDROCODONE BITARTRATE AND ACETAMINOPHEN 2 TABLET: 5; 325 TABLET ORAL at 03:14

## 2024-06-20 RX ADMIN — AMLODIPINE BESYLATE 5 MG: 5 TABLET ORAL at 10:02

## 2024-06-20 RX ADMIN — HYDROCODONE BITARTRATE AND ACETAMINOPHEN 2 TABLET: 5; 325 TABLET ORAL at 19:25

## 2024-06-20 RX ADMIN — HEPARIN SODIUM 1900 UNITS: 1000 INJECTION, SOLUTION INTRAVENOUS; SUBCUTANEOUS at 13:21

## 2024-06-20 RX ADMIN — LINEZOLID 600 MG: 600 INJECTION, SOLUTION INTRAVENOUS at 03:38

## 2024-06-20 RX ADMIN — TAMSULOSIN HYDROCHLORIDE 0.4 MG: 0.4 CAPSULE ORAL at 08:40

## 2024-06-20 RX ADMIN — GABAPENTIN 100 MG: 100 CAPSULE ORAL at 08:41

## 2024-06-20 RX ADMIN — HEPARIN SODIUM 1900 UNITS: 1000 INJECTION, SOLUTION INTRAVENOUS; SUBCUTANEOUS at 13:22

## 2024-06-20 ASSESSMENT — PAIN DESCRIPTION - LOCATION: LOCATION: FOOT

## 2024-06-20 ASSESSMENT — PAIN SCALES - GENERAL
PAINLEVEL_OUTOF10: 2
PAINLEVEL_OUTOF10: 3
PAINLEVEL_OUTOF10: 8

## 2024-06-20 ASSESSMENT — PAIN DESCRIPTION - DESCRIPTORS: DESCRIPTORS: DISCOMFORT

## 2024-06-20 ASSESSMENT — PAIN - FUNCTIONAL ASSESSMENT: PAIN_FUNCTIONAL_ASSESSMENT: ACTIVITIES ARE NOT PREVENTED

## 2024-06-20 ASSESSMENT — PAIN DESCRIPTION - ORIENTATION: ORIENTATION: LEFT

## 2024-06-20 NOTE — PROGRESS NOTES
128*  --  126*  --   --   --   --   --    CKTOTAL  --   --   --   --   --  115  --   --    LACTACIDWB  --  1.2  --   --   --   --   --   --     < > = values in this interval not displayed.       Recent Labs     06/19/24  0722 06/19/24  1419 06/19/24  1737 06/19/24  2000 06/20/24  0740 06/20/24  1137   POCGLU 169* 158* 214* 196* 145* 196*       ABG:  Lab Results   Component Value Date/Time    POCPH 7.365 02/26/2024 09:01 PM    POCPCO2 35.9 02/26/2024 09:01 PM    POCPO2 83.0 02/26/2024 09:01 PM    POCHCO3 20.5 02/26/2024 09:01 PM    NBEA 4.4 02/26/2024 09:01 PM    CUOR9QPO 95.9 02/26/2024 09:01 PM    FIO2 INFORMATION NOT PROVIDED 06/03/2024 12:06 AM     Lab Results   Component Value Date/Time    SPECIAL LFT HAND 10ML 06/17/2024 05:49 PM     Lab Results   Component Value Date/Time    CULTURE (A) 06/17/2024 10:08 PM     HAEMOPHILUS PARAINFLUENZAE MODERATE GROWTH Identification by MALDI-TOF BETA LACTAMASE POSITIVE    CULTURE NORMAL SKIN SUNNI 06/17/2024 10:08 PM       Radiology:  US RENAL COMPLETE    Result Date: 6/17/2024  Unremarkable bilateral renal ultrasound. Probable bladder diverticulum.  Otherwise unremarkable urinary bladder ultrasound     XR FOOT LEFT (MIN 3 VIEWS)    Result Date: 6/17/2024  Demineralization involving the tuft of the 1st distal phalanx which can be seen in the setting of osteomyelitis.     XR CHEST (2 VW)    Result Date: 6/17/2024  Small to moderate right pleural effusion with adjacent compressive atelectasis.       Physical Examination:        General appearance:  alert, cooperative and no distress  Mental Status:  oriented to person, place and time and normal affect  Lungs:  clear to auscultation bilaterally, normal effort  Heart:  regular rate and rhythm, no murmur  Abdomen:  soft, nontender, nondistended, normal bowel sounds, no masses, hepatomegaly, splenomegaly  Extremities: Right BKA, left foot wound with dressing  Skin:  no gross lesions, rashes, induration  Dialysis catheter  present  Assessment:        Hospital Problems             Last Modified POA    * (Principal) CAMILLE (acute kidney injury) ([facility]) 6/17/2024 Yes    Wound eschar of foot 6/18/2024 Yes    Essential hypertension 6/17/2024 Yes    Acute kidney injury superimposed on CKD ([facility]) 6/17/2024 Yes    History of type 2 diabetes mellitus 6/17/2024 Yes    Hx of CABG 6/17/2024 Yes    Hyperkalemia 6/17/2024 Yes    History of aortic valve replacement 6/17/2024 Yes    PVD (peripheral vascular disease) ([facility]) 6/18/2024 Yes    Wound, open, foot, left, initial encounter 6/18/2024 Yes     Plan:        CAMILLE on CKD stage III/IV with baseline creatinine of 1.82, creatinine upto 8.9, now dialysis dependent, tunneled catheter in place, outpatient dialysis port established, discharge when okay with nephrology.    Left great toe osteomyelitis-podiatry plan noted, recommend outpatient follow-up with possible plan for digit amputation.  Patient to apply dressing.  Continue Zyvox till 7/1    Type 2 diabetes mellitus-on Lantus 40 units nightly, insulin sliding scale    Cad- s/p CABG, known RCA disease, no intervention per cardio inpatient, was supposed to get PCI stent outpatient, d/c coumadin, continue aspirin and lipitor    AVR- >3 months, okay to stop anticoagulation per cardiology    Hx of Sternal wound dehiscence-fully healed    PVD with rt BKA  Adjust antihypertensives  Discharge planning started      Hector Romero MD  6/20/2024  3:17 PM

## 2024-06-20 NOTE — DISCHARGE INSTRUCTIONS
Podiatry:  - Please make a follow-up visit with Dr. Pavel Cline outpatient 1 week after discharge  - Please keep your lower extremity wounds dry clean and covered at all times until follow-up visit  - Weightbearing status: weightbearing as tolerated with surgical shoe  - Please take all prescribed medications as instructed  - Please restart all home medications as prescribed  - Please return to the hospital if any of the following local signs of infection arise: Increased/streaking redness, pain, swelling, drainage or malodor  - Please return to the hospital for any the following systemic signs of infection arise: Nausea, vomiting, fever, chills, diarrhea, shortness of breath or chest pain     Follow-up with cardiology to discuss your cath procedure on 7/2  Continue dialysis

## 2024-06-20 NOTE — BRIEF OP NOTE
Brief Postoperative Note    Vincenzo Darden  YOB: 1965  [id number]    Pre-operative Diagnosis: Acute Renal Failure      Post-operative Diagnosis: Same    Procedure: Tunneled Dialysis Catheter    Medication Given: none    Anesthesia: 1%Lidocaine     Surgeons/Assistants: MD Dwayne    Estimated Blood Loss: Minimal    Complications: none    14 Fr x 23 cm tip to cuff palindrome tunneled HD Catheter placed successfully via the Site:  Right Internal Jugular Vein.  Catheter secured to skin, dressing applied.  Catheter locked with Heparin.  May use catheter.    Electronically signed by HERRERA Wagoner on 6/20/2024 at 1:41 PM

## 2024-06-20 NOTE — PROGRESS NOTES
Medical Decision Making-Other:     Note:    Thank you for allowing us to participate in the care of this patient. Please call with questions.    MD Josesito Haq MD Heather Helweg, APRN    ATTESTATION:    I have discussed the case, including pertinent history and exam findings with the APRN. I have evaluated the  History, physical findings and pictures of the patient and the key elements of the encounter have been performed by me. I have reviewed the laboratory data, other diagnostic studies and discussed them with the APRN. I have updated the medical record where necessary.    I agree with the assessment, plan and orders as documented by the APRN.    In addition diagnostic and decision making elements, performed by the Attending Physician, are included in the Diagnostic and Decision Making Section of the text.    Josesito Richardson MD                   Office: [phone]

## 2024-06-20 NOTE — PROGRESS NOTES
CLINICAL PHARMACY NOTE: MEDS TO BEDS    Total # of Prescriptions Filled: 4   The following medications were delivered to the patient:  Amlodipine  Gabapentin  Linezolid  norco    Additional Documentation:

## 2024-06-20 NOTE — CARE COORDINATION
SW received treatment summary letter from [facility]. SW provided pt with treatment summary. See schedule below.     Unit: [facility]    Schedule: TTS 10:15am; start date 6/22 at 9am. After 6/22 pt will begin sessions at 10:15am    Physician: [facility]    Transportation:  pt denies needing assistance with transportation    Insurance:  [employer]    Discharge Plan: home with wife, home health care

## 2024-06-20 NOTE — PROGRESS NOTES
Physical Therapy        Physical Therapy Cancel Note      DATE: 2024    NAME: Vnicenzo Darden  MRN: [id number]   : 1965      Patient not seen this date for Physical Therapy due to:    Hemodialysis: Pt off floor to HD. PT to continue to follow and address as indicated.       Electronically signed by Debbie Santos PT on 2024 at 2:45 PM

## 2024-06-20 NOTE — DISCHARGE INSTR - COC
Continuity of Care Form    Patient Name: Vincenzo Darden   :  1965  MRN:  [id number]    Admit date:  2024  Discharge date:  24    Code Status Order: Full Code   Advance Directives:     Admitting Physician:  No admitting provider for patient encounter.  PCP: Shaikh Calix MD    Discharging Nurse: Ratna   Discharging Hospital Unit/Room#: 0433/0433-01  Discharging Unit Phone Number: [phone]    Emergency Contact:   Extended Emergency Contact Information  Primary Emergency Contact: Ashtyn Forde  Home Phone: [phone]  Relation: Spouse    Past Surgical History:  Past Surgical History:   Procedure Laterality Date    CABG WITH AORTIC VALVE REPLACEMENT N/A 2024    Epi-Cardial leads present. No MRI Per Dr. Cm 24 KRM- CABG CORONARY ARTERY BYPASS X3, STERNAL PLATING, AORTIC VALVE REPLACEMENT 23MM INSPIRIS AORTIC VALVE, ON PUMP, JO performed by Andrei James MD at [facility] CVOR    CARDIAC PROCEDURE N/A 10/09/2023    Coronary angiography performed by Louis Ramirez MD at [facility] CARDIAC CATH LAB    CARDIAC PROCEDURE N/A 10/09/2023    Aortic root aortogram performed by Louis Ramirez MD at [facility] CARDIAC CATH LAB    IR NONTUNNELED VASCULAR CATHETER  2024    IR NONTUNNELED VASCULAR CATHETER 2024 Marlon Rice MD [facility] SPECIAL PROCEDURES       Immunization History:     There is no immunization history on file for this patient.    Active Problems:  Patient Active Problem List   Diagnosis Code    NSTEMI (non-ST elevated myocardial infarction) ([facility]) I21.4    Sepsis ([facility]) A41.9    Type 2 DM with diabetic neuropathy affecting both sides of body ([facility]) E11.42    Essential hypertension I10    Smoker F17.200    Hx of BKA, left ([facility]) Z89.512    GERD (gastroesophageal reflux disease) K21.9    COLT (obstructive sleep apnea) G47.33    Class 2 obesity due to excess calories with body mass index (BMI) of 35.0 to 35.9 in adult E66.09, Z68.35    Acute kidney injury superimposed on CKD ([facility])  [address] [place] [address]  Dialysis Schedule: TTS 10:15am; start date 6/25/24, please arrive at 9am to first treatment session  Phone: [phone]  Fax: [phone]    / signature: Electronically signed by BRIDGETT REYES on 6/20/24 at 9:14 AM EDT    PHYSICIAN SECTION    Prognosis: Fair    Condition at Discharge: Stable    Rehab Potential (if transferring to Rehab): Fair    Recommended Labs or Other Treatments After Discharge: Podiatry:  - Please make a follow-up visit with Dr. Pavel Cline outpatient 1 week after discharge  - Please keep your lower extremity wounds dry clean and covered at all times until follow-up visit  - Weightbearing status: weightbearing as tolerated with surgical shoe  - Please take all prescribed medications as instructed  - Please restart all home medications as prescribed  - Please return to the hospital if any of the following local signs of infection arise: Increased/streaking redness, pain, swelling, drainage or malodor  - Please return to the hospital for any the following systemic signs of infection arise: Nausea, vomiting, fever, chills, diarrhea, shortness of breath or chest pain     Follow-up with cardiology to discuss your cath procedure on 7/2  Continue dialysis    Physician Certification: I certify the above information and transfer of Vincenzo Darden  is necessary for the continuing treatment of the diagnosis listed and that he requires Home Care for less 30 days.     Update Admission H&P: Changes in H&P as follows - discharge summary to follow    PHYSICIAN SIGNATURE:  Electronically signed by Hector Romero MD on 6/20/24 at 3:22 PM EDT

## 2024-06-20 NOTE — PROGRESS NOTES
Dialysis Post Treatment Note  Vitals:    06/20/24 1821   BP: (!) 145/87   Pulse: 80   Resp: 18   Temp: 98 °F (36.7 °C)   SpO2:      Pre-Weight = 138.0kg  Post-weight = Weight - Scale: 135.1 kg (297 lb 13.5 oz)  Total Liters Processed = Blood Volume Processed (Liters): 79.37 L  Rinseback Volume (mL) = Rinseback Volume (ml): 270 ml  Net Removal (mL) = 3030   Patient's dry weight= TBD  Type of access used= perm cath right  Length of treatment=212    Pt. Tolerated tx well with 3L removed in 3.5 hours. Vital sign stable through out tx. Cath worked well with no issues. Report given to primary nurse Ratna MORTENSEN RN.

## 2024-06-20 NOTE — CARE COORDINATION
Spoke with Kiki from [facility]. Confirmed the patient is current with the agency. States they will need 24 notice to resume services for home antibiotics.    9168 [employer] message Robina Chu concerning IV antibiotic need post discharge. Informed the home care service requires 24 hour notice of discharge to schedule the nurse.    1407 Met with the patient. States he has received the port for dialysis. Second IMM explained and copy provided to the patient. States his wife will provide transportation home the day of discharge.

## 2024-06-20 NOTE — PROGRESS NOTES
Division of Vascular Surgery         Progress Note      Name: Vincenzo Darden  MRN: [id number]         Overnight Events:      No overnight events.     Subjective:     Patient seen and examined at bedside. VSS, afebrile. Able to follow commands and answer questions after waking up. Denies significant pain.     Physical Exam:     Vitals:  BP (!) 150/87   Pulse 81   Temp 98.2 °F (36.8 °C) (Oral)   Resp 16   Ht 1.829 m (6')   Wt 134.7 kg (296 lb 15.4 oz)   SpO2 91%   BMI 40.27 kg/m²     General appearance - sleeping, awakens to voice, no apparent distress   Mental status - oriented to person, place and time with normal affect  Head - normocephalic and atraumatic  Neck - supple, trachea midline   Chest - no acute respiratory distress or accessory muscle use   Heart - normal rate  Abdomen - soft, non-distended  Neurological - normal speech, moving all extremities   Extremities - R prosthesis in place, BKA   Foot Exam - edema present to left lower extremity. Dressing to left lower extremity clean, dry and intact  Vascular Exam - Intact DP/PT pulse in left lower extremity     Imaging/Labs:     XR CHEST PORTABLE    Result Date: 6/19/2024  EXAMINATION: ONE XRAY VIEW OF THE CHEST 6/19/2024 9:47 am COMPARISON: Two-view chest from 06/17/2024, and procedural imaging from 06/18/2024. HISTORY: ORDERING SYSTEM PROVIDED HISTORY: verify line placement after patient pulled on line; needs dialysis TECHNOLOGIST PROVIDED HISTORY: verify line placement after patient pulled on line; needs dialysis FINDINGS: Right IJ non tunneled hemodialysis catheter tip position remains satisfactory lower right atrium.  Again noted midline sternotomy closure devices and clips. Cardiomediastinal shadow and lung fields unchanged, again with bibasilar opacities, greater on the right and some blunting right costophrenic angle. No pneumothorax. Bones unchanged.     1. Right IJ non tunneled hemodialysis catheter tip position remains satisfactory lower  right atrium.  May be used for dialysis. 2. No significant interval change.     IR NONTUNNELED VASCULAR CATHETER > 5 YEARS    Result Date: 6/19/2024  PROCEDURE: ULTRASOUND GUIDED VASCULAR ACCESS. FLUOROSCOPY GUIDED PLACEMENT OF A NON-TUNNELED CATHETER. 6/18/2024. HISTORY: ORDERING SYSTEM PROVIDED HISTORY: CAMILLE TECHNOLOGIST PROVIDED HISTORY: CAMILLE SEDATION: None FLUOROSCOPY DOSE AND TYPE: Fluoro time 1.3 minutes DAP 2404.62 uGy m 2 Views: 5 TECHNIQUE: This procedure was performed by Siva Calle PA-C under indirect supervision of Dr. Rice.  Informed consent was obtained after a detailed explanation of the procedure including risks, benefits, and alternatives. Universal protocol was observed using maximum sterile barrier technique. All elements of maximal sterile barrier technique, including cap, mask, sterile gown, sterile gloves, a large sterile sheet, hand hygiene and 2% chlorhexidine for cutaneous antisepsis were followed. Using ultrasound guidance, after anesthetizing the skin with one percent lidocaine, a micropuncture needle was advanced into the patent right internal jugular vein.  An ultrasound image demonstrating patency of the vein with needle tip located within it was obtained and stored in PACS. A microwire was inserted under fluoroscopic guidance and the needle was removed and a 5 Zimbabwean sheath was placed. The microwire was exchanged for an 0.035 wire and the tract was serially dilated to accommodate a 14 Zimbabwean by 20 cm non tunneled Schon temporary hemodialysis catheter.  An image was saved demonstrating the catheter tip in the right atrium. The catheter flushed and aspirated appropriately. This was sutured to the skin using 2-0 Ethilon and dressed appropriately. Estimated blood loss was 5 mL. The patient tolerated the procedure well and left the department stable condition. FINDINGS: Fluoroscopic image demonstrates the tip of the catheter in the right atrium.     Successful ultrasound and

## 2024-06-20 NOTE — PROGRESS NOTES
Progress Note  Podiatric Medicine and Surgery     Subjective     CC: Left foot wound and gangrene     - Patient seen and examined during rounding today  - No acute events overnight  - Afebrile, vital signs stable     Labs:  CRP: 73.4->70.8->77.7  ESR: 79->75->62  WBC: 14.8->13.4->10.9-> 9.4    HPI :  Vincenzo Darden is a 58 y.o. male diabetic patient seen at [facility] for foot wounds to the left lower extremity. Patient has been following up with unknown podiatrist in [place] for routine foot care and debridement. They have had this wound for 3 weeks. Patient says he has seen a vascular surgeon before, but has never had any procedures done because he has good bloodflow. They recently noticed increased swelling, redness, pain and came to the ED. Patient denies constitutional symptoms.  Podiatry was consulted for further wound care intervention.      PCP is Shaikh Calix MD    ROS: Denies N/V/F/C/SOB/CP.  Otherwise negative except at stated in the HPI.     Medications:  Scheduled Meds:   amLODIPine  5 mg Oral Daily    gabapentin  100 mg Oral 3 times per day    aspirin  81 mg Oral Daily    linezolid  600 mg IntraVENous Q12H    atorvastatin  40 mg Oral Nightly    insulin glargine  40 Units SubCUTAneous Nightly    metoprolol tartrate  25 mg Oral BID    tamsulosin  0.4 mg Oral Daily    sodium chloride flush  5-40 mL IntraVENous 2 times per day    insulin lispro  0-8 Units SubCUTAneous TID WC    insulin lispro  0-4 Units SubCUTAneous Nightly       Continuous Infusions:   sodium chloride Stopped (06/19/24 1641)       PRN Meds:heparin (porcine), heparin (porcine), sodium chloride flush, sodium chloride, ondansetron **OR** ondansetron, polyethylene glycol, acetaminophen **OR** acetaminophen, HYDROcodone 5 mg - acetaminophen **OR** HYDROcodone 5 mg - acetaminophen    Objective     Vitals:  Patient Vitals for the past 8 hrs:   BP Temp Temp src Pulse Resp SpO2 Weight   06/19/24 1959 -- -- -- -- 18 -- --   06/19/24 1950  YEARS    (Results Pending)       Cultures:   2x cultures of the left foot: Gram positive cocci in pairs. Gram negative rods - preliminary results    Assessment   Vincenzo Darden is a 58 y.o. male with   Full -thickness ulceration, exposed subcutaneous tissue, left foot  Diabetes mellitus type 2  Cellulitis, left lower extremity  Dry gangrene, left foot  CAMILLE  HTN  CKD  Hyperkalemia    Principal Problem:    CAMILLE (acute kidney injury) ([facility])  Active Problems:    Wound eschar of foot    Essential hypertension    Acute kidney injury superimposed on CKD ([facility])    History of type 2 diabetes mellitus    Hx of CABG    Hyperkalemia    History of aortic valve replacement    PVD (peripheral vascular disease) ([facility])    Wound, open, foot, left, initial encounter  Resolved Problems:    * No resolved hospital problems. *       Plan     Patient labs, imaging, chart reviewed.  Plain film radiographs ordered.  Osteomyelitis of 1st distal phalanx left foot. Negative for soft tissue emphysema, acute fracture or dislocation, foreign body.  MRI ordered.  Patient unable to have MRI due to epicardial leads.  ID consulted.  Abx: daptomycin, Zyvox  Admitted for medical management per IM.   NIVS ordered due to diminished peripheral pulses  Left side findings: Resting NING is 1.23. Resting TBI is 1.28.   Consult placed for vascular  Plan pending PVR results  No debridement done at this time  Cultures obtained  Final results pending  Plan: No surgical intervention planned while patient is in house. Patient stable for discharge from Podiatry's standpoint pending ID outpatient abx recs. Patient to have outpatient digit amputation. Patient to follow up with Dr. Quinton Cline within 1 week of discharge. Podiatry to sign off at this time. Please PS on call resident with any questions.   Dressing applied to Left foot: betadine wet to dry, 4x4, kerlix, ace  Order placed for surgical shoe  WBAT to Left lower extremity with surgical shoe.  Discussed with

## 2024-06-20 NOTE — PROGRESS NOTES
[facility]   Progress Note                   Date:   6/20/2024  Patient name: Vincenzo Darden  Date of admission:  6/17/2024  2:12 PM  MRN:   [id number]  YOB: 1965  PCP: Shaikh Calix MD    Reason for Admission: PAD (peripheral artery disease) (HCC) [I73.9]  CAMILLE (acute kidney injury) (HCC) [N17.9]  Wound eschar of foot [R23.4]  Wound, open, foot, left, initial encounter [S91.302A]  Acute kidney injury superimposed on CKD (HCC) [N17.9, N18.9]    Subjective:       Clinical Changes / Abnormalities: Pt seen and examined in the room.  Patient resting on side of bed. Pt denies any CP or sob.  Labs, vitals and tele reviewed- SR    Medications:   Scheduled Meds:   amLODIPine  5 mg Oral Daily    gabapentin  100 mg Oral 3 times per day    aspirin  81 mg Oral Daily    linezolid  600 mg IntraVENous Q12H    atorvastatin  40 mg Oral Nightly    insulin glargine  40 Units SubCUTAneous Nightly    metoprolol tartrate  25 mg Oral BID    tamsulosin  0.4 mg Oral Daily    sodium chloride flush  5-40 mL IntraVENous 2 times per day    insulin lispro  0-8 Units SubCUTAneous TID WC    insulin lispro  0-4 Units SubCUTAneous Nightly     Continuous Infusions:   sodium chloride Stopped (06/19/24 1641)     CBC:   Recent Labs     06/17/24  1923 06/18/24  0531 06/19/24  0548   WBC 13.4* 10.9 9.4   HGB 9.7* 9.5* 9.4*    368 395       BMP:    Recent Labs     06/17/24  1453 06/18/24  0208 06/18/24  0531 06/19/24  0548   *  --  130* 127*   K 6.0* 5.5* 5.7* 5.2   CL 96*  --  97* 93*   CO2 17*  --  14* 17*   *  --  109* 88*   CREATININE 8.7*  --  8.9* 7.4*   GLUCOSE 251*  --  161* 177*       Hepatic:   Recent Labs     06/17/24  1453   AST 23   ALT 12   BILITOT 0.3   ALKPHOS 125       Troponin:   Recent Labs     06/17/24  1453 06/17/24  1750 06/18/24  0208   TROPHS 137* 128* 126*       BNP: No results for input(s): \"BNP\" in the last 72 hours.  Lipids: No results for input(s): \"CHOL\", \"HDL\" in the last  72 hours.    Invalid input(s): \"LDLCALCU\"  INR:   Recent Labs     24  1923 24  0531   INR 1.4 1.3         Objective:   Vitals: BP (!) 144/93   Pulse 72   Temp 97.5 °F (36.4 °C) (Axillary)   Resp 16   Ht 1.829 m (6')   Wt 134.7 kg (296 lb 15.4 oz)   SpO2 94%   BMI 40.27 kg/m²     General appearance: alert and cooperative with exam  HEENT: Head: Normocephalic, no lesions, without obvious abnormality.  Neck: no JVD, trachea midline, no adenopathy  Lungs: Clear to auscultation  Heart: Regular rate and rhythm, s1/s2 auscultated, + murmurs  Abdomen: soft, non-tender, bowel sounds active  Extremities: no edema  Neurologic: not done    EK/4/24: NSR with RBBB     ECHO:   24 TTE:    Left Ventricle: Mildly reduced left ventricular systolic function with a visually estimated EF of 45 - 50%. EF by 2D Simpsons Biplane is 55%. Left ventricle size is normal. Findings consistent with severe concentric hypertrophy. Septal motion is consistent with bundle branch block. See diagram for wall motion findings. Global longitudinal strain is -12.5%.    Right Ventricle: Right ventricle size is normal. Normal systolic function.    Aortic Valve: Mild sclerosis of the aortic valve cusp. Mild stenosis of the aortic valve. AV mean gradient is 13 mmHg. AV area by continuity VTI is 1.8 cm2.    Mitral Valve: Mild annular calcification of the mitral valve. No regurgitation.    Tricuspid Valve: Valve structure is normal. No regurgitation.    Left Atrium: Left atrium size is normal.    Right Atrium: Right atrium size is normal.    Aorta: Normal sized aortic root and ascending aorta.    Pericardium: No pericardial effusion.    Image quality is adequate.     Cardiac Angiography:   10/9/23:  Left Main  Has 20% stenosis.     Left Anterior Descending  Has eccentric 75-80% stenosis. The apical LAD has 60% stenosis. The D1 has proximal 90% stenosis. The D2 is small with 90% ostial stenosis.     Left Circumflex  Has extreme

## 2024-06-20 NOTE — PLAN OF CARE
Problem: Pain  Goal: Verbalizes/displays adequate comfort level or baseline comfort level  6/20/2024 0307 by Randolph Mancilla RN  Outcome: Progressing  Flowsheets (Taken 6/19/2024 1959)  Verbalizes/displays adequate comfort level or baseline comfort level: Encourage patient to monitor pain and request assistance  6/19/2024 1713 by Teresa Short RN  Outcome: Progressing     Problem: Skin/Tissue Integrity  Goal: Absence of new skin breakdown  Description: 1.  Monitor for areas of redness and/or skin breakdown  2.  Assess vascular access sites hourly  3.  Every 4-6 hours minimum:  Change oxygen saturation probe site  4.  Every 4-6 hours:  If on nasal continuous positive airway pressure, respiratory therapy assess nares and determine need for appliance change or resting period.  6/20/2024 0307 by Randolph Mancilla RN  Outcome: Progressing  6/19/2024 1713 by Teresa Short RN  Outcome: Progressing     Problem: Safety - Adult  Goal: Free from fall injury  6/20/2024 0307 by Randolph Mancilla RN  Outcome: Progressing  Flowsheets (Taken 6/19/2024 2011)  Free From Fall Injury: Instruct family/caregiver on patient safety  6/19/2024 1713 by Teresa Short RN  Outcome: Progressing     Problem: ABCDS Injury Assessment  Goal: Absence of physical injury  6/20/2024 0307 by Randolph Mancilla RN  Outcome: Progressing  Flowsheets (Taken 6/19/2024 2011)  Absence of Physical Injury: Implement safety measures based on patient assessment  6/19/2024 1713 by Teresa Short RN  Outcome: Progressing     Problem: Chronic Conditions and Co-morbidities  Goal: Patient's chronic conditions and co-morbidity symptoms are monitored and maintained or improved  6/20/2024 0307 by Randolph Mancilla RN  Outcome: Progressing  Flowsheets (Taken 6/19/2024 1900)  Care Plan - Patient's Chronic Conditions and Co-Morbidity Symptoms are Monitored and Maintained or Improved: Monitor and assess patient's chronic conditions and comorbid symptoms

## 2024-06-20 NOTE — PROGRESS NOTES
Dialysis Time Out  To be done by RN and tech or 2 RNs  Staff Names Eloisa GONZALEZ RN    [x]  Identity of the patient using 2 patient identifiers  [x]  Consent for treatment  [x]  Equipment-proper machine and dialyzer  [x]  B-Hep B status HB Ags Negative 6/18/24  [x]  Orders- to include bath, blood flow, dialyzer, time and fluid removal  [x]  Access-Correct site and in working order  [x]  Time for patient to ask questions.

## 2024-06-20 NOTE — PLAN OF CARE
Problem: Pain  Goal: Verbalizes/displays adequate comfort level or baseline comfort level  6/20/2024 1059 by Ratna Hardy RN  Outcome: Progressing  Flowsheets (Taken 6/19/2024 1959 by Randolph Mancilla, RN)  Verbalizes/displays adequate comfort level or baseline comfort level: Encourage patient to monitor pain and request assistance  6/20/2024 0307 by Randolph Mancilla, RN  Outcome: Progressing  Flowsheets (Taken 6/19/2024 1959)  Verbalizes/displays adequate comfort level or baseline comfort level: Encourage patient to monitor pain and request assistance     Problem: Skin/Tissue Integrity  Goal: Absence of new skin breakdown  Description: 1.  Monitor for areas of redness and/or skin breakdown  2.  Assess vascular access sites hourly  3.  Every 4-6 hours minimum:  Change oxygen saturation probe site  4.  Every 4-6 hours:  If on nasal continuous positive airway pressure, respiratory therapy assess nares and determine need for appliance change or resting period.  6/20/2024 1059 by Ratna Hardy RN  Outcome: Progressing  6/20/2024 0307 by Randolph Mancilla RN  Outcome: Progressing     Problem: Safety - Adult  Goal: Free from fall injury  6/20/2024 0307 by Randolph Mancilla RN  Outcome: Progressing  Flowsheets (Taken 6/19/2024 2011)  Free From Fall Injury: Instruct family/caregiver on patient safety     Problem: ABCDS Injury Assessment  Goal: Absence of physical injury  6/20/2024 1059 by Ratna Hardy RN  Outcome: Progressing  Flowsheets (Taken 6/19/2024 2011 by Randolph Mancilla, RN)  Absence of Physical Injury: Implement safety measures based on patient assessment  6/20/2024 0307 by Randolph Mancilla, RN  Outcome: Progressing  Flowsheets (Taken 6/19/2024 2011)  Absence of Physical Injury: Implement safety measures based on patient assessment     Problem: Chronic Conditions and Co-morbidities  Goal: Patient's chronic conditions and co-morbidity symptoms are monitored and maintained or

## 2024-06-20 NOTE — PROGRESS NOTES
Nephrology Progress Note      SUBJECTIVE       Pt was seen and examined. No acute issues overnite. Stable hemodynamics .   He was evaluated a couple days ago because of CAMILLE.  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  His complement level C3 is depressed.  He does have a couple macular lesions on his toes on the left foot as well.  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  Hemodialysis was initiated yesterday because of ongoing azotemia.  Currently has a temporary dialysis catheter in place.    He does have a history of chronic kidney disease with a baseline creatinine of 1.8-2.0 range.  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    Patient seen and evaluated in room, no acute events overnight  Vital signs stable   Patient denies shortness of breath, chest tightness or pain, nausea, bloating, or vomiting.  Patient received dialysis yesterday with net 1200mL off. Session had to be ended prematurely due to machine clotting. Order placed for tunneled catheter placement planned for today.  Awaiting morning lab results     Patient has been accepted to [facility] for outpatient dialysis Tuesday, Thursday, Saturday start date     OBJECTIVE      CURRENT TEMPERATURE:  Temp: 98.2 °F (36.8 °C)  MAXIMUM TEMPERATURE OVER 24HRS:  Temp (24hrs), Av °F (36.7 °C), Min:97.8 °F (36.6 °C), Max:98.2 °F (36.8 °C)    CURRENT RESPIRATORY RATE:  Respirations: 16  CURRENT PULSE:  Pulse: 81  CURRENT BLOOD PRESSURE:  BP: (!) 150/87  24HR BLOOD PRESSURE RANGE:  Systolic (24hrs), Av , Min:147 , Max:178   ; Diastolic (24hrs), Av, Min:84, Max:106    24HR INTAKE/OUTPUT:    Intake/Output Summary (Last 24 hours) at 2024 0817  Last data filed at 2024 1714  Gross per 24 hour   Intake 1309.66 ml   Output 2100 ml   Net -790.34 ml       WEIGHT :Patient Vitals for the past 96 hrs (Last 3 readings):   Weight   24 0500 134.7 kg  06/17/24  1923 06/18/24  0531 06/19/24  0548   WBC 13.4* 10.9 9.4   RBC 3.38* 3.33* 3.29*   HGB 9.7* 9.5* 9.4*   HCT 30.1* 30.1* 29.4*   MCV 89.1 90.4 89.4   MCH 28.7 28.5 28.6   MCHC 32.2 31.6 32.0   RDW 13.6 13.8 13.9    368 395   MPV 9.2 9.0 8.9        BMP:   Recent Labs     06/17/24  1453 06/18/24  0208 06/18/24  0531 06/19/24  0548   *  --  130* 127*   K 6.0* 5.5* 5.7* 5.2   CL 96*  --  97* 93*   CO2 17*  --  14* 17*   *  --  109* 88*   CREATININE 8.7*  --  8.9* 7.4*   GLUCOSE 251*  --  161* 177*   CALCIUM 7.9*  --  7.7* 7.4*          MAGNESIUM:   Recent Labs     06/18/24  0531   MG 2.4         SPEP:   Lab Results   Component Value Date/Time    ALBCAL 1.7 06/17/2024 05:50 PM    ALBPCT 28 06/17/2024 05:50 PM    LABALPH 0.5 06/17/2024 05:50 PM    LABALPH 0.9 06/17/2024 05:50 PM    A1PCT 9 06/17/2024 05:50 PM    A2PCT 14 06/17/2024 05:50 PM    BETAPCT 13 06/17/2024 05:50 PM    GAMGLOB 2.2 06/17/2024 05:50 PM    GGPCT 36 06/17/2024 05:50 PM    PATH ELECTRONICALLY SIGNED. GABBIE CHAVARRIA M.D. 06/17/2024 05:50 PM     HEPBSAG:  Lab Results   Component Value Date/Time    HEPBSAG NONREACTIVE 06/18/2024 09:29 AM     HEPCAB:  Lab Results   Component Value Date/Time    HEPCAB NONREACTIVE 06/18/2024 09:29 AM     C3:   Lab Results   Component Value Date    C3 35 (L) 06/18/2024     C4:   Lab Results   Component Value Date    C4 24 06/18/2024       URINE EOSINOPHILS:   Lab Results   Component Value Date/Time    UREO Eosinophils present 06/17/2024 07:32 PM     URINALYSIS:  U/A:   Lab Results   Component Value Date/Time    NITRU NEGATIVE 06/17/2024 07:33 PM    COLORU Yellow 06/17/2024 07:33 PM    PHUR 5.5 06/17/2024 07:33 PM    PHUR 6.0 10/06/2023 06:30 AM    WBCUA TOO NUMEROUS TO COUNT 06/17/2024 07:33 PM    RBCUA 2 TO 5 06/17/2024 07:33 PM    BACTERIA None 06/17/2024 07:33 PM    LEUKOCYTESUR SMALL 06/17/2024 07:33 PM    UROBILINOGEN Normal 06/17/2024 07:33 PM    BILIRUBINUR NEGATIVE 06/17/2024 07:33 PM

## 2024-06-20 NOTE — DISCHARGE SUMMARY
[facility]  Office: [phone]  Alexi Draper DO, Sam Wharton DO, Matthew Kirkpatrick DO, Shane Dennis DO, Lisbeth Owusu MD, Nurys Crisostomo MD, Jena Xie MD, Susan Danielle MD,  Shubham Friedman MD, Mireya Nugent MD, Aleida Rincon MD,  Aliza Saavedra DO, Hector Romero MD, Moncho Gilbert MD, Marlon Draper DO, Liliana Painting MD,  Soy Mccullough DO, Amparo Kong MD, Shila Arana MD, Kristy Wei MD, Kelly Vogel MD,  Morris Sheets MD, Chapincito Rebolledo MD, Andrea Fabian MD, Lisset Mccracken MD, René Holland MD, Nishi Ojeda MD, Fer Hernandez DO, Tejas Paulino DO, Luis M Reardon MD,  Bruce Greenfield MD, Shirley Waterhouse, CNP,  Mariela Gomez CNP, Andrei Polanco, CNP,  Nan Ray, DNP, Liss Lu, CNP, Olya Kaba, CNP, Alivia Melchor CNP, An Rosenbaum, CNP, Deena Carmona, PA-C, Corinna Mcdaniel PA-C, Kiera Holland, CNP, Gladys Carter, CNP, Deja Patterson, CNP, Christa Ojeda, CNP, Maddi Sun, CNP, Amanda Gallardo, CNS, Yamilex Latif, CNP, Zoe Conenr CNP, Tracy Schwab, CNP         [facility]   IN-PATIENT SERVICE   [facility]    Discharge Summary     Patient ID: Vincenzo Darden  :  1965   MRN: [id number]     ACCOUNT:  [id number]   Patient's PCP: Shaikh Calix MD  Admit Date: 2024   Discharge Date: 2024     Length of Stay: 3  Code Status:  Full Code  Admitting Physician: No admitting provider for patient encounter.  Discharge Physician: Hector Romero MD     Active Discharge Diagnoses:     Hospital Problem Lists:  Principal Problem:    CAMILLE (acute kidney injury) ([facility])  Active Problems:    Wound eschar of foot    Essential hypertension    Acute kidney injury superimposed on CKD (HCC)    History of type 2 diabetes mellitus    Hx of CABG    Hyperkalemia    History of aortic valve replacement    PVD (peripheral vascular disease) ([facility])    Wound, open, foot, left, initial encounter  Resolved Problems:    * No  Specialist Recommendations/Findings:   IP CONSULT TO CARDIOLOGY  IP CONSULT TO PODIATRY  IP CONSULT TO HOSPITALIST  IP CONSULT TO INFECTIOUS DISEASES  IP CONSULT TO VASCULAR SURGERY  IP CONSULT TO NEPHROLOGY  IP CONSULT TO HOME CARE NEEDS      The patient was seen and examined on day of discharge and this discharge summary is in conjunction with any daily progress note from day of discharge.    Discharge plan:     Disposition: Home    Physician Follow Up:     Quinton Cline, DPM  [address]  [address]  [facility] [address]  [phone]    Schedule an appointment as soon as possible for a visit in 1 week(s)  For wound re-check    [facility] [place]  [address], [address]  [facility] [address]  [phone]  Go on 7/2/2024  at 2PM    [facility]  [id number] [place] [address] [id number] [phone]        Shaikh Calix MD  [address] [facility] [address]  [phone]    Schedule an appointment as soon as possible for a visit in 3 day(s)         Requiring Further Evaluation/Follow Up POST HOSPITALIZATION/Incidental Findings: FOLLOW PODIATRY    Diet: renal diet    Activity: As tolerated    Instructions to Patient: Podiatry:  - Please make a follow-up visit with Dr. Pavel Cline outpatient 1 week after discharge  - Please keep your lower extremity wounds dry clean and covered at all times until follow-up visit  - Weightbearing status: weightbearing as tolerated with surgical shoe  - Please take all prescribed medications as instructed  - Please restart all home medications as prescribed  - Please return to the hospital if any of the following local signs of infection arise: Increased/streaking redness, pain, swelling, drainage or malodor  - Please return to the hospital for any the following systemic signs of infection arise: Nausea, vomiting, fever, chills, diarrhea, shortness of breath or chest pain     Follow-up with cardiology to discuss your cath procedure on 7/2  Continue

## 2024-06-20 NOTE — PLAN OF CARE
Problem: Pain  Goal: Verbalizes/displays adequate comfort level or baseline comfort level  6/20/2024 1732 by Ratna Hardy RN  Outcome: Adequate for Discharge  6/20/2024 1059 by Ratna Hardy RN  Outcome: Progressing  Flowsheets (Taken 6/19/2024 1959 by Randolph Mancilla, RN)  Verbalizes/displays adequate comfort level or baseline comfort level: Encourage patient to monitor pain and request assistance     Problem: Skin/Tissue Integrity  Goal: Absence of new skin breakdown  Description: 1.  Monitor for areas of redness and/or skin breakdown  2.  Assess vascular access sites hourly  3.  Every 4-6 hours minimum:  Change oxygen saturation probe site  4.  Every 4-6 hours:  If on nasal continuous positive airway pressure, respiratory therapy assess nares and determine need for appliance change or resting period.  6/20/2024 1732 by Ratna Hardy RN  Outcome: Adequate for Discharge  6/20/2024 1059 by Ratna Hardy RN  Outcome: Progressing     Problem: Safety - Adult  Goal: Free from fall injury  Outcome: Adequate for Discharge     Problem: ABCDS Injury Assessment  Goal: Absence of physical injury  6/20/2024 1732 by Ratna Hardy RN  Outcome: Adequate for Discharge  6/20/2024 1059 by Ratna Hardy RN  Outcome: Progressing  Flowsheets (Taken 6/19/2024 2011 by Randolph Mancilla, RN)  Absence of Physical Injury: Implement safety measures based on patient assessment     Problem: Chronic Conditions and Co-morbidities  Goal: Patient's chronic conditions and co-morbidity symptoms are monitored and maintained or improved  6/20/2024 1732 by Ratna Hardy RN  Outcome: Adequate for Discharge  6/20/2024 1059 by Ratna Hardy RN  Outcome: Progressing  Flowsheets (Taken 6/19/2024 1900 by Randolph Mancilla, RN)  Care Plan - Patient's Chronic Conditions and Co-Morbidity Symptoms are Monitored and Maintained or Improved: Monitor and assess patient's chronic conditions and comorbid symptoms for stability, deterioration, or

## 2024-06-22 LAB
MICROORGANISM SPEC CULT: ABNORMAL
MICROORGANISM SPEC CULT: NORMAL
MICROORGANISM SPEC CULT: NORMAL
MICROORGANISM/AGENT SPEC: ABNORMAL
SERVICE CMNT-IMP: NORMAL
SERVICE CMNT-IMP: NORMAL
SPECIMEN DESCRIPTION: ABNORMAL
SPECIMEN DESCRIPTION: NORMAL
SPECIMEN DESCRIPTION: NORMAL

## 2024-06-26 ENCOUNTER — HOSPITAL ENCOUNTER (INPATIENT)
Age: 59
LOS: 14 days | Discharge: HOME HEALTH | DRG: 291 | End: 2024-07-10
Attending: STUDENT IN AN ORGANIZED HEALTH CARE EDUCATION/TRAINING PROGRAM | Admitting: STUDENT IN AN ORGANIZED HEALTH CARE EDUCATION/TRAINING PROGRAM
Payer: MEDICARE

## 2024-06-26 ENCOUNTER — APPOINTMENT (OUTPATIENT)
Dept: ULTRASOUND IMAGING | Age: 59
DRG: 291 | End: 2024-06-26
Attending: STUDENT IN AN ORGANIZED HEALTH CARE EDUCATION/TRAINING PROGRAM
Payer: MEDICARE

## 2024-06-26 DIAGNOSIS — I50.82 BIVENTRICULAR CONGESTIVE HEART FAILURE (HCC): Primary | ICD-10-CM

## 2024-06-26 DIAGNOSIS — J86.9 EMPYEMA LUNG (HCC): ICD-10-CM

## 2024-06-26 PROBLEM — R06.00 DYSPNEA: Status: ACTIVE | Noted: 2024-06-26

## 2024-06-26 PROBLEM — R06.02 SOB (SHORTNESS OF BREATH): Status: ACTIVE | Noted: 2024-06-26

## 2024-06-26 LAB
ABO + RH BLD: NORMAL
ALBUMIN SERPL-MCNC: 2.2 G/DL (ref 3.5–5.2)
ALBUMIN/GLOB SERPL: ABNORMAL {RATIO} (ref 1–2.5)
ALP SERPL-CCNC: 155 U/L (ref 40–129)
ALT SERPL-CCNC: 14 U/L (ref 10–50)
ANION GAP SERPL CALCULATED.3IONS-SCNC: 11 MMOL/L (ref 9–16)
ARM BAND NUMBER: NORMAL
AST SERPL-CCNC: 29 U/L (ref 10–50)
BILIRUB DIRECT SERPL-MCNC: <0.2 MG/DL (ref 0–0.3)
BILIRUB INDIRECT SERPL-MCNC: ABNORMAL MG/DL (ref 0–1)
BILIRUB SERPL-MCNC: 0.3 MG/DL (ref 0–1.2)
BLOOD BANK SAMPLE EXPIRATION: NORMAL
BLOOD GROUP ANTIBODIES SERPL: NEGATIVE
BNP SERPL-MCNC: ABNORMAL PG/ML (ref 0–300)
BUN SERPL-MCNC: 34 MG/DL (ref 6–20)
CALCIUM SERPL-MCNC: 7.5 MG/DL (ref 8.6–10.4)
CHLORIDE SERPL-SCNC: 98 MMOL/L (ref 98–107)
CK SERPL-CCNC: 44 U/L (ref 39–308)
CO2 SERPL-SCNC: 24 MMOL/L (ref 20–31)
CREAT SERPL-MCNC: 5 MG/DL (ref 0.7–1.2)
ERYTHROCYTE [DISTWIDTH] IN BLOOD BY AUTOMATED COUNT: 13.7 % (ref 11.8–14.4)
FERRITIN SERPL-MCNC: 264 NG/ML (ref 30–400)
FOLATE SERPL-MCNC: 5.9 NG/ML (ref 4.8–24.2)
GFR, ESTIMATED: 13 ML/MIN/1.73M2
GLOBULIN SER CALC-MCNC: 4.7 G/DL
GLUCOSE BLD-MCNC: 155 MG/DL (ref 75–110)
GLUCOSE BLD-MCNC: 165 MG/DL (ref 75–110)
GLUCOSE BLD-MCNC: 213 MG/DL (ref 75–110)
GLUCOSE SERPL-MCNC: 107 MG/DL (ref 74–99)
HCT VFR BLD AUTO: 24.9 % (ref 40.7–50.3)
HGB BLD-MCNC: 7.8 G/DL (ref 13–17)
INR PPP: 1.2
IRON SATN MFR SERPL: 22 % (ref 20–55)
IRON SERPL-MCNC: 35 UG/DL (ref 61–157)
LDH SERPL-CCNC: 221 U/L (ref 135–225)
MAGNESIUM SERPL-MCNC: 1.7 MG/DL (ref 1.6–2.6)
MCH RBC QN AUTO: 29.1 PG (ref 25.2–33.5)
MCHC RBC AUTO-ENTMCNC: 31.3 G/DL (ref 28.4–34.8)
MCV RBC AUTO: 92.9 FL (ref 82.6–102.9)
NRBC BLD-RTO: 0 PER 100 WBC
PLATELET # BLD AUTO: 245 K/UL (ref 138–453)
PMV BLD AUTO: 8.5 FL (ref 8.1–13.5)
POTASSIUM SERPL-SCNC: 3.9 MMOL/L (ref 3.7–5.3)
PROT SERPL-MCNC: 6.9 G/DL (ref 6.6–8.7)
PROTHROMBIN TIME: 15 SEC (ref 11.7–14.9)
RBC # BLD AUTO: 2.68 M/UL (ref 4.21–5.77)
SODIUM SERPL-SCNC: 133 MMOL/L (ref 136–145)
TIBC SERPL-MCNC: 159 UG/DL (ref 250–450)
UNSATURATED IRON BINDING CAPACITY: 124 UG/DL (ref 112–347)
VIT B12 SERPL-MCNC: 547 PG/ML (ref 232–1245)
WBC OTHER # BLD: 9.1 K/UL (ref 3.5–11.3)

## 2024-06-26 PROCEDURE — 99223 1ST HOSP IP/OBS HIGH 75: CPT | Performed by: INTERNAL MEDICINE

## 2024-06-26 PROCEDURE — 99222 1ST HOSP IP/OBS MODERATE 55: CPT | Performed by: INTERNAL MEDICINE

## 2024-06-26 PROCEDURE — 6370000000 HC RX 637 (ALT 250 FOR IP): Performed by: INTERNAL MEDICINE

## 2024-06-26 PROCEDURE — 82550 ASSAY OF CK (CPK): CPT

## 2024-06-26 PROCEDURE — 6360000002 HC RX W HCPCS: Performed by: NURSE PRACTITIONER

## 2024-06-26 PROCEDURE — 2580000003 HC RX 258: Performed by: NURSE PRACTITIONER

## 2024-06-26 PROCEDURE — 6360000002 HC RX W HCPCS: Performed by: INTERNAL MEDICINE

## 2024-06-26 PROCEDURE — 90935 HEMODIALYSIS ONE EVALUATION: CPT

## 2024-06-26 PROCEDURE — 85610 PROTHROMBIN TIME: CPT

## 2024-06-26 PROCEDURE — 82607 VITAMIN B-12: CPT

## 2024-06-26 PROCEDURE — 97530 THERAPEUTIC ACTIVITIES: CPT

## 2024-06-26 PROCEDURE — 86850 RBC ANTIBODY SCREEN: CPT

## 2024-06-26 PROCEDURE — 82728 ASSAY OF FERRITIN: CPT

## 2024-06-26 PROCEDURE — 99213 OFFICE O/P EST LOW 20 MIN: CPT

## 2024-06-26 PROCEDURE — 80048 BASIC METABOLIC PNL TOTAL CA: CPT

## 2024-06-26 PROCEDURE — 36415 COLL VENOUS BLD VENIPUNCTURE: CPT

## 2024-06-26 PROCEDURE — 83615 LACTATE (LD) (LDH) ENZYME: CPT

## 2024-06-26 PROCEDURE — 2060000000 HC ICU INTERMEDIATE R&B

## 2024-06-26 PROCEDURE — 86900 BLOOD TYPING SEROLOGIC ABO: CPT

## 2024-06-26 PROCEDURE — 2580000003 HC RX 258: Performed by: INTERNAL MEDICINE

## 2024-06-26 PROCEDURE — 83735 ASSAY OF MAGNESIUM: CPT

## 2024-06-26 PROCEDURE — 86901 BLOOD TYPING SEROLOGIC RH(D): CPT

## 2024-06-26 PROCEDURE — 82746 ASSAY OF FOLIC ACID SERUM: CPT

## 2024-06-26 PROCEDURE — 82947 ASSAY GLUCOSE BLOOD QUANT: CPT

## 2024-06-26 PROCEDURE — 94761 N-INVAS EAR/PLS OXIMETRY MLT: CPT

## 2024-06-26 PROCEDURE — 83540 ASSAY OF IRON: CPT

## 2024-06-26 PROCEDURE — 83880 ASSAY OF NATRIURETIC PEPTIDE: CPT

## 2024-06-26 PROCEDURE — 97162 PT EVAL MOD COMPLEX 30 MIN: CPT

## 2024-06-26 PROCEDURE — 85027 COMPLETE CBC AUTOMATED: CPT

## 2024-06-26 PROCEDURE — 83550 IRON BINDING TEST: CPT

## 2024-06-26 PROCEDURE — 76775 US EXAM ABDO BACK WALL LIM: CPT

## 2024-06-26 PROCEDURE — 80076 HEPATIC FUNCTION PANEL: CPT

## 2024-06-26 RX ORDER — INSULIN LISPRO 100 [IU]/ML
0-4 INJECTION, SOLUTION INTRAVENOUS; SUBCUTANEOUS
Status: DISCONTINUED | OUTPATIENT
Start: 2024-06-26 | End: 2024-07-10 | Stop reason: HOSPADM

## 2024-06-26 RX ORDER — DEXTROSE MONOHYDRATE 100 MG/ML
INJECTION, SOLUTION INTRAVENOUS CONTINUOUS PRN
Status: DISCONTINUED | OUTPATIENT
Start: 2024-06-26 | End: 2024-07-10 | Stop reason: HOSPADM

## 2024-06-26 RX ORDER — SODIUM CHLORIDE 0.9 % (FLUSH) 0.9 %
5-40 SYRINGE (ML) INJECTION EVERY 12 HOURS SCHEDULED
Status: DISCONTINUED | OUTPATIENT
Start: 2024-06-26 | End: 2024-07-10 | Stop reason: HOSPADM

## 2024-06-26 RX ORDER — GLUCAGON 1 MG/ML
1 KIT INJECTION PRN
Status: DISCONTINUED | OUTPATIENT
Start: 2024-06-26 | End: 2024-07-10 | Stop reason: HOSPADM

## 2024-06-26 RX ORDER — ENOXAPARIN SODIUM 100 MG/ML
30 INJECTION SUBCUTANEOUS DAILY
Status: DISCONTINUED | OUTPATIENT
Start: 2024-06-26 | End: 2024-06-26

## 2024-06-26 RX ORDER — POLYETHYLENE GLYCOL 3350 17 G/17G
17 POWDER, FOR SOLUTION ORAL DAILY PRN
Status: DISCONTINUED | OUTPATIENT
Start: 2024-06-26 | End: 2024-07-10 | Stop reason: HOSPADM

## 2024-06-26 RX ORDER — LINEZOLID 2 MG/ML
600 INJECTION, SOLUTION INTRAVENOUS EVERY 12 HOURS
Status: DISCONTINUED | OUTPATIENT
Start: 2024-06-26 | End: 2024-06-26

## 2024-06-26 RX ORDER — INSULIN GLARGINE 100 [IU]/ML
32 INJECTION, SOLUTION SUBCUTANEOUS NIGHTLY
Status: DISCONTINUED | OUTPATIENT
Start: 2024-06-26 | End: 2024-06-30

## 2024-06-26 RX ORDER — ASPIRIN 81 MG/1
81 TABLET, CHEWABLE ORAL DAILY
Status: DISCONTINUED | OUTPATIENT
Start: 2024-06-26 | End: 2024-07-10 | Stop reason: HOSPADM

## 2024-06-26 RX ORDER — ATORVASTATIN CALCIUM 40 MG/1
40 TABLET, FILM COATED ORAL NIGHTLY
Status: DISCONTINUED | OUTPATIENT
Start: 2024-06-26 | End: 2024-07-10 | Stop reason: HOSPADM

## 2024-06-26 RX ORDER — SODIUM CHLORIDE 0.9 % (FLUSH) 0.9 %
10 SYRINGE (ML) INJECTION PRN
Status: DISCONTINUED | OUTPATIENT
Start: 2024-06-26 | End: 2024-07-10 | Stop reason: HOSPADM

## 2024-06-26 RX ORDER — SODIUM CHLORIDE 9 MG/ML
INJECTION, SOLUTION INTRAVENOUS PRN
Status: DISCONTINUED | OUTPATIENT
Start: 2024-06-26 | End: 2024-07-10 | Stop reason: HOSPADM

## 2024-06-26 RX ORDER — HEPARIN SODIUM 1000 [USP'U]/ML
1900 INJECTION, SOLUTION INTRAVENOUS; SUBCUTANEOUS PRN
Status: DISCONTINUED | OUTPATIENT
Start: 2024-06-26 | End: 2024-07-10 | Stop reason: HOSPADM

## 2024-06-26 RX ORDER — PANTOPRAZOLE SODIUM 40 MG/1
40 TABLET, DELAYED RELEASE ORAL
Status: DISCONTINUED | OUTPATIENT
Start: 2024-06-26 | End: 2024-07-10 | Stop reason: HOSPADM

## 2024-06-26 RX ORDER — MORPHINE SULFATE 2 MG/ML
2 INJECTION, SOLUTION INTRAMUSCULAR; INTRAVENOUS
Status: DISCONTINUED | OUTPATIENT
Start: 2024-06-26 | End: 2024-06-27

## 2024-06-26 RX ORDER — MORPHINE SULFATE 4 MG/ML
4 INJECTION, SOLUTION INTRAMUSCULAR; INTRAVENOUS
Status: DISCONTINUED | OUTPATIENT
Start: 2024-06-26 | End: 2024-06-27

## 2024-06-26 RX ORDER — AMLODIPINE BESYLATE 10 MG/1
10 TABLET ORAL DAILY
Status: DISCONTINUED | OUTPATIENT
Start: 2024-06-26 | End: 2024-07-10 | Stop reason: HOSPADM

## 2024-06-26 RX ORDER — TAMSULOSIN HYDROCHLORIDE 0.4 MG/1
0.4 CAPSULE ORAL DAILY
Status: DISCONTINUED | OUTPATIENT
Start: 2024-06-26 | End: 2024-07-10 | Stop reason: HOSPADM

## 2024-06-26 RX ORDER — ACETAMINOPHEN 650 MG/1
650 SUPPOSITORY RECTAL EVERY 6 HOURS PRN
Status: DISCONTINUED | OUTPATIENT
Start: 2024-06-26 | End: 2024-07-10 | Stop reason: HOSPADM

## 2024-06-26 RX ORDER — ONDANSETRON 4 MG/1
4 TABLET, ORALLY DISINTEGRATING ORAL EVERY 8 HOURS PRN
Status: DISCONTINUED | OUTPATIENT
Start: 2024-06-26 | End: 2024-07-10 | Stop reason: HOSPADM

## 2024-06-26 RX ORDER — ONDANSETRON 2 MG/ML
4 INJECTION INTRAMUSCULAR; INTRAVENOUS EVERY 6 HOURS PRN
Status: DISCONTINUED | OUTPATIENT
Start: 2024-06-26 | End: 2024-07-10 | Stop reason: HOSPADM

## 2024-06-26 RX ORDER — HEPARIN SODIUM 5000 [USP'U]/ML
5000 INJECTION, SOLUTION INTRAVENOUS; SUBCUTANEOUS EVERY 8 HOURS SCHEDULED
Status: DISCONTINUED | OUTPATIENT
Start: 2024-06-26 | End: 2024-07-10 | Stop reason: HOSPADM

## 2024-06-26 RX ORDER — ACETAMINOPHEN 325 MG/1
650 TABLET ORAL EVERY 6 HOURS PRN
Status: DISCONTINUED | OUTPATIENT
Start: 2024-06-26 | End: 2024-07-10 | Stop reason: HOSPADM

## 2024-06-26 RX ORDER — GABAPENTIN 100 MG/1
100 CAPSULE ORAL EVERY 8 HOURS SCHEDULED
Status: DISCONTINUED | OUTPATIENT
Start: 2024-06-26 | End: 2024-07-10 | Stop reason: HOSPADM

## 2024-06-26 RX ORDER — INSULIN LISPRO 100 [IU]/ML
0-4 INJECTION, SOLUTION INTRAVENOUS; SUBCUTANEOUS NIGHTLY
Status: DISCONTINUED | OUTPATIENT
Start: 2024-06-26 | End: 2024-07-10 | Stop reason: HOSPADM

## 2024-06-26 RX ADMIN — INSULIN GLARGINE 32 UNITS: 100 INJECTION, SOLUTION SUBCUTANEOUS at 21:04

## 2024-06-26 RX ADMIN — HEPARIN SODIUM 5000 UNITS: 5000 INJECTION INTRAVENOUS; SUBCUTANEOUS at 21:51

## 2024-06-26 RX ADMIN — HEPARIN SODIUM 5000 UNITS: 5000 INJECTION INTRAVENOUS; SUBCUTANEOUS at 06:44

## 2024-06-26 RX ADMIN — METOPROLOL TARTRATE 25 MG: 25 TABLET, FILM COATED ORAL at 08:40

## 2024-06-26 RX ADMIN — MORPHINE SULFATE 2 MG: 2 INJECTION, SOLUTION INTRAMUSCULAR; INTRAVENOUS at 12:46

## 2024-06-26 RX ADMIN — MORPHINE SULFATE 2 MG: 2 INJECTION, SOLUTION INTRAMUSCULAR; INTRAVENOUS at 09:59

## 2024-06-26 RX ADMIN — AMLODIPINE BESYLATE 10 MG: 10 TABLET ORAL at 08:40

## 2024-06-26 RX ADMIN — PANTOPRAZOLE SODIUM 40 MG: 40 TABLET, DELAYED RELEASE ORAL at 08:40

## 2024-06-26 RX ADMIN — MORPHINE SULFATE 2 MG: 2 INJECTION, SOLUTION INTRAMUSCULAR; INTRAVENOUS at 06:44

## 2024-06-26 RX ADMIN — MORPHINE SULFATE 4 MG: 4 INJECTION INTRAVENOUS at 19:49

## 2024-06-26 RX ADMIN — HEPARIN SODIUM 1900 UNITS: 1000 INJECTION INTRAVENOUS; SUBCUTANEOUS at 14:53

## 2024-06-26 RX ADMIN — MORPHINE SULFATE 4 MG: 4 INJECTION INTRAVENOUS at 21:53

## 2024-06-26 RX ADMIN — METOPROLOL TARTRATE 25 MG: 25 TABLET, FILM COATED ORAL at 21:04

## 2024-06-26 RX ADMIN — DAPTOMYCIN 600 MG: 500 INJECTION, POWDER, LYOPHILIZED, FOR SOLUTION INTRAVENOUS at 17:59

## 2024-06-26 RX ADMIN — TAMSULOSIN HYDROCHLORIDE 0.4 MG: 0.4 CAPSULE ORAL at 08:40

## 2024-06-26 RX ADMIN — SODIUM CHLORIDE, PRESERVATIVE FREE 5 ML: 5 INJECTION INTRAVENOUS at 22:08

## 2024-06-26 RX ADMIN — HEPARIN SODIUM 1900 UNITS: 1000 INJECTION INTRAVENOUS; SUBCUTANEOUS at 14:56

## 2024-06-26 RX ADMIN — ASPIRIN 81 MG 81 MG: 81 TABLET ORAL at 08:40

## 2024-06-26 RX ADMIN — GABAPENTIN 100 MG: 100 CAPSULE ORAL at 06:44

## 2024-06-26 RX ADMIN — GABAPENTIN 100 MG: 100 CAPSULE ORAL at 21:51

## 2024-06-26 ASSESSMENT — PAIN SCALES - GENERAL
PAINLEVEL_OUTOF10: 0
PAINLEVEL_OUTOF10: 0
PAINLEVEL_OUTOF10: 7
PAINLEVEL_OUTOF10: 8
PAINLEVEL_OUTOF10: 0
PAINLEVEL_OUTOF10: 0
PAINLEVEL_OUTOF10: 6
PAINLEVEL_OUTOF10: 5
PAINLEVEL_OUTOF10: 6

## 2024-06-26 ASSESSMENT — PAIN DESCRIPTION - ORIENTATION
ORIENTATION: RIGHT
ORIENTATION: LEFT;RIGHT
ORIENTATION: RIGHT;LEFT
ORIENTATION: LEFT;RIGHT

## 2024-06-26 ASSESSMENT — PAIN - FUNCTIONAL ASSESSMENT: PAIN_FUNCTIONAL_ASSESSMENT: PREVENTS OR INTERFERES SOME ACTIVE ACTIVITIES AND ADLS

## 2024-06-26 ASSESSMENT — PAIN DESCRIPTION - LOCATION
LOCATION: FLANK

## 2024-06-26 ASSESSMENT — PAIN DESCRIPTION - DESCRIPTORS
DESCRIPTORS: DULL;ACHING;BURNING
DESCRIPTORS: ACHING;DULL

## 2024-06-26 NOTE — PROGRESS NOTES
[facility]  Occupational Therapy Not Seen Note    DATE: 2024    NAME: Vincenzo Darden  MRN: [id number]   : 1965      Patient not seen this date for Occupational Therapy due to:    Patient Declined: Pt politely declines participation in OT evaluation this AM d/t recently completing PT evaluation. OT will return in PM as time allows, more likely tomorrow for evaluation.    Next Scheduled Treatment:       Electronically signed by HARRISON Jimenez on 2024 at 11:18 AM

## 2024-06-26 NOTE — CONSULTS
[facility] - Initial Consult Note    Today's Date and Time: 6/26/2024, 5:55 PM    Impression :   Shortness of breath  Rt pleural effusion with compressive atelectasis  Loculated lower anterior mediastinum fluid collection  CAMILLE on CKD stage III  Recent MSSA septicemia 6-2-24 x 2  Was under treatment with Linezolid because of prior CAMILLE with Daptomycin  Hx anaphylaxis with Zosyn    Concern for left great toe osteomyelitis   CAD  S/P CABG and AVR Feb 2024  Hx of Sternal wound dehiscence-fully healed  DM 2  PVD with prior Rt BKA  Left leg and foot ulcers.  Allergy to vancomycin (nausea)  Allergy to Zosyn (anaphylaxis)      Recommendations:   Daptomycin 600 mg IV q 48 hr    Prior treatment:  6/18/24: IV Zyvox 600 mg po BID initiated  Tentative End date 7/1/24  Daptomycin 700 mg IV q 48 hr. Stopped on 6/17/24 because of renal failure    Prior blood cultures:   6/3/24 with 1/1 staph aureus  6-4-24: No growth   6/7/24: No growth   6/17/24: No growth     TTE with no evidence of endocarditis from 6/6/24  MRI left foot unable to be completed because of PPM  No surgical intervention planned per Podiatry     Medical Decision Making/Summary/Discussion:6/26/2024     Daily Elements of Decision Making provided by Consulting Physician:    Note: I have independently performed the steps listed below as part of the medical decision making and evaluation.    Review of current Problems:  Today I am seeing the patient for the following problems:  Shortness of breath  Rt pleural effusion with compressive atelectasis  Loculated lower anterior mediastinum fluid collection  CAMILLE on CKD stage III  Recent MSSA septicemia 6-2-24 x 2  Was under treatment with Linezolid because of prior CAMILLE with Daptomycin  Hx anaphylaxis with Zosyn    Concern for left great toe osteomyelitis   CAD  S/P CABG and AVR Feb 2024  Hx of Sternal wound dehiscence-fully healed  DM 2  PVD with prior Rt BKA  Left leg and foot  distention chronic wall hypertrophy or cystitis no other potential acute findings in the abdominal pelvis  Suspected cholelithiasis    IR TUNNELED CVC PLACEMENT:  2024  Successful removal of the existing non-tunneled dialysis catheter with tunneling and placement of a new right internal jugular approach 14.5 [place] x 23 cm tip to cuff palindrome dialysis catheter, as above.  The catheter is ready for use    Left foot XR 24:  Demineralization involving the tuft of the 1st distal phalanx which can be  seen in the setting of osteomyelitis.    CXR 24:  Small to moderate right pleural effusion with adjacent compressive atelectasis.       CURRENT EVALUATION- DAILY INTERVAL CHANGES 2024  BP (!) 165/95   Pulse 70   Temp 97.5 °F (36.4 °C) (Oral)   Resp 15   Ht 1.829 m (6' 0.01\")   Wt 131.5 kg (289 lb 14.5 oz)   SpO2 98%   BMI 39.31 kg/m²     Afebrile  VS stable    Patient on room air  RR 15  02 sat 98    Patient underwent HD with removal of 3 L fluid with improvement in dyspnea  No new issues reported    Medications reviewed:  Daptomycin 600 mg q 48 hr    Skin with Lt plantar ulcer, abrasions of Lt 1st, 3rd and 4th toes, ulcer of Lt distal hallux.    Labs, X rays reviewed: 2024 with independent review of X rays    BUN: 109-->88  Cr: 8.9-->7.4  GFR: 6-->8  NA: 130-->127  K: 5.7--.>5.2    WBC: 14.8-->10.9-->9.4  Hb: 9.5-->9.4  Plat:  368-->395    CRP: 73.4-->72.7  ESR: 79-->75-->62    CK   24: 921.  24: 177    Cultures:  Urine:  24: No growth  Blood:  24: MSSA x 2  6-3-24: / Staph Aureus  24: No growth   24: No growth  24: No growth   24: No growth     Sputum :    Wound:  24: Left foot: GNR and normal laurence  MRSA Nares:  10-25-23: negative     Imagin-26-24:  Lt hallux      24 Lt anterior foot      24 Lt plantar          Left foot  24                I have personally reviewed the past medical history, past surgical history,

## 2024-06-26 NOTE — PROGRESS NOTES
Dialysis Post Treatment Note  Patient tolerated treatment well. Denies complaints at time of discharge.   Vitals:    06/26/24 1608   BP: (!) 165/95   Pulse:    Resp:    Temp: 97.5 °F (36.4 °C)   SpO2:      Pre-Weight = 134.1 kg  Post-weight = Weight - Scale: 131.5 kg (289 lb 14.5 oz)  Total Liters Processed = Blood Volume Processed (Liters): 46.71 L  Rinseback Volume (mL) = Rinseback Volume (ml): 300 ml  Net Removal (ml  + 3000 ml  Length of treatment 2 hrs   Access:  right HD catheter.    Pt tolerated SUF treatment well. Vitals stable post treatment. Report was given to the pts PCN.The pt returned to his room after treatment.

## 2024-06-26 NOTE — CARE COORDINATION
Transitional planning    Writer to room to discuss d/c plan, off unit, will reattempt if time allows.

## 2024-06-26 NOTE — PLAN OF CARE
I came by his room and no one was around.  Patient was not in room.  We can anticipate we will would see this patient in outpatient clinic for follow-up regarding this seroma of his surgical incision.  Anticipate seeing patient tomorrow morning if not later today

## 2024-06-26 NOTE — H&P
[facility]  Office: [phone]  Alexi Draper DO, Sam Wharton DO, Matthew Kirkpatrick DO, Shane Dennis DO, Lisbeth Owusu MD, Nurys Crisostomo MD, Jena Xie MD, Susan Danielle MD,  Shubham Friedman MD, Mireya Nugent MD, Aleida Rincon MD,  Aliza Saavedra DO, Hector Romero MD, Moncho Gilbert MD, Marlon Draper DO, Liliana Painting MD,  Soy Mccullough DO, Amparo Kong MD, Shila Arana MD, Kristy Wei MD, Kelly Vogel MD,  Morris Sheets MD, Chapincito Rebolledo MD, Andrea Fabian MD, Lisset Mccracken MD, René Holland MD, Nishi Ojeda MD, Fer Hernandez DO, Tejas Paulino DO, Luis M Reardon MD,  Bruce Greenfield MD, Shirley Waterhouse, CNP,  Mariela Gomez CNP, Andrei Polanco, CNP,  Nan Ray, DNP, Liss Lu, CNP, Olya Kaba, CNP, Alivia Melchor, CNP, An Rosenbaum, CNP, Deena Carmona, PA-C, Corinna Mcdaniel PA-C, Kiera Holland, CNP, Gladys Carter, CNP, Deja Patterson, CNP, Christa Ojeda, CNP, Maddi Sun, CNP, Amanda Gallardo, CNS, Yamilex Latif, CNP, Zoe Conner CNP, Tracy Schwab, CNP         [facility]   IN-PATIENT SERVICE   [facility]    HISTORY AND PHYSICAL EXAMINATION            Date:   6/26/2024  Patient name:  Vincenzo Darden  Date of admission:  6/26/2024  4:33 AM  MRN:   [id number]  Account:  [id number]  YOB: 1965  PCP:    Shaikh Calix MD  Room:   [address] [place] [address]  Code Status:    Full Code    Chief Complaint:     \"It is on my flanks\"    History Obtained From:     patient    History of Present Illness:     Vincenzo Darden is a 58 y.o.  male w/ PVD s/p right BKA, non healing left foot wounds, CAD s/p AVR/CABGx3 in 02/2024 complicated by chest wall dehiscence, MSSA infection, w/ CAMILLE on CKD3 s/p initiating HD (HD T/R/S), DM2, COLT who was recently discharged 06/20/2024 who returned to [facility] yesterday evening with worsening shortness of breath, flank pain with chest pain and recommended for transfer to [facility]

## 2024-06-26 NOTE — CONSENT
Informed Consent for Blood Component Transfusion Note    I have discussed with the patient the rationale for blood component transfusion; its benefits in treating or preventing fatigue, organ damage, or death; and its risk which includes mild transfusion reactions, rare risk of blood borne infection, or more serious but rare reactions. I have discussed the alternatives to transfusion, including the risk and consequences of not receiving transfusion. The patient had an opportunity to ask questions and had agreed to proceed with transfusion of blood components.    Electronically signed by Liliana Painting MD on 6/26/24 at 7:47 AM EDT

## 2024-06-26 NOTE — PROGRESS NOTES
[facility] Nurse  Consult Note       NAME:  Vincenzo Darden  MEDICAL RECORD NUMBER:  [id number]  AGE: 58 y.o.   GENDER: male  : 1965  TODAY'S DATE:  2024    Subjective:     Reason for [facility] Evaluation and Assessment: foot wound      Vincenzo Darden is a 58 y.o. male referred by:   [x] Physician  [] Nursing  [] Other:     Wound Identification:  Wound Type: diabetic  Contributing Factors: edema, diabetes, poor glucose control, shear force, obesity, arterial insufficiency, decreased tissue oxygenation, and immunosuppression        Patient reports he follows with wound care at [facility]. He reports using Medihoney and collagen dressings to the hallux wound. \"Betadine to everything else\"   Hallux is covered in well adhered eschar as per prior admissions.     Objective:      BP (!) 156/85   Pulse 76   Temp 98.4 °F (36.9 °C) (Oral)   Resp 19   Ht 1.829 m (6' 0.01\")   SpO2 98%   BMI 40.39 kg/m²   Satish Risk Score: Satish Scale Score: 19    LABS    CBC:   Lab Results   Component Value Date/Time    WBC 9.1 2024 06:14 AM    RBC 2.68 2024 06:14 AM    HGB 7.8 2024 06:14 AM    HCT 24.9 2024 06:14 AM     CMP:  Albumin:  No results found for: \"LABALBU\"  PT/INR:    Lab Results   Component Value Date/Time    PROTIME 15.0 2024 06:14 AM    PROTIME 13.3 2024 01:10 PM    INR 1.2 2024 06:14 AM     HgBA1c:    Lab Results   Component Value Date/Time    LABA1C 12.0 2024 10:51 AM     PTT: No components found for: \"LABPTT\"      Assessment:     Left hallux eschar with built up, old collagen dressing removed as able. Eschar is unattached. Toes 3 and 4 with partial amputations. Distal skin with superficial eschars.   Measurements:     24 1205   Wound 24 Toe (Comment  which one) Left;Plantar #1   Date First Assessed: 24   Present on Original Admission: Yes  Primary Wound Type: Traumatic  Location: Toe (Comment  which one)  Wound Location

## 2024-06-26 NOTE — PROGRESS NOTES
Dialysis Time Out  To be done by RN and tech or 2 RNs  Staff Names Blanquita BASSETT RN, Jada CANTOR RN    [x]  Identity of the patient using 2 patient identifiers  [x]  Consent for treatment  [x]  Equipment-proper machine and dialyzer  [x]  B-Hep B status HbSAg  6/18/24 Neg  [x]  Orders- to include bath, blood flow, dialyzer, time and fluid removal  [x]  Access-Correct site and in working order  [x]  Time for patient to ask questions.

## 2024-06-26 NOTE — CONSULTS
NEPHROLOGY     REASON FOR CONSULT:     Dialysis dependent acute kidney injury      ASSESSMENT     Acute kidney injury June 2024 nonoliguric secondary to likely AED vs AIN (infection/antibiotic) or residual ATN damage from contrast.  PVR was 135.  Hemodialysis dependent since June 2024.  Tuesday Thursday Saturday schedule.  Tunneled catheter in place  Chronic kidney disease stage IIIb secondary to diabetic nephropathy with baseline creatinine 1.6-1.8  Nephrotic range proteinuria  Presented with persistent shortness of breath NYHA class IV since discharge which is not getting better imaging studies revealing moderate-sized right pleural effusion with suspected lower lobe consolidation.  Echo in the past has shown ejection fraction of 45%.  MSSA sternal wound infection on antibiotics  Type 2 diabetes  COLT  Sleep apnea    PLAN     UF today 2-3 KG off.  Conventional hemodialysis/UF tomorrow  Fluid/salt restriction   Interested in pursuing home dialysis.  Will follow      HISTORY OF PRESENTING ILLNESS                 This is a 58 y.o. male who presents presents with chief complaints of shortness of breath NYHA class III/IV associated with orthopnea with no PND.  Symptoms have been persistent since discharge from the hospital recently and have not improved in spite of being compliant with fluid restriction and dialysis.    In retrospect patient has had 2 admissions within the last 1 month.  On the first hospitalization he was diagnosed to have MSSA bacteremia/lower extremity cellulitis and sustained acute kidney injury from contrast nephropathy.  Sequential follow-up of the labs showed high potassium and worsening of creatinine which prompted rehab hospitalization.  His CAMILLE was attributed to likely atheroembolic disease versus AIN but needed to be on dialysis which she is currently on.    Initial assessment showed stable vitals with imaging studies consistent with moderate pleural effusion with suspected  FLEXTOUCH) 200 UNIT/ML SOPN, Inject 40 Units into the skin nightly  Insulin Aspart, w/Niacinamide, (FIASP FLEXTOUCH) 100 UNIT/ML SOPN, Inject 20 Units into the skin 3 times daily (with meals) Pt has his sliding scale at home  Scheduled Meds:    sodium chloride flush  5-40 mL IntraVENous 2 times per day    heparin (porcine)  5,000 Units SubCUTAneous 3 times per day    DAPTOmycin (CUBICIN) 600 mg in sodium chloride 0.9 % 50 mL IVPB  6 mg/kg (Adjusted) IntraVENous Q48H    insulin lispro  0-4 Units SubCUTAneous TID WC    insulin lispro  0-4 Units SubCUTAneous Nightly    amLODIPine  10 mg Oral Daily    aspirin  81 mg Oral Daily    [Held by provider] atorvastatin  40 mg Oral Nightly    gabapentin  100 mg Oral 3 times per day    metoprolol tartrate  25 mg Oral BID    tamsulosin  0.4 mg Oral Daily    insulin glargine  32 Units SubCUTAneous Nightly    pantoprazole  40 mg Oral QAM AC     Continuous Infusions:    sodium chloride      dextrose       PRN Meds:  sodium chloride flush, sodium chloride, ondansetron **OR** ondansetron, polyethylene glycol, acetaminophen **OR** acetaminophen, glucose, dextrose bolus **OR** dextrose bolus, glucagon (rDNA), dextrose, morphine **OR** morphine    ALLERGY     Vancomycin and Zosyn [piperacillin sod-tazobactam so]       SOCIAL HISTORY     Social History     Socioeconomic History    Marital status:      Spouse name: Not on file    Number of children: Not on file    Years of education: Not on file    Highest education level: Not on file   Occupational History    Not on file   Tobacco Use    Smoking status: Every Day     Current packs/day: 1.50     Average packs/day: 1.5 packs/day for 29.5 years (44.2 ttl pk-yrs)     Types: Cigarettes     Start date: 01/1995    Smokeless tobacco: Never   Substance and Sexual Activity    Alcohol use: Not Currently    Drug use: Not on file    Sexual activity: Not on file   Other Topics Concern    Not on file   Social History Narrative    Not on file

## 2024-06-26 NOTE — CONSULTS
Comprehensive Nutrition Assessment    Type and Reason for Visit:  Initial, Consult    Nutrition Recommendations/Plan:   Continue Diabetic, Low Potassium Diet as tolerated.   Recommend initiating Renal (Nepro) Supplement once daily.   Monitor ONS, intakes, weights, labs, meal time behavior, and plan of care.      Malnutrition Assessment:  Malnutrition Status:  At risk for malnutrition (Comment) (06/26/24 1220)    Context:  Acute Illness     Findings of the 6 clinical characteristics of malnutrition:  Energy Intake:  75% or less of estimated energy requirements for 7 or more days  Weight Loss:  No significant weight loss     Body Fat Loss:  No significant body fat loss     Muscle Mass Loss:  No significant muscle mass loss    Fluid Accumulation:  Mild Extremities   Strength:  Not Performed    Nutrition Assessment:    Pt consulted for nutrition education for wounds. Pt admitted for Dyspnea/SOB. PMH significant for: DM2, DM neuropathy, PVD s/p right BKA w/ chronic left 1st toe gangrene, CKD3, COLT (not on CPAP), multi vessel CAD s/p CABG 02/2024. Pt on HD (T/R/S). Patient reported to having poor appetite prior to admission (1 wk). Appetite is good this morning. Pt reports having % of his eggs and cream of wheat at breakfast. Pt educated on high protein foods for wound healing. Pt reports that he already eats lots of high protein foods including eggs and meat. Discussed Cesar wound healing supplement, pt refused. Agreeable to consume an ONS as needed. Will recommend Nepro once daily. Patient reported his weight trending upwards, with fluctuation r/t hemodialysis. [employer] weight history reviewed - 14.61% wt gain in 2 weeks noted. No BM noted in chart. Labs include: Na 133 mmol/L (L). Cr 5.0 mg/dL (H). Ca 7.5 mg/dL (L). Alk Phos 155 U/L (H). Meds: Lantus, Humalog.    Nutrition Related Findings:    Generalized, +2 LLE edema per chart review. Wound Type: Multiple (non-healing left foot wounds)       Current

## 2024-06-26 NOTE — PROGRESS NOTES
Physical Therapy  Facility/Department: [address] ONC/MED SURG  Physical Therapy Initial Assessment    Name: Vincenzo Darden  : 1965  MRN: [id number]  Date of Service: 2024    Discharge Recommendations:  Patient would benefit from continued therapy after discharge          Patient Diagnosis(es): There were no encounter diagnoses.  Past Medical History:  has a past medical history of Diabetes mellitus (HCC) and Hernia, abdominal.  Past Surgical History:  has a past surgical history that includes Cardiac procedure (N/A, 10/09/2023); Cardiac procedure (N/A, 10/09/2023); Coronary artery bypass graft (N/A, 2024); IR NONTUNNELED VASCULAR CATHETER > 5 YEARS (2024); and IR TUNNELED CVC PLACE WO SQ PORT/PUMP > 5 YEARS (2024).    Assessment   Body Structures, Functions, Activity Limitations Requiring Skilled Therapeutic Intervention: Decreased strength;Decreased endurance;Increased pain;Decreased functional mobility   Assessment: The pt ambulated 25 ft without a device x CGA. He was mildly unsteady and had signs of mild fatigue.He could benefit from a continuation of PT for gait , strengthening and functional mobility prior to his DC  Therapy Prognosis: Good  Decision Making: Medium Complexity  Requires PT Follow-Up: Yes  Activity Tolerance  Activity Tolerance: Patient limited by fatigue;Patient limited by endurance     Plan   Physical Therapy Plan  General Plan:  (5-6x wk)  Current Treatment Recommendations: Strengthening, Functional mobility training, Endurance training, Stair training, Gait training, Safety education & training, Therapeutic activities  Safety Devices  Type of Devices: Call light within reach, Nurse notified, Left in bed  Restraints  Restraints Initially in Place: No     Restrictions  Position Activity Restriction  Other position/activity restrictions: Up with assist   The pt is a [employer]     Subjective   General  Patient assessed for rehabilitation services?: Yes  Response To Previous

## 2024-06-27 ENCOUNTER — APPOINTMENT (OUTPATIENT)
Dept: GENERAL RADIOLOGY | Age: 59
DRG: 291 | End: 2024-06-27
Attending: STUDENT IN AN ORGANIZED HEALTH CARE EDUCATION/TRAINING PROGRAM
Payer: MEDICARE

## 2024-06-27 PROBLEM — J90 PLEURAL EFFUSION: Status: ACTIVE | Noted: 2024-06-27

## 2024-06-27 PROBLEM — I97.641 POSTOPERATIVE SEROMA INVOLVING CIRCULATORY SYSTEM AFTER CARDIAC BYPASS: Status: ACTIVE | Noted: 2024-06-27

## 2024-06-27 PROBLEM — I50.82 BIVENTRICULAR CONGESTIVE HEART FAILURE (HCC): Status: ACTIVE | Noted: 2024-06-27

## 2024-06-27 LAB
ANION GAP SERPL CALCULATED.3IONS-SCNC: 16 MMOL/L (ref 9–16)
BUN SERPL-MCNC: 44 MG/DL (ref 6–20)
CALCIUM SERPL-MCNC: 7.8 MG/DL (ref 8.6–10.4)
CHLORIDE SERPL-SCNC: 98 MMOL/L (ref 98–107)
CO2 SERPL-SCNC: 19 MMOL/L (ref 20–31)
CREAT SERPL-MCNC: 5.9 MG/DL (ref 0.7–1.2)
ERYTHROCYTE [DISTWIDTH] IN BLOOD BY AUTOMATED COUNT: 13.6 % (ref 11.8–14.4)
GFR, ESTIMATED: 10 ML/MIN/1.73M2
GLUCOSE BLD-MCNC: 105 MG/DL (ref 75–110)
GLUCOSE BLD-MCNC: 163 MG/DL (ref 75–110)
GLUCOSE BLD-MCNC: 94 MG/DL (ref 75–110)
GLUCOSE SERPL-MCNC: 61 MG/DL (ref 74–99)
HCT VFR BLD AUTO: 27.7 % (ref 40.7–50.3)
HGB BLD-MCNC: 8.4 G/DL (ref 13–17)
INR PPP: 1.1
INTERVENTION: NORMAL
MAGNESIUM SERPL-MCNC: 1.8 MG/DL (ref 1.6–2.6)
MCH RBC QN AUTO: 28.8 PG (ref 25.2–33.5)
MCHC RBC AUTO-ENTMCNC: 30.3 G/DL (ref 28.4–34.8)
MCV RBC AUTO: 94.9 FL (ref 82.6–102.9)
NRBC BLD-RTO: 0 PER 100 WBC
PLATELET # BLD AUTO: 251 K/UL (ref 138–453)
PMV BLD AUTO: 8.3 FL (ref 8.1–13.5)
POTASSIUM SERPL-SCNC: 4.1 MMOL/L (ref 3.7–5.3)
PROTHROMBIN TIME: 14.5 SEC (ref 11.7–14.9)
RBC # BLD AUTO: 2.92 M/UL (ref 4.21–5.77)
SODIUM SERPL-SCNC: 133 MMOL/L (ref 136–145)
WBC OTHER # BLD: 8.8 K/UL (ref 3.5–11.3)

## 2024-06-27 PROCEDURE — 90935 HEMODIALYSIS ONE EVALUATION: CPT

## 2024-06-27 PROCEDURE — 6370000000 HC RX 637 (ALT 250 FOR IP): Performed by: INTERNAL MEDICINE

## 2024-06-27 PROCEDURE — 88305 TISSUE EXAM BY PATHOLOGIST: CPT

## 2024-06-27 PROCEDURE — 71045 X-RAY EXAM CHEST 1 VIEW: CPT

## 2024-06-27 PROCEDURE — 6360000002 HC RX W HCPCS: Performed by: NURSE PRACTITIONER

## 2024-06-27 PROCEDURE — 99232 SBSQ HOSP IP/OBS MODERATE 35: CPT | Performed by: STUDENT IN AN ORGANIZED HEALTH CARE EDUCATION/TRAINING PROGRAM

## 2024-06-27 PROCEDURE — 85610 PROTHROMBIN TIME: CPT

## 2024-06-27 PROCEDURE — 6370000000 HC RX 637 (ALT 250 FOR IP): Performed by: NURSE PRACTITIONER

## 2024-06-27 PROCEDURE — 2580000003 HC RX 258: Performed by: NURSE PRACTITIONER

## 2024-06-27 PROCEDURE — 99232 SBSQ HOSP IP/OBS MODERATE 35: CPT | Performed by: INTERNAL MEDICINE

## 2024-06-27 PROCEDURE — 82947 ASSAY GLUCOSE BLOOD QUANT: CPT

## 2024-06-27 PROCEDURE — 6360000002 HC RX W HCPCS: Performed by: INTERNAL MEDICINE

## 2024-06-27 PROCEDURE — 90935 HEMODIALYSIS ONE EVALUATION: CPT | Performed by: INTERNAL MEDICINE

## 2024-06-27 PROCEDURE — 2580000003 HC RX 258: Performed by: STUDENT IN AN ORGANIZED HEALTH CARE EDUCATION/TRAINING PROGRAM

## 2024-06-27 PROCEDURE — 83735 ASSAY OF MAGNESIUM: CPT

## 2024-06-27 PROCEDURE — 5A1D70Z PERFORMANCE OF URINARY FILTRATION, INTERMITTENT, LESS THAN 6 HOURS PER DAY: ICD-10-PCS | Performed by: INTERNAL MEDICINE

## 2024-06-27 PROCEDURE — 2060000000 HC ICU INTERMEDIATE R&B

## 2024-06-27 PROCEDURE — 88112 CYTOPATH CELL ENHANCE TECH: CPT

## 2024-06-27 PROCEDURE — 85027 COMPLETE CBC AUTOMATED: CPT

## 2024-06-27 PROCEDURE — 36415 COLL VENOUS BLD VENIPUNCTURE: CPT

## 2024-06-27 PROCEDURE — 80048 BASIC METABOLIC PNL TOTAL CA: CPT

## 2024-06-27 PROCEDURE — 6370000000 HC RX 637 (ALT 250 FOR IP): Performed by: STUDENT IN AN ORGANIZED HEALTH CARE EDUCATION/TRAINING PROGRAM

## 2024-06-27 RX ORDER — MORPHINE SULFATE 2 MG/ML
2 INJECTION, SOLUTION INTRAMUSCULAR; INTRAVENOUS ONCE
Status: COMPLETED | OUTPATIENT
Start: 2024-06-27 | End: 2024-06-27

## 2024-06-27 RX ORDER — SODIUM CHLORIDE 0.9 % (FLUSH) 0.9 %
5-40 SYRINGE (ML) INJECTION PRN
Status: DISCONTINUED | OUTPATIENT
Start: 2024-06-27 | End: 2024-07-10 | Stop reason: HOSPADM

## 2024-06-27 RX ORDER — OXYCODONE HYDROCHLORIDE AND ACETAMINOPHEN 5; 325 MG/1; MG/1
1 TABLET ORAL EVERY 6 HOURS PRN
Status: DISCONTINUED | OUTPATIENT
Start: 2024-06-27 | End: 2024-07-02

## 2024-06-27 RX ORDER — OXYCODONE HYDROCHLORIDE 5 MG/1
5 TABLET ORAL EVERY 6 HOURS PRN
Status: DISCONTINUED | OUTPATIENT
Start: 2024-06-27 | End: 2024-06-27

## 2024-06-27 RX ORDER — SODIUM CHLORIDE 9 MG/ML
INJECTION, SOLUTION INTRAVENOUS PRN
Status: DISCONTINUED | OUTPATIENT
Start: 2024-06-27 | End: 2024-07-10 | Stop reason: HOSPADM

## 2024-06-27 RX ORDER — SODIUM CHLORIDE 0.9 % (FLUSH) 0.9 %
5-40 SYRINGE (ML) INJECTION EVERY 12 HOURS SCHEDULED
Status: DISCONTINUED | OUTPATIENT
Start: 2024-06-27 | End: 2024-07-10 | Stop reason: HOSPADM

## 2024-06-27 RX ADMIN — INSULIN GLARGINE 32 UNITS: 100 INJECTION, SOLUTION SUBCUTANEOUS at 21:00

## 2024-06-27 RX ADMIN — OXYCODONE 5 MG: 5 TABLET ORAL at 13:35

## 2024-06-27 RX ADMIN — MORPHINE SULFATE 4 MG: 4 INJECTION INTRAVENOUS at 00:08

## 2024-06-27 RX ADMIN — MORPHINE SULFATE 2 MG: 2 INJECTION, SOLUTION INTRAMUSCULAR; INTRAVENOUS at 22:06

## 2024-06-27 RX ADMIN — ERYTHROPOIETIN 8000 UNITS: 10000 INJECTION, SOLUTION INTRAVENOUS; SUBCUTANEOUS at 12:37

## 2024-06-27 RX ADMIN — MORPHINE SULFATE 4 MG: 4 INJECTION INTRAVENOUS at 05:45

## 2024-06-27 RX ADMIN — GABAPENTIN 100 MG: 100 CAPSULE ORAL at 05:45

## 2024-06-27 RX ADMIN — MORPHINE SULFATE 4 MG: 4 INJECTION INTRAVENOUS at 08:30

## 2024-06-27 RX ADMIN — MORPHINE SULFATE 4 MG: 4 INJECTION INTRAVENOUS at 02:36

## 2024-06-27 RX ADMIN — HEPARIN SODIUM 5000 UNITS: 5000 INJECTION INTRAVENOUS; SUBCUTANEOUS at 20:59

## 2024-06-27 RX ADMIN — OXYCODONE HYDROCHLORIDE AND ACETAMINOPHEN 1 TABLET: 5; 325 TABLET ORAL at 17:45

## 2024-06-27 RX ADMIN — METOPROLOL TARTRATE 25 MG: 25 TABLET, FILM COATED ORAL at 08:30

## 2024-06-27 RX ADMIN — HEPARIN SODIUM 5000 UNITS: 5000 INJECTION INTRAVENOUS; SUBCUTANEOUS at 05:45

## 2024-06-27 RX ADMIN — PANTOPRAZOLE SODIUM 40 MG: 40 TABLET, DELAYED RELEASE ORAL at 05:53

## 2024-06-27 RX ADMIN — ASPIRIN 81 MG 81 MG: 81 TABLET ORAL at 08:30

## 2024-06-27 RX ADMIN — SODIUM CHLORIDE, PRESERVATIVE FREE 10 ML: 5 INJECTION INTRAVENOUS at 22:08

## 2024-06-27 RX ADMIN — GABAPENTIN 100 MG: 100 CAPSULE ORAL at 13:35

## 2024-06-27 RX ADMIN — SODIUM CHLORIDE, PRESERVATIVE FREE 10 ML: 5 INJECTION INTRAVENOUS at 08:30

## 2024-06-27 RX ADMIN — HEPARIN SODIUM 1900 UNITS: 1000 INJECTION INTRAVENOUS; SUBCUTANEOUS at 11:33

## 2024-06-27 RX ADMIN — TAMSULOSIN HYDROCHLORIDE 0.4 MG: 0.4 CAPSULE ORAL at 08:29

## 2024-06-27 RX ADMIN — HEPARIN SODIUM 1900 UNITS: 1000 INJECTION INTRAVENOUS; SUBCUTANEOUS at 11:34

## 2024-06-27 RX ADMIN — ACETAMINOPHEN 650 MG: 325 TABLET ORAL at 20:58

## 2024-06-27 RX ADMIN — GABAPENTIN 100 MG: 100 CAPSULE ORAL at 20:58

## 2024-06-27 RX ADMIN — AMLODIPINE BESYLATE 10 MG: 10 TABLET ORAL at 08:29

## 2024-06-27 RX ADMIN — HEPARIN SODIUM 5000 UNITS: 5000 INJECTION INTRAVENOUS; SUBCUTANEOUS at 13:35

## 2024-06-27 RX ADMIN — METOPROLOL TARTRATE 25 MG: 25 TABLET, FILM COATED ORAL at 20:59

## 2024-06-27 ASSESSMENT — PAIN DESCRIPTION - LOCATION
LOCATION: BACK
LOCATION: FLANK

## 2024-06-27 ASSESSMENT — PAIN DESCRIPTION - PAIN TYPE
TYPE: ACUTE PAIN

## 2024-06-27 ASSESSMENT — PAIN DESCRIPTION - DESCRIPTORS
DESCRIPTORS: ACHING
DESCRIPTORS: ACHING;BURNING
DESCRIPTORS: BURNING;SORE
DESCRIPTORS: BURNING;SORE
DESCRIPTORS: BURNING;ACHING

## 2024-06-27 ASSESSMENT — PAIN SCALES - GENERAL
PAINLEVEL_OUTOF10: 0
PAINLEVEL_OUTOF10: 8
PAINLEVEL_OUTOF10: 8
PAINLEVEL_OUTOF10: 7
PAINLEVEL_OUTOF10: 7
PAINLEVEL_OUTOF10: 3
PAINLEVEL_OUTOF10: 7
PAINLEVEL_OUTOF10: 0
PAINLEVEL_OUTOF10: 0
PAINLEVEL_OUTOF10: 8
PAINLEVEL_OUTOF10: 8

## 2024-06-27 ASSESSMENT — PAIN DESCRIPTION - ORIENTATION
ORIENTATION: LEFT;RIGHT
ORIENTATION: RIGHT;LEFT;ANTERIOR
ORIENTATION: LEFT;RIGHT
ORIENTATION: RIGHT;LEFT

## 2024-06-27 ASSESSMENT — PAIN DESCRIPTION - FREQUENCY
FREQUENCY: CONTINUOUS

## 2024-06-27 ASSESSMENT — PAIN DESCRIPTION - ONSET
ONSET: ON-GOING

## 2024-06-27 NOTE — PROGRESS NOTES
[facility]  Occupational Therapy Not Seen Note    DATE: 2024    NAME: Vincenzo Darden  MRN: [id number]   : 1965      Patient not seen this date for Occupational Therapy due to:    Hemodialysis:OT will check back tomorrow as able.    Next Scheduled Treatment:       Electronically signed by HARRISON Jimenez on 2024 at 12:15 PM

## 2024-06-27 NOTE — PROGRESS NOTES
Congestive Heart Failure Education completed and charted. CHF booklet given. Patient was receptive to education.    Discussed the  importance of medication compliance.    Discussed the importance of a heart healthy diet. Discussed 2000 mg sodium-restricted daily diet.  Patient instructed to limit fluid intake to  1.5 to 2 liters per day.    Patient instructed to weigh self at the same time of each day each morning, reinforced teaching to monitor for 3-5 lb weight increase over 1-2 days notify physician if change noted.      Signs and symptoms of CHF discussed with patient, such as feeling more tired than normal, feeling short of breath, coughing that increases when lying down, sudden weight gain, swelling of the feet, legs or belly.  Patient verbalized understanding to notify physician office if these symptoms occur.  EF 45-50%

## 2024-06-27 NOTE — PROGRESS NOTES
SUBJECTIVE    Patient was seen and examined on hemodialysis.  Patient was started on hemodialysis in mid part of 2024.  Patient was presented to ER with abnormal lab.  He has suppressed C3 and the diagnosis was acute tubulointerstitial nephritis versus atheroembolic disease.  Patient was started on dialysis due to worsening of BUN and creatinine.  Currently he is receiving dialysis through right tunneled catheter.  His regular dialysis days are .  Patient was readmitted on 2024 because of increasing shortness of breath.  Chest x-ray shows bilateral pleural effusions and patient is having a difficulty in lying flat.  Preadmission his dry weight was 133 kg.  Today predialysis his weight is 131 and we are planning to remove 3 L with dialysis.  Patient underwent coronary artery bypass surgery in February of this year.  Patient has mildly reduced ejection fraction of 40 to 45%    OBJECTIVE      CURRENT TEMPERATURE:  Temp: 97.8 °F (36.6 °C)  MAXIMUM TEMPERATURE OVER 24HRS:  Temp (24hrs), Av °F (36.7 °C), Min:97.5 °F (36.4 °C), Max:98.4 °F (36.9 °C)    CURRENT RESPIRATORY RATE:  Respirations: 22  CURRENT PULSE:  Pulse: 75  CURRENT BLOOD PRESSURE:  BP: (!) 154/88  24HR BLOOD PRESSURE RANGE:  Systolic (24hrs), Av , Min:117 , Max:187   ; Diastolic (24hrs), Av, Min:75, Max:105    24HR INTAKE/OUTPUT:    Intake/Output Summary (Last 24 hours) at 2024 1037  Last data filed at 2024 0400  Gross per 24 hour   Intake 801.04 ml   Output 3850 ml   Net -3048.96 ml     WEIGHT :Patient Vitals for the past 96 hrs (Last 3 readings):   Weight   24 0909 131.8 kg (290 lb 9.1 oz)   24 0550 128 kg (282 lb 3 oz)   24 1535 131.5 kg (289 lb 14.5 oz)     PHYSICAL EXAM      GENERAL APPEARANCE: Awake and alert x 3  SKIN: Warm to touch and no erythema   EYES: Pupils are reactive to light  NECK:   No JVD or carotid bruit   PULMONARY: Bilateral air entry and clear to  results found for: \"CLU\"  URINE PH:  No components found for: \"PO4U\"  URINE OSMOLARITY:    Lab Results   Component Value Date/Time    OSMOU 290 06/06/2024 06:07 PM     URINE CREATININE:  No results found for: \"LABCREA\"  URINE EOSINOPHILS: No components found for: \"EOSU\"  URINE PROTEIN:  No results found for: \"TPU\"  URINALYSIS:  U/A:   Lab Results   Component Value Date/Time    NITRU NEGATIVE 06/17/2024 07:33 PM    COLORU Yellow 06/17/2024 07:33 PM    PHUR 5.5 06/17/2024 07:33 PM    PHUR 6.0 10/06/2023 06:30 AM    WBCUA TOO NUMEROUS TO COUNT 06/17/2024 07:33 PM    RBCUA 2 TO 5 06/17/2024 07:33 PM    BACTERIA None 06/17/2024 07:33 PM    LEUKOCYTESUR SMALL 06/17/2024 07:33 PM    UROBILINOGEN Normal 06/17/2024 07:33 PM    BILIRUBINUR NEGATIVE 06/17/2024 07:33 PM    GLUCOSEU TRACE 06/17/2024 07:33 PM    KETUA NEGATIVE 06/17/2024 07:33 PM     ANTIGBM:No results found for: \"GBMABIGG\"      RADIOLOGY      Reviewed as available.      ASSESSMENT    1.  Acute kidney injury on top of chronic kidney disease due to acute interstitial nephritis versus atheroembolic disease.  At present patient is dialysis dependent.  Dialysis orders were reviewed with dialysis nurses as well as will remove 3 L with dialysis.  2.  Orthopnea and volume overload with bilateral pleural effusion.  3.  History of coronary artery bypass surgery in February 2024  4.  Peripheral vascular disease and right BKA  5.  History of chronic kidney disease due to diabetic nephropathy and baseline creatinine prior to acute renal failure was 1.8 mg/dL  6.  Anemia of chronic disease.    PLAN      1.  Will continue to challenge dry weight  2.  Patient needs bilateral thoracocentesis.  3.  CBC and BMP in a.m.  4.  Continue to challenge dry weight   please do not hesitate to call with questions.    Electronically signed by Louis Monte MD on 6/27/2024 at 10:37 AM

## 2024-06-27 NOTE — CARE COORDINATION
06/27/24 1621   Readmission Assessment   Number of Days since last admission? 1-7 days   Previous Disposition Home with Family   Who is being Interviewed Patient   What was the patient's/caregiver's perception as to why they think they needed to return back to the hospital? Did not realize care needs would be so extensive;Not enough help at home   Did you visit your Primary Care Physician after you left the hospital, before you returned this time? No   Why weren't you able to visit your PCP? Did not have an appointment   Did you see a specialist, such as Cardiac, Pulmonary, Orthopedic Physician, etc. after you left the hospital? No   Who advised the patient to return to the hospital? Self-referral   Does the patient report anything that got in the way of taking their medications? No   In our efforts to provide the best possible care to you and others like you, can you think of anything that we could have done to help you after you left the hospital the first time, so that you might not have needed to return so soon? Arrange for more help when leaving the hospital;Identify patient's health literacy needs;Education on how to continue taking medications upon discharge;Additional Community resources available for illness support     Entered in error Current with [facility]

## 2024-06-27 NOTE — CONSULTS
[facility]    Consult               Today's Date: 6/27/2024  Patient Name: Vincenzo Darden  Date of admission: 6/26/2024  4:33 AM  Patient's age: 58 y.o., 1965  Admission Dx: Dyspnea [R06.00]  SOB (shortness of breath) [R06.02]    Requesting Physician: Andrea Casillas Sra, MD    Cardiac Evaluation Reason:  Chest pain and SOB    History Obtained From: patient and chart review     History of Present Illness:    This patient 58 y.o. years old with past medical history given below.     Mr. Darden was recently discharged from the hospital on 6/20/24 for management of an CAMILLE. Yesterday evening on 6/26/24 the patient returned to [facility] with complaints of worsening shortness of breath, flank pain and chest pain. He was then recommended to transfer to [facility] for management of dyspnea.     This morning patient states that this does not appear to be true chest pain as before as this is more so pin point tenderness that is reproducible with palpation and movements. As for his SOB at [facility] a CT was performed demonstrating fluid in his chest cavity. Yesterday at [facility] he underwent dialysis to relive some of the fluid and will be taken again later today. He state there is some tenderness to palpation of his chest however the pain has gotten better. The pain does not radiate. Denies any lightheadedness, dizziness or fatigue. He is still SOB, he is unsure if this has improved as he has not been walking around as much.  Patient states that his left lower extremity has some swelling however believes to be due to the wounds on his foot.     Past Medical History:   has a past medical history of Diabetes mellitus (HCC) and Hernia, abdominal.    Past Surgical History:   has a past surgical history that includes Cardiac procedure (N/A, 10/09/2023); Cardiac procedure (N/A, 10/09/2023); Coronary artery bypass graft (N/A, 02/26/2024); IR NONTUNNELED VASCULAR CATHETER > 5 YEARS  N/V  Genitourinary: No dysuria, trouble voiding, or hematuria.  Musculoskeletal:  No gait disturbance, No weakness or joint complaints.  Neurological: No headache, diplopia, change in muscle strength, numbness or tingling. No change in gate.   Endocrine: No temperature intolerance. No excessive thirst, fluid intake, or urination. No tremor.  Hematologic/Lymphatic: No abnormal bruising or bleeding    PHYSICAL EXAM:      BP (!) 153/92   Pulse 72   Temp 98.4 °F (36.9 °C)   Resp 18   Ht 1.829 m (6' 0.01\")   Wt 128 kg (282 lb 3 oz)   SpO2 99%   BMI 38.26 kg/m²    Constitutional and General Appearance: alert, cooperative, in no distress   HEENT: atraumatic, normocephalic.   Respiratory:  Clear to auscultation bilaterally  Cardiovascular:  Regular S1 and S2  + systolic murmur auscultated   No JVD  Peripheral pulses are symmetrical and full   Abdomen:   Distended   Bowel sounds present  Extremities:  Left lower extremity edema  Neurological:  Deferred     LABS:   CBC:   Recent Labs     2414 24  0559   WBC 9.1 8.8   HGB 7.8* 8.4*   HCT 24.9* 27.7*    251     BMP:   Recent Labs     24   *   K 3.9   CO2 24   BUN 34*   CREATININE 5.0*   LABGLOM 13*   GLUCOSE 107*     BNP: 49,377 pg/ml 2024  PT/INR:   Recent Labs     24   PROTIME 15.0*   INR 1.2     APTT:No results for input(s): \"APTT\" in the last 72 hours.  CARDIAC ENZYMES:  Recent Labs     2414   CKTOTAL 44       DATA:    EK24  Sinus rhythm with 1st degree A-V block  Left axis deviation  Right bundle branch block  Possible Lateral infarct (cited on or before 2024)  Inferior infarct (cited on or before 05-OCT-2023)  Abnormal ECG  When compared with ECG of 2024 09:05,  QRS duration has increased  Serial changes of Lateral infarct Present      ECHO: 2024    Left Ventricle: Mildly reduced left ventricular systolic function with a visually estimated EF of 45 - 50%. EF by 2D

## 2024-06-27 NOTE — PLAN OF CARE
Problem: Safety - Adult  Goal: Free from fall injury  Outcome: Progressing     Problem: ABCDS Injury Assessment  Goal: Absence of physical injury  Outcome: Progressing     Problem: Pain  Goal: Verbalizes/displays adequate comfort level or baseline comfort level  Outcome: Progressing     Problem: Chronic Conditions and Co-morbidities  Goal: Patient's chronic conditions and co-morbidity symptoms are monitored and maintained or improved  Outcome: Progressing     Problem: Nutrition Deficit:  Goal: Optimize nutritional status  6/27/2024 0050 by Horacio Bell RN  Outcome: Progressing  6/26/2024 1225 by Yana Campa  Outcome: Progressing  6/26/2024 1225 by Yana Campa  Flowsheets (Taken 6/26/2024 1225)  Nutrient intake appropriate for improving, restoring, or maintaining nutritional needs:   Assess nutritional status and recommend course of action   Monitor oral intake, labs, and treatment plans   Recommend appropriate diets, oral nutritional supplements, and vitamin/mineral supplements

## 2024-06-27 NOTE — PROGRESS NOTES
[facility] - Progress Note      Today's Date and Time: 6/27/2024, 12:04 PM    Impression :   Shortness of breath  Rt pleural effusion with compressive atelectasis  Loculated lower anterior mediastinum fluid collection  CAMILLE on CKD stage III  Recent MSSA septicemia 6-2-24 x 2  Was under treatment with Linezolid because of prior CAMILLE with Daptomycin  Hx anaphylaxis with Zosyn    Concern for left great toe osteomyelitis   CAD  S/P CABG and AVR Feb 2024  Hx of Sternal wound dehiscence-fully healed  DM 2  PVD with prior Rt BKA  Left leg and foot ulcers.  Allergy to vancomycin (nausea)  Allergy to Zosyn (anaphylaxis)      Recommendations:   Daptomycin 600 mg IV q 48 hr    Prior treatment:  6/18/24: IV Zyvox 600 mg po BID initiated  Tentative End date 7/1/24  Daptomycin 700 mg IV q 48 hr. Stopped on 6/17/24 because of renal failure    Prior blood cultures:   6/3/24 with 1/1 staph aureus  6-4-24: No growth   6/7/24: No growth   6/17/24: No growth     TTE with no evidence of endocarditis from 6/6/24  MRI left foot unable to be completed because of PPM  No surgical intervention planned per Podiatry     Medical Decision Making/Summary/Discussion:6/27/2024     Daily Elements of Decision Making provided by Consulting Physician:    Note: I have independently performed the steps listed below as part of the medical decision making and evaluation.    Review of current Problems:  Today I am seeing the patient for the following problems:  Shortness of breath  Rt pleural effusion with compressive atelectasis  Loculated lower anterior mediastinum fluid collection  CAMILLE on CKD stage III  Recent MSSA septicemia 6-2-24 x 2  Was under treatment with Linezolid because of prior CAMILLE with Daptomycin  Hx anaphylaxis with Zosyn    Concern for left great toe osteomyelitis   CAD  S/P CABG and AVR Feb 2024  Hx of Sternal wound dehiscence-fully healed  DM 2  PVD with prior Rt BKA  Left leg and foot ulcers.  Allergy

## 2024-06-27 NOTE — PROGRESS NOTES
[facility]    [facility]   IN-PATIENT SERVICE   [facility]    Progress Note    6/28/2024    7:05 PM    Name:   Vincenzo Darden  MRN:     [id number]     Acct:      [id number]   Room:   [facility] Day:  2  Admit Date:  6/26/2024  4:33 AM    PCP:   Shaikh Calix MD  Code Status:  Full Code    Subjective:     C/C: No chief complaint on file.  Shortness of breath, flank pain  Interval History Status: not changed.     Seen at bedside, hemodynamically stable  Continues to feel short of breath, chest x-ray consistent with right-sided pleural effusion, will plan for thoracentesis  Underwent dialysis per renal /nephrology service  Continues to be on antibiotics per ID service, will send urine studies as well due to persistent flank pain  CT surgery signed off, cardiology service following as well    Brief History:     Per initial H&P    Vincenzo Darden is a 58 y.o.  male w/ PVD s/p right BKA, non healing left foot wounds, CAD s/p AVR/CABGx3 in 02/2024 complicated by chest wall dehiscence, MSSA infection, w/ CAMILLE on CKD3 s/p initiating HD (HD T/R/S), DM2, COLT who was recently discharged 06/20/2024 who returned to [facility] yesterday evening with worsening shortness of breath, flank pain with chest pain and recommended for transfer to [facility] for the management of Dyspnea.     Patient does describe progressive shortness of breath since his discharge with acute worsening symptoms the past few days.  States he is only able to walk 25 to 50 feet, he does describe midsternal sharp intermittent chest pain with pleurisy.      + Acute bilateral flank pain with sharp quality that waxed and waned with nausea that began after dialysis yesterday.  Does struggle with chronic lumbago/ OA and states those symptoms are stable.  Denies recent falls or injuries .  denies urinary symptoms.  He does make small amounts of urine but denies hematuria or history of kidney stones.

## 2024-06-27 NOTE — PROGRESS NOTES
Dialysis Time Out  To be done by RN and tech or 2 RNs  Staff Names : Sabrina GEE RN & Rose JIMENEZ    [x]  Identity of the patient using 2 patient identifiers  [x]  Consent for treatment  [x]  Equipment-proper machine and dialyzer  [x]  B-Hep B status HEPATITIS B ANTIGEN NEGATIVE 6/18/2024  [x]  Orders- to include bath, blood flow, dialyzer, time and fluid removal  [x]  Access-Correct site and in working order  [x]  Time for patient to ask questions.

## 2024-06-27 NOTE — PROGRESS NOTES
Cardiothoracic surgery sign off non appropriate consult.  There is no intervention warranted from our needs.  Patient can always follow-up with cardiothoracic surgery as needed if there is any concern.

## 2024-06-27 NOTE — CARE COORDINATION
.Case Management Assessment  Initial Evaluation    Date/Time of Evaluation: 6/27/2024 4:29 PM  Assessment Completed by: Alivia Latif RN    If patient is discharged prior to next notation, then this note serves as note for discharge by case management.    Patient Name: Vincenzo Darden                   YOB: 1965  Diagnosis: Dyspnea [R06.00]  SOB (shortness of breath) [R06.02]                   Date / Time: 6/26/2024  4:33 AM    Patient Admission Status: Inpatient   Readmission Risk (Low < 19, Mod (19-27), High > 27): Readmission Risk Score: 28.1    Current PCP: Shaikh Calix MD  PCP verified by CM? (P) Yes    Chart Reviewed: Yes      History Provided by: Patient  Patient Orientation: Alert and Oriented    Patient Cognition: Alert    Hospitalization in the last 30 days (Readmission):  Yes    If yes, Readmission Assessment in CM Navigator will be completed.    Advance Directives:      Code Status: Full Code   Patient's Primary Decision Maker is: Legal Next of Kin    Primary Decision Maker: Ashtyn Forde - Spouse - [phone]    Discharge Planning:    Patient lives with: (P) Spouse/Significant Other Type of Home: (P) House  Primary Care Giver: Self  Patient Support Systems include: /, Spouse/Significant Other, Home Care Staff   Current Financial resources: (P) Medicare  Current community resources: (P) Other (Comment) (dialysis)  Current services prior to admission: (P) Durable Medical Equipment, Home Care, Other (Comment) (dialysis)            Current DME: (P) Wheelchair            Type of Home Care services:  (P) Skilled Therapy    ADLS  Prior functional level: (P) Independent in ADLs/IADLs  Current functional level: (P) Independent in ADLs/IADLs    PT AM-PAC: 20 /24  OT AM-PAC:   /24    Family can provide assistance at DC: (P) Yes  Would you like Case Management to discuss the discharge plan with any other family members/significant others, and if so, who? (P) No  Plans to  Return to Present Housing: (P) Yes  Other Identified Issues/Barriers to RETURNING to current housing: none  Potential Assistance needed at discharge: (P) Home Care            Potential DME:    Patient expects to discharge to: (P) House  Plan for transportation at discharge: (P) Family    Financial    Payor: [employer] MEDICARE / Plan: [employer] CHOICE-PPO MEDICARE / Product Type: *No Product type* /     Does insurance require precert for SNF: Yes    Potential assistance Purchasing Medications: (P) No  Meds-to-Beds request: Yes      [id number] - [address] -  [phone] - F [phone]  [address] [place] [id number]  Phone: [phone] Fax: [phone]    [id number] - [place], [place] - [address] [facility] -  [phone] - F [phone]  [address] [facility] [address]  Phone: [phone] Fax: [phone]      Notes:    Factors facilitating achievement of predicted outcomes: Family support and Cooperative    Barriers to discharge: Pain, Limited family support, Lower extremity weakness, Long standing deficits, Medical complications, Skin Care, and Medication managment    Additional Case Management Notes: plan is to return home, current with [facility](confirmed with Kiki) dialysis T/R/S in [place], follow for needs     The Plan for Transition of Care is related to the following treatment goals of Dyspnea [R06.00]  SOB (shortness of breath) [R06.02]    IF APPLICABLE: The Patient and/or patient representative Vincenzo and his family were provided with a choice of provider and agrees with the discharge plan. Freedom of choice list with basic dialogue that supports the patient's individualized plan of care/goals and shares the quality data associated with the providers was provided to: (P) Patient   Patient Representative Name:       The Patient and/or Patient Representative Agree with the Discharge Plan? (P) Yes    Alivia Latif RN  Case Management

## 2024-06-27 NOTE — PROGRESS NOTES
Dialysis Post Treatment Note  Vitals:    06/27/24 1253   BP: (!) 165/92   Pulse: 69   Resp: 20   Temp: 97.7 °F (36.5 °C)   SpO2: 93%     Pre-Weight = 131.8kg  Post-weight = Weight - Scale: 128.3 kg (282 lb 13.6 oz)  Total Liters Processed = Blood Volume Processed (Liters): 80.65 L  Rinseback Volume (mL) = Rinseback Volume (ml): 280 ml  Net Removal (mL) =  3500  Patient's dry weight=133.0kg  Type of access used=  RIGHT PERM CATH  Length of treatment=213 MINUTES    Patient tolerated treatment without issues today. Patient came in under current TW of 133.0kg. He was challenged for a net removal of 3.0kg, however, calculated net removal 3.5kg. He had no complaints of cramping during treatment. VSS throughout. Patient received Epo 8000units during treatment. CVC worked well. Report called to 4C to Alivia JARAMILLO

## 2024-06-28 ENCOUNTER — APPOINTMENT (OUTPATIENT)
Dept: CT IMAGING | Age: 59
DRG: 291 | End: 2024-06-28
Attending: STUDENT IN AN ORGANIZED HEALTH CARE EDUCATION/TRAINING PROGRAM
Payer: MEDICARE

## 2024-06-28 ENCOUNTER — APPOINTMENT (OUTPATIENT)
Dept: ULTRASOUND IMAGING | Age: 59
DRG: 291 | End: 2024-06-28
Attending: STUDENT IN AN ORGANIZED HEALTH CARE EDUCATION/TRAINING PROGRAM
Payer: MEDICARE

## 2024-06-28 PROBLEM — M86.9 OSTEOMYELITIS OF GREAT TOE OF LEFT FOOT (HCC): Status: ACTIVE | Noted: 2024-06-28

## 2024-06-28 PROBLEM — I25.810 CORONARY ARTERY DISEASE INVOLVING CORONARY BYPASS GRAFT OF NATIVE HEART WITHOUT ANGINA PECTORIS: Status: RESOLVED | Noted: 2024-06-04 | Resolved: 2024-06-28

## 2024-06-28 PROBLEM — I50.20 HFREF (HEART FAILURE WITH REDUCED EJECTION FRACTION) (HCC): Status: ACTIVE | Noted: 2024-06-28

## 2024-06-28 LAB
ANION GAP SERPL CALCULATED.3IONS-SCNC: 8 MMOL/L (ref 9–16)
BACTERIA URNS QL MICRO: NORMAL
BILIRUB UR QL STRIP: NEGATIVE
BUN SERPL-MCNC: 28 MG/DL (ref 6–20)
CALCIUM SERPL-MCNC: 7.9 MG/DL (ref 8.6–10.4)
CASTS #/AREA URNS LPF: NORMAL /LPF (ref 0–8)
CHLORIDE SERPL-SCNC: 96 MMOL/L (ref 98–107)
CLARITY UR: ABNORMAL
CO2 SERPL-SCNC: 28 MMOL/L (ref 20–31)
COLOR UR: YELLOW
CREAT SERPL-MCNC: 4.7 MG/DL (ref 0.7–1.2)
EPI CELLS #/AREA URNS HPF: NORMAL /HPF (ref 0–5)
ERYTHROCYTE [DISTWIDTH] IN BLOOD BY AUTOMATED COUNT: 13.5 % (ref 11.8–14.4)
GFR, ESTIMATED: 14 ML/MIN/1.73M2
GLUCOSE BLD-MCNC: 131 MG/DL (ref 75–110)
GLUCOSE BLD-MCNC: 172 MG/DL (ref 75–110)
GLUCOSE BLD-MCNC: 183 MG/DL (ref 75–110)
GLUCOSE BLD-MCNC: 208 MG/DL (ref 75–110)
GLUCOSE BLD-MCNC: 276 MG/DL (ref 75–110)
GLUCOSE FLD-MCNC: <2 MG/DL
GLUCOSE SERPL-MCNC: 135 MG/DL (ref 74–99)
GLUCOSE UR STRIP-MCNC: ABNORMAL MG/DL
HCT VFR BLD AUTO: 27 % (ref 40.7–50.3)
HGB BLD-MCNC: 8.5 G/DL (ref 13–17)
HGB UR QL STRIP.AUTO: ABNORMAL
KETONES UR STRIP-MCNC: NEGATIVE MG/DL
LDH FLD L TO P-CCNC: 6402 U/L
LDH SERPL-CCNC: 204 U/L (ref 135–225)
LEUKOCYTE ESTERASE UR QL STRIP: ABNORMAL
MAGNESIUM SERPL-MCNC: 1.7 MG/DL (ref 1.6–2.6)
MCH RBC QN AUTO: 29 PG (ref 25.2–33.5)
MCHC RBC AUTO-ENTMCNC: 31.5 G/DL (ref 28.4–34.8)
MCV RBC AUTO: 92.2 FL (ref 82.6–102.9)
NITRITE UR QL STRIP: NEGATIVE
NRBC BLD-RTO: 0 PER 100 WBC
PH FLUID: 7.5
PH UR STRIP: 6.5 [PH] (ref 5–8)
PLATELET # BLD AUTO: 242 K/UL (ref 138–453)
PMV BLD AUTO: 8.5 FL (ref 8.1–13.5)
POTASSIUM SERPL-SCNC: 4.3 MMOL/L (ref 3.7–5.3)
PROT SERPL-MCNC: 7.2 G/DL (ref 6.6–8.7)
PROT UR STRIP-MCNC: ABNORMAL MG/DL
RBC # BLD AUTO: 2.93 M/UL (ref 4.21–5.77)
RBC #/AREA URNS HPF: NORMAL /HPF (ref 0–4)
SODIUM SERPL-SCNC: 132 MMOL/L (ref 136–145)
SP GR UR STRIP: 1.03 (ref 1–1.03)
SPECIMEN TYPE: NORMAL
UROBILINOGEN UR STRIP-ACNC: NORMAL EU/DL (ref 0–1)
WBC #/AREA URNS HPF: NORMAL /HPF (ref 0–5)
WBC OTHER # BLD: 8.5 K/UL (ref 3.5–11.3)

## 2024-06-28 PROCEDURE — 87086 URINE CULTURE/COLONY COUNT: CPT

## 2024-06-28 PROCEDURE — 99232 SBSQ HOSP IP/OBS MODERATE 35: CPT | Performed by: INTERNAL MEDICINE

## 2024-06-28 PROCEDURE — 2580000003 HC RX 258: Performed by: STUDENT IN AN ORGANIZED HEALTH CARE EDUCATION/TRAINING PROGRAM

## 2024-06-28 PROCEDURE — 6370000000 HC RX 637 (ALT 250 FOR IP): Performed by: NURSE PRACTITIONER

## 2024-06-28 PROCEDURE — 84157 ASSAY OF PROTEIN OTHER: CPT

## 2024-06-28 PROCEDURE — 87206 SMEAR FLUORESCENT/ACID STAI: CPT

## 2024-06-28 PROCEDURE — 36415 COLL VENOUS BLD VENIPUNCTURE: CPT

## 2024-06-28 PROCEDURE — 2580000003 HC RX 258: Performed by: INTERNAL MEDICINE

## 2024-06-28 PROCEDURE — 82945 GLUCOSE OTHER FLUID: CPT

## 2024-06-28 PROCEDURE — 87116 MYCOBACTERIA CULTURE: CPT

## 2024-06-28 PROCEDURE — 89051 BODY FLUID CELL COUNT: CPT

## 2024-06-28 PROCEDURE — 6370000000 HC RX 637 (ALT 250 FOR IP): Performed by: STUDENT IN AN ORGANIZED HEALTH CARE EDUCATION/TRAINING PROGRAM

## 2024-06-28 PROCEDURE — 87015 SPECIMEN INFECT AGNT CONCNTJ: CPT

## 2024-06-28 PROCEDURE — 99232 SBSQ HOSP IP/OBS MODERATE 35: CPT | Performed by: STUDENT IN AN ORGANIZED HEALTH CARE EDUCATION/TRAINING PROGRAM

## 2024-06-28 PROCEDURE — 97530 THERAPEUTIC ACTIVITIES: CPT

## 2024-06-28 PROCEDURE — 6370000000 HC RX 637 (ALT 250 FOR IP): Performed by: INTERNAL MEDICINE

## 2024-06-28 PROCEDURE — 6360000002 HC RX W HCPCS: Performed by: INTERNAL MEDICINE

## 2024-06-28 PROCEDURE — 6360000002 HC RX W HCPCS: Performed by: NURSE PRACTITIONER

## 2024-06-28 PROCEDURE — 87075 CULTR BACTERIA EXCEPT BLOOD: CPT

## 2024-06-28 PROCEDURE — 2709999900 US THORACENTESIS

## 2024-06-28 PROCEDURE — 2060000000 HC ICU INTERMEDIATE R&B

## 2024-06-28 PROCEDURE — 97116 GAIT TRAINING THERAPY: CPT

## 2024-06-28 PROCEDURE — 0W993ZZ DRAINAGE OF RIGHT PLEURAL CAVITY, PERCUTANEOUS APPROACH: ICD-10-PCS | Performed by: RADIOLOGY

## 2024-06-28 PROCEDURE — 83615 LACTATE (LD) (LDH) ENZYME: CPT

## 2024-06-28 PROCEDURE — 87102 FUNGUS ISOLATION CULTURE: CPT

## 2024-06-28 PROCEDURE — 85027 COMPLETE CBC AUTOMATED: CPT

## 2024-06-28 PROCEDURE — 71250 CT THORAX DX C-: CPT

## 2024-06-28 PROCEDURE — 87205 SMEAR GRAM STAIN: CPT

## 2024-06-28 PROCEDURE — 82947 ASSAY GLUCOSE BLOOD QUANT: CPT

## 2024-06-28 PROCEDURE — 87070 CULTURE OTHR SPECIMN AEROBIC: CPT

## 2024-06-28 PROCEDURE — 80048 BASIC METABOLIC PNL TOTAL CA: CPT

## 2024-06-28 PROCEDURE — 83735 ASSAY OF MAGNESIUM: CPT

## 2024-06-28 PROCEDURE — 83986 ASSAY PH BODY FLUID NOS: CPT

## 2024-06-28 PROCEDURE — 81001 URINALYSIS AUTO W/SCOPE: CPT

## 2024-06-28 PROCEDURE — 84155 ASSAY OF PROTEIN SERUM: CPT

## 2024-06-28 PROCEDURE — 99233 SBSQ HOSP IP/OBS HIGH 50: CPT | Performed by: NURSE PRACTITIONER

## 2024-06-28 RX ORDER — CARVEDILOL 6.25 MG/1
6.25 TABLET ORAL 2 TIMES DAILY WITH MEALS
Status: DISCONTINUED | OUTPATIENT
Start: 2024-06-28 | End: 2024-06-30

## 2024-06-28 RX ORDER — LABETALOL HYDROCHLORIDE 5 MG/ML
10 INJECTION, SOLUTION INTRAVENOUS
Status: DISCONTINUED | OUTPATIENT
Start: 2024-06-28 | End: 2024-07-10 | Stop reason: HOSPADM

## 2024-06-28 RX ADMIN — OXYCODONE HYDROCHLORIDE AND ACETAMINOPHEN 1 TABLET: 5; 325 TABLET ORAL at 07:01

## 2024-06-28 RX ADMIN — ASPIRIN 81 MG 81 MG: 81 TABLET ORAL at 09:52

## 2024-06-28 RX ADMIN — SODIUM CHLORIDE, PRESERVATIVE FREE 10 ML: 5 INJECTION INTRAVENOUS at 09:51

## 2024-06-28 RX ADMIN — TAMSULOSIN HYDROCHLORIDE 0.4 MG: 0.4 CAPSULE ORAL at 09:51

## 2024-06-28 RX ADMIN — SODIUM CHLORIDE, PRESERVATIVE FREE 10 ML: 5 INJECTION INTRAVENOUS at 21:37

## 2024-06-28 RX ADMIN — OXYCODONE HYDROCHLORIDE AND ACETAMINOPHEN 1 TABLET: 5; 325 TABLET ORAL at 22:43

## 2024-06-28 RX ADMIN — HEPARIN SODIUM 5000 UNITS: 5000 INJECTION INTRAVENOUS; SUBCUTANEOUS at 07:01

## 2024-06-28 RX ADMIN — CARVEDILOL 6.25 MG: 6.25 TABLET, FILM COATED ORAL at 17:39

## 2024-06-28 RX ADMIN — INSULIN GLARGINE 32 UNITS: 100 INJECTION, SOLUTION SUBCUTANEOUS at 21:36

## 2024-06-28 RX ADMIN — GABAPENTIN 100 MG: 100 CAPSULE ORAL at 14:18

## 2024-06-28 RX ADMIN — DAPTOMYCIN 600 MG: 500 INJECTION, POWDER, LYOPHILIZED, FOR SOLUTION INTRAVENOUS at 07:00

## 2024-06-28 RX ADMIN — AMLODIPINE BESYLATE 10 MG: 10 TABLET ORAL at 09:52

## 2024-06-28 RX ADMIN — GABAPENTIN 100 MG: 100 CAPSULE ORAL at 21:39

## 2024-06-28 RX ADMIN — PANTOPRAZOLE SODIUM 40 MG: 40 TABLET, DELAYED RELEASE ORAL at 07:01

## 2024-06-28 RX ADMIN — METOPROLOL TARTRATE 25 MG: 25 TABLET, FILM COATED ORAL at 09:51

## 2024-06-28 RX ADMIN — HEPARIN SODIUM 5000 UNITS: 5000 INJECTION INTRAVENOUS; SUBCUTANEOUS at 14:18

## 2024-06-28 RX ADMIN — GABAPENTIN 100 MG: 100 CAPSULE ORAL at 07:01

## 2024-06-28 RX ADMIN — ACETAMINOPHEN 650 MG: 325 TABLET ORAL at 09:52

## 2024-06-28 RX ADMIN — OXYCODONE HYDROCHLORIDE AND ACETAMINOPHEN 1 TABLET: 5; 325 TABLET ORAL at 16:05

## 2024-06-28 RX ADMIN — HEPARIN SODIUM 5000 UNITS: 5000 INJECTION INTRAVENOUS; SUBCUTANEOUS at 21:38

## 2024-06-28 RX ADMIN — LABETALOL HYDROCHLORIDE 10 MG: 5 INJECTION, SOLUTION INTRAVENOUS at 21:36

## 2024-06-28 ASSESSMENT — PAIN DESCRIPTION - ORIENTATION
ORIENTATION: RIGHT;LEFT;LOWER
ORIENTATION: POSTERIOR
ORIENTATION: RIGHT;LEFT
ORIENTATION: RIGHT;LEFT;LOWER

## 2024-06-28 ASSESSMENT — PAIN DESCRIPTION - DESCRIPTORS
DESCRIPTORS: ACHING
DESCRIPTORS: DULL;BURNING
DESCRIPTORS: DULL;BURNING

## 2024-06-28 ASSESSMENT — PAIN SCALES - GENERAL
PAINLEVEL_OUTOF10: 0
PAINLEVEL_OUTOF10: 8
PAINLEVEL_OUTOF10: 2
PAINLEVEL_OUTOF10: 0
PAINLEVEL_OUTOF10: 8
PAINLEVEL_OUTOF10: 6
PAINLEVEL_OUTOF10: 4
PAINLEVEL_OUTOF10: 6

## 2024-06-28 ASSESSMENT — PAIN - FUNCTIONAL ASSESSMENT
PAIN_FUNCTIONAL_ASSESSMENT: ACTIVITIES ARE NOT PREVENTED
PAIN_FUNCTIONAL_ASSESSMENT: PREVENTS OR INTERFERES SOME ACTIVE ACTIVITIES AND ADLS

## 2024-06-28 ASSESSMENT — PAIN DESCRIPTION - LOCATION
LOCATION: BACK
LOCATION: FLANK
LOCATION: BACK;FLANK
LOCATION: BACK

## 2024-06-28 ASSESSMENT — PAIN SCALES - WONG BAKER: WONGBAKER_NUMERICALRESPONSE: NO HURT

## 2024-06-28 NOTE — CONSULTS
Right-sided pleural effusion  Chronic renal failure on dialysis  Hypertension  Diabetes mellitus  Coronary artery disease  Post coronary bypass surgery  Diabetic neuropathy  Ulceration of the left foot  Above-knee amputation of the right leg  Obesity  Sleep apnea syndrome  :                PLAN:      Before we consider the possibility of putting a chest tube we will get a CT scan of the chest without contrast.  Will also help us to assess whether.  The fluid is loculated or not.  In case there is significant fluid and it is loculated that it would make sense to put a chest tube or do a repeat thoracentesis.  He have had a thoracentesis done today and 50 cc of fluid was removed we will look at the fluid analysis for further management.  The pleural fluid may be could could be related to dialysis and or    Continue treatment of hypertension  Will arrange for a sleep study as outpatient.  Will treat him with BiPAP while he is in the hospital.  Will start at the pressure of 12 x 6.  It will help him to feel better.  Continue treatment of diabetes  Continue rest of the treatment as before.  Anisha supplemental oxygen to keep saturation above 90%.  He is not a CO2 retainer  Thank you very much for asking us to participate in his care dictated by Dr. Tobar      Medications Discontinued During This Encounter   Medication Reason    enoxaparin Sodium (LOVENOX) injection 30 mg     linezolid (ZYVOX) IVPB 600 mg     Insulin Degludec SOPN 40 Units     dulaglutide (TRULICITY) SC injection 1.5 mg (Patient Supplied)     morphine (PF) injection 2 mg     morphine injection 4 mg     epoetin saida-epbx (RETACRIT) injection 8,000 Units     oxyCODONE (ROXICODONE) immediate release tablet 5 mg        Vincenzo received counseling on the following healthy behaviors: nutrition, exercise and medication adherence    Patient given educational materials : see patient instruction       Discussed use, benefit, and side effects of prescribed  medications.  Barriers to medication compliance addressed.      All patient questions answered.  Pt voiced understanding.   I hope this updates you on my evaluation and clinical thinking. Thank you for allowing me to participate in his care.     Sincerely,      Electronically signed by Ajit Tobar MD on   6/28/24 at 3:37 PM EDT   CC time 60 minutes    Please note that this chart was generated using voice recognition Dragon dictation software.  Although every effort was made to ensure the accuracy of this automated transcription, some errors in transcription may have occurred.

## 2024-06-28 NOTE — BRIEF OP NOTE
Brief Postoperative Note for Thoracentesis    Vincenzo Darden  YOB: 1965  [id number]    Pre-operative Diagnosis: Right loculated pleural effusion      Post-operative Diagnosis: Same    Procedure: Ultrasound guided thoracentesis     Anesthesia: 1% Lidocaine     Surgeons/Assistants: Siva Calle PA-C    Complications: none    EBL: Minimal    Specimens: Were obtained    Ultrasound guided right thoracentesis performed. 50 ml cloudy yellow fluid obtained. Dressing applied.      Electronically signed by HERRERA Wagoner on 6/28/2024 at 9:29 AM     US at bedside

## 2024-06-28 NOTE — PROGRESS NOTES
Cardiology fellow notified of patient blood pressure 163/90, no c/o pain, chest pain, or SOB.  No PRNs ordered. Waiting for reply.

## 2024-06-28 NOTE — PROGRESS NOTES
Physical Therapy  Facility/Department: [address] ONC/MED SURG  Physical Therapy Daily Treatment Note    Name: Vincenzo Darden  : 1965  MRN: [id number]  Date of Service: 2024    Discharge Recommendations:  Patient would benefit from continued therapy after discharge   PT Equipment Recommendations  Equipment Needed: No      Patient Diagnosis(es): The encounter diagnosis was Biventricular congestive heart failure (HCC).  Past Medical History:  has a past medical history of Diabetes mellitus (HCC) and Hernia, abdominal.  Past Surgical History:  has a past surgical history that includes Cardiac procedure (N/A, 10/09/2023); Cardiac procedure (N/A, 10/09/2023); Coronary artery bypass graft (N/A, 2024); IR NONTUNNELED VASCULAR CATHETER > 5 YEARS (2024); and IR TUNNELED CVC PLACE WO SQ PORT/PUMP > 5 YEARS (2024).    Assessment   Body Structures, Functions, Activity Limitations Requiring Skilled Therapeutic Intervention: Decreased strength;Decreased endurance;Increased pain;Decreased functional mobility   Assessment: Pt ambulated 200ft with RW and CGA/SBA, overall fair stability with no true LOB. Pt would benefit from continued PT to address balance and endurance deficits and maximize safety and independence with mobility.  Therapy Prognosis: Good  Requires PT Follow-Up: Yes  Activity Tolerance  Activity Tolerance: Patient limited by endurance;Patient limited by pain     Plan   Physical Therapy Plan  General Plan:  (5-6x/week)  Current Treatment Recommendations: Strengthening, Functional mobility training, Endurance training, Stair training, Gait training, Safety education & training, Therapeutic activities  Safety Devices  Type of Devices: Call light within reach, Nurse notified, Left in bed (Pt left sitting EOB)  Restraints  Restraints Initially in Place: No     Restrictions  Position Activity Restriction  Other position/activity restrictions: Up with assist, hx or R BKA (has prosthetic in room)

## 2024-06-28 NOTE — PROGRESS NOTES
[facility]  Progress Note                   Date:   6/28/2024  Patient name: Vincenzo Darden  Date of admission:  6/26/2024  4:33 AM  MRN:   [id number]  YOB: 1965  PCP: Shaikh Calix MD    Reason for Admission: Dyspnea [R06.00]  SOB (shortness of breath) [R06.02]    Subjective:       Clinical Changes /Abnormalities: Stable overnight. Right thora this AM with only 50ml removed. Labs, vitals, and tele reviewed. Remains     Review of Systems    Medications:   Scheduled Meds:   epoetin  8,000 Units IntraVENous Once per day on Tue Thu Sat    sodium chloride flush  5-40 mL IntraVENous 2 times per day    sodium chloride flush  5-40 mL IntraVENous 2 times per day    heparin (porcine)  5,000 Units SubCUTAneous 3 times per day    DAPTOmycin (CUBICIN) 600 mg in sodium chloride 0.9 % 50 mL IVPB  6 mg/kg (Adjusted) IntraVENous Q48H    insulin lispro  0-4 Units SubCUTAneous TID WC    insulin lispro  0-4 Units SubCUTAneous Nightly    amLODIPine  10 mg Oral Daily    aspirin  81 mg Oral Daily    [Held by provider] atorvastatin  40 mg Oral Nightly    gabapentin  100 mg Oral 3 times per day    metoprolol tartrate  25 mg Oral BID    tamsulosin  0.4 mg Oral Daily    insulin glargine  32 Units SubCUTAneous Nightly    pantoprazole  40 mg Oral QAM AC     Continuous Infusions:   sodium chloride      sodium chloride      dextrose       CBC:   Recent Labs     06/26/24  0614 06/27/24  0559 06/28/24  0707   WBC 9.1 8.8 8.5   HGB 7.8* 8.4* 8.5*    251 242     BMP:    Recent Labs     06/26/24  0614 06/27/24  0559 06/28/24  0707   * 133* 132*   K 3.9 4.1 4.3   CL 98 98 96*   CO2 24 19* 28   BUN 34* 44* 28*   CREATININE 5.0* 5.9* 4.7*   GLUCOSE 107* 61* 135*     Hepatic:  Recent Labs     06/26/24  0614   AST 29   ALT 14   BILITOT 0.3   ALKPHOS 155*     Troponin: No results for input(s): \"TROPHS\" in the last 72 hours.  BNP: No results for input(s): \"BNP\" in the last 72 hours.  Lipids: No results for  90% ostial stenosis.      Left Circumflex   Has extreme eccentric angle from the left main with mid 75-80% eccentric stenosis seen in cranial views. It supplies LPDA.      Right Coronary Artery   Is a co-dominant vessel with proximal 75-80% eccentric stenosis.          Assessment / Acute Cardiac Problems:   Acute on chronic HFpEF  CAD/MVD s/p CABG x3 LIMA to LAD, RSVG1 to large high Diag/Ramus, RSVG2 to LPDA  with AVR on 2/26/24   Right pleural effusion  PAD, right BKA  Hyponatermiea  Hyperkalmeia  HTN  HLD  DM2  Obesity with BMI > 35    Patient Active Problem List:     NSTEMI (non-ST elevated myocardial infarction) ([facility])     Sepsis ([facility])     Type 2 DM with diabetic neuropathy affecting both sides of body ([facility])     Essential hypertension     Smoker     Hx of BKA, left ([facility])     GERD (gastroesophageal reflux disease)     COLT (obstructive sleep apnea)     Class 2 obesity due to excess calories with body mass index (BMI) of 35.0 to 35.9 in adult     Acute kidney injury superimposed on CKD ([facility])     Metabolic acidosis     History of type 2 diabetes mellitus     Below knee amputation ([facility])     Unstable angina ([facility])     Pyelonephritis     Persistent proteinuria     Multiple vessel coronary artery disease     Troponin level elevated     Aortic insufficiency     Moderate to severe aortic insufficiency     Coronary artery disease involving native coronary artery of native heart without angina pectoris     Hx of CABG     Stage 3b chronic kidney disease ([facility])     Urinary retention     Chest pain     Cardiac aneurysm     Diffuse abdominal pain     Acute encephalopathy     Staphylococcus aureus bacteremia     Hyponatremia     MSSA (methicillin susceptible Staphylococcus aureus) septicemia ([facility])     Disruption or dehiscence of closure of sternum or sternotomy     PAD (peripheral artery disease) ([facility])     Skin ulcer due to diabetes mellitus ([facility])     Coronary artery disease involving coronary bypass graft of native heart without

## 2024-06-28 NOTE — PROGRESS NOTES
[facility] - Progress Note      Today's Date and Time: 6/28/2024, 2:11 PM    Impression :   Shortness of breath  Rt pleural effusion with compressive atelectasis  Loculated lower anterior mediastinum fluid collection  CAMILLE on CKD stage III  Recent MSSA septicemia 6-2-24 x 2  Was under treatment with Linezolid because of prior CAMILLE with Daptomycin  Hx anaphylaxis with Zosyn    Concern for left great toe osteomyelitis   CAD  S/P CABG and AVR Feb 2024  Hx of Sternal wound dehiscence-fully healed  DM 2  PVD with prior Rt BKA  Left leg and foot ulcers.  Allergy to vancomycin (nausea)  Allergy to Zosyn (anaphylaxis)      Recommendations:   Daptomycin 600 mg IV q 48 hr. stop date 7-2-24 for previous MSSA septicemia of 6/2/2024    Prior treatment:  6/18/24: IV Zyvox 600 mg po BID initiated  Tentative End date 7/1/24  Daptomycin 700 mg IV q 48 hr. Stopped on 6/17/24 because of renal failure    Prior blood cultures:   6/3/24 with 1/1 staph aureus  6-4-24: No growth   6/7/24: No growth   6/17/24: No growth     TTE with no evidence of endocarditis from 6/6/24  MRI left foot unable to be completed because of PPM  No surgical intervention planned per Podiatry     Medical Decision Making/Summary/Discussion:6/28/2024     Daily Elements of Decision Making provided by Consulting Physician:    Note: I have independently performed the steps listed below as part of the medical decision making and evaluation.    Review of current Problems:  Today I am seeing the patient for the following problems:  Shortness of breath  Rt pleural effusion with compressive atelectasis  Loculated lower anterior mediastinum fluid collection  CAMILLE on CKD stage III  Recent MSSA septicemia 6-2-24 x 2  Was under treatment with Linezolid because of prior CAMILLE with Daptomycin  Hx anaphylaxis with Zosyn    Concern for left great toe osteomyelitis   CAD  S/P CABG and AVR Feb 2024  Hx of Sternal wound dehiscence-fully healed  DM  11:45 am COMPARISON: None. HISTORY: ORDERING SYSTEM PROVIDED HISTORY: gangrene of L toe TECHNOLOGIST PROVIDED HISTORY: gangrene of L toe FINDINGS: Bones: There is demineralization of the tuft of the 1st distal phalanx. 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 Joints: Normal alignment. Soft Tissues: Soft tissue irregularity involving the distal 1st phalanx.     Demineralization involving the tuft of the 1st distal phalanx which can be seen in the setting of osteomyelitis.     Vascular duplex lower extremity venous bilateral    Result Date: 6/17/2024    No evidence of deep vein or superficial vein thrombosis in the right lower extremity.   No evidence of deep vein or superficial vein thrombosis in the left lower extremity.     XR CHEST (2 VW)    Result Date: 6/17/2024  EXAMINATION: TWO XRAY VIEWS OF THE CHEST 6/17/2024 2:45 pm COMPARISON: Chest x-ray dated 06/02/2024.  Chest x-ray dated 06/02/2024. HISTORY: ORDERING SYSTEM PROVIDED HISTORY: Chest pain, SOB TECHNOLOGIST PROVIDED HISTORY: Chest pain, SOB FINDINGS: MEDICAL DEVICES: There is a right arm PICC. HEART/MEDIASTINUM: The cardiac silhouette is enlarged, but stable. PLEURA/LUNGS: There is a small to moderate right pleural effusion with adjacent compressive atelectasis.  There is no appreciable pneumothorax. BONES/SOFT TISSUE: No acute abnormality.     Small to moderate right pleural effusion with adjacent compressive atelectasis.       Medical Decision Qtkygb-Tbzbwyxz-Kihvk:     Results       Procedure Component Value Units Date/Time    Culture, Urine [[id number]] Collected: 06/28/24 0539    Order Status: No result Specimen: Urine, clean catch Updated: 06/28/24 0540    Culture with Smear, Acid Fast Bacillius [[id number]]     Order Status: Sent Specimen: Pleural Fl from Thoracentesis     Smear fungus [[id number]]     Order Status: Sent Specimen: Pleural Fl from Thoracentesis     Culture, Fungus [[id number]]     Order  Unknown if ever smoked

## 2024-06-28 NOTE — PROGRESS NOTES
[facility]  Occupational Therapy Not Seen Note    DATE: 2024    NAME: Vincenzo Darden  MRN: [id number]   : 1965      Patient not seen this date for Occupational Therapy due to:    Surgery/Procedure: Pt off floor to IR for paratenesis. OT will check back in PM as time allows, more likely tomorrow as able.        Electronically signed by HARRISON Jimenez on 2024 at 9:36 AM

## 2024-06-28 NOTE — PROGRESS NOTES
SUBJECTIVE    Patient was seen and examined.  Patient was sitting comfortably.  He underwent right-sided thoracocentesis.  He had a thick 50 mL fluid was sent for analysis.  Patient states that he was told that he might be needing a chest tube because fluid is too thick to drain with a needle.  Patient states that his shortness of breath is relatively better.  Yesterday on dialysis 3 L of fluid was removed and he tolerated well.  He denies any nausea or vomiting.  He denies any increase in shortness of breath.  Patient also met with dialysis educator.  He states that if his renal failure persist she will prefer to do home dialysis.      Summary: Patient was seen and examined on hemodialysis.  Patient was started on hemodialysis in mid part of 2024.  Patient was presented to ER with abnormal lab.  He has suppressed C3 and the diagnosis was acute tubulointerstitial nephritis versus atheroembolic disease.  Patient was started on dialysis due to worsening of BUN and creatinine.  Currently he is receiving dialysis through right tunneled catheter.  Patient was readmitted on 2024 because of increasing shortness of breath.  Chest x-ray shows bilateral pleural effusions and patient is having a difficulty in lying flat.  Preadmission his dry weight was 133 kg.  Today predialysis his weight is 131 and we are planning to remove 3 L with dialysis.  Patient underwent coronary artery bypass surgery in February of this year.  Patient has mildly reduced ejection fraction of 40 to 45%    OBJECTIVE      CURRENT TEMPERATURE:  Temp: 97.8 °F (36.6 °C)  MAXIMUM TEMPERATURE OVER 24HRS:  Temp (24hrs), Av °F (36.7 °C), Min:97.5 °F (36.4 °C), Max:98.4 °F (36.9 °C)    CURRENT RESPIRATORY RATE:  Respirations: 22  CURRENT PULSE:  Pulse: 75  CURRENT BLOOD PRESSURE:  BP: (!) 154/88  24HR BLOOD PRESSURE RANGE:  Systolic (24hrs), Av , Min:117 , Max:187   ; Diastolic (24hrs), Av, Min:75, Max:105    24HR INTAKE/OUTPUT:

## 2024-06-28 NOTE — PROGRESS NOTES
Pt arrives to room for rt alo CASTAÑEDA RDMS and JR SILVERMAN to bedside  Site prepped and draped  Access obtained and draining  50mls of cloudy yellow fluid with sediment removed  Specimen to lab  Access removed and site covered with dsd  Return to floor

## 2024-06-28 NOTE — PROGRESS NOTES
@[facility] HospitalEDLOGO@    [facility]   IN-PATIENT SERVICE   [facility]    Progress Note    6/28/2024    7:07 PM    Name:   Vincenzo Darden  MRN:     [id number]     Acct:      [id number]   Room:   [facility] Day:  2  Admit Date:  6/26/2024  4:33 AM    PCP:   Shaikh Calix MD  Code Status:  Full Code    Subjective:     C/C: No chief complaint on file.  Shortness of breath, flank pain  Interval History Status: not changed.     Seen at bedside, hemodynamically stable, blood pressure on the higher side, Lopressor changed to Coreg  Patient states that his breathing is much better today.  Continues to have flank pain  S/p right-sided thoracentesis with 50 mL cloudy yellow fluid obtained, concern for loculated pleural effusion, pulm service consulted as well  Nephrology service following for dialysis  Continues to be on antibiotics per ID service, urine studies concerning for UTI, ID service updated  Cardiology service has been following, no further workup planned    Brief History:     Per initial H&P    Vincenzo Darden is a 58 y.o.  male w/ PVD s/p right BKA, non healing left foot wounds, CAD s/p AVR/CABGx3 in 02/2024 complicated by chest wall dehiscence, MSSA infection, w/ CAMILLE on CKD3 s/p initiating HD (HD T/R/S), DM2, COLT who was recently discharged 06/20/2024 who returned to [facility] yesterday evening with worsening shortness of breath, flank pain with chest pain and recommended for transfer to [facility] for the management of Dyspnea.     Patient does describe progressive shortness of breath since his discharge with acute worsening symptoms the past few days.  States he is only able to walk 25 to 50 feet, he does describe midsternal sharp intermittent chest pain with pleurisy.      + Acute bilateral flank pain with sharp quality that waxed and waned with nausea that began after dialysis yesterday.  Does struggle with chronic lumbago/ OA and states those symptoms

## 2024-06-28 NOTE — PLAN OF CARE
Problem: Safety - Adult  Goal: Free from fall injury  Outcome: Progressing     Problem: ABCDS Injury Assessment  Goal: Absence of physical injury  Outcome: Progressing     Problem: Pain  Goal: Verbalizes/displays adequate comfort level or baseline comfort level  Outcome: Progressing     Problem: Chronic Conditions and Co-morbidities  Goal: Patient's chronic conditions and co-morbidity symptoms are monitored and maintained or improved  Outcome: Progressing  Flowsheets (Taken 6/28/2024 0706)  Care Plan - Patient's Chronic Conditions and Co-Morbidity Symptoms are Monitored and Maintained or Improved:   Monitor and assess patient's chronic conditions and comorbid symptoms for stability, deterioration, or improvement   Collaborate with multidisciplinary team to address chronic and comorbid conditions and prevent exacerbation or deterioration   Update acute care plan with appropriate goals if chronic or comorbid symptoms are exacerbated and prevent overall improvement and discharge     Problem: Nutrition Deficit:  Goal: Optimize nutritional status  Outcome: Progressing      A-T Advancement Flap Text: The defect edges were debeveled with a #15 scalpel blade.  Given the location of the defect, shape of the defect and the proximity to free margins an A-T advancement flap was deemed most appropriate.  Using a sterile surgical marker, an appropriate advancement flap was drawn incorporating the defect and placing the expected incisions within the relaxed skin tension lines where possible.    The area thus outlined was incised deep to adipose tissue with a #15 scalpel blade.  The skin margins were undermined to an appropriate distance in all directions utilizing iris scissors.

## 2024-06-29 ENCOUNTER — APPOINTMENT (OUTPATIENT)
Dept: DIALYSIS | Age: 59
DRG: 291 | End: 2024-06-29
Attending: STUDENT IN AN ORGANIZED HEALTH CARE EDUCATION/TRAINING PROGRAM
Payer: MEDICARE

## 2024-06-29 PROBLEM — I25.5 ISCHEMIC CARDIOMYOPATHY: Status: ACTIVE | Noted: 2024-06-29

## 2024-06-29 LAB
ANION GAP SERPL CALCULATED.3IONS-SCNC: 14 MMOL/L (ref 9–16)
APPEARANCE FLD: NORMAL
BASOPHILS # BLD: 0.03 K/UL (ref 0–0.2)
BASOPHILS NFR BLD: 0 % (ref 0–2)
BODY FLD TYPE: NORMAL
BUN SERPL-MCNC: 38 MG/DL (ref 6–20)
CALCIUM SERPL-MCNC: 7.8 MG/DL (ref 8.6–10.4)
CHLORIDE SERPL-SCNC: 97 MMOL/L (ref 98–107)
CLOT CHECK: NORMAL
CO2 SERPL-SCNC: 22 MMOL/L (ref 20–31)
COLOR FLD: YELLOW
CREAT SERPL-MCNC: 6 MG/DL (ref 0.7–1.2)
EOSINOPHIL # BLD: 0.34 K/UL (ref 0–0.44)
EOSINOPHILS RELATIVE PERCENT: 4 % (ref 1–4)
ERYTHROCYTE [DISTWIDTH] IN BLOOD BY AUTOMATED COUNT: 13.7 % (ref 11.8–14.4)
GFR, ESTIMATED: 10 ML/MIN/1.73M2
GLUCOSE BLD-MCNC: 104 MG/DL (ref 75–110)
GLUCOSE BLD-MCNC: 83 MG/DL (ref 75–110)
GLUCOSE SERPL-MCNC: 111 MG/DL (ref 74–99)
HCT VFR BLD AUTO: 26.3 % (ref 40.7–50.3)
HGB BLD-MCNC: 8 G/DL (ref 13–17)
IMM GRANULOCYTES # BLD AUTO: 0.07 K/UL (ref 0–0.3)
IMM GRANULOCYTES NFR BLD: 1 %
LYMPHOCYTES NFR BLD: 0.93 K/UL (ref 1.1–3.7)
LYMPHOCYTES NFR FLD: 3 %
LYMPHOCYTES RELATIVE PERCENT: 10 % (ref 24–43)
MCH RBC QN AUTO: 29 PG (ref 25.2–33.5)
MCHC RBC AUTO-ENTMCNC: 30.4 G/DL (ref 28.4–34.8)
MCV RBC AUTO: 95.3 FL (ref 82.6–102.9)
MESOTHELIAL CELLS BODY FLUID: 2 %
MICROORGANISM SPEC CULT: NO GROWTH
MONOCYTES NFR BLD: 0.9 K/UL (ref 0.1–1.2)
MONOCYTES NFR BLD: 10 % (ref 3–12)
MONOCYTES NFR FLD: 10 %
NEUTROPHILS NFR BLD: 75 % (ref 36–65)
NEUTROPHILS NFR FLD: 85 %
NEUTS SEG NFR BLD: 6.92 K/UL (ref 1.5–8.1)
NRBC BLD-RTO: 0 PER 100 WBC
NUC CELL # FLD: NORMAL CELLS/UL
PLATELET # BLD AUTO: 238 K/UL (ref 138–453)
PMV BLD AUTO: 8.7 FL (ref 8.1–13.5)
POTASSIUM SERPL-SCNC: 4.3 MMOL/L (ref 3.7–5.3)
PROT FLD-MCNC: 4.7 G/DL
RBC # BLD AUTO: 2.76 M/UL (ref 4.21–5.77)
RBC # FLD: NORMAL CELLS/UL
SODIUM SERPL-SCNC: 133 MMOL/L (ref 136–145)
SPECIMEN DESCRIPTION: NORMAL
SPECIMEN TYPE: NORMAL
WBC OTHER # BLD: 9.2 K/UL (ref 3.5–11.3)

## 2024-06-29 PROCEDURE — 36415 COLL VENOUS BLD VENIPUNCTURE: CPT

## 2024-06-29 PROCEDURE — 6370000000 HC RX 637 (ALT 250 FOR IP): Performed by: INTERNAL MEDICINE

## 2024-06-29 PROCEDURE — 2060000000 HC ICU INTERMEDIATE R&B

## 2024-06-29 PROCEDURE — 90935 HEMODIALYSIS ONE EVALUATION: CPT

## 2024-06-29 PROCEDURE — 6360000002 HC RX W HCPCS: Performed by: NURSE PRACTITIONER

## 2024-06-29 PROCEDURE — 6370000000 HC RX 637 (ALT 250 FOR IP): Performed by: STUDENT IN AN ORGANIZED HEALTH CARE EDUCATION/TRAINING PROGRAM

## 2024-06-29 PROCEDURE — 90935 HEMODIALYSIS ONE EVALUATION: CPT | Performed by: INTERNAL MEDICINE

## 2024-06-29 PROCEDURE — 6360000002 HC RX W HCPCS: Performed by: INTERNAL MEDICINE

## 2024-06-29 PROCEDURE — 99233 SBSQ HOSP IP/OBS HIGH 50: CPT | Performed by: NURSE PRACTITIONER

## 2024-06-29 PROCEDURE — 2580000003 HC RX 258: Performed by: STUDENT IN AN ORGANIZED HEALTH CARE EDUCATION/TRAINING PROGRAM

## 2024-06-29 PROCEDURE — 82947 ASSAY GLUCOSE BLOOD QUANT: CPT

## 2024-06-29 PROCEDURE — 2580000003 HC RX 258: Performed by: NURSE PRACTITIONER

## 2024-06-29 PROCEDURE — 85025 COMPLETE CBC W/AUTO DIFF WBC: CPT

## 2024-06-29 PROCEDURE — 99232 SBSQ HOSP IP/OBS MODERATE 35: CPT | Performed by: STUDENT IN AN ORGANIZED HEALTH CARE EDUCATION/TRAINING PROGRAM

## 2024-06-29 PROCEDURE — 99233 SBSQ HOSP IP/OBS HIGH 50: CPT | Performed by: INTERNAL MEDICINE

## 2024-06-29 PROCEDURE — 80048 BASIC METABOLIC PNL TOTAL CA: CPT

## 2024-06-29 RX ORDER — TIZANIDINE 4 MG/1
4 TABLET ORAL EVERY 6 HOURS PRN
Status: DISCONTINUED | OUTPATIENT
Start: 2024-06-29 | End: 2024-07-10 | Stop reason: HOSPADM

## 2024-06-29 RX ADMIN — HEPARIN SODIUM 1900 UNITS: 1000 INJECTION INTRAVENOUS; SUBCUTANEOUS at 10:44

## 2024-06-29 RX ADMIN — GABAPENTIN 100 MG: 100 CAPSULE ORAL at 22:46

## 2024-06-29 RX ADMIN — HEPARIN SODIUM 5000 UNITS: 5000 INJECTION INTRAVENOUS; SUBCUTANEOUS at 14:33

## 2024-06-29 RX ADMIN — OXYCODONE HYDROCHLORIDE AND ACETAMINOPHEN 1 TABLET: 5; 325 TABLET ORAL at 06:12

## 2024-06-29 RX ADMIN — ASPIRIN 81 MG 81 MG: 81 TABLET ORAL at 14:31

## 2024-06-29 RX ADMIN — SODIUM CHLORIDE, PRESERVATIVE FREE 10 ML: 5 INJECTION INTRAVENOUS at 20:49

## 2024-06-29 RX ADMIN — TIZANIDINE 4 MG: 4 TABLET ORAL at 16:34

## 2024-06-29 RX ADMIN — OXYCODONE HYDROCHLORIDE AND ACETAMINOPHEN 1 TABLET: 5; 325 TABLET ORAL at 14:31

## 2024-06-29 RX ADMIN — ERYTHROPOIETIN 8000 UNITS: 10000 INJECTION, SOLUTION INTRAVENOUS; SUBCUTANEOUS at 16:32

## 2024-06-29 RX ADMIN — SODIUM CHLORIDE, PRESERVATIVE FREE 10 ML: 5 INJECTION INTRAVENOUS at 14:32

## 2024-06-29 RX ADMIN — TAMSULOSIN HYDROCHLORIDE 0.4 MG: 0.4 CAPSULE ORAL at 14:31

## 2024-06-29 RX ADMIN — PANTOPRAZOLE SODIUM 40 MG: 40 TABLET, DELAYED RELEASE ORAL at 06:17

## 2024-06-29 RX ADMIN — LABETALOL HYDROCHLORIDE 10 MG: 5 INJECTION, SOLUTION INTRAVENOUS at 20:47

## 2024-06-29 RX ADMIN — GABAPENTIN 100 MG: 100 CAPSULE ORAL at 14:31

## 2024-06-29 RX ADMIN — INSULIN GLARGINE 32 UNITS: 100 INJECTION, SOLUTION SUBCUTANEOUS at 20:51

## 2024-06-29 RX ADMIN — OXYCODONE HYDROCHLORIDE AND ACETAMINOPHEN 1 TABLET: 5; 325 TABLET ORAL at 20:50

## 2024-06-29 RX ADMIN — HEPARIN SODIUM 1900 UNITS: 1000 INJECTION INTRAVENOUS; SUBCUTANEOUS at 10:43

## 2024-06-29 RX ADMIN — CARVEDILOL 6.25 MG: 6.25 TABLET, FILM COATED ORAL at 16:31

## 2024-06-29 RX ADMIN — GABAPENTIN 100 MG: 100 CAPSULE ORAL at 06:12

## 2024-06-29 ASSESSMENT — PAIN DESCRIPTION - LOCATION
LOCATION: BACK
LOCATION: BACK
LOCATION: FLANK
LOCATION: BACK
LOCATION: BACK
LOCATION: FLANK

## 2024-06-29 ASSESSMENT — PAIN DESCRIPTION - ORIENTATION
ORIENTATION: POSTERIOR
ORIENTATION: POSTERIOR
ORIENTATION: RIGHT
ORIENTATION: RIGHT

## 2024-06-29 ASSESSMENT — PAIN SCALES - GENERAL
PAINLEVEL_OUTOF10: 8
PAINLEVEL_OUTOF10: 8
PAINLEVEL_OUTOF10: 4
PAINLEVEL_OUTOF10: 4
PAINLEVEL_OUTOF10: 8

## 2024-06-29 ASSESSMENT — PAIN DESCRIPTION - DESCRIPTORS
DESCRIPTORS: ACHING
DESCRIPTORS: ACHING
DESCRIPTORS: ACHING;DISCOMFORT
DESCRIPTORS: ACHING

## 2024-06-29 ASSESSMENT — PAIN - FUNCTIONAL ASSESSMENT
PAIN_FUNCTIONAL_ASSESSMENT: ACTIVITIES ARE NOT PREVENTED
PAIN_FUNCTIONAL_ASSESSMENT: PREVENTS OR INTERFERES WITH MANY ACTIVE NOT PASSIVE ACTIVITIES

## 2024-06-29 ASSESSMENT — PAIN DESCRIPTION - PAIN TYPE: TYPE: ACUTE PAIN

## 2024-06-29 ASSESSMENT — PAIN SCALES - WONG BAKER
WONGBAKER_NUMERICALRESPONSE: HURTS A LITTLE BIT
WONGBAKER_NUMERICALRESPONSE: HURTS A LITTLE BIT

## 2024-06-29 NOTE — PROGRESS NOTES
[facility]@    [facility]   IN-PATIENT SERVICE   [facility]    Progress Note    6/29/2024    6:44 PM    Name:   Vincenzo Darden  MRN:     [id number]     Acct:      [id number]   Room:   [facility]0/0440-01   Day:  3  Admit Date:  6/26/2024  4:33 AM    PCP:   Shaikh Calix MD  Code Status:  Full Code    Subjective:     C/C: No chief complaint on file.  Shortness of breath, flank pain  Interval History Status: not changed.     Seen at bedside, hemodynamically stable, blood pressure improved shortness of breath.  CT chest completed, CT surgery was consulted for evaluation for chest tube placement after discussion with pulm service  Nephrology service following for dialysis  Continues to be on antibiotics per ID service-daptomycin, CK levels pending      Brief History:     Per initial H&P    Vincenzo Darden is a 58 y.o.  male w/ PVD s/p right BKA, non healing left foot wounds, CAD s/p AVR/CABGx3 in 02/2024 complicated by chest wall dehiscence, MSSA infection, w/ CAMILLE on CKD3 s/p initiating HD (HD T/R/S), DM2, COLT who was recently discharged 06/20/2024 who returned to [facility] yesterday evening with worsening shortness of breath, flank pain with chest pain and recommended for transfer to [facility] for the management of Dyspnea.     Patient does describe progressive shortness of breath since his discharge with acute worsening symptoms the past few days.  States he is only able to walk 25 to 50 feet, he does describe midsternal sharp intermittent chest pain with pleurisy.      + Acute bilateral flank pain with sharp quality that waxed and waned with nausea that began after dialysis yesterday.  Does struggle with chronic lumbago/ OA and states those symptoms are stable.  Denies recent falls or injuries .  denies urinary symptoms.  He does make small amounts of urine but denies hematuria or history of kidney stones.  Denies diarrhea or constipation.\"    Medications:  9.6 oz)   SpO2 98%   BMI 38.86 kg/m²   Temp (24hrs), Av.1 °F (36.7 °C), Min:97.7 °F (36.5 °C), Max:98.4 °F (36.9 °C)    Recent Labs     24  1707 24  2137 24  2349 24  1541   POCGLU 183* 208* 276* 83         I/O (24Hr):    Intake/Output Summary (Last 24 hours) at 2024 1844  Last data filed at 2024 1754  Gross per 24 hour   Intake 870 ml   Output 4300 ml   Net -3430 ml         Labs:  Hematology:  Recent Labs     24  0559 24  0707 24  0750   WBC 8.8 8.5 9.2   RBC 2.92* 2.93* 2.76*   HGB 8.4* 8.5* 8.0*   HCT 27.7* 27.0* 26.3*   MCV 94.9 92.2 95.3   MCH 28.8 29.0 29.0   MCHC 30.3 31.5 30.4   RDW 13.6 13.5 13.7    242 238   MPV 8.3 8.5 8.7   INR 1.1  --   --        Chemistry:  Recent Labs     24  0559 24  0707 24  0750   * 132* 133*   K 4.1 4.3 4.3   CL 98 96* 97*   CO2 19* 28 22   GLUCOSE 61* 135* 111*   BUN 44* 28* 38*   CREATININE 5.9* 4.7* 6.0*   MG 1.8 1.7  --    ANIONGAP 16 8* 14   LABGLOM 10* 14* 10*   CALCIUM 7.8* 7.9* 7.8*       Recent Labs     24  0707 24  0813 24  1250 24  1707 24  2137 24  2349 24  1541     --   --   --   --   --   --    POCGLU  --  131* 172* 183* 208* 276* 83       ABG:  Lab Results   Component Value Date/Time    POCPH 7.365 2024 09:01 PM    POCPCO2 35.9 2024 09:01 PM    POCPO2 83.0 2024 09:01 PM    POCHCO3 20.5 2024 09:01 PM    NBEA 4.4 2024 09:01 PM    YPJM7HLN 95.9 2024 09:01 PM    FIO2 INFORMATION NOT PROVIDED 2024 12:06 AM     Lab Results   Component Value Date/Time    SPECIAL Site: Pleural Fl 2024 03:33 PM     Lab Results   Component Value Date/Time    CULTURE PENDING 2024 03:33 PM       Radiology:  US RETROPERITONEAL LIMITED    Result Date: 2024  Unremarkable ultrasound of the kidneys.       Physical Examination:        General appearance:  alert, cooperative and no distress  Mental Status:

## 2024-06-29 NOTE — PLAN OF CARE
Problem: Safety - Adult  Goal: Free from fall injury  6/29/2024 1759 by Anisha Molina, RN  Outcome: Progressing  6/29/2024 0649 by Yaron Hughes, RN  Outcome: Progressing  Flowsheets (Taken 6/28/2024 2000)  Free From Fall Injury: Instruct family/caregiver on patient safety

## 2024-06-29 NOTE — PROGRESS NOTES
PULMONARY & CRITICAL CARE MEDICINE PROGRESS NOTE     Patient:  Vincenzo Darden  MRN: [id number]  Admit date: 6/26/2024  Primary Care Physician: Shaikh Calix MD  Consulting Physician: Andrea Fabian MD  CODE Status: Full Code  LOS: 3     SUBJECTIVE     CHIEF COMPLAINT/REASON FOR INITIAL CONSULT:No chief complaint on file.    BRIEF HOSPITAL COURSE:  The patient is a 58 y.o. male with history of multiple comorbidities including diabetes, obesity, obstructive sleep apnea, peripheral vascular disease, coronary artery disease, s/p AVR and CABG (February 2024), complicated by chest wall dehiscence and MSSA infection, CAMILLE on CKD, required initiation of hemodialysis.  [facility].    On evaluation he was found to have pleural effusion and underwent thoracentesis with removal of 50 mL of cloudy yellow fluid in the right side.  The pleural fluid is exudative with neutrophilia, markedly elevated LDH at 6402, protein 4.7 and a glucose less than 2 suggestive of empyema.    INTERVAL HISTORY:  06/29/24  Patient is currently on room air  CT chest reviewed shows trace amount of fluid density with small focus of gas 3.7 x 2 cm  He is afebrile, white count is 9.2  He is on daptomycin per ID recommendations    REVIEW OF SYSTEMS:  Review of Systems   Constitutional:  Positive for fatigue. Negative for fever.   HENT:  Negative for congestion and voice change.    Eyes:  Negative for visual disturbance.   Respiratory:  Positive for shortness of breath.    Cardiovascular:  Negative for chest pain and leg swelling.   Gastrointestinal:  Negative for abdominal pain, nausea and vomiting.   Genitourinary:  Negative for dysuria and urgency.   Musculoskeletal:  Negative for back pain and joint swelling.   Skin:  Negative for rash.   Neurological:  Positive for weakness.   Hematological:  Does not bruise/bleed easily.   Psychiatric/Behavioral:   prophylaxis  Physical/occupational therapy    It was my pleasure to evaluate Vincenzo Darden today. I would like to thank you for allowing me to participate in the care of this patient.  Please feel free to call with any further questions or concerns. We will continue to follow.     Vernon Biswas MD  Pulmonary and Critical Care Medicine  6/29/2024, 9:24 AM         This note is created with the assistance of a speech recognition program.  While intending to generate a document that actually reflects the content of the visit, the document can still have some errors including those of syntax and sound-alike substitutions which may escape proof reading.  It such instances, actual meaning can be extrapolated by contextual diversion.

## 2024-06-29 NOTE — PROGRESS NOTES
[facility]  Progress Note                   Date:   6/29/2024  Patient name: Vincenzo Darden  Date of admission:  6/26/2024  4:33 AM  MRN:   [id number]  YOB: 1965  PCP: Shaikh Calix MD    Reason for Admission: Dyspnea [R06.00]  SOB (shortness of breath) [R06.02]    Subjective:       Clinical Changes /Abnormalities: Seen & examiend in room. Stable overnight.  Labs, vitals, and tele reviewed. Remains Sr 72 currently    Review of Systems    Medications:   Scheduled Meds:   carvedilol  6.25 mg Oral BID WC    epoetin  8,000 Units IntraVENous Once per day on Tue Thu Sat    sodium chloride flush  5-40 mL IntraVENous 2 times per day    sodium chloride flush  5-40 mL IntraVENous 2 times per day    heparin (porcine)  5,000 Units SubCUTAneous 3 times per day    DAPTOmycin (CUBICIN) 600 mg in sodium chloride 0.9 % 50 mL IVPB  6 mg/kg (Adjusted) IntraVENous Q48H    insulin lispro  0-4 Units SubCUTAneous TID WC    insulin lispro  0-4 Units SubCUTAneous Nightly    amLODIPine  10 mg Oral Daily    aspirin  81 mg Oral Daily    [Held by provider] atorvastatin  40 mg Oral Nightly    gabapentin  100 mg Oral 3 times per day    tamsulosin  0.4 mg Oral Daily    insulin glargine  32 Units SubCUTAneous Nightly    pantoprazole  40 mg Oral QAM AC     Continuous Infusions:   sodium chloride      sodium chloride      dextrose       CBC:   Recent Labs     06/27/24  0559 06/28/24  0707   WBC 8.8 8.5   HGB 8.4* 8.5*    242       BMP:    Recent Labs     06/27/24  0559 06/28/24  0707   * 132*   K 4.1 4.3   CL 98 96*   CO2 19* 28   BUN 44* 28*   CREATININE 5.9* 4.7*   GLUCOSE 61* 135*       Hepatic:  No results for input(s): \"AST\", \"ALT\", \"BILITOT\", \"ALKPHOS\" in the last 72 hours.    Invalid input(s): \"ALB\"    Troponin: No results for input(s): \"TROPHS\" in the last 72 hours.  BNP: No results for input(s): \"BNP\" in the last 72 hours.  Lipids: No results for input(s): \"CHOL\", \"HDL\" in the last 72  hours.    Invalid input(s): \"LDLCALCU\"  INR:   Recent Labs     06/27/24  0559   INR 1.1         Objective:   Vitals: BP (!) 153/91   Pulse 78   Temp 97.7 °F (36.5 °C) (Oral)   Resp 19   Ht 1.829 m (6' 0.01\")   Wt 128.3 kg (282 lb 13.6 oz)   SpO2 92%   BMI 38.35 kg/m²   General appearance: alert and cooperative with exam  HEENT: Head: Normocephalic, no lesions, without obvious abnormality.  Neck:no JVD, trachea midline, no adenopathy  Lungs: dim throughout   Heart: Regular rate and rhythm, s1/s2 auscultated, no murmurs  Abdomen: soft, non-tender, bowel sounds active, obese  Extremities: right amp, LLE with dressing in place  Neurologic: not done    ECHO: 6/16/2024    Left Ventricle: Mildly reduced left ventricular systolic function with a visually estimated EF of 45 - 50%. EF by 2D Simpsons Biplane is 55%. Left ventricle size is normal. Findings consistent with severe concentric hypertrophy. Septal motion is consistent with bundle branch block. See diagram for wall motion findings. Global longitudinal strain is -12.5%.    Right Ventricle: Right ventricle size is normal. Normal systolic function.    Aortic Valve: Mild sclerosis of the aortic valve cusp. Mild stenosis of the aortic valve. AV mean gradient is 13 mmHg. AV area by continuity VTI is 1.8 cm2.    Mitral Valve: Mild annular calcification of the mitral valve. No regurgitation.    Tricuspid Valve: Valve structure is normal. No regurgitation.    Left Atrium: Left atrium size is normal.    Right Atrium: Right atrium size is normal.    Aorta: Normal sized aortic root and ascending aorta.    Pericardium: No pericardial effusion.    Image quality is adequate.     Cardiac Angiography: 10/9/2023  Diagnostic  Dominance: Right  Left Main   Has 20% stenosis.      Left Anterior Descending   Has eccentric 75-80% stenosis. The apical LAD has 60% stenosis. The D1 has proximal 90% stenosis. The D2 is small with 90% ostial stenosis.      Left Circumflex   Has extreme

## 2024-06-29 NOTE — PROGRESS NOTES
Dialysis Time Out  To be done by RN and tech or 2 RNs  Staff Names Marysol PHILLIP    [x]  Identity of the patient using 2 patient identifiers  [x]  Consent for treatment  [x]  Equipment-proper machine and dialyzer  [x]  B-Hep B status  [x]  Orders- to include bath, blood flow, dialyzer, time and fluid removal  [x]  Access-Correct site and in working order  [x]  Time for patient to ask questions.

## 2024-06-29 NOTE — PLAN OF CARE
Patient was in HD went Dr. Galo and I rounded this morning. I checked back again and the patient was still in HD. Full consult to follow.     PLAN:    Wound care consultation  Wound culture to be sent   ID consultation. Consultation noted on 6/26

## 2024-06-29 NOTE — PLAN OF CARE
Problem: Safety - Adult  Goal: Free from fall injury  Outcome: Progressing  Flowsheets (Taken 6/28/2024 2000)  Free From Fall Injury: Instruct family/caregiver on patient safety     Problem: ABCDS Injury Assessment  Goal: Absence of physical injury  Outcome: Progressing  Flowsheets (Taken 6/28/2024 2000)  Absence of Physical Injury: Implement safety measures based on patient assessment     Problem: Pain  Goal: Verbalizes/displays adequate comfort level or baseline comfort level  Outcome: Progressing  Flowsheets  Taken 6/28/2024 2344  Verbalizes/displays adequate comfort level or baseline comfort level: Encourage patient to monitor pain and request assistance  Taken 6/28/2024 2024  Verbalizes/displays adequate comfort level or baseline comfort level: Encourage patient to monitor pain and request assistance     Problem: Chronic Conditions and Co-morbidities  Goal: Patient's chronic conditions and co-morbidity symptoms are monitored and maintained or improved  Outcome: Progressing     Problem: Nutrition Deficit:  Goal: Optimize nutritional status  Outcome: Progressing

## 2024-06-29 NOTE — PROGRESS NOTES
SUBJECTIVE    Summary: Patient was started on hemodialysis in mid part of 2024.  Patient was presented to ER with abnormal lab.  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  Patient was started on dialysis due to worsening of BUN and creatinine.  Currently he is receiving dialysis through right tunneled catheter.  Patient was readmitted on 2024 because of increasing shortness of breath.  Chest x-ray shows bilateral pleural effusions and patient is having a difficulty in lying flat.  Preadmission his dry weight was 133 kg.  Today predialysis his weight is 131 and we are planning to remove 3 L with dialysis.  Patient underwent coronary artery bypass surgery in February of this year.  Patient has mildly reduced ejection fraction of 40 to 45%    Patient seen on dialysis     OBJECTIVE      CURRENT TEMPERATURE:  Temp: 98.1 °F (36.7 °C)  MAXIMUM TEMPERATURE OVER 24HRS:  Temp (24hrs), Av.9 °F (36.6 °C), Min:97.7 °F (36.5 °C), Max:98.2 °F (36.8 °C)    CURRENT RESPIRATORY RATE:  Respirations: 16  CURRENT PULSE:  Pulse: 61  CURRENT BLOOD PRESSURE:  BP: 106/70  24HR BLOOD PRESSURE RANGE:  Systolic (24hrs), Av , Min:106 , Max:178   ; Diastolic (24hrs), Av, Min:70, Max:97    24HR INTAKE/OUTPUT:  No intake or output data in the 24 hours ending 24 1129    WEIGHT :Patient Vitals for the past 96 hrs (Last 3 readings):   Weight   24 0930 133.8 kg (294 lb 15.6 oz)   24 1253 128.3 kg (282 lb 13.6 oz)   24 0909 131.8 kg (290 lb 9.1 oz)       PHYSICAL EXAM      GENERAL APPEARANCE: Awake and alert x 3   SKIN: Warm to touch and no erythema  EYES: Pupils are reactive to light  NECK: No JVD or carotid bruit   PULMONARY: Bilateral air entry decreased at the bases  CADRDIOVASCULAR: S1 and S2 audible no S3  ABDOMEN: soft nontender, bowel sounds present, no organomegaly,  no ascites EXTREMITIES: Right BKA and left 1-2+ edema rest of the lower extremity was wrapped  breath on exertion.  He still has some lower extremity pitting edema although edema does appear touch better than previous.  Patient is without any nausea, vomiting or fever.  His PermCath is working well.  Looked at his x-ray from couple days ago and it actually looks better than his x-ray from 10 days ago.    From renal standpoint he potentially can be discharged anytime.  We will continue to challenge his dry weight downwards as outpatient on dialysis.    Myles Núñez MD , MD

## 2024-06-29 NOTE — PROGRESS NOTES
Dialysis Post Treatment Note  Vitals:    06/29/24 1413   BP: 139/80   Pulse: 75   Resp: 18   Temp: 98.4 °F (36.9 °C)   SpO2:      Pre-Weight = 133.8 kg  Post-weight = Weight - Scale: 130 kg (286 lb 9.6 oz)  Total Liters Processed = Blood Volume Processed (Liters): 78.95 L  Rinseback Volume (mL) = Rinseback Volume (ml): 300 ml  Net Removal (mL) =  3800  Patient's dry weight=remove 3500  Type of access used=right perm cath  Length of treatment=210    Post treatment all blood returned, heparin instilled sterile end caps applied.  Pt tolerated treatment well ,  returning to room via w/c.  Post report given to Anisha JARAMILLO on floor.

## 2024-06-29 NOTE — CONSULTS
Cardiothoracic Surgery  IN-PATIENT SERVICE   [facility]    CONSULTATION / HISTORY AND PHYSICAL EXAMINATION            Date:   6/29/2024  Patient name:  Vincenzo Darden  Date of admission:  6/26/2024  4:33 AM  MRN:   [id number]  Account:  [id number]  YOB: 1965  PCP:    Shaikh Calix MD  Room:   [address] [place] [address]  Code Status:    Full Code    Physician Requesting Consult: Andrea Fabian MD    Reason for Consult:  Loculated right pleural effusion     Chief Complaint:     SOB and chest pain     History Obtained From:     Patient    History of Present Illness:     Mr. Vincenzo Darden is a 58 y.o.male with PMH significant for CAD s/p CABG X 3 w/ LIMA to LAD, RSVG1 to large high Diag/Ramus, RSVG2 to LPDA. AVR w/ 23mm CE INSPIRIS valve, Sternal plating on 2/26/24 complicated by chest wall dehiscence, MSSA infection, with CAMILLE on CKD Stage 3 s/p initiating HD (Tues/Thurs/Sat), DM2, PVD s/p right BKA, non healing left foot wounds and COLT (not using CPAP at home secondary to intolerance) who was recently discharged on 6/20/2024 who returned to [facility] on 6/25 in the evening with worsening shortness of breath, flank pain with chest pain. It was recommended that the patient be transferred to [facility] for the management of dyspnea.     Patient endorses  progressive shortness of breath since his discharge with acute worsening symptoms the past few days.  States he is only able to walk 25 to 50 feet, he does describe midsternal sharp intermittent chest pain with pleurisy. Patient also describes acute bilateral flank pain with sharp quality that waxed and waned with nausea that began after dialysis on 6/25. Denies recent falls or injuries, urinary symptoms (he does make small amounts of urine) albeit, denies hematuria or history of kidney stones, diarrhea or constipation.      Of note, patient had a right sided thoracentesis performed on 6/28/24  3b chronic kidney disease (HCC) 6/28/2024 Yes    Urinary retention 6/28/2024 Yes    MSSA (methicillin susceptible Staphylococcus aureus) septicemia ([facility]) 6/28/2024 Yes    PAD (peripheral artery disease) ([facility]) 6/28/2024 Yes    Hyperkalemia 6/28/2024 Yes    History of aortic valve replacement 6/28/2024 Yes    PVD (peripheral vascular disease) ([facility]) 6/28/2024 Yes    SOB (shortness of breath) 6/26/2024 Yes    Pleural effusion 6/27/2024 Yes    Postoperative seroma involving circulatory system after cardiac bypass 6/27/2024 Yes    Biventricular congestive heart failure ([facility]) 6/27/2024 Yes    HFrEF (heart failure with reduced ejection fraction) ([facility]) 6/28/2024 Yes    Osteomyelitis of great toe of left foot ([facility]) 6/28/2024 Yes       In summary, Mr. Darden is a 58 y.o. year-old male with a complex PMH who is CABG X 3 w/ LIMA to LAD, RSVG1 to large high Diag/Ramus, RSVG2 to LPDA. AVR w/ 23mm CE INSPIRIS valve, Sternal plating on 2/26/24 complicated by chest wall dehiscence, MSSA infection, with CAMILLE on CKD Stage 3 s/p initiating HD (Tues/Thurs/Sat), DM2, PVD s/p right BKA, non healing left foot wounds and COLT (not using CPAP at home secondary to intolerance) who was recently discharged on 6/20/2024 who returned to [facility] on 6/25 in the evening with worsening shortness of breath, flank pain with chest pain.     CT Chest w/o contrast revealed a small loculated right pleural effusion posteriorly at the base with associated mild pleural thickening and punctate calcification for chest tube placement.     Patient is s/p US guided right sided thoracentesis performed on 6/28/24 which drained 50 cc of cloudy, yellow fluid    Plan:       Recommendation:      Cont current care and meds as per Internal Medicine, Cardiology, Pulmonary, Nephrology and ID   IR right sided CT placement ordered   Will possibly need intrapleural t-PA and Dornase   Will manage CT   Further recommendations as per Dr. Clover Rasmussen,

## 2024-06-30 PROBLEM — J86.9 EMPYEMA LUNG (HCC): Status: ACTIVE | Noted: 2024-06-30

## 2024-06-30 PROBLEM — J86.9 EMPYEMA OF RIGHT PLEURAL SPACE (HCC): Status: ACTIVE | Noted: 2024-06-30

## 2024-06-30 LAB
ANION GAP SERPL CALCULATED.3IONS-SCNC: 10 MMOL/L (ref 9–16)
BASOPHILS # BLD: 0.04 K/UL (ref 0–0.2)
BASOPHILS NFR BLD: 0 % (ref 0–2)
BUN SERPL-MCNC: 26 MG/DL (ref 6–20)
CALCIUM SERPL-MCNC: 8.2 MG/DL (ref 8.6–10.4)
CHLORIDE SERPL-SCNC: 98 MMOL/L (ref 98–107)
CK SERPL-CCNC: 122 U/L (ref 39–308)
CO2 SERPL-SCNC: 24 MMOL/L (ref 20–31)
CREAT SERPL-MCNC: 4.9 MG/DL (ref 0.7–1.2)
EOSINOPHIL # BLD: 0.34 K/UL (ref 0–0.44)
EOSINOPHILS RELATIVE PERCENT: 4 % (ref 1–4)
ERYTHROCYTE [DISTWIDTH] IN BLOOD BY AUTOMATED COUNT: 13.6 % (ref 11.8–14.4)
GFR, ESTIMATED: 13 ML/MIN/1.73M2
GLUCOSE BLD-MCNC: 119 MG/DL (ref 75–110)
GLUCOSE BLD-MCNC: 150 MG/DL (ref 75–110)
GLUCOSE BLD-MCNC: 155 MG/DL (ref 75–110)
GLUCOSE BLD-MCNC: 69 MG/DL (ref 75–110)
GLUCOSE SERPL-MCNC: 66 MG/DL (ref 74–99)
HCT VFR BLD AUTO: 29.3 % (ref 40.7–50.3)
HGB BLD-MCNC: 8.8 G/DL (ref 13–17)
IMM GRANULOCYTES # BLD AUTO: 0.05 K/UL (ref 0–0.3)
IMM GRANULOCYTES NFR BLD: 1 %
LYMPHOCYTES NFR BLD: 0.89 K/UL (ref 1.1–3.7)
LYMPHOCYTES RELATIVE PERCENT: 9 % (ref 24–43)
MCH RBC QN AUTO: 28.7 PG (ref 25.2–33.5)
MCHC RBC AUTO-ENTMCNC: 30 G/DL (ref 28.4–34.8)
MCV RBC AUTO: 95.4 FL (ref 82.6–102.9)
MONOCYTES NFR BLD: 0.95 K/UL (ref 0.1–1.2)
MONOCYTES NFR BLD: 10 % (ref 3–12)
NEUTROPHILS NFR BLD: 76 % (ref 36–65)
NEUTS SEG NFR BLD: 7.51 K/UL (ref 1.5–8.1)
NRBC BLD-RTO: 0 PER 100 WBC
PLATELET # BLD AUTO: 272 K/UL (ref 138–453)
PMV BLD AUTO: 8.7 FL (ref 8.1–13.5)
POTASSIUM SERPL-SCNC: 4.6 MMOL/L (ref 3.7–5.3)
RBC # BLD AUTO: 3.07 M/UL (ref 4.21–5.77)
SODIUM SERPL-SCNC: 132 MMOL/L (ref 136–145)
WBC OTHER # BLD: 9.8 K/UL (ref 3.5–11.3)

## 2024-06-30 PROCEDURE — 99233 SBSQ HOSP IP/OBS HIGH 50: CPT | Performed by: INTERNAL MEDICINE

## 2024-06-30 PROCEDURE — 6370000000 HC RX 637 (ALT 250 FOR IP): Performed by: INTERNAL MEDICINE

## 2024-06-30 PROCEDURE — 2060000000 HC ICU INTERMEDIATE R&B

## 2024-06-30 PROCEDURE — 6360000002 HC RX W HCPCS: Performed by: NURSE PRACTITIONER

## 2024-06-30 PROCEDURE — 36415 COLL VENOUS BLD VENIPUNCTURE: CPT

## 2024-06-30 PROCEDURE — 2580000003 HC RX 258: Performed by: STUDENT IN AN ORGANIZED HEALTH CARE EDUCATION/TRAINING PROGRAM

## 2024-06-30 PROCEDURE — 2580000003 HC RX 258: Performed by: NURSE PRACTITIONER

## 2024-06-30 PROCEDURE — 82947 ASSAY GLUCOSE BLOOD QUANT: CPT

## 2024-06-30 PROCEDURE — 6370000000 HC RX 637 (ALT 250 FOR IP): Performed by: STUDENT IN AN ORGANIZED HEALTH CARE EDUCATION/TRAINING PROGRAM

## 2024-06-30 PROCEDURE — 2580000003 HC RX 258: Performed by: INTERNAL MEDICINE

## 2024-06-30 PROCEDURE — 85025 COMPLETE CBC W/AUTO DIFF WBC: CPT

## 2024-06-30 PROCEDURE — 99232 SBSQ HOSP IP/OBS MODERATE 35: CPT | Performed by: STUDENT IN AN ORGANIZED HEALTH CARE EDUCATION/TRAINING PROGRAM

## 2024-06-30 PROCEDURE — 82550 ASSAY OF CK (CPK): CPT

## 2024-06-30 PROCEDURE — 80048 BASIC METABOLIC PNL TOTAL CA: CPT

## 2024-06-30 PROCEDURE — 99232 SBSQ HOSP IP/OBS MODERATE 35: CPT | Performed by: INTERNAL MEDICINE

## 2024-06-30 PROCEDURE — 6360000002 HC RX W HCPCS: Performed by: INTERNAL MEDICINE

## 2024-06-30 PROCEDURE — 99233 SBSQ HOSP IP/OBS HIGH 50: CPT | Performed by: NURSE PRACTITIONER

## 2024-06-30 RX ORDER — CARVEDILOL 12.5 MG/1
12.5 TABLET ORAL 2 TIMES DAILY WITH MEALS
Status: DISCONTINUED | OUTPATIENT
Start: 2024-06-30 | End: 2024-07-10 | Stop reason: HOSPADM

## 2024-06-30 RX ORDER — INSULIN GLARGINE 100 [IU]/ML
25 INJECTION, SOLUTION SUBCUTANEOUS NIGHTLY
Status: DISCONTINUED | OUTPATIENT
Start: 2024-06-30 | End: 2024-07-10 | Stop reason: HOSPADM

## 2024-06-30 RX ORDER — LINEZOLID 600 MG/1
600 TABLET, FILM COATED ORAL EVERY 12 HOURS SCHEDULED
Status: COMPLETED | OUTPATIENT
Start: 2024-06-30 | End: 2024-07-07

## 2024-06-30 RX ORDER — DIPHENHYDRAMINE HYDROCHLORIDE, ZINC ACETATE 2; .1 G/100G; G/100G
CREAM TOPICAL 3 TIMES DAILY PRN
Status: DISCONTINUED | OUTPATIENT
Start: 2024-06-30 | End: 2024-07-10 | Stop reason: HOSPADM

## 2024-06-30 RX ADMIN — HEPARIN SODIUM 5000 UNITS: 5000 INJECTION INTRAVENOUS; SUBCUTANEOUS at 05:55

## 2024-06-30 RX ADMIN — DAPTOMYCIN 600 MG: 500 INJECTION, POWDER, LYOPHILIZED, FOR SOLUTION INTRAVENOUS at 08:34

## 2024-06-30 RX ADMIN — LABETALOL HYDROCHLORIDE 10 MG: 5 INJECTION, SOLUTION INTRAVENOUS at 03:38

## 2024-06-30 RX ADMIN — LABETALOL HYDROCHLORIDE 10 MG: 5 INJECTION, SOLUTION INTRAVENOUS at 21:27

## 2024-06-30 RX ADMIN — LABETALOL HYDROCHLORIDE 10 MG: 5 INJECTION, SOLUTION INTRAVENOUS at 23:30

## 2024-06-30 RX ADMIN — SODIUM CHLORIDE, PRESERVATIVE FREE 10 ML: 5 INJECTION INTRAVENOUS at 21:29

## 2024-06-30 RX ADMIN — GABAPENTIN 100 MG: 100 CAPSULE ORAL at 17:20

## 2024-06-30 RX ADMIN — LINEZOLID 600 MG: 600 TABLET, FILM COATED ORAL at 21:29

## 2024-06-30 RX ADMIN — DESMOPRESSIN ACETATE 40 MG: 0.2 TABLET ORAL at 21:24

## 2024-06-30 RX ADMIN — SODIUM CHLORIDE, PRESERVATIVE FREE 10 ML: 5 INJECTION INTRAVENOUS at 08:31

## 2024-06-30 RX ADMIN — CARVEDILOL 12.5 MG: 12.5 TABLET, FILM COATED ORAL at 09:57

## 2024-06-30 RX ADMIN — GABAPENTIN 100 MG: 100 CAPSULE ORAL at 21:32

## 2024-06-30 RX ADMIN — TAMSULOSIN HYDROCHLORIDE 0.4 MG: 0.4 CAPSULE ORAL at 08:31

## 2024-06-30 RX ADMIN — AMLODIPINE BESYLATE 10 MG: 10 TABLET ORAL at 09:57

## 2024-06-30 RX ADMIN — ASPIRIN 81 MG 81 MG: 81 TABLET ORAL at 08:31

## 2024-06-30 RX ADMIN — PANTOPRAZOLE SODIUM 40 MG: 40 TABLET, DELAYED RELEASE ORAL at 05:53

## 2024-06-30 RX ADMIN — CARVEDILOL 12.5 MG: 12.5 TABLET, FILM COATED ORAL at 17:20

## 2024-06-30 RX ADMIN — GABAPENTIN 100 MG: 100 CAPSULE ORAL at 05:53

## 2024-06-30 RX ADMIN — OXYCODONE HYDROCHLORIDE AND ACETAMINOPHEN 1 TABLET: 5; 325 TABLET ORAL at 23:29

## 2024-06-30 RX ADMIN — SODIUM CHLORIDE, PRESERVATIVE FREE 10 ML: 5 INJECTION INTRAVENOUS at 08:32

## 2024-06-30 ASSESSMENT — PAIN DESCRIPTION - ORIENTATION: ORIENTATION: POSTERIOR

## 2024-06-30 ASSESSMENT — PAIN SCALES - WONG BAKER
WONGBAKER_NUMERICALRESPONSE: HURTS A LITTLE BIT
WONGBAKER_NUMERICALRESPONSE: HURTS A LITTLE BIT

## 2024-06-30 ASSESSMENT — PAIN DESCRIPTION - DESCRIPTORS: DESCRIPTORS: ACHING

## 2024-06-30 ASSESSMENT — PAIN DESCRIPTION - LOCATION: LOCATION: BACK

## 2024-06-30 ASSESSMENT — PAIN SCALES - GENERAL
PAINLEVEL_OUTOF10: 4
PAINLEVEL_OUTOF10: 6
PAINLEVEL_OUTOF10: 4

## 2024-06-30 ASSESSMENT — PAIN - FUNCTIONAL ASSESSMENT: PAIN_FUNCTIONAL_ASSESSMENT: PREVENTS OR INTERFERES SOME ACTIVE ACTIVITIES AND ADLS

## 2024-06-30 NOTE — PROGRESS NOTES
findings with the resident/fellow. I have reviewed the key elements of all parts of the encounter with the resident/fellow. I have seen and examined the patient with the resident/fellow.  I agree with the assessment and plan and status of the problem list as documented.    Patient seen and examined.  He tells me that he still has a difficult time laying down flat.  He is scheduled to have IR guided thoracentesis/chest tube tomorrow.  Patient continues to exhibit edema although overall improved.  He is scheduled to receive hemodialysis tomorrow and will additionally remove 3 L of tomorrow as well depending on how his blood pressures tolerated.  Thus far he has been tolerating 3 L removal fairly well.  Additional recommendations to follow    Myles Núñez MD , MD

## 2024-06-30 NOTE — PROGRESS NOTES
[facility]    [facility]   IN-PATIENT SERVICE   [facility]    Progress Note    6/30/2024    4:00 PM    Name:   Vincenzo Darden  MRN:     [id number]     Acct:      [id number]   Room:   [facility] Day:  4  Admit Date:  6/26/2024  4:33 AM    PCP:   Shaikh Calix MD  Code Status:  Full Code    Subjective:     C/C: No chief complaint on file.  Shortness of breath, flank pain  Interval History Status: not changed.     Seen at bedside, hemodynamically stable, Coreg dose increased  Continues to complain of shortness of breath  IR consulted for chest tube placement tomorrow by CT surgery team.  Pulm/ID service following, daptomycin changed to Zyvox today  Nephrology service following for dialysis  Insulin dose adjusted because of hypoglycemia this morning      Brief History:     Per initial H&P    Vincenzo Darden is a 58 y.o.  male w/ PVD s/p right BKA, non healing left foot wounds, CAD s/p AVR/CABGx3 in 02/2024 complicated by chest wall dehiscence, MSSA infection, w/ CAMILLE on CKD3 s/p initiating HD (HD T/R/S), DM2, COLT who was recently discharged 06/20/2024 who returned to [facility] yesterday evening with worsening shortness of breath, flank pain with chest pain and recommended for transfer to [facility] for the management of Dyspnea.     Patient does describe progressive shortness of breath since his discharge with acute worsening symptoms the past few days.  States he is only able to walk 25 to 50 feet, he does describe midsternal sharp intermittent chest pain with pleurisy.      + Acute bilateral flank pain with sharp quality that waxed and waned with nausea that began after dialysis yesterday.  Does struggle with chronic lumbago/ OA and states those symptoms are stable.  Denies recent falls or injuries .  denies urinary symptoms.  He does make small amounts of urine but denies hematuria or history of kidney stones.  Denies diarrhea or

## 2024-06-30 NOTE — PROGRESS NOTES
[facility]  Progress Note    Vincenzo Darden  MRN: [id number]  Date: 6/30/2024  LOS: 4     Reason for F/U :   Empyema    Impression :   Right-sided pleural effusion with compressive atelectasis  Status post thoracentesis 6/28/2024 and fluid studies consistent with empyema  Fluid cultures without any growth thus far  Loculated low anterior mediastinal fluid collection  Acute kidney injury on chronic kidney disease stage  Recent MSSA sepsis 6/2/24  Left great toe osteomyelitis  Coronary artery disease status post coronary artery bypass grafting and AVR February 2024  Left leg and foot ulcers  Zosyn allergy-anaphylaxis  Vancomycin allergy-nausea    Recommendations:   There is minimal residual fluid in the right pleural cavity with compressive atelectasis and consolidation and fluid studies  The patient is on daptomycin and the care was discussed with Dr. Prado and given the concern for empyema I will switch him back to linezolid to give better lung penetration  Blood cultures thus far remain negative  I will follow the progress and adjust therapy according    Prior treatment:  6/18/24: IV Zyvox 600 mg po BID initiated  Tentative End date 7/1/24  Daptomycin 700 mg IV q 48 hr. Stopped on 6/17/24 because of renal failure    Infection Control Recommendations:   Universal precautions    Discharge Planning:   Estimated Length of IV antimicrobials: To be determined  Patient will need Midline Catheter Insertion/ PICC line Insertion: No  Patient will need: Home IV , Infusion Center,  SNF,  LTAC: Undetermined  Patient willneed outpatient wound care: No    Medical Decision making / Summary of Stay:   Vincenzo Darden is a 58 y.o.-year-old male who was initially admitted on 6/26/2024. Patient seen at the request of Dr. Fabian.     INITIAL HISTORY:     Patient with multiple past medical problems, including DM2, DM neuropathy, PVD s/p right BKA w/ chronic left 1st toe gangrene, CKD3, COLT, multi vessel CAD s/p CABG  Updated: 06/29/24 0825    Specimen Description .THORACENTESIS FLUID    Special Requests Site: Pleural Fl    Direct Exam NO ACID FAST BACILLI SEEN (CONCENTRATED SMEAR)    Culture PENDING   Culture, Urine [[id number]] Collected: 06/28/24 0539   Order Status: Completed Specimen: Urine, clean catch Updated: 06/29/24 0823    Specimen Description .CLEAN CATCH URINE    Culture NO GROWTH   Culture, Fungus [[id number]] Collected: 06/28/24 1533   Order Status: Completed Specimen: Pleural Fl from Thoracentesis Updated: 06/29/24 0821    Specimen Description .THORACENTESIS FLUID    Special Requests Site: Pleural Fl    Direct Exam NO FUNGAL ELEMENTS SEEN    Culture PENDING   Smear fungus [[id number]]        Medications:      insulin glargine  25 Units SubCUTAneous Nightly    carvedilol  12.5 mg Oral BID WC    epoetin  8,000 Units IntraVENous Once per day on Tue Thu Sat    sodium chloride flush  5-40 mL IntraVENous 2 times per day    sodium chloride flush  5-40 mL IntraVENous 2 times per day    heparin (porcine)  5,000 Units SubCUTAneous 3 times per day    DAPTOmycin (CUBICIN) 600 mg in sodium chloride 0.9 % 50 mL IVPB  6 mg/kg (Adjusted) IntraVENous Q48H    insulin lispro  0-4 Units SubCUTAneous TID WC    insulin lispro  0-4 Units SubCUTAneous Nightly    amLODIPine  10 mg Oral Daily    aspirin  81 mg Oral Daily    [Held by provider] atorvastatin  40 mg Oral Nightly    gabapentin  100 mg Oral 3 times per day    tamsulosin  0.4 mg Oral Daily    pantoprazole  40 mg Oral QAM AC       Electronically signed by Shanae Ruano MD on 6/30/2024 at 1:29 PM      [facility]  Shanae Ruano MD  Perfect Serve messaging  OFFICE: [phone]    Thank you for allowing us to participate in the care of this patient. Please call with questions.    This note is created with the assistance of a speech recognition program.  While intending to generate a document that actually reflects the content of the visit, the

## 2024-06-30 NOTE — PLAN OF CARE
Patient was seen and examined at the bedside this morning with Dr. Galo without any complaints.     PLAN:     IR consultation for placement of a right CT for small loculated right pleural effusion posteriorly at the base with associated mild pleural thickening and punctate calcification  May need t-PA and Dornase post CT placement   Cont antibiotics as per ID   Will follow  No surgical intervention at this time   Further recommendations as per Dr. Clover Rasmussen, TINO, PA-C

## 2024-06-30 NOTE — PROGRESS NOTES
PULMONARY & CRITICAL CARE MEDICINE PROGRESS NOTE     Patient:  Vincenzo Darden  MRN: [id number]  Admit date: 6/26/2024  Primary Care Physician: Shaikh Calix MD  Consulting Physician: Andrea Fabian MD  CODE Status: Full Code  LOS: 4     SUBJECTIVE     CHIEF COMPLAINT/REASON FOR INITIAL CONSULT:No chief complaint on file.    BRIEF HOSPITAL COURSE:  The patient is a 58 y.o. male with history of multiple comorbidities including diabetes, obesity, obstructive sleep apnea, peripheral vascular disease, coronary artery disease, s/p AVR and CABG (February 2024), complicated by chest wall dehiscence and MSSA infection, CAMILLE on CKD, required initiation of hemodialysis.  [facility].    On evaluation he was found to have pleural effusion and underwent thoracentesis with removal of 50 mL of cloudy yellow fluid in the right side.  The pleural fluid is exudative with neutrophilia, markedly elevated LDH at 6402, protein 4.7 and a glucose less than 2 suggestive of empyema.    INTERVAL HISTORY:  06/30/24  Patient is currently on nasal cannula 2 L/min  CT chest reviewed shows trace amount of fluid density with small focus of gas 3.7 x 2 cm  He is afebrile, white count is 9.8  No overnight issues reported  Hemodialysis yesterday    REVIEW OF SYSTEMS:  Review of Systems   Constitutional:  Positive for fatigue. Negative for fever.   HENT:  Negative for congestion and voice change.    Eyes:  Negative for visual disturbance.   Respiratory:  Positive for shortness of breath.    Cardiovascular:  Negative for chest pain and leg swelling.   Gastrointestinal:  Negative for abdominal pain, nausea and vomiting.   Genitourinary:  Negative for dysuria and urgency.   Musculoskeletal:  Negative for back pain and joint swelling.   Skin:  Negative for rash.   Neurological:  Positive for weakness.   Hematological:  Does not bruise/bleed easily.

## 2024-06-30 NOTE — PROGRESS NOTES
[facility]  Progress Note                   Date:   6/30/2024  Patient name: Vincenzo Darden  Date of admission:  6/26/2024  4:33 AM  MRN:   [id number]  YOB: 1965  PCP: Shaikh Calix MD    Reason for Admission: Dyspnea [R06.00]  SOB (shortness of breath) [R06.02]    Subjective:       Clinical Changes /Abnormalities: Seen & examiend in room. Does have some intermitted SOB at rest and when getting up. Currently on RA. Discussed with RN.  Labs, vitals, and tele reviewed. Remains SR    Review of Systems    Medications:   Scheduled Meds:   insulin glargine  25 Units SubCUTAneous Nightly    carvedilol  12.5 mg Oral BID WC    epoetin  8,000 Units IntraVENous Once per day on Tue Thu Sat    sodium chloride flush  5-40 mL IntraVENous 2 times per day    sodium chloride flush  5-40 mL IntraVENous 2 times per day    heparin (porcine)  5,000 Units SubCUTAneous 3 times per day    DAPTOmycin (CUBICIN) 600 mg in sodium chloride 0.9 % 50 mL IVPB  6 mg/kg (Adjusted) IntraVENous Q48H    insulin lispro  0-4 Units SubCUTAneous TID WC    insulin lispro  0-4 Units SubCUTAneous Nightly    amLODIPine  10 mg Oral Daily    aspirin  81 mg Oral Daily    [Held by provider] atorvastatin  40 mg Oral Nightly    gabapentin  100 mg Oral 3 times per day    tamsulosin  0.4 mg Oral Daily    pantoprazole  40 mg Oral QAM AC     Continuous Infusions:   sodium chloride      sodium chloride      dextrose       CBC:   Recent Labs     06/28/24  0707 06/29/24  0750 06/30/24  0647   WBC 8.5 9.2 9.8   HGB 8.5* 8.0* 8.8*    238 272       BMP:    Recent Labs     06/28/24  0707 06/29/24  0750 06/30/24  0647   * 133* 132*   K 4.3 4.3 4.6   CL 96* 97* 98   CO2 28 22 24   BUN 28* 38* 26*   CREATININE 4.7* 6.0* 4.9*   GLUCOSE 135* 111* 66*       Hepatic:  No results for input(s): \"AST\", \"ALT\", \"BILITOT\", \"ALKPHOS\" in the last 72 hours.    Invalid input(s): \"ALB\"    Troponin: No results for input(s): \"TROPHS\" in the last 72  LAD has 60% stenosis. The D1 has proximal 90% stenosis. The D2 is small with 90% ostial stenosis.      Left Circumflex   Has extreme eccentric angle from the left main with mid 75-80% eccentric stenosis seen in cranial views. It supplies LPDA.      Right Coronary Artery   Is a co-dominant vessel with proximal 75-80% eccentric stenosis.          Assessment / Acute Cardiac Problems:   Acute on chronic HFpEF  CAD/MVD s/p CABG x3 LIMA to LAD, RSVG1 to large high Diag/Ramus, RSVG2 to LPDA  with AVR on 2/26/24   Right pleural effusion  PAD, right BKA  Hyponatermiea  Hyperkalmeia  HTN  HLD  DM2  Obesity with BMI > 35    Patient Active Problem List:     NSTEMI (non-ST elevated myocardial infarction) ([facility])     Sepsis ([facility])     Type 2 DM with diabetic neuropathy affecting both sides of body ([facility])     Essential hypertension     Smoker     Hx of BKA, left ([facility])     GERD (gastroesophageal reflux disease)     COLT (obstructive sleep apnea)     Class 2 obesity due to excess calories with body mass index (BMI) of 35.0 to 35.9 in adult     Acute kidney injury superimposed on CKD ([facility])     Metabolic acidosis     History of type 2 diabetes mellitus     Below knee amputation ([facility])     Unstable angina ([facility])     Pyelonephritis     Persistent proteinuria     Multiple vessel coronary artery disease     Troponin level elevated     Aortic insufficiency     Moderate to severe aortic insufficiency     Coronary artery disease involving native coronary artery of native heart without angina pectoris     Hx of CABG     Stage 3b chronic kidney disease ([facility])     Urinary retention     Chest pain     Cardiac aneurysm     Diffuse abdominal pain     Acute encephalopathy     Staphylococcus aureus bacteremia     Hyponatremia     MSSA (methicillin susceptible Staphylococcus aureus) septicemia ([facility])     Disruption or dehiscence of closure of sternum or sternotomy     PAD (peripheral artery disease) ([facility])     Skin ulcer due to diabetes mellitus ([facility])

## 2024-06-30 NOTE — PLAN OF CARE
Problem: Safety - Adult  Goal: Free from fall injury  6/30/2024 0344 by Yaron Hughes, RN  Outcome: Progressing  Flowsheets (Taken 6/29/2024 2000)  Free From Fall Injury: Instruct family/caregiver on patient safety  6/29/2024 1759 by Anisha Molina, RN  Outcome: Progressing     Problem: ABCDS Injury Assessment  Goal: Absence of physical injury  Outcome: Progressing  Flowsheets (Taken 6/29/2024 2000)  Absence of Physical Injury: Implement safety measures based on patient assessment     Problem: Pain  Goal: Verbalizes/displays adequate comfort level or baseline comfort level  Outcome: Progressing  Flowsheets  Taken 6/30/2024 0338  Verbalizes/displays adequate comfort level or baseline comfort level: Encourage patient to monitor pain and request assistance  Taken 6/30/2024 0001  Verbalizes/displays adequate comfort level or baseline comfort level: Assess pain using appropriate pain scale  Taken 6/29/2024 1929  Verbalizes/displays adequate comfort level or baseline comfort level: Assess pain using appropriate pain scale     Problem: Chronic Conditions and Co-morbidities  Goal: Patient's chronic conditions and co-morbidity symptoms are monitored and maintained or improved  Outcome: Progressing     Problem: Nutrition Deficit:  Goal: Optimize nutritional status  Outcome: Progressing

## 2024-07-01 ENCOUNTER — APPOINTMENT (OUTPATIENT)
Dept: INTERVENTIONAL RADIOLOGY/VASCULAR | Age: 59
DRG: 291 | End: 2024-07-01
Attending: STUDENT IN AN ORGANIZED HEALTH CARE EDUCATION/TRAINING PROGRAM
Payer: MEDICARE

## 2024-07-01 PROBLEM — I10 ESSENTIAL HYPERTENSION: Status: ACTIVE | Noted: 2024-07-01

## 2024-07-01 PROBLEM — R60.0 BILATERAL LOWER EXTREMITY EDEMA: Status: ACTIVE | Noted: 2024-07-01

## 2024-07-01 PROBLEM — Z99.2 DEPENDENCE ON RENAL DIALYSIS (HCC): Status: ACTIVE | Noted: 2024-07-01

## 2024-07-01 LAB
ANION GAP SERPL CALCULATED.3IONS-SCNC: 11 MMOL/L (ref 9–16)
BASOPHILS # BLD: 0.03 K/UL (ref 0–0.2)
BASOPHILS NFR BLD: 0 % (ref 0–2)
BUN SERPL-MCNC: 39 MG/DL (ref 6–20)
CALCIUM SERPL-MCNC: 8.1 MG/DL (ref 8.6–10.4)
CASE NUMBER:: NORMAL
CHLORIDE SERPL-SCNC: 97 MMOL/L (ref 98–107)
CO2 SERPL-SCNC: 23 MMOL/L (ref 20–31)
CREAT SERPL-MCNC: 6.3 MG/DL (ref 0.7–1.2)
EOSINOPHIL # BLD: 0.32 K/UL (ref 0–0.44)
EOSINOPHILS RELATIVE PERCENT: 3 % (ref 1–4)
ERYTHROCYTE [DISTWIDTH] IN BLOOD BY AUTOMATED COUNT: 13.9 % (ref 11.8–14.4)
GFR, ESTIMATED: 9 ML/MIN/1.73M2
GLUCOSE BLD-MCNC: 147 MG/DL (ref 75–110)
GLUCOSE BLD-MCNC: 176 MG/DL (ref 75–110)
GLUCOSE BLD-MCNC: 214 MG/DL (ref 75–110)
GLUCOSE SERPL-MCNC: 173 MG/DL (ref 74–99)
HCT VFR BLD AUTO: 23.6 % (ref 40.7–50.3)
HGB BLD-MCNC: 7.3 G/DL (ref 13–17)
IMM GRANULOCYTES # BLD AUTO: 0.06 K/UL (ref 0–0.3)
IMM GRANULOCYTES NFR BLD: 1 %
LYMPHOCYTES NFR BLD: 0.78 K/UL (ref 1.1–3.7)
LYMPHOCYTES RELATIVE PERCENT: 7 % (ref 24–43)
MCH RBC QN AUTO: 28.7 PG (ref 25.2–33.5)
MCHC RBC AUTO-ENTMCNC: 30.9 G/DL (ref 28.4–34.8)
MCV RBC AUTO: 92.9 FL (ref 82.6–102.9)
MONOCYTES NFR BLD: 1.17 K/UL (ref 0.1–1.2)
MONOCYTES NFR BLD: 11 % (ref 3–12)
NEUTROPHILS NFR BLD: 77 % (ref 36–65)
NEUTS SEG NFR BLD: 8.11 K/UL (ref 1.5–8.1)
NRBC BLD-RTO: 0 PER 100 WBC
PLATELET # BLD AUTO: 254 K/UL (ref 138–453)
PMV BLD AUTO: 8.6 FL (ref 8.1–13.5)
POTASSIUM SERPL-SCNC: 4.8 MMOL/L (ref 3.7–5.3)
RBC # BLD AUTO: 2.54 M/UL (ref 4.21–5.77)
SODIUM SERPL-SCNC: 131 MMOL/L (ref 136–145)
WBC OTHER # BLD: 10.5 K/UL (ref 3.5–11.3)

## 2024-07-01 PROCEDURE — 90935 HEMODIALYSIS ONE EVALUATION: CPT | Performed by: INTERNAL MEDICINE

## 2024-07-01 PROCEDURE — 6370000000 HC RX 637 (ALT 250 FOR IP): Performed by: INTERNAL MEDICINE

## 2024-07-01 PROCEDURE — 99232 SBSQ HOSP IP/OBS MODERATE 35: CPT | Performed by: INTERNAL MEDICINE

## 2024-07-01 PROCEDURE — 6370000000 HC RX 637 (ALT 250 FOR IP): Performed by: STUDENT IN AN ORGANIZED HEALTH CARE EDUCATION/TRAINING PROGRAM

## 2024-07-01 PROCEDURE — 85025 COMPLETE CBC W/AUTO DIFF WBC: CPT

## 2024-07-01 PROCEDURE — 99213 OFFICE O/P EST LOW 20 MIN: CPT

## 2024-07-01 PROCEDURE — 99232 SBSQ HOSP IP/OBS MODERATE 35: CPT | Performed by: STUDENT IN AN ORGANIZED HEALTH CARE EDUCATION/TRAINING PROGRAM

## 2024-07-01 PROCEDURE — 2060000000 HC ICU INTERMEDIATE R&B

## 2024-07-01 PROCEDURE — 6360000002 HC RX W HCPCS: Performed by: NURSE PRACTITIONER

## 2024-07-01 PROCEDURE — 32557 INSERT CATH PLEURA W/ IMAGE: CPT

## 2024-07-01 PROCEDURE — 2580000003 HC RX 258: Performed by: STUDENT IN AN ORGANIZED HEALTH CARE EDUCATION/TRAINING PROGRAM

## 2024-07-01 PROCEDURE — 36415 COLL VENOUS BLD VENIPUNCTURE: CPT

## 2024-07-01 PROCEDURE — 99233 SBSQ HOSP IP/OBS HIGH 50: CPT | Performed by: NURSE PRACTITIONER

## 2024-07-01 PROCEDURE — C1729 CATH, DRAINAGE: HCPCS

## 2024-07-01 PROCEDURE — 80048 BASIC METABOLIC PNL TOTAL CA: CPT

## 2024-07-01 PROCEDURE — 0W9930Z DRAINAGE OF RIGHT PLEURAL CAVITY WITH DRAINAGE DEVICE, PERCUTANEOUS APPROACH: ICD-10-PCS | Performed by: RADIOLOGY

## 2024-07-01 PROCEDURE — 82947 ASSAY GLUCOSE BLOOD QUANT: CPT

## 2024-07-01 PROCEDURE — C1769 GUIDE WIRE: HCPCS

## 2024-07-01 PROCEDURE — 90935 HEMODIALYSIS ONE EVALUATION: CPT

## 2024-07-01 RX ADMIN — DESMOPRESSIN ACETATE 40 MG: 0.2 TABLET ORAL at 21:13

## 2024-07-01 RX ADMIN — LABETALOL HYDROCHLORIDE 10 MG: 5 INJECTION, SOLUTION INTRAVENOUS at 04:41

## 2024-07-01 RX ADMIN — SODIUM CHLORIDE, PRESERVATIVE FREE 10 ML: 5 INJECTION INTRAVENOUS at 21:17

## 2024-07-01 RX ADMIN — INSULIN GLARGINE 25 UNITS: 100 INJECTION, SOLUTION SUBCUTANEOUS at 21:13

## 2024-07-01 RX ADMIN — TAMSULOSIN HYDROCHLORIDE 0.4 MG: 0.4 CAPSULE ORAL at 09:09

## 2024-07-01 RX ADMIN — GABAPENTIN 100 MG: 100 CAPSULE ORAL at 21:13

## 2024-07-01 RX ADMIN — AMLODIPINE BESYLATE 10 MG: 10 TABLET ORAL at 09:09

## 2024-07-01 RX ADMIN — HYDROMORPHONE HYDROCHLORIDE 0.5 MG: 1 INJECTION, SOLUTION INTRAMUSCULAR; INTRAVENOUS; SUBCUTANEOUS at 22:01

## 2024-07-01 RX ADMIN — SODIUM CHLORIDE, PRESERVATIVE FREE 10 ML: 5 INJECTION INTRAVENOUS at 09:09

## 2024-07-01 RX ADMIN — OXYCODONE HYDROCHLORIDE AND ACETAMINOPHEN 1 TABLET: 5; 325 TABLET ORAL at 21:13

## 2024-07-01 RX ADMIN — OXYCODONE HYDROCHLORIDE AND ACETAMINOPHEN 1 TABLET: 5; 325 TABLET ORAL at 06:28

## 2024-07-01 RX ADMIN — CARVEDILOL 12.5 MG: 12.5 TABLET, FILM COATED ORAL at 09:09

## 2024-07-01 RX ADMIN — CARVEDILOL 12.5 MG: 12.5 TABLET, FILM COATED ORAL at 17:10

## 2024-07-01 RX ADMIN — LINEZOLID 600 MG: 600 TABLET, FILM COATED ORAL at 09:10

## 2024-07-01 RX ADMIN — GABAPENTIN 100 MG: 100 CAPSULE ORAL at 06:28

## 2024-07-01 RX ADMIN — LINEZOLID 600 MG: 600 TABLET, FILM COATED ORAL at 21:13

## 2024-07-01 RX ADMIN — PANTOPRAZOLE SODIUM 40 MG: 40 TABLET, DELAYED RELEASE ORAL at 06:28

## 2024-07-01 ASSESSMENT — PAIN SCALES - GENERAL
PAINLEVEL_OUTOF10: 3
PAINLEVEL_OUTOF10: 7
PAINLEVEL_OUTOF10: 3
PAINLEVEL_OUTOF10: 9

## 2024-07-01 ASSESSMENT — PAIN DESCRIPTION - DESCRIPTORS: DESCRIPTORS: ACHING

## 2024-07-01 ASSESSMENT — PAIN DESCRIPTION - LOCATION
LOCATION: BACK

## 2024-07-01 ASSESSMENT — PAIN DESCRIPTION - PAIN TYPE
TYPE: ACUTE PAIN
TYPE: ACUTE PAIN

## 2024-07-01 ASSESSMENT — PAIN DESCRIPTION - ORIENTATION
ORIENTATION: POSTERIOR
ORIENTATION: RIGHT

## 2024-07-01 ASSESSMENT — PAIN - FUNCTIONAL ASSESSMENT: PAIN_FUNCTIONAL_ASSESSMENT: ACTIVITIES ARE NOT PREVENTED

## 2024-07-01 NOTE — PROGRESS NOTES
Dialysis Time Out  To be done by RN and tech or 2 RNs  Staff Names Ad CANTOR RN     [x]  Identity of the patient using 2 patient identifiers  [x]  Consent for treatment  [x]  Equipment-proper machine and dialyzer  [x]  B-Hep B status (06/18/2024) neg.  [x]  Orders- to include bath, blood flow, dialyzer, time and fluid removal  [x]  Access-Correct site and in working order  [x]  Time for patient to ask questions.

## 2024-07-01 NOTE — PROGRESS NOTES
Renal Progress Note    Patient :  Vincenzo Darden; 58 y.o. MRN# [id number]  Location:  0440/0440-01  Attending:  Andrea Fabian MD  Admit Date:  6/26/2024   Hospital Day: 5    Subjective:     Patient seen and examined HD.  Tolerating the procedure well.  No new issues reported overnight.  BMP results from today reviewed sodium 131, calcium 4.8, chloride 97, bicarb 23, calcium 8.1, BUN 39, creatinine 6.3 mg/dl.  Patient getting extra dialysis today due to volume overload.    Patient normally gets dialysis as per TTS schedule.  Outpatient Medications:     Medications Prior to Admission: gabapentin (NEURONTIN) 100 MG capsule, Take 1 capsule by mouth every 8 hours for 30 days.  amLODIPine (NORVASC) 10 MG tablet, Take 1 tablet by mouth daily  linezolid (ZYVOX) 600 MG tablet, Take 1 tablet by mouth every 12 hours for 24 doses  atorvastatin (LIPITOR) 40 MG tablet, Take 1 tablet by mouth nightly  metoprolol tartrate (LOPRESSOR) 25 MG tablet, Take 1 tablet by mouth 2 times daily  tamsulosin (FLOMAX) 0.4 MG capsule, Take 1 capsule by mouth daily  aspirin 81 MG chewable tablet, Take 1 tablet by mouth daily  TRULICITY 1.5 MG/0.5ML SC injection, Inject 0.5 mLs into the skin Once a week at 5 PM  Insulin Degludec (TRESIBA FLEXTOUCH) 200 UNIT/ML SOPN, Inject 40 Units into the skin nightly  Insulin Aspart, w/Niacinamide, (FIASP FLEXTOUCH) 100 UNIT/ML SOPN, Inject 20 Units into the skin 3 times daily (with meals) Pt has his sliding scale at home    Current Medications:     Scheduled Meds:    insulin glargine  25 Units SubCUTAneous Nightly    carvedilol  12.5 mg Oral BID WC    linezolid  600 mg Oral 2 times per day    epoetin  8,000 Units IntraVENous Once per day on Tue Thu Sat    sodium chloride flush  5-40 mL IntraVENous 2 times per day    sodium chloride flush  5-40 mL IntraVENous 2 times per day    heparin (porcine)  5,000 Units SubCUTAneous 3 times per day    insulin lispro  0-4 Units SubCUTAneous TID WC    insulin lispro   06/29/24  0750 06/30/24  0647 07/01/24  0741   WBC 9.2 9.8 10.5   RBC 2.76* 3.07* 2.54*   HGB 8.0* 8.8* 7.3*   HCT 26.3* 29.3* 23.6*   MCV 95.3 95.4 92.9   MCH 29.0 28.7 28.7   MCHC 30.4 30.0 30.9   RDW 13.7 13.6 13.9    272 254   MPV 8.7 8.7 8.6      BMP:   Recent Labs     06/29/24  0750 06/30/24  0647 07/01/24  0741   * 132* 131*   K 4.3 4.6 4.8   CL 97* 98 97*   CO2 22 24 23   BUN 38* 26* 39*   CREATININE 6.0* 4.9* 6.3*   GLUCOSE 111* 66* 173*   CALCIUM 7.8* 8.2* 8.1*     MARIA DEL CARMEN:      Lab Results   Component Value Date/Time    MARIA DEL CARMEN NEGATIVE 06/18/2024 09:29 AM     SPEP:  Lab Results   Component Value Date/Time    ALBCAL 1.7 06/17/2024 05:50 PM    ALBPCT 28 06/17/2024 05:50 PM    A1PCT 9 06/17/2024 05:50 PM    A2PCT 14 06/17/2024 05:50 PM    BETAPCT 13 06/17/2024 05:50 PM    GAMGLOB 2.2 06/17/2024 05:50 PM    GGPCT 36 06/17/2024 05:50 PM    PATH ELECTRONICALLY SIGNED. GABBIE CHAVARRIA M.D. 06/17/2024 05:50 PM     C3:     Lab Results   Component Value Date/Time    C3 35 06/18/2024 09:29 AM     C4:     Lab Results   Component Value Date/Time    C4 24 06/18/2024 09:29 AM     MPO ANCA:     Lab Results   Component Value Date/Time    MPO <0.3 06/17/2024 05:50 PM     PR3 ANCA:     Lab Results   Component Value Date/Time    PR3 <0.7 06/17/2024 05:50 PM     Hep BsAg:         Lab Results   Component Value Date/Time    HEPBSAG NONREACTIVE 06/18/2024 09:29 AM     Hep C AB:          Lab Results   Component Value Date/Time    HEPCAB NONREACTIVE 06/18/2024 09:29 AM     Urinalysis/Chemistries:      Lab Results   Component Value Date/Time    NITRU NEGATIVE 06/28/2024 05:24 AM    COLORU Yellow 06/28/2024 05:24 AM    PHUR 6.5 06/28/2024 05:24 AM    PHUR 6.0 10/06/2023 06:30 AM    WBCUA 50  06/28/2024 05:24 AM    RBCUA 20 TO 50 06/28/2024 05:24 AM    BACTERIA None 06/28/2024 05:24 AM    LEUKOCYTESUR SMALL 06/28/2024 05:24 AM    UROBILINOGEN Normal 06/28/2024 05:24 AM    BILIRUBINUR NEGATIVE 06/28/2024 05:24 AM

## 2024-07-01 NOTE — PROGRESS NOTES
[facility]  Follow Up      NAME:  Vincenzo Darden  MEDICAL RECORD NUMBER:  [id number]  AGE: 58 y.o.   GENDER: male  : 1965  TODAY'S DATE:  2024    Subjective:     Reason for WOCN Evaluation and Assessment: foot wound      Vincenzo Darden is a 58 y.o. male referred by:   [x] Physician  [] Nursing  [] Other:      Wound Identification:  Wound Type: diabetic  Contributing Factors: edema, diabetes, poor glucose control, shear force, obesity, arterial insufficiency, decreased tissue oxygenation, and immunosuppression                                        Patient reports he follows with wound care at [facility]. He reports using Medihoney and collagen dressings to the hallux wound. \"Betadine to everything else\"   Hallux is covered in well adhered eschar as per prior admissions.             Objective:      /82   Pulse 74   Temp 98.1 °F (36.7 °C) (Oral)   Resp 17   Ht 1.829 m (6' 0.01\")   Wt 126.3 kg (278 lb 7.1 oz)   SpO2 94%   BMI 37.76 kg/m²   Satish Risk Score: Satish Scale Score: 19    LABS    CBC:   Lab Results   Component Value Date/Time    WBC 10.5 2024 07:41 AM    RBC 2.54 2024 07:41 AM    HGB 7.3 2024 07:41 AM    HCT 23.6 2024 07:41 AM     CMP:  Albumin:  No results found for: \"LABALBU\"  PT/INR:    Lab Results   Component Value Date/Time    PROTIME 14.5 2024 05:59 AM    PROTIME 13.3 2024 01:10 PM    INR 1.1 2024 05:59 AM     HgBA1c:    Lab Results   Component Value Date/Time    LABA1C 12.0 2024 10:51 AM     PTT: No components found for: \"LABPTT\"      Assessment:       Measurements:     24 1715   Wound 24 Toe (Comment  which one) Left;Plantar #1   Date First Assessed: 24   Present on Original Admission: Yes  Primary Wound Type: Traumatic  Location: Toe (Comment  which one)  Wound Location Orientation: Left;Plantar  Wound Description (Comments): #1   Wound Image    Wound Etiology Diabetic  Total Pressure Relief  [] Other:      Discharge Plan:  TBD     Patient/Caregiver Teaching:  Steps of care reviewed as provided. Discussed goal is to prevent infection. Left hallux eschar will require further care. Follow up as scheduled to outpatient wound care center.   Level of patient/caregiver understanding:    [] Indicates understanding                [] Needs reinforcement  [] Unsuccessful                                  [x] Verbal Understanding  [] Demonstrated understanding        [] No evidence of learning  [] Refused teaching                           [] N/A     Contact the Wound Ostomy RN on-call during working hours Monday-Friday [phone] via [employer] by searching \"wound\" under \"groups\" and selecting the on-call clinician. Sending messages via individual names will not reach a clinician.        Electronically signed by Nilsa Car RN, CWON on 7/1/2024 at 6:04 PM

## 2024-07-01 NOTE — PROGRESS NOTES
[facility]  Progress Note                   Date:   7/1/2024  Patient name: Vincenzo Darden  Date of admission:  6/26/2024  4:33 AM  MRN:   [id number]  YOB: 1965  PCP: Shaikh Calix MD    Reason for Admission: Dyspnea [R06.00]  SOB (shortness of breath) [R06.02]    Subjective:       Clinical Changes /Abnormalities: Seen & examiend in room. Does have some intermitted SOB at rest and when getting up. Patient reports that his SOB is positional. Currently on RA. Discussed with RN.  Labs, vitals, and tele reviewed. Remains SR    Medications:   Scheduled Meds:   insulin glargine  25 Units SubCUTAneous Nightly    carvedilol  12.5 mg Oral BID WC    linezolid  600 mg Oral 2 times per day    epoetin  8,000 Units IntraVENous Once per day on Tue Thu Sat    sodium chloride flush  5-40 mL IntraVENous 2 times per day    sodium chloride flush  5-40 mL IntraVENous 2 times per day    heparin (porcine)  5,000 Units SubCUTAneous 3 times per day    insulin lispro  0-4 Units SubCUTAneous TID WC    insulin lispro  0-4 Units SubCUTAneous Nightly    amLODIPine  10 mg Oral Daily    aspirin  81 mg Oral Daily    atorvastatin  40 mg Oral Nightly    gabapentin  100 mg Oral 3 times per day    tamsulosin  0.4 mg Oral Daily    pantoprazole  40 mg Oral QAM AC     Continuous Infusions:   sodium chloride      sodium chloride      dextrose       CBC:   Recent Labs     06/29/24  0750 06/30/24  0647 07/01/24  0741   WBC 9.2 9.8 10.5   HGB 8.0* 8.8* 7.3*    272 254       BMP:    Recent Labs     06/29/24  0750 06/30/24  0647 07/01/24  0741   * 132* 131*   K 4.3 4.6 4.8   CL 97* 98 97*   CO2 22 24 23   BUN 38* 26* 39*   CREATININE 6.0* 4.9* 6.3*   GLUCOSE 111* 66* 173*       Hepatic:  No results for input(s): \"AST\", \"ALT\", \"BILITOT\", \"ALKPHOS\" in the last 72 hours.    Invalid input(s): \"ALB\"    Troponin: No results for input(s): \"TROPHS\" in the last 72 hours.  BNP: No results for input(s): \"BNP\" in the last

## 2024-07-01 NOTE — PLAN OF CARE
Problem: Safety - Adult  Goal: Free from fall injury  Outcome: Progressing  Flowsheets (Taken 6/30/2024 2000)  Free From Fall Injury: Instruct family/caregiver on patient safety     Problem: ABCDS Injury Assessment  Goal: Absence of physical injury  Outcome: Progressing  Flowsheets (Taken 6/30/2024 2000)  Absence of Physical Injury: Implement safety measures based on patient assessment     Problem: Pain  Goal: Verbalizes/displays adequate comfort level or baseline comfort level  Outcome: Progressing  Flowsheets  Taken 7/1/2024 0404  Verbalizes/displays adequate comfort level or baseline comfort level: Encourage patient to monitor pain and request assistance  Taken 6/30/2024 2329  Verbalizes/displays adequate comfort level or baseline comfort level: Encourage patient to monitor pain and request assistance  Taken 6/30/2024 1949  Verbalizes/displays adequate comfort level or baseline comfort level:   Assess pain using appropriate pain scale   Encourage patient to monitor pain and request assistance     Problem: Chronic Conditions and Co-morbidities  Goal: Patient's chronic conditions and co-morbidity symptoms are monitored and maintained or improved  Outcome: Progressing  Flowsheets (Taken 6/30/2024 2000)  Care Plan - Patient's Chronic Conditions and Co-Morbidity Symptoms are Monitored and Maintained or Improved: Monitor and assess patient's chronic conditions and comorbid symptoms for stability, deterioration, or improvement     Problem: Nutrition Deficit:  Goal: Optimize nutritional status  Outcome: Progressing

## 2024-07-01 NOTE — PROGRESS NOTES
2120 RN clarified  with IR service via phone call with Dr. Watson;  Jacob to put put patient on NPO at midnight 07/01/ 24 for CT guided thoracentesis

## 2024-07-01 NOTE — CARE COORDINATION
Transitional planning    Plan is to return home, current with [facility](confirmed with Kiki) dialysis T/R/S in [place], follow for needs.

## 2024-07-01 NOTE — PROGRESS NOTES
Dialysis Post Treatment Note  Vitals:    07/01/24 1642   BP: 138/82   Pulse: 74   Resp: 17   Temp: 98.1 °F (36.7 °C)   SpO2: 94%     Pre-Weight = 129.8kg  Post-weight = Weight - Scale: 126.3 kg (278 lb 7.1 oz)  Total Liters Processed = Blood Volume Processed (Liters): 69.7 L  Rinseback Volume (mL) = Rinseback Volume (ml): 300 ml  Net Removal (mL) =  3500  Patient's dry weight= TBD  Type of access used= Tunneled cath  Length of treatment=180 min      PT tolerated tx well w/o issue. VSS pre/intra/post tx. 3.5L removed w/o complaint.

## 2024-07-01 NOTE — PROGRESS NOTES
postoperative changes in the overlying midline subcutaneous fat the anterior chest wall and underlying mediastinal fat  Nonspecific loculated fluid collection in the anterior lower mediastinum could reflect postoperative seroma though abscess cannot be excluded  Moderate sized layering right pleural effusion with adjacent right lower lobe consolidated favoring atelectasis with trace layering effusion on the left  Mild urinary bladder wall thickening could reflect incomplete bladder distention chronic wall hypertrophy or cystitis no other potential acute findings in the abdominal pelvis  Suspected cholelithiasis    IR TUNNELED CVC PLACEMENT:  6/21/2024  Successful removal of the existing non-tunneled dialysis catheter with tunneling and placement of a new right internal jugular approach 14.5 Czech x 23 cm tip to cuff palindrome dialysis catheter, as above.  The catheter is ready for use    Left foot XR 6/17/24:  Demineralization involving the tuft of the 1st distal phalanx which can be  seen in the setting of osteomyelitis.    CXR 6/17/24:  Small to moderate right pleural effusion with adjacent compressive atelectasis.       CURRENT EVALUATION- DAILY INTERVAL CHANGES 7/1/2024  BP (!) 167/85 Comment: prn given  Pulse 72   Temp 98.4 °F (36.9 °C) (Oral)   Resp 16   Ht 1.829 m (6' 0.01\")   Wt 130 kg (286 lb 9.6 oz)   SpO2 94%   BMI 38.86 kg/m²     Afebrile  VS stable, mild HTN    The patient went to IR for placement of a right pleural drain for loculated pleural effusion.  No culture sent  Prior thoracentesis culture 6-28-24: No growth     HD per Nephro    Medications reviewed:  PO Zyvox-stop date 7/7/24    Skin with Lt plantar ulcer, abrasions of Lt 1st, 3rd and 4th toes, ulcer of Lt distal hallux.    Discharge planning is underway    Labs, X rays reviewed: 7/1/2024 with independent review of X  POSITIVE COCCI IN PAIRS      FEW GRAM POSITIVE RODS     Culture HAEMOPHILUS PARAINFLUENZAE MODERATE GROWTH Identification by MALDI-TOF BETA LACTAMASE POSITIVE      NORMAL SKIN SUNNI      PREVOTELLA SPECIES MODERATE GROWTH BETA LACTAMASE NEGATIVE Identification by MALDI-TOF      PREVOTELLA SPECIES 2ND COLONY TYPE LIGHT GROWTH BETA LACTAMASE POSITIVE    Culture, Urine [[id number]] Collected: 06/17/24 1932    Order Status: Completed Specimen: Urine Updated: 06/18/24 1617     Specimen Description .URINE     Culture NO GROWTH    Culture, Blood 1 [[id number]] Collected: 06/17/24 1749    Order Status: Completed Specimen: Blood Updated: 06/22/24 2017     Specimen Description .BLOOD     Special Requests LFT HAND 10ML     Culture NO GROWTH 5 DAYS    Culture, Blood 1 [[id number]] Collected: 06/17/24 1740    Order Status: Completed Specimen: Blood Updated: 06/22/24 2014     Specimen Description .BLOOD     Special Requests RT HAND 10ML     Culture NO GROWTH 5 DAYS              Medical Decision Making-Other:     Note:    Thank you for allowing us to participate in the care of this patient. Please call with questions.    MD Josesito Haq MD Heather Helweg, APRN    ATTESTATION:    I have discussed the case, including pertinent history and exam findings with the APRN. I have evaluated the  History, physical findings and pictures of the patient and the key elements of the encounter have been performed by me. I have reviewed the laboratory data, other diagnostic studies and discussed them with the APRN. I have updated the medical record where necessary.    I agree with the assessment, plan and orders as documented by the APRN.    In addition diagnostic and decision making elements, performed by the Attending Physician, are included in the Diagnostic and Decision Making Section of the text.    Josesito Richardson MD           Office: [phone]

## 2024-07-01 NOTE — PLAN OF CARE
PLAN:     IR consultation for placement of a right CT for small loculated right pleural effusion posteriorly at the base with associated mild pleural thickening and punctate calcification  May need t-PA and Dornase post CT placement   Cont antibiotics as per ID   Will follow  No surgical intervention at this time   Further recommendations as per Dr. Clover Rasmussen, TINO, PA-C

## 2024-07-01 NOTE — BRIEF OP NOTE
Brief Postoperative Note    Vincenzo Darden  YOB: 1965  [id number]    Pre-operative Diagnosis: Loculated right pleural collection; Dyspnea    Post-operative Diagnosis: Same    Procedure: US guided 10 Fr right pleural drain placement    Anesthesia: Local    Surgeons/Assistants: Shawn    Estimated Blood Loss: less than 50     Complications: None    Specimens: Was Not requested    Electronically signed by Moy Escobar MD on 7/1/2024 at 12:22 PM

## 2024-07-01 NOTE — PROGRESS NOTES
PULMONARY PROGRESS NOTE      Patient:  Vincenzo Darden  YOB: 1965    MRN: [id number]     Acct: [id number]     Admit date: 6/26/2024    REASON FOR CONSULT:-     Pt seen and Chart reviewed.    Subjective:     The patient is a 58 y.o. male with history of multiple comorbidities including diabetes, obesity, obstructive sleep apnea, peripheral vascular disease, coronary artery disease, s/p AVR and CABG (February 2024), complicated by chest wall dehiscence and MSSA infection, CAMILLE on CKD, required initiation of hemodialysis.  [facility].     On evaluation he was found to have pleural effusion and underwent thoracentesis with removal of 50 mL of cloudy yellow fluid in the right side.  The pleural fluid is exudative with neutrophilia, markedly elevated LDH at 6402, protein 4.7 and a glucose less than 2 suggestive of empyema.        Interval History  7/1/2024:  No acute issues overnight  Chest tube placement by IR  Vital signs stable, afebrile.    Review of Systems -   Review of Systems   Constitutional:  Negative for fever.   Respiratory:  Positive for cough and shortness of breath.    Cardiovascular:  Negative for chest pain and palpitations.   Gastrointestinal:  Negative for abdominal pain, diarrhea, nausea and vomiting.   Musculoskeletal:  Negative for arthralgias and myalgias.   Neurological:  Negative for dizziness and light-headedness.           Physical Exam:  Vitals: BP (!) 144/90   Pulse 71   Temp 98.2 °F (36.8 °C) (Axillary)   Resp 18   Ht 1.829 m (6' 0.01\")   Wt 130 kg (286 lb 9.6 oz)   SpO2 94%   BMI 38.86 kg/m²   24 hour intake/output:  Intake/Output Summary (Last 24 hours) at 7/1/2024 1345  Last data filed at 7/1/2024 0600  Gross per 24 hour   Intake 1180 ml   Output 430 ml   Net 750 ml     Last 3 weights:  Wt Readings from Last 3 Encounters:   06/29/24 130 kg (286 lb 9.6 oz)   06/20/24 135.1

## 2024-07-01 NOTE — PROGRESS NOTES
@[facility]@    [facility]   IN-PATIENT SERVICE   [facility]    Progress Note    7/1/2024    4:51 PM    Name:   Vincenzo Darden  MRN:     [id number]     Acct:      [id number]   Room:   [facility] Day:  5  Admit Date:  6/26/2024  4:33 AM    PCP:   Shaikh Calix MD  Code Status:  Full Code    Subjective:     C/C: No chief complaint on file.  Shortness of breath, flank pain  Interval History Status: not changed.     Seen at bedside, hemodynamically stable, blood pressure improved  Continues to complain of shortness of breath  Plan to undergo IR guided chest tube placement today  Pulm/ID service following, daptomycin changed to Zyvox yesterday  Nephrology service following for dialysis  Labs reviewed, hemoglobin 7.3    Brief History:     58-year-old male with history of peripheral vascular disease s/p right BKA, nonhealing left foot wounds, CAD s/p CABG, s/p AVR complicated by chest wall dehiscence, MSSA infection, with CAMILLE on CKD started on dialysis recently, diabetes mellitus, who came in with shortness of breath, chest pain, flank pain, shortness of breath likely secondary to fluid overload versus right-sided empyema for which patient underwent thoracentesis and eventual chest tube placement, pulm/CT surgery/ID service have been following, possible plan for tPA dornase  Nephrology service has been following for dialysis    Medications:     Allergies:    Allergies   Allergen Reactions    Vancomycin Nausea And Vomiting     States got levofloxicin and was ok    Zosyn [Piperacillin Sod-Tazobactam So] Anaphylaxis       Current Meds:   Scheduled Meds:    insulin glargine  25 Units SubCUTAneous Nightly    carvedilol  12.5 mg Oral BID WC    linezolid  600 mg Oral 2 times per day    epoetin  8,000 Units IntraVENous Once per day on Tue Thu Sat    sodium chloride flush  5-40 mL IntraVENous 2 times per day    sodium chloride flush  5-40 mL IntraVENous 2 times per day    heparin  95.4 92.9   MCH 29.0 28.7 28.7   MCHC 30.4 30.0 30.9   RDW 13.7 13.6 13.9    272 254   MPV 8.7 8.7 8.6       Chemistry:  Recent Labs     06/29/24  0750 06/30/24  0647 07/01/24  0741   * 132* 131*   K 4.3 4.6 4.8   CL 97* 98 97*   CO2 22 24 23   GLUCOSE 111* 66* 173*   BUN 38* 26* 39*   CREATININE 6.0* 4.9* 6.3*   ANIONGAP 14 10 11   LABGLOM 10* 13* 9*   CALCIUM 7.8* 8.2* 8.1*   CKTOTAL  --  122  --        Recent Labs     06/29/24  1931 06/30/24  0736 06/30/24  1152 06/30/24  1538 06/30/24  2044 07/01/24  0819   POCGLU 104 69* 119* 150* 155* 176*       ABG:  Lab Results   Component Value Date/Time    POCPH 7.365 02/26/2024 09:01 PM    POCPCO2 35.9 02/26/2024 09:01 PM    POCPO2 83.0 02/26/2024 09:01 PM    POCHCO3 20.5 02/26/2024 09:01 PM    NBEA 4.4 02/26/2024 09:01 PM    BGID2AMI 95.9 02/26/2024 09:01 PM    FIO2 INFORMATION NOT PROVIDED 06/03/2024 12:06 AM     Lab Results   Component Value Date/Time    SPECIAL Site: Pleural Fl 06/28/2024 03:33 PM     Lab Results   Component Value Date/Time    CULTURE NO GROWTH 3 DAYS 06/28/2024 03:33 PM       Radiology:  US RETROPERITONEAL LIMITED    Result Date: 6/26/2024  Unremarkable ultrasound of the kidneys.       Physical Examination:        General appearance:  alert, cooperative and no distress  Mental Status:  oriented to person, place and time and normal affect  Lungs: Decreased right-sided breath sounds, normal effort  Heart:  regular rate and rhythm, no murmur  Abdomen: Bilateral flank tenderness improved from before,, normal bowel sounds, no masses, hepatomegaly, splenomegaly  Extremities: S/p right BKA with chronic left-sided first toe gangrene skin:  no gross lesions, rashes, induration except above    Assessment:        Hospital Problems             Last Modified POA    * (Principal) Dyspnea 6/26/2024 Yes    Chest pain 6/28/2024 Yes    Hypertension, essential 7/1/2024 Yes    COLT (obstructive sleep apnea) 6/28/2024 Yes    Class 2 obesity due to excess

## 2024-07-02 ENCOUNTER — APPOINTMENT (OUTPATIENT)
Dept: DIALYSIS | Age: 59
DRG: 291 | End: 2024-07-02
Attending: STUDENT IN AN ORGANIZED HEALTH CARE EDUCATION/TRAINING PROGRAM
Payer: MEDICARE

## 2024-07-02 LAB
ANION GAP SERPL CALCULATED.3IONS-SCNC: 8 MMOL/L (ref 9–16)
BASOPHILS # BLD: 0.03 K/UL (ref 0–0.2)
BASOPHILS NFR BLD: 0 % (ref 0–2)
BUN SERPL-MCNC: 28 MG/DL (ref 6–20)
CALCIUM SERPL-MCNC: 8 MG/DL (ref 8.6–10.4)
CHLORIDE SERPL-SCNC: 95 MMOL/L (ref 98–107)
CO2 SERPL-SCNC: 28 MMOL/L (ref 20–31)
CREAT SERPL-MCNC: 5.3 MG/DL (ref 0.7–1.2)
EOSINOPHIL # BLD: 0.34 K/UL (ref 0–0.44)
EOSINOPHILS RELATIVE PERCENT: 4 % (ref 1–4)
ERYTHROCYTE [DISTWIDTH] IN BLOOD BY AUTOMATED COUNT: 14 % (ref 11.8–14.4)
GFR, ESTIMATED: 12 ML/MIN/1.73M2
GLUCOSE BLD-MCNC: 148 MG/DL (ref 75–110)
GLUCOSE BLD-MCNC: 157 MG/DL (ref 75–110)
GLUCOSE BLD-MCNC: 250 MG/DL (ref 75–110)
GLUCOSE SERPL-MCNC: 153 MG/DL (ref 74–99)
HCT VFR BLD AUTO: 24.7 % (ref 40.7–50.3)
HGB BLD-MCNC: 7.6 G/DL (ref 13–17)
IMM GRANULOCYTES # BLD AUTO: 0.06 K/UL (ref 0–0.3)
IMM GRANULOCYTES NFR BLD: 1 %
LYMPHOCYTES NFR BLD: 1.01 K/UL (ref 1.1–3.7)
LYMPHOCYTES RELATIVE PERCENT: 11 % (ref 24–43)
MCH RBC QN AUTO: 28.7 PG (ref 25.2–33.5)
MCHC RBC AUTO-ENTMCNC: 30.8 G/DL (ref 28.4–34.8)
MCV RBC AUTO: 93.2 FL (ref 82.6–102.9)
MONOCYTES NFR BLD: 1.02 K/UL (ref 0.1–1.2)
MONOCYTES NFR BLD: 11 % (ref 3–12)
NEUTROPHILS NFR BLD: 74 % (ref 36–65)
NEUTS SEG NFR BLD: 6.78 K/UL (ref 1.5–8.1)
NRBC BLD-RTO: 0 PER 100 WBC
PLATELET # BLD AUTO: 296 K/UL (ref 138–453)
PMV BLD AUTO: 8.5 FL (ref 8.1–13.5)
POTASSIUM SERPL-SCNC: 4.4 MMOL/L (ref 3.7–5.3)
RBC # BLD AUTO: 2.65 M/UL (ref 4.21–5.77)
SODIUM SERPL-SCNC: 131 MMOL/L (ref 136–145)
SURGICAL PATHOLOGY REPORT: NORMAL
WBC OTHER # BLD: 9.2 K/UL (ref 3.5–11.3)

## 2024-07-02 PROCEDURE — 6370000000 HC RX 637 (ALT 250 FOR IP): Performed by: INTERNAL MEDICINE

## 2024-07-02 PROCEDURE — 99232 SBSQ HOSP IP/OBS MODERATE 35: CPT | Performed by: STUDENT IN AN ORGANIZED HEALTH CARE EDUCATION/TRAINING PROGRAM

## 2024-07-02 PROCEDURE — 6360000002 HC RX W HCPCS: Performed by: NURSE PRACTITIONER

## 2024-07-02 PROCEDURE — 82947 ASSAY GLUCOSE BLOOD QUANT: CPT

## 2024-07-02 PROCEDURE — 36415 COLL VENOUS BLD VENIPUNCTURE: CPT

## 2024-07-02 PROCEDURE — 6360000002 HC RX W HCPCS: Performed by: INTERNAL MEDICINE

## 2024-07-02 PROCEDURE — 99232 SBSQ HOSP IP/OBS MODERATE 35: CPT | Performed by: INTERNAL MEDICINE

## 2024-07-02 PROCEDURE — 2060000000 HC ICU INTERMEDIATE R&B

## 2024-07-02 PROCEDURE — 99233 SBSQ HOSP IP/OBS HIGH 50: CPT | Performed by: NURSE PRACTITIONER

## 2024-07-02 PROCEDURE — 2580000003 HC RX 258: Performed by: STUDENT IN AN ORGANIZED HEALTH CARE EDUCATION/TRAINING PROGRAM

## 2024-07-02 PROCEDURE — 2580000003 HC RX 258: Performed by: NURSE PRACTITIONER

## 2024-07-02 PROCEDURE — 6370000000 HC RX 637 (ALT 250 FOR IP): Performed by: STUDENT IN AN ORGANIZED HEALTH CARE EDUCATION/TRAINING PROGRAM

## 2024-07-02 PROCEDURE — 90935 HEMODIALYSIS ONE EVALUATION: CPT | Performed by: INTERNAL MEDICINE

## 2024-07-02 PROCEDURE — 90935 HEMODIALYSIS ONE EVALUATION: CPT

## 2024-07-02 PROCEDURE — 85025 COMPLETE CBC W/AUTO DIFF WBC: CPT

## 2024-07-02 PROCEDURE — 80048 BASIC METABOLIC PNL TOTAL CA: CPT

## 2024-07-02 RX ORDER — HYDROMORPHONE HYDROCHLORIDE 2 MG/1
1 TABLET ORAL EVERY 6 HOURS PRN
Status: DISCONTINUED | OUTPATIENT
Start: 2024-07-02 | End: 2024-07-10 | Stop reason: HOSPADM

## 2024-07-02 RX ORDER — MORPHINE SULFATE 2 MG/ML
2 INJECTION, SOLUTION INTRAMUSCULAR; INTRAVENOUS EVERY 4 HOURS PRN
Status: DISCONTINUED | OUTPATIENT
Start: 2024-07-02 | End: 2024-07-02

## 2024-07-02 RX ORDER — OXYCODONE HYDROCHLORIDE AND ACETAMINOPHEN 5; 325 MG/1; MG/1
1 TABLET ORAL EVERY 6 HOURS PRN
Status: DISCONTINUED | OUTPATIENT
Start: 2024-07-02 | End: 2024-07-10 | Stop reason: HOSPADM

## 2024-07-02 RX ADMIN — ASPIRIN 81 MG 81 MG: 81 TABLET ORAL at 08:36

## 2024-07-02 RX ADMIN — SODIUM CHLORIDE, PRESERVATIVE FREE 10 ML: 5 INJECTION INTRAVENOUS at 20:51

## 2024-07-02 RX ADMIN — CARVEDILOL 12.5 MG: 12.5 TABLET, FILM COATED ORAL at 16:11

## 2024-07-02 RX ADMIN — GABAPENTIN 100 MG: 100 CAPSULE ORAL at 06:12

## 2024-07-02 RX ADMIN — AMLODIPINE BESYLATE 10 MG: 10 TABLET ORAL at 16:11

## 2024-07-02 RX ADMIN — HEPARIN SODIUM 1900 UNITS: 1000 INJECTION INTRAVENOUS; SUBCUTANEOUS at 13:54

## 2024-07-02 RX ADMIN — GABAPENTIN 100 MG: 100 CAPSULE ORAL at 21:30

## 2024-07-02 RX ADMIN — HYDROMORPHONE HYDROCHLORIDE 1 MG: 2 TABLET ORAL at 18:46

## 2024-07-02 RX ADMIN — SODIUM CHLORIDE, PRESERVATIVE FREE 10 ML: 5 INJECTION INTRAVENOUS at 08:36

## 2024-07-02 RX ADMIN — INSULIN GLARGINE 25 UNITS: 100 INJECTION, SOLUTION SUBCUTANEOUS at 20:50

## 2024-07-02 RX ADMIN — DIPHENHYDRAMINE HYDROCHLORIDE, ZINC ACETATE: 2; .1 CREAM TOPICAL at 22:39

## 2024-07-02 RX ADMIN — OXYCODONE HYDROCHLORIDE AND ACETAMINOPHEN 1 TABLET: 5; 325 TABLET ORAL at 04:21

## 2024-07-02 RX ADMIN — HEPARIN SODIUM 1900 UNITS: 1000 INJECTION INTRAVENOUS; SUBCUTANEOUS at 13:53

## 2024-07-02 RX ADMIN — OXYCODONE HYDROCHLORIDE AND ACETAMINOPHEN 1 TABLET: 5; 325 TABLET ORAL at 17:02

## 2024-07-02 RX ADMIN — PANTOPRAZOLE SODIUM 40 MG: 40 TABLET, DELAYED RELEASE ORAL at 06:12

## 2024-07-02 RX ADMIN — OXYCODONE HYDROCHLORIDE AND ACETAMINOPHEN 1 TABLET: 5; 325 TABLET ORAL at 23:03

## 2024-07-02 RX ADMIN — HYDROMORPHONE HYDROCHLORIDE 0.5 MG: 1 INJECTION, SOLUTION INTRAMUSCULAR; INTRAVENOUS; SUBCUTANEOUS at 02:08

## 2024-07-02 RX ADMIN — LINEZOLID 600 MG: 600 TABLET, FILM COATED ORAL at 20:50

## 2024-07-02 RX ADMIN — LINEZOLID 600 MG: 600 TABLET, FILM COATED ORAL at 16:11

## 2024-07-02 RX ADMIN — TIZANIDINE 4 MG: 4 TABLET ORAL at 01:03

## 2024-07-02 RX ADMIN — TAMSULOSIN HYDROCHLORIDE 0.4 MG: 0.4 CAPSULE ORAL at 08:36

## 2024-07-02 RX ADMIN — TIZANIDINE 4 MG: 4 TABLET ORAL at 20:50

## 2024-07-02 RX ADMIN — DESMOPRESSIN ACETATE 40 MG: 0.2 TABLET ORAL at 20:50

## 2024-07-02 RX ADMIN — ERYTHROPOIETIN 8000 UNITS: 10000 INJECTION, SOLUTION INTRAVENOUS; SUBCUTANEOUS at 15:17

## 2024-07-02 ASSESSMENT — PAIN SCALES - GENERAL
PAINLEVEL_OUTOF10: 7
PAINLEVEL_OUTOF10: 2
PAINLEVEL_OUTOF10: 8
PAINLEVEL_OUTOF10: 10
PAINLEVEL_OUTOF10: 8
PAINLEVEL_OUTOF10: 8
PAINLEVEL_OUTOF10: 7
PAINLEVEL_OUTOF10: 7
PAINLEVEL_OUTOF10: 8
PAINLEVEL_OUTOF10: 0
PAINLEVEL_OUTOF10: 7

## 2024-07-02 ASSESSMENT — PAIN DESCRIPTION - DESCRIPTORS
DESCRIPTORS: SORE
DESCRIPTORS: ACHING
DESCRIPTORS: ACHING
DESCRIPTORS: SORE

## 2024-07-02 ASSESSMENT — PAIN DESCRIPTION - ORIENTATION
ORIENTATION: RIGHT

## 2024-07-02 ASSESSMENT — PAIN DESCRIPTION - LOCATION
LOCATION: CHEST

## 2024-07-02 NOTE — PLAN OF CARE
Problem: Safety - Adult  Goal: Free from fall injury  Outcome: Progressing     Problem: ABCDS Injury Assessment  Goal: Absence of physical injury  Outcome: Progressing     Problem: Pain  Goal: Verbalizes/displays adequate comfort level or baseline comfort level  Outcome: Progressing     Problem: Chronic Conditions and Co-morbidities  Goal: Patient's chronic conditions and co-morbidity symptoms are monitored and maintained or improved  Outcome: Progressing     Problem: Nutrition Deficit:  Goal: Optimize nutritional status  Outcome: Progressing

## 2024-07-02 NOTE — PROGRESS NOTES
\"CHOL\", \"HDL\" in the last 72 hours.    Invalid input(s): \"LDLCALCU\"  INR:   No results for input(s): \"INR\" in the last 72 hours.      Objective:   Vitals: /70   Pulse 69   Temp 98.6 °F (37 °C) (Oral)   Resp 16   Ht 1.829 m (6' 0.01\")   Wt 126.3 kg (278 lb 7.1 oz)   SpO2 95%   BMI 37.76 kg/m²   General appearance: alert and cooperative with exam  HEENT: Head: Normocephalic, no lesions, without obvious abnormality.  Neck:no JVD, trachea midline, no adenopathy  Lungs: dim throughout  + CT   Heart: Regular rate and rhythm, s1/s2 auscultated, + murmurs  Abdomen: soft, non-tender, bowel sounds active, obese  Extremities: right amp, LLE with dressing in place  Neurologic: not done    ECHO: 6/16/2024    Left Ventricle: Mildly reduced left ventricular systolic function with a visually estimated EF of 45 - 50%. EF by 2D Simpsons Biplane is 55%. Left ventricle size is normal. Findings consistent with severe concentric hypertrophy. Septal motion is consistent with bundle branch block. See diagram for wall motion findings. Global longitudinal strain is -12.5%.    Right Ventricle: Right ventricle size is normal. Normal systolic function.    Aortic Valve: Mild sclerosis of the aortic valve cusp. Mild stenosis of the aortic valve. AV mean gradient is 13 mmHg. AV area by continuity VTI is 1.8 cm2.    Mitral Valve: Mild annular calcification of the mitral valve. No regurgitation.    Tricuspid Valve: Valve structure is normal. No regurgitation.    Left Atrium: Left atrium size is normal.    Right Atrium: Right atrium size is normal.    Aorta: Normal sized aortic root and ascending aorta.    Pericardium: No pericardial effusion.    Image quality is adequate.     Cardiac Angiography: 10/9/2023  Diagnostic  Dominance: Right  Left Main   Has 20% stenosis.      Left Anterior Descending   Has eccentric 75-80% stenosis. The apical LAD has 60% stenosis. The D1 has proximal 90% stenosis. The D2 is small with 90% ostial stenosis.  Heart failure, diastolic, with acute decompensation ([facility])     Hyperkalemia     History of aortic valve replacement     CAMILLE (acute kidney injury) ([facility])     Wound eschar of foot     PVD (peripheral vascular disease) ([facility])     Wound, open, foot, left, initial encounter     Dyspnea     SOB (shortness of breath)     Pleural effusion     Postoperative seroma involving circulatory system after cardiac bypass     Biventricular congestive heart failure ([facility])      Plan of Treatment:   Stable and improving. Volume management per nephrology/HD. Appreciate assistance.  Echo reviewed as above. No further ischemic work=up. Recent CABG in Feb. Cont ASA, statin & BB.    BP stable. Continue Norvasc  Keep K >4 and Mg > 2   IV ATB per ID   CTS managing chest tube.    No further cardiac workup.  Ok for patient to be discharged per CV standpoint and for him to follow up outpatient with [facility] in 2-4 weeks.     Electronically signed by ADEOLA BATES CNP on 7/2/2024 at 10:28 AM  [facility]  [phone]

## 2024-07-02 NOTE — PROGRESS NOTES
Renal Progress Note    Patient :  Vincenzo Darden; 58 y.o. MRN# [id number]  Location:  0440/0440-01  Attending:  Andrea Fabian MD  Admit Date:  6/26/2024   Hospital Day: 6    Subjective:     Patient seen and examined on HD. Tolerating the procedure well.   No new issues reported.     Labs reviewed: sodium 131, potassium 4.4, chloride 95, bicarb 28, BUN 28, creatinine 5.3, calcium 8.0.   Pt is oliguric on HD, normal days TTS, last treatment Monday 7/1, ran for 2 hrs, approx 3L fluid removal via right subclavian HD catheter.   Patient has right-sided chest tube basement which was done by IR 7/1/2024.  Pulmonology and CT surgery on board and managing may need t-PA dornase per the notes.    History reviewed:  Vincenzo Darden is a 58 y.o.  male w/ PVD s/p right BKA, non healing left foot wounds, CAD s/p AVR/CABGx3 in 02/2024 complicated by chest wall dehiscence, MSSA infection, w/ CAMILLE on CKD3 s/p initiating HD (HD T/R/S), DM2, COLT who was recently discharged 06/20/2024 who returned to [facility] yesterday evening with worsening shortness of breath, flank pain with chest pain and recommended for transfer to [facility] for the management of Dyspnea.     Patient does describe progressive shortness of breath since his discharge with acute worsening symptoms the past few days.  States he is only able to walk 25 to 50 feet, he does describe midsternal sharp intermittent chest pain with pleurisy.      + Acute bilateral flank pain with sharp quality that waxed and waned with nausea that began after dialysis yesterday.  Does struggle with chronic lumbago/ OA and states those symptoms are stable.  Denies recent falls or injuries .  denies urinary symptoms.  He does make small amounts of urine but denies hematuria or history of kidney stones.  Denies diarrhea or constipation.      Had right-sided chest tube placed by IR 7/1, serous output noted, CTS indicating patient may also require t-PA and dornase if no  97* 95*   CO2 24 23 28   BUN 26* 39* 28*   CREATININE 4.9* 6.3* 5.3*   GLUCOSE 66* 173* 153*   CALCIUM 8.2* 8.1* 8.0*        MARIA DEL CARMEN:      Lab Results   Component Value Date/Time    MARIA DEL CARMEN NEGATIVE 06/18/2024 09:29 AM     SPEP:  Lab Results   Component Value Date/Time    ALBCAL 1.7 06/17/2024 05:50 PM    ALBPCT 28 06/17/2024 05:50 PM    A1PCT 9 06/17/2024 05:50 PM    A2PCT 14 06/17/2024 05:50 PM    BETAPCT 13 06/17/2024 05:50 PM    GAMGLOB 2.2 06/17/2024 05:50 PM    GGPCT 36 06/17/2024 05:50 PM    PATH ELECTRONICALLY SIGNED. GABBIE CHAVARRIA M.D. 06/17/2024 05:50 PM       C3:     Lab Results   Component Value Date/Time    C3 35 06/18/2024 09:29 AM     C4:     Lab Results   Component Value Date/Time    C4 24 06/18/2024 09:29 AM     MPO ANCA:     Lab Results   Component Value Date/Time    MPO <0.3 06/17/2024 05:50 PM     PR3 ANCA:     Lab Results   Component Value Date/Time    PR3 <0.7 06/17/2024 05:50 PM       Hep BsAg:         Lab Results   Component Value Date/Time    HEPBSAG NONREACTIVE 06/18/2024 09:29 AM     Hep C AB:          Lab Results   Component Value Date/Time    HEPCAB NONREACTIVE 06/18/2024 09:29 AM       Urinalysis/Chemistries:      Lab Results   Component Value Date/Time    NITRU NEGATIVE 06/28/2024 05:24 AM    COLORU Yellow 06/28/2024 05:24 AM    PHUR 6.5 06/28/2024 05:24 AM    PHUR 6.0 10/06/2023 06:30 AM    WBCUA 50  06/28/2024 05:24 AM    RBCUA 20 TO 50 06/28/2024 05:24 AM    BACTERIA None 06/28/2024 05:24 AM    LEUKOCYTESUR SMALL 06/28/2024 05:24 AM    UROBILINOGEN Normal 06/28/2024 05:24 AM    BILIRUBINUR NEGATIVE 06/28/2024 05:24 AM    GLUCOSEU TRACE 06/28/2024 05:24 AM    KETUA NEGATIVE 06/28/2024 05:24 AM       Urine Potassium:    Lab Results   Component Value Date/Time    KUR 52.2 10/05/2023 08:03 AM     Urine Chloride:    Lab Results   Component Value Date/Time    CLUR 27 10/05/2023 08:03 AM     Urine Osmolarity:   Lab Results   Component Value Date/Time    OSMOU 290 06/06/2024 06:07 PM

## 2024-07-02 NOTE — PROGRESS NOTES
Dialysis Post Treatment Note  Vitals:    07/02/24 1732   BP: 142 87   Pulse: 72   Resp: 18   Temp: 37.4   SpO2: 95     Pre-Weight = 131.5 kg  Post-weight = Weight - Scale: 124.2 kg (273 lb 13 oz)  Total Liters Processed = Blood Volume Processed (Liters): 59.23 L  Rinseback Volume (mL) = Rinseback Volume (ml): 300 ml  Net Removal (mL) = 3500   Patient's dry weight=remove 3500  Type of access used=right perm cath  Length of treatment=210    Post treatment all blood returned, heparin instilled sterile end caps applied, pt tolerated treatment well, returning to room via st. Post report called to RN on floor Johanna. No c/o post treatment.

## 2024-07-02 NOTE — PROGRESS NOTES
Dialysis Time Out  To be done by RN and tech or 2 RNs  Staff Names Marysol CANTOR    [x]  Identity of the patient using 2 patient identifiers  [x]  Consent for treatment  [x]  Equipment-proper machine and dialyzer  [x]  B-Hep B status  6/18/24  [x]  Orders- to include bath, blood flow, dialyzer, time and fluid removal  [x]  Access-Correct site and in working order  [x]  Time for patient to ask questions.

## 2024-07-02 NOTE — PLAN OF CARE
Problem: Safety - Adult  Goal: Free from fall injury  Outcome: Progressing  Flowsheets (Taken 7/2/2024 1033)  Free From Fall Injury:   Instruct family/caregiver on patient safety   Based on caregiver fall risk screen, instruct family/caregiver to ask for assistance with transferring infant if caregiver noted to have fall risk factors     Problem: ABCDS Injury Assessment  Goal: Absence of physical injury  Outcome: Progressing  Flowsheets (Taken 7/2/2024 1033)  Absence of Physical Injury: Implement safety measures based on patient assessment     Problem: Pain  Goal: Verbalizes/displays adequate comfort level or baseline comfort level  Outcome: Progressing     Problem: Chronic Conditions and Co-morbidities  Goal: Patient's chronic conditions and co-morbidity symptoms are monitored and maintained or improved  Outcome: Progressing     Problem: Nutrition Deficit:  Goal: Optimize nutritional status  Outcome: Progressing

## 2024-07-02 NOTE — PROGRESS NOTES
Physical Therapy        Physical Therapy Cancel Note      DATE: 2024    NAME: Vincenzo Darden  MRN: [id number]   : 1965      Patient not seen this date for Physical Therapy due to:    Pt not in room      Electronically signed by REBECCA MIRANDA PTA on 2024 at 1:15 PM

## 2024-07-02 NOTE — PROGRESS NOTES
[facility] Cardiothoracic Surgery  Progress Note  7/2/2024    2:32 PM    Name:   Vincenzo Darden      Day:  6  Admit Date:  6/26/2024  4:33 AM      CC: SOB    Subjective:     No new complaints    Physical Examination:        /79   Pulse 70   Temp 97.6 °F (36.4 °C)   Resp 18   Ht 1.829 m (6' 0.01\")   Wt 128.7 kg (283 lb 11.7 oz)   SpO2 96%   BMI 38.47 kg/m²   General appearance:  AAO x 3  Lungs:  diminished right  Heart: RR  Abdomen: benign  Extremities: right aka left no edema    Medications:     Current Meds:   Scheduled Meds:    insulin glargine  25 Units SubCUTAneous Nightly    carvedilol  12.5 mg Oral BID WC    linezolid  600 mg Oral 2 times per day    epoetin  8,000 Units IntraVENous Once per day on Tue Thu Sat    sodium chloride flush  5-40 mL IntraVENous 2 times per day    sodium chloride flush  5-40 mL IntraVENous 2 times per day    heparin (porcine)  5,000 Units SubCUTAneous 3 times per day    insulin lispro  0-4 Units SubCUTAneous TID WC    insulin lispro  0-4 Units SubCUTAneous Nightly    amLODIPine  10 mg Oral Daily    aspirin  81 mg Oral Daily    atorvastatin  40 mg Oral Nightly    gabapentin  100 mg Oral 3 times per day    tamsulosin  0.4 mg Oral Daily    pantoprazole  40 mg Oral QAM AC     Continuous Infusions:    sodium chloride      sodium chloride      dextrose       PRN Meds: diphenhydrAMINE-zinc acetate, tiZANidine, labetalol, oxyCODONE-acetaminophen, sodium chloride flush, sodium chloride, sodium chloride flush, sodium chloride, ondansetron **OR** ondansetron, polyethylene glycol, acetaminophen **OR** acetaminophen, glucose, dextrose bolus **OR** dextrose bolus, glucagon (rDNA), dextrose, heparin (porcine), heparin (porcine)    I/O     I/O (24Hr):    Intake/Output Summary (Last 24 hours) at 7/2/2024 1432  Last data filed at 7/2/2024 0600  Gross per 24 hour   Intake 300 ml   Output 3930 ml   Net -3630 ml       LABS     Hematology:  Recent Labs     06/30/24  0647 07/01/24  0741  Empyema lung (HCC) 6/30/2024 Yes    Dependence on renal dialysis (HCC) 7/1/2024 Yes    Bilateral lower extremity edema 7/1/2024 Yes    Essential hypertension 7/1/2024 Yes       Plan:        Chest tube placed has 140 cc in chest tube. Chest tube not to suction and actively draining. Will place to suction overnight if no drainage start tPA first thing tomorrow. Patient currently off floor for dialysis.    HERRERA Jacobson  7/2/2024  2:32 PM

## 2024-07-02 NOTE — PROGRESS NOTES
involving circulatory system after cardiac bypass    Biventricular congestive heart failure (HCC)    HFrEF (heart failure with reduced ejection fraction) (HCC)    Osteomyelitis of great toe of left foot (HCC)    Ischemic cardiomyopathy    Empyema of right pleural space (HCC)    Empyema lung (HCC)    Dependence on renal dialysis (HCC)    Bilateral lower extremity edema    Essential hypertension       Assessment and Plan:     Right-sided empyema  CAMILLE and CKD, on currently dialysis dependent  S/p CABG and AVR (complicated by wound dehiscence and MSSA bacteremia)  HFrEF  History of peripheral vascular disease, s/p right BKA  Obesity  COLT  Essential hypertension  Type II DM      Plan:    Keep oxygen saturation more than 90%  CT surgery consulted, appreciate recommendations  IR guided chest tube placement on 7/1  Pleural fluid concerning for empyema  Maintain bronchopulmonary hygiene  Monitor I's/O, electrolytes with a goal of even/negative fluid balance  Hemodialysis as per nephrology  DVT/stress ulcer prophylaxis  PT/OT    Rakan Fraire MD  PGY-3, Internal Medicine Resident  [facility]         7/2/2024, 7:49 AM  Attending Physician Statement  I have discussed the care of Vincenzo Darden, including pertinent history and exam findings,  with the resident. I have seen and examined the patient and the key elements of all parts of the encounter have been performed by me.  I agree with the assessment, plan and orders as documented by the resident with additions .      Treatment plan Discussed with nursing staff in detail , all questions answered .   Electronically signed by Ajit Tobar MD on   7/3/24 at 5:50 PM EDT    Please note that this chart was generated using voice recognition Dragon dictation software.  Although every effort was made to ensure the accuracy of this automated transcription, some errors in transcription may have occurred.

## 2024-07-02 NOTE — PROGRESS NOTES
patient for the following problems:  Shortness of breath  Rt pleural effusion with compressive atelectasis  Loculated lower anterior mediastinum fluid collection  CAMILLE on CKD stage III  Recent MSSA septicemia 6-2-24 x 2  Was under treatment with Linezolid because of prior CAMILLE with Daptomycin  Hx anaphylaxis with Zosyn    Concern for left great toe osteomyelitis   CAD  S/P CABG and AVR Feb 2024  Hx of Sternal wound dehiscence-fully healed  DM 2  PVD with prior Rt BKA  Left leg and foot ulcers.  Allergy to vancomycin (nausea)  Allergy to Zosyn (anaphylaxis)  Evaluation of Patient:  Evaluation for prior MSSA septicemia  Rt pleural effusion with compressive atelectasis  Loculated lower anterior mediastinum fluid collection  CAMILLE on CKD stage III  Please see daily details in Interval Changes Section  Changes in physical exam:  Afebrile  VS stable  Dyspnea  CAMILLE  Please see daily details in Physical Exam section and in Interval Changes Section  Changes in ROS:  Unchanged  Please see daily details in Review of Symptoms section and in Interval Changes Section  Discussion with Referring Physician or Service  Dr. Fabian.  Laboratory and Radiologic Studies with personal review of radiologic studies  Laboratory  Radiology  Microbiology  Prior MSSA septicemia  Please see Details in daily Interval Changes Section devoted to Lab, Micro and Radiology and in Medical Decision Laboratory, Imaging and Cultures sections.  Review of Infection Control and Prevention measures:  Universal precautions  Please see daily details in Infection Control Recommendations section  Administer medications as ordered:  Linezolid  Review of Antimicrobial Stewardship Measures:  Targeted therapy  PK dosing  Please see daily details in Antimicrobial Stewardship Recommendations section   Prognosis:  Guarded  Review of Discharge Planning:  Please see daily details in Coordination of Outpatient Care section  Review of need for outpatient follow up.    Josesito Richardson,  supervision.       ATTESTATION:     I have discussed the case, including pertinent history and exam findings with the medical resident. I have evaluated the  History, physical findings and pictures of the patient and the key elements of the encounter have been performed by me. I have reviewed the laboratory data, other diagnostic studies and discussed them with the medical resident. I have updated the medical record where necessary.     I agree with the assessment, plan and orders as documented by the medical resident and I have modified them as necessary.     Josesito Richardson MD    7/2/2024             Office: [phone]

## 2024-07-02 NOTE — PROGRESS NOTES
@[facility]@    [facility]   IN-PATIENT SERVICE   [facility]    Progress Note    7/2/2024    6:02 PM    Name:   Vincenzo Darden  MRN:     [id number]     Acct:      [id number]   Room:   [facility][note 3][facility] Day:  6  Admit Date:  6/26/2024  4:33 AM    PCP:   Shaikh Calix MD  Code Status:  Full Code    Subjective:     C/C: No chief complaint on file.  Shortness of breath, flank pain  Interval History Status: not changed.     Seen at bedside, hemodynamically stable, blood pressure improved  Continues to complain of shortness of breath, pain at the chest tube site,  S/p IR guided right-sided chest tube placement yesterday, currently on suction, CT surgery following,  Pulm/ID service following, currently on Zyvox  Nephrology service following for dialysis  Labs reviewed, hemoglobin stable at 7.6  Brief History:     58-year-old male with history of peripheral vascular disease s/p right BKA, nonhealing left foot wounds, CAD s/p CABG, s/p AVR complicated by chest wall dehiscence, MSSA infection, with CAMILLE on CKD started on dialysis recently, diabetes mellitus, who came in with shortness of breath, chest pain, flank pain, shortness of breath likely secondary to fluid overload versus right-sided empyema for which patient underwent thoracentesis and eventual chest tube placement, pulm/CT surgery/ID service have been following, possible plan for tPA dornase  Nephrology service has been following for dialysis    Medications:     Allergies:    Allergies   Allergen Reactions   • Vancomycin Nausea And Vomiting     States got levofloxicin and was ok   • Zosyn [Piperacillin Sod-Tazobactam So] Anaphylaxis       Current Meds:   Scheduled Meds:   • insulin glargine  25 Units SubCUTAneous Nightly   • carvedilol  12.5 mg Oral BID WC   • linezolid  600 mg Oral 2 times per day   • epoetin  8,000 Units IntraVENous Once per day on Tue Thu Sat   • sodium chloride flush  5-40 mL IntraVENous 2 times per day   •  chloride flush  5-40 mL IntraVENous 2 times per day    heparin (porcine)  5,000 Units SubCUTAneous 3 times per day    insulin lispro  0-4 Units SubCUTAneous TID WC    insulin lispro  0-4 Units SubCUTAneous Nightly    amLODIPine  10 mg Oral Daily    aspirin  81 mg Oral Daily    atorvastatin  40 mg Oral Nightly    gabapentin  100 mg Oral 3 times per day    tamsulosin  0.4 mg Oral Daily    pantoprazole  40 mg Oral QAM AC     Continuous Infusions:    sodium chloride      sodium chloride      dextrose       PRN Meds: HYDROmorphone, oxyCODONE-acetaminophen, diphenhydrAMINE-zinc acetate, tiZANidine, labetalol, sodium chloride flush, sodium chloride, sodium chloride flush, sodium chloride, ondansetron **OR** ondansetron, polyethylene glycol, acetaminophen **OR** acetaminophen, glucose, dextrose bolus **OR** dextrose bolus, glucagon (rDNA), dextrose, heparin (porcine), heparin (porcine)    Data:     Past Medical History:   has a past medical history of Diabetes mellitus (HCC) and Hernia, abdominal.    Social History:   reports that he has been smoking cigarettes. He started smoking about 29 years ago. He has a 44.3 pack-year smoking history. He has never used smokeless tobacco. He reports that he does not currently use alcohol.     Family History: No family history on file.    Vitals:  /82   Pulse 72   Temp 97.7 °F (36.5 °C) (Oral)   Resp 18   Ht 1.829 m (6' 0.01\")   Wt 124.2 kg (273 lb 13 oz)   SpO2 91%   BMI 37.13 kg/m²   Temp (24hrs), Av.3 °F (36.8 °C), Min:97.6 °F (36.4 °C), Max:99.3 °F (37.4 °C)    Recent Labs     24  1709 24  2012 24  0736 24  1617   POCGLU 147* 214* 148* 157*         I/O (24Hr):    Intake/Output Summary (Last 24 hours) at 2024 1802  Last data filed at 2024 1510  Gross per 24 hour   Intake 700 ml   Output 4265 ml   Net -3565 ml         Labs:  Hematology:  Recent Labs     24  0647 24  0741 24  0650   WBC 9.8 10.5 9.2   RBC 3.07* 2.54*

## 2024-07-03 ENCOUNTER — APPOINTMENT (OUTPATIENT)
Dept: GENERAL RADIOLOGY | Age: 59
DRG: 291 | End: 2024-07-03
Attending: STUDENT IN AN ORGANIZED HEALTH CARE EDUCATION/TRAINING PROGRAM
Payer: MEDICARE

## 2024-07-03 LAB
ANION GAP SERPL CALCULATED.3IONS-SCNC: 10 MMOL/L (ref 9–16)
BASOPHILS # BLD: 0.04 K/UL (ref 0–0.2)
BASOPHILS NFR BLD: 1 % (ref 0–2)
BUN SERPL-MCNC: 27 MG/DL (ref 6–20)
CALCIUM SERPL-MCNC: 7.9 MG/DL (ref 8.6–10.4)
CHLORIDE SERPL-SCNC: 94 MMOL/L (ref 98–107)
CO2 SERPL-SCNC: 28 MMOL/L (ref 20–31)
CREAT SERPL-MCNC: 4.8 MG/DL (ref 0.7–1.2)
EOSINOPHIL # BLD: 0.35 K/UL (ref 0–0.44)
EOSINOPHILS RELATIVE PERCENT: 4 % (ref 1–4)
ERYTHROCYTE [DISTWIDTH] IN BLOOD BY AUTOMATED COUNT: 13.8 % (ref 11.8–14.4)
GFR, ESTIMATED: 13 ML/MIN/1.73M2
GLUCOSE BLD-MCNC: 143 MG/DL (ref 75–110)
GLUCOSE BLD-MCNC: 194 MG/DL (ref 75–110)
GLUCOSE BLD-MCNC: 206 MG/DL (ref 75–110)
GLUCOSE BLD-MCNC: 215 MG/DL (ref 75–110)
GLUCOSE SERPL-MCNC: 153 MG/DL (ref 74–99)
HCT VFR BLD AUTO: 23.3 % (ref 40.7–50.3)
HGB BLD-MCNC: 7.1 G/DL (ref 13–17)
IMM GRANULOCYTES # BLD AUTO: 0.05 K/UL (ref 0–0.3)
IMM GRANULOCYTES NFR BLD: 1 %
LYMPHOCYTES NFR BLD: 1.02 K/UL (ref 1.1–3.7)
LYMPHOCYTES RELATIVE PERCENT: 12 % (ref 24–43)
MCH RBC QN AUTO: 28.3 PG (ref 25.2–33.5)
MCHC RBC AUTO-ENTMCNC: 30.5 G/DL (ref 28.4–34.8)
MCV RBC AUTO: 92.8 FL (ref 82.6–102.9)
MICROORGANISM SPEC CULT: NORMAL
MICROORGANISM/AGENT SPEC: NORMAL
MONOCYTES NFR BLD: 1.04 K/UL (ref 0.1–1.2)
MONOCYTES NFR BLD: 12 % (ref 3–12)
NEUTROPHILS NFR BLD: 70 % (ref 36–65)
NEUTS SEG NFR BLD: 5.91 K/UL (ref 1.5–8.1)
NRBC BLD-RTO: 0 PER 100 WBC
PLATELET # BLD AUTO: 294 K/UL (ref 138–453)
PMV BLD AUTO: 8.7 FL (ref 8.1–13.5)
POTASSIUM SERPL-SCNC: 4.5 MMOL/L (ref 3.7–5.3)
RBC # BLD AUTO: 2.51 M/UL (ref 4.21–5.77)
SERVICE CMNT-IMP: NORMAL
SODIUM SERPL-SCNC: 132 MMOL/L (ref 136–145)
SPECIMEN DESCRIPTION: NORMAL
WBC OTHER # BLD: 8.4 K/UL (ref 3.5–11.3)

## 2024-07-03 PROCEDURE — 2580000003 HC RX 258: Performed by: STUDENT IN AN ORGANIZED HEALTH CARE EDUCATION/TRAINING PROGRAM

## 2024-07-03 PROCEDURE — 80048 BASIC METABOLIC PNL TOTAL CA: CPT

## 2024-07-03 PROCEDURE — 6370000000 HC RX 637 (ALT 250 FOR IP): Performed by: INTERNAL MEDICINE

## 2024-07-03 PROCEDURE — 82947 ASSAY GLUCOSE BLOOD QUANT: CPT

## 2024-07-03 PROCEDURE — 2580000003 HC RX 258: Performed by: PHYSICIAN ASSISTANT

## 2024-07-03 PROCEDURE — 97110 THERAPEUTIC EXERCISES: CPT

## 2024-07-03 PROCEDURE — 71045 X-RAY EXAM CHEST 1 VIEW: CPT

## 2024-07-03 PROCEDURE — 99233 SBSQ HOSP IP/OBS HIGH 50: CPT | Performed by: INTERNAL MEDICINE

## 2024-07-03 PROCEDURE — 6370000000 HC RX 637 (ALT 250 FOR IP): Performed by: STUDENT IN AN ORGANIZED HEALTH CARE EDUCATION/TRAINING PROGRAM

## 2024-07-03 PROCEDURE — 6360000002 HC RX W HCPCS: Performed by: PHYSICIAN ASSISTANT

## 2024-07-03 PROCEDURE — 99232 SBSQ HOSP IP/OBS MODERATE 35: CPT | Performed by: INTERNAL MEDICINE

## 2024-07-03 PROCEDURE — 85025 COMPLETE CBC W/AUTO DIFF WBC: CPT

## 2024-07-03 PROCEDURE — 6360000002 HC RX W HCPCS: Performed by: NURSE PRACTITIONER

## 2024-07-03 PROCEDURE — 99231 SBSQ HOSP IP/OBS SF/LOW 25: CPT | Performed by: PHYSICIAN ASSISTANT

## 2024-07-03 PROCEDURE — 99232 SBSQ HOSP IP/OBS MODERATE 35: CPT | Performed by: STUDENT IN AN ORGANIZED HEALTH CARE EDUCATION/TRAINING PROGRAM

## 2024-07-03 PROCEDURE — 97530 THERAPEUTIC ACTIVITIES: CPT

## 2024-07-03 PROCEDURE — 2580000003 HC RX 258: Performed by: NURSE PRACTITIONER

## 2024-07-03 PROCEDURE — 2060000000 HC ICU INTERMEDIATE R&B

## 2024-07-03 PROCEDURE — 36415 COLL VENOUS BLD VENIPUNCTURE: CPT

## 2024-07-03 RX ADMIN — SODIUM CHLORIDE, PRESERVATIVE FREE 10 ML: 5 INJECTION INTRAVENOUS at 21:04

## 2024-07-03 RX ADMIN — AMLODIPINE BESYLATE 10 MG: 10 TABLET ORAL at 09:04

## 2024-07-03 RX ADMIN — HYDROMORPHONE HYDROCHLORIDE 1 MG: 2 TABLET ORAL at 09:03

## 2024-07-03 RX ADMIN — GABAPENTIN 100 MG: 100 CAPSULE ORAL at 21:02

## 2024-07-03 RX ADMIN — LINEZOLID 600 MG: 600 TABLET, FILM COATED ORAL at 09:04

## 2024-07-03 RX ADMIN — INSULIN GLARGINE 25 UNITS: 100 INJECTION, SOLUTION SUBCUTANEOUS at 21:03

## 2024-07-03 RX ADMIN — HEPARIN SODIUM 5000 UNITS: 5000 INJECTION INTRAVENOUS; SUBCUTANEOUS at 21:09

## 2024-07-03 RX ADMIN — ALTEPLASE 5 MG: 2.2 INJECTION, POWDER, LYOPHILIZED, FOR SOLUTION INTRAVENOUS at 11:00

## 2024-07-03 RX ADMIN — SODIUM CHLORIDE, PRESERVATIVE FREE 10 ML: 5 INJECTION INTRAVENOUS at 09:05

## 2024-07-03 RX ADMIN — PANTOPRAZOLE SODIUM 40 MG: 40 TABLET, DELAYED RELEASE ORAL at 05:44

## 2024-07-03 RX ADMIN — CARVEDILOL 12.5 MG: 12.5 TABLET, FILM COATED ORAL at 17:12

## 2024-07-03 RX ADMIN — HYDROMORPHONE HYDROCHLORIDE 1 MG: 2 TABLET ORAL at 17:11

## 2024-07-03 RX ADMIN — INSULIN LISPRO 1 UNITS: 100 INJECTION, SOLUTION INTRAVENOUS; SUBCUTANEOUS at 13:01

## 2024-07-03 RX ADMIN — LINEZOLID 600 MG: 600 TABLET, FILM COATED ORAL at 21:02

## 2024-07-03 RX ADMIN — OXYCODONE HYDROCHLORIDE AND ACETAMINOPHEN 1 TABLET: 5; 325 TABLET ORAL at 12:09

## 2024-07-03 RX ADMIN — TAMSULOSIN HYDROCHLORIDE 0.4 MG: 0.4 CAPSULE ORAL at 09:04

## 2024-07-03 RX ADMIN — GABAPENTIN 100 MG: 100 CAPSULE ORAL at 05:44

## 2024-07-03 RX ADMIN — OXYCODONE HYDROCHLORIDE AND ACETAMINOPHEN 1 TABLET: 5; 325 TABLET ORAL at 21:04

## 2024-07-03 RX ADMIN — DESMOPRESSIN ACETATE 40 MG: 0.2 TABLET ORAL at 21:02

## 2024-07-03 RX ADMIN — ASPIRIN 81 MG 81 MG: 81 TABLET ORAL at 09:04

## 2024-07-03 RX ADMIN — DORNASE ALFA 5 MG: 1 SOLUTION RESPIRATORY (INHALATION) at 11:00

## 2024-07-03 RX ADMIN — CARVEDILOL 12.5 MG: 12.5 TABLET, FILM COATED ORAL at 09:04

## 2024-07-03 RX ADMIN — HYDROMORPHONE HYDROCHLORIDE 1 MG: 2 TABLET ORAL at 00:49

## 2024-07-03 ASSESSMENT — PAIN - FUNCTIONAL ASSESSMENT
PAIN_FUNCTIONAL_ASSESSMENT: PREVENTS OR INTERFERES SOME ACTIVE ACTIVITIES AND ADLS
PAIN_FUNCTIONAL_ASSESSMENT: ACTIVITIES ARE NOT PREVENTED

## 2024-07-03 ASSESSMENT — PAIN SCALES - GENERAL
PAINLEVEL_OUTOF10: 0
PAINLEVEL_OUTOF10: 9
PAINLEVEL_OUTOF10: 6
PAINLEVEL_OUTOF10: 7
PAINLEVEL_OUTOF10: 10
PAINLEVEL_OUTOF10: 7
PAINLEVEL_OUTOF10: 3
PAINLEVEL_OUTOF10: 2
PAINLEVEL_OUTOF10: 3

## 2024-07-03 ASSESSMENT — PAIN DESCRIPTION - LOCATION
LOCATION: CHEST
LOCATION: BACK;CHEST
LOCATION: BACK;CHEST
LOCATION: CHEST

## 2024-07-03 ASSESSMENT — PAIN SCALES - WONG BAKER
WONGBAKER_NUMERICALRESPONSE: NO HURT

## 2024-07-03 ASSESSMENT — PAIN DESCRIPTION - DESCRIPTORS
DESCRIPTORS: SORE
DESCRIPTORS: ACHING;SORE
DESCRIPTORS: SORE
DESCRIPTORS: ACHING;SORE
DESCRIPTORS: ACHING

## 2024-07-03 ASSESSMENT — PAIN DESCRIPTION - ORIENTATION
ORIENTATION: RIGHT
ORIENTATION: RIGHT
ORIENTATION: RIGHT;POSTERIOR
ORIENTATION: RIGHT
ORIENTATION: RIGHT

## 2024-07-03 NOTE — PROGRESS NOTES
Comprehensive Nutrition Assessment    Type and Reason for Visit:  Reassess    Nutrition Recommendations/Plan:   Modify diet order to Regular with double protein portions for improved inake and satisfaction  Discontinue oral nutritional supplements d/t refusal to take     Malnutrition Assessment:  Malnutrition Status:  At risk for malnutrition (Comment) (06/26/24 1220)    Context:  Acute Illness         Nutrition Assessment:    Pt remains on 60g CHO diet, though renal restrictions were discontinued.  Pt is frustrated with his menu options, as he is not getting what he asks for and other menu options are not allowed.  He would like double protein portions with meals for increased kcal and protein intake for wound healing.  However, he continues to refuse Cesar and does not drink the Glucerna which was ordered TID.  Will disontinue Glucerna and send double protein portions with meals.  Pt states he is eating well otherwise.  Will discontinue all restrictions at this time for improved patient intake and satisfaction.    Nutrition Related Findings:    Meds/Labs reviewed.  Na 132, .  Meds include epotin, insulin.  2+ edema to extremeties. Wound Type: Multiple, Diabetic Ulcer       Current Nutrition Intake & Therapies:    Average Meal Intake: %  Average Supplements Intake: Refusing to take  ADULT DIET; Regular; 4 carb choices (60 gm/meal)  ADULT ORAL NUTRITION SUPPLEMENT; Breakfast, Lunch, Dinner; Diabetic Oral Supplement    Anthropometric Measures:  Height: 182.9 cm (6' 0.01\")  Ideal Body Weight (IBW): 178 lbs (81 kg)    Admission Body Weight: 130.4 kg (287 lb 7.7 oz)  Current Body Weight: 126 kg (277 lb 12.5 oz), 167.3 % IBW. Weight Source: Bed Scale  Current BMI (kg/m2): 37.7        Weight Adjustment For: Amputation  Total Adjusted Percentage (Calculated): 5.9  Adjusted Ideal Body Weight (lbs) (Calculated): 167.5 lbs  Adjusted Ideal Body Weight (kg) (Calculated): 76.14 kg  Adjusted % Ideal Body Weight

## 2024-07-03 NOTE — PROGRESS NOTES
Renal Progress Note    Patient :  Vincenzo Darden; 58 y.o. MRN# [id number]  Location:  0440/0440-01  Attending:  René Holland MD  Admit Date:  6/26/2024   Hospital Day: 7    Subjective:     Patient seen and examined at bedside. Reports significantly improved work of breathing. Only complaint at this time is persistent pain at chest tube site.     Labs reviewed: Electrolytes stable with sodium 132 potassium 4.5, chloride 94, bicarb 28, calcium 7.9.  Labs as of 7/2 sodium 131, potassium 4.4, chloride 95, bicarb 28, BUN 28, creatinine 5.3, calcium 8.0.   Pt is oliguric, last tx yesterday (normal days TTS), ran for 3 hours, 3.5 L off via R perm cath.   Right-sided chest tube was placed on 7/1. Patient has had a total of 300 ml of cloudy/yellow output, to receive tPA and dornase today per CT surgery for suspected empyema.     History reviewed.  Vincenzo Darden is a 58 y.o.  male w/ PVD s/p right BKA, non healing left foot wounds, CAD s/p AVR/CABGx3 in 02/2024 complicated by chest wall dehiscence, MSSA infection, w/ CAMILLE on CKD3 s/p initiating HD (HD T/R/S), DM2, COLT who was recently discharged 06/20/2024 who returned to [facility] yesterday evening with worsening shortness of breath, flank pain with chest pain and recommended for transfer to [facility] for the management of Dyspnea.     Patient does describe progressive shortness of breath since his discharge with acute worsening symptoms the past few days.  States he is only able to walk 25 to 50 feet, he does describe midsternal sharp intermittent chest pain with pleurisy.      + Acute bilateral flank pain with sharp quality that waxed and waned with nausea that began after dialysis yesterday.  Does struggle with chronic lumbago/ OA and states those symptoms are stable.  Denies recent falls or injuries .  denies urinary symptoms.  He does make small amounts of urine but denies hematuria or history of kidney stones.  Denies diarrhea or constipation.   07/02/24  0650   * 131*   K 4.8 4.4   CL 97* 95*   CO2 23 28   BUN 39* 28*   CREATININE 6.3* 5.3*   GLUCOSE 173* 153*   CALCIUM 8.1* 8.0*        MARIA DEL CARMEN:      Lab Results   Component Value Date/Time    MARIA DEL CARMEN NEGATIVE 06/18/2024 09:29 AM     SPEP:  Lab Results   Component Value Date/Time    ALBCAL 1.7 06/17/2024 05:50 PM    ALBPCT 28 06/17/2024 05:50 PM    A1PCT 9 06/17/2024 05:50 PM    A2PCT 14 06/17/2024 05:50 PM    BETAPCT 13 06/17/2024 05:50 PM    GAMGLOB 2.2 06/17/2024 05:50 PM    GGPCT 36 06/17/2024 05:50 PM    PATH ELECTRONICALLY SIGNED. GABBIE CHAVARRIA M.D. 06/17/2024 05:50 PM       C3:     Lab Results   Component Value Date/Time    C3 35 06/18/2024 09:29 AM     C4:     Lab Results   Component Value Date/Time    C4 24 06/18/2024 09:29 AM     MPO ANCA:     Lab Results   Component Value Date/Time    MPO <0.3 06/17/2024 05:50 PM     PR3 ANCA:     Lab Results   Component Value Date/Time    PR3 <0.7 06/17/2024 05:50 PM     Hep BsAg:         Lab Results   Component Value Date/Time    HEPBSAG NONREACTIVE 06/18/2024 09:29 AM     Hep C AB:          Lab Results   Component Value Date/Time    HEPCAB NONREACTIVE 06/18/2024 09:29 AM       Urinalysis/Chemistries:      Lab Results   Component Value Date/Time    NITRU NEGATIVE 06/28/2024 05:24 AM    COLORU Yellow 06/28/2024 05:24 AM    PHUR 6.5 06/28/2024 05:24 AM    PHUR 6.0 10/06/2023 06:30 AM    WBCUA 50  06/28/2024 05:24 AM    RBCUA 20 TO 50 06/28/2024 05:24 AM    BACTERIA None 06/28/2024 05:24 AM    LEUKOCYTESUR SMALL 06/28/2024 05:24 AM    UROBILINOGEN Normal 06/28/2024 05:24 AM    BILIRUBINUR NEGATIVE 06/28/2024 05:24 AM    GLUCOSEU TRACE 06/28/2024 05:24 AM    KETUA NEGATIVE 06/28/2024 05:24 AM     Urine Potassium:    Lab Results   Component Value Date/Time    KUR 52.2 10/05/2023 08:03 AM     Urine Chloride:    Lab Results   Component Value Date/Time    CLUR 27 10/05/2023 08:03 AM     Urine Osmolarity:   Lab Results   Component Value Date/Time    OSMOU 290

## 2024-07-03 NOTE — PROGRESS NOTES
07/03/24  1700   POCGLU 250* 143* 206* 194*         I/O (24Hr):    Intake/Output Summary (Last 24 hours) at 7/3/2024 1750  Last data filed at 7/3/2024 0613  Gross per 24 hour   Intake 840 ml   Output 50 ml   Net 790 ml         Labs:  Hematology:  Recent Labs     07/01/24  0741 07/02/24  0650 07/03/24  0721   WBC 10.5 9.2 8.4   RBC 2.54* 2.65* 2.51*   HGB 7.3* 7.6* 7.1*   HCT 23.6* 24.7* 23.3*   MCV 92.9 93.2 92.8   MCH 28.7 28.7 28.3   MCHC 30.9 30.8 30.5   RDW 13.9 14.0 13.8    296 294   MPV 8.6 8.5 8.7       Chemistry:  Recent Labs     07/01/24  0741 07/02/24  0650 07/03/24  0721   * 131* 132*   K 4.8 4.4 4.5   CL 97* 95* 94*   CO2 23 28 28   GLUCOSE 173* 153* 153*   BUN 39* 28* 27*   CREATININE 6.3* 5.3* 4.8*   ANIONGAP 11 8* 10   LABGLOM 9* 12* 13*   CALCIUM 8.1* 8.0* 7.9*       Recent Labs     07/02/24  0736 07/02/24  1617 07/02/24  1954 07/03/24  0754 07/03/24  1155 07/03/24  1700   POCGLU 148* 157* 250* 143* 206* 194*       ABG:  Lab Results   Component Value Date/Time    POCPH 7.365 02/26/2024 09:01 PM    POCPCO2 35.9 02/26/2024 09:01 PM    POCPO2 83.0 02/26/2024 09:01 PM    POCHCO3 20.5 02/26/2024 09:01 PM    NBEA 4.4 02/26/2024 09:01 PM    VWWN5WJN 95.9 02/26/2024 09:01 PM    FIO2 INFORMATION NOT PROVIDED 06/03/2024 12:06 AM     Lab Results   Component Value Date/Time    SPECIAL Site: Pleural Fl 06/28/2024 03:33 PM     Lab Results   Component Value Date/Time    CULTURE NO GROWTH 3 DAYS 06/28/2024 03:33 PM       Radiology:  US RETROPERITONEAL LIMITED    Result Date: 6/26/2024  Unremarkable ultrasound of the kidneys.       Physical Examination:        General appearance:  alert, cooperative and no distress  Mental Status:  oriented to person, place and time and normal affect  Lungs: Right-sided chest tube in place,, normal effort  Heart:  regular rate and rhythm, no murmur  Abdomen: Bilateral flank tenderness improved from before,, normal bowel sounds, no masses, hepatomegaly,  hepatomegaly, splenomegaly  Extremities: S/p right BKA with chronic left-sided first toe gangrene skin:  no gross lesions, rashes, induration except above    Assessment:        Hospital Problems             Last Modified POA    * (Principal) Dyspnea 6/26/2024 Yes    Chest pain 6/28/2024 Yes    Hypertension, essential 7/1/2024 Yes    COLT (obstructive sleep apnea) 6/28/2024 Yes    Class 2 obesity due to excess calories with body mass index (BMI) of 35.0 to 35.9 in adult 6/28/2024 Yes    Acute kidney injury superimposed on CKD (HCC) 6/28/2024 Yes    History of type 2 diabetes mellitus 6/28/2024 Yes    Below knee amputation ([facility]) 6/28/2024 Yes    Hx of CABG 6/28/2024 Yes    Stage 3b chronic kidney disease ([facility]) 6/28/2024 Yes    Urinary retention 6/28/2024 Yes    MSSA (methicillin susceptible Staphylococcus aureus) septicemia ([facility]) 6/28/2024 Yes    PAD (peripheral artery disease) ([facility]) 6/28/2024 Yes    Hyperkalemia 6/28/2024 Yes    History of aortic valve replacement 6/28/2024 Yes    PVD (peripheral vascular disease) ([facility]) 6/28/2024 Yes    SOB (shortness of breath) 6/26/2024 Yes    Pleural effusion 6/27/2024 Yes    Postoperative seroma involving circulatory system after cardiac bypass 6/27/2024 Yes    Biventricular congestive heart failure ([facility]) 6/27/2024 Yes    HFrEF (heart failure with reduced ejection fraction) ([facility]) 6/28/2024 Yes    Osteomyelitis of great toe of left foot ([facility]) 6/28/2024 Yes    Ischemic cardiomyopathy 6/29/2024 Yes    Empyema of right pleural space ([facility]) 6/30/2024 Yes    Empyema lung ([facility]) 6/30/2024 Yes    Dependence on renal dialysis ([facility]) 7/1/2024 Yes    Bilateral lower extremity edema 7/1/2024 Yes    Essential hypertension 7/1/2024 Yes   Plan:        Dyspnea secondary to fluid overload with right-sided empyema  CAMILLE on CKD currently dialysis dependent  Hyperkalemia  History of coronary artery disease s/p CABG, s/p AVR  Concern for mediastinal collection-seroma/abscess post CABG  HFrEF  Essential

## 2024-07-03 NOTE — CARE COORDINATION
Transitional planning      Writer to room to discuss d/c plan, plan is still to return home, current with [facility], has chest tube,  dialysis T/R/S in [place], follow for needs .

## 2024-07-03 NOTE — PROGRESS NOTES
Occupational Therapy    [facility]  Occupational Therapy Not Seen Note    DATE: 7/3/2024    NAME: Vincenzo Darden  MRN: [id number]   : 1965      Patient not seen this date for Occupational Therapy due to:    Patient Declined: stating \"I only do therapy once a day\" and despite education on purpose and importance of evaluation and encouragement pt continued to refuse. OT will attempt as pt becomes more agreeable    Electronically signed by HARRISON Gleason on 7/3/2024 at 3:21 PM

## 2024-07-03 NOTE — PROGRESS NOTES
[facility] - Progress Note      Today's Date and Time: 7/3/2024, 7:35 AM    Impression :   Shortness of breath  Rt pleural effusion with compressive atelectasis  Loculated lower anterior mediastinum fluid collection  CAMILLE on CKD stage III  Recent MSSA septicemia 6-2-24 x 2  Was under treatment with Linezolid because of prior CAMILLE with Daptomycin  Hx anaphylaxis with Zosyn    Concern for left great toe osteomyelitis   CAD  S/P CABG and AVR Feb 2024  Hx of Sternal wound dehiscence-fully healed  DM 2  PVD with prior Rt BKA  Left leg and foot ulcers.  Allergy to vancomycin (nausea)  Allergy to Zosyn (anaphylaxis)      Recommendations:     PO Zyvox 600 mg BID initiated-6/30/24:  Stop date 7/7/24  There is minimal residual fluid in the right pleural cavity with compressive atelectasis and consolidation   S/p left pleural drain placement with 70 ml fluid drained on 7/1/24  The patient was on daptomycin and the care was discussed with Dr. Prado and given the concern for empyema, he was switched him back to linezolid to give better lung penetration  Blood cultures thus far remain negative  I will follow the progress and adjust therapy accordingly    Prior treatment:  Daptomycin started 6/26/24 and stopped 6/30/24 6/18/24: IV Zyvox 600 mg po BID initiated  Daptomycin 700 mg IV q 48 hr. Stopped on 6/17/24 because of renal failure    Prior blood cultures:   6/3/24 with 1/1 staph aureus  6-4-24: No growth   6/7/24: No growth   6/17/24: No growth     TTE with no evidence of endocarditis from 6/6/24  MRI left foot unable to be completed because of PPM  No surgical intervention planned per Podiatry     Medical Decision Making/Summary/Discussion:7/3/2024     Daily Elements of Decision Making provided by Consulting Physician:    Note: I have independently performed the steps listed below as part of the medical decision making and evaluation.    Review of current Problems:  Today I am seeing the

## 2024-07-03 NOTE — PLAN OF CARE
Problem: Safety - Adult  Goal: Free from fall injury  7/3/2024 0407 by Ashley Romero RN  Outcome: Progressing  Flowsheets (Taken 7/2/2024 2009)  Free From Fall Injury: Instruct family/caregiver on patient safety  7/2/2024 1805 by Maris Coon RN  Outcome: Progressing  Flowsheets (Taken 7/2/2024 1033)  Free From Fall Injury:   Instruct family/caregiver on patient safety   Based on caregiver fall risk screen, instruct family/caregiver to ask for assistance with transferring infant if caregiver noted to have fall risk factors     Problem: ABCDS Injury Assessment  Goal: Absence of physical injury  7/3/2024 0407 by Ashley Romero RN  Outcome: Progressing  Flowsheets (Taken 7/2/2024 2009)  Absence of Physical Injury: Implement safety measures based on patient assessment  7/2/2024 1805 by Maris Coon RN  Outcome: Progressing  Flowsheets (Taken 7/2/2024 1033)  Absence of Physical Injury: Implement safety measures based on patient assessment     Problem: Pain  Goal: Verbalizes/displays adequate comfort level or baseline comfort level  7/3/2024 0407 by Ashley Romero RN  Outcome: Progressing  Flowsheets  Taken 7/3/2024 0400  Verbalizes/displays adequate comfort level or baseline comfort level:   Encourage patient to monitor pain and request assistance   Assess pain using appropriate pain scale  Taken 7/2/2024 2003  Verbalizes/displays adequate comfort level or baseline comfort level: Encourage patient to monitor pain and request assistance  7/2/2024 1805 by Maris Coon RN  Outcome: Progressing     Problem: Chronic Conditions and Co-morbidities  Goal: Patient's chronic conditions and co-morbidity symptoms are monitored and maintained or improved  7/3/2024 0407 by Ashley Romero RN  Outcome: Progressing  Flowsheets (Taken 7/2/2024 2030)  Care Plan - Patient's Chronic Conditions and Co-Morbidity Symptoms are Monitored and Maintained or Improved: Monitor and assess patient's chronic conditions

## 2024-07-03 NOTE — PROGRESS NOTES
PULMONARY PROGRESS NOTE      Patient:  Vincenzo Darden  YOB: 1965    MRN: [id number]     Acct: [id number]     Admit date: 6/26/2024    REASON FOR CONSULT:-     Pt seen and Chart reviewed.    Subjective:     The patient is a 58 y.o. male with history of multiple comorbidities including diabetes, obesity, obstructive sleep apnea, peripheral vascular disease, coronary artery disease, s/p AVR and CABG (February 2024), complicated by chest wall dehiscence and MSSA infection, CAMILLE on CKD, required initiation of hemodialysis.  [facility].     On evaluation he was found to have pleural effusion and underwent thoracentesis with removal of 50 mL of cloudy yellow fluid in the right side.  The pleural fluid is exudative with neutrophilia, markedly elevated LDH at 6402, protein 4.7 and a glucose less than 2 suggestive of empyema.        Interval History  7/3/2024:  No acute issues overnight  Patient denies any acute complaints  Vital signs stable, afebrile.  Cultures not growing any organism  Thoracentesis fluid negative for malignancy  50 mL chest tube output over the past 24 hours      Review of Systems -   Review of Systems   Constitutional:  Negative for fever.   Respiratory:  Positive for cough and shortness of breath.    Cardiovascular:  Negative for chest pain and palpitations.   Gastrointestinal:  Negative for abdominal pain, diarrhea, nausea and vomiting.   Musculoskeletal:  Negative for arthralgias and myalgias.   Neurological:  Negative for dizziness and light-headedness.           Physical Exam:  Vitals: /83   Pulse 67   Temp 98.2 °F (36.8 °C) (Oral)   Resp 16   Ht 1.829 m (6' 0.01\")   Wt 126 kg (277 lb 12.5 oz)   SpO2 92%   BMI 37.67 kg/m²   24 hour intake/output:  Intake/Output Summary (Last 24 hours) at 7/3/2024 1607  Last data filed at 7/3/2024 0613  Gross per 24 hour   Intake 840 ml

## 2024-07-03 NOTE — PLAN OF CARE
Problem: Safety - Adult  Goal: Free from fall injury  Outcome: Progressing  Flowsheets (Taken 7/3/2024 0929)  Free From Fall Injury:   Instruct family/caregiver on patient safety   Based on caregiver fall risk screen, instruct family/caregiver to ask for assistance with transferring infant if caregiver noted to have fall risk factors     Problem: ABCDS Injury Assessment  Goal: Absence of physical injury  Outcome: Progressing  Flowsheets (Taken 7/3/2024 0929)  Absence of Physical Injury: Implement safety measures based on patient assessment     Problem: Pain  Goal: Verbalizes/displays adequate comfort level or baseline comfort level  Outcome: Progressing     Problem: Chronic Conditions and Co-morbidities  Goal: Patient's chronic conditions and co-morbidity symptoms are monitored and maintained or improved  Outcome: Progressing     Problem: Nutrition Deficit:  Goal: Optimize nutritional status  7/3/2024 1905 by Maris Coon RN  Outcome: Progressing  7/3/2024 1532 by Zarina Lopes, RD  Outcome: Progressing  Flowsheets (Taken 7/3/2024 1525)  Nutrient intake appropriate for improving, restoring, or maintaining nutritional needs:   Assess nutritional status and recommend course of action   Monitor oral intake, labs, and treatment plans   Recommend appropriate diets, oral nutritional supplements, and vitamin/mineral supplements

## 2024-07-04 ENCOUNTER — APPOINTMENT (OUTPATIENT)
Dept: GENERAL RADIOLOGY | Age: 59
DRG: 291 | End: 2024-07-04
Attending: STUDENT IN AN ORGANIZED HEALTH CARE EDUCATION/TRAINING PROGRAM
Payer: MEDICARE

## 2024-07-04 LAB
ANION GAP SERPL CALCULATED.3IONS-SCNC: 10 MMOL/L (ref 9–16)
BASOPHILS # BLD: 0.04 K/UL (ref 0–0.2)
BASOPHILS NFR BLD: 1 % (ref 0–2)
BUN SERPL-MCNC: 38 MG/DL (ref 6–20)
CALCIUM SERPL-MCNC: 8.3 MG/DL (ref 8.6–10.4)
CHLORIDE SERPL-SCNC: 95 MMOL/L (ref 98–107)
CO2 SERPL-SCNC: 26 MMOL/L (ref 20–31)
CREAT SERPL-MCNC: 6.3 MG/DL (ref 0.7–1.2)
EOSINOPHIL # BLD: 0.38 K/UL (ref 0–0.44)
EOSINOPHILS RELATIVE PERCENT: 5 % (ref 1–4)
ERYTHROCYTE [DISTWIDTH] IN BLOOD BY AUTOMATED COUNT: 13.6 % (ref 11.8–14.4)
GFR, ESTIMATED: 9 ML/MIN/1.73M2
GLUCOSE BLD-MCNC: 118 MG/DL (ref 75–110)
GLUCOSE BLD-MCNC: 129 MG/DL (ref 75–110)
GLUCOSE BLD-MCNC: 189 MG/DL (ref 75–110)
GLUCOSE BLD-MCNC: 248 MG/DL (ref 75–110)
GLUCOSE SERPL-MCNC: 146 MG/DL (ref 74–99)
HCT VFR BLD AUTO: 23.2 % (ref 40.7–50.3)
HGB BLD-MCNC: 7.3 G/DL (ref 13–17)
IMM GRANULOCYTES # BLD AUTO: 0.06 K/UL (ref 0–0.3)
IMM GRANULOCYTES NFR BLD: 1 %
LYMPHOCYTES NFR BLD: 0.99 K/UL (ref 1.1–3.7)
LYMPHOCYTES RELATIVE PERCENT: 12 % (ref 24–43)
MCH RBC QN AUTO: 28.9 PG (ref 25.2–33.5)
MCHC RBC AUTO-ENTMCNC: 31.5 G/DL (ref 28.4–34.8)
MCV RBC AUTO: 91.7 FL (ref 82.6–102.9)
MONOCYTES NFR BLD: 1.1 K/UL (ref 0.1–1.2)
MONOCYTES NFR BLD: 13 % (ref 3–12)
NEUTROPHILS NFR BLD: 70 % (ref 36–65)
NEUTS SEG NFR BLD: 5.96 K/UL (ref 1.5–8.1)
NRBC BLD-RTO: 0 PER 100 WBC
PLATELET # BLD AUTO: 305 K/UL (ref 138–453)
PMV BLD AUTO: 8.4 FL (ref 8.1–13.5)
POTASSIUM SERPL-SCNC: 4.6 MMOL/L (ref 3.7–5.3)
RBC # BLD AUTO: 2.53 M/UL (ref 4.21–5.77)
SODIUM SERPL-SCNC: 131 MMOL/L (ref 136–145)
WBC OTHER # BLD: 8.5 K/UL (ref 3.5–11.3)

## 2024-07-04 PROCEDURE — 99233 SBSQ HOSP IP/OBS HIGH 50: CPT | Performed by: INTERNAL MEDICINE

## 2024-07-04 PROCEDURE — 6370000000 HC RX 637 (ALT 250 FOR IP): Performed by: NURSE PRACTITIONER

## 2024-07-04 PROCEDURE — 6360000002 HC RX W HCPCS: Performed by: INTERNAL MEDICINE

## 2024-07-04 PROCEDURE — 6370000000 HC RX 637 (ALT 250 FOR IP): Performed by: INTERNAL MEDICINE

## 2024-07-04 PROCEDURE — 36415 COLL VENOUS BLD VENIPUNCTURE: CPT

## 2024-07-04 PROCEDURE — 85025 COMPLETE CBC W/AUTO DIFF WBC: CPT

## 2024-07-04 PROCEDURE — 6370000000 HC RX 637 (ALT 250 FOR IP): Performed by: STUDENT IN AN ORGANIZED HEALTH CARE EDUCATION/TRAINING PROGRAM

## 2024-07-04 PROCEDURE — 2580000003 HC RX 258: Performed by: STUDENT IN AN ORGANIZED HEALTH CARE EDUCATION/TRAINING PROGRAM

## 2024-07-04 PROCEDURE — 71045 X-RAY EXAM CHEST 1 VIEW: CPT

## 2024-07-04 PROCEDURE — 6360000002 HC RX W HCPCS: Performed by: NURSE PRACTITIONER

## 2024-07-04 PROCEDURE — 6360000002 HC RX W HCPCS: Performed by: PHYSICIAN ASSISTANT

## 2024-07-04 PROCEDURE — 90935 HEMODIALYSIS ONE EVALUATION: CPT | Performed by: INTERNAL MEDICINE

## 2024-07-04 PROCEDURE — 2060000000 HC ICU INTERMEDIATE R&B

## 2024-07-04 PROCEDURE — 80048 BASIC METABOLIC PNL TOTAL CA: CPT

## 2024-07-04 PROCEDURE — 90935 HEMODIALYSIS ONE EVALUATION: CPT

## 2024-07-04 PROCEDURE — 2580000003 HC RX 258: Performed by: PHYSICIAN ASSISTANT

## 2024-07-04 PROCEDURE — 99232 SBSQ HOSP IP/OBS MODERATE 35: CPT | Performed by: INTERNAL MEDICINE

## 2024-07-04 PROCEDURE — 99232 SBSQ HOSP IP/OBS MODERATE 35: CPT | Performed by: STUDENT IN AN ORGANIZED HEALTH CARE EDUCATION/TRAINING PROGRAM

## 2024-07-04 PROCEDURE — 82947 ASSAY GLUCOSE BLOOD QUANT: CPT

## 2024-07-04 RX ADMIN — PANTOPRAZOLE SODIUM 40 MG: 40 TABLET, DELAYED RELEASE ORAL at 06:36

## 2024-07-04 RX ADMIN — LINEZOLID 600 MG: 600 TABLET, FILM COATED ORAL at 20:03

## 2024-07-04 RX ADMIN — HEPARIN SODIUM 1900 UNITS: 1000 INJECTION INTRAVENOUS; SUBCUTANEOUS at 13:27

## 2024-07-04 RX ADMIN — CARVEDILOL 12.5 MG: 12.5 TABLET, FILM COATED ORAL at 17:11

## 2024-07-04 RX ADMIN — SODIUM CHLORIDE, PRESERVATIVE FREE 10 ML: 5 INJECTION INTRAVENOUS at 20:04

## 2024-07-04 RX ADMIN — OXYCODONE HYDROCHLORIDE AND ACETAMINOPHEN 1 TABLET: 5; 325 TABLET ORAL at 14:15

## 2024-07-04 RX ADMIN — HYDROMORPHONE HYDROCHLORIDE 1 MG: 2 TABLET ORAL at 01:37

## 2024-07-04 RX ADMIN — DESMOPRESSIN ACETATE 40 MG: 0.2 TABLET ORAL at 20:03

## 2024-07-04 RX ADMIN — OXYCODONE HYDROCHLORIDE AND ACETAMINOPHEN 1 TABLET: 5; 325 TABLET ORAL at 20:03

## 2024-07-04 RX ADMIN — HYDROMORPHONE HYDROCHLORIDE 1 MG: 2 TABLET ORAL at 22:18

## 2024-07-04 RX ADMIN — ASPIRIN 81 MG 81 MG: 81 TABLET ORAL at 08:26

## 2024-07-04 RX ADMIN — TAMSULOSIN HYDROCHLORIDE 0.4 MG: 0.4 CAPSULE ORAL at 08:26

## 2024-07-04 RX ADMIN — LINEZOLID 600 MG: 600 TABLET, FILM COATED ORAL at 08:26

## 2024-07-04 RX ADMIN — ALTEPLASE 10 MG: 2.2 INJECTION, POWDER, LYOPHILIZED, FOR SOLUTION INTRAVENOUS at 12:30

## 2024-07-04 RX ADMIN — GABAPENTIN 100 MG: 100 CAPSULE ORAL at 22:18

## 2024-07-04 RX ADMIN — WATER 5 MG: 1 INJECTION INTRAMUSCULAR; INTRAVENOUS; SUBCUTANEOUS at 12:30

## 2024-07-04 RX ADMIN — GABAPENTIN 100 MG: 100 CAPSULE ORAL at 06:36

## 2024-07-04 RX ADMIN — AMLODIPINE BESYLATE 10 MG: 10 TABLET ORAL at 08:26

## 2024-07-04 RX ADMIN — GABAPENTIN 100 MG: 100 CAPSULE ORAL at 14:15

## 2024-07-04 RX ADMIN — POLYETHYLENE GLYCOL 3350 17 G: 17 POWDER, FOR SOLUTION ORAL at 18:21

## 2024-07-04 RX ADMIN — HYDROMORPHONE HYDROCHLORIDE 1 MG: 2 TABLET ORAL at 08:26

## 2024-07-04 RX ADMIN — LABETALOL HYDROCHLORIDE 10 MG: 5 INJECTION, SOLUTION INTRAVENOUS at 04:44

## 2024-07-04 RX ADMIN — HEPARIN SODIUM 5000 UNITS: 5000 INJECTION INTRAVENOUS; SUBCUTANEOUS at 22:18

## 2024-07-04 RX ADMIN — HEPARIN SODIUM 5000 UNITS: 5000 INJECTION INTRAVENOUS; SUBCUTANEOUS at 06:36

## 2024-07-04 RX ADMIN — OXYCODONE HYDROCHLORIDE AND ACETAMINOPHEN 1 TABLET: 5; 325 TABLET ORAL at 04:43

## 2024-07-04 RX ADMIN — INSULIN GLARGINE 25 UNITS: 100 INJECTION, SOLUTION SUBCUTANEOUS at 20:03

## 2024-07-04 RX ADMIN — TIZANIDINE 4 MG: 4 TABLET ORAL at 17:47

## 2024-07-04 RX ADMIN — CARVEDILOL 12.5 MG: 12.5 TABLET, FILM COATED ORAL at 08:26

## 2024-07-04 RX ADMIN — HEPARIN SODIUM 1900 UNITS: 1000 INJECTION INTRAVENOUS; SUBCUTANEOUS at 13:28

## 2024-07-04 RX ADMIN — ERYTHROPOIETIN 8000 UNITS: 10000 INJECTION, SOLUTION INTRAVENOUS; SUBCUTANEOUS at 13:04

## 2024-07-04 RX ADMIN — HYDROMORPHONE HYDROCHLORIDE 1 MG: 2 TABLET ORAL at 15:21

## 2024-07-04 RX ADMIN — SODIUM CHLORIDE, PRESERVATIVE FREE 10 ML: 5 INJECTION INTRAVENOUS at 08:27

## 2024-07-04 ASSESSMENT — PAIN SCALES - GENERAL
PAINLEVEL_OUTOF10: 10
PAINLEVEL_OUTOF10: 8
PAINLEVEL_OUTOF10: 6
PAINLEVEL_OUTOF10: 10
PAINLEVEL_OUTOF10: 7
PAINLEVEL_OUTOF10: 6
PAINLEVEL_OUTOF10: 8
PAINLEVEL_OUTOF10: 4
PAINLEVEL_OUTOF10: 7
PAINLEVEL_OUTOF10: 10
PAINLEVEL_OUTOF10: 8
PAINLEVEL_OUTOF10: 7
PAINLEVEL_OUTOF10: 10

## 2024-07-04 ASSESSMENT — PAIN DESCRIPTION - LOCATION
LOCATION: BACK;CHEST
LOCATION: BACK;CHEST
LOCATION: CHEST

## 2024-07-04 ASSESSMENT — PAIN SCALES - WONG BAKER
WONGBAKER_NUMERICALRESPONSE: HURTS EVEN MORE
WONGBAKER_NUMERICALRESPONSE: NO HURT

## 2024-07-04 ASSESSMENT — PAIN DESCRIPTION - DESCRIPTORS
DESCRIPTORS: ACHING
DESCRIPTORS: ACHING;DISCOMFORT
DESCRIPTORS: ACHING

## 2024-07-04 ASSESSMENT — PAIN DESCRIPTION - ORIENTATION
ORIENTATION: RIGHT

## 2024-07-04 NOTE — PROGRESS NOTES
@[email or web address]@    [facility]   IN-PATIENT SERVICE   [facility]    Progress Note    7/4/2024    4:41 PM    Name:   Vincenzo Darden  MRN:     [id number]     Acct:      [id number]   Room:   [facility]0440-01   Day:  8  Admit Date:  6/26/2024  4:33 AM    PCP:   Shaikh Calix MD  Code Status:  Full Code    Subjective:     C/C: No chief complaint on file.  Shortness of breath, flank pain  Interval History Status: not changed.     Seen at bedside, hemodynamically stable,   Continues to complain of pain at chest tube site, states that shortness of breath is improved  Underwent dialysis today  Continues to have right-sided chest tube in place, 420 mL output last 24 hours, plan for repeat tPA dornase today, CT surgery/pulm service following  Continues to be on Zyvox, ID service following    Brief History:     58-year-old male with history of peripheral vascular disease s/p right BKA, nonhealing left foot wounds,  CAD s/p CABG, s/p AVR complicated by chest wall dehiscence, MSSA infection, with CAMILLE on CKD started on dialysis recently, diabetes mellitus, who came in with shortness of breath, chest pain, flank pain, shortness of breath likely secondary to fluid overload versus right-sided empyema for which patient underwent thoracentesis and eventual chest tube placement, pulm/CT surgery/ID service have been following, s/p tPA dornase,  Nephrology service has been following for dialysis    Medications:     Allergies:    Allergies   Allergen Reactions    Vancomycin Nausea And Vomiting     States got levofloxicin and was ok    Zosyn [Piperacillin Sod-Tazobactam So] Anaphylaxis       Current Meds:   Scheduled Meds:    insulin glargine  25 Units SubCUTAneous Nightly    carvedilol  12.5 mg Oral BID WC    linezolid  600 mg Oral 2 times per day    epoetin  8,000 Units IntraVENous Once per day on Tue Thu Sat    sodium chloride flush  5-40 mL IntraVENous 2 times per day    sodium chloride flush  5-40 mL

## 2024-07-04 NOTE — DIALYSIS
Dialysis Time Out  To be done by RN and tech or 2 RNs  Staff Names Tomy DICKEY,RN Josiah BOND RN    [x]  Identity of the patient using 2 patient identifiers  [x]  Consent for treatment  [x]  Equipment-proper machine and dialyzer  [x]  B-Hep B status ( 6- Negative Antigen)  [x]  Orders- to include bath, blood flow, dialyzer, time and fluid removal  [x]  Access-Correct site and in working order  [x]  Time for patient to ask questions.

## 2024-07-04 NOTE — PROGRESS NOTES
Dialysis Post Treatment Note  Vitals:    07/04/24 1345   BP: (!) 142/69   Pulse: 70   Resp: 18   Temp: 98 °F (36.7 °C)   SpO2:      Pre-Weight = 128.3kg  Post-weight = Weight - Scale: 124.8 kg (275 lb 2.2 oz)  Total Liters Processed = Blood Volume Processed (Liters): 80.75 L  Rinseback Volume (mL) = Rinseback Volume (ml): 300 ml  Net Removal (mL) = 3500 ML   Patient's dry weight=  Type of access used= Right TDC  Length of treatment=3.5 Hours    Pt tolerated tx well, no s/s of acute distress noted, TDC worked well. 3.5 Liters removed, 8000u Epo Given (see MAR) post tx report given to floor CLINT Prince.

## 2024-07-04 NOTE — PLAN OF CARE
Problem: Safety - Adult  Goal: Free from fall injury  7/4/2024 0440 by Yaron Hughes RN  Outcome: Progressing  Flowsheets (Taken 7/3/2024 2000)  Free From Fall Injury: Instruct family/caregiver on patient safety  7/3/2024 1905 by Maris Coon RN  Outcome: Progressing  Flowsheets (Taken 7/3/2024 0929)  Free From Fall Injury:   Instruct family/caregiver on patient safety   Based on caregiver fall risk screen, instruct family/caregiver to ask for assistance with transferring infant if caregiver noted to have fall risk factors     Problem: ABCDS Injury Assessment  Goal: Absence of physical injury  7/4/2024 0440 by Yaron Hughes RN  Outcome: Progressing  Flowsheets (Taken 7/3/2024 2000)  Absence of Physical Injury: Implement safety measures based on patient assessment  7/3/2024 1905 by Maris Coon RN  Outcome: Progressing  Flowsheets (Taken 7/3/2024 0929)  Absence of Physical Injury: Implement safety measures based on patient assessment     Problem: Pain  Goal: Verbalizes/displays adequate comfort level or baseline comfort level  7/4/2024 0440 by Yaron Hughes RN  Outcome: Progressing  Flowsheets  Taken 7/4/2024 0321  Verbalizes/displays adequate comfort level or baseline comfort level: Encourage patient to monitor pain and request assistance  Taken 7/4/2024 0044  Verbalizes/displays adequate comfort level or baseline comfort level: Encourage patient to monitor pain and request assistance  Taken 7/3/2024 2004  Verbalizes/displays adequate comfort level or baseline comfort level: Encourage patient to monitor pain and request assistance  7/3/2024 1905 by Maris Coon RN  Outcome: Progressing     Problem: Chronic Conditions and Co-morbidities  Goal: Patient's chronic conditions and co-morbidity symptoms are monitored and maintained or improved  7/4/2024 0440 by Yaron Hughes RN  Outcome: Progressing  Flowsheets (Taken 7/3/2024 2000)  Care Plan - Patient's Chronic Conditions

## 2024-07-04 NOTE — PROGRESS NOTES
RN clarified PULMOZYME administration with Teresa Pharmacist, she said it should be given with ateplase and MD should give this.     RN contacted Nain Rasmussen  [phone] CT assistant, to reach the MD to give the Pulmozyme. He said it is not due tonight and that its only given once a day.   Charge RN notified.

## 2024-07-04 NOTE — PROGRESS NOTES
PULMONARY PROGRESS NOTE      Patient:  Vincenzo Darden  YOB: 1965    MRN: [id number]     Acct: [id number]     Admit date: 6/26/2024    REASON FOR CONSULT:-Acute hypoxic respiratory failure    Pt seen and Chart reviewed.    Subjective:     The patient is a 58 y.o. male with history of multiple comorbidities including diabetes, obesity, obstructive sleep apnea, peripheral vascular disease, coronary artery disease, s/p AVR and CABG (February 2024), complicated by chest wall dehiscence and MSSA infection, CAMILLE on CKD, required initiation of hemodialysis.  [facility].     On evaluation he was found to have pleural effusion and underwent thoracentesis with removal of 50 mL of cloudy yellow fluid in the right side.  The pleural fluid is exudative with neutrophilia, markedly elevated LDH at 6402, protein 4.7 and a glucose less than 2 suggestive of empyema.        Interval History  7/4/2024:  No acute issues overnight  Patient denies any acute complaints  Vital signs stable, afebrile.  Cultures not growing any organism  Thoracentesis fluid negative for malignancy  420 mL chest tube output over the past 24 hours  CXR yesterday shows persistent right pleural effusion  On tPA dornase  Scheduled for hemodialysis    Review of Systems -   Review of Systems   Constitutional:  Negative for fever.   Respiratory:  Positive for cough and shortness of breath.    Cardiovascular:  Negative for chest pain and palpitations.   Gastrointestinal:  Negative for abdominal pain, diarrhea, nausea and vomiting.   Musculoskeletal:  Negative for arthralgias and myalgias.   Neurological:  Negative for dizziness and light-headedness.           Physical Exam:  Vitals: BP (!) 147/76   Pulse 65   Temp 97.6 °F (36.4 °C) (Temporal)   Resp 20   Ht 1.829 m (6' 0.01\")   Wt 126 kg (277 lb 12.5 oz)   SpO2 97%   BMI 37.67 kg/m²   24 hour

## 2024-07-04 NOTE — PROGRESS NOTES
[facility] Cardiothoracic Surgery  Daily Progress Note          Surgeon:  Dr. Galo       Subjective:  Mr. Daredn is a 58 y.o. year-old male status post placement of a right sided 10 fr pigtail on 7/1/24 for a loculated right pleural effusion.  Patient was seen and examined at bedside this morning without any complaints.      Vital Signs: BP (!) 167/86   Pulse 69   Temp 98.2 °F (36.8 °C)   Resp 16   Ht 1.829 m (6' 0.01\")   Wt 131.8 kg (290 lb 9.1 oz)   SpO2 97%   BMI 39.40 kg/m²  O2 Flow Rate (L/min): 2 L/min   Admit Weight: Weight - Scale: 130.4 kg (287 lb 7.7 oz)   WEIGHTWeight - Scale: 131.8 kg (290 lb 9.1 oz)     I/O's:  I/O last 3 completed shifts:  In: 840 [P.O.:840]  Out: 420 [Chest Tube:420]    Data:    CBC:   Recent Labs     07/02/24  0650 07/03/24  0721 07/04/24  0734   WBC 9.2 8.4 8.5   HGB 7.6* 7.1* 7.3*   HCT 24.7* 23.3* 23.2*   MCV 93.2 92.8 91.7    294 305     BMP:   Recent Labs     07/02/24  0650 07/03/24  0721 07/04/24  0734   * 132* 131*   K 4.4 4.5 4.6   CL 95* 94* 95*   CO2 28 28 26   BUN 28* 27* 38*   CREATININE 5.3* 4.8* 6.3*     PT/INR: No results for input(s): \"PROTIME\", \"INR\" in the last 72 hours.  APTT: No results for input(s): \"APTT\" in the last 72 hours.    Chest X-Ray: 7/4/24  EXAMINATION:  ONE XRAY VIEW OF THE CHEST     7/4/2024 8:10 am     COMPARISON:  July 3, 2024     HISTORY:  ORDERING SYSTEM PROVIDED HISTORY: s/p CT placement by IR  TECHNOLOGIST PROVIDED HISTORY:  s/p CT placement by IR     FINDINGS:  Some improved aeration both lung bases compared to prior with bibasilar  atelectasis or infiltrates remaining.  No pneumothorax.  Small caliber  right-sided pleural drain stable.  Cardiac and mediastinal silhouettes  unchanged.  Dual lumen central venous catheter stable.  Osseous structures  intact.     IMPRESSION:  Some improved aeration both lung bases compared to prior with bibasilar  atelectasis or infiltrates remaining.    Scheduled Meds:

## 2024-07-04 NOTE — PROGRESS NOTES
in length with cortical thickness of 16 mm.  Renal parenchymal echogenicity is within normal limits and there is no hydronephrosis. Bladder: Moderate distention of the urinary bladder.  Probable bladder diverticulum along the bladder dome.  Bilateral ureteral jets are documented.  No postvoid imaging was performed as the patient did not have the ureter to void.     Unremarkable bilateral renal ultrasound. Probable bladder diverticulum.  Otherwise unremarkable urinary bladder ultrasound     XR FOOT LEFT (MIN 3 VIEWS)    Result Date: 6/17/2024  EXAMINATION: THREE XRAY VIEWS OF THE LEFT FOOT 6/17/2024 11:45 am COMPARISON: None. HISTORY: ORDERING SYSTEM PROVIDED HISTORY: gangrene of L toe TECHNOLOGIST PROVIDED HISTORY: gangrene of L toe FINDINGS: Bones: There is demineralization of the tuft of the 1st distal phalanx. 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 Joints: Normal alignment. Soft Tissues: Soft tissue irregularity involving the distal 1st phalanx.     Demineralization involving the tuft of the 1st distal phalanx which can be seen in the setting of osteomyelitis.     Vascular duplex lower extremity venous bilateral    Result Date: 6/17/2024    No evidence of deep vein or superficial vein thrombosis in the right lower extremity.   No evidence of deep vein or superficial vein thrombosis in the left lower extremity.     XR CHEST (2 VW)    Result Date: 6/17/2024  EXAMINATION: TWO XRAY VIEWS OF THE CHEST 6/17/2024 2:45 pm COMPARISON: Chest x-ray dated 06/02/2024.  Chest x-ray dated 06/02/2024. HISTORY: ORDERING SYSTEM PROVIDED HISTORY: Chest pain, SOB TECHNOLOGIST PROVIDED HISTORY: Chest pain, SOB FINDINGS: MEDICAL DEVICES: There is a right arm PICC. HEART/MEDIASTINUM: The cardiac silhouette is enlarged, but stable. PLEURA/LUNGS: There is a small to moderate right pleural effusion with adjacent compressive atelectasis.  There is no appreciable pneumothorax. BONES/SOFT  allowing us to participate in the care of this patient. Please call with questions.    MD Maddi Haq DPM participated in the evaluation of the patient under supervision.       ATTESTATION:     I have discussed the case, including pertinent history and exam findings with the medical resident. I have evaluated the  History, physical findings and pictures of the patient and the key elements of the encounter have been performed by me. I have reviewed the laboratory data, other diagnostic studies and discussed them with the medical resident. I have updated the medical record where necessary.     I agree with the assessment, plan and orders as documented by the medical resident and I have modified them as necessary.     Josesito Richardson MD    7/4/2024           Office: [phone]

## 2024-07-04 NOTE — PROGRESS NOTES
Renal Progress Note    Patient :  Vincenzo Darden; 58 y.o. MRN# [id number]  Location:    Attending:  Andrea Fabian MD  Admit Date:  2024   Hospital Day: 8    Subjective:     Patient seen and examined in dialysis, tolerating treatment well. To receive another dose of dornase later today.     Labs reviewed. Sodium 131, potassium 4.6, chloride 95, bicarb 26, BUN 38, creatinine 6.3, calcium 8.3.   Patient is anuric, on dialysis TTS.  Right-sided chest tube with 420 cc/24 hrs. Initial fluid evaluation deemed output to be exudative.   Received first dose of tPA/dornase 7/3, to continue having dornase instilled every 12 hours for 6 doses.       History reviewed.  Vincenzo Darden is a 58 y.o.  male w/ PVD s/p right BKA, non healing left foot wounds, CAD s/p AVR/CABGx3 in 2024 complicated by chest wall dehiscence, MSSA infection, w/ CAMILLE on CKD3 s/p initiating HD (HD T/R/S), DM2, COLT who was recently discharged 2024 who returned to [facility] yesterday evening with worsening shortness of breath, flank pain with chest pain and recommended for transfer to [facility] for the management of Dyspnea.      Outpatient Medications:     Medications Prior to Admission: gabapentin (NEURONTIN) 100 MG capsule, Take 1 capsule by mouth every 8 hours for 30 days.  amLODIPine (NORVASC) 10 MG tablet, Take 1 tablet by mouth daily  [] linezolid (ZYVOX) 600 MG tablet, Take 1 tablet by mouth every 12 hours for 24 doses  atorvastatin (LIPITOR) 40 MG tablet, Take 1 tablet by mouth nightly  metoprolol tartrate (LOPRESSOR) 25 MG tablet, Take 1 tablet by mouth 2 times daily  tamsulosin (FLOMAX) 0.4 MG capsule, Take 1 capsule by mouth daily  aspirin 81 MG chewable tablet, Take 1 tablet by mouth daily  TRULICITY 1.5 MG/0.5ML SC injection, Inject 0.5 mLs into the skin Once a week at 5 PM  Insulin Degludec (TRESIBA FLEXTOUCH) 200 UNIT/ML SOPN, Inject 40 Units into the skin nightly  Insulin Aspart,

## 2024-07-05 ENCOUNTER — APPOINTMENT (OUTPATIENT)
Dept: GENERAL RADIOLOGY | Age: 59
DRG: 291 | End: 2024-07-05
Attending: STUDENT IN AN ORGANIZED HEALTH CARE EDUCATION/TRAINING PROGRAM
Payer: MEDICARE

## 2024-07-05 LAB
ANION GAP SERPL CALCULATED.3IONS-SCNC: 9 MMOL/L (ref 9–16)
BASOPHILS # BLD: 0.05 K/UL (ref 0–0.2)
BASOPHILS NFR BLD: 1 % (ref 0–2)
BUN SERPL-MCNC: 29 MG/DL (ref 6–20)
CALCIUM SERPL-MCNC: 8.1 MG/DL (ref 8.6–10.4)
CHLORIDE SERPL-SCNC: 93 MMOL/L (ref 98–107)
CO2 SERPL-SCNC: 28 MMOL/L (ref 20–31)
CREAT SERPL-MCNC: 5 MG/DL (ref 0.7–1.2)
EOSINOPHIL # BLD: 0.36 K/UL (ref 0–0.44)
EOSINOPHILS RELATIVE PERCENT: 4 % (ref 1–4)
ERYTHROCYTE [DISTWIDTH] IN BLOOD BY AUTOMATED COUNT: 13.7 % (ref 11.8–14.4)
GFR, ESTIMATED: 13 ML/MIN/1.73M2
GLUCOSE BLD-MCNC: 132 MG/DL (ref 75–110)
GLUCOSE BLD-MCNC: 162 MG/DL (ref 75–110)
GLUCOSE BLD-MCNC: 181 MG/DL (ref 75–110)
GLUCOSE BLD-MCNC: 195 MG/DL (ref 75–110)
GLUCOSE SERPL-MCNC: 134 MG/DL (ref 74–99)
HCT VFR BLD AUTO: 26.3 % (ref 40.7–50.3)
HGB BLD-MCNC: 8.1 G/DL (ref 13–17)
IMM GRANULOCYTES # BLD AUTO: 0.04 K/UL (ref 0–0.3)
IMM GRANULOCYTES NFR BLD: 1 %
LYMPHOCYTES NFR BLD: 1.25 K/UL (ref 1.1–3.7)
LYMPHOCYTES RELATIVE PERCENT: 15 % (ref 24–43)
MCH RBC QN AUTO: 28.5 PG (ref 25.2–33.5)
MCHC RBC AUTO-ENTMCNC: 30.8 G/DL (ref 28.4–34.8)
MCV RBC AUTO: 92.6 FL (ref 82.6–102.9)
MONOCYTES NFR BLD: 0.96 K/UL (ref 0.1–1.2)
MONOCYTES NFR BLD: 12 % (ref 3–12)
NEUTROPHILS NFR BLD: 67 % (ref 36–65)
NEUTS SEG NFR BLD: 5.58 K/UL (ref 1.5–8.1)
NRBC BLD-RTO: 0 PER 100 WBC
PLATELET # BLD AUTO: 313 K/UL (ref 138–453)
PMV BLD AUTO: 8.6 FL (ref 8.1–13.5)
POTASSIUM SERPL-SCNC: 4.6 MMOL/L (ref 3.7–5.3)
RBC # BLD AUTO: 2.84 M/UL (ref 4.21–5.77)
SODIUM SERPL-SCNC: 130 MMOL/L (ref 136–145)
WBC OTHER # BLD: 8.2 K/UL (ref 3.5–11.3)

## 2024-07-05 PROCEDURE — 6360000002 HC RX W HCPCS: Performed by: PHYSICIAN ASSISTANT

## 2024-07-05 PROCEDURE — 2580000003 HC RX 258: Performed by: STUDENT IN AN ORGANIZED HEALTH CARE EDUCATION/TRAINING PROGRAM

## 2024-07-05 PROCEDURE — 6370000000 HC RX 637 (ALT 250 FOR IP): Performed by: INTERNAL MEDICINE

## 2024-07-05 PROCEDURE — 82947 ASSAY GLUCOSE BLOOD QUANT: CPT

## 2024-07-05 PROCEDURE — 6370000000 HC RX 637 (ALT 250 FOR IP): Performed by: STUDENT IN AN ORGANIZED HEALTH CARE EDUCATION/TRAINING PROGRAM

## 2024-07-05 PROCEDURE — 85025 COMPLETE CBC W/AUTO DIFF WBC: CPT

## 2024-07-05 PROCEDURE — 99232 SBSQ HOSP IP/OBS MODERATE 35: CPT | Performed by: INTERNAL MEDICINE

## 2024-07-05 PROCEDURE — 2580000003 HC RX 258: Performed by: NURSE PRACTITIONER

## 2024-07-05 PROCEDURE — 80048 BASIC METABOLIC PNL TOTAL CA: CPT

## 2024-07-05 PROCEDURE — 36415 COLL VENOUS BLD VENIPUNCTURE: CPT

## 2024-07-05 PROCEDURE — 2580000003 HC RX 258: Performed by: PHYSICIAN ASSISTANT

## 2024-07-05 PROCEDURE — 2060000000 HC ICU INTERMEDIATE R&B

## 2024-07-05 PROCEDURE — 99231 SBSQ HOSP IP/OBS SF/LOW 25: CPT | Performed by: PHYSICIAN ASSISTANT

## 2024-07-05 PROCEDURE — 99232 SBSQ HOSP IP/OBS MODERATE 35: CPT | Performed by: STUDENT IN AN ORGANIZED HEALTH CARE EDUCATION/TRAINING PROGRAM

## 2024-07-05 PROCEDURE — 71045 X-RAY EXAM CHEST 1 VIEW: CPT

## 2024-07-05 PROCEDURE — 99233 SBSQ HOSP IP/OBS HIGH 50: CPT | Performed by: INTERNAL MEDICINE

## 2024-07-05 RX ORDER — FENTANYL CITRATE 50 UG/ML
25 INJECTION, SOLUTION INTRAMUSCULAR; INTRAVENOUS ONCE
Status: COMPLETED | OUTPATIENT
Start: 2024-07-05 | End: 2024-07-05

## 2024-07-05 RX ADMIN — TAMSULOSIN HYDROCHLORIDE 0.4 MG: 0.4 CAPSULE ORAL at 09:42

## 2024-07-05 RX ADMIN — WATER 5 MG: 1 INJECTION INTRAMUSCULAR; INTRAVENOUS; SUBCUTANEOUS at 10:19

## 2024-07-05 RX ADMIN — CARVEDILOL 12.5 MG: 12.5 TABLET, FILM COATED ORAL at 09:42

## 2024-07-05 RX ADMIN — LINEZOLID 600 MG: 600 TABLET, FILM COATED ORAL at 20:50

## 2024-07-05 RX ADMIN — GABAPENTIN 100 MG: 100 CAPSULE ORAL at 20:50

## 2024-07-05 RX ADMIN — FENTANYL CITRATE 25 MCG: 50 INJECTION, SOLUTION INTRAMUSCULAR; INTRAVENOUS at 10:14

## 2024-07-05 RX ADMIN — DESMOPRESSIN ACETATE 40 MG: 0.2 TABLET ORAL at 20:50

## 2024-07-05 RX ADMIN — SODIUM CHLORIDE, PRESERVATIVE FREE 10 ML: 5 INJECTION INTRAVENOUS at 20:51

## 2024-07-05 RX ADMIN — SODIUM CHLORIDE, PRESERVATIVE FREE 10 ML: 5 INJECTION INTRAVENOUS at 14:00

## 2024-07-05 RX ADMIN — OXYCODONE HYDROCHLORIDE AND ACETAMINOPHEN 1 TABLET: 5; 325 TABLET ORAL at 14:04

## 2024-07-05 RX ADMIN — ASPIRIN 81 MG 81 MG: 81 TABLET ORAL at 09:41

## 2024-07-05 RX ADMIN — CARVEDILOL 12.5 MG: 12.5 TABLET, FILM COATED ORAL at 17:20

## 2024-07-05 RX ADMIN — INSULIN GLARGINE 25 UNITS: 100 INJECTION, SOLUTION SUBCUTANEOUS at 20:50

## 2024-07-05 RX ADMIN — PANTOPRAZOLE SODIUM 40 MG: 40 TABLET, DELAYED RELEASE ORAL at 05:42

## 2024-07-05 RX ADMIN — ALTEPLASE 10 MG: 2.2 INJECTION, POWDER, LYOPHILIZED, FOR SOLUTION INTRAVENOUS at 10:19

## 2024-07-05 RX ADMIN — LINEZOLID 600 MG: 600 TABLET, FILM COATED ORAL at 09:44

## 2024-07-05 RX ADMIN — SODIUM CHLORIDE, PRESERVATIVE FREE 10 ML: 5 INJECTION INTRAVENOUS at 09:00

## 2024-07-05 RX ADMIN — OXYCODONE HYDROCHLORIDE AND ACETAMINOPHEN 1 TABLET: 5; 325 TABLET ORAL at 05:42

## 2024-07-05 RX ADMIN — GABAPENTIN 100 MG: 100 CAPSULE ORAL at 05:42

## 2024-07-05 RX ADMIN — OXYCODONE HYDROCHLORIDE AND ACETAMINOPHEN 1 TABLET: 5; 325 TABLET ORAL at 20:50

## 2024-07-05 RX ADMIN — GABAPENTIN 100 MG: 100 CAPSULE ORAL at 14:04

## 2024-07-05 RX ADMIN — AMLODIPINE BESYLATE 10 MG: 10 TABLET ORAL at 09:42

## 2024-07-05 ASSESSMENT — PAIN DESCRIPTION - ORIENTATION
ORIENTATION: RIGHT

## 2024-07-05 ASSESSMENT — PAIN DESCRIPTION - LOCATION
LOCATION: BACK
LOCATION: ABDOMEN

## 2024-07-05 ASSESSMENT — PAIN SCALES - GENERAL
PAINLEVEL_OUTOF10: 5
PAINLEVEL_OUTOF10: 6
PAINLEVEL_OUTOF10: 6
PAINLEVEL_OUTOF10: 7
PAINLEVEL_OUTOF10: 5
PAINLEVEL_OUTOF10: 6

## 2024-07-05 ASSESSMENT — PAIN DESCRIPTION - DESCRIPTORS
DESCRIPTORS: ACHING

## 2024-07-05 ASSESSMENT — PAIN DESCRIPTION - PAIN TYPE: TYPE: ACUTE PAIN

## 2024-07-05 ASSESSMENT — PAIN - FUNCTIONAL ASSESSMENT
PAIN_FUNCTIONAL_ASSESSMENT: PREVENTS OR INTERFERES SOME ACTIVE ACTIVITIES AND ADLS

## 2024-07-05 NOTE — PROGRESS NOTES
Further recommendations as per Dr. Toledo      The above recommendations including medications and orders were discussed and agreed upon with Dr. Galo.     HERRERA Jacobson

## 2024-07-05 NOTE — PROGRESS NOTES
[facility]@    [facility]   IN-PATIENT SERVICE   [facility]    Progress Note    7/5/2024    3:19 PM    Name:   Vincenzo Darden  MRN:     [id number]     Acct:      [id number]   Room:   [facility]0440-01   Day:  9  Admit Date:  6/26/2024  4:33 AM    PCP:   Shaikh Calix MD  Code Status:  Full Code    Subjective:     C/C: No chief complaint on file.  Shortness of breath, flank pain  Interval History Status: not changed.     Seen at bedside, hemodynamically stable, on 2 L nasal cannula  Continues to complain of chest pain and shortness of breath  Continues to have right-sided chest tube in place, 240 mL output last 24 hours, plan for third dose of tPA dornase today, CT surgery/pulm service following, plan for CT chest tomorrow  Continues to be on Zyvox, ID service following  Nephrology service following for dialysis    Brief History:     58-year-old male with history of peripheral vascular disease s/p right BKA, nonhealing left foot wounds,  CAD s/p CABG, s/p AVR complicated by chest wall dehiscence, MSSA infection, with CAMILLE on CKD started on dialysis recently, diabetes mellitus, who came in with shortness of breath, chest pain, flank pain, shortness of breath likely secondary to fluid overload versus right-sided empyema for which patient underwent thoracentesis and eventual chest tube placement, pulm/CT surgery/ID service have been following, s/p tPA dornase,  Nephrology service has been following for dialysis    Medications:     Allergies:    Allergies   Allergen Reactions    Vancomycin Nausea And Vomiting     States got levofloxicin and was ok    Zosyn [Piperacillin Sod-Tazobactam So] Anaphylaxis       Current Meds:   Scheduled Meds:    HYDROmorphone  0.5 mg IntraVENous Once    insulin glargine  25 Units SubCUTAneous Nightly    carvedilol  12.5 mg Oral BID WC    linezolid  600 mg Oral 2 times per day    epoetin  8,000 Units IntraVENous Once per day on Tue Thu Sat    sodium chloride

## 2024-07-05 NOTE — PROGRESS NOTES
postoperative changes in the overlying midline subcutaneous fat the anterior chest wall and underlying mediastinal fat  Nonspecific loculated fluid collection in the anterior lower mediastinum could reflect postoperative seroma though abscess cannot be excluded  Moderate sized layering right pleural effusion with adjacent right lower lobe consolidated favoring atelectasis with trace layering effusion on the left  Mild urinary bladder wall thickening could reflect incomplete bladder distention chronic wall hypertrophy or cystitis no other potential acute findings in the abdominal pelvis  Suspected cholelithiasis    IR TUNNELED CVC PLACEMENT:  6/21/2024  Successful removal of the existing non-tunneled dialysis catheter with tunneling and placement of a new right internal jugular approach 14.5 English x 23 cm tip to cuff palindrome dialysis catheter, as above.  The catheter is ready for use    Left foot XR 6/17/24:  Demineralization involving the tuft of the 1st distal phalanx which can be  seen in the setting of osteomyelitis.    CXR 6/17/24:  Small to moderate right pleural effusion with adjacent compressive atelectasis.       CURRENT EVALUATION- DAILY INTERVAL CHANGES 7/5/2024  BP (!) 142/81   Pulse 66   Temp 98.4 °F (36.9 °C) (Axillary)   Resp 18   Ht 1.829 m (6' 0.01\")   Wt 124.8 kg (275 lb 2.2 oz)   SpO2 97%   BMI 37.31 kg/m²     Afebrile  VS stable, HTN    Afebrile  VS stable    Patient feels better  Renal function improving  No complaints  No new issues reported    Medications reviewed:  PO Zyvox-stop date 7/7/24    Chest tube placed 07/01/24      Labs, X rays reviewed: 7/5/2024 with independent review of X rays    BUN: 88-->44-->28-->28-->38-->29  Cr: 5.9-->4.7-->5.3-->6.3-->5.0  GFR: 6-->8  NA: 132-->131-->130  K: 5.7--.>5.2-->4.3-->4.4-->4.6    WBC: 10.9-->8.4-->8.5-->8.2  Hb: 9.4-->7.6-->7.1-->7.3-->8.1  Plat:  296-->294-->305-->313    CRP: 73.4-->72.7.-->74. 5  ESR: 79-->75-->62    CK   6-5-24:  921  24: 177    Cultures:  Urine:  24: No growth  24: No growth  Blood:  24: MSSA x 2  6-3-24:  Staph Aureus  24: No growth   24: No growth  24: No growth   24: No growth     Sputum :    Wound:  24: Left foot: GNR and normal laurence  24:Thoracentesis Fluid: No growth  MRSA Nares:  10-25-23: negative     Imagin-26-24:  Lt hallux      24 Lt anterior foot      24 Lt plantar          Left foot  24                I have personally reviewed the past medical history, past surgical history, medications, social history, and family history, and I have updated the database accordingly.  Past Medical History:     Past Medical History:   Diagnosis Date    Diabetes mellitus (HCC)     Hernia, abdominal        Past Surgical  History:     Past Surgical History:   Procedure Laterality Date    CABG WITH AORTIC VALVE REPLACEMENT N/A 2024    Epi-Cardial leads present. No MRI Per Dr. Cm 24 KRM- CABG CORONARY ARTERY BYPASS X3, STERNAL PLATING, AORTIC VALVE REPLACEMENT 23MM INSPIRIS AORTIC VALVE, ON PUMP, JO performed by Andrei James MD at [facility] CVOR    CARDIAC PROCEDURE N/A 10/09/2023    Coronary angiography performed by Louis Ramirez MD at [facility] CARDIAC CATH LAB    CARDIAC PROCEDURE N/A 10/09/2023    Aortic root aortogram performed by Louis Ramirez MD at [facility] CARDIAC CATH LAB    IR CHEST TUBE INSERTION  2024    IR CHEST TUBE INSERTION 2024 Moy Escobar MD [facility] SPECIAL PROCEDURES    IR NONTUNNELED VASCULAR CATHETER  2024    IR NONTUNNELED VASCULAR CATHETER 2024 Marlon Rice MD [facility] SPECIAL PROCEDURES    IR TUNNELED CATHETER PLACEMENT GREATER THAN 5 YEARS  2024    IR TUNNELED CATHETER PLACEMENT GREATER THAN 5 YEARS 2024 Marlon Rice MD [facility] SPECIAL PROCEDURES       Medications:      insulin glargine  25 Units SubCUTAneous Nightly    carvedilol  12.5 mg Oral BID WC    linezolid  600 mg

## 2024-07-05 NOTE — CARE COORDINATION
Transitional planning      Plan is still to return home, current with [facility], has chest tube,  dialysis T/R/S in [place], follow for needs .

## 2024-07-05 NOTE — PROGRESS NOTES
Renal Progress Note    Patient :  Vincenzo Darden; 58 y.o. MRN# [id number]  Location:    Attending:  Andrea Fabian MD  Admit Date:  2024   Hospital Day: 9    Subjective:     Patient seen and examined at bedside. No new issues overnight. Awaiting next dornase treatment. Lower extremity edema much improved today.    Labs reviewed. Sodium 130, potassium 4.6, chloride 93, bicarb 28, BUN 29, creatinine 5.0, calcium 8.1.   Patient is anuric, on dialysis TTS.  Right-sided chest tube with 240 cc/24 hrs. Initial fluid evaluation deemed output to be exudative.   Received first dose of tPA/dornase 7/3, to continue having dornase instilled every 12 hours for 6 doses.     History reviewed.  Vincenzo Darden is a 58 y.o. male w/ PVD s/p right BKA, non healing left foot wounds, CAD s/p AVR/CABGx3 in 2024 complicated by chest wall dehiscence, MSSA infection, w/ CAMILLE on CKD3 s/p initiating HD (HD T/R/S), DM2, COLT who was recently discharged 2024 who returned to [facility] yesterday evening with worsening shortness of breath, flank pain with chest pain and recommended for transfer to [facility] for the management of Dyspnea.        Outpatient Medications:     Medications Prior to Admission: gabapentin (NEURONTIN) 100 MG capsule, Take 1 capsule by mouth every 8 hours for 30 days.  amLODIPine (NORVASC) 10 MG tablet, Take 1 tablet by mouth daily  [] linezolid (ZYVOX) 600 MG tablet, Take 1 tablet by mouth every 12 hours for 24 doses  atorvastatin (LIPITOR) 40 MG tablet, Take 1 tablet by mouth nightly  metoprolol tartrate (LOPRESSOR) 25 MG tablet, Take 1 tablet by mouth 2 times daily  tamsulosin (FLOMAX) 0.4 MG capsule, Take 1 capsule by mouth daily  aspirin 81 MG chewable tablet, Take 1 tablet by mouth daily  TRULICITY 1.5 MG/0.5ML SC injection, Inject 0.5 mLs into the skin Once a week at 5 PM  Insulin Degludec (TRESIBA FLEXTOUCH) 200 UNIT/ML SOPN, Inject 40 Units into the skin

## 2024-07-05 NOTE — PLAN OF CARE
Problem: Safety - Adult  Goal: Free from fall injury  7/5/2024 0415 by Florentin Lopez RN  Outcome: Progressing  7/4/2024 1858 by Jaylyn Louise RN  Outcome: Progressing     Problem: ABCDS Injury Assessment  Goal: Absence of physical injury  7/5/2024 0415 by Florentin Lopez RN  Outcome: Progressing  7/4/2024 1858 by Jaylyn Louise RN  Outcome: Progressing     Problem: Pain  Goal: Verbalizes/displays adequate comfort level or baseline comfort level  7/5/2024 0415 by Florentin Lopez RN  Outcome: Progressing  7/4/2024 1858 by Jaylyn Louise RN  Outcome: Progressing     Problem: Chronic Conditions and Co-morbidities  Goal: Patient's chronic conditions and co-morbidity symptoms are monitored and maintained or improved  7/5/2024 0415 by Florentin Lopez RN  Outcome: Progressing  7/4/2024 1858 by Jaylyn Louise RN  Outcome: Progressing     Problem: Nutrition Deficit:  Goal: Optimize nutritional status  7/5/2024 0415 by Florentin Lopez RN  Outcome: Progressing  7/4/2024 1858 by Jaylyn Louise RN  Outcome: Progressing

## 2024-07-05 NOTE — PROGRESS NOTES
PULMONARY PROGRESS NOTE      Patient:  iVncenzo Darden  YOB: 1965    MRN: [id number]     Acct: [id number]     Admit date: 6/26/2024    REASON FOR CONSULT:-Acute hypoxic respiratory failure    Pt seen and Chart reviewed.    Subjective:     The patient is a 58 y.o. male with history of multiple comorbidities including diabetes, obesity, obstructive sleep apnea, peripheral vascular disease, coronary artery disease, s/p AVR and CABG (February 2024), complicated by chest wall dehiscence and MSSA infection, CAMILLE on CKD, required initiation of hemodialysis.  [facility].     On evaluation he was found to have pleural effusion and underwent thoracentesis with removal of 50 mL of cloudy yellow fluid in the right side.  The pleural fluid is exudative with neutrophilia, markedly elevated LDH at 6402, protein 4.7 and a glucose less than 2 suggestive of empyema.        Interval History  7/5/2024:  No acute issues overnight  Patient denies any acute complaints  Vital signs stable, afebrile.  Patient on 2 L oxygen by nasal cannula  Cultures not growing any organism  Thoracentesis fluid negative for malignancy  240 mL chest tube output over the past 24 hours  On tPA dornase, per CT surgery.  Patient to receive both medications today via the chest tube  Fluid balance -19 L    Review of Systems -   Review of Systems   Constitutional:  Negative for fever.   Respiratory:  Positive for cough and shortness of breath.    Cardiovascular:  Negative for chest pain and palpitations.   Gastrointestinal:  Negative for abdominal pain, diarrhea, nausea and vomiting.   Musculoskeletal:  Negative for arthralgias and myalgias.   Neurological:  Negative for dizziness and light-headedness.           Physical Exam:  Vitals: BP (!) 142/81   Pulse 66   Temp 98.4 °F (36.9 °C) (Axillary)   Resp 18   Ht 1.829 m (6' 0.01\")   Wt 124.8 kg (275 lb  diabetes mellitus (HCC)    Heart failure, diastolic, with acute decompensation (HCC)    Hyperkalemia    History of aortic valve replacement    CAMILLE (acute kidney injury) (HCC)    Wound eschar of foot    PVD (peripheral vascular disease) (HCC)    Wound, open, foot, left, initial encounter    Dyspnea    SOB (shortness of breath)    Pleural effusion    Postoperative seroma involving circulatory system after cardiac bypass    Biventricular congestive heart failure (HCC)    HFrEF (heart failure with reduced ejection fraction) (HCC)    Osteomyelitis of great toe of left foot (HCC)    Ischemic cardiomyopathy    Empyema of right pleural space (HCC)    Empyema lung (HCC)    Dependence on renal dialysis (HCC)    Bilateral lower extremity edema    Essential hypertension       Assessment and Plan:     Right-sided empyema  CAMILLE and CKD, on currently dialysis dependent  S/p CABG and AVR (complicated by wound dehiscence and MSSA bacteremia)  HFrEF  History of peripheral vascular disease, s/p right BKA  Obesity  COLT  Essential hypertension  Type II DM  Anemia, better      Plan:    Continue supplemental oxygen to keep SpO2 more than 92%  Received tPA dornase  IR guided chest tube placement on 7/1  Pleural fluid concerning for empyema  Antibiotics: Zyvox as per ID recommendations  Maintain bronchopulmonary hygiene  Monitor I's/O, electrolytes with a goal of even/negative fluid balance  Hemodialysis as per nephrology  DVT/stress ulcer prophylaxis-subcutaneous heparin  PT/OT  Chest x-ray of 7/5/2024 with small right pleural effusion and bibasilar atelectasis    Jonny Bills MD  [facility], [place]         7/5/2024, 8:46 AM

## 2024-07-05 NOTE — PROGRESS NOTES
Occupational Therapy    [facility]  Occupational Therapy Not Seen Note    DATE: 2024    NAME: Vincenzo Darden  MRN: [id number]   : 1965      Patient not seen this date for Occupational Therapy due to:    Testing: Pt leaving with transport upon arrival for echo.    Electronically signed by HARRISON Lambert on 2024 at 9:44 AM

## 2024-07-06 ENCOUNTER — APPOINTMENT (OUTPATIENT)
Dept: GENERAL RADIOLOGY | Age: 59
DRG: 291 | End: 2024-07-06
Attending: STUDENT IN AN ORGANIZED HEALTH CARE EDUCATION/TRAINING PROGRAM
Payer: MEDICARE

## 2024-07-06 ENCOUNTER — APPOINTMENT (OUTPATIENT)
Dept: CT IMAGING | Age: 59
DRG: 291 | End: 2024-07-06
Attending: STUDENT IN AN ORGANIZED HEALTH CARE EDUCATION/TRAINING PROGRAM
Payer: MEDICARE

## 2024-07-06 LAB
ANION GAP SERPL CALCULATED.3IONS-SCNC: 12 MMOL/L (ref 9–16)
BASOPHILS # BLD: 0.04 K/UL (ref 0–0.2)
BASOPHILS NFR BLD: 0 % (ref 0–2)
BUN SERPL-MCNC: 41 MG/DL (ref 6–20)
CALCIUM SERPL-MCNC: 8.3 MG/DL (ref 8.6–10.4)
CHLORIDE SERPL-SCNC: 93 MMOL/L (ref 98–107)
CO2 SERPL-SCNC: 25 MMOL/L (ref 20–31)
CREAT SERPL-MCNC: 6.1 MG/DL (ref 0.7–1.2)
EOSINOPHIL # BLD: 0.29 K/UL (ref 0–0.44)
EOSINOPHILS RELATIVE PERCENT: 3 % (ref 1–4)
ERYTHROCYTE [DISTWIDTH] IN BLOOD BY AUTOMATED COUNT: 13.7 % (ref 11.8–14.4)
GFR, ESTIMATED: 10 ML/MIN/1.73M2
GLUCOSE BLD-MCNC: 166 MG/DL (ref 75–110)
GLUCOSE BLD-MCNC: 179 MG/DL (ref 75–110)
GLUCOSE BLD-MCNC: 208 MG/DL (ref 75–110)
GLUCOSE SERPL-MCNC: 162 MG/DL (ref 74–99)
HCT VFR BLD AUTO: 25.3 % (ref 40.7–50.3)
HGB BLD-MCNC: 7.7 G/DL (ref 13–17)
IMM GRANULOCYTES # BLD AUTO: 0.06 K/UL (ref 0–0.3)
IMM GRANULOCYTES NFR BLD: 1 %
LYMPHOCYTES NFR BLD: 1.16 K/UL (ref 1.1–3.7)
LYMPHOCYTES RELATIVE PERCENT: 13 % (ref 24–43)
MCH RBC QN AUTO: 28.3 PG (ref 25.2–33.5)
MCHC RBC AUTO-ENTMCNC: 30.4 G/DL (ref 28.4–34.8)
MCV RBC AUTO: 93 FL (ref 82.6–102.9)
MONOCYTES NFR BLD: 1.02 K/UL (ref 0.1–1.2)
MONOCYTES NFR BLD: 11 % (ref 3–12)
NEUTROPHILS NFR BLD: 72 % (ref 36–65)
NEUTS SEG NFR BLD: 6.63 K/UL (ref 1.5–8.1)
NRBC BLD-RTO: 0 PER 100 WBC
PLATELET # BLD AUTO: 327 K/UL (ref 138–453)
PMV BLD AUTO: 8.5 FL (ref 8.1–13.5)
POTASSIUM SERPL-SCNC: 4.7 MMOL/L (ref 3.7–5.3)
RBC # BLD AUTO: 2.72 M/UL (ref 4.21–5.77)
SODIUM SERPL-SCNC: 130 MMOL/L (ref 136–145)
WBC OTHER # BLD: 9.2 K/UL (ref 3.5–11.3)

## 2024-07-06 PROCEDURE — 71045 X-RAY EXAM CHEST 1 VIEW: CPT

## 2024-07-06 PROCEDURE — 36415 COLL VENOUS BLD VENIPUNCTURE: CPT

## 2024-07-06 PROCEDURE — 6370000000 HC RX 637 (ALT 250 FOR IP): Performed by: INTERNAL MEDICINE

## 2024-07-06 PROCEDURE — 80048 BASIC METABOLIC PNL TOTAL CA: CPT

## 2024-07-06 PROCEDURE — 2580000003 HC RX 258: Performed by: NURSE PRACTITIONER

## 2024-07-06 PROCEDURE — 99233 SBSQ HOSP IP/OBS HIGH 50: CPT | Performed by: INTERNAL MEDICINE

## 2024-07-06 PROCEDURE — 6370000000 HC RX 637 (ALT 250 FOR IP): Performed by: STUDENT IN AN ORGANIZED HEALTH CARE EDUCATION/TRAINING PROGRAM

## 2024-07-06 PROCEDURE — 99232 SBSQ HOSP IP/OBS MODERATE 35: CPT | Performed by: STUDENT IN AN ORGANIZED HEALTH CARE EDUCATION/TRAINING PROGRAM

## 2024-07-06 PROCEDURE — 99232 SBSQ HOSP IP/OBS MODERATE 35: CPT | Performed by: INTERNAL MEDICINE

## 2024-07-06 PROCEDURE — 82947 ASSAY GLUCOSE BLOOD QUANT: CPT

## 2024-07-06 PROCEDURE — 90935 HEMODIALYSIS ONE EVALUATION: CPT

## 2024-07-06 PROCEDURE — 6360000002 HC RX W HCPCS: Performed by: INTERNAL MEDICINE

## 2024-07-06 PROCEDURE — 85025 COMPLETE CBC W/AUTO DIFF WBC: CPT

## 2024-07-06 PROCEDURE — 71250 CT THORAX DX C-: CPT

## 2024-07-06 PROCEDURE — 2700000000 HC OXYGEN THERAPY PER DAY

## 2024-07-06 PROCEDURE — 2060000000 HC ICU INTERMEDIATE R&B

## 2024-07-06 PROCEDURE — 2580000003 HC RX 258: Performed by: STUDENT IN AN ORGANIZED HEALTH CARE EDUCATION/TRAINING PROGRAM

## 2024-07-06 PROCEDURE — 94761 N-INVAS EAR/PLS OXIMETRY MLT: CPT

## 2024-07-06 RX ADMIN — HEPARIN SODIUM 1900 UNITS: 1000 INJECTION INTRAVENOUS; SUBCUTANEOUS at 12:08

## 2024-07-06 RX ADMIN — SODIUM CHLORIDE, PRESERVATIVE FREE 10 ML: 5 INJECTION INTRAVENOUS at 20:35

## 2024-07-06 RX ADMIN — CARVEDILOL 12.5 MG: 12.5 TABLET, FILM COATED ORAL at 17:14

## 2024-07-06 RX ADMIN — INSULIN GLARGINE 25 UNITS: 100 INJECTION, SOLUTION SUBCUTANEOUS at 20:34

## 2024-07-06 RX ADMIN — LINEZOLID 600 MG: 600 TABLET, FILM COATED ORAL at 20:34

## 2024-07-06 RX ADMIN — DESMOPRESSIN ACETATE 40 MG: 0.2 TABLET ORAL at 20:34

## 2024-07-06 RX ADMIN — TAMSULOSIN HYDROCHLORIDE 0.4 MG: 0.4 CAPSULE ORAL at 15:16

## 2024-07-06 RX ADMIN — OXYCODONE HYDROCHLORIDE AND ACETAMINOPHEN 1 TABLET: 5; 325 TABLET ORAL at 05:49

## 2024-07-06 RX ADMIN — GABAPENTIN 100 MG: 100 CAPSULE ORAL at 05:49

## 2024-07-06 RX ADMIN — GABAPENTIN 100 MG: 100 CAPSULE ORAL at 20:34

## 2024-07-06 RX ADMIN — GABAPENTIN 100 MG: 100 CAPSULE ORAL at 15:16

## 2024-07-06 RX ADMIN — ASPIRIN 81 MG 81 MG: 81 TABLET ORAL at 15:16

## 2024-07-06 RX ADMIN — OXYCODONE HYDROCHLORIDE AND ACETAMINOPHEN 1 TABLET: 5; 325 TABLET ORAL at 15:20

## 2024-07-06 RX ADMIN — SODIUM CHLORIDE, PRESERVATIVE FREE 10 ML: 5 INJECTION INTRAVENOUS at 15:23

## 2024-07-06 RX ADMIN — LINEZOLID 600 MG: 600 TABLET, FILM COATED ORAL at 15:20

## 2024-07-06 RX ADMIN — PANTOPRAZOLE SODIUM 40 MG: 40 TABLET, DELAYED RELEASE ORAL at 05:49

## 2024-07-06 RX ADMIN — HYDROMORPHONE HYDROCHLORIDE 1 MG: 2 TABLET ORAL at 02:50

## 2024-07-06 RX ADMIN — ERYTHROPOIETIN 8000 UNITS: 10000 INJECTION, SOLUTION INTRAVENOUS; SUBCUTANEOUS at 10:16

## 2024-07-06 RX ADMIN — OXYCODONE HYDROCHLORIDE AND ACETAMINOPHEN 1 TABLET: 5; 325 TABLET ORAL at 21:50

## 2024-07-06 ASSESSMENT — PAIN SCALES - GENERAL
PAINLEVEL_OUTOF10: 5
PAINLEVEL_OUTOF10: 6
PAINLEVEL_OUTOF10: 6
PAINLEVEL_OUTOF10: 5
PAINLEVEL_OUTOF10: 5
PAINLEVEL_OUTOF10: 7
PAINLEVEL_OUTOF10: 7
PAINLEVEL_OUTOF10: 3
PAINLEVEL_OUTOF10: 5

## 2024-07-06 ASSESSMENT — PAIN DESCRIPTION - DESCRIPTORS
DESCRIPTORS: ACHING
DESCRIPTORS: ACHING

## 2024-07-06 ASSESSMENT — PAIN DESCRIPTION - ORIENTATION
ORIENTATION: RIGHT

## 2024-07-06 ASSESSMENT — PAIN DESCRIPTION - LOCATION
LOCATION: ABDOMEN
LOCATION: CHEST
LOCATION: ABDOMEN
LOCATION: RIB CAGE
LOCATION: ABDOMEN
LOCATION: ABDOMEN

## 2024-07-06 ASSESSMENT — PAIN DESCRIPTION - PAIN TYPE: TYPE: ACUTE PAIN

## 2024-07-06 ASSESSMENT — PAIN - FUNCTIONAL ASSESSMENT: PAIN_FUNCTIONAL_ASSESSMENT: PREVENTS OR INTERFERES SOME ACTIVE ACTIVITIES AND ADLS

## 2024-07-06 ASSESSMENT — PAIN DESCRIPTION - FREQUENCY: FREQUENCY: CONTINUOUS

## 2024-07-06 ASSESSMENT — PAIN DESCRIPTION - ONSET: ONSET: ON-GOING

## 2024-07-06 NOTE — PROGRESS NOTES
[facility] - Progress Note      Today's Date and Time: 7/6/2024, 7:15 AM    Impression :   Shortness of breath  Rt pleural effusion with compressive atelectasis  Loculated lower anterior mediastinum fluid collection  CAMILLE on CKD stage III  Recent MSSA septicemia 6-2-24 x 2  Was under treatment with Linezolid because of prior CAMILLE with Daptomycin  Hx anaphylaxis with Zosyn    Concern for left great toe osteomyelitis   CAD  S/P CABG and AVR Feb 2024  Hx of Sternal wound dehiscence-fully healed  DM 2  PVD with prior Rt BKA  Left leg and foot ulcers.  Allergy to vancomycin (nausea)  Allergy to Zosyn (anaphylaxis)      Recommendations:     PO Zyvox 600 mg BID initiated-6/30/24:  Stop date 7/7/24  There is minimal residual fluid in the right pleural cavity with compressive atelectasis and consolidation   S/p left pleural drain placement with 70 ml fluid drained on 7/1/24  The patient was on daptomycin and the care was discussed with Dr. Prado and given the concern for empyema, he was switched him back to linezolid to give better lung penetration  Blood cultures thus far remain negative  I will follow the progress and adjust therapy accordingly    Prior treatment:  Daptomycin started 6/26/24 and stopped 6/30/24 6/18/24: IV Zyvox 600 mg po BID initiated  Daptomycin 700 mg IV q 48 hr. Stopped on 6/17/24 because of renal failure    Prior blood cultures:   6/3/24 with 1/1 staph aureus  6-4-24: No growth   6/7/24: No growth   6/17/24: No growth     TTE with no evidence of endocarditis from 6/6/24  MRI left foot unable to be completed because of PPM  No surgical intervention planned per Podiatry     Medical Decision Making/Summary/Discussion:7/6/2024     Daily Elements of Decision Making provided by Consulting Physician:    Note: I have independently performed the steps listed below as part of the medical decision making and evaluation.    Review of current Problems:  Today I am seeing the  distention of the urinary bladder.  Probable bladder diverticulum along the bladder dome.  Bilateral ureteral jets are documented.  No postvoid imaging was performed as the patient did not have the ureter to void.     Unremarkable bilateral renal ultrasound. Probable bladder diverticulum.  Otherwise unremarkable urinary bladder ultrasound     XR FOOT LEFT (MIN 3 VIEWS)    Result Date: 6/17/2024  EXAMINATION: THREE XRAY VIEWS OF THE LEFT FOOT 6/17/2024 11:45 am COMPARISON: None. HISTORY: ORDERING SYSTEM PROVIDED HISTORY: gangrene of L toe TECHNOLOGIST PROVIDED HISTORY: gangrene of L toe FINDINGS: Bones: There is demineralization of the tuft of the 1st distal phalanx. 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 Joints: Normal alignment. Soft Tissues: Soft tissue irregularity involving the distal 1st phalanx.     Demineralization involving the tuft of the 1st distal phalanx which can be seen in the setting of osteomyelitis.     Vascular duplex lower extremity venous bilateral    Result Date: 6/17/2024    No evidence of deep vein or superficial vein thrombosis in the right lower extremity.   No evidence of deep vein or superficial vein thrombosis in the left lower extremity.     XR CHEST (2 VW)    Result Date: 6/17/2024  EXAMINATION: TWO XRAY VIEWS OF THE CHEST 6/17/2024 2:45 pm COMPARISON: Chest x-ray dated 06/02/2024.  Chest x-ray dated 06/02/2024. HISTORY: ORDERING SYSTEM PROVIDED HISTORY: Chest pain, SOB TECHNOLOGIST PROVIDED HISTORY: Chest pain, SOB FINDINGS: MEDICAL DEVICES: There is a right arm PICC. HEART/MEDIASTINUM: The cardiac silhouette is enlarged, but stable. PLEURA/LUNGS: There is a small to moderate right pleural effusion with adjacent compressive atelectasis.  There is no appreciable pneumothorax. BONES/SOFT TISSUE: No acute abnormality.     Small to moderate right pleural effusion with adjacent compressive atelectasis.       Medical Decision

## 2024-07-06 NOTE — PROGRESS NOTES
PULMONARY PROGRESS NOTE      Patient:  Vincenzo Darden  YOB: 1965    MRN: [id number]     Acct: [id number]     Admit date: 6/26/2024    REASON FOR CONSULT:-Acute hypoxic respiratory failure    Pt seen and Chart reviewed.    Subjective:     The patient is a 58 y.o. male with history of multiple comorbidities including diabetes, obesity, obstructive sleep apnea, peripheral vascular disease, coronary artery disease, s/p AVR and CABG (February 2024), complicated by chest wall dehiscence and MSSA infection, CAMILLE on CKD, required initiation of hemodialysis.  [facility].     On evaluation he was found to have pleural effusion and underwent thoracentesis with removal of 50 mL of cloudy yellow fluid in the right side.  The pleural fluid is exudative with neutrophilia, markedly elevated LDH at 6402, protein 4.7 and a glucose less than 2 suggestive of empyema.        Interval History  7/6/2024:    No acute issues overnight  Patient denies any acute complaints  Vital signs stable, afebrile.  Patient on 2 L oxygen by nasal cannula  Cultures not growing any organism  Thoracentesis fluid negative for malignancy  20 mL chest tube output over the past 24 hours   Has received 3 doses of tPA dornase so far    Review of Systems -   Review of Systems   Constitutional:  Negative for fever.   Respiratory:  Negative for cough and shortness of breath.    Cardiovascular:  Negative for chest pain and palpitations.   Gastrointestinal:  Negative for abdominal pain, diarrhea, nausea and vomiting.   Musculoskeletal:  Negative for arthralgias and myalgias.   Neurological:  Negative for dizziness and light-headedness.           Physical Exam:  Vitals: /74   Pulse 68   Temp 97.7 °F (36.5 °C)   Resp 20   Ht 1.829 m (6' 0.01\")   Wt 128.4 kg (283 lb 1.1 oz)   SpO2 96%   BMI 38.38 kg/m²   24 hour intake/output:  Intake/Output

## 2024-07-06 NOTE — PROGRESS NOTES
POA    * (Principal) Dyspnea 6/26/2024 Yes    Chest pain 6/28/2024 Yes    Hypertension, essential 7/1/2024 Yes    COLT (obstructive sleep apnea) 6/28/2024 Yes    Class 2 obesity due to excess calories with body mass index (BMI) of 35.0 to 35.9 in adult 6/28/2024 Yes    Acute kidney injury superimposed on CKD ([facility]) 6/28/2024 Yes    History of type 2 diabetes mellitus 6/28/2024 Yes    Below knee amputation ([facility]) 6/28/2024 Yes    Hx of CABG 6/28/2024 Yes    Stage 3b chronic kidney disease ([facility]) 6/28/2024 Yes    Urinary retention 6/28/2024 Yes    MSSA (methicillin susceptible Staphylococcus aureus) septicemia ([facility]) 6/28/2024 Yes    PAD (peripheral artery disease) ([facility]) 6/28/2024 Yes    Hyperkalemia 6/28/2024 Yes    History of aortic valve replacement 6/28/2024 Yes    PVD (peripheral vascular disease) ([facility]) 6/28/2024 Yes    SOB (shortness of breath) 6/26/2024 Yes    Pleural effusion 6/27/2024 Yes    Postoperative seroma involving circulatory system after cardiac bypass 6/27/2024 Yes    Biventricular congestive heart failure ([facility]) 6/27/2024 Yes    HFrEF (heart failure with reduced ejection fraction) ([facility]) 6/28/2024 Yes    Osteomyelitis of great toe of left foot ([facility]) 6/28/2024 Yes    Ischemic cardiomyopathy 6/29/2024 Yes    Empyema of right pleural space ([facility]) 6/30/2024 Yes    Empyema lung ([facility]) 6/30/2024 Yes    Dependence on renal dialysis ([facility]) 7/1/2024 Yes    Bilateral lower extremity edema 7/1/2024 Yes    Essential hypertension 7/1/2024 Yes   Plan:        Dyspnea secondary to fluid overload with right-sided empyema  CAMILLE on CKD currently dialysis dependent  Hyperkalemia-improved  History of coronary artery disease s/p CABG, s/p AVR  Concern for mediastinal collection-seroma/abscess, osteomyelitis at sternotomy site, post CABG  HFrEF  Essential hypertension  Hyperlipidemia  Type 2 diabetes mellitus  History of PAD s/p right BKA  -Recent MSSA septicemia  Concern for left great toe osteomyelitis  Anemia of chronic  sleep apnea, intolerant of CPAP    -Continue dialysis per nephrology service recommendations  -S/p right-sided thoracentesis , pleural studies suggestive of empyema,, CT surgery has been following, s/p chest tube placement by IR, s/p 3 doses of tPA dornase,, continue supplemental oxygen/BiPAP as needed  -Continue aspirin, statin, beta-blocker, amlodipine, has been seen by cardiology, no further intervention planned follow-up with cardiology in 2 to 4 weeks  -Continue current insulin regimen,, POCT glucose checks, hypoglycemia protocol  -Being followed by ID service, has been on daptomycin, switched to Zyvox, , will discuss the findings of osteomyelitis seen on CT chest.  -Continue to monitor H&H,  Transfuse as needed      Andrea Casillas Sra, MD  7/6/2024  5:46 PM

## 2024-07-06 NOTE — PLAN OF CARE
Problem: Safety - Adult  Goal: Free from fall injury  7/6/2024 1139 by Anisha Molina, RN  Outcome: Progressing  7/6/2024 0303 by Florentin Lopez, RN  Outcome: Progressing

## 2024-07-06 NOTE — PROGRESS NOTES
Renal Progress Note    Patient :  Vincenzo Darden; 58 y.o. MRN# [id number]  Location:    Attending:  Andrea Fabian MD  Admit Date:  2024   Hospital Day: 10    Subjective:     Following for ESRD TTS  He is s/p placement of a right sided 10 fr pigtail on 24 for a loculated right pleural effusion.  Labs: Na 130, K 4.7, Cl 93, CO2 25, BUN 41, Cr 6.1 HgB 7.7   Blood pressures stable  On 5 liters nasal canula    History reviewed.  Vincenzo Darden is a 58 y.o. male w/ PVD s/p right BKA, non healing left foot wounds, CAD s/p AVR/CABGx3 in 2024 complicated by chest wall dehiscence, MSSA infection, w/ CAMILLE on CKD3 s/p initiating HD (HD T/R/S), DM2, COLT who was recently discharged 2024 who returned to [facility] yesterday evening with worsening shortness of breath, flank pain with chest pain and recommended for transfer to [facility] for the management of Dyspnea.        Outpatient Medications:     Medications Prior to Admission: gabapentin (NEURONTIN) 100 MG capsule, Take 1 capsule by mouth every 8 hours for 30 days.  amLODIPine (NORVASC) 10 MG tablet, Take 1 tablet by mouth daily  [] linezolid (ZYVOX) 600 MG tablet, Take 1 tablet by mouth every 12 hours for 24 doses  atorvastatin (LIPITOR) 40 MG tablet, Take 1 tablet by mouth nightly  metoprolol tartrate (LOPRESSOR) 25 MG tablet, Take 1 tablet by mouth 2 times daily  tamsulosin (FLOMAX) 0.4 MG capsule, Take 1 capsule by mouth daily  aspirin 81 MG chewable tablet, Take 1 tablet by mouth daily  TRULICITY 1.5 MG/0.5ML SC injection, Inject 0.5 mLs into the skin Once a week at 5 PM  Insulin Degludec (TRESIBA FLEXTOUCH) 200 UNIT/ML SOPN, Inject 40 Units into the skin nightly  Insulin Aspart, w/Niacinamide, (FIASP FLEXTOUCH) 100 UNIT/ML SOPN, Inject 20 Units into the skin 3 times daily (with meals) Pt has his sliding scale at home    Current Medications:     Scheduled Meds:    insulin glargine  25 Units SubCUTAneous  equal, round and reactive to light, EOMI.  Neck:   Supple  Chest:   Bilateral vesicular breath sounds, no rales or wheezes. Right base diminished.   Cardiac:  S1 S2 RR, no murmurs, gallops or rubs.  Abdomen: Soft, non-tender, no masses or organomegaly, BS audible.  :   No suprapubic or flank tenderness.  Neuro:  AAO x 3, No FND.  SKIN:  No rashes, good skin turgor.  Extremities:  LLE trace edema, R BKA.    Labs:       Recent Labs     07/04/24  0734 07/05/24  0718 07/06/24  0746   WBC 8.5 8.2 9.2   RBC 2.53* 2.84* 2.72*   HGB 7.3* 8.1* 7.7*   HCT 23.2* 26.3* 25.3*   MCV 91.7 92.6 93.0   MCH 28.9 28.5 28.3   MCHC 31.5 30.8 30.4   RDW 13.6 13.7 13.7    313 327   MPV 8.4 8.6 8.5        BMP:   Recent Labs     07/04/24  0734 07/05/24  0718 07/06/24  0746   * 130* 130*   K 4.6 4.6 4.7   CL 95* 93* 93*   CO2 26 28 25   BUN 38* 29* 41*   CREATININE 6.3* 5.0* 6.1*   GLUCOSE 146* 134* 162*   CALCIUM 8.3* 8.1* 8.3*        MARIA DEL CARMEN:      Lab Results   Component Value Date/Time    MARIA DEL CARMEN NEGATIVE 06/18/2024 09:29 AM     SPEP:  Lab Results   Component Value Date/Time    ALBCAL 1.7 06/17/2024 05:50 PM    ALBPCT 28 06/17/2024 05:50 PM    A1PCT 9 06/17/2024 05:50 PM    A2PCT 14 06/17/2024 05:50 PM    BETAPCT 13 06/17/2024 05:50 PM    GAMGLOB 2.2 06/17/2024 05:50 PM    GGPCT 36 06/17/2024 05:50 PM    PATH ELECTRONICALLY SIGNED. GABBIE CHAVARRIA M.D. 06/17/2024 05:50 PM     C3:     Lab Results   Component Value Date/Time    C3 35 06/18/2024 09:29 AM     C4:     Lab Results   Component Value Date/Time    C4 24 06/18/2024 09:29 AM     MPO ANCA:     Lab Results   Component Value Date/Time    MPO <0.3 06/17/2024 05:50 PM     PR3 ANCA:     Lab Results   Component Value Date/Time    PR3 <0.7 06/17/2024 05:50 PM     Hep BsAg:         Lab Results   Component Value Date/Time    HEPBSAG NONREACTIVE 06/18/2024 09:29 AM     Hep C AB:          Lab Results   Component Value Date/Time    HEPCAB NONREACTIVE 06/18/2024 09:29 AM

## 2024-07-06 NOTE — PROGRESS NOTES
Dialysis Post Treatment Note  Vitals:    07/06/24 1330   BP: 136/77   Pulse: 67   Resp: 18   Temp: 97.7 °F (36.5 °C)   SpO2:      Pre-Weight = 128.4 kg  Post-weight = Weight - Scale: 124.5 kg (274 lb 7.6 oz)  Total Liters Processed = Blood Volume Processed (Liters): 79.96 L  Rinseback Volume (mL) = Rinseback Volume (ml): 290 ml  Net Removal (mL) = 4000   Patient's dry weight=124.0 kg  Type of access used= R chest perm cath  Length of treatment=210    Tolerated HD w/o issues. Target weight decreased per Nephro. Epogen administered. Report called to covering primary care nurse

## 2024-07-06 NOTE — PROGRESS NOTES
Dialysis Time Out  To be done by RN and tech or 2 RNs  Staff Names Amie RN and Cayden RN    [x]  Identity of the patient using 2 patient identifiers  [x]  Consent for treatment  [x]  Equipment-proper machine and dialyzer  [x]  B-Hep B status (hep ag neg 6/18/24)  [x]  Orders- to include bath, blood flow, dialyzer, time and fluid removal  [x]  Access-Correct site and in working order  [x]  Time for patient to ask questions.

## 2024-07-07 ENCOUNTER — APPOINTMENT (OUTPATIENT)
Dept: GENERAL RADIOLOGY | Age: 59
DRG: 291 | End: 2024-07-07
Attending: STUDENT IN AN ORGANIZED HEALTH CARE EDUCATION/TRAINING PROGRAM
Payer: MEDICARE

## 2024-07-07 LAB
ANION GAP SERPL CALCULATED.3IONS-SCNC: 11 MMOL/L (ref 9–16)
BASOPHILS # BLD: 0.06 K/UL (ref 0–0.2)
BASOPHILS NFR BLD: 1 % (ref 0–2)
BUN SERPL-MCNC: 33 MG/DL (ref 6–20)
CALCIUM SERPL-MCNC: 8.1 MG/DL (ref 8.6–10.4)
CHLORIDE SERPL-SCNC: 94 MMOL/L (ref 98–107)
CO2 SERPL-SCNC: 25 MMOL/L (ref 20–31)
CREAT SERPL-MCNC: 5 MG/DL (ref 0.7–1.2)
EOSINOPHIL # BLD: 0.3 K/UL (ref 0–0.44)
EOSINOPHILS RELATIVE PERCENT: 3 % (ref 1–4)
ERYTHROCYTE [DISTWIDTH] IN BLOOD BY AUTOMATED COUNT: 13.9 % (ref 11.8–14.4)
GFR, ESTIMATED: 12 ML/MIN/1.73M2
GLUCOSE BLD-MCNC: 149 MG/DL (ref 75–110)
GLUCOSE BLD-MCNC: 171 MG/DL (ref 75–110)
GLUCOSE BLD-MCNC: 215 MG/DL (ref 75–110)
GLUCOSE SERPL-MCNC: 152 MG/DL (ref 74–99)
HCT VFR BLD AUTO: 26.4 % (ref 40.7–50.3)
HGB BLD-MCNC: 8.1 G/DL (ref 13–17)
IMM GRANULOCYTES # BLD AUTO: 0.05 K/UL (ref 0–0.3)
IMM GRANULOCYTES NFR BLD: 1 %
LYMPHOCYTES NFR BLD: 1.16 K/UL (ref 1.1–3.7)
LYMPHOCYTES RELATIVE PERCENT: 13 % (ref 24–43)
MCH RBC QN AUTO: 28.6 PG (ref 25.2–33.5)
MCHC RBC AUTO-ENTMCNC: 30.7 G/DL (ref 28.4–34.8)
MCV RBC AUTO: 93.3 FL (ref 82.6–102.9)
MONOCYTES NFR BLD: 0.89 K/UL (ref 0.1–1.2)
MONOCYTES NFR BLD: 10 % (ref 3–12)
NEUTROPHILS NFR BLD: 72 % (ref 36–65)
NEUTS SEG NFR BLD: 6.39 K/UL (ref 1.5–8.1)
NRBC BLD-RTO: 0 PER 100 WBC
PLATELET # BLD AUTO: 325 K/UL (ref 138–453)
PMV BLD AUTO: 8.3 FL (ref 8.1–13.5)
POTASSIUM SERPL-SCNC: 4.6 MMOL/L (ref 3.7–5.3)
RBC # BLD AUTO: 2.83 M/UL (ref 4.21–5.77)
SODIUM SERPL-SCNC: 130 MMOL/L (ref 136–145)
WBC OTHER # BLD: 8.9 K/UL (ref 3.5–11.3)

## 2024-07-07 PROCEDURE — 80048 BASIC METABOLIC PNL TOTAL CA: CPT

## 2024-07-07 PROCEDURE — 82947 ASSAY GLUCOSE BLOOD QUANT: CPT

## 2024-07-07 PROCEDURE — 6370000000 HC RX 637 (ALT 250 FOR IP): Performed by: STUDENT IN AN ORGANIZED HEALTH CARE EDUCATION/TRAINING PROGRAM

## 2024-07-07 PROCEDURE — 94761 N-INVAS EAR/PLS OXIMETRY MLT: CPT

## 2024-07-07 PROCEDURE — 6370000000 HC RX 637 (ALT 250 FOR IP): Performed by: INTERNAL MEDICINE

## 2024-07-07 PROCEDURE — 2580000003 HC RX 258: Performed by: STUDENT IN AN ORGANIZED HEALTH CARE EDUCATION/TRAINING PROGRAM

## 2024-07-07 PROCEDURE — 71045 X-RAY EXAM CHEST 1 VIEW: CPT

## 2024-07-07 PROCEDURE — 6370000000 HC RX 637 (ALT 250 FOR IP): Performed by: NURSE PRACTITIONER

## 2024-07-07 PROCEDURE — 99232 SBSQ HOSP IP/OBS MODERATE 35: CPT | Performed by: STUDENT IN AN ORGANIZED HEALTH CARE EDUCATION/TRAINING PROGRAM

## 2024-07-07 PROCEDURE — 85025 COMPLETE CBC W/AUTO DIFF WBC: CPT

## 2024-07-07 PROCEDURE — 2700000000 HC OXYGEN THERAPY PER DAY

## 2024-07-07 PROCEDURE — 99232 SBSQ HOSP IP/OBS MODERATE 35: CPT | Performed by: INTERNAL MEDICINE

## 2024-07-07 PROCEDURE — 6360000002 HC RX W HCPCS: Performed by: NURSE PRACTITIONER

## 2024-07-07 PROCEDURE — 2060000000 HC ICU INTERMEDIATE R&B

## 2024-07-07 PROCEDURE — 99233 SBSQ HOSP IP/OBS HIGH 50: CPT | Performed by: INTERNAL MEDICINE

## 2024-07-07 PROCEDURE — 2580000003 HC RX 258: Performed by: NURSE PRACTITIONER

## 2024-07-07 PROCEDURE — 36415 COLL VENOUS BLD VENIPUNCTURE: CPT

## 2024-07-07 RX ORDER — SENNA AND DOCUSATE SODIUM 50; 8.6 MG/1; MG/1
2 TABLET, FILM COATED ORAL DAILY
Status: DISCONTINUED | OUTPATIENT
Start: 2024-07-07 | End: 2024-07-10 | Stop reason: HOSPADM

## 2024-07-07 RX ORDER — SENNA AND DOCUSATE SODIUM 50; 8.6 MG/1; MG/1
2 TABLET, FILM COATED ORAL DAILY PRN
Status: DISCONTINUED | OUTPATIENT
Start: 2024-07-07 | End: 2024-07-07

## 2024-07-07 RX ADMIN — POLYETHYLENE GLYCOL 3350 17 G: 17 POWDER, FOR SOLUTION ORAL at 00:24

## 2024-07-07 RX ADMIN — HEPARIN SODIUM 5000 UNITS: 5000 INJECTION INTRAVENOUS; SUBCUTANEOUS at 13:46

## 2024-07-07 RX ADMIN — AMLODIPINE BESYLATE 10 MG: 10 TABLET ORAL at 10:04

## 2024-07-07 RX ADMIN — CARVEDILOL 12.5 MG: 12.5 TABLET, FILM COATED ORAL at 17:03

## 2024-07-07 RX ADMIN — POLYETHYLENE GLYCOL 3350 17 G: 17 POWDER, FOR SOLUTION ORAL at 08:31

## 2024-07-07 RX ADMIN — GABAPENTIN 100 MG: 100 CAPSULE ORAL at 06:13

## 2024-07-07 RX ADMIN — GABAPENTIN 100 MG: 100 CAPSULE ORAL at 21:18

## 2024-07-07 RX ADMIN — ASPIRIN 81 MG 81 MG: 81 TABLET ORAL at 10:04

## 2024-07-07 RX ADMIN — LINEZOLID 600 MG: 600 TABLET, FILM COATED ORAL at 10:04

## 2024-07-07 RX ADMIN — INSULIN LISPRO 1 UNITS: 100 INJECTION, SOLUTION INTRAVENOUS; SUBCUTANEOUS at 17:06

## 2024-07-07 RX ADMIN — GABAPENTIN 100 MG: 100 CAPSULE ORAL at 13:46

## 2024-07-07 RX ADMIN — OXYCODONE HYDROCHLORIDE AND ACETAMINOPHEN 1 TABLET: 5; 325 TABLET ORAL at 19:16

## 2024-07-07 RX ADMIN — PANTOPRAZOLE SODIUM 40 MG: 40 TABLET, DELAYED RELEASE ORAL at 06:13

## 2024-07-07 RX ADMIN — TAMSULOSIN HYDROCHLORIDE 0.4 MG: 0.4 CAPSULE ORAL at 10:04

## 2024-07-07 RX ADMIN — HYDROMORPHONE HYDROCHLORIDE 1 MG: 2 TABLET ORAL at 00:24

## 2024-07-07 RX ADMIN — DESMOPRESSIN ACETATE 40 MG: 0.2 TABLET ORAL at 21:18

## 2024-07-07 RX ADMIN — CARVEDILOL 12.5 MG: 12.5 TABLET, FILM COATED ORAL at 10:04

## 2024-07-07 RX ADMIN — SODIUM CHLORIDE, PRESERVATIVE FREE 10 ML: 5 INJECTION INTRAVENOUS at 10:05

## 2024-07-07 RX ADMIN — INSULIN GLARGINE 25 UNITS: 100 INJECTION, SOLUTION SUBCUTANEOUS at 21:18

## 2024-07-07 RX ADMIN — SODIUM CHLORIDE, PRESERVATIVE FREE 10 ML: 5 INJECTION INTRAVENOUS at 21:18

## 2024-07-07 ASSESSMENT — PAIN SCALES - GENERAL
PAINLEVEL_OUTOF10: 8
PAINLEVEL_OUTOF10: 5
PAINLEVEL_OUTOF10: 6
PAINLEVEL_OUTOF10: 6
PAINLEVEL_OUTOF10: 4

## 2024-07-07 ASSESSMENT — PAIN DESCRIPTION - ORIENTATION: ORIENTATION: RIGHT

## 2024-07-07 ASSESSMENT — PAIN DESCRIPTION - LOCATION
LOCATION: ABDOMEN
LOCATION: CHEST;ABDOMEN

## 2024-07-07 NOTE — CARE COORDINATION
Transitional planning      Plan is still to return home, current with [facility], has chest tube, dialysis T/R/S in [place], follow for needs, IR Tuesday for removal of pigtail .

## 2024-07-07 NOTE — PROGRESS NOTES
[facility] - Progress Note      Today's Date and Time: 7/7/2024, 7:17 AM    Impression :   Shortness of breath  Rt pleural effusion with compressive atelectasis  Loculated lower anterior mediastinum fluid collection  CAMILLE on CKD stage III  Recent MSSA septicemia 6-2-24 x 2  Was under treatment with Linezolid because of prior CAMILLE with Daptomycin  Hx anaphylaxis with Zosyn    Concern for left great toe osteomyelitis   CAD  S/P CABG and AVR Feb 2024  Hx of Sternal wound dehiscence-fully healed  DM 2  PVD with prior Rt BKA  Left leg and foot ulcers.  Allergy to vancomycin (nausea)  Allergy to Zosyn (anaphylaxis)      Recommendations:     PO Zyvox 600 mg BID completed 7/7/2024  There is minimal residual fluid in the right pleural cavity with compressive atelectasis and consolidation   S/p left pleural drain placement with 70 ml fluid drained on 7/1/24  The patient was on daptomycin and the care was discussed with Dr. Prado and given the concern for empyema, he was switched him back to linezolid to give better lung penetration  Blood cultures thus far remain negative  I will follow the progress and adjust therapy accordingly    Prior treatment:  Daptomycin started 6/26/24 and stopped 6/30/24 6/18/24: IV Zyvox 600 mg po BID initiated  Daptomycin 700 mg IV q 48 hr. Stopped on 6/17/24 because of renal failure    Prior blood cultures:   6/3/24 with 1/1 staph aureus  6-4-24: No growth   6/7/24: No growth   6/17/24: No growth     TTE with no evidence of endocarditis from 6/6/24  MRI left foot unable to be completed because of PPM  No surgical intervention planned per Podiatry     Medical Decision Making/Summary/Discussion:7/7/2024     Daily Elements of Decision Making provided by Consulting Physician:    Note: I have independently performed the steps listed below as part of the medical decision making and evaluation.    Review of current Problems:  Today I am seeing the patient for the  Status: Completed Specimen: Pleural Fl from Thoracentesis Updated: 07/01/24 1201     Specimen Description .THORACENTESIS FLUID     Special Requests Site: Pleural Fl     Direct Exam NO FUNGAL ELEMENTS SEEN     Culture NO GROWTH 3 DAYS    Culture, Body Fluid [[id number]] Collected: 06/28/24 1532    Order Status: Completed Specimen: Pleural Fl from Thoracentesis Updated: 07/03/24 1754     Specimen Description .THORACENTESIS FLUID     Special Requests Site: Pleural Fl     Direct Exam MANY NEUTROPHILS      NO ORGANISMS SEEN      Gram stain made from cytocentrifuged specimen.  Organisms and cells will be concentrated.     Culture NO GROWTH 5 DAYS    Culture with Smear, Acid Fast Bacillius [[id number]] Collected: 06/28/24 1532    Order Status: Completed Specimen: Pleural Fl from Thoracentesis Updated: 07/01/24 0939     Specimen Description .THORACENTESIS FLUID     Special Requests Site: Pleural Fl     Direct Exam NO ACID FAST BACILLI SEEN (CONCENTRATED SMEAR)     Culture NO GROWTH 2 DAYS    Culture, Urine [[id number]] Collected: 06/28/24 0539    Order Status: Completed Specimen: Urine, clean catch Updated: 06/29/24 0823     Specimen Description .CLEAN CATCH URINE     Culture NO GROWTH    Smear fungus [[id number]] Collected: 06/27/24 1532    Order Status: Canceled Specimen: Pleural Fl from Thoracentesis     Gram stain [[id number]] Collected: 06/27/24 1531    Order Status: Canceled Specimen: Pleural Fl from Thoracentesis               Medical Decision Making-Other:     Note:    Thank you for allowing us to participate in the care of this patient. Please call with questions.    MD Maddi Haq DPM participated in the evaluation of the patient under supervision.       ATTESTATION:     I have discussed the case, including pertinent history and exam findings with the medical resident. I have evaluated the  History, physical findings and pictures of the patient and the key elements of the encounter have

## 2024-07-07 NOTE — PROGRESS NOTES
PULMONARY PROGRESS NOTE      Patient:  Vincenzo Darden  YOB: 1965    MRN: [id number]     Acct: [id number]     Admit date: 6/26/2024    REASON FOR CONSULT:-Acute hypoxic respiratory failure    Pt seen and Chart reviewed.    Subjective:     The patient is a 58 y.o. male with history of multiple comorbidities including diabetes, obesity, obstructive sleep apnea, peripheral vascular disease, coronary artery disease, s/p AVR and CABG (February 2024), complicated by chest wall dehiscence and MSSA infection, CAMILLE on CKD, required initiation of hemodialysis.  [facility].     On evaluation he was found to have pleural effusion and underwent thoracentesis with removal of 50 mL of cloudy yellow fluid in the right side.  The pleural fluid is exudative with neutrophilia, markedly elevated LDH at 6402, protein 4.7 and a glucose less than 2 suggestive of empyema.        Interval History  7/7/2024:  No acute issues overnight  Patient denies any acute complaints  Vital signs stable, afebrile.  Patient on 2 L oxygen by nasal cannula  Cultures not growing any organism  Thoracentesis fluid negative for malignancy  0 mL chest tube output over the past 24 hours  On tPA dornase, per CT surgery.  Patient to receive both medications tomorrow via the chest tube, and possible discontinuation of the chest tube on Tuesday  Fluid balance - 20.8 L    Review of Systems -   Review of Systems   Constitutional:  Negative for fever.   Respiratory:  Positive for cough and shortness of breath.    Cardiovascular:  Negative for chest pain and palpitations.   Gastrointestinal:  Negative for abdominal pain, diarrhea, nausea and vomiting.   Musculoskeletal:  Negative for arthralgias and myalgias.   Neurological:  Negative for dizziness and light-headedness.           Physical Exam:  Vitals: /71   Pulse 70   Temp 98.1 °F (36.7 °C) (Oral)

## 2024-07-07 NOTE — PROGRESS NOTES
Physical Therapy        Physical Therapy Cancel Note      DATE: 2024    NAME: Vincenzo Darden  MRN: [id number]   : 1965      Patient not seen this date for Physical Therapy due to:    Patient Declined: Pt states \"I just don't feel up to it today \". Educated on the importance of daily mobility, pt reports he is getting up and moving on his own. RN aware.      Electronically signed by Katie Gutierrez PTA on 2024 at 3:13 PM

## 2024-07-07 NOTE — PROGRESS NOTES
Take 1 tablet by mouth daily  TRULICITY 1.5 MG/0.5ML SC injection, Inject 0.5 mLs into the skin Once a week at 5 PM  Insulin Degludec (TRESIBA FLEXTOUCH) 200 UNIT/ML SOPN, Inject 40 Units into the skin nightly  Insulin Aspart, w/Niacinamide, (FIASP FLEXTOUCH) 100 UNIT/ML SOPN, Inject 20 Units into the skin 3 times daily (with meals) Pt has his sliding scale at home    Current Medications:     Scheduled Meds:    sennosides-docusate sodium  2 tablet Oral Daily    insulin glargine  25 Units SubCUTAneous Nightly    carvedilol  12.5 mg Oral BID WC    epoetin  8,000 Units IntraVENous Once per day on Tue Thu Sat    sodium chloride flush  5-40 mL IntraVENous 2 times per day    sodium chloride flush  5-40 mL IntraVENous 2 times per day    heparin (porcine)  5,000 Units SubCUTAneous 3 times per day    insulin lispro  0-4 Units SubCUTAneous TID WC    insulin lispro  0-4 Units SubCUTAneous Nightly    amLODIPine  10 mg Oral Daily    aspirin  81 mg Oral Daily    atorvastatin  40 mg Oral Nightly    gabapentin  100 mg Oral 3 times per day    tamsulosin  0.4 mg Oral Daily    pantoprazole  40 mg Oral QAM AC     Continuous Infusions:    sodium chloride      sodium chloride      dextrose       PRN Meds:  HYDROmorphone, oxyCODONE-acetaminophen, diphenhydrAMINE-zinc acetate, tiZANidine, labetalol, sodium chloride flush, sodium chloride, sodium chloride flush, sodium chloride, ondansetron **OR** ondansetron, polyethylene glycol, acetaminophen **OR** acetaminophen, glucose, dextrose bolus **OR** dextrose bolus, glucagon (rDNA), dextrose, heparin (porcine), heparin (porcine)    Input/Output:       I/O last 3 completed shifts:  In: 2346 [P.O.:2036; I.V.:10]  Out: 4620 [Urine:300; Chest Tube:20].    Patient Vitals for the past 96 hrs (Last 3 readings):   Weight   07/06/24 1330 124.5 kg (274 lb 7.6 oz)   07/06/24 0930 128.4 kg (283 lb 1.1 oz)   07/04/24 1345 124.8 kg (275 lb 2.2 oz)         Vital Signs:   Temperature:  Temp: 97.7 °F (36.5  °C)  TMax:   Temp (24hrs), Av.9 °F (36.6 °C), Min:97.7 °F (36.5 °C), Max:98.2 °F (36.8 °C)    Respirations:  Respirations: 16  Pulse:   Pulse: 69  BP:    BP: 137/81  BP Range: Systolic (24hrs), Av , Min:124 , Max:137       Diastolic (24hrs), Av, Min:71, Max:81      Physical Examination:     General:  AAO x 3, speaking in full sentences, no accessory muscle use.  HEENT: Atraumatic, normocephalic, no throat congestion, moist mucosa.  Neck:   Supple  Chest:   Bilateral vesicular breath sounds, no rales or wheezes. Right base diminished.   Cardiac:  S1 S2 RR, no murmurs, gallops or rubs.  Abdomen: Soft, non-tender, no masses or organomegaly, BS audible.  :   No suprapubic or flank tenderness.  Neuro:  AAO x 3, No FND.  SKIN:  No rashes, good skin turgor.  Extremities:  LLE trace edema, R BKA.    Labs:       Recent Labs     24  0718 24  0746 24   WBC 8.2 9.2 8.9   RBC 2.84* 2.72* 2.83*   HGB 8.1* 7.7* 8.1*   HCT 26.3* 25.3* 26.4*   MCV 92.6 93.0 93.3   MCH 28.5 28.3 28.6   MCHC 30.8 30.4 30.7   RDW 13.7 13.7 13.9    327 325   MPV 8.6 8.5 8.3        BMP:   Recent Labs     24  0718 24  0746 24   * 130* 130*   K 4.6 4.7 4.6   CL 93* 93* 94*   CO2 28 25 25   BUN 29* 41* 33*   CREATININE 5.0* 6.1* 5.0*   GLUCOSE 134* 162* 152*   CALCIUM 8.1* 8.3* 8.1*        MARIA DEL CARMEN:      Lab Results   Component Value Date/Time    MARIA DEL CARMEN NEGATIVE 2024 09:29 AM     SPEP:  Lab Results   Component Value Date/Time    ALBCAL 1.7 2024 05:50 PM    ALBPCT 28 2024 05:50 PM    A1PCT 9 2024 05:50 PM    A2PCT 14 2024 05:50 PM    BETAPCT 13 2024 05:50 PM    GAMGLOB 2.2 2024 05:50 PM    GGPCT 36 2024 05:50 PM    PATH ELECTRONICALLY SIGNED. GABBIE CHAVARRIA M.D. 2024 05:50 PM     C3:     Lab Results   Component Value Date/Time    C3 35 2024 09:29 AM     C4:     Lab Results   Component Value Date/Time    C4 24 2024 09:29 AM

## 2024-07-07 NOTE — PROGRESS NOTES
@[email or web address]    [facility]   IN-PATIENT SERVICE   [facility]    Progress Note    7/7/2024    11:32 AM    Name:   Vincenzo Darden  MRN:     [id number]     Acct:      [id number]   Room:   [facility]0/0440-01   Day:  11  Admit Date:  6/26/2024  4:33 AM    PCP:   Shaikh Calix MD  Code Status:  Full Code    Subjective:     C/C: No chief complaint on file.  Shortness of breath, flank pain  Interval History Status: not changed.     Seen at bedside, hemodynamically stable, on 2 L nasal cannula  Continues to complain of chest pain and shortness of breath  Continues to have right-sided chest tube in place, output not charted for last 24 hours, CT surgery/pulm service following, CT chest yesterday showed improvement in pleural effusion, and improved aeration on the right side, however concerning for osteomyelitis around the sternotomy site  Completed course of Zyvox, ID service following  Nephrology service following for dialysis    Brief History:     58-year-old male with history of peripheral vascular disease s/p right BKA, nonhealing left foot wounds,  CAD s/p CABG, s/p AVR complicated by chest wall dehiscence, MSSA infection, with CAMILLE on CKD started on dialysis recently, diabetes mellitus, who came in with shortness of breath, chest pain, flank pain, shortness of breath likely secondary to fluid overload versus right-sided empyema for which patient underwent thoracentesis and eventual chest tube placement, pulm/CT surgery/ID service have been following, s/p tPA dornase,  Nephrology service has been following for dialysis    Medications:     Allergies:    Allergies   Allergen Reactions   • Vancomycin Nausea And Vomiting     States got levofloxicin and was ok   • Zosyn [Piperacillin Sod-Tazobactam So] Anaphylaxis       Current Meds:   Scheduled Meds:   • sennosides-docusate sodium  2 tablet Oral Daily   • insulin glargine  25 Units SubCUTAneous Nightly   • carvedilol  12.5 mg Oral BID WC   •  intolerant of CPAP    -Continue dialysis per nephrology service recommendations  -S/p right-sided thoracentesis , pleural studies suggestive of empyema,, CT surgery has been following, s/p chest tube placement by IR, s/p 3 doses of tPA dornase,, continue supplemental oxygen/BiPAP as needed  -Continue aspirin, statin, beta-blocker, amlodipine, has been seen by cardiology, no further intervention planned follow-up with cardiology in 2 to 4 weeks  -Continue current insulin regimen,, POCT glucose checks, hypoglycemia protocol  -Being followed by ID service, has been on daptomycin, switched to Zyvox, completed course,, will discuss the findings of osteomyelitis seen on CT chest.  -Continue to monitor H&H,  Transfuse as needed      Andrea Casillas Sra, MD  7/7/2024  11:32 AM

## 2024-07-07 NOTE — PLAN OF CARE
Intrapleural t-PA/Dornase x 3 doses. CT Scan performed on 7/6/24 revealed: Complex right pleural effusion has diminished in size.  There is corresponding improved aeration at the right lung base.    CT output x 24 hours was 0    Plan for t-PA/Dornase x 1 more dose tomorrow  IR to d/c pigtail on Tuesday       Nain Rasmussen, TINO, PA-C

## 2024-07-08 ENCOUNTER — APPOINTMENT (OUTPATIENT)
Dept: GENERAL RADIOLOGY | Age: 59
DRG: 291 | End: 2024-07-08
Attending: STUDENT IN AN ORGANIZED HEALTH CARE EDUCATION/TRAINING PROGRAM
Payer: MEDICARE

## 2024-07-08 LAB
ANION GAP SERPL CALCULATED.3IONS-SCNC: 12 MMOL/L (ref 9–16)
BUN SERPL-MCNC: 47 MG/DL (ref 6–20)
CALCIUM SERPL-MCNC: 8.5 MG/DL (ref 8.6–10.4)
CHLORIDE SERPL-SCNC: 95 MMOL/L (ref 98–107)
CO2 SERPL-SCNC: 23 MMOL/L (ref 20–31)
CREAT SERPL-MCNC: 6 MG/DL (ref 0.7–1.2)
GFR, ESTIMATED: 10 ML/MIN/1.73M2
GLUCOSE BLD-MCNC: 119 MG/DL (ref 75–110)
GLUCOSE BLD-MCNC: 145 MG/DL (ref 75–110)
GLUCOSE BLD-MCNC: 159 MG/DL (ref 75–110)
GLUCOSE BLD-MCNC: 83 MG/DL (ref 75–110)
GLUCOSE SERPL-MCNC: 117 MG/DL (ref 74–99)
MICROORGANISM SPEC CULT: NORMAL
MICROORGANISM SPEC CULT: NORMAL
MICROORGANISM/AGENT SPEC: NORMAL
MICROORGANISM/AGENT SPEC: NORMAL
POTASSIUM SERPL-SCNC: 4.9 MMOL/L (ref 3.7–5.3)
SERVICE CMNT-IMP: NORMAL
SERVICE CMNT-IMP: NORMAL
SODIUM SERPL-SCNC: 130 MMOL/L (ref 136–145)
SPECIMEN DESCRIPTION: NORMAL
SPECIMEN DESCRIPTION: NORMAL

## 2024-07-08 PROCEDURE — 6360000002 HC RX W HCPCS: Performed by: INTERNAL MEDICINE

## 2024-07-08 PROCEDURE — 6370000000 HC RX 637 (ALT 250 FOR IP): Performed by: STUDENT IN AN ORGANIZED HEALTH CARE EDUCATION/TRAINING PROGRAM

## 2024-07-08 PROCEDURE — 2580000003 HC RX 258: Performed by: NURSE PRACTITIONER

## 2024-07-08 PROCEDURE — 71045 X-RAY EXAM CHEST 1 VIEW: CPT

## 2024-07-08 PROCEDURE — 99233 SBSQ HOSP IP/OBS HIGH 50: CPT | Performed by: INTERNAL MEDICINE

## 2024-07-08 PROCEDURE — 6360000002 HC RX W HCPCS: Performed by: NURSE PRACTITIONER

## 2024-07-08 PROCEDURE — 2060000000 HC ICU INTERMEDIATE R&B

## 2024-07-08 PROCEDURE — 99232 SBSQ HOSP IP/OBS MODERATE 35: CPT | Performed by: STUDENT IN AN ORGANIZED HEALTH CARE EDUCATION/TRAINING PROGRAM

## 2024-07-08 PROCEDURE — 6370000000 HC RX 637 (ALT 250 FOR IP): Performed by: INTERNAL MEDICINE

## 2024-07-08 PROCEDURE — 99232 SBSQ HOSP IP/OBS MODERATE 35: CPT | Performed by: INTERNAL MEDICINE

## 2024-07-08 PROCEDURE — 36415 COLL VENOUS BLD VENIPUNCTURE: CPT

## 2024-07-08 PROCEDURE — 82947 ASSAY GLUCOSE BLOOD QUANT: CPT

## 2024-07-08 PROCEDURE — 2580000003 HC RX 258: Performed by: STUDENT IN AN ORGANIZED HEALTH CARE EDUCATION/TRAINING PROGRAM

## 2024-07-08 PROCEDURE — 80048 BASIC METABOLIC PNL TOTAL CA: CPT

## 2024-07-08 PROCEDURE — 90935 HEMODIALYSIS ONE EVALUATION: CPT | Performed by: INTERNAL MEDICINE

## 2024-07-08 PROCEDURE — 90935 HEMODIALYSIS ONE EVALUATION: CPT

## 2024-07-08 RX ORDER — FENTANYL CITRATE 50 UG/ML
50 INJECTION, SOLUTION INTRAMUSCULAR; INTRAVENOUS ONCE
Status: COMPLETED | OUTPATIENT
Start: 2024-07-08 | End: 2024-07-08

## 2024-07-08 RX ADMIN — GABAPENTIN 100 MG: 100 CAPSULE ORAL at 14:18

## 2024-07-08 RX ADMIN — ALTEPLASE 5 MG: 2.2 INJECTION, POWDER, LYOPHILIZED, FOR SOLUTION INTRAVENOUS at 12:16

## 2024-07-08 RX ADMIN — GABAPENTIN 100 MG: 100 CAPSULE ORAL at 20:36

## 2024-07-08 RX ADMIN — HEPARIN SODIUM 1900 UNITS: 1000 INJECTION INTRAVENOUS; SUBCUTANEOUS at 11:07

## 2024-07-08 RX ADMIN — WATER 5 MG: 1 INJECTION INTRAMUSCULAR; INTRAVENOUS; SUBCUTANEOUS at 12:15

## 2024-07-08 RX ADMIN — SODIUM CHLORIDE, PRESERVATIVE FREE 10 ML: 5 INJECTION INTRAVENOUS at 19:31

## 2024-07-08 RX ADMIN — ASPIRIN 81 MG 81 MG: 81 TABLET ORAL at 08:17

## 2024-07-08 RX ADMIN — OXYCODONE HYDROCHLORIDE AND ACETAMINOPHEN 1 TABLET: 5; 325 TABLET ORAL at 20:36

## 2024-07-08 RX ADMIN — TAMSULOSIN HYDROCHLORIDE 0.4 MG: 0.4 CAPSULE ORAL at 08:17

## 2024-07-08 RX ADMIN — DESMOPRESSIN ACETATE 40 MG: 0.2 TABLET ORAL at 20:36

## 2024-07-08 RX ADMIN — FENTANYL CITRATE 50 MCG: 50 INJECTION, SOLUTION INTRAMUSCULAR; INTRAVENOUS at 12:14

## 2024-07-08 RX ADMIN — HEPARIN SODIUM 1900 UNITS: 1000 INJECTION INTRAVENOUS; SUBCUTANEOUS at 11:08

## 2024-07-08 RX ADMIN — OXYCODONE HYDROCHLORIDE AND ACETAMINOPHEN 1 TABLET: 5; 325 TABLET ORAL at 14:21

## 2024-07-08 RX ADMIN — HYDROMORPHONE HYDROCHLORIDE 1 MG: 2 TABLET ORAL at 00:18

## 2024-07-08 RX ADMIN — GABAPENTIN 100 MG: 100 CAPSULE ORAL at 06:17

## 2024-07-08 RX ADMIN — CARVEDILOL 12.5 MG: 12.5 TABLET, FILM COATED ORAL at 18:01

## 2024-07-08 RX ADMIN — TIZANIDINE 4 MG: 4 TABLET ORAL at 22:32

## 2024-07-08 RX ADMIN — PANTOPRAZOLE SODIUM 40 MG: 40 TABLET, DELAYED RELEASE ORAL at 06:17

## 2024-07-08 RX ADMIN — HYDROMORPHONE HYDROCHLORIDE 1 MG: 2 TABLET ORAL at 18:04

## 2024-07-08 RX ADMIN — INSULIN GLARGINE 25 UNITS: 100 INJECTION, SOLUTION SUBCUTANEOUS at 20:36

## 2024-07-08 ASSESSMENT — PAIN DESCRIPTION - DESCRIPTORS
DESCRIPTORS: ACHING
DESCRIPTORS: SORE
DESCRIPTORS: ACHING
DESCRIPTORS: SORE

## 2024-07-08 ASSESSMENT — PAIN DESCRIPTION - ORIENTATION
ORIENTATION: RIGHT

## 2024-07-08 ASSESSMENT — PAIN SCALES - GENERAL
PAINLEVEL_OUTOF10: 6
PAINLEVEL_OUTOF10: 0
PAINLEVEL_OUTOF10: 9
PAINLEVEL_OUTOF10: 5
PAINLEVEL_OUTOF10: 5
PAINLEVEL_OUTOF10: 6
PAINLEVEL_OUTOF10: 6
PAINLEVEL_OUTOF10: 5
PAINLEVEL_OUTOF10: 7
PAINLEVEL_OUTOF10: 3
PAINLEVEL_OUTOF10: 8
PAINLEVEL_OUTOF10: 8

## 2024-07-08 ASSESSMENT — PAIN DESCRIPTION - LOCATION
LOCATION: FLANK

## 2024-07-08 ASSESSMENT — ENCOUNTER SYMPTOMS
VOMITING: 0
ABDOMINAL PAIN: 0
NAUSEA: 0
COUGH: 1
DIARRHEA: 0
SHORTNESS OF BREATH: 1

## 2024-07-08 ASSESSMENT — PAIN - FUNCTIONAL ASSESSMENT
PAIN_FUNCTIONAL_ASSESSMENT: ACTIVITIES ARE NOT PREVENTED

## 2024-07-08 NOTE — PROGRESS NOTES
Renal Progress Note    Patient :  Vincenzo Darden; 58 y.o. MRN# [id number]  Location:    Attending:  Andrea Fabian MD  Admit Date:  2024   Hospital Day: 12    Subjective:     Patient seen and examined in dialysis.     Labs reviewed. Sodium 130, potassium 4.9, chloride 95, bicarb 23, BUN 47, creatinine 6.0, calcium 8.5.  Pt is oliguric, no urine output recorded in past 24 hours.   Last dialysis treatment , ran for 3 hours, 4L fluid removal via right perm cath.    Repeat CT chest reveals improvement in pleural effusion, plan is for 1 more dose tPA/dornase today and removal of chest tube in IR tomorrow.     History reviewed.  Vincenzo Darden is a 58 y.o. male w/ PVD s/p right BKA, non healing left foot wounds, CAD s/p AVR/CABGx3 in 2024 complicated by chest wall dehiscence, MSSA infection, w/ CAMILLE on CKD3 s/p initiating HD (HD T/R/S), DM2, COLT who was recently discharged 2024 who returned to [facility] yesterday evening with worsening shortness of breath, flank pain with chest pain and recommended for transfer to [facility] for the management of Dyspnea.         Outpatient Medications:     Medications Prior to Admission: gabapentin (NEURONTIN) 100 MG capsule, Take 1 capsule by mouth every 8 hours for 30 days.  amLODIPine (NORVASC) 10 MG tablet, Take 1 tablet by mouth daily  [] linezolid (ZYVOX) 600 MG tablet, Take 1 tablet by mouth every 12 hours for 24 doses  atorvastatin (LIPITOR) 40 MG tablet, Take 1 tablet by mouth nightly  metoprolol tartrate (LOPRESSOR) 25 MG tablet, Take 1 tablet by mouth 2 times daily  tamsulosin (FLOMAX) 0.4 MG capsule, Take 1 capsule by mouth daily  aspirin 81 MG chewable tablet, Take 1 tablet by mouth daily  TRULICITY 1.5 MG/0.5ML SC injection, Inject 0.5 mLs into the skin Once a week at 5 PM  Insulin Degludec (TRESIBA FLEXTOUCH) 200 UNIT/ML SOPN, Inject 40 Units into the skin nightly  Insulin Aspart, w/Niacinamide, (FIASP FLEXTOUCH)  right BKA.  Plan:   Seen and examined on hemodialysis.  UF for 2 hours.  Fluid restriction/salt restriction.  BMP in am.  Will follow.     Nutrition   Please ensure that patient is on a renal diet/TF. Avoid nephrotoxic drugs/contrast exposure.    Chela Rutherford, [facility]-  [facility]     This note is created with the assistance of a speech-recognition program. While intending to generate a document that actually reflects the content of the visit, no guarantees can be provided that every mistake has been identified and corrected by editing.     Attending Physician Statement  I have discussed the care of Vincenzo Darden, including pertinent history and exam findings,  with the CNP. I have reviewed the key elements of all parts of the encounter with the CNP.  I agree with the assessment, plan and orders as documented by the CNP    Markel Johnson MD MD, MRCP (), FACP   7/8/2024 1:38 PM    [facility]

## 2024-07-08 NOTE — PROGRESS NOTES
[facility]    [facility]   IN-PATIENT SERVICE   [facility]    Progress Note    7/8/2024    3:40 PM    Name:   Vincenzo Darden  MRN:     [id number]     Acct:      [id number]   Room:   [facility] Day:  12  Admit Date:  6/26/2024  4:33 AM    PCP:   Shaikh Calix MD  Code Status:  Full Code    Subjective:     C/C: No chief complaint on file.  Shortness of breath, flank pain  Interval History Status: not changed.     Seen at bedside, hemodynamically stable, on room air  Denies any complaints today, no acute events overnight  Continues to have right-sided chest tube in place, output not charted for last 24 hours, CT surgery/pulm service following, CT chest yesterday showed improvement in pleural effusion,  Plan for repeat dose of tPA dornase today  Completed course of Zyvox, ID service following  Nephrology service following for dialysis    Brief History:     58-year-old male with history of peripheral vascular disease s/p right BKA, nonhealing left foot wounds,  CAD s/p CABG, s/p AVR complicated by chest wall dehiscence, MSSA infection, with CAMILLE on CKD started on dialysis recently, diabetes mellitus, who came in with shortness of breath, chest pain, flank pain, shortness of breath likely secondary to fluid overload versus right-sided empyema for which patient underwent thoracentesis and eventual chest tube placement, pulm/CT surgery/ID service have been following, s/p tPA dornase,  Nephrology service has been following for dialysis    Medications:     Allergies:    Allergies   Allergen Reactions    Vancomycin Nausea And Vomiting     States got levofloxicin and was ok    Zosyn [Piperacillin Sod-Tazobactam So] Anaphylaxis       Current Meds:   Scheduled Meds:    sennosides-docusate sodium  2 tablet Oral Daily    insulin glargine  25 Units SubCUTAneous Nightly    carvedilol  12.5 mg Oral BID WC    epoetin  8,000 Units IntraVENous Once per day on Tue Thu Sat    sodium chloride

## 2024-07-08 NOTE — PLAN OF CARE
Problem: Safety - Adult  Goal: Free from fall injury  7/8/2024 1159 by Carina Tidwell RN  Outcome: Progressing  Flowsheets (Taken 7/8/2024 1147)  Free From Fall Injury: Instruct family/caregiver on patient safety  7/8/2024 0113 by Florentin Lopez RN  Outcome: Progressing     Problem: ABCDS Injury Assessment  Goal: Absence of physical injury  7/8/2024 1159 by Carina Tidwell RN  Outcome: Progressing  Flowsheets (Taken 7/8/2024 1147)  Absence of Physical Injury: Implement safety measures based on patient assessment  7/8/2024 0113 by Florentin Lopez RN  Outcome: Progressing     Problem: Pain  Goal: Verbalizes/displays adequate comfort level or baseline comfort level  7/8/2024 1159 by Carina Tidwell RN  Outcome: Progressing  7/8/2024 0113 by Florentin Lopez RN  Outcome: Progressing     Problem: Chronic Conditions and Co-morbidities  Goal: Patient's chronic conditions and co-morbidity symptoms are monitored and maintained or improved  7/8/2024 1159 by Carina Tidwell RN  Outcome: Progressing  Flowsheets (Taken 7/8/2024 0800)  Care Plan - Patient's Chronic Conditions and Co-Morbidity Symptoms are Monitored and Maintained or Improved: Monitor and assess patient's chronic conditions and comorbid symptoms for stability, deterioration, or improvement  7/8/2024 0113 by Florentin Lopez RN  Outcome: Progressing     Problem: Nutrition Deficit:  Goal: Optimize nutritional status  7/8/2024 1159 by Carina Tidwell RN  Outcome: Progressing  7/8/2024 0113 by Florentin Lopez RN  Outcome: Progressing

## 2024-07-08 NOTE — PROGRESS NOTES
Physical Therapy        Physical Therapy Cancel Note      DATE: 2024    NAME: Vincenzo Darden  MRN: [id number]   : 1965      Patient not seen this date for Physical Therapy due to:    Other: Pt is currently at dialysis. Will check back as able and appropriate.       Electronically signed by ARIE LEÓN on 2024 at 12:38 PM

## 2024-07-08 NOTE — PROGRESS NOTES
[facility] Cardiothoracic Surgery  Progress Note    7/8/2024 1:31 PM    Subjective:  Mr. Darden   Patient had just got back from dialysis with no chest pain or shortness of breath.  I explained to him that he has near resolution of the right-sided empyema.  We are going to do 1 more dose of tPA  Objective:  /81   Pulse 64   Temp 98.2 °F (36.8 °C)   Resp 16   Ht 1.829 m (6' 0.01\")   Wt 125.5 kg (276 lb 10.8 oz)   SpO2 97%   BMI 37.52 kg/m²   Chest: pacing wires: no, chest tubes:yes, air leak yes, 1 +  CV: no murmur noted, Normal S1, S2,   Lungs: clear to auscultation, no wheezes, rales, or rhonchi  Abd: normal bowel sounds   Lower Extremities: Trace edema  Saph Incison: no sign of drainage or infection  Sternal Incison: dressing applied-no excessive drainage or signs of infection noted    Reviewed his most recent CT chest which shows right-sided intraparenchymal and small pleural effusion.  No pneumothorax    Labs: Labs reviewed today  CBC:   Recent Labs     07/06/24  0746 07/07/24  0718   WBC 9.2 8.9   HGB 7.7* 8.1*   HCT 25.3* 26.4*   MCV 93.0 93.3    325     BMP:   Recent Labs     07/06/24  0746 07/07/24  0718 07/08/24  0619   * 130* 130*   K 4.7 4.6 4.9   CL 93* 94* 95*   CO2 25 25 23   BUN 41* 33* 47*   CREATININE 6.1* 5.0* 6.0*       I/O: I/O last 3 completed shifts:  In: 1418 [P.O.:1408; I.V.:10]  Out: 300 [Other:300]  Scheduled Meds:   sennosides-docusate sodium  2 tablet Oral Daily    insulin glargine  25 Units SubCUTAneous Nightly    carvedilol  12.5 mg Oral BID WC    epoetin  8,000 Units IntraVENous Once per day on Tue Thu Sat    sodium chloride flush  5-40 mL IntraVENous 2 times per day    sodium chloride flush  5-40 mL IntraVENous 2 times per day    heparin (porcine)  5,000 Units SubCUTAneous 3 times per day    insulin lispro  0-4 Units SubCUTAneous TID     insulin lispro  0-4 Units SubCUTAneous Nightly    amLODIPine  10 mg Oral Daily    aspirin  81 mg Oral Daily     atorvastatin  40 mg Oral Nightly    gabapentin  100 mg Oral 3 times per day    tamsulosin  0.4 mg Oral Daily    pantoprazole  40 mg Oral QAM AC     Continuous Infusions:   sodium chloride      sodium chloride      dextrose       PRN Meds:HYDROmorphone, oxyCODONE-acetaminophen, diphenhydrAMINE-zinc acetate, tiZANidine, labetalol, sodium chloride flush, sodium chloride, sodium chloride flush, sodium chloride, ondansetron **OR** ondansetron, polyethylene glycol, acetaminophen **OR** acetaminophen, glucose, dextrose bolus **OR** dextrose bolus, glucagon (rDNA), dextrose, heparin (porcine), heparin (porcine)        Daily Nursing Care:  Please keep SCDS in place as DVT prophylaxis  If not intubated, Please have patient use IS and acapella 10X/hr or during commercial breaks  Please continue BM management  Daily labs and CXR  Daily PT/OT and ambulation  Continue with Case Management for DC planning      Assessment/ Plan:    tPA and dornase total 60 mL injected through chest tube pigtail with no complications.  By 2 PM please open valve and leave the -20 suction with strict output monitoring  HANSEL CASTLE, ADEOLA - NP

## 2024-07-08 NOTE — PROGRESS NOTES
[facility] - Progress Note      Today's Date and Time: 7/8/2024, 7:54 AM    Impression :   Shortness of breath  Rt pleural effusion with compressive atelectasis  Loculated lower anterior mediastinum fluid collection  CAMILLE on CKD stage III  Recent MSSA septicemia 6-2-24 x 2  Was under treatment with Linezolid because of prior CAMILLE with Daptomycin  Hx anaphylaxis with Zosyn    Concern for left great toe osteomyelitis   CAD  S/P CABG and AVR Feb 2024  Hx of Sternal wound dehiscence-fully healed  DM 2  PVD with prior Rt BKA  Left leg and foot ulcers.  Allergy to vancomycin (nausea)  Allergy to Zosyn (anaphylaxis)      Recommendations:     PO Zyvox 600 mg BID completed 7/7/2024  There is minimal residual fluid in the right pleural cavity with compressive atelectasis and consolidation   S/p left pleural drain placement with 70 ml fluid drained on 7/1/24  The patient was on daptomycin and the care was discussed with Dr. Prado and given the concern for empyema, he was switched him back to linezolid to give better lung penetration  Blood cultures remain negative    Prior treatment:  Daptomycin started 6/26/24 and stopped 6/30/24 6/18/24: IV Zyvox 600 mg po BID initiated  Daptomycin 700 mg IV q 48 hr. Stopped on 6/17/24 because of renal failure    Prior blood cultures:   6/3/24 with 1/1 staph aureus  6-4-24: No growth   6/7/24: No growth   6/17/24: No growth     TTE with no evidence of endocarditis from 6/6/24  MRI left foot unable to be completed because of PPM  No surgical intervention planned per Podiatry     Medical Decision Making/Summary/Discussion:7/8/2024     Daily Elements of Decision Making provided by Consulting Physician:    Note: I have independently performed the steps listed below as part of the medical decision making and evaluation.    Review of current Problems:  Today I am seeing the patient for the following problems:  Shortness of breath  Rt pleural effusion with  6/17/2024 2:45 pm COMPARISON: Chest x-ray dated 06/02/2024.  Chest x-ray dated 06/02/2024. HISTORY: ORDERING SYSTEM PROVIDED HISTORY: Chest pain, SOB TECHNOLOGIST PROVIDED HISTORY: Chest pain, SOB FINDINGS: MEDICAL DEVICES: There is a right arm PICC. HEART/MEDIASTINUM: The cardiac silhouette is enlarged, but stable. PLEURA/LUNGS: There is a small to moderate right pleural effusion with adjacent compressive atelectasis.  There is no appreciable pneumothorax. BONES/SOFT TISSUE: No acute abnormality.     Small to moderate right pleural effusion with adjacent compressive atelectasis.       Medical Decision Jgcayh-Vuzwsazp-Ohlbm:     Results       Procedure Component Value Units Date/Time    Culture, Fungus [[id number]] Collected: 06/28/24 1533    Order Status: Completed Specimen: Pleural Fl from Thoracentesis Updated: 07/01/24 1201     Specimen Description .THORACENTESIS FLUID     Special Requests Site: Pleural Fl     Direct Exam NO FUNGAL ELEMENTS SEEN     Culture NO GROWTH 3 DAYS    Culture, Body Fluid [[id number]] Collected: 06/28/24 1532    Order Status: Completed Specimen: Pleural Fl from Thoracentesis Updated: 07/03/24 1754     Specimen Description .THORACENTESIS FLUID     Special Requests Site: Pleural Fl     Direct Exam MANY NEUTROPHILS      NO ORGANISMS SEEN      Gram stain made from cytocentrifuged specimen.  Organisms and cells will be concentrated.     Culture NO GROWTH 5 DAYS    Culture with Smear, Acid Fast Bacillius [[id number]] Collected: 06/28/24 1532    Order Status: Completed Specimen: Pleural Fl from Thoracentesis Updated: 07/01/24 0939     Specimen Description .THORACENTESIS FLUID     Special Requests Site: Pleural Fl     Direct Exam NO ACID FAST BACILLI SEEN (CONCENTRATED SMEAR)     Culture NO GROWTH 2 DAYS    Culture, Urine [[id number]] Collected: 06/28/24 0539    Order Status: Completed Specimen: Urine, clean catch Updated: 06/29/24 0823     Specimen Description .CLEAN CATCH URINE     Culture NO

## 2024-07-08 NOTE — PROGRESS NOTES
Dialysis Post Treatment Note  Vitals:    07/08/24 1121   BP: (!) 152/84   Pulse: 63   Resp: 16   Temp: 98.4 °F (36.9 °C)   SpO2:      Pre-Weight = 127.6 kg  Post-weight = Weight - Scale: 125.5 kg (276 lb 10.8 oz)  Total Liters Processed = Blood Volume Processed (Liters): 45.52 L  Rinseback Volume (mL) = Rinseback Volume (ml): 260 ml  Net Removal (mL) =  2000  Type of access used=Perm catheter  Length of treatment=120    Patient tolerated UF treatment well with no complaints. Report called to floor CLINT Lim

## 2024-07-08 NOTE — PROGRESS NOTES
Pt upset that his breakfast tray is not here prior to dialysis, writer called the Dietary Manager who stated the tray would be sent up, writer notified pt of this pt threw pulse ox on the floor, refuses tele. Will monitor.

## 2024-07-08 NOTE — PROGRESS NOTES
PULMONARY PROGRESS NOTE      Patient:  Vincenzo Darden  YOB: 1965    MRN: [id number]     Acct: [id number]     Admit date: 6/26/2024    REASON FOR CONSULT:-Acute hypoxic respiratory failure    Pt seen and Chart reviewed.    Subjective:     The patient is a 58 y.o. male with history of multiple comorbidities including diabetes, obesity, obstructive sleep apnea, peripheral vascular disease, coronary artery disease, s/p AVR and CABG (February 2024), complicated by chest wall dehiscence and MSSA infection, CAMILLE on CKD, required initiation of hemodialysis.  He was [facility].     On evaluation he was found to have pleural effusion and underwent thoracentesis with removal of 50 mL of cloudy yellow fluid in the right side.  The pleural fluid is exudative with neutrophilia, markedly elevated LDH at 6402, protein 4.7 and a glucose less than 2 suggestive of empyema.        Interval History  7/8/2024:  No acute issues overnight  Patient denies any acute complaints  Vital signs stable, afebrile.  Patient on 3 L oxygen by nasal cannula  Cultures not growing any organism  Thoracentesis fluid negative for malignancy    On tPA dornase, per CT surgery.  Received today.     Had a hemodialysis session. Tolerated it well.       Review of Systems -   Review of Systems   Constitutional:  Negative for fever.   Respiratory:  Positive for cough and shortness of breath.    Cardiovascular:  Negative for chest pain and palpitations.   Gastrointestinal:  Negative for abdominal pain, diarrhea, nausea and vomiting.   Musculoskeletal:  Negative for arthralgias and myalgias.   Neurological:  Negative for dizziness and light-headedness.           Physical Exam:  Vitals: BP (!) 140/82   Pulse 64   Temp 97.7 °F (36.5 °C) (Oral)   Resp 16   Ht 1.829 m (6' 0.01\")   Wt 124.5 kg (274 lb 7.6 oz)   SpO2 96%   BMI 37.22 kg/m²   24 hour

## 2024-07-08 NOTE — PROGRESS NOTES
Dialysis Time Out  To be done by RN and tech or 2 RNs  Staff Names Mary JIMENEZ & Tomy RN    [x]  Identity of the patient using 2 patient identifiers  [x]  Consent for treatment  [x]  Equipment-proper machine and dialyzer  [x]  B-Hep B status (Neg AG 6/18/24)  [x]  Orders- to include bath, blood flow, dialyzer, time and fluid removal  [x]  Access-Correct site and in working order  [x]  Time for patient to ask questions.

## 2024-07-09 ENCOUNTER — APPOINTMENT (OUTPATIENT)
Dept: GENERAL RADIOLOGY | Age: 59
DRG: 291 | End: 2024-07-09
Attending: STUDENT IN AN ORGANIZED HEALTH CARE EDUCATION/TRAINING PROGRAM
Payer: MEDICARE

## 2024-07-09 LAB
ANION GAP SERPL CALCULATED.3IONS-SCNC: 15 MMOL/L (ref 9–16)
BASOPHILS # BLD: 0.05 K/UL (ref 0–0.2)
BASOPHILS NFR BLD: 1 % (ref 0–2)
BUN SERPL-MCNC: 64 MG/DL (ref 6–20)
CALCIUM SERPL-MCNC: 8 MG/DL (ref 8.6–10.4)
CHLORIDE SERPL-SCNC: 95 MMOL/L (ref 98–107)
CO2 SERPL-SCNC: 22 MMOL/L (ref 20–31)
CREAT SERPL-MCNC: 7 MG/DL (ref 0.7–1.2)
EOSINOPHIL # BLD: 0.29 K/UL (ref 0–0.44)
EOSINOPHILS RELATIVE PERCENT: 3 % (ref 1–4)
ERYTHROCYTE [DISTWIDTH] IN BLOOD BY AUTOMATED COUNT: 14.1 % (ref 11.8–14.4)
GFR, ESTIMATED: 8 ML/MIN/1.73M2
GLUCOSE BLD-MCNC: 111 MG/DL (ref 75–110)
GLUCOSE BLD-MCNC: 164 MG/DL (ref 75–110)
GLUCOSE BLD-MCNC: 178 MG/DL (ref 75–110)
GLUCOSE SERPL-MCNC: 140 MG/DL (ref 74–99)
HCT VFR BLD AUTO: 25.8 % (ref 40.7–50.3)
HGB BLD-MCNC: 8.2 G/DL (ref 13–17)
IMM GRANULOCYTES # BLD AUTO: 0.07 K/UL (ref 0–0.3)
IMM GRANULOCYTES NFR BLD: 1 %
LYMPHOCYTES NFR BLD: 0.72 K/UL (ref 1.1–3.7)
LYMPHOCYTES RELATIVE PERCENT: 7 % (ref 24–43)
MCH RBC QN AUTO: 29.2 PG (ref 25.2–33.5)
MCHC RBC AUTO-ENTMCNC: 31.8 G/DL (ref 28.4–34.8)
MCV RBC AUTO: 91.8 FL (ref 82.6–102.9)
MONOCYTES NFR BLD: 0.68 K/UL (ref 0.1–1.2)
MONOCYTES NFR BLD: 6 % (ref 3–12)
NEUTROPHILS NFR BLD: 82 % (ref 36–65)
NEUTS SEG NFR BLD: 8.86 K/UL (ref 1.5–8.1)
NRBC BLD-RTO: 0 PER 100 WBC
PLATELET # BLD AUTO: 332 K/UL (ref 138–453)
PMV BLD AUTO: 8.4 FL (ref 8.1–13.5)
POTASSIUM SERPL-SCNC: 5.3 MMOL/L (ref 3.7–5.3)
RBC # BLD AUTO: 2.81 M/UL (ref 4.21–5.77)
SODIUM SERPL-SCNC: 132 MMOL/L (ref 136–145)
WBC OTHER # BLD: 10.7 K/UL (ref 3.5–11.3)

## 2024-07-09 PROCEDURE — 6370000000 HC RX 637 (ALT 250 FOR IP): Performed by: STUDENT IN AN ORGANIZED HEALTH CARE EDUCATION/TRAINING PROGRAM

## 2024-07-09 PROCEDURE — 2580000003 HC RX 258: Performed by: STUDENT IN AN ORGANIZED HEALTH CARE EDUCATION/TRAINING PROGRAM

## 2024-07-09 PROCEDURE — 36415 COLL VENOUS BLD VENIPUNCTURE: CPT

## 2024-07-09 PROCEDURE — 71045 X-RAY EXAM CHEST 1 VIEW: CPT

## 2024-07-09 PROCEDURE — 90935 HEMODIALYSIS ONE EVALUATION: CPT

## 2024-07-09 PROCEDURE — 99233 SBSQ HOSP IP/OBS HIGH 50: CPT | Performed by: INTERNAL MEDICINE

## 2024-07-09 PROCEDURE — 6360000002 HC RX W HCPCS: Performed by: INTERNAL MEDICINE

## 2024-07-09 PROCEDURE — 80048 BASIC METABOLIC PNL TOTAL CA: CPT

## 2024-07-09 PROCEDURE — 6370000000 HC RX 637 (ALT 250 FOR IP): Performed by: INTERNAL MEDICINE

## 2024-07-09 PROCEDURE — 2060000000 HC ICU INTERMEDIATE R&B

## 2024-07-09 PROCEDURE — 82947 ASSAY GLUCOSE BLOOD QUANT: CPT

## 2024-07-09 PROCEDURE — 99232 SBSQ HOSP IP/OBS MODERATE 35: CPT | Performed by: INTERNAL MEDICINE

## 2024-07-09 PROCEDURE — 99232 SBSQ HOSP IP/OBS MODERATE 35: CPT | Performed by: STUDENT IN AN ORGANIZED HEALTH CARE EDUCATION/TRAINING PROGRAM

## 2024-07-09 PROCEDURE — 85025 COMPLETE CBC W/AUTO DIFF WBC: CPT

## 2024-07-09 PROCEDURE — 90935 HEMODIALYSIS ONE EVALUATION: CPT | Performed by: INTERNAL MEDICINE

## 2024-07-09 RX ADMIN — SODIUM CHLORIDE, PRESERVATIVE FREE 10 ML: 5 INJECTION INTRAVENOUS at 20:46

## 2024-07-09 RX ADMIN — HEPARIN SODIUM 1900 UNITS: 1000 INJECTION INTRAVENOUS; SUBCUTANEOUS at 12:53

## 2024-07-09 RX ADMIN — AMLODIPINE BESYLATE 10 MG: 10 TABLET ORAL at 09:05

## 2024-07-09 RX ADMIN — INSULIN GLARGINE 25 UNITS: 100 INJECTION, SOLUTION SUBCUTANEOUS at 20:46

## 2024-07-09 RX ADMIN — CARVEDILOL 12.5 MG: 12.5 TABLET, FILM COATED ORAL at 17:47

## 2024-07-09 RX ADMIN — GABAPENTIN 100 MG: 100 CAPSULE ORAL at 09:06

## 2024-07-09 RX ADMIN — OXYCODONE HYDROCHLORIDE AND ACETAMINOPHEN 1 TABLET: 5; 325 TABLET ORAL at 09:05

## 2024-07-09 RX ADMIN — DESMOPRESSIN ACETATE 40 MG: 0.2 TABLET ORAL at 20:46

## 2024-07-09 RX ADMIN — SENNOSIDES AND DOCUSATE SODIUM 2 TABLET: 50; 8.6 TABLET ORAL at 09:05

## 2024-07-09 RX ADMIN — HYDROMORPHONE HYDROCHLORIDE 1 MG: 2 TABLET ORAL at 00:13

## 2024-07-09 RX ADMIN — OXYCODONE HYDROCHLORIDE AND ACETAMINOPHEN 1 TABLET: 5; 325 TABLET ORAL at 15:05

## 2024-07-09 RX ADMIN — ASPIRIN 81 MG 81 MG: 81 TABLET ORAL at 09:05

## 2024-07-09 RX ADMIN — HEPARIN SODIUM 1900 UNITS: 1000 INJECTION INTRAVENOUS; SUBCUTANEOUS at 12:54

## 2024-07-09 RX ADMIN — PANTOPRAZOLE SODIUM 40 MG: 40 TABLET, DELAYED RELEASE ORAL at 09:06

## 2024-07-09 RX ADMIN — GABAPENTIN 100 MG: 100 CAPSULE ORAL at 20:46

## 2024-07-09 RX ADMIN — TAMSULOSIN HYDROCHLORIDE 0.4 MG: 0.4 CAPSULE ORAL at 09:05

## 2024-07-09 RX ADMIN — GABAPENTIN 100 MG: 100 CAPSULE ORAL at 15:05

## 2024-07-09 RX ADMIN — HYDROMORPHONE HYDROCHLORIDE 1 MG: 2 TABLET ORAL at 20:46

## 2024-07-09 RX ADMIN — CARVEDILOL 12.5 MG: 12.5 TABLET, FILM COATED ORAL at 09:05

## 2024-07-09 RX ADMIN — ERYTHROPOIETIN 8000 UNITS: 10000 INJECTION, SOLUTION INTRAVENOUS; SUBCUTANEOUS at 12:53

## 2024-07-09 ASSESSMENT — PAIN SCALES - GENERAL
PAINLEVEL_OUTOF10: 7
PAINLEVEL_OUTOF10: 7
PAINLEVEL_OUTOF10: 8
PAINLEVEL_OUTOF10: 0
PAINLEVEL_OUTOF10: 4
PAINLEVEL_OUTOF10: 0
PAINLEVEL_OUTOF10: 0
PAINLEVEL_OUTOF10: 7
PAINLEVEL_OUTOF10: 0
PAINLEVEL_OUTOF10: 6

## 2024-07-09 ASSESSMENT — PAIN DESCRIPTION - LOCATION
LOCATION: FLANK

## 2024-07-09 ASSESSMENT — PAIN DESCRIPTION - DESCRIPTORS
DESCRIPTORS: DISCOMFORT
DESCRIPTORS: SORE

## 2024-07-09 ASSESSMENT — ENCOUNTER SYMPTOMS
NAUSEA: 0
SHORTNESS OF BREATH: 1
COUGH: 1
DIARRHEA: 0
VOMITING: 0
ABDOMINAL PAIN: 0

## 2024-07-09 ASSESSMENT — PAIN - FUNCTIONAL ASSESSMENT: PAIN_FUNCTIONAL_ASSESSMENT: ACTIVITIES ARE NOT PREVENTED

## 2024-07-09 ASSESSMENT — PAIN DESCRIPTION - ORIENTATION
ORIENTATION: RIGHT

## 2024-07-09 NOTE — PROGRESS NOTES
Nephrology Progress Note        Subjective: patient evaluated on dialysis. Pt is stable on dialysis. Access cannulated without problems. No new issues overnite. Stable hemodynamics.   Tolerating treatment well.  Shortness of breath better although persists.  He tells me urine output has improved.  He is currently off antibiotics.  Right-sided chest tube still in place.  Did receive tPA for improved drainage.  He is hoping the chest tube can come out today.  Appetite good.  Denies any nausea or vomiting.  No fever or chills.  Blood pressure is well-controlled.  Had ultrafiltration yesterday 2 L was removed, plan for 3 to 4 L of fluid removal today with dialysis.  Labs today showed a sodium 130 potassium 4.9 chloride 95 bicarb 23 BUN 47 creatinine 6 glucose 111    History reviewed.  Vincenzo Darden is a 58 y.o. male w/ PVD s/p right BKA, non healing left foot wounds, CAD s/p AVR/CABGx3 in 2024 complicated by chest wall dehiscence, MSSA infection in 2024, developed CAMILLE on CKD3 s/p initiating HD (HD T/R/S) on 2024, DM2, COLT who was recently discharged 2024 who returned to [facility] yesterday evening with worsening shortness of breath, flank pain with chest pain and recommended for transfer to [facility] for the management of Dyspnea.  Last echocardiogram in  showed ejection fraction of 45 to 50% with mild to moderate aortic stenosis    Objective:  CURRENT TEMPERATURE:  Temp: 97.7 °F (36.5 °C)  MAXIMUM TEMPERATURE OVER 24HRS:  Temp (24hrs), Av.7 °F (36.5 °C), Min:97.2 °F (36.2 °C), Max:98.4 °F (36.9 °C)    CURRENT RESPIRATORY RATE:  Respirations: 16  CURRENT PULSE:  Pulse: 73  CURRENT BLOOD PRESSURE:  BP: (!) 146/85  24HR BLOOD PRESSURE RANGE:  Systolic (24hrs), Av , Min:127 , Max:155   ; Diastolic (24hrs), Av, Min:74, Max:91    24HR INTAKE/OUTPUT:    Intake/Output Summary (Last 24 hours) at 2024 1013  Last data filed at 2024 0895  Gross per 24 hour   Intake  history of coronary bypass graft February 2024, RCA still needs to be grafted  6.f peripheral vascular history of right BKA:  7.  MSSA bacteremia in June 2024 source likely foot wounds then with antibiotics now  8.  Type 2 diabetes with complications  9 morbid obesity.     Plan:  1. Wt removal and dialysis orders reviewed.    2.  Will aim for 3 to 4 L of fluid removal today  3. Cont pt on aranesp and zemplar per protocol.   4. Follow up labs ordered.  5.  Await CT surgery plans for chest tube removal  6.  Will follow with you      Please do not hesitate to call with questions.    Electronically signed by Carina Yang MD on 7/9/2024 at 10:13 AM

## 2024-07-09 NOTE — PROGRESS NOTES
Comprehensive Nutrition Assessment    Type and Reason for Visit:  Reassess    Nutrition Recommendations/Plan:   Continue a diabetic/renal diet.  To monitor nutrition intake/tolerance, labs, weight, and plan of care.     Malnutrition Assessment:  Malnutrition Status:  At risk for malnutrition (Comment) (06/26/24 1220)    Context:  Acute Illness     Findings of the 6 clinical characteristics of malnutrition:  Energy Intake:  75% or less of estimated energy requirements for 7 or more days  Weight Loss:  No significant weight loss     Body Fat Loss:  No significant body fat loss     Muscle Mass Loss:  No significant muscle mass loss    Fluid Accumulation:  Mild Extremities   Strength:  Not Performed    Nutrition Assessment:    Patient not in room at attempted visit. Currently ordered a diabeticc/renal diet, with no ONS. Per previous RD assessment, patient denied interest in consuming any ONS, including javed. Patient food preference includes double protein portions with meals. Per chart, patient consumed % of 1 documented mealn 7/5. Per chart, no nausea/emesis, last BM 7/8. New documented weight noted- would indicate 5% weight loss over 1 1/2 weeks. To note, patient with edema and on hemodialysis. Labs include: BUN 64 mg/dL (H), Cr 7 mg/dL (H), GFR 8 ml/min/1.73m2 (L). Meds include: lantus, humalog, senokot.    Nutrition Related Findings:    Edema: trace generalized/RUE/LUE; +1 RLE; +2LLE per chart. Wound Type: Multiple, Diabetic Ulcer       Current Nutrition Intake & Therapies:    Average Meal Intake: %  Average Supplements Intake: Refusing to take  ADULT DIET; Regular; 4 carb choices (60 gm/meal); Low Potassium (Less than 3000 mg/day); Low Phosphorus (Less than 1000 mg); 1500 ml    Anthropometric Measures:  Height: 182.9 cm (6' 0.01\")  Ideal Body Weight (IBW): 178 lbs (81 kg)    Admission Body Weight: 130.4 kg (287 lb 7.7 oz)  Current Body Weight: 127.5 kg (281 lb 1.4 oz), 157.9 % IBW. Weight Source:

## 2024-07-09 NOTE — CARE COORDINATION
Please complete CXR.   Monitor output at this time noted to be minimal.   We would recommend chest tube removal after dialysis via IR or admitting team.   No more plans for TPA dornase.   60Ml output of chest tube after TPA.   CTS sign off.

## 2024-07-09 NOTE — PROGRESS NOTES
Dialysis Post Treatment Note  Vitals:    07/09/24 1543   BP: 132/74   Pulse: 72   Resp: 15   Temp: 98 °F (36.7 °C)   SpO2: 98%     Pre-Weight = 123.5 kg  Post-weight = Weight - Scale: 127.5 kg (281 lb 1.4 oz)  Total Liters Processed = Blood Volume Processed (Liters): 79.28 L  Rinseback Volume (mL) = Rinseback Volume (ml): 300 ml  Net Removal (mL) =  3700 ml  Patient's dry weight=124 kg  Type of access used= CVC Tunneled Cathter @R Neck  Length of treatment=212 minutes      Pt completed hD TX with stable V/S. No issue with arterial/venous flow running BFR of 400. Catheter care and dressing done. Report given to floor nurse.

## 2024-07-09 NOTE — PROGRESS NOTES
Dialysis Time Out  To be done by RN and tech or 2 RNs  Staff Names nya KAPOOR RN and Mary JIMENEZ    [x]  Identity of the patient using 2 patient identifiers  [x]  Consent for treatment  [x]  Equipment-proper machine and dialyzer  [x]  B-Hep B status nonreactive 6.18.24  [x]  Orders- to include bath, blood flow, dialyzer, time and fluid removal  [x]  Access-Correct site and in working order  [x]  Time for patient to ask questions.

## 2024-07-09 NOTE — PROGRESS NOTES
921  24: 177    Cultures:  Urine:  24: No growth  24: No growth  Blood:  24: MSSA x 2  6-3-24: 1/ Staph Aureus  24: No growth   24: No growth  24: No growth   24: No growth     Sputum :    Wound:  24: Left foot: GNR and normal laurence  24:Thoracentesis Fluid: No growth  MRSA Nares:  10-25-23: negative     Imagin2024: X-ray chest  1. Mild CHF related changes with stable cardiomegaly, pulmonary vascular congestion, bibasilar airspace disease and small effusions.  2. Right IJ approach dialysis catheter distal tip overlying the right atrium.  3. Right basilar pigtail chest tube present.  No obvious pneumothorax.      2024: CT chest  Complex right pleural effusion has diminished in size.  There is corresponding improved aeration at the right lung base.  Right pigtail pleural catheter in place.  Postoperative changes following median sternotomy and AVR with persistent inflammatory fat stranding about the sternotomy site and sternal wire displacement which can be seen in the setting of osteomyelitis.      24:  Lt hallux      24 Lt anterior foot      24 Lt plantar          Left foot  24                I have personally reviewed the past medical history, past surgical history, medications, social history, and family history, and I have updated the database accordingly.  Past Medical History:     Past Medical History:   Diagnosis Date    Diabetes mellitus (HCC)     Hernia, abdominal        Past Surgical  History:     Past Surgical History:   Procedure Laterality Date    CABG WITH AORTIC VALVE REPLACEMENT N/A 2024    Epi-Cardial leads present. No MRI Per Dr. Cm 24 KRM- CABG CORONARY ARTERY BYPASS X3, STERNAL PLATING, AORTIC VALVE REPLACEMENT 23MM INSPIRIS AORTIC VALVE, ON PUMP, JO performed by Andrei James MD at [facility]    CARDIAC PROCEDURE N/A 10/09/2023    Coronary angiography performed by Louis Ramirez MD at [facility]  Specimen: Urine, clean catch Updated: 06/29/24 0823     Specimen Description .CLEAN CATCH URINE     Culture NO GROWTH    Smear fungus [[id number]] Collected: 06/27/24 1532    Order Status: Canceled Specimen: Pleural Fl from Thoracentesis     Gram stain [[id number]] Collected: 06/27/24 1531    Order Status: Canceled Specimen: Pleural Fl from Thoracentesis               Medical Decision Making-Other:     Note:    Thank you for allowing us to participate in the care of this patient. Please call with questions.    MD Maddi Haq DPM participated in the evaluation of the patient under supervision.       ATTESTATION:     I have discussed the case, including pertinent history and exam findings with the medical resident. I have evaluated the  History, physical findings and pictures of the patient and the key elements of the encounter have been performed by me. I have reviewed the laboratory data, other diagnostic studies and discussed them with the medical resident. I have updated the medical record where necessary.     I agree with the assessment, plan and orders as documented by the medical resident and I have modified them as necessary.     Josesito Richardson MD    7/9/2024           Office: [phone]

## 2024-07-09 NOTE — PLAN OF CARE
Problem: Safety - Adult  Goal: Free from fall injury  7/8/2024 2139 by Iván Wells RN  Outcome: Progressing  7/8/2024 1159 by Carina Tidwell RN  Outcome: Progressing  Flowsheets (Taken 7/8/2024 1147)  Free From Fall Injury: Instruct family/caregiver on patient safety     Problem: ABCDS Injury Assessment  Goal: Absence of physical injury  7/8/2024 2139 by Iván Wells RN  Outcome: Progressing  7/8/2024 1159 by Carina Tidwell RN  Outcome: Progressing  Flowsheets (Taken 7/8/2024 1147)  Absence of Physical Injury: Implement safety measures based on patient assessment     Problem: Pain  Goal: Verbalizes/displays adequate comfort level or baseline comfort level  7/8/2024 2139 by Iván Wells RN  Outcome: Progressing  7/8/2024 1159 by Carina Tidwell RN  Outcome: Progressing     Problem: Chronic Conditions and Co-morbidities  Goal: Patient's chronic conditions and co-morbidity symptoms are monitored and maintained or improved  7/8/2024 2139 by Iván Wells RN  Outcome: Progressing  7/8/2024 1159 by Carina Tidwell RN  Outcome: Progressing  Flowsheets (Taken 7/8/2024 0800)  Care Plan - Patient's Chronic Conditions and Co-Morbidity Symptoms are Monitored and Maintained or Improved: Monitor and assess patient's chronic conditions and comorbid symptoms for stability, deterioration, or improvement     Problem: Nutrition Deficit:  Goal: Optimize nutritional status  7/8/2024 2139 by Iván Wells RN  Outcome: Progressing  7/8/2024 1159 by Carina Tidwell RN  Outcome: Progressing

## 2024-07-09 NOTE — PLAN OF CARE
Problem: Safety - Adult  Goal: Free from fall injury  7/9/2024 0834 by Craina Tidwell RN  Outcome: Progressing  Flowsheets (Taken 7/9/2024 0833)  Free From Fall Injury: Instruct family/caregiver on patient safety  7/8/2024 2139 by Iván Wells RN  Outcome: Progressing     Problem: ABCDS Injury Assessment  Goal: Absence of physical injury  7/9/2024 0834 by Carina Tidwell RN  Outcome: Progressing  Flowsheets (Taken 7/9/2024 0833)  Absence of Physical Injury: Implement safety measures based on patient assessment  7/8/2024 2139 by Iván Wells RN  Outcome: Progressing     Problem: Pain  Goal: Verbalizes/displays adequate comfort level or baseline comfort level  7/9/2024 0834 by Carina Tidwell RN  Outcome: Progressing  7/8/2024 2139 by Iván Wells RN  Outcome: Progressing     Problem: Chronic Conditions and Co-morbidities  Goal: Patient's chronic conditions and co-morbidity symptoms are monitored and maintained or improved  7/9/2024 0834 by Carina Tidwell RN  Outcome: Progressing  7/8/2024 2139 by Iván Wells RN  Outcome: Progressing     Problem: Nutrition Deficit:  Goal: Optimize nutritional status  7/9/2024 0834 by Carina Tidwell RN  Outcome: Progressing  7/8/2024 2139 by Iván Wells RN  Outcome: Progressing

## 2024-07-09 NOTE — PROGRESS NOTES
@[email or web address]@    [facility]   IN-PATIENT SERVICE   [facility]    Progress Note    7/9/2024    12:37 PM    Name:   Vincenzo Darden  MRN:     [id number]     Acct:      [id number]   Room:   [facility] Day:  13  Admit Date:  6/26/2024  4:33 AM    PCP:   Shaikh Calix MD  Code Status:  Full Code    Subjective:     C/C: No chief complaint on file.  Shortness of breath, flank pain  Interval History Status: not changed.     Seen at bedside, hemodynamically stable, on room air  Denies any complaints today, no acute events overnight  Continues to have right-sided chest tube in place, output not charted for last 24 hours, CT surgery/pulm service following,  Possible plan for removal of chest tube today,  Completed course of Zyvox, ID service following  Nephrology service following for dialysis    Brief History:     58-year-old male with history of peripheral vascular disease s/p right BKA, nonhealing left foot wounds,  CAD s/p CABG, s/p AVR complicated by chest wall dehiscence, MSSA infection, with CAMILLE on CKD started on dialysis recently, diabetes mellitus, who came in with shortness of breath, chest pain, flank pain, shortness of breath likely secondary to fluid overload versus right-sided empyema for which patient underwent thoracentesis and eventual chest tube placement, pulm/CT surgery/ID service have been following, s/p tPA dornase,  Nephrology service has been following for dialysis    Medications:     Allergies:    Allergies   Allergen Reactions    Vancomycin Nausea And Vomiting     States got levofloxicin and was ok    Zosyn [Piperacillin Sod-Tazobactam So] Anaphylaxis       Current Meds:   Scheduled Meds:    sennosides-docusate sodium  2 tablet Oral Daily    insulin glargine  25 Units SubCUTAneous Nightly    carvedilol  12.5 mg Oral BID WC    epoetin  8,000 Units IntraVENous Once per day on Tue Thu Sat    sodium chloride flush  5-40 mL IntraVENous 2 times per day    sodium

## 2024-07-09 NOTE — PROGRESS NOTES
PULMONARY PROGRESS NOTE      Patient:  Vincenzo Darden  YOB: 1965    MRN: [id number]     Acct: [id number]     Admit date: 6/26/2024    REASON FOR CONSULT:-Acute hypoxic respiratory failure    Pt seen and Chart reviewed.    Subjective:     The patient is a 58 y.o. male with history of multiple comorbidities including diabetes, obesity, obstructive sleep apnea, peripheral vascular disease, coronary artery disease, s/p AVR and CABG (February 2024), complicated by chest wall dehiscence and MSSA infection, CAMILLE on CKD, required initiation of hemodialysis.  [facility].     On evaluation he was found to have pleural effusion and underwent thoracentesis with removal of 50 mL of cloudy yellow fluid in the right side.  The pleural fluid is exudative with neutrophilia, markedly elevated LDH at 6402, protein 4.7 and a glucose less than 2 suggestive of empyema.        Interval History  7/9/2024:    No acute issues overnight  Patient denies any acute complaints  Vital signs stable, afebrile.  Patient on 2 L oxygen by nasal cannula  Cultures not growing any organism  Thoracentesis fluid negative for malignancy    On tPA dornase, per CT surgery.  Received today.     Had a hemodialysis session. Tolerated it well.       Review of Systems -   Review of Systems   Constitutional:  Negative for fever.   Respiratory:  Positive for cough and shortness of breath.    Cardiovascular:  Negative for chest pain and palpitations.   Gastrointestinal:  Negative for abdominal pain, diarrhea, nausea and vomiting.   Musculoskeletal:  Negative for arthralgias and myalgias.   Neurological:  Negative for dizziness and light-headedness.           Physical Exam:  Vitals: BP (!) 146/85   Pulse 73   Temp 97.7 °F (36.5 °C) (Oral)   Resp 16   Ht 1.829 m (6' 0.01\")   Wt 125.5 kg (276 lb 10.8 oz)   SpO2 98%   BMI 37.52 kg/m²   24 hour

## 2024-07-10 ENCOUNTER — APPOINTMENT (OUTPATIENT)
Dept: GENERAL RADIOLOGY | Age: 59
DRG: 291 | End: 2024-07-10
Attending: STUDENT IN AN ORGANIZED HEALTH CARE EDUCATION/TRAINING PROGRAM
Payer: MEDICARE

## 2024-07-10 ENCOUNTER — TELEPHONE (OUTPATIENT)
Dept: PULMONOLOGY | Age: 59
End: 2024-07-10

## 2024-07-10 VITALS
DIASTOLIC BLOOD PRESSURE: 69 MMHG | WEIGHT: 270.06 LBS | TEMPERATURE: 98.1 F | HEIGHT: 72 IN | RESPIRATION RATE: 18 BRPM | BODY MASS INDEX: 36.58 KG/M2 | SYSTOLIC BLOOD PRESSURE: 130 MMHG | HEART RATE: 73 BPM | OXYGEN SATURATION: 96 %

## 2024-07-10 LAB
ANION GAP SERPL CALCULATED.3IONS-SCNC: 12 MMOL/L (ref 9–16)
BASOPHILS # BLD: 0.05 K/UL (ref 0–0.2)
BASOPHILS NFR BLD: 1 % (ref 0–2)
BUN SERPL-MCNC: 48 MG/DL (ref 6–20)
CALCIUM SERPL-MCNC: 8.2 MG/DL (ref 8.6–10.4)
CHLORIDE SERPL-SCNC: 93 MMOL/L (ref 98–107)
CO2 SERPL-SCNC: 25 MMOL/L (ref 20–31)
CREAT SERPL-MCNC: 5.3 MG/DL (ref 0.7–1.2)
EOSINOPHIL # BLD: 0.3 K/UL (ref 0–0.44)
EOSINOPHILS RELATIVE PERCENT: 3 % (ref 1–4)
ERYTHROCYTE [DISTWIDTH] IN BLOOD BY AUTOMATED COUNT: 14.4 % (ref 11.8–14.4)
GFR, ESTIMATED: 12 ML/MIN/1.73M2
GLUCOSE BLD-MCNC: 125 MG/DL (ref 75–110)
GLUCOSE BLD-MCNC: 131 MG/DL (ref 75–110)
GLUCOSE SERPL-MCNC: 141 MG/DL (ref 74–99)
HCT VFR BLD AUTO: 25 % (ref 40.7–50.3)
HGB BLD-MCNC: 7.9 G/DL (ref 13–17)
IMM GRANULOCYTES # BLD AUTO: 0.06 K/UL (ref 0–0.3)
IMM GRANULOCYTES NFR BLD: 1 %
LYMPHOCYTES NFR BLD: 1.29 K/UL (ref 1.1–3.7)
LYMPHOCYTES RELATIVE PERCENT: 14 % (ref 24–43)
MCH RBC QN AUTO: 28.4 PG (ref 25.2–33.5)
MCHC RBC AUTO-ENTMCNC: 31.6 G/DL (ref 28.4–34.8)
MCV RBC AUTO: 89.9 FL (ref 82.6–102.9)
MONOCYTES NFR BLD: 1.01 K/UL (ref 0.1–1.2)
MONOCYTES NFR BLD: 11 % (ref 3–12)
NEUTROPHILS NFR BLD: 70 % (ref 36–65)
NEUTS SEG NFR BLD: 6.24 K/UL (ref 1.5–8.1)
NRBC BLD-RTO: 0 PER 100 WBC
PLATELET # BLD AUTO: 328 K/UL (ref 138–453)
PMV BLD AUTO: 8.3 FL (ref 8.1–13.5)
POTASSIUM SERPL-SCNC: 4.3 MMOL/L (ref 3.7–5.3)
RBC # BLD AUTO: 2.78 M/UL (ref 4.21–5.77)
SODIUM SERPL-SCNC: 130 MMOL/L (ref 136–145)
WBC OTHER # BLD: 9 K/UL (ref 3.5–11.3)

## 2024-07-10 PROCEDURE — 80048 BASIC METABOLIC PNL TOTAL CA: CPT

## 2024-07-10 PROCEDURE — 82947 ASSAY GLUCOSE BLOOD QUANT: CPT

## 2024-07-10 PROCEDURE — 71045 X-RAY EXAM CHEST 1 VIEW: CPT

## 2024-07-10 PROCEDURE — 99232 SBSQ HOSP IP/OBS MODERATE 35: CPT | Performed by: INTERNAL MEDICINE

## 2024-07-10 PROCEDURE — 6370000000 HC RX 637 (ALT 250 FOR IP): Performed by: INTERNAL MEDICINE

## 2024-07-10 PROCEDURE — 36415 COLL VENOUS BLD VENIPUNCTURE: CPT

## 2024-07-10 PROCEDURE — 99239 HOSP IP/OBS DSCHRG MGMT >30: CPT | Performed by: STUDENT IN AN ORGANIZED HEALTH CARE EDUCATION/TRAINING PROGRAM

## 2024-07-10 PROCEDURE — 2580000003 HC RX 258: Performed by: STUDENT IN AN ORGANIZED HEALTH CARE EDUCATION/TRAINING PROGRAM

## 2024-07-10 PROCEDURE — 6360000002 HC RX W HCPCS: Performed by: NURSE PRACTITIONER

## 2024-07-10 PROCEDURE — 85025 COMPLETE CBC W/AUTO DIFF WBC: CPT

## 2024-07-10 PROCEDURE — 6370000000 HC RX 637 (ALT 250 FOR IP): Performed by: STUDENT IN AN ORGANIZED HEALTH CARE EDUCATION/TRAINING PROGRAM

## 2024-07-10 RX ORDER — CARVEDILOL 12.5 MG/1
12.5 TABLET ORAL 2 TIMES DAILY WITH MEALS
Qty: 60 TABLET | Refills: 3 | Status: SHIPPED | OUTPATIENT
Start: 2024-07-10

## 2024-07-10 RX ADMIN — GABAPENTIN 100 MG: 100 CAPSULE ORAL at 08:46

## 2024-07-10 RX ADMIN — TAMSULOSIN HYDROCHLORIDE 0.4 MG: 0.4 CAPSULE ORAL at 08:42

## 2024-07-10 RX ADMIN — OXYCODONE HYDROCHLORIDE AND ACETAMINOPHEN 1 TABLET: 5; 325 TABLET ORAL at 00:22

## 2024-07-10 RX ADMIN — OXYCODONE HYDROCHLORIDE AND ACETAMINOPHEN 1 TABLET: 5; 325 TABLET ORAL at 15:07

## 2024-07-10 RX ADMIN — CARVEDILOL 12.5 MG: 12.5 TABLET, FILM COATED ORAL at 08:42

## 2024-07-10 RX ADMIN — SENNOSIDES AND DOCUSATE SODIUM 2 TABLET: 50; 8.6 TABLET ORAL at 08:42

## 2024-07-10 RX ADMIN — PANTOPRAZOLE SODIUM 40 MG: 40 TABLET, DELAYED RELEASE ORAL at 08:42

## 2024-07-10 RX ADMIN — SODIUM CHLORIDE, PRESERVATIVE FREE 10 ML: 5 INJECTION INTRAVENOUS at 08:43

## 2024-07-10 RX ADMIN — GABAPENTIN 100 MG: 100 CAPSULE ORAL at 15:03

## 2024-07-10 RX ADMIN — AMLODIPINE BESYLATE 10 MG: 10 TABLET ORAL at 08:42

## 2024-07-10 RX ADMIN — HEPARIN SODIUM 5000 UNITS: 5000 INJECTION INTRAVENOUS; SUBCUTANEOUS at 15:03

## 2024-07-10 RX ADMIN — ASPIRIN 81 MG 81 MG: 81 TABLET ORAL at 08:42

## 2024-07-10 ASSESSMENT — PAIN DESCRIPTION - LOCATION: LOCATION: RIB CAGE

## 2024-07-10 ASSESSMENT — ENCOUNTER SYMPTOMS
NAUSEA: 0
DIARRHEA: 0
COUGH: 1
ABDOMINAL PAIN: 0
SHORTNESS OF BREATH: 1
VOMITING: 0

## 2024-07-10 ASSESSMENT — PAIN SCALES - GENERAL
PAINLEVEL_OUTOF10: 0
PAINLEVEL_OUTOF10: 0
PAINLEVEL_OUTOF10: 6
PAINLEVEL_OUTOF10: 0
PAINLEVEL_OUTOF10: 0
PAINLEVEL_OUTOF10: 7
PAINLEVEL_OUTOF10: 0
PAINLEVEL_OUTOF10: 0

## 2024-07-10 ASSESSMENT — PAIN DESCRIPTION - DESCRIPTORS: DESCRIPTORS: SORE;TENDER

## 2024-07-10 ASSESSMENT — PAIN DESCRIPTION - ORIENTATION: ORIENTATION: RIGHT

## 2024-07-10 NOTE — DISCHARGE INSTRUCTIONS
Continue taking medications as prescribed, follow up with PCP and specialists as given.  Get Ct chest as ordered and follow up with lung doctor for same.

## 2024-07-10 NOTE — DISCHARGE INSTR - DIET

## 2024-07-10 NOTE — PROGRESS NOTES
Physical Therapy        Physical Therapy Cancel Note      DATE: 7/10/2024    NAME: Vincenzo Darden  MRN: [id number]   : 1965      Patient not seen this date for Physical Therapy due to:    Patient Declined: Despite extensive education from PT on mobility and exercise, pt continued to refuse stating that he \"does not feel like it right now, does not want to put on his prosthesis at the moment, is going home today, and walks around just fine on his own.\"      Electronically signed by ARIE LEÓN on 7/10/2024 at 9:12 AM

## 2024-07-10 NOTE — CARE COORDINATION
Transitional planning    Plan is home, current with [facility], chest tube removed today, HD T/TH/S in [place].    1550 call to Kiki with [facility] and updated on discharging.

## 2024-07-10 NOTE — PLAN OF CARE
Problem: Safety - Adult  Goal: Free from fall injury  7/9/2024 2222 by Iván Wells RN  Outcome: Progressing  7/9/2024 0834 by Carina Tidwell RN  Outcome: Progressing  Flowsheets (Taken 7/9/2024 0833)  Free From Fall Injury: Instruct family/caregiver on patient safety     Problem: ABCDS Injury Assessment  Goal: Absence of physical injury  7/9/2024 2222 by Iván Wells RN  Outcome: Progressing  7/9/2024 0834 by Carina Tidwell RN  Outcome: Progressing  Flowsheets (Taken 7/9/2024 0833)  Absence of Physical Injury: Implement safety measures based on patient assessment     Problem: Pain  Goal: Verbalizes/displays adequate comfort level or baseline comfort level  7/9/2024 2222 by Iván Wells RN  Outcome: Progressing  7/9/2024 0834 by Carina Tidwell RN  Outcome: Progressing     Problem: Chronic Conditions and Co-morbidities  Goal: Patient's chronic conditions and co-morbidity symptoms are monitored and maintained or improved  7/9/2024 2222 by Iván Wells RN  Outcome: Progressing  7/9/2024 0834 by Carina Tidwell RN  Outcome: Progressing     Problem: Nutrition Deficit:  Goal: Optimize nutritional status  7/9/2024 2222 by Iván Wells RN  Outcome: Progressing  7/9/2024 0834 by Carina Tidwell RN  Outcome: Progressing

## 2024-07-10 NOTE — PROGRESS NOTES
CLINICAL PHARMACY NOTE: MEDS TO BEDS    Total # of Prescriptions Filled: 1   The following medications were delivered to the patient:  Carvedilol 12.5 mg    Additional Documentation:   Delivered to pt on 7/10 at 3:27 pm. Pt had no copay

## 2024-07-10 NOTE — PROGRESS NOTES
Renal Progress Note    Patient :  Vincenzo Darden; 58 y.o. MRN# [id number]  Location:    Attending:  Andrea Fabian MD  Admit Date:  2024   Hospital Day: 14    Subjective:     Patient seen and examined at bedside. To have chest tube removed at bedside.     Labs reviewed. Sodium 130, potassium 4.3, chloride 93, bicarb 25, BUN 48, creatinine 5.3, calcium 9.2.  Last dialyzed yesterday as per TTS schedule, ran for 3 hours, 3.7L fluid removal per R perm cath.     History reviewed.  Vincenzo Darden is a 58 y.o. male w/ PVD s/p right BKA, non healing left foot wounds, CAD s/p AVR/CABGx3 in 2024 complicated by chest wall dehiscence, MSSA infection in 2024, developed CAMILLE on CKD3 s/p initiating HD (HD T/R/S) on 2024, DM2, COLT who was recently discharged 2024 who returned to [facility] yesterday evening with worsening shortness of breath, flank pain with chest pain and recommended for transfer to [facility] for the management of Dyspnea.  Last echocardiogram in  showed ejection fraction of 45 to 50% with mild to moderate aortic stenosis    Outpatient Medications:     Medications Prior to Admission: gabapentin (NEURONTIN) 100 MG capsule, Take 1 capsule by mouth every 8 hours for 30 days.  amLODIPine (NORVASC) 10 MG tablet, Take 1 tablet by mouth daily  [] linezolid (ZYVOX) 600 MG tablet, Take 1 tablet by mouth every 12 hours for 24 doses  atorvastatin (LIPITOR) 40 MG tablet, Take 1 tablet by mouth nightly  metoprolol tartrate (LOPRESSOR) 25 MG tablet, Take 1 tablet by mouth 2 times daily  tamsulosin (FLOMAX) 0.4 MG capsule, Take 1 capsule by mouth daily  aspirin 81 MG chewable tablet, Take 1 tablet by mouth daily  TRULICITY 1.5 MG/0.5ML SC injection, Inject 0.5 mLs into the skin Once a week at 5 PM  Insulin Degludec (TRESIBA FLEXTOUCH) 200 UNIT/ML SOPN, Inject 40 Units into the skin nightly  Insulin Aspart, w/Niacinamide, (FIASP FLEXTOUCH) 100 UNIT/ML SOPN,

## 2024-07-10 NOTE — PROGRESS NOTES
[facility] - Progress Note      Today's Date and Time: 7/10/2024, 7:17 AM    Impression :   Shortness of breath  Rt pleural effusion with compressive atelectasis  Loculated lower anterior mediastinum fluid collection  CAMILLE on CKD stage III  Recent MSSA septicemia 6-2-24 x 2  Was under treatment with Linezolid because of prior CAMILLE with Daptomycin  Hx anaphylaxis with Zosyn    Concern for left great toe osteomyelitis   CAD  S/P CABG and AVR Feb 2024  Hx of Sternal wound dehiscence-fully healed  DM 2  PVD with prior Rt BKA  Left leg and foot ulcers.  Allergy to vancomycin (nausea)  Allergy to Zosyn (anaphylaxis)      Recommendations:     Monitor off antibiotics   PO Zyvox 600 mg BID completed 7/7/2024  There is minimal residual fluid in the right pleural cavity with compressive atelectasis and consolidation   S/p left pleural drain placement with 70 ml fluid drained on 7/1/24  The patient was on daptomycin and the care was discussed with Dr. Prado and given the concern for empyema, he was switched him back to linezolid to give better lung penetration  Blood cultures remain negative    Prior treatment:  Daptomycin started 6/26/24 and stopped 6/30/24 6/18/24: IV Zyvox 600 mg po BID initiated  Daptomycin 700 mg IV q 48 hr. Stopped on 6/17/24 because of renal failure    Prior blood cultures:   6/3/24 with 1/1 staph aureus  6-4-24: No growth   6/7/24: No growth   6/17/24: No growth     TTE with no evidence of endocarditis from 6/6/24  MRI left foot unable to be completed because of PPM  No surgical intervention planned per Podiatry     Medical Decision Making/Summary/Discussion:7/10/2024     Daily Elements of Decision Making provided by Consulting Physician:    Note: I have independently performed the steps listed below as part of the medical decision making and evaluation.    Review of current Problems:  Today I am seeing the patient for the following problems:  Shortness of breath  Rt  vein or superficial vein thrombosis in the left lower extremity.     XR CHEST (2 VW)    Result Date: 6/17/2024  EXAMINATION: TWO XRAY VIEWS OF THE CHEST 6/17/2024 2:45 pm COMPARISON: Chest x-ray dated 06/02/2024.  Chest x-ray dated 06/02/2024. HISTORY: ORDERING SYSTEM PROVIDED HISTORY: Chest pain, SOB TECHNOLOGIST PROVIDED HISTORY: Chest pain, SOB FINDINGS: MEDICAL DEVICES: There is a right arm PICC. HEART/MEDIASTINUM: The cardiac silhouette is enlarged, but stable. PLEURA/LUNGS: There is a small to moderate right pleural effusion with adjacent compressive atelectasis.  There is no appreciable pneumothorax. BONES/SOFT TISSUE: No acute abnormality.     Small to moderate right pleural effusion with adjacent compressive atelectasis.       Medical Decision Vyaawc-Ojuwrymx-Rertn:     Results       Procedure Component Value Units Date/Time    Culture, Fungus [[id number]] Collected: 06/28/24 1533    Order Status: Completed Specimen: Pleural Fl from Thoracentesis Updated: 07/08/24 1345     Specimen Description .THORACENTESIS FLUID     Special Requests Site: Pleural Fl     Direct Exam NO FUNGAL ELEMENTS SEEN     Culture NO GROWTH 10 DAYS    Culture, Body Fluid [[id number]] Collected: 06/28/24 1532    Order Status: Completed Specimen: Pleural Fl from Thoracentesis Updated: 07/03/24 1754     Specimen Description .THORACENTESIS FLUID     Special Requests Site: Pleural Fl     Direct Exam MANY NEUTROPHILS      NO ORGANISMS SEEN      Gram stain made from cytocentrifuged specimen.  Organisms and cells will be concentrated.     Culture NO GROWTH 5 DAYS    Culture with Smear, Acid Fast Bacillius [[id number]] Collected: 06/28/24 1532    Order Status: Completed Specimen: Pleural Fl from Thoracentesis Updated: 07/08/24 1119     Specimen Description .THORACENTESIS FLUID     Special Requests Site: Pleural Fl     Direct Exam NO ACID FAST BACILLI SEEN (CONCENTRATED SMEAR)     Culture NO GROWTH 9 DAYS    Culture, Urine [[id number]]

## 2024-07-10 NOTE — TELEPHONE ENCOUNTER
----- Message from Fadia Medina sent at 7/10/2024 11:08 AM EDT -----  Monet, please see message from Dr Arana and arrange as requested. Thank you.   ----- Message -----  From: Seven Arana MD  Sent: 7/10/2024  11:02 AM EDT  To: [facility] Respiratory Spec Clinical Staff    Please schedule follow up in clinic with me in 4 weeks . With ct chest   Order placed   Thank you   SEVEN ARANA MD

## 2024-07-10 NOTE — PROGRESS NOTES
PULMONARY PROGRESS NOTE      Patient:  Vincenzo Darden  YOB: 1965    MRN: [id number]     Acct: [id number]     Admit date: 6/26/2024    REASON FOR CONSULT:-Acute hypoxic respiratory failure    Pt seen and Chart reviewed.    Subjective:     The patient is a 58 y.o. male with history of multiple comorbidities including diabetes, obesity, obstructive sleep apnea, peripheral vascular disease, coronary artery disease, s/p AVR and CABG (February 2024), complicated by chest wall dehiscence and MSSA infection, CAMILLE on CKD, required initiation of hemodialysis.  [facility].     On evaluation he was found to have pleural effusion and underwent thoracentesis with removal of 50 mL of cloudy yellow fluid in the right side.  The pleural fluid is exudative with neutrophilia, markedly elevated LDH at 6402, protein 4.7 and a glucose less than 2 suggestive of empyema.        Interval History  7/10/2024:    No acute issues overnight  Patient denies any acute complaints  Vital signs stable, afebrile.  Patient on 2 L oxygen by nasal cannula  Cultures not growing any organism  Thoracentesis fluid negative for malignancy    On tPA dornase, per CT surgery.  Received yesterday    CXR reviewed today, chest tube not draining.       Review of Systems -   Review of Systems   Constitutional:  Negative for fever.   Respiratory:  Positive for cough and shortness of breath.    Cardiovascular:  Negative for chest pain and palpitations.   Gastrointestinal:  Negative for abdominal pain, diarrhea, nausea and vomiting.   Musculoskeletal:  Negative for arthralgias and myalgias.   Neurological:  Negative for dizziness and light-headedness.           Physical Exam:  Vitals: /69   Pulse 73   Temp 98.1 °F (36.7 °C) (Oral)   Resp 18   Ht 1.829 m (6' 0.01\")   Wt 122.5 kg (270 lb 1 oz)   SpO2 96%   BMI 36.62 kg/m²   24 hour  aortic valve replacement    CAMILLE (acute kidney injury) ([facility])    Wound eschar of foot    PVD (peripheral vascular disease) ([facility])    Wound, open, foot, left, initial encounter    Dyspnea    SOB (shortness of breath)    Pleural effusion    Postoperative seroma involving circulatory system after cardiac bypass    Biventricular congestive heart failure (HCC)    HFrEF (heart failure with reduced ejection fraction) ([facility])    Osteomyelitis of great toe of left foot (HCC)    Ischemic cardiomyopathy    Empyema of right pleural space ([facility])    Empyema lung ([facility])    Dependence on renal dialysis (HCC)    Bilateral lower extremity edema    Essential hypertension       Assessment and Plan:     Right-sided empyema  CAMILLE and CKD, on currently dialysis dependent  S/p CABG and AVR (complicated by wound dehiscence and MSSA bacteremia)  HFrEF  History of peripheral vascular disease, s/p right BKA  Obesity  COLT  Essential hypertension  Type II DM  Anemia, better      Plan:    Continue supplemental oxygen to keep SpO2 more than 92%  Received tPA dornase  IR guided chest tube placement on 7/1  Pleural fluid concerning for empyema  Antibiotics: None. Completed Zyvox course on 7/7  Maintain bronchopulmonary hygiene  Monitor I's/O, electrolytes with a goal of even/negative fluid balance  Hemodialysis as per nephrology  DVT/stress ulcer prophylaxis-subcutaneous heparin  PT/OT  Ok to pull out chest tube  Ok for discharge from pulmonary standpoint. Follow-up in office with CT chest in 4 weeks    Rakan Fraire  PGY-3, Internal Medicine Resident  [facility], [place]         7/10/2024, 12:30 PM     Attending Physician Statement  I have discussed the care of Vincenzo KELI Katalina, including pertinent history and exam findings,  with the resident. I have seen and examined the patient and the key elements of all parts of the encounter have been performed by me.  I agree with the assessment, plan and orders as documented by the resident

## 2024-07-10 NOTE — PLAN OF CARE
Problem: Safety - Adult  Goal: Free from fall injury  7/10/2024 0926 by Lorena Foreman RN  Outcome: Progressing  7/9/2024 2222 by Iván Wells RN  Outcome: Progressing     Problem: ABCDS Injury Assessment  Goal: Absence of physical injury  7/10/2024 0926 by Lorena Foreman RN  Outcome: Progressing  7/9/2024 2222 by Iván Wells RN  Outcome: Progressing     Problem: Pain  Goal: Verbalizes/displays adequate comfort level or baseline comfort level  7/10/2024 0926 by Lorena Foreman RN  Outcome: Progressing  7/9/2024 2222 by Iván Wells RN  Outcome: Progressing     Problem: Chronic Conditions and Co-morbidities  Goal: Patient's chronic conditions and co-morbidity symptoms are monitored and maintained or improved  7/10/2024 0926 by Lorena Foreman RN  Outcome: Progressing  7/9/2024 2222 by Iván Wells RN  Outcome: Progressing     Problem: Nutrition Deficit:  Goal: Optimize nutritional status  7/10/2024 0926 by Lorena Foreman RN  Outcome: Progressing  7/9/2024 2222 by Iván Wells RN  Outcome: Progressing

## 2024-07-13 NOTE — DISCHARGE SUMMARY
by chest wall dehiscence, MSSA infection, with concern of left  great toe osteomyelitis, with CAMILLE on CKD started on dialysis recently, diabetes mellitus, who came in with shortness of breath, chest pain, flank pain, shortness of breath likely secondary to fluid overload versus right-sided empyema for which patient underwent thoracentesis and eventual chest tube placement,  Also concern for mediastinal collection seroma/abscess, osteomyelitis sternotomy site, pulm/CT surgery/ID service have been following, s/p tPA dornase x 4, was on daptomycin, eventually switched to Zyvox, completed course of Zyvox per ID service.  Nephrology service has been following for dialysis  Seen by cardiology service as well, no intervention planned recommended follow-up in 2 to 4 weeks  Eventually chest tube taken out by IR, chest x-ray shows improvement  Currently stable for discharge          Significant therapeutic interventions: As described above    Significant Diagnostic Studies:   Labs / Micro:  CBC:   Lab Results   Component Value Date/Time    WBC 9.0 07/10/2024 06:40 AM    RBC 2.78 07/10/2024 06:40 AM    HGB 7.9 07/10/2024 06:40 AM    HCT 25.0 07/10/2024 06:40 AM    MCV 89.9 07/10/2024 06:40 AM    MCH 28.4 07/10/2024 06:40 AM    MCHC 31.6 07/10/2024 06:40 AM    RDW 14.4 07/10/2024 06:40 AM     07/10/2024 06:40 AM     BMP:    Lab Results   Component Value Date/Time    GLUCOSE 141 07/10/2024 06:40 AM     07/10/2024 06:40 AM    K 4.3 07/10/2024 06:40 AM    CL 93 07/10/2024 06:40 AM    CO2 25 07/10/2024 06:40 AM    ANIONGAP 12 07/10/2024 06:40 AM    BUN 48 07/10/2024 06:40 AM    CREATININE 5.3 07/10/2024 06:40 AM    CALCIUM 8.2 07/10/2024 06:40 AM    LABGLOM 12 07/10/2024 06:40 AM    LABGLOM 43 03/03/2024 08:43 AM     HFP:  No components found for: \"AP\", \"ALB\", \"PROT\", \"SGOT\", \"SGPT\", \"TBIL\", \"DBILCALC\"  CMP:    Lab Results   Component Value Date/Time    GLUCOSE 141 07/10/2024 06:40 AM     07/10/2024 06:40 AM    K  CATHETER. 6/18/2024. HISTORY: ORDERING SYSTEM PROVIDED HISTORY: CAMILLE TECHNOLOGIST PROVIDED HISTORY: CAMILLE SEDATION: None FLUOROSCOPY DOSE AND TYPE: Fluoro time 1.3 minutes DAP 2404.62 uGy m 2 Views: 5 TECHNIQUE: This procedure was performed by Siva Calle PA-C under indirect supervision of Dr. Rice.  Informed consent was obtained after a detailed explanation of the procedure including risks, benefits, and alternatives. Universal protocol was observed using maximum sterile barrier technique. All elements of maximal sterile barrier technique, including cap, mask, sterile gown, sterile gloves, a large sterile sheet, hand hygiene and 2% chlorhexidine for cutaneous antisepsis were followed. Using ultrasound guidance, after anesthetizing the skin with one percent lidocaine, a micropuncture needle was advanced into the patent right internal jugular vein.  An ultrasound image demonstrating patency of the vein with needle tip located within it was obtained and stored in PACS. A microwire was inserted under fluoroscopic guidance and the needle was removed and a 5 Yoruba sheath was placed. The microwire was exchanged for an 0.035 wire and the tract was serially dilated to accommodate a 14 Yoruba by 20 cm non tunneled Schon temporary hemodialysis catheter.  An image was saved demonstrating the catheter tip in the right atrium. The catheter flushed and aspirated appropriately. This was sutured to the skin using 2-0 Ethilon and dressed appropriately. Estimated blood loss was 5 mL. The patient tolerated the procedure well and left the department stable condition. FINDINGS: Fluoroscopic image demonstrates the tip of the catheter in the right atrium.     Successful ultrasound and fluoroscopy guided non-tunneled hemodialysis catheter placement.  Okay to use.     Vascular lower arterial complete physiologic Doppler at rest    Result Date: 6/18/2024    Left: NING of 1.23 is within normal limits.     US RENAL COMPLETE    Result

## 2024-07-15 LAB
MICROORGANISM SPEC CULT: NORMAL
MICROORGANISM SPEC CULT: NORMAL
MICROORGANISM/AGENT SPEC: NORMAL
MICROORGANISM/AGENT SPEC: NORMAL
SERVICE CMNT-IMP: NORMAL
SERVICE CMNT-IMP: NORMAL
SPECIMEN DESCRIPTION: NORMAL
SPECIMEN DESCRIPTION: NORMAL

## 2024-07-15 NOTE — TELEPHONE ENCOUNTER
Spoke with pt appt for ct scan and appt with us on 8/19/24.  Patient asked me to leave this information on his machine and the appointment details address to our location was left on the patient's machine.

## 2024-07-23 RX ORDER — AMIODARONE HYDROCHLORIDE 200 MG/1
TABLET ORAL
Qty: 30 TABLET | Refills: 0 | OUTPATIENT
Start: 2024-07-23

## 2024-07-24 RX ORDER — POTASSIUM CHLORIDE 20 MEQ/1
20 TABLET, EXTENDED RELEASE ORAL DAILY
Qty: 30 TABLET | Refills: 0 | OUTPATIENT
Start: 2024-07-24

## 2024-07-29 ENCOUNTER — TELEPHONE (OUTPATIENT)
Dept: PHARMACY | Age: 59
End: 2024-07-29

## 2024-07-29 NOTE — TELEPHONE ENCOUNTER
Checked patients profile and noticed that per his last discharge summary, he is not taking warfarin. Called patient and left a voicemail asking him to confirm that he is no longer on warfarin so we can move forward with discharge.     QI Downing, Pharmacy Intern   7/29/2024  3:28 PM

## 2024-08-05 ENCOUNTER — TELEPHONE (OUTPATIENT)
Dept: CARDIOTHORACIC SURGERY | Age: 59
End: 2024-08-05

## 2024-08-05 NOTE — TELEPHONE ENCOUNTER
----- Message from Cassandra Litten, MA sent at 8/5/2024  3:07 PM EDT -----  Regarding: RE: Clearance for sternal closure  LVM for a 3rd time to set up f/u appointment    ----- Message -----  From: Litten, Cassandra, MA  Sent: 7/26/2024   2:17 PM EDT  To: [facility]  Heart/Vascular Clinical Staff  Subject: RE: Clearance for sternal closure                LVM to schedule f/u appt    ----- Message -----  From: Litten, Cassandra, MA  Sent: 7/18/2024   1:27 PM EDT  To: [facility] Sv Heart/Vascular Clinical Staff  Subject: RE: Clearance for sternal closure                LVM to schedule f/u appointment         ----- Message -----  From: Litten, Cassandra, MA  Sent: 6/10/2024  12:00 AM EDT  To: [facility]  Heart/Vascular Clinical Staff  Subject: RE: Clearance for sternal closure                Patient is still currently admitted    ----- Message -----  From: oSy Mao APRN - NP  Sent: 6/4/2024  10:30 AM EDT  To: Ami Coulter MD; #  Subject: Clearance for sternal closure                    Stephania Coulter    This patient is continued his sternum closed.  He has chronic bacteremia due to poor healing and now has concerns of infection in his left foot.  He had his right leg amputated due to infection.    We plan on doing the sternal closure as an outpatient case.  We assumed a good 4 weeks of antibiotics and then we will bring him back in our cardiothoracic clinic for closure    I am just messaging you to see when you think will be an appropriate time for him to go for closure maybe we can do it sooner??      Thanks  Flavio

## 2024-08-06 ENCOUNTER — HOSPITAL ENCOUNTER (INPATIENT)
Age: 59
LOS: 2 days | Discharge: HOME HEALTH | End: 2024-08-08
Attending: EMERGENCY MEDICINE | Admitting: STUDENT IN AN ORGANIZED HEALTH CARE EDUCATION/TRAINING PROGRAM
Payer: MEDICARE

## 2024-08-06 ENCOUNTER — APPOINTMENT (OUTPATIENT)
Dept: GENERAL RADIOLOGY | Age: 59
End: 2024-08-06
Payer: MEDICARE

## 2024-08-06 DIAGNOSIS — M86.9 OSTEOMYELITIS OF GREAT TOE OF LEFT FOOT (HCC): ICD-10-CM

## 2024-08-06 DIAGNOSIS — M86.172 OTHER ACUTE OSTEOMYELITIS OF LEFT FOOT (HCC): Primary | ICD-10-CM

## 2024-08-06 LAB
ANION GAP SERPL CALCULATED.3IONS-SCNC: 12 MMOL/L (ref 9–16)
BUN SERPL-MCNC: 34 MG/DL (ref 6–20)
CALCIUM SERPL-MCNC: 7.8 MG/DL (ref 8.6–10.4)
CHLORIDE SERPL-SCNC: 106 MMOL/L (ref 98–107)
CO2 SERPL-SCNC: 21 MMOL/L (ref 20–31)
CREAT SERPL-MCNC: 3.1 MG/DL (ref 0.7–1.2)
CRP SERPL HS-MCNC: 9.6 MG/L (ref 0–5)
ERYTHROCYTE [DISTWIDTH] IN BLOOD BY AUTOMATED COUNT: 16.8 % (ref 11.8–14.4)
GFR, ESTIMATED: 22 ML/MIN/1.73M2
GLUCOSE BLD-MCNC: 138 MG/DL (ref 75–110)
GLUCOSE SERPL-MCNC: 228 MG/DL (ref 74–99)
HCT VFR BLD AUTO: 27.3 % (ref 40.7–50.3)
HGB BLD-MCNC: 9 G/DL (ref 13–17)
LACTIC ACID, SEPSIS WHOLE BLOOD: 0.6 MMOL/L (ref 0.5–1.9)
LACTIC ACID, SEPSIS WHOLE BLOOD: 1 MMOL/L (ref 0.5–1.9)
LACTIC ACID, WHOLE BLOOD: 1.1 MMOL/L (ref 0.7–2.1)
MCH RBC QN AUTO: 29.4 PG (ref 25.2–33.5)
MCHC RBC AUTO-ENTMCNC: 33 G/DL (ref 28.4–34.8)
MCV RBC AUTO: 89.2 FL (ref 82.6–102.9)
MICROORGANISM SPEC CULT: NORMAL
MICROORGANISM/AGENT SPEC: NORMAL
NRBC BLD-RTO: 0 PER 100 WBC
PLATELET # BLD AUTO: 216 K/UL (ref 138–453)
PMV BLD AUTO: 8.7 FL (ref 8.1–13.5)
POTASSIUM SERPL-SCNC: 3 MMOL/L (ref 3.7–5.3)
PROCALCITONIN SERPL-MCNC: 0.11 NG/ML (ref 0–0.09)
RBC # BLD AUTO: 3.06 M/UL (ref 4.21–5.77)
SERVICE CMNT-IMP: NORMAL
SODIUM SERPL-SCNC: 139 MMOL/L (ref 136–145)
SPECIMEN DESCRIPTION: NORMAL
TROPONIN I SERPL HS-MCNC: 94 NG/L (ref 0–22)
TROPONIN I SERPL HS-MCNC: 99 NG/L (ref 0–22)
WBC OTHER # BLD: 10 K/UL (ref 3.5–11.3)

## 2024-08-06 PROCEDURE — 84145 PROCALCITONIN (PCT): CPT

## 2024-08-06 PROCEDURE — 99223 1ST HOSP IP/OBS HIGH 75: CPT | Performed by: PHYSICIAN ASSISTANT

## 2024-08-06 PROCEDURE — 87154 CUL TYP ID BLD PTHGN 6+ TRGT: CPT

## 2024-08-06 PROCEDURE — 85027 COMPLETE CBC AUTOMATED: CPT

## 2024-08-06 PROCEDURE — 87040 BLOOD CULTURE FOR BACTERIA: CPT

## 2024-08-06 PROCEDURE — 1200000000 HC SEMI PRIVATE

## 2024-08-06 PROCEDURE — 99285 EMERGENCY DEPT VISIT HI MDM: CPT

## 2024-08-06 PROCEDURE — 93005 ELECTROCARDIOGRAM TRACING: CPT

## 2024-08-06 PROCEDURE — 73630 X-RAY EXAM OF FOOT: CPT

## 2024-08-06 PROCEDURE — 87205 SMEAR GRAM STAIN: CPT

## 2024-08-06 PROCEDURE — 84484 ASSAY OF TROPONIN QUANT: CPT

## 2024-08-06 PROCEDURE — 80048 BASIC METABOLIC PNL TOTAL CA: CPT

## 2024-08-06 PROCEDURE — 81001 URINALYSIS AUTO W/SCOPE: CPT

## 2024-08-06 PROCEDURE — 86140 C-REACTIVE PROTEIN: CPT

## 2024-08-06 PROCEDURE — 6370000000 HC RX 637 (ALT 250 FOR IP): Performed by: PHYSICIAN ASSISTANT

## 2024-08-06 PROCEDURE — 6360000002 HC RX W HCPCS: Performed by: PHYSICIAN ASSISTANT

## 2024-08-06 PROCEDURE — 83605 ASSAY OF LACTIC ACID: CPT

## 2024-08-06 PROCEDURE — 82947 ASSAY GLUCOSE BLOOD QUANT: CPT

## 2024-08-06 PROCEDURE — 71045 X-RAY EXAM CHEST 1 VIEW: CPT

## 2024-08-06 RX ORDER — ACETAMINOPHEN 650 MG/1
650 SUPPOSITORY RECTAL EVERY 6 HOURS PRN
Status: DISCONTINUED | OUTPATIENT
Start: 2024-08-06 | End: 2024-08-07

## 2024-08-06 RX ORDER — ATORVASTATIN CALCIUM 40 MG/1
40 TABLET, FILM COATED ORAL NIGHTLY
Status: DISCONTINUED | OUTPATIENT
Start: 2024-08-06 | End: 2024-08-08 | Stop reason: HOSPADM

## 2024-08-06 RX ORDER — ENOXAPARIN SODIUM 100 MG/ML
30 INJECTION SUBCUTANEOUS 2 TIMES DAILY
Status: CANCELLED | OUTPATIENT
Start: 2024-08-06

## 2024-08-06 RX ORDER — INSULIN LISPRO 100 [IU]/ML
0-16 INJECTION, SOLUTION INTRAVENOUS; SUBCUTANEOUS
Status: DISCONTINUED | OUTPATIENT
Start: 2024-08-07 | End: 2024-08-06

## 2024-08-06 RX ORDER — ONDANSETRON 2 MG/ML
4 INJECTION INTRAMUSCULAR; INTRAVENOUS EVERY 6 HOURS PRN
Status: DISCONTINUED | OUTPATIENT
Start: 2024-08-06 | End: 2024-08-08 | Stop reason: HOSPADM

## 2024-08-06 RX ORDER — ACETAMINOPHEN 325 MG/1
650 TABLET ORAL EVERY 6 HOURS PRN
Status: DISCONTINUED | OUTPATIENT
Start: 2024-08-06 | End: 2024-08-07

## 2024-08-06 RX ORDER — AMLODIPINE BESYLATE 10 MG/1
10 TABLET ORAL DAILY
Status: DISCONTINUED | OUTPATIENT
Start: 2024-08-07 | End: 2024-08-08 | Stop reason: HOSPADM

## 2024-08-06 RX ORDER — INSULIN LISPRO 100 [IU]/ML
0-4 INJECTION, SOLUTION INTRAVENOUS; SUBCUTANEOUS NIGHTLY
Status: DISCONTINUED | OUTPATIENT
Start: 2024-08-06 | End: 2024-08-08 | Stop reason: HOSPADM

## 2024-08-06 RX ORDER — SODIUM CHLORIDE 0.9 % (FLUSH) 0.9 %
5-40 SYRINGE (ML) INJECTION EVERY 12 HOURS SCHEDULED
Status: DISCONTINUED | OUTPATIENT
Start: 2024-08-06 | End: 2024-08-08 | Stop reason: HOSPADM

## 2024-08-06 RX ORDER — INSULIN GLARGINE 100 [IU]/ML
32 INJECTION, SOLUTION SUBCUTANEOUS NIGHTLY
Status: DISCONTINUED | OUTPATIENT
Start: 2024-08-06 | End: 2024-08-06

## 2024-08-06 RX ORDER — LEVOFLOXACIN 5 MG/ML
500 INJECTION, SOLUTION INTRAVENOUS ONCE
Status: DISCONTINUED | OUTPATIENT
Start: 2024-08-06 | End: 2024-08-06

## 2024-08-06 RX ORDER — INSULIN LISPRO 100 [IU]/ML
0-4 INJECTION, SOLUTION INTRAVENOUS; SUBCUTANEOUS NIGHTLY
Status: DISCONTINUED | OUTPATIENT
Start: 2024-08-06 | End: 2024-08-06

## 2024-08-06 RX ORDER — LEVOFLOXACIN 5 MG/ML
750 INJECTION, SOLUTION INTRAVENOUS ONCE
Status: DISCONTINUED | OUTPATIENT
Start: 2024-08-06 | End: 2024-08-07

## 2024-08-06 RX ORDER — INSULIN LISPRO 100 [IU]/ML
0-4 INJECTION, SOLUTION INTRAVENOUS; SUBCUTANEOUS
Status: DISCONTINUED | OUTPATIENT
Start: 2024-08-07 | End: 2024-08-08 | Stop reason: HOSPADM

## 2024-08-06 RX ORDER — ASPIRIN 81 MG/1
81 TABLET, CHEWABLE ORAL DAILY
Status: DISCONTINUED | OUTPATIENT
Start: 2024-08-07 | End: 2024-08-08 | Stop reason: HOSPADM

## 2024-08-06 RX ORDER — SODIUM CHLORIDE 0.9 % (FLUSH) 0.9 %
10 SYRINGE (ML) INJECTION PRN
Status: DISCONTINUED | OUTPATIENT
Start: 2024-08-06 | End: 2024-08-08 | Stop reason: HOSPADM

## 2024-08-06 RX ORDER — CARVEDILOL 12.5 MG/1
12.5 TABLET ORAL 2 TIMES DAILY WITH MEALS
Status: DISCONTINUED | OUTPATIENT
Start: 2024-08-07 | End: 2024-08-08 | Stop reason: HOSPADM

## 2024-08-06 RX ORDER — TAMSULOSIN HYDROCHLORIDE 0.4 MG/1
0.4 CAPSULE ORAL DAILY
Status: DISCONTINUED | OUTPATIENT
Start: 2024-08-07 | End: 2024-08-08 | Stop reason: HOSPADM

## 2024-08-06 RX ORDER — SODIUM CHLORIDE 9 MG/ML
INJECTION, SOLUTION INTRAVENOUS PRN
Status: DISCONTINUED | OUTPATIENT
Start: 2024-08-06 | End: 2024-08-08 | Stop reason: HOSPADM

## 2024-08-06 RX ORDER — POLYETHYLENE GLYCOL 3350 17 G/17G
17 POWDER, FOR SOLUTION ORAL DAILY PRN
Status: DISCONTINUED | OUTPATIENT
Start: 2024-08-06 | End: 2024-08-08 | Stop reason: HOSPADM

## 2024-08-06 RX ORDER — INSULIN GLARGINE 100 [IU]/ML
32 INJECTION, SOLUTION SUBCUTANEOUS NIGHTLY
Status: DISCONTINUED | OUTPATIENT
Start: 2024-08-06 | End: 2024-08-08 | Stop reason: HOSPADM

## 2024-08-06 RX ORDER — HEPARIN SODIUM 5000 [USP'U]/ML
5000 INJECTION, SOLUTION INTRAVENOUS; SUBCUTANEOUS EVERY 8 HOURS SCHEDULED
Status: DISCONTINUED | OUTPATIENT
Start: 2024-08-06 | End: 2024-08-08 | Stop reason: HOSPADM

## 2024-08-06 RX ORDER — ONDANSETRON 4 MG/1
4 TABLET, ORALLY DISINTEGRATING ORAL EVERY 8 HOURS PRN
Status: DISCONTINUED | OUTPATIENT
Start: 2024-08-06 | End: 2024-08-08 | Stop reason: HOSPADM

## 2024-08-06 RX ADMIN — INSULIN GLARGINE 32 UNITS: 100 INJECTION, SOLUTION SUBCUTANEOUS at 23:25

## 2024-08-06 RX ADMIN — HEPARIN SODIUM 5000 UNITS: 5000 INJECTION INTRAVENOUS; SUBCUTANEOUS at 23:24

## 2024-08-06 ASSESSMENT — ENCOUNTER SYMPTOMS
NAUSEA: 0
COUGH: 0
VOMITING: 0
SHORTNESS OF BREATH: 0
SORE THROAT: 0
ABDOMINAL PAIN: 0
RHINORRHEA: 0
WHEEZING: 0

## 2024-08-06 NOTE — ED TRIAGE NOTES
Pt presents to ED with c/o SOB. Pt states he has a Hx of fluid collection around lungs which resulted in chest tube. Pulm referred pt to ED. Pt denies injuries or chest pain. Pt is alert, oriented, and ambulatory.

## 2024-08-06 NOTE — ED PROVIDER NOTES
[facility] ED  Emergency Department Encounter  Emergency Medicine Resident     Pt Name:Vincenzo Darden  MRN: [id number]  Birthdate 1965  Date of evaluation: 8/6/24  PCP:  Shaikh Calix MD  Note Started: 7:22 PM EDT      CHIEF COMPLAINT       Chief Complaint   Patient presents with    Shortness of Breath       HISTORY OF PRESENT ILLNESS  (Location/Symptom, Timing/Onset, Context/Setting, Quality, Duration, Modifying Factors, Severity.)      Vincenzo Darden is a 58 y.o. male who presents with shortness of breath.  He states that he is concerned for a large pleural effusion.  He discussed with his pulmonologist who instructed him to come to the emergency department.  Patient does have a significant medical history that started this year.  He does have a history of diabetes, right above-knee amputation, nonhealing wounds of the left foot, CAD status post CABG and AVR in February of this year complicated by chest wall dehiscence and MSSA infection.  Patient did develop CAMILLE on CKD and is now a twice weekly dialysis patient.    Patient states that his shortness of breath started today, has been progressively worsening.  He states that it is worse when he lies flat.  He denies fever or chills, denies hemoptysis, denies swelling of his left lower leg, denies productive cough or any cough at all.  He denies any trauma to the chest or elsewhere.    Patient states that he was dialyzed today, denies any disruption or interruption of his dialysis treatment.    PAST MEDICAL / SURGICAL / SOCIAL / FAMILY HISTORY      has a past medical history of Diabetes mellitus (HCC) and Hernia, abdominal.     has a past surgical history that includes Cardiac procedure (N/A, 10/09/2023); Cardiac procedure (N/A, 10/09/2023); Coronary artery bypass graft (N/A, 02/26/2024); IR NONTUNNELED VASCULAR CATHETER > 5 YEARS (6/18/2024); IR TUNNELED CVC PLACE WO SQ PORT/PUMP > 5 YEARS (6/20/2024); and IR CHEST TUBE INSERTION  dialysis.  Did also discuss that an infectious disease consult was placed - patient was given Levaquin first dose here however better antibiotic regimen would probably be Zyvox and cefepime, will need to be ordered by infectious disease.  There is also discussed with them that a podiatry consult has been placed for the acute osteomyelitis. [KJ]      ED Course User Index  [KJ] Jose Alfredo Vitale MD       IMAGING:  XR CHEST PORTABLE   Final Result   Mild bibasilar atelectasis.         XR FOOT LEFT (MIN 3 VIEWS)   Final Result   Cortical resorption at the distal tuft of the 1st digit suspicious for acute   osteomyelitis.           MEDICATIONS GIVEN TO PATIENT THIS ENCOUNTER:  Orders Placed This Encounter   Medications    DISCONTD: levoFLOXacin (LEVAQUIN) 500 MG/100ML infusion 500 mg     Order Specific Question:   Antimicrobial Indications     Answer:   Skin and Soft Tissue Infection    amLODIPine (NORVASC) tablet 10 mg    aspirin chewable tablet 81 mg    atorvastatin (LIPITOR) tablet 40 mg    carvedilol (COREG) tablet 12.5 mg    tamsulosin (FLOMAX) capsule 0.4 mg    sodium chloride flush 0.9 % injection 5-40 mL    sodium chloride flush 0.9 % injection 10 mL    0.9 % sodium chloride infusion    OR Linked Order Group     ondansetron (ZOFRAN-ODT) disintegrating tablet 4 mg     ondansetron (ZOFRAN) injection 4 mg    polyethylene glycol (GLYCOLAX) packet 17 g    OR Linked Order Group     acetaminophen (TYLENOL) tablet 650 mg     acetaminophen (TYLENOL) suppository 650 mg    DISCONTD: insulin lispro (HUMALOG,ADMELOG) injection vial 0-16 Units    DISCONTD: insulin lispro (HUMALOG,ADMELOG) injection vial 0-4 Units    levoFLOXacin (LEVAQUIN) 750 MG/150ML infusion 750 mg     Order Specific Question:   Antimicrobial Indications     Answer:   Skin and Soft Tissue Infection    heparin (porcine) injection 5,000 Units    insulin lispro (HUMALOG,ADMELOG) injection vial 0-4 Units    insulin lispro (HUMALOG,ADMELOG) injection vial 0-4

## 2024-08-07 PROBLEM — E11.628 TYPE 2 DIABETES MELLITUS WITH LEFT DIABETIC FOOT INFECTION (HCC): Status: ACTIVE | Noted: 2023-10-05

## 2024-08-07 PROBLEM — L08.9 TYPE 2 DIABETES MELLITUS WITH LEFT DIABETIC FOOT INFECTION (HCC): Status: ACTIVE | Noted: 2023-10-05

## 2024-08-07 LAB
ANION GAP SERPL CALCULATED.3IONS-SCNC: 14 MMOL/L (ref 9–16)
BACTERIA URNS QL MICRO: ABNORMAL
BILIRUB UR QL STRIP: NEGATIVE
BUN SERPL-MCNC: 35 MG/DL (ref 6–20)
CALCIUM SERPL-MCNC: 7.8 MG/DL (ref 8.6–10.4)
CASTS #/AREA URNS LPF: ABNORMAL /LPF (ref 0–8)
CHLORIDE SERPL-SCNC: 109 MMOL/L (ref 98–107)
CLARITY UR: CLEAR
CO2 SERPL-SCNC: 19 MMOL/L (ref 20–31)
COLOR UR: YELLOW
CREAT SERPL-MCNC: 3.4 MG/DL (ref 0.7–1.2)
EKG ATRIAL RATE: 77 BPM
EKG P AXIS: 9 DEGREES
EKG P-R INTERVAL: 180 MS
EKG Q-T INTERVAL: 452 MS
EKG QRS DURATION: 146 MS
EKG QTC CALCULATION (BAZETT): 511 MS
EKG R AXIS: -71 DEGREES
EKG T AXIS: 23 DEGREES
EKG VENTRICULAR RATE: 77 BPM
EPI CELLS #/AREA URNS HPF: ABNORMAL /HPF (ref 0–5)
ERYTHROCYTE [DISTWIDTH] IN BLOOD BY AUTOMATED COUNT: 16.8 % (ref 11.8–14.4)
GFR, ESTIMATED: 20 ML/MIN/1.73M2
GLUCOSE BLD-MCNC: 164 MG/DL (ref 75–110)
GLUCOSE BLD-MCNC: 179 MG/DL (ref 75–110)
GLUCOSE BLD-MCNC: 223 MG/DL (ref 75–110)
GLUCOSE BLD-MCNC: 224 MG/DL (ref 75–110)
GLUCOSE BLD-MCNC: 88 MG/DL (ref 75–110)
GLUCOSE SERPL-MCNC: 81 MG/DL (ref 74–99)
GLUCOSE UR STRIP-MCNC: ABNORMAL MG/DL
HCT VFR BLD AUTO: 29.8 % (ref 40.7–50.3)
HGB BLD-MCNC: 9.5 G/DL (ref 13–17)
HGB UR QL STRIP.AUTO: ABNORMAL
INR PPP: 1.1
KETONES UR STRIP-MCNC: NEGATIVE MG/DL
LEUKOCYTE ESTERASE UR QL STRIP: NEGATIVE
MAGNESIUM SERPL-MCNC: 2 MG/DL (ref 1.6–2.6)
MCH RBC QN AUTO: 30.1 PG (ref 25.2–33.5)
MCHC RBC AUTO-ENTMCNC: 31.9 G/DL (ref 28.4–34.8)
MCV RBC AUTO: 94.3 FL (ref 82.6–102.9)
NITRITE UR QL STRIP: NEGATIVE
NRBC BLD-RTO: 0 PER 100 WBC
PH UR STRIP: 7 [PH] (ref 5–8)
PLATELET # BLD AUTO: 237 K/UL (ref 138–453)
PMV BLD AUTO: 9.2 FL (ref 8.1–13.5)
POTASSIUM SERPL-SCNC: 3 MMOL/L (ref 3.7–5.3)
PROT UR STRIP-MCNC: ABNORMAL MG/DL
PROTHROMBIN TIME: 14.1 SEC (ref 11.7–14.9)
RBC # BLD AUTO: 3.16 M/UL (ref 4.21–5.77)
RBC #/AREA URNS HPF: ABNORMAL /HPF (ref 0–4)
SODIUM SERPL-SCNC: 142 MMOL/L (ref 136–145)
SP GR UR STRIP: 1.02 (ref 1–1.03)
UROBILINOGEN UR STRIP-ACNC: NORMAL EU/DL (ref 0–1)
WBC #/AREA URNS HPF: ABNORMAL /HPF (ref 0–5)
WBC OTHER # BLD: 8.2 K/UL (ref 3.5–11.3)

## 2024-08-07 PROCEDURE — 85027 COMPLETE CBC AUTOMATED: CPT

## 2024-08-07 PROCEDURE — 80048 BASIC METABOLIC PNL TOTAL CA: CPT

## 2024-08-07 PROCEDURE — 85610 PROTHROMBIN TIME: CPT

## 2024-08-07 PROCEDURE — 6370000000 HC RX 637 (ALT 250 FOR IP): Performed by: NURSE PRACTITIONER

## 2024-08-07 PROCEDURE — 83735 ASSAY OF MAGNESIUM: CPT

## 2024-08-07 PROCEDURE — 36415 COLL VENOUS BLD VENIPUNCTURE: CPT

## 2024-08-07 PROCEDURE — 99232 SBSQ HOSP IP/OBS MODERATE 35: CPT | Performed by: STUDENT IN AN ORGANIZED HEALTH CARE EDUCATION/TRAINING PROGRAM

## 2024-08-07 PROCEDURE — 82947 ASSAY GLUCOSE BLOOD QUANT: CPT

## 2024-08-07 PROCEDURE — 99222 1ST HOSP IP/OBS MODERATE 55: CPT | Performed by: INTERNAL MEDICINE

## 2024-08-07 PROCEDURE — 1200000000 HC SEMI PRIVATE

## 2024-08-07 PROCEDURE — 6360000002 HC RX W HCPCS: Performed by: PHYSICIAN ASSISTANT

## 2024-08-07 PROCEDURE — 2580000003 HC RX 258: Performed by: NURSE PRACTITIONER

## 2024-08-07 PROCEDURE — 6360000002 HC RX W HCPCS: Performed by: NURSE PRACTITIONER

## 2024-08-07 PROCEDURE — 6370000000 HC RX 637 (ALT 250 FOR IP): Performed by: PHYSICIAN ASSISTANT

## 2024-08-07 PROCEDURE — 6370000000 HC RX 637 (ALT 250 FOR IP): Performed by: STUDENT IN AN ORGANIZED HEALTH CARE EDUCATION/TRAINING PROGRAM

## 2024-08-07 RX ORDER — OXYCODONE HYDROCHLORIDE AND ACETAMINOPHEN 5; 325 MG/1; MG/1
1 TABLET ORAL EVERY 6 HOURS PRN
Status: DISCONTINUED | OUTPATIENT
Start: 2024-08-07 | End: 2024-08-08 | Stop reason: HOSPADM

## 2024-08-07 RX ORDER — GABAPENTIN 100 MG/1
100 CAPSULE ORAL ONCE
Status: COMPLETED | OUTPATIENT
Start: 2024-08-07 | End: 2024-08-07

## 2024-08-07 RX ORDER — POTASSIUM CHLORIDE 20 MEQ/1
40 TABLET, EXTENDED RELEASE ORAL 2 TIMES DAILY WITH MEALS
Status: COMPLETED | OUTPATIENT
Start: 2024-08-07 | End: 2024-08-08

## 2024-08-07 RX ORDER — ACETAMINOPHEN 325 MG/1
650 TABLET ORAL EVERY 6 HOURS PRN
Status: DISCONTINUED | OUTPATIENT
Start: 2024-08-07 | End: 2024-08-08 | Stop reason: HOSPADM

## 2024-08-07 RX ORDER — ACETAMINOPHEN 650 MG/1
650 SUPPOSITORY RECTAL EVERY 6 HOURS PRN
Status: DISCONTINUED | OUTPATIENT
Start: 2024-08-07 | End: 2024-08-08 | Stop reason: HOSPADM

## 2024-08-07 RX ORDER — LABETALOL HYDROCHLORIDE 5 MG/ML
20 INJECTION, SOLUTION INTRAVENOUS ONCE
Status: COMPLETED | OUTPATIENT
Start: 2024-08-07 | End: 2024-08-07

## 2024-08-07 RX ADMIN — ASPIRIN 81 MG 81 MG: 81 TABLET ORAL at 09:15

## 2024-08-07 RX ADMIN — GABAPENTIN 100 MG: 100 CAPSULE ORAL at 03:49

## 2024-08-07 RX ADMIN — TAMSULOSIN HYDROCHLORIDE 0.4 MG: 0.4 CAPSULE ORAL at 09:14

## 2024-08-07 RX ADMIN — ACETAMINOPHEN 650 MG: 325 TABLET ORAL at 01:47

## 2024-08-07 RX ADMIN — HEPARIN SODIUM 5000 UNITS: 5000 INJECTION INTRAVENOUS; SUBCUTANEOUS at 05:48

## 2024-08-07 RX ADMIN — OXYCODONE HYDROCHLORIDE AND ACETAMINOPHEN 1 TABLET: 5; 325 TABLET ORAL at 12:54

## 2024-08-07 RX ADMIN — INSULIN GLARGINE 32 UNITS: 100 INJECTION, SOLUTION SUBCUTANEOUS at 20:02

## 2024-08-07 RX ADMIN — INSULIN LISPRO 1 UNITS: 100 INJECTION, SOLUTION INTRAVENOUS; SUBCUTANEOUS at 16:28

## 2024-08-07 RX ADMIN — POTASSIUM CHLORIDE 40 MEQ: 1500 TABLET, EXTENDED RELEASE ORAL at 14:34

## 2024-08-07 RX ADMIN — CARVEDILOL 12.5 MG: 12.5 TABLET, FILM COATED ORAL at 09:15

## 2024-08-07 RX ADMIN — SODIUM CHLORIDE, PRESERVATIVE FREE 10 ML: 5 INJECTION INTRAVENOUS at 20:03

## 2024-08-07 RX ADMIN — ATORVASTATIN CALCIUM 40 MG: 40 TABLET, FILM COATED ORAL at 20:01

## 2024-08-07 RX ADMIN — CARVEDILOL 12.5 MG: 12.5 TABLET, FILM COATED ORAL at 16:27

## 2024-08-07 RX ADMIN — AMLODIPINE BESYLATE 10 MG: 10 TABLET ORAL at 09:14

## 2024-08-07 RX ADMIN — LABETALOL HYDROCHLORIDE 20 MG: 5 INJECTION, SOLUTION INTRAVENOUS at 22:43

## 2024-08-07 RX ADMIN — SODIUM CHLORIDE, PRESERVATIVE FREE 10 ML: 5 INJECTION INTRAVENOUS at 09:15

## 2024-08-07 RX ADMIN — HEPARIN SODIUM 5000 UNITS: 5000 INJECTION INTRAVENOUS; SUBCUTANEOUS at 14:34

## 2024-08-07 RX ADMIN — OXYCODONE HYDROCHLORIDE AND ACETAMINOPHEN 1 TABLET: 5; 325 TABLET ORAL at 19:04

## 2024-08-07 ASSESSMENT — ENCOUNTER SYMPTOMS
GASTROINTESTINAL NEGATIVE: 1
RESPIRATORY NEGATIVE: 1
EYES NEGATIVE: 1
NAUSEA: 0
EYE ITCHING: 0
ALLERGIC/IMMUNOLOGIC NEGATIVE: 1
CHOKING: 0
VOMITING: 0
COLOR CHANGE: 1
ABDOMINAL PAIN: 0
COUGH: 0
COLOR CHANGE: 0
SHORTNESS OF BREATH: 1
CHEST TIGHTNESS: 0
WHEEZING: 0

## 2024-08-07 ASSESSMENT — PAIN DESCRIPTION - ORIENTATION
ORIENTATION: LEFT;RIGHT;MID;LOWER
ORIENTATION: MID;LOWER

## 2024-08-07 ASSESSMENT — PAIN DESCRIPTION - DESCRIPTORS
DESCRIPTORS: ACHING;DULL;NAGGING
DESCRIPTORS: ACHING

## 2024-08-07 ASSESSMENT — PAIN SCALES - GENERAL
PAINLEVEL_OUTOF10: 8
PAINLEVEL_OUTOF10: 7
PAINLEVEL_OUTOF10: 8
PAINLEVEL_OUTOF10: 4

## 2024-08-07 ASSESSMENT — PAIN DESCRIPTION - LOCATION
LOCATION: BACK
LOCATION: BACK;HIP

## 2024-08-07 NOTE — CONSULTS
x-ray: Showing some evidence of basilar atelectasis but no effusions or infiltrate    X-ray of the left foot showing evidence of cortical resorption at the distal first digit suspicious for osteomyelitis.    Assessment:  1. CAMILLE on top of CKD necessitating initiation of hemodialysis in June.  As of late although he has shown some improvement in his serum creatinine and urinary output, 24-hour urine collection as an outpatient showing creatinine clearance of around 14, urea clearance not available, dialysis frequency dropped to 2 days a week.  2.  History of coronary artery disease with history of aortic valve replacement and open heart surgery  3.  Essential hypertension  4.  Diabetes mellitus type 2  5.  Dyslipidemia  6.  Right below-knee amputation  7.  History of CKD with a baseline creatinine of around 1.8 or thereabouts prior to his episode of acute renal injury.  He has had near nephrotic proteinuria in the past as well.       Plan:  1.  I believe ID and podiatry have been consulted.  2.  Follow-up on BMP tomorrow  3.  Monitor urinary output closely as he has shown some clinical indications of recovery of renal function.  4.  Will review his labs tomorrow before sending him down for dialysis again..  5.  Antibiotics per ID, avoid vancomycin   6.  Daily weights, intake output charting follow-up on BMP daily.  7. Following   Thank you for the consultation.  Please do not hesitate to call with questions.    This note is created with the assistance of a speech-recognition program. While intending to generate a document that actually reflects the content of the visit, no guarantees can be provided that every mistake has been identified and corrected by editing    Electronically signed by Myles Núñez MD on 8/7/2024 at 9:28 AM

## 2024-08-07 NOTE — CARE COORDINATION
08/07/24 1525   Readmission Assessment   Number of Days since last admission? 8-30 days   Previous Disposition Home with Home Health   Who is being Interviewed Patient   What was the patient's/caregiver's perception as to why they think they needed to return back to the hospital? Other (Comment)  (increased symptoms-[facility] RN sent to ER)   Did you visit your Primary Care Physician after you left the hospital, before you returned this time? No   Why weren't you able to visit your PCP? Did not have an appointment   Did you see a specialist, such as Cardiac, Pulmonary, Orthopedic Physician, etc. after you left the hospital? No   Who advised the patient to return to the hospital? Home Health Staff  (C RN sent to ER)   Does the patient report anything that got in the way of taking their medications? No   In our efforts to provide the best possible care to you and others like you, can you think of anything that we could have done to help you after you left the hospital the first time, so that you might not have needed to return so soon? Other (Comment)  (Nothing)

## 2024-08-07 NOTE — ED NOTES
ED to inpatient nurses report      Chief Complaint:  Chief Complaint   Patient presents with    Shortness of Breath     Present to ED from: Home    MOA:     LOC: alert and orientated to name, place, date  Mobility: Independent  Oxygen Baseline: 95    Current needs required: none   Pending ED orders: Heparin  Present condition: good    Why did the patient come to the ED?     Pt presents to ED with c/o SOB. Pt states he has a Hx of fluid collection around lungs which resulted in chest tube. Pulm referred pt to ED. Pt denies injuries or chest pain. Pt is alert, oriented, and ambulatory.      What is the plan? Unclear, obs  Any procedures or intervention occur? none  Any safety concerns??    Mental Status:  Level of Consciousness: Alert (0)    Psych Assessment:   Psychosocial  Psychosocial (WDL): Within Defined Limits  Vital signs   Vitals:    08/06/24 1930 08/06/24 1945 08/06/24 2030 08/06/24 2115   BP: (!) 164/86 (!) 171/93 (!) 146/125 (!) 153/92   Pulse: 77 77 78 78   Resp: 20 25 19 19   Temp:       SpO2: 97% 96% 94% 95%   Weight:            Vitals:  Patient Vitals for the past 24 hrs:   BP Temp Pulse Resp SpO2 Weight   08/06/24 2115 (!) 153/92 -- 78 19 95 % --   08/06/24 2030 (!) 146/125 -- 78 19 94 % --   08/06/24 1945 (!) 171/93 -- 77 25 96 % --   08/06/24 1930 (!) 164/86 -- 77 20 97 % --   08/06/24 1825 (!) 146/85 97 °F (36.1 °C) 82 19 98 % 125 kg (275 lb 9.2 oz)      Visit Vitals  BP (!) 153/92   Pulse 78   Temp 97 °F (36.1 °C)   Resp 19   Wt 125 kg (275 lb 9.2 oz)   SpO2 95%   BMI 37.37 kg/m²        LDAs:   Peripheral IV 08/06/24 Left Wrist (Active)   Site Assessment Clean, dry & intact 08/06/24 2047       Ambulatory Status:  Presents to emergency department  because of falls (Syncope, seizure, or loss of consciousness): No, Age > 70: No, Altered Mental Status, Intoxication with alcohol or substance confusion (Disorientation, impaired judgment, poor safety awaremess, or inability to follow instructions): No,

## 2024-08-07 NOTE — H&P
8/6/2024 Yes    Hx of BKA, left (HCC) 8/6/2024 Yes    GERD (gastroesophageal reflux disease) 8/6/2024 Yes    COLT (obstructive sleep apnea) 8/6/2024 Yes    Heart failure, diastolic, with acute decompensation ([facility]) 8/6/2024 Yes    PVD (peripheral vascular disease) ([facility]) 8/6/2024 Yes    HFrEF (heart failure with reduced ejection fraction) ([facility]) 8/6/2024 Yes       Plan:     Patient status inpatient in the Progressive Unit/Step down    Osteomyelitis - Noted on 1st digit of left foot x-ray. Podiatry and ID consulted. Patient was ordered Levaquin in ED. Patient is currently refusing antibiotics. He states he wants to speak with nephrologist prior to antibiotic administration. Explained all risks, patient remained adamant about no antibiotics. Multiple attempts made to discuss further with patient but he refused.     Type 2 DM - Continue home insulin regimen with glucose checks and hypoglycemia protocol.    Hypertension - Continue Norvasc and Coreg    CAD s/p CABG - Continue ASA, Lipitor    ESRD on HD - Secondary to CAMILLE without recovery. Currently on TTS schedule, nephrology consulted.    Plan discussed with patient at bedside. Patient agitated but agreeable to admission. Likely discharge within the next few days.    Consultations:   IP CONSULT TO HOSPITALIST  IP CONSULT TO PODIATRY  IP CONSULT TO INFECTIOUS DISEASES    Patient is admitted as inpatient status because of co-morbidities listed above, severity of signs and symptoms as outlined, requirement for current medical therapies and most importantly because of direct risk to patient if care not provided in a hospital setting.  Expected length of stay > 48 hours.    HERRERA Gonzalez  8/6/2024  9:36 PM    Copy sent to Shaikh Nieves MD

## 2024-08-07 NOTE — ED PROVIDER NOTES
[facility]     Emergency Department     Faculty Attestation    I performed a history and physical examination of the patient and discussed management with the resident. I reviewed the resident´s note and agree with the documented findings and plan of care. Any areas of disagreement are noted on the chart. I was personally present for the key portions of any procedures. I have documented in the chart those procedures where I was not present during the key portions. I have reviewed the emergency nurses triage note. I agree with the chief complaint, past medical history, past surgical history, allergies, medications, social and family history as documented unless otherwise noted below. For Physician Assistant/ Nurse Practitioner cases/documentation I have personally evaluated this patient and have completed at least one if not all key elements of the E/M (history, physical exam, and MDM). Additional findings are as noted.    Decreased breath sounds at the bases, heart regular rate and rhythm, right below the knee amputation, left pretibial pitting,.  Pulse ox is 96% on room air, patient is not on home O2.     EKG Interpretation    Interpreted by emergency department physician    Rhythm: normal sinus   Rate: normal/77  Axis: Left -71 degrees  Ectopy: none  Conduction: Right bundle branch block, left anterior hemiblock  ST Segments: no acute change  T Waves: no acute change  Q Waves: Inferior    Clinical Impression: Abnormal EKG without significant changes from EKG done June 17, 2024.    Horacio Marie, III     Horacio Marie MD  08/06/24 2008

## 2024-08-07 NOTE — CONSULTS
Allergic/Immunologic: Negative for immunocompromised state.   Neurological:  Negative for dizziness and weakness.   Hematological:  Negative for adenopathy.   Psychiatric/Behavioral:  Negative for confusion.        Physical Examination :       Physical Exam  Constitutional:       General: He is not in acute distress.     Appearance: Normal appearance.   HENT:      Head: Normocephalic and atraumatic.      Nose: Nose normal. No congestion.      Mouth/Throat:      Mouth: Mucous membranes are moist.   Eyes:      General: No scleral icterus.     Conjunctiva/sclera: Conjunctivae normal.   Cardiovascular:      Rate and Rhythm: Regular rhythm.      Heart sounds: Normal heart sounds. No murmur heard.  Pulmonary:      Breath sounds: No stridor. No wheezing or rhonchi.      Comments: Reduced air entry RLZ   Abdominal:      General: There is no distension.      Palpations: There is no mass.      Tenderness: There is no abdominal tenderness.   Musculoskeletal:         General: No swelling or tenderness.      Cervical back: Neck supple. No rigidity.      Left lower leg: Edema (Denies increase in edema) present.      Comments: Has an open wound on the big toe of left toe - dry and no inflammation on the surrounding skin.   Has a small healing ulcer over the Achilles tendon area of the left foot    Skin:     Coloration: Skin is not jaundiced or pale.   Neurological:      Mental Status: He is alert and oriented to person, place, and time.      Cranial Nerves: No cranial nerve deficit.   Psychiatric:         Mood and Affect: Mood normal.         Past Medical History:     Past Medical History:   Diagnosis Date    Diabetes mellitus (HCC)     Hernia, abdominal        Past Surgical  History:     Past Surgical History:   Procedure Laterality Date    CABG WITH AORTIC VALVE REPLACEMENT N/A 02/26/2024    Epi-Cardial leads present. No MRI Per Dr. Cm 6/18/24 [facility]- CABG CORONARY ARTERY BYPASS X3, STERNAL PLATING, AORTIC VALVE REPLACEMENT  sound a like substitutions which may escape proof reading.  It such instances, actual meaningcan be extrapolated by contextual diversion.    Dagoberto Moore MD  Office: [phone]  Perfect serve / office [phone]         I have discussed the care of the patient, including pertinent history and exam findings,  with the resident., student or CNP- I have seen and examined the patient and the key elements of all parts of the encounter have been performed by me.  I have decided the assessment, plan and orders as documented by the resident.student or CNP    Ami Marylin, Infectious Diseases

## 2024-08-07 NOTE — CONSULTS
Consult Note  Foot and Ankle Surgery    Subjective     Chief Complaint: Left great toe, heel wound    HPI:  Vincenzo Darden is a 58 y.o. male seen on the floor at [facility].  Patient was admitted on 8/6/2024 for shortness of breath as well as a wound noted to the left great toe and heel.  Patient resides near [facility] and follows Dr. Richmond regularly for his podiatry concerns.  Of note patient does have a right below-knee amputation and multiple toe amputations on the left.  The wound has been present for quite some time and has had local wound care weekly.  Patient also has CKD and is on dialysis.  X-rays are concerning for osteomyelitis of the distal phalanx of left great toe.  Patient is aware of this and does not want surgical intervention at this time.  He does his own dressing changes including Betadine and Santyl.  No further complaints of his left foot at this time.    PCP is Shaikh Calix MD    ROS:   Review of Systems   Constitutional: Negative.    HENT: Negative.     Eyes: Negative.    Respiratory:  Positive for shortness of breath.    Cardiovascular:  Positive for leg swelling.   Gastrointestinal: Negative.    Endocrine: Negative.    Genitourinary: Negative.    Musculoskeletal:  Positive for gait problem and joint swelling.   Skin:  Positive for color change and wound.   Allergic/Immunologic: Negative.    Neurological:  Positive for numbness.   Hematological: Negative.    Psychiatric/Behavioral: Negative.         Past Medical History   has a past medical history of Diabetes mellitus (HCC) and Hernia, abdominal.    Past Surgical History   has a past surgical history that includes Cardiac procedure (N/A, 10/09/2023); Cardiac procedure (N/A, 10/09/2023); Coronary artery bypass graft (N/A, 02/26/2024); IR NONTUNNELED VASCULAR CATHETER > 5 YEARS (6/18/2024); IR TUNNELED CVC PLACE WO SQ PORT/PUMP > 5 YEARS (6/20/2024); and IR CHEST TUBE INSERTION (7/1/2024).    Medications  Prior to Admission  erosions of the distal phalanx of the left great toe.  Consistent with osteomyelitis  Patient started on antibiotic therapy:  No antibiotics per infectious disease  Antibiotics per infectious disease  No leukocytosis  No mechanical debridement   Plan: Patient would prefer to continue local wound care.  There is no immediate signs of infection.  Is likely chronic osteomyelitic changes to the distal tuft of the great toe due to longstanding local wound.  Patient prefers follow-up with his podiatrist Dr. Yi navarro [place].  We respect his wishes.  Patient can continue his own dressing changes including Betadine wet-to-dry on the toe and Santyl on the heel.  WBAT  Dressings per above  Podiatry will sign off at this time  Will discuss with Dr. Son Browne DPM   Foot and Ankle Surgery  8/7/2024 at 1:59 PM

## 2024-08-07 NOTE — CARE COORDINATION
Case Management Assessment  Initial Evaluation    Date/Time of Evaluation: 8/7/2024 3:25 PM  Assessment Completed by: Beth Whitman RN    If patient is discharged prior to next notation, then this note serves as note for discharge by case management.    Patient Name: Vincenzo Darden                   YOB: 1965  Diagnosis: Osteomyelitis (HCC) [M86.9]  Other acute osteomyelitis of left foot (HCC) [M86.172]                   Date / Time: 8/6/2024  7:08 PM    Patient Admission Status: Inpatient   Readmission Risk (Low < 19, Mod (19-27), High > 27): Readmission Risk Score: 27.8    Current PCP: Shaikh Calix MD  PCP verified by CM? Yes    Chart Reviewed: Yes      History Provided by: Patient  Patient Orientation: Alert and Oriented, Person, Place, Situation, Self    Patient Cognition: Alert    Hospitalization in the last 30 days (Readmission):  Yes    If yes, Readmission Assessment in  Navigator will be completed.    Advance Directives:      Code Status: Full Code   Patient's Primary Decision Maker is: Legal Next of Kin    Primary Decision Maker: Ashtyn Forde - Spouse - [phone]    Discharge Planning:    Patient lives with: (P) Spouse/Significant Other Type of Home: (P) House  Primary Care Giver: Self  Patient Support Systems include: Spouse/Significant Other, Home Care Staff   Current Financial resources: Medicare  Current community resources: Other (Comment) (Dialysis [facility] (recently dropped Sat))  Current services prior to admission: (P) Durable Medical Equipment, Home Care            Current DME: (P) Other (Comment) ([employer])            Type of Home Care services:  (P) OT, PT, Nursing Services    ADLS  Prior functional level: Independent in ADLs/IADLs  Current functional level: Independent in ADLs/IADLs    PT AM-PAC:   /24  OT AM-PAC:   /24    Family can provide assistance at DC: Yes  Would you like Case Management to discuss the discharge plan with any other  family members/significant others, and if so, who? No  Plans to Return to Present Housing: Yes  Other Identified Issues/Barriers to RETURNING to current housing: none  Potential Assistance needed at discharge: (P) Home Care            Potential DME:  none  Patient expects to discharge to: (P) House  Plan for transportation at discharge: (P) Family    Financial    Payor: [employer] MEDICARE / Plan: [employer] CHOICE-PPO MEDICARE / Product Type: *No Product type* /     Does insurance require precert for SNF: Yes    Potential assistance Purchasing Medications: (P) No  Meds-to-Beds request: Yes      [id number] - [address] [phone] - F [phone]  [address] [place] [id number]  Phone: [phone] Fax: [phone]    [id number] - [facility] [address] [facility] [phone] - F [phone]  [address] [place] [id number]  Phone: [phone] Fax: [phone]      Notes:    Factors facilitating achievement of predicted outcomes: Family support, Caregiver support, Friend support, Cooperative, and Pleasant    Barriers to discharge: Pain, Decreased endurance, Lower extremity weakness, Medical complications, and Wound Care    Additional Case Management Notes: Transitional planning: Plan is home with [facility]. Referral sent. Family will transport. Address, emergency contacts, PCP and insurance confirmed with patient. Patient denies any needs.     Case Management Services Information Letter Provided [x]    The Plan for Transition of Care is related to the following treatment goals of Osteomyelitis (HCC) [M86.9]  Other acute osteomyelitis of left foot (HCC) [M86.172]    IF APPLICABLE: The Patient and/or patient representative Vincenzo and his family were provided with a choice of provider and agrees with the discharge plan. Freedom of choice list with basic dialogue that supports the patient's individualized plan of care/goals and shares

## 2024-08-07 NOTE — PROGRESS NOTES
Effort: No respiratory distress.      Breath sounds: No wheezing or rales.   Abdominal:      General: There is no distension.      Tenderness: There is no abdominal tenderness. There is no rebound.   Musculoskeletal:         General: No deformity.      Cervical back: No rigidity or tenderness.      Right lower leg: No edema.      Left lower leg: No edema.   Lymphadenopathy:      Cervical: No cervical adenopathy.   Skin:     Coloration: Skin is not jaundiced or pale.      Findings: No lesion or rash.   Neurological:      General: No focal deficit present.      Mental Status: He is alert and oriented to person, place, and time.      Sensory: No sensory deficit.      Motor: No weakness.   Psychiatric:         Mood and Affect: Mood normal.        Media Information    Document Information    Wound Care Image: Wound      08/07/2024 13:11   Attached To:   Hospital Encounter on 8/6/24   Source Information    Gideon Browne, RAYMOND  [facility]       Assessment:        Hospital Problems             Last Modified POA    * (Principal) Osteomyelitis ([facility]) 8/6/2024 Yes    Type 2 diabetes mellitus with left diabetic foot infection ([facility]) 8/7/2024 Yes    Hypertension, essential 8/6/2024 Yes    Hx of BKA, left ([facility]) 8/6/2024 Yes    GERD (gastroesophageal reflux disease) 8/6/2024 Yes    COLT (obstructive sleep apnea) 8/6/2024 Yes    Heart failure, diastolic, with acute decompensation ([facility]) 8/6/2024 Yes    PVD (peripheral vascular disease) ([facility]) 8/6/2024 Yes    HFrEF (heart failure with reduced ejection fraction) ([facility]) 8/6/2024 Yes       Plan:        Concerning for osteomyelitis on left big toe, patient has a history of left hallux diabetic osteomyelitis which was treated before, x-ray concerning for big toe left foot osteomyelitis, infectious disease and podiatry on board, no need for antibiotic per ID, appreciate input,Will follow with podiatry  CAMILLE on chronic kidney disease, and patient was started on dialysis in June,  nephrology is following, continue to avoid toxic agents monitor BMP, dialysis per nephrology  Hypertension continue amlodipine  Peripheral vascular disease continue aspirin and statin  Diabetes continue with insulin sliding scale, Lantus, glucose check and diabetic diet  Dyslipidemia continue statin  Coronary artery disease status post CABG continue rosuvastatin  Hypokalemia will replace and monitor  DVT prophylaxis  Lisset Mccracken MD  8/7/2024  1:21 PM

## 2024-08-08 VITALS
TEMPERATURE: 97.6 F | DIASTOLIC BLOOD PRESSURE: 83 MMHG | RESPIRATION RATE: 18 BRPM | HEIGHT: 72 IN | BODY MASS INDEX: 37.18 KG/M2 | WEIGHT: 274.47 LBS | OXYGEN SATURATION: 96 % | HEART RATE: 74 BPM | SYSTOLIC BLOOD PRESSURE: 135 MMHG

## 2024-08-08 LAB
ANION GAP SERPL CALCULATED.3IONS-SCNC: 12 MMOL/L (ref 9–16)
BUN SERPL-MCNC: 44 MG/DL (ref 6–20)
CALCIUM SERPL-MCNC: 8.3 MG/DL (ref 8.6–10.4)
CHLORIDE SERPL-SCNC: 107 MMOL/L (ref 98–107)
CO2 SERPL-SCNC: 20 MMOL/L (ref 20–31)
CREAT SERPL-MCNC: 4.1 MG/DL (ref 0.7–1.2)
GFR, ESTIMATED: 16 ML/MIN/1.73M2
GLUCOSE BLD-MCNC: 144 MG/DL (ref 75–110)
GLUCOSE SERPL-MCNC: 157 MG/DL (ref 74–99)
HBV SURFACE AG SERPL QL IA: NONREACTIVE
MICROORGANISM SPEC CULT: ABNORMAL
POTASSIUM SERPL-SCNC: 3.4 MMOL/L (ref 3.7–5.3)
SERVICE CMNT-IMP: ABNORMAL
SODIUM SERPL-SCNC: 139 MMOL/L (ref 136–145)
SPECIMEN DESCRIPTION: ABNORMAL

## 2024-08-08 PROCEDURE — 90935 HEMODIALYSIS ONE EVALUATION: CPT

## 2024-08-08 PROCEDURE — 2580000003 HC RX 258: Performed by: NURSE PRACTITIONER

## 2024-08-08 PROCEDURE — 80048 BASIC METABOLIC PNL TOTAL CA: CPT

## 2024-08-08 PROCEDURE — 99239 HOSP IP/OBS DSCHRG MGMT >30: CPT | Performed by: STUDENT IN AN ORGANIZED HEALTH CARE EDUCATION/TRAINING PROGRAM

## 2024-08-08 PROCEDURE — 6370000000 HC RX 637 (ALT 250 FOR IP): Performed by: NURSE PRACTITIONER

## 2024-08-08 PROCEDURE — 6360000002 HC RX W HCPCS: Performed by: INTERNAL MEDICINE

## 2024-08-08 PROCEDURE — 5A1D70Z PERFORMANCE OF URINARY FILTRATION, INTERMITTENT, LESS THAN 6 HOURS PER DAY: ICD-10-PCS | Performed by: INTERNAL MEDICINE

## 2024-08-08 PROCEDURE — 6370000000 HC RX 637 (ALT 250 FOR IP): Performed by: STUDENT IN AN ORGANIZED HEALTH CARE EDUCATION/TRAINING PROGRAM

## 2024-08-08 PROCEDURE — 82947 ASSAY GLUCOSE BLOOD QUANT: CPT

## 2024-08-08 PROCEDURE — 87340 HEPATITIS B SURFACE AG IA: CPT

## 2024-08-08 PROCEDURE — 36415 COLL VENOUS BLD VENIPUNCTURE: CPT

## 2024-08-08 PROCEDURE — 99232 SBSQ HOSP IP/OBS MODERATE 35: CPT | Performed by: INTERNAL MEDICINE

## 2024-08-08 PROCEDURE — 6360000002 HC RX W HCPCS: Performed by: PHYSICIAN ASSISTANT

## 2024-08-08 RX ORDER — POTASSIUM CHLORIDE 20 MEQ/1
20 TABLET, EXTENDED RELEASE ORAL ONCE
Status: DISCONTINUED | OUTPATIENT
Start: 2024-08-08 | End: 2024-08-08

## 2024-08-08 RX ORDER — GABAPENTIN 100 MG/1
100 CAPSULE ORAL EVERY 8 HOURS SCHEDULED
Qty: 90 CAPSULE | Refills: 0 | Status: SHIPPED | OUTPATIENT
Start: 2024-08-08 | End: 2024-09-07

## 2024-08-08 RX ORDER — OXYCODONE HYDROCHLORIDE AND ACETAMINOPHEN 5; 325 MG/1; MG/1
1 TABLET ORAL EVERY 6 HOURS PRN
Qty: 12 TABLET | Refills: 0 | Status: SHIPPED | OUTPATIENT
Start: 2024-08-08 | End: 2024-08-11

## 2024-08-08 RX ORDER — HEPARIN SODIUM 1000 [USP'U]/ML
1900 INJECTION, SOLUTION INTRAVENOUS; SUBCUTANEOUS PRN
Status: DISCONTINUED | OUTPATIENT
Start: 2024-08-08 | End: 2024-08-08 | Stop reason: HOSPADM

## 2024-08-08 RX ADMIN — POTASSIUM CHLORIDE 40 MEQ: 1500 TABLET, EXTENDED RELEASE ORAL at 08:15

## 2024-08-08 RX ADMIN — TAMSULOSIN HYDROCHLORIDE 0.4 MG: 0.4 CAPSULE ORAL at 08:15

## 2024-08-08 RX ADMIN — HEPARIN SODIUM 1900 UNITS: 1000 INJECTION INTRAVENOUS; SUBCUTANEOUS at 13:31

## 2024-08-08 RX ADMIN — CARVEDILOL 12.5 MG: 12.5 TABLET, FILM COATED ORAL at 08:15

## 2024-08-08 RX ADMIN — ASPIRIN 81 MG 81 MG: 81 TABLET ORAL at 08:15

## 2024-08-08 RX ADMIN — HEPARIN SODIUM 5000 UNITS: 5000 INJECTION INTRAVENOUS; SUBCUTANEOUS at 06:28

## 2024-08-08 RX ADMIN — OXYCODONE HYDROCHLORIDE AND ACETAMINOPHEN 1 TABLET: 5; 325 TABLET ORAL at 01:40

## 2024-08-08 RX ADMIN — HEPARIN SODIUM 1900 UNITS: 1000 INJECTION INTRAVENOUS; SUBCUTANEOUS at 13:30

## 2024-08-08 RX ADMIN — AMLODIPINE BESYLATE 10 MG: 10 TABLET ORAL at 08:16

## 2024-08-08 RX ADMIN — OXYCODONE HYDROCHLORIDE AND ACETAMINOPHEN 1 TABLET: 5; 325 TABLET ORAL at 08:15

## 2024-08-08 RX ADMIN — SODIUM CHLORIDE, PRESERVATIVE FREE 10 ML: 5 INJECTION INTRAVENOUS at 08:17

## 2024-08-08 ASSESSMENT — PAIN SCALES - GENERAL
PAINLEVEL_OUTOF10: 2
PAINLEVEL_OUTOF10: 8
PAINLEVEL_OUTOF10: 9
PAINLEVEL_OUTOF10: 6
PAINLEVEL_OUTOF10: 0

## 2024-08-08 ASSESSMENT — ENCOUNTER SYMPTOMS
COLOR CHANGE: 0
ABDOMINAL PAIN: 0
EYE ITCHING: 0
CHEST TIGHTNESS: 0
COUGH: 0

## 2024-08-08 ASSESSMENT — PAIN DESCRIPTION - LOCATION
LOCATION: BACK;HIP
LOCATION: HIP;BACK
LOCATION: HIP;BACK

## 2024-08-08 ASSESSMENT — PAIN DESCRIPTION - PAIN TYPE: TYPE: CHRONIC PAIN

## 2024-08-08 ASSESSMENT — PAIN DESCRIPTION - ORIENTATION
ORIENTATION: RIGHT;LEFT;LOWER
ORIENTATION: RIGHT;LEFT;LOWER

## 2024-08-08 ASSESSMENT — PAIN - FUNCTIONAL ASSESSMENT: PAIN_FUNCTIONAL_ASSESSMENT: ACTIVITIES ARE NOT PREVENTED

## 2024-08-08 ASSESSMENT — PAIN DESCRIPTION - DESCRIPTORS
DESCRIPTORS: ACHING
DESCRIPTORS: ACHING;DISCOMFORT

## 2024-08-08 ASSESSMENT — PAIN DESCRIPTION - ONSET: ONSET: GRADUAL

## 2024-08-08 ASSESSMENT — PAIN DESCRIPTION - FREQUENCY: FREQUENCY: INTERMITTENT

## 2024-08-08 NOTE — DISCHARGE SUMMARY
[facility]  Office: [phone]  Alexi Draper DO, Sam Wharton DO, Matthew Kirkpatrick DO, Shane Dennis DO, Lisbeth Owusu MD, Nurys Crisostomo MD, Jena Xie MD, Susan Danielle MD,  Shubham Friedman MD, Mireya Nugent MD, Aleida Rincon MD,  Aliza Saavedra DO, Hector Romero MD, Moncho Gilbert MD, Marlon Draper DO, Liliana Painting MD,  Soy Mccullough DO, Amparo Kong MD, Shila Arana MD, Kristy Wei MD, Kelly Vogel MD,  Morris Sheets MD, Chapincito Rebolledo MD, Andrea Fabian MD, Lisset Mccracken MD, René Holland MD, Nishi Ojeda MD, Fer Hernandez DO, Tejas Paulino DO, Luis M Reardon MD,  Bruce Greenfield MD, Shirley Waterhouse, CNP,  Mariela Gomez CNP, Andrei Polanco, CNP,  Nan Ray, DNP, Liss Lu, CNP, Olya Kaba, CNP, Alivia Melchor CNP, An Rosenbaum, CNP, Deena Carmona, PA-C, Corinna Mcdaniel PA-C, Kiera Holland, CNP, Deja Patterson, CNP, Christa Ojeda, CNP, Maddi Sun, CNP, Amanda Gallardo, CNS, Yamilex Latif, CNP, Zoe Conner CNP, Tracy Schwab, CNP         [facility]   IN-PATIENT SERVICE   [facility]    Discharge Summary     Patient ID: Vincenzo Darden  :  1965   MRN: [id number]     ACCOUNT:  [id number]   Patient's PCP: Shaikh Calix MD  Admit Date: 2024   Discharge Date: 2024    Length of Stay: 2  Code Status:  Full Code  Admitting Physician: Lisset Mccracken MD  Discharge Physician: Lisset Mccracken MD     Active Discharge Diagnoses:     Hospital Problem Lists:  Principal Problem:    Osteomyelitis (HCC)  Active Problems:    Type 2 diabetes mellitus with left diabetic foot infection (HCC)    Hypertension, essential    Hx of BKA, left (HCC)    GERD (gastroesophageal reflux disease)    COLT (obstructive sleep apnea)    Heart failure, diastolic, with acute decompensation (HCC)    PVD (peripheral vascular disease) (HCC)    HFrEF (heart failure with reduced ejection fraction) (HCC)  Resolved  Problems:    * No resolved hospital problems. *      Admission Condition:  fair     Discharged Condition: good    Hospital Stay:     Hospital Course:   58-year-old male with past medical history of end-stage renal disease on dialysis history of peripheral vascular disease, nonhealing left foot wound, coronary artery disease presented to emergency department with difficulty breathing and left foot wound, upon arrival to emergency department patient labs remarkable for hypokalemia, chest x-ray negative, left foot x-ray concerning for first digit osteomyelitis, podiatry and ID and nephrology were consulted, during hospital course patient evaluated by infectious disease team and podiatry and decision made that patient does not need antibiotic for his wound and his wound most likely chronic and patient preferred to follow-up with his podiatrist as an outpatient for further intervention, patient received potassium replacement and he continues to receive dialysis per scheduled, his symptom improved, cleared from nephrology infectious and podiatric team to be discharged and outpatient follow-up  He was seen and examined today in dialysis unit, at time of my evaluation he reports a chronic back pain requested refill for pain management and gabapentin denies any other complaint and he will be discharged today in stable condition after dialysis      Significant therapeutic interventions: as above     Significant Diagnostic Studies:   Labs / Micro:  CBC:   Lab Results   Component Value Date/Time    WBC 8.2 08/07/2024 08:12 AM    RBC 3.16 08/07/2024 08:12 AM    HGB 9.5 08/07/2024 08:12 AM    HCT 29.8 08/07/2024 08:12 AM    MCV 94.3 08/07/2024 08:12 AM    MCH 30.1 08/07/2024 08:12 AM    MCHC 31.9 08/07/2024 08:12 AM    RDW 16.8 08/07/2024 08:12 AM     08/07/2024 08:12 AM     BMP:    Lab Results   Component Value Date/Time    GLUCOSE 157 08/08/2024 07:19 AM     08/08/2024 07:19 AM    K 3.4 08/08/2024 07:19 AM    CL

## 2024-08-08 NOTE — DISCHARGE INSTR - COC
Status Clean;Dry;Intact 08/08/24 0800   Wound Cleansed Cleansed with saline;Betadine/povidone iodine 08/07/24 0827   Dressing/Treatment Ace wrap;Gauze dressing/dressing sponge 08/08/24 0800   Offloading for Diabetic Foot Ulcers Offloading not ordered 08/08/24 0800   Dressing Change Due 07/10/24 07/10/24 1600   Wound Length (cm) 1.5 cm 06/26/24 1205   Wound Width (cm) 4.5 cm 06/26/24 1205   Wound Depth (cm) 1 cm 06/26/24 1205   Wound Surface Area (cm^2) 6.75 cm^2 06/26/24 1205   Wound Volume (cm^3) 6.75 cm^3 06/26/24 1205   Wound Assessment Other (Comment) 07/10/24 1600   Drainage Amount None (dry) 07/10/24 1600   Drainage Description Serosanguinous 07/01/24 1715   Odor None 07/10/24 1600   Nicole-wound Assessment Other (Comment) 07/10/24 1600   Number of days: 43       Wound 06/26/24 Foot Left;Anterior (Active)   Wound Image   07/01/24 1715   Wound Etiology Traumatic 07/10/24 1600   Dressing Status Clean;Dry;Intact 08/08/24 0800   Wound Cleansed Cleansed with saline;Betadine/povidone iodine 08/07/24 0827   Dressing/Treatment ABD;Gauze dressing/dressing sponge 08/08/24 0800   Dressing Change Due 07/10/24 07/10/24 1600   Wound Length (cm) 1.4 cm 06/26/24 1205   Wound Width (cm) 4.7 cm 06/26/24 1205   Wound Depth (cm) 0.1 cm 06/26/24 1205   Wound Surface Area (cm^2) 6.58 cm^2 06/26/24 1205   Wound Volume (cm^3) 0.658 cm^3 06/26/24 1205   Wound Assessment Eschar dry 07/04/24 1806   Drainage Amount None (dry) 07/10/24 1600   Odor None 07/10/24 1600   Nicole-wound Assessment Other (Comment) 07/10/24 1600   Number of days: 43        Elimination:  Continence:   Bowel: {YES / NO:19727}  Bladder: {YES / NO:19727}  Urinary Catheter: {Urinary Catheter:407495316}   Colostomy/Ileostomy/Ileal Conduit: {YES / NO:19727}       Date of Last BM: ***    Intake/Output Summary (Last 24 hours) at 8/8/2024 1005  Last data filed at 8/8/2024 0533  Gross per 24 hour   Intake --   Output 1100 ml   Net -1100 ml     I/O last 3 completed shifts:  In:  -   Out: 1350 [Urine:1350]    Safety Concerns:     { PETER Safety Concerns:330466138}    Impairments/Disabilities:      { PETER Impairments/Disabilities:930041551}    Nutrition Therapy:  Current Nutrition Therapy:   { PETER Diet List:493599838}    Routes of Feeding: {CHP DME Other Feedings:175931726}  Liquids: {Slp liquid thickness:95493}  Daily Fluid Restriction: {CH DME Yes amt example:267991682}  Last Modified Barium Swallow with Video (Video Swallowing Test): {Done Not Done Date:}    Treatments at the Time of Hospital Discharge:   Respiratory Treatments: ***  Oxygen Therapy:  {Therapy; copd oxygen:74509}  Ventilator:    {[facility] Vent List:618081530}    Rehab Therapies: {THERAPEUTIC INTERVENTION:9737197258}  Weight Bearing Status/Restrictions: {[facility] Weight Bearin}  Other Medical Equipment (for information only, NOT a DME order):  {EQUIPMENT:452699630}  Other Treatments: ***    Patient's personal belongings (please select all that are sent with patient):  {[facility] DME Belongings:411989131}    RN SIGNATURE:  {Esignature:606781943}    CASE MANAGEMENT/SOCIAL WORK SECTION    Inpatient Status Date: 2024    Readmission Risk Assessment Score:  Readmission Risk              Risk of Unplanned Readmission:  32           Discharging to Facility/ Agency   Name: [facility]  Address:  Phone:  Fax:    Dialysis Facility (if applicable)   Name:  Address:  Dialysis Schedule:  Phone:  Fax:    / signature: Electronically signed by Beth Whitman RN on 24 at 10:05 AM EDT    PHYSICIAN SECTION    Prognosis: Good    Condition at Discharge: Stable    Rehab Potential (if transferring to Rehab): Good    Recommended Labs or Other Treatments After Discharge: BMP,CBC    Physician Certification: I certify the above information and transfer of Vincenzo Darden  is necessary for the continuing treatment of the diagnosis listed and that he requires Home Care for greater 30 days.     Update Admission

## 2024-08-08 NOTE — CARE COORDINATION
Transitional planning: Phone call to Kiki with [facility] to confirm patient current with them and they can accept back. Informed her that patient will be discharging after dialysis today.    1005 PS message to Dr. Mccracken requesting HHC order and PETER completion.      Discharge Report    [facility]  Clinical Case Management Department  Written by: Beth Whitman RN    Patient Name: Vincenzo DICKEY Darden  Attending Provider: Lisset Mccracken*  Admit Date: 2024  7:08 PM  MRN: [id number]  Account: [id number]                     : 1965  Discharge Date: 2024      Disposition: home    Beth Whitman RN

## 2024-08-08 NOTE — PROGRESS NOTES
Temp: 97 °F (36.1 °C)  TMax:   Temp (24hrs), Av.5 °F (36.4 °C), Min:97 °F (36.1 °C), Max:97.8 °F (36.6 °C)    Respirations:  Respirations: 19  Pulse:   Pulse: 69  BP:    BP: (!) 166/95  BP Range: Systolic (24hrs), Av , Min:152 , Max:184       Diastolic (24hrs), Av, Min:76, Max:97      Physical Examination:     General:  AAO x 3, speaking in full sentences, no accessory muscle use.  Chest:   Bilateral vesicular breath sounds, no rales or wheezes.  Cardiac:  S1 S2 RR, no murmurs, gallops or rubs.  Abdomen: Soft, non-tender, no masses or organomegaly, BS audible.  :   No suprapubic or flank tenderness.  Neuro:  AAO x 3, No FND.  SKIN:  No rashes, good skin turgor.  Extremities:   Right BKA, overall left lower extremity edema seen and improved, open wound on big toe.    Labs:       Recent Labs     24  0812   WBC 10.0 8.2   RBC 3.06* 3.16*   HGB 9.0* 9.5*   HCT 27.3* 29.8*   MCV 89.2 94.3   MCH 29.4 30.1   MCHC 33.0 31.9   RDW 16.8* 16.8*    237   MPV 8.7 9.2      BMP:   Recent Labs     24  0812 24  0719    142 139   K 3.0* 3.0* 3.4*    109* 107   CO2 21 19* 20   BUN 34* 35* 44*   CREATININE 3.1* 3.4* 4.1*   GLUCOSE 228* 81 157*   CALCIUM 7.8* 7.8* 8.3*        Magnesium:    Recent Labs     24  0812   MG 2.0     MARIA DEL CARMEN:      Lab Results   Component Value Date/Time    MARIA DEL CARMEN NEGATIVE 2024 09:29 AM     SPEP:  Lab Results   Component Value Date/Time    ALBCAL 1.7 2024 05:50 PM    ALBPCT 28 2024 05:50 PM    A1PCT 9 2024 05:50 PM    A2PCT 14 2024 05:50 PM    BETAPCT 13 2024 05:50 PM    GAMGLOB 2.2 2024 05:50 PM    GGPCT 36 2024 05:50 PM    PATH ELECTRONICALLY SIGNED. GABBIE CHAVARRIA M.D. 2024 05:50 PM       C3:     Lab Results   Component Value Date/Time    C3 35 2024 09:29 AM     C4:     Lab Results   Component Value Date/Time    C4 24 2024 09:29 AM     MPO ANCA:     Lab Results    Component Value Date/Time    MPO <0.3 06/17/2024 05:50 PM     PR3 ANCA:     Lab Results   Component Value Date/Time    PR3 <0.7 06/17/2024 05:50 PM       Hep BsAg:         Lab Results   Component Value Date/Time    HEPBSAG NONREACTIVE 06/18/2024 09:29 AM     Hep C AB:          Lab Results   Component Value Date/Time    HEPCAB NONREACTIVE 06/18/2024 09:29 AM         Radiology:     Reviewed.  Showing some evidence of basilar atelectasis but no effusions or infiltrate     X-ray of the left foot showing evidence of cortical resorption at the distal first digit suspicious for osteomyelitis    Assessment:     CAMILLE on top of CKD necessitating initiation of hemodialysis in June.  As of late although he has shown some improvement in his serum creatinine and urinary output, 24-hour urine collection as an outpatient showing creatinine clearance of around 14, urea clearance not available, dialysis frequency dropped to 2 days a week.  2.  History of coronary artery disease with history of aortic valve replacement and open heart surgery  3.  Essential hypertension  4.  Diabetes mellitus type 2  5.  Dyslipidemia  6.  Right below-knee amputation  7.  History of CKD with a baseline creatinine of around 1.8 or thereabouts prior to his episode of acute renal injury.  He has had near nephrotic proteinuria in the past as well.    Plan:   Patient gets dialyzed twice in a week last dialysis was Tuesday.  Labs reviewed creatinine stable but slightly trending up plan for dialysis today.  Daily weights, monitor I's and O's  Renal diet  Replace electrolytes to keep potassium above 4 and magnesium above 2   patient is otherwise stable to be discharged from nephrology standpoint  Medical management as per ID and primary team  Please do not hesitate to call with questions    Nutrition   Please ensure that patient is on a renal diet/TF. Avoid nephrotoxic drugs/contrast exposure.      Janny Saenz MD  Internal Medicine Resident, PGY-2  [facility]

## 2024-08-08 NOTE — PROGRESS NOTES
[facility] -   Infectious diseases evaluation  admission date 8/6/2024    reason for consultation:   Acute Osteomyelitis     Impression :   Current:  Left hallux diabetic osteomyelitis - treated and improved  X-ray reports osteomyelitis big toe of left foot   T2DM with peripheral neuropathy   CAMILLE on CKD 3-  - on hemodialysis - Tuesday and Thursday - hoping for improved renal function  CAMILLE on CKD - was on daptomycin in June    MSSA septicemia on previous admission (June 24) - was on linezolid eventually - completed it on 7/7/24   Also Treated for right pleural effusion with compressive atelectasis, empyema and osteomyelitis of the big toe of left foot on that admission  PVD - right BKA   Concentric hypertrophy and reduced EF - likely causing SOB   Allergy to vancomycin (nausea) and zosyn (anaphylaxis)       Other:    Discussion / summary of stay / plan of care/ Recommendations:       HENCE:   Left hallux tip osteomyeltiis healing clinically tip wound seems now healthy - past fistula track healed -I suspect the healing occurred due to prolonged course of antibiotic he has received for his MSSA bacteremia and his empyema so far  Demineralization/resorption of the tuft of the big toe of left foot - seen on previous x-ray done on 6/17./24  Long discussion with the patient regarding removing that infected bone versus keeping it  At this time I cannot guarantee that that infected bone showing on the x-ray as osteomyelitis, has completely resolved its  infection, as diabetic foot patient could recur their infection despite prolonged course of antibiotic received  I also cannot guarantee that the source of the Staph aureus septicemia that she had in June, was not the bone infection of this toe.  Hence if this infection clinically recurs, could include such as septicemia.  Hence it will be his decision to make, if he decides to keep this toe 1 more shot and try to skin graft        Detailed results:        Thank you for allowing us to participate in the care of this patient.Please call with questions.    This note is created with the assistance of a speech recognition program.  While intending to generate adocument that actually reflects the content of the visit, the document can still have some errors including those of syntax and sound a like substitutions which may escape proof reading.  It such instances, actual meaningcan be extrapolated by contextual diversion.    Dagoberto Moore MD  Office: [phone]  Perfect serve / office [phone]         I have discussed the care of the patient, including pertinent history and exam findings,  with the resident., student or CNP- I have seen and examined the patient and the key elements of all parts of the encounter have been performed by me.  I have decided the assessment, plan and orders as documented by the resident.student or CNP    Ami Coulter, Infectious Diseases

## 2024-08-08 NOTE — PROGRESS NOTES
Dialysis Post Treatment Note  Vitals:    08/08/24 1355   BP: (!) 173/102   Pulse: 72   Resp: 18   Temp: 98 °F (36.7 °C)   SpO2: 98%     Pre-Weight = 127.3 kg  Post-weight = Weight - Scale: 124.5 kg (274 lb 7.6 oz)  Total Liters Processed = Blood Volume Processed (Liters): 78.91 L  Rinseback Volume (mL) = Rinseback Volume (ml): 290 ml  Net Removal (mL) =  3000 ml  Patient's dry weight=121.9 kg  Type of access used= CVC Tunneled @R Neck  Length of treatment=212 minutes      Pt tolerated HD well with slightly elevated BP pre/mid/post dialysis. No issue with arterial /venous flow running max BFR. Report given to floor nurse Amber.

## 2024-08-08 NOTE — PLAN OF CARE
Problem: Chronic Conditions and Co-morbidities  Goal: Patient's chronic conditions and co-morbidity symptoms are monitored and maintained or improved  8/8/2024 1455 by Amber De La Rosa RN  Outcome: Completed  8/8/2024 1455 by Amber De La Rosa RN  Outcome: Adequate for Discharge     Problem: ABCDS Injury Assessment  Goal: Absence of physical injury  8/8/2024 1455 by Amber De La Rosa RN  Outcome: Completed  8/8/2024 1455 by Amber De La Rosa RN  Outcome: Adequate for Discharge  8/8/2024 0552 by Jamie Fountain RN  Outcome: Progressing     Problem: Safety - Adult  Goal: Free from fall injury  8/8/2024 1455 by Amber De La Rosa RN  Outcome: Completed  8/8/2024 1455 by Amber De La Rosa RN  Outcome: Adequate for Discharge  8/8/2024 0552 by Jamie Fountain RN  Outcome: Progressing     Problem: Pain  Goal: Verbalizes/displays adequate comfort level or baseline comfort level  8/8/2024 1455 by Amber De La Rosa RN  Outcome: Completed  8/8/2024 1455 by Amber De La Rosa RN  Outcome: Adequate for Discharge

## 2024-08-08 NOTE — PROGRESS NOTES
Physical Therapy Cancel Note      DATE: 2024    NAME: Vincenzo Darden  MRN: [id number]   : 1965      Patient not seen this date for Physical Therapy due to:    Patient reports baseline functional level with no acute PT needs. Will defer PT evaluation at this time. Please reorder PT if future needs arise.       Electronically signed by RAIE Barboza on 2024 at 10:55 AM

## 2024-08-08 NOTE — PLAN OF CARE
Problem: Chronic Conditions and Co-morbidities  Goal: Patient's chronic conditions and co-morbidity symptoms are monitored and maintained or improved  Outcome: Adequate for Discharge     Problem: ABCDS Injury Assessment  Goal: Absence of physical injury  8/8/2024 1455 by Amber De La Rosa RN  Outcome: Adequate for Discharge  8/8/2024 0552 by Jamie Fountain RN  Outcome: Progressing     Problem: Safety - Adult  Goal: Free from fall injury  8/8/2024 1455 by Amber De La Rosa RN  Outcome: Adequate for Discharge  8/8/2024 0552 by Jamie Fountain RN  Outcome: Progressing     Problem: Pain  Goal: Verbalizes/displays adequate comfort level or baseline comfort level  Outcome: Adequate for Discharge

## 2024-08-08 NOTE — DISCHARGE INSTRUCTIONS
Please continue to take your medication as prescribed, continue to check your blood pressure and blood sugar at home regularly and follow-up with your primary doctor within 1 week of discharge  Follow-up with nephrology and foot doctor to check your wound after discharge

## 2024-08-08 NOTE — PROGRESS NOTES
Dialysis Time Out  To be done by RN and tech or 2 RNs  Staff Names Bere KAPOOR RN and Ad DEY RN    [x]  Identity of the patient using 2 patient identifiers  [x]  Consent for treatment  [x]  Equipment-proper machine and dialyzer  [x]  B-Hep B status result still in process  [x]  Orders- to include bath, blood flow, dialyzer, time and fluid removal  [x]  Access-Correct site and in working order  [x]  Time for patient to ask questions.

## 2024-08-11 LAB
MICROORGANISM SPEC CULT: NORMAL
SERVICE CMNT-IMP: NORMAL
SPECIMEN DESCRIPTION: NORMAL

## 2024-08-12 LAB
MICROORGANISM SPEC CULT: NORMAL
MICROORGANISM/AGENT SPEC: NORMAL
SERVICE CMNT-IMP: NORMAL
SPECIMEN DESCRIPTION: NORMAL

## 2024-12-05 ENCOUNTER — TELEPHONE (OUTPATIENT)
Dept: TRANSPLANT | Facility: HOSPITAL | Age: 59
End: 2024-12-05
Payer: MEDICARE

## 2024-12-05 ENCOUNTER — DOCUMENTATION (OUTPATIENT)
Dept: TRANSPLANT | Facility: HOSPITAL | Age: 59
End: 2024-12-05
Payer: MEDICARE

## 2024-12-05 VITALS — HEIGHT: 72 IN | WEIGHT: 266.76 LBS | BODY MASS INDEX: 36.13 KG/M2

## 2024-12-05 DIAGNOSIS — N18.6 ESRD (END STAGE RENAL DISEASE) (MULTI): Primary | ICD-10-CM

## 2024-12-05 NOTE — PROGRESS NOTES
Do you have difficulty reading or writing in English?   no  Do you have any visual or hearing difficulties?  yes Hearing   Do you have transportation to and from your medical appointments?   yes  What type of transportation do you use?   Private car  Who will be coming with you to your transplant appointments?  Wife   Are they able to drive?   yes  How far can you walk?   FAR  Do you use any assistive devices for walking? (Cane, walker, wheelchair, motorized scooter)   Sometimes -Cane  Who is your primary care doctor?   Jose Del Toro DO  Who is your kidney doctor?   Grace Sharpe  What is the primary cause of your kidney disease?   Antibiotics & Heart surgery  Are you currently on dialysis?   yes  If yes, what days do you have your dialysis treatments?  [facility]  How many years have you been on dialysis?  6 MO  Have you received a transplant before?   no  If yes, what organ, and when and where was your transplant?   N/A  Have you been diagnosed with diabetes?    yes  Do you currently have any open sores or wounds?   no  If yes, where?   N/A  Have you ever had any amputations?  yes  If yes, where?   TOES LEFT FEET & MIDE CAVE IN RIGHT FOOT  Do you have any history of blood clots in the legs or lungs?   no  If yes, any known family clotting disorders?   no  Have you ever had a blood transfusion?   no  If yes, how many and when was the last one?   N/A  Have you ever been diagnosed with Sickle Cell Disease?   no  Have you tested positive for hepatitis or HIV?   no  Have you ever been diagnosed with cancer?   no  If yes, what type of cancer, and when and where were you treated?   N/A  Do you have a history of a heart attack or stroke?   no  Are you currently using any supplemental oxygen?   no  Have you been hospitalized for any reason in the last year?   yes ESRD  (Kidney failure)  If yes, what were you hospitalized for and where were you hospitalized? [facility]    Are you  currently or have you previously been seen by a mental health professional?   no  If yes, what is the name of your mental health provider?   N/A  Are you a current or former tobacco user?   yes  Former  Do you have history of alcohol abuse or dependence?   no  Do you have a history of illegal drug abuse or dependence?   no  Has anyone told you they're willing to donate their kidney to you?   yes Wife   Are you currently in evaluation or listed at another transplant center?   no  If yes, where?   N/A  Comments:   Intake complete

## 2025-01-12 ENCOUNTER — HOSPITAL ENCOUNTER (INPATIENT)
Age: 60
LOS: 7 days | Discharge: HOME HEALTH | DRG: 637 | End: 2025-01-19
Attending: EMERGENCY MEDICINE | Admitting: INTERNAL MEDICINE
Payer: MEDICARE

## 2025-01-12 ENCOUNTER — APPOINTMENT (OUTPATIENT)
Dept: GENERAL RADIOLOGY | Age: 60
DRG: 637 | End: 2025-01-12
Payer: MEDICARE

## 2025-01-12 DIAGNOSIS — R60.0 BILATERAL LOWER EXTREMITY EDEMA: Primary | ICD-10-CM

## 2025-01-12 DIAGNOSIS — I95.1 ORTHOSTATIC HYPOTENSION: ICD-10-CM

## 2025-01-12 DIAGNOSIS — R55 SYNCOPE, UNSPECIFIED SYNCOPE TYPE: ICD-10-CM

## 2025-01-12 PROBLEM — L03.90 CELLULITIS: Status: ACTIVE | Noted: 2025-01-12

## 2025-01-12 LAB
ALBUMIN SERPL-MCNC: 3.1 G/DL (ref 3.5–5.2)
ALBUMIN/GLOB SERPL: 0.9 {RATIO} (ref 1–2.5)
ALP SERPL-CCNC: 78 U/L (ref 40–129)
ALT SERPL-CCNC: 19 U/L (ref 10–50)
ANION GAP SERPL CALCULATED.3IONS-SCNC: 17 MMOL/L (ref 9–16)
ANTI-XA UNFRAC HEPARIN: <0.1 IU/L
AST SERPL-CCNC: 54 U/L (ref 10–50)
BASOPHILS # BLD: 0.04 K/UL (ref 0–0.2)
BASOPHILS NFR BLD: 0 % (ref 0–2)
BILIRUB SERPL-MCNC: 0.4 MG/DL (ref 0–1.2)
BUN SERPL-MCNC: 69 MG/DL (ref 8–23)
CALCIUM SERPL-MCNC: 8.1 MG/DL (ref 8.6–10.4)
CHLORIDE SERPL-SCNC: 98 MMOL/L (ref 98–107)
CK SERPL-CCNC: 1337 U/L (ref 39–308)
CO2 SERPL-SCNC: 15 MMOL/L (ref 20–31)
CREAT SERPL-MCNC: 7.4 MG/DL (ref 0.7–1.2)
CRP SERPL HS-MCNC: 242 MG/L
D DIMER PPP FEU-MCNC: 3.83 UG/ML FEU (ref 0–0.57)
EOSINOPHIL # BLD: <0.03 K/UL (ref 0–0.44)
EOSINOPHILS RELATIVE PERCENT: 0 % (ref 1–4)
ERYTHROCYTE [DISTWIDTH] IN BLOOD BY AUTOMATED COUNT: 15.6 % (ref 11.8–14.4)
ERYTHROCYTE [DISTWIDTH] IN BLOOD BY AUTOMATED COUNT: 15.7 % (ref 11.8–14.4)
GFR, ESTIMATED: 8 ML/MIN/1.73M2
GLUCOSE SERPL-MCNC: 205 MG/DL (ref 74–99)
HCT VFR BLD AUTO: 34.5 % (ref 40.7–50.3)
HCT VFR BLD AUTO: 36.8 % (ref 40.7–50.3)
HGB BLD-MCNC: 11.2 G/DL (ref 13–17)
HGB BLD-MCNC: 12.4 G/DL (ref 13–17)
IMM GRANULOCYTES # BLD AUTO: 0.05 K/UL (ref 0–0.3)
IMM GRANULOCYTES NFR BLD: 0 %
INR PPP: 1.2
LACTIC ACID, WHOLE BLOOD: 1.4 MMOL/L (ref 0.7–2.1)
LYMPHOCYTES NFR BLD: 0.92 K/UL (ref 1.1–3.7)
LYMPHOCYTES RELATIVE PERCENT: 7 % (ref 24–43)
MCH RBC QN AUTO: 30 PG (ref 25.2–33.5)
MCH RBC QN AUTO: 30.1 PG (ref 25.2–33.5)
MCHC RBC AUTO-ENTMCNC: 32.5 G/DL (ref 28.4–34.8)
MCHC RBC AUTO-ENTMCNC: 33.7 G/DL (ref 28.4–34.8)
MCV RBC AUTO: 89.3 FL (ref 82.6–102.9)
MCV RBC AUTO: 92.5 FL (ref 82.6–102.9)
MONOCYTES NFR BLD: 0.83 K/UL (ref 0.1–1.2)
MONOCYTES NFR BLD: 7 % (ref 3–12)
MYOGLOBIN SERPL-MCNC: 1352 NG/ML (ref 28–72)
NEUTROPHILS NFR BLD: 86 % (ref 36–65)
NEUTS SEG NFR BLD: 10.62 K/UL (ref 1.5–8.1)
NRBC BLD-RTO: 0 PER 100 WBC
NRBC BLD-RTO: 0 PER 100 WBC
PARTIAL THROMBOPLASTIN TIME: 37.7 SEC (ref 23–36.5)
PLATELET # BLD AUTO: 153 K/UL (ref 138–453)
PLATELET # BLD AUTO: 155 K/UL (ref 138–453)
PMV BLD AUTO: 10.2 FL (ref 8.1–13.5)
PMV BLD AUTO: 9.8 FL (ref 8.1–13.5)
POTASSIUM SERPL-SCNC: 4.6 MMOL/L (ref 3.7–5.3)
PROT SERPL-MCNC: 6.4 G/DL (ref 6.6–8.7)
PROTHROMBIN TIME: 15.3 SEC (ref 11.7–14.9)
RBC # BLD AUTO: 3.73 M/UL (ref 4.21–5.77)
RBC # BLD AUTO: 4.12 M/UL (ref 4.21–5.77)
RBC # BLD: ABNORMAL 10*6/UL
SODIUM SERPL-SCNC: 130 MMOL/L (ref 136–145)
WBC OTHER # BLD: 10.2 K/UL (ref 3.5–11.3)
WBC OTHER # BLD: 12.5 K/UL (ref 3.5–11.3)

## 2025-01-12 PROCEDURE — 93005 ELECTROCARDIOGRAM TRACING: CPT

## 2025-01-12 PROCEDURE — 85610 PROTHROMBIN TIME: CPT

## 2025-01-12 PROCEDURE — 85520 HEPARIN ASSAY: CPT

## 2025-01-12 PROCEDURE — 85379 FIBRIN DEGRADATION QUANT: CPT

## 2025-01-12 PROCEDURE — 80053 COMPREHEN METABOLIC PANEL: CPT

## 2025-01-12 PROCEDURE — 85730 THROMBOPLASTIN TIME PARTIAL: CPT

## 2025-01-12 PROCEDURE — 6370000000 HC RX 637 (ALT 250 FOR IP)

## 2025-01-12 PROCEDURE — 82550 ASSAY OF CK (CPK): CPT

## 2025-01-12 PROCEDURE — 36415 COLL VENOUS BLD VENIPUNCTURE: CPT

## 2025-01-12 PROCEDURE — 73590 X-RAY EXAM OF LOWER LEG: CPT

## 2025-01-12 PROCEDURE — 96375 TX/PRO/DX INJ NEW DRUG ADDON: CPT

## 2025-01-12 PROCEDURE — 99285 EMERGENCY DEPT VISIT HI MDM: CPT

## 2025-01-12 PROCEDURE — 87040 BLOOD CULTURE FOR BACTERIA: CPT

## 2025-01-12 PROCEDURE — 85027 COMPLETE CBC AUTOMATED: CPT

## 2025-01-12 PROCEDURE — 6360000002 HC RX W HCPCS

## 2025-01-12 PROCEDURE — 86140 C-REACTIVE PROTEIN: CPT

## 2025-01-12 PROCEDURE — 83605 ASSAY OF LACTIC ACID: CPT

## 2025-01-12 PROCEDURE — 85025 COMPLETE CBC W/AUTO DIFF WBC: CPT

## 2025-01-12 PROCEDURE — 83874 ASSAY OF MYOGLOBIN: CPT

## 2025-01-12 PROCEDURE — 96365 THER/PROPH/DIAG IV INF INIT: CPT

## 2025-01-12 PROCEDURE — 73630 X-RAY EXAM OF FOOT: CPT

## 2025-01-12 PROCEDURE — 1200000000 HC SEMI PRIVATE

## 2025-01-12 RX ORDER — POLYETHYLENE GLYCOL 3350 17 G/17G
17 POWDER, FOR SOLUTION ORAL DAILY PRN
Status: DISCONTINUED | OUTPATIENT
Start: 2025-01-12 | End: 2025-01-19 | Stop reason: HOSPADM

## 2025-01-12 RX ORDER — SODIUM CHLORIDE 0.9 % (FLUSH) 0.9 %
5-40 SYRINGE (ML) INJECTION PRN
Status: DISCONTINUED | OUTPATIENT
Start: 2025-01-12 | End: 2025-01-19 | Stop reason: HOSPADM

## 2025-01-12 RX ORDER — MAGNESIUM SULFATE IN WATER 40 MG/ML
2000 INJECTION, SOLUTION INTRAVENOUS PRN
Status: DISCONTINUED | OUTPATIENT
Start: 2025-01-12 | End: 2025-01-12

## 2025-01-12 RX ORDER — TAMSULOSIN HYDROCHLORIDE 0.4 MG/1
0.4 CAPSULE ORAL DAILY
Status: DISCONTINUED | OUTPATIENT
Start: 2025-01-13 | End: 2025-01-19 | Stop reason: HOSPADM

## 2025-01-12 RX ORDER — HEPARIN SODIUM 1000 [USP'U]/ML
5000 INJECTION, SOLUTION INTRAVENOUS; SUBCUTANEOUS PRN
Status: DISCONTINUED | OUTPATIENT
Start: 2025-01-12 | End: 2025-01-13

## 2025-01-12 RX ORDER — ONDANSETRON 4 MG/1
4 TABLET, ORALLY DISINTEGRATING ORAL EVERY 8 HOURS PRN
Status: DISCONTINUED | OUTPATIENT
Start: 2025-01-12 | End: 2025-01-19 | Stop reason: HOSPADM

## 2025-01-12 RX ORDER — AMLODIPINE BESYLATE 10 MG/1
10 TABLET ORAL DAILY
Status: DISCONTINUED | OUTPATIENT
Start: 2025-01-13 | End: 2025-01-14

## 2025-01-12 RX ORDER — CARVEDILOL 12.5 MG/1
12.5 TABLET ORAL 2 TIMES DAILY WITH MEALS
Status: DISCONTINUED | OUTPATIENT
Start: 2025-01-13 | End: 2025-01-18

## 2025-01-12 RX ORDER — KETOROLAC TROMETHAMINE 15 MG/ML
30 INJECTION, SOLUTION INTRAMUSCULAR; INTRAVENOUS ONCE
Status: COMPLETED | OUTPATIENT
Start: 2025-01-12 | End: 2025-01-12

## 2025-01-12 RX ORDER — CLINDAMYCIN PHOSPHATE 600 MG/50ML
600 INJECTION, SOLUTION INTRAVENOUS EVERY 8 HOURS
Status: DISCONTINUED | OUTPATIENT
Start: 2025-01-12 | End: 2025-01-13

## 2025-01-12 RX ORDER — INSULIN LISPRO 100 [IU]/ML
0-16 INJECTION, SOLUTION INTRAVENOUS; SUBCUTANEOUS
Status: DISCONTINUED | OUTPATIENT
Start: 2025-01-13 | End: 2025-01-15

## 2025-01-12 RX ORDER — INSULIN GLARGINE 100 [IU]/ML
40 INJECTION, SOLUTION SUBCUTANEOUS NIGHTLY
Status: DISCONTINUED | OUTPATIENT
Start: 2025-01-13 | End: 2025-01-19 | Stop reason: HOSPADM

## 2025-01-12 RX ORDER — DEXTROSE MONOHYDRATE 100 MG/ML
INJECTION, SOLUTION INTRAVENOUS CONTINUOUS PRN
Status: DISCONTINUED | OUTPATIENT
Start: 2025-01-12 | End: 2025-01-19 | Stop reason: HOSPADM

## 2025-01-12 RX ORDER — HEPARIN SODIUM 1000 [USP'U]/ML
10000 INJECTION, SOLUTION INTRAVENOUS; SUBCUTANEOUS ONCE
Status: COMPLETED | OUTPATIENT
Start: 2025-01-12 | End: 2025-01-12

## 2025-01-12 RX ORDER — HEPARIN SODIUM 1000 [USP'U]/ML
10000 INJECTION, SOLUTION INTRAVENOUS; SUBCUTANEOUS PRN
Status: DISCONTINUED | OUTPATIENT
Start: 2025-01-12 | End: 2025-01-13

## 2025-01-12 RX ORDER — ACETAMINOPHEN 500 MG
1000 TABLET ORAL ONCE
Status: COMPLETED | OUTPATIENT
Start: 2025-01-12 | End: 2025-01-12

## 2025-01-12 RX ORDER — POTASSIUM CHLORIDE 7.45 MG/ML
10 INJECTION INTRAVENOUS PRN
Status: DISCONTINUED | OUTPATIENT
Start: 2025-01-12 | End: 2025-01-12

## 2025-01-12 RX ORDER — ACETAMINOPHEN 325 MG/1
650 TABLET ORAL EVERY 6 HOURS PRN
Status: DISCONTINUED | OUTPATIENT
Start: 2025-01-12 | End: 2025-01-19 | Stop reason: HOSPADM

## 2025-01-12 RX ORDER — SODIUM CHLORIDE 0.9 % (FLUSH) 0.9 %
5-40 SYRINGE (ML) INJECTION EVERY 12 HOURS SCHEDULED
Status: DISCONTINUED | OUTPATIENT
Start: 2025-01-12 | End: 2025-01-19 | Stop reason: HOSPADM

## 2025-01-12 RX ORDER — SODIUM CHLORIDE 9 MG/ML
INJECTION, SOLUTION INTRAVENOUS PRN
Status: DISCONTINUED | OUTPATIENT
Start: 2025-01-12 | End: 2025-01-19 | Stop reason: HOSPADM

## 2025-01-12 RX ORDER — POTASSIUM CHLORIDE 1500 MG/1
40 TABLET, EXTENDED RELEASE ORAL PRN
Status: DISCONTINUED | OUTPATIENT
Start: 2025-01-12 | End: 2025-01-12

## 2025-01-12 RX ORDER — ONDANSETRON 2 MG/ML
4 INJECTION INTRAMUSCULAR; INTRAVENOUS EVERY 6 HOURS PRN
Status: DISCONTINUED | OUTPATIENT
Start: 2025-01-12 | End: 2025-01-19 | Stop reason: HOSPADM

## 2025-01-12 RX ORDER — HEPARIN SODIUM 10000 [USP'U]/100ML
5-30 INJECTION, SOLUTION INTRAVENOUS CONTINUOUS
Status: DISCONTINUED | OUTPATIENT
Start: 2025-01-12 | End: 2025-01-13

## 2025-01-12 RX ORDER — ACETAMINOPHEN 650 MG/1
650 SUPPOSITORY RECTAL EVERY 6 HOURS PRN
Status: DISCONTINUED | OUTPATIENT
Start: 2025-01-12 | End: 2025-01-19 | Stop reason: HOSPADM

## 2025-01-12 RX ORDER — GLUCAGON 1 MG/ML
1 KIT INJECTION PRN
Status: DISCONTINUED | OUTPATIENT
Start: 2025-01-12 | End: 2025-01-19 | Stop reason: HOSPADM

## 2025-01-12 RX ORDER — ATORVASTATIN CALCIUM 40 MG/1
40 TABLET, FILM COATED ORAL NIGHTLY
Status: DISCONTINUED | OUTPATIENT
Start: 2025-01-13 | End: 2025-01-13

## 2025-01-12 RX ADMIN — KETOROLAC TROMETHAMINE 30 MG: 15 INJECTION, SOLUTION INTRAMUSCULAR; INTRAVENOUS at 18:59

## 2025-01-12 RX ADMIN — HEPARIN SODIUM 10000 UNITS: 1000 INJECTION INTRAVENOUS; SUBCUTANEOUS at 22:26

## 2025-01-12 RX ADMIN — ACETAMINOPHEN 1000 MG: 500 TABLET, FILM COATED ORAL at 17:52

## 2025-01-12 RX ADMIN — ACETAMINOPHEN 650 MG: 325 TABLET ORAL at 22:21

## 2025-01-12 RX ADMIN — HEPARIN SODIUM 16 UNITS/KG/HR: 10000 INJECTION, SOLUTION INTRAVENOUS at 22:25

## 2025-01-12 RX ADMIN — HYDROMORPHONE HYDROCHLORIDE 0.25 MG: 1 INJECTION, SOLUTION INTRAMUSCULAR; INTRAVENOUS; SUBCUTANEOUS at 22:28

## 2025-01-12 RX ADMIN — CLINDAMYCIN PHOSPHATE 600 MG: 600 INJECTION, SOLUTION INTRAVENOUS at 19:28

## 2025-01-12 ASSESSMENT — PAIN SCALES - GENERAL
PAINLEVEL_OUTOF10: 3
PAINLEVEL_OUTOF10: 9
PAINLEVEL_OUTOF10: 7

## 2025-01-12 ASSESSMENT — PAIN - FUNCTIONAL ASSESSMENT: PAIN_FUNCTIONAL_ASSESSMENT: ACTIVITIES ARE NOT PREVENTED

## 2025-01-12 ASSESSMENT — PAIN DESCRIPTION - ORIENTATION: ORIENTATION: LEFT

## 2025-01-12 ASSESSMENT — PAIN DESCRIPTION - PAIN TYPE: TYPE: ACUTE PAIN

## 2025-01-12 ASSESSMENT — PAIN DESCRIPTION - LOCATION
LOCATION: HEAD;LEG
LOCATION: HEAD

## 2025-01-12 ASSESSMENT — PAIN DESCRIPTION - DESCRIPTORS: DESCRIPTORS: ACHING;THROBBING

## 2025-01-12 ASSESSMENT — LIFESTYLE VARIABLES
HOW OFTEN DO YOU HAVE A DRINK CONTAINING ALCOHOL: NEVER
HOW MANY STANDARD DRINKS CONTAINING ALCOHOL DO YOU HAVE ON A TYPICAL DAY: PATIENT DOES NOT DRINK

## 2025-01-12 NOTE — ED PROVIDER NOTES
[facility]  Emergency Department  Faculty Attestation     I performed a history and physical examination of the patient and discussed management with the resident. I reviewed the resident’s note and agree with the documented findings and plan of care. Any areas of disagreement are noted on the chart. I was personally present for the key portions of any procedures. I have documented in the chart those procedures where I was not present during the key portions. I have reviewed the emergency nurses triage note. I agree with the chief complaint, past medical history, past surgical history, allergies, medications, social and family history as documented unless otherwise noted below.    For Physician Assistant/ Nurse Practitioner cases/documentation I have personally evaluated this patient and have completed at least one if not all key elements of the E/M (history, physical exam, and MDM). Additional findings are as noted.    Preliminary note started at 5:51 PM EST    Primary Care Physician:  Shaikh Calix MD    Screenings:  [unfilled]    CHIEF COMPLAINT       Chief Complaint   Patient presents with    Extremity Weakness       RECENT VITALS:   BP (!) 163/103   Pulse 87   Temp 98.2 °F (36.8 °C)   Resp 24   SpO2 100%     LABS:  Labs Reviewed   CBC WITH AUTO DIFFERENTIAL   D-DIMER, QUANTITATIVE   COMPREHENSIVE METABOLIC PANEL   CK   MYOGLOBIN, BLOOD       Radiology  No orders to display       CRITICAL CARE: There was a high probability of clinically significant/life threatening deterioration in this patient's condition which required my urgent intervention.  Total critical care time was none minutes.  This excludes any time for separately reportable procedures.     EKG:  EKG Interpretation    Interpreted by me    Rhythm: normal sinus   Rate: normal  Axis: Leftward  Ectopy: Unifocal PVC  Conduction: Right bundle branch block pattern  ST Segments: no acute change  T Waves: no 
Resp 16   Ht 1.829 m (6')   Wt 121.1 kg (266 lb 15.6 oz)   SpO2 97%   BMI 36.21 kg/m²     Physical Exam  Vitals and nursing note reviewed.   Constitutional:       General: He is not in acute distress.     Appearance: Normal appearance.   Eyes:      Extraocular Movements: Extraocular movements intact.      Pupils: Pupils are equal, round, and reactive to light.   Cardiovascular:      Rate and Rhythm: Normal rate and regular rhythm.      Pulses: Normal pulses.      Heart sounds: Normal heart sounds.   Pulmonary:      Effort: Pulmonary effort is normal. No respiratory distress.      Breath sounds: Normal breath sounds.   Abdominal:      General: Abdomen is flat. Bowel sounds are normal. There is no distension.      Tenderness: There is no abdominal tenderness.   Musculoskeletal:         General: No tenderness. Normal range of motion.      Cervical back: Normal range of motion and neck supple.   Skin:     General: Skin is warm and dry.   Neurological:      General: No focal deficit present.      Mental Status: He is alert and oriented to person, place, and time.      Sensory: No sensory deficit.   Psychiatric:         Mood and Affect: Mood normal.         Behavior: Behavior normal.           DDX/DIAGNOSTIC RESULTS / EMERGENCY DEPARTMENT COURSE / MDM     Medical Decision Making  Amount and/or Complexity of Data Reviewed  Labs: ordered. Decision-making details documented in ED Course.  ECG/medicine tests: ordered.    Risk  OTC drugs.  Prescription drug management.  Decision regarding hospitalization.        EKG  EKG Interpretation    Interpreted by emergency department physician    Rhythm: normal sinus   Rate: normal  Axis: Left  Ectopy: premature ventricular contractions (unifocal)  Conduction: bifasicular  ST Segments: no acute change  T Waves: no acute change  Q Waves: none    Clinical Impression: no acute changes and non-specific EKG    Cristofer Young,       All EKG's are interpreted by the Emergency

## 2025-01-12 NOTE — ED NOTES
Pt arrives via triage, pt c/o ill feeling and extremity weakness.  Pt states he is a dialysis pt who goes Tuesday Thursday Saturday.   Pt states he went Thursday but did not go on Saturday due to feeling ill.   Pt is A+Ox4, call light in reach.

## 2025-01-13 ENCOUNTER — APPOINTMENT (OUTPATIENT)
Dept: DIALYSIS | Age: 60
DRG: 637 | End: 2025-01-13
Payer: MEDICARE

## 2025-01-13 ENCOUNTER — APPOINTMENT (OUTPATIENT)
Dept: VASCULAR LAB | Age: 60
DRG: 637 | End: 2025-01-13
Payer: MEDICARE

## 2025-01-13 LAB
ANION GAP SERPL CALCULATED.3IONS-SCNC: 17 MMOL/L (ref 9–16)
ANTI-XA UNFRAC HEPARIN: <0.1 IU/L
B-OH-BUTYR SERPL-MCNC: 0.2 MMOL/L (ref 0.02–0.27)
BUN SERPL-MCNC: 75 MG/DL (ref 8–23)
CALCIUM SERPL-MCNC: 7.8 MG/DL (ref 8.6–10.4)
CHLORIDE SERPL-SCNC: 102 MMOL/L (ref 98–107)
CK SERPL-CCNC: 531 U/L (ref 39–308)
CK SERPL-CCNC: 624 U/L (ref 39–308)
CK SERPL-CCNC: 810 U/L (ref 39–308)
CO2 SERPL-SCNC: 15 MMOL/L (ref 20–31)
CREAT SERPL-MCNC: 7.9 MG/DL (ref 0.7–1.2)
ECHO BSA: 2.54 M2
EKG ATRIAL RATE: 88 BPM
EKG P AXIS: 48 DEGREES
EKG P-R INTERVAL: 190 MS
EKG Q-T INTERVAL: 412 MS
EKG QRS DURATION: 144 MS
EKG QTC CALCULATION (BAZETT): 498 MS
EKG R AXIS: -70 DEGREES
EKG T AXIS: 36 DEGREES
EKG VENTRICULAR RATE: 88 BPM
GFR, ESTIMATED: 7 ML/MIN/1.73M2
GLUCOSE BLD-MCNC: 107 MG/DL (ref 75–110)
GLUCOSE BLD-MCNC: 128 MG/DL (ref 75–110)
GLUCOSE BLD-MCNC: 147 MG/DL (ref 75–110)
GLUCOSE BLD-MCNC: 156 MG/DL (ref 75–110)
GLUCOSE BLD-MCNC: 168 MG/DL (ref 75–110)
GLUCOSE BLD-MCNC: 211 MG/DL (ref 75–110)
GLUCOSE SERPL-MCNC: 147 MG/DL (ref 74–99)
HBV SURFACE AG SERPL QL IA: NONREACTIVE
MYOGLOBIN SERPL-MCNC: 733 NG/ML (ref 28–72)
POTASSIUM SERPL-SCNC: 4 MMOL/L (ref 3.7–5.3)
SODIUM SERPL-SCNC: 134 MMOL/L (ref 136–145)
TSH SERPL DL<=0.05 MIU/L-ACNC: 2.14 UIU/ML (ref 0.27–4.2)

## 2025-01-13 PROCEDURE — 2500000003 HC RX 250 WO HCPCS

## 2025-01-13 PROCEDURE — 6360000002 HC RX W HCPCS

## 2025-01-13 PROCEDURE — 36415 COLL VENOUS BLD VENIPUNCTURE: CPT

## 2025-01-13 PROCEDURE — 83874 ASSAY OF MYOGLOBIN: CPT

## 2025-01-13 PROCEDURE — 85520 HEPARIN ASSAY: CPT

## 2025-01-13 PROCEDURE — 6370000000 HC RX 637 (ALT 250 FOR IP)

## 2025-01-13 PROCEDURE — 5A1D70Z PERFORMANCE OF URINARY FILTRATION, INTERMITTENT, LESS THAN 6 HOURS PER DAY: ICD-10-PCS | Performed by: INTERNAL MEDICINE

## 2025-01-13 PROCEDURE — 93971 EXTREMITY STUDY: CPT | Performed by: SURGERY

## 2025-01-13 PROCEDURE — 84443 ASSAY THYROID STIM HORMONE: CPT

## 2025-01-13 PROCEDURE — 99222 1ST HOSP IP/OBS MODERATE 55: CPT | Performed by: INTERNAL MEDICINE

## 2025-01-13 PROCEDURE — 87340 HEPATITIS B SURFACE AG IA: CPT

## 2025-01-13 PROCEDURE — 90935 HEMODIALYSIS ONE EVALUATION: CPT

## 2025-01-13 PROCEDURE — 1200000000 HC SEMI PRIVATE

## 2025-01-13 PROCEDURE — 80048 BASIC METABOLIC PNL TOTAL CA: CPT

## 2025-01-13 PROCEDURE — 93010 ELECTROCARDIOGRAM REPORT: CPT | Performed by: INTERNAL MEDICINE

## 2025-01-13 PROCEDURE — 82550 ASSAY OF CK (CPK): CPT

## 2025-01-13 PROCEDURE — 82010 KETONE BODYS QUAN: CPT

## 2025-01-13 PROCEDURE — 93971 EXTREMITY STUDY: CPT

## 2025-01-13 PROCEDURE — 99233 SBSQ HOSP IP/OBS HIGH 50: CPT | Performed by: INTERNAL MEDICINE

## 2025-01-13 PROCEDURE — 82947 ASSAY GLUCOSE BLOOD QUANT: CPT

## 2025-01-13 PROCEDURE — 6360000002 HC RX W HCPCS: Performed by: INTERNAL MEDICINE

## 2025-01-13 RX ORDER — GABAPENTIN 100 MG/1
100 CAPSULE ORAL EVERY 8 HOURS SCHEDULED
Status: DISCONTINUED | OUTPATIENT
Start: 2025-01-13 | End: 2025-01-13

## 2025-01-13 RX ORDER — GABAPENTIN 300 MG/1
300 CAPSULE ORAL DAILY
Status: DISCONTINUED | OUTPATIENT
Start: 2025-01-13 | End: 2025-01-19

## 2025-01-13 RX ORDER — PRAVASTATIN SODIUM 20 MG
40 TABLET ORAL NIGHTLY
Status: DISCONTINUED | OUTPATIENT
Start: 2025-01-13 | End: 2025-01-14

## 2025-01-13 RX ORDER — LINEZOLID 2 MG/ML
600 INJECTION, SOLUTION INTRAVENOUS EVERY 12 HOURS
Status: DISCONTINUED | OUTPATIENT
Start: 2025-01-13 | End: 2025-01-13

## 2025-01-13 RX ORDER — HEPARIN SODIUM 5000 [USP'U]/ML
5000 INJECTION, SOLUTION INTRAVENOUS; SUBCUTANEOUS EVERY 8 HOURS SCHEDULED
Status: DISCONTINUED | OUTPATIENT
Start: 2025-01-13 | End: 2025-01-15

## 2025-01-13 RX ORDER — HEPARIN SODIUM 1000 [USP'U]/ML
1900 INJECTION, SOLUTION INTRAVENOUS; SUBCUTANEOUS PRN
Status: DISCONTINUED | OUTPATIENT
Start: 2025-01-13 | End: 2025-01-14

## 2025-01-13 RX ORDER — CLINDAMYCIN PHOSPHATE 600 MG/50ML
600 INJECTION, SOLUTION INTRAVENOUS EVERY 8 HOURS
Status: DISCONTINUED | OUTPATIENT
Start: 2025-01-13 | End: 2025-01-17

## 2025-01-13 RX ADMIN — CARVEDILOL 12.5 MG: 12.5 TABLET, FILM COATED ORAL at 16:04

## 2025-01-13 RX ADMIN — HYDROMORPHONE HYDROCHLORIDE 0.25 MG: 1 INJECTION, SOLUTION INTRAMUSCULAR; INTRAVENOUS; SUBCUTANEOUS at 16:00

## 2025-01-13 RX ADMIN — GABAPENTIN 300 MG: 300 CAPSULE ORAL at 16:12

## 2025-01-13 RX ADMIN — PRAVASTATIN SODIUM 40 MG: 20 TABLET ORAL at 20:41

## 2025-01-13 RX ADMIN — INSULIN GLARGINE 40 UNITS: 100 INJECTION, SOLUTION SUBCUTANEOUS at 00:34

## 2025-01-13 RX ADMIN — HEPARIN SODIUM 1900 UNITS: 1000 INJECTION INTRAVENOUS; SUBCUTANEOUS at 13:15

## 2025-01-13 RX ADMIN — HYDROMORPHONE HYDROCHLORIDE 0.25 MG: 1 INJECTION, SOLUTION INTRAMUSCULAR; INTRAVENOUS; SUBCUTANEOUS at 23:04

## 2025-01-13 RX ADMIN — INSULIN GLARGINE 40 UNITS: 100 INJECTION, SOLUTION SUBCUTANEOUS at 20:41

## 2025-01-13 RX ADMIN — ATORVASTATIN CALCIUM 40 MG: 40 TABLET, FILM COATED ORAL at 00:34

## 2025-01-13 RX ADMIN — TAMSULOSIN HYDROCHLORIDE 0.4 MG: 0.4 CAPSULE ORAL at 16:04

## 2025-01-13 RX ADMIN — HEPARIN SODIUM 5000 UNITS: 5000 INJECTION INTRAVENOUS; SUBCUTANEOUS at 21:44

## 2025-01-13 RX ADMIN — CLINDAMYCIN PHOSPHATE 600 MG: 600 INJECTION, SOLUTION INTRAVENOUS at 23:11

## 2025-01-13 RX ADMIN — INSULIN LISPRO 4 UNITS: 100 INJECTION, SOLUTION INTRAVENOUS; SUBCUTANEOUS at 00:34

## 2025-01-13 RX ADMIN — HEPARIN SODIUM 10000 UNITS: 1000 INJECTION INTRAVENOUS; SUBCUTANEOUS at 06:49

## 2025-01-13 RX ADMIN — HEPARIN SODIUM 5000 UNITS: 5000 INJECTION INTRAVENOUS; SUBCUTANEOUS at 16:04

## 2025-01-13 RX ADMIN — HYDROMORPHONE HYDROCHLORIDE 0.25 MG: 1 INJECTION, SOLUTION INTRAMUSCULAR; INTRAVENOUS; SUBCUTANEOUS at 12:51

## 2025-01-13 RX ADMIN — CLINDAMYCIN PHOSPHATE 600 MG: 600 INJECTION, SOLUTION INTRAVENOUS at 17:11

## 2025-01-13 RX ADMIN — SODIUM CHLORIDE, PRESERVATIVE FREE 10 ML: 5 INJECTION INTRAVENOUS at 16:04

## 2025-01-13 RX ADMIN — CLINDAMYCIN PHOSPHATE 600 MG: 600 INJECTION, SOLUTION INTRAVENOUS at 04:14

## 2025-01-13 RX ADMIN — AMLODIPINE BESYLATE 10 MG: 10 TABLET ORAL at 16:04

## 2025-01-13 RX ADMIN — HEPARIN SODIUM 1900 UNITS: 1000 INJECTION INTRAVENOUS; SUBCUTANEOUS at 13:16

## 2025-01-13 RX ADMIN — HYDROMORPHONE HYDROCHLORIDE 0.25 MG: 1 INJECTION, SOLUTION INTRAMUSCULAR; INTRAVENOUS; SUBCUTANEOUS at 04:13

## 2025-01-13 RX ADMIN — SODIUM CHLORIDE, PRESERVATIVE FREE 10 ML: 5 INJECTION INTRAVENOUS at 20:41

## 2025-01-13 ASSESSMENT — PAIN SCALES - GENERAL
PAINLEVEL_OUTOF10: 7
PAINLEVEL_OUTOF10: 6
PAINLEVEL_OUTOF10: 6
PAINLEVEL_OUTOF10: 8
PAINLEVEL_OUTOF10: 8
PAINLEVEL_OUTOF10: 4
PAINLEVEL_OUTOF10: 0
PAINLEVEL_OUTOF10: 7
PAINLEVEL_OUTOF10: 9

## 2025-01-13 ASSESSMENT — PAIN DESCRIPTION - LOCATION
LOCATION: HEAD;LEG
LOCATION: HEAD;LEG
LOCATION: LEG;FOOT
LOCATION: LEG

## 2025-01-13 ASSESSMENT — PAIN DESCRIPTION - ORIENTATION
ORIENTATION: LEFT;RIGHT
ORIENTATION: RIGHT;LEFT
ORIENTATION: LEFT
ORIENTATION: LEFT

## 2025-01-13 ASSESSMENT — PAIN DESCRIPTION - DESCRIPTORS
DESCRIPTORS: DISCOMFORT
DESCRIPTORS: ACHING

## 2025-01-13 ASSESSMENT — PAIN SCALES - WONG BAKER
WONGBAKER_NUMERICALRESPONSE: NO HURT
WONGBAKER_NUMERICALRESPONSE: NO HURT

## 2025-01-13 ASSESSMENT — PAIN DESCRIPTION - PAIN TYPE: TYPE: ACUTE PAIN

## 2025-01-13 NOTE — ED NOTES
ED to inpatient nurses report      Chief Complaint:  Chief Complaint   Patient presents with    Extremity Weakness     Present to ED from: Home    MOA:     LOC: alert and orientated to name, place, date  Mobility: Requires assistance * 1  Oxygen Baseline: RA    Current needs required: NA   Pending ED orders: None  Present condition: Stable    Why did the patient come to the ED? pt c/o ill feeling and extremity weakness.  Pt states he is a dialysis pt who goes Tuesday Thursday Saturday.   Pt states he went Thursday but did not go on Saturday due to feeling ill.   What is the plan? Abx, US in a.m.  Any procedures or intervention occur? NA  Any safety concerns??Fall Risk    Mental Status:       Psych Assessment:   Psychosocial  Psychosocial (WDL): Within Defined Limits  Vital signs   Vitals:    01/12/25 1722 01/12/25 1737 01/12/25 1758 01/12/25 1844   BP: (!) 163/103      Pulse: 89 87 88 88   Resp: 20 24 21 21   Temp: 98.2 °F (36.8 °C)      SpO2: 97% 100% 100% 98%        Vitals:  Patient Vitals for the past 24 hrs:   BP Temp Pulse Resp SpO2   01/12/25 1844 -- -- 88 21 98 %   01/12/25 1758 -- -- 88 21 100 %   01/12/25 1737 -- -- 87 24 100 %   01/12/25 1722 (!) 163/103 98.2 °F (36.8 °C) 89 20 97 %      Visit Vitals  BP (!) 163/103   Pulse 88   Temp 98.2 °F (36.8 °C)   Resp 21   SpO2 98%        LDAs:   Peripheral IV 01/12/25 Distal;Right Forearm (Active)       Ambulatory Status:  Presents to emergency department  because of falls (Syncope, seizure, or loss of consciousness): No, Age > 70: No, Altered Mental Status, Intoxication with alcohol or substance confusion (Disorientation, impaired judgment, poor safety awaremess, or inability to follow instructions): No, Impaired Mobility: Ambulates or transfers with assistive devices or assistance; Unable to ambulate or transer.: Yes, Nursing Judgement: Yes    Diagnosis:  DISPOSITION     Final diagnoses:   None        Consults:  IP CONSULT TO INTERNAL MEDICINE     Treatment Team:

## 2025-01-13 NOTE — H&P
[facility]     Department of Internal Medicine - Staff Internal Medicine Teaching Service          ADMISSION NOTE/HISTORY AND PHYSICAL EXAMINATION   Date: 1/12/2025  Patient Name: Vincenzo Darden  Date of admission: 1/12/2025  5:21 PM  YOB: 1965  PCP: Shaikh Calix MD  History Obtained From:  patient, electronic medical record    CHIEF COMPLAINT     Chief complaint: pain and weakness of left leg     HISTORY OF PRESENTING ILLNESS     The patient is a pleasant 59 y.o. male with a PMHx significant for     Type 2 diabetes mellitus  Diabetic foot wound on left great toe and heel  Right below-knee amputation  ESRD -on dialysis since June 2024 -TTS   Hypertension  HFrEF - EF 45-50 %,      presents with a chief complaint of pain, weakness and worsening swelling of the left leg.  Patient is known to have chronic wounds on the left great toe and heel.  He follows up with podiatry weekly and was last seen on Monday.  He reports that he does have edema and erythema of the left leg at baseline.  Pain is neurosurgery is likely worsened.  The informs that since yesterday has not been feeling well and experiencing pain over the left thigh and leg.  He is finding it difficult to move his leg.  The pain worsens on movement.  He he thinks he is having a viral infection as he had similar symptoms when he had viral infection last time.  Though, he denies cough, cold, sore throat, fever, shortness of breath, chest pain, generalized myalgia.  He also denies nausea, vomiting, abdominal pain, urinary symptoms or issues with bowels.      He is known ESRD and on TTS dialysis schedule.  He is under Dr. Núñez.  He missed his dialysis session yesterday due to not feeling well.  He does produce urine and no change in the volume recently.  He denies feeling short of breath.       Review of Systems:  General ROS: Completed and except as mentioned above were negative   HEENT ROS: Completed and except

## 2025-01-13 NOTE — PLAN OF CARE
Rapid response was called as patient had syncope while going to the bathroom for few seconds.  Patient was recovered spontaneously and there was RN assisting him while going to the bathroom and patient did not have any injury.  No history of blurring of vision, weakness of extremities or seizure.  POC glucose is 168.  Blood pressure was 112/78, saturation is around 100% with a heart rate in 80s.  Plan to obtain orthostatics and hold one of the antihypertensives at night if blood pressure remains low.      Ajit Pope MD  Internal Medicine Resident, PGY-1  [facility]  01/13/25  6:19 PM

## 2025-01-13 NOTE — PLAN OF CARE
Problem: Discharge Planning  Goal: Discharge to home or other facility with appropriate resources  Outcome: Progressing     Problem: Safety - Adult  Goal: Free from fall injury  Outcome: Progressing     Problem: Pain  Goal: Verbalizes/displays adequate comfort level or baseline comfort level  Outcome: Progressing      Mental Health Visit Note    6/27/2019    Start time: 902   Stop Time: 950   Session # 1    Session Type: Patient is presenting for an Individual session.    Cong Tuttle is a 28 y.o. male is being seen today for    Chief Complaint   Patient presents with     Intake     Depression     Present For Session: Patient and therapist     New symptoms or complaints: Patient indicated struggling with his mental health.     Functional Impairment:   Personal: 3  Family: 3  Work: 2  Social:3    Clinical assessment of mental status: Mr. Tuttle presented on time for scheduled session today.  He was open and cooperative, and dressed appropriately for this session and weather. His memory was good for both short and long term.  His speech and language was soft and concise throughout the session.  Concentration and focus is within normal limits. Psychosis is not noted or reported. He reports his mood is depressed.  Affect is congruent with speech.  Fund of knowledge is adequate. Insight is adequate for therapy.     Suicidal/Homicidal Ideation present: None Reported This Session    Patient's impression of their current status: Patient indicated feeling depressed. Patient reported seeing a therapist in [place] and then moving to the [facility] and never establishing with another therapist. Patient indicated he has been struggling with self-harm. Patient reported when he gets sober it seems the self-harm gets worse. Patient indicated he also struggles with eating. Patient reported on a typical day he will not eat until 8pm and that is after he has ran and feels he deserves to eat. Patient indicated his depression makes it so that he wants to isolate, cancel plans and has a lot of shame and guilt.     Therapist impression of patients current state: The patient presented for an initial intake session with this provider. Patient is a 28 year old male. Patient was referred by VALENCIA Blunt to engage in individual psychotherapy to  address symptoms of depression and anxiety. The patient appeared cooperative and open-minded throughout the intake and easily engaged in dialogue. Therapist and patient reviewed consent and privacy policy. Patient reported understanding and signed document- sent to be scanned into EMR. The patient completed the following questionnaires for session today: PHQ-9, JOSSIE-7 . Therapist and patient completed C-SSRS together in session. No concerns about patient's safety or the safety of others per assessment today. These questionnaires will be used to inform diagnoses and will be uploaded to patient chart after standard DA is completed. Therapist and patient will further review patient's history during the next follow-up encounter in order to complete a standard diagnostic assessment. Goals for treatment will be established after the 2nd or 3rd follow-up session with this provider. The patient plans to follow-up with psychotropic medication management with her PCP. Patient did not request psychiatry services at intake.     Type of psychotherapeutic technique provided: Insight oriented, Client centered and CBT    Progress toward short term goals:Progress as expected, with patient coming to scheduled session.    Review of long term goals: Not done at today's visit due to first visit    Diagnosis:   1. Major depressive disorder, recurrent episode, moderate (H)      Plan and Follow up: Patient appears to have fair insight into his mental health. This therapist provided paperwork for patient to complete.     Discharge Criteria/Planning: Patient will continue with follow-up until therapy can be discontinued without return of signs and symptoms.    Maria Del Rosario Backer 6/27/2019

## 2025-01-13 NOTE — DISCHARGE INSTRUCTIONS
Internal medicine instructions:  You presented with pain over the left thigh and leg and was treated for cellulitis of the left leg.  You are advised to continue the clindamycin until 1/22/2025.  You have been started on Culturelle and you should continue taking it for the next 7 days.  Please seek care if you are developing profuse diarrhea or abdominal pain.     Please follow up with podiatry for chronic wounds.     Your enzymes from the vessels were high on admission and there was concern for rhabdomyolysis.  You have been on atorvastatin and there is possibility that it could be contributing.  Therefore, the atorvastatin has been switched to pravastatin 10 mg that she should continue taking once at night.    You continue to have your TTS schedule dialysis while in the hospital.  Nephrology managed your electrolytes and fluid status.  You are advised to continue following up with your nephrology and dialysis center.    You had an episode of syncope while in the hospital.  You are found to have drop in your blood pressure on standing up.  Your amlodipine has been decreased to 5 mg and Coreg has been switched to Lopressor 25 mg twice a day.    You are continued on 14 days of Lantus while in the hospital.  You can continue to do 24 units at night.  You are advised to manage sliding scale based on the glucose reading at home.  Trulicity has been removed from the list as you have not been taking it.    He also continued to complain of pain over the lower back.  We understand that this pain has been going on since surgery for spinal stenosis in 2019.  He has been on gabapentin that you take 300 mg once in the morning.        Please follow-up with your primary care provider within 5 to 7 days for continued care.   If you have been given medication please take them as prescribed. Do not take more medication than recommended at any given time.   If you begin to experience any symptoms such as chest pain shortness of

## 2025-01-14 ENCOUNTER — APPOINTMENT (OUTPATIENT)
Dept: DIALYSIS | Age: 60
DRG: 637 | End: 2025-01-14
Payer: MEDICARE

## 2025-01-14 ENCOUNTER — APPOINTMENT (OUTPATIENT)
Dept: GENERAL RADIOLOGY | Age: 60
DRG: 637 | End: 2025-01-14
Payer: MEDICARE

## 2025-01-14 PROBLEM — Z99.2 ESRD ON DIALYSIS (HCC): Status: ACTIVE | Noted: 2025-01-14

## 2025-01-14 PROBLEM — N18.6 ESRD ON DIALYSIS (HCC): Status: ACTIVE | Noted: 2025-01-14

## 2025-01-14 PROBLEM — R55 SYNCOPE: Status: ACTIVE | Noted: 2025-01-14

## 2025-01-14 LAB
ANION GAP SERPL CALCULATED.3IONS-SCNC: 14 MMOL/L (ref 9–16)
BASOPHILS # BLD: 0 K/UL (ref 0–0.2)
BASOPHILS NFR BLD: 0 % (ref 0–2)
BUN SERPL-MCNC: 46 MG/DL (ref 8–23)
CALCIUM SERPL-MCNC: 7.6 MG/DL (ref 8.6–10.4)
CHLORIDE SERPL-SCNC: 94 MMOL/L (ref 98–107)
CK SERPL-CCNC: 406 U/L (ref 39–308)
CO2 SERPL-SCNC: 20 MMOL/L (ref 20–31)
CREAT SERPL-MCNC: 6.1 MG/DL (ref 0.7–1.2)
CRP SERPL HS-MCNC: 121 MG/L (ref 0–5)
EKG ATRIAL RATE: 91 BPM
EKG P AXIS: 17 DEGREES
EKG P-R INTERVAL: 168 MS
EKG Q-T INTERVAL: 402 MS
EKG QRS DURATION: 150 MS
EKG QTC CALCULATION (BAZETT): 494 MS
EKG R AXIS: -72 DEGREES
EKG T AXIS: 10 DEGREES
EKG VENTRICULAR RATE: 91 BPM
EOSINOPHIL # BLD: 0.06 K/UL (ref 0–0.44)
EOSINOPHILS RELATIVE PERCENT: 1 % (ref 1–4)
ERYTHROCYTE [DISTWIDTH] IN BLOOD BY AUTOMATED COUNT: 16 % (ref 11.8–14.4)
GFR, ESTIMATED: 10 ML/MIN/1.73M2
GLUCOSE BLD-MCNC: 117 MG/DL (ref 75–110)
GLUCOSE BLD-MCNC: 124 MG/DL (ref 75–110)
GLUCOSE BLD-MCNC: 126 MG/DL (ref 75–110)
GLUCOSE BLD-MCNC: 97 MG/DL (ref 75–110)
GLUCOSE BLD-MCNC: 99 MG/DL (ref 75–110)
GLUCOSE SERPL-MCNC: 160 MG/DL (ref 74–99)
HCT VFR BLD AUTO: 34.1 % (ref 40.7–50.3)
HGB BLD-MCNC: 10.9 G/DL (ref 13–17)
IMM GRANULOCYTES # BLD AUTO: 0.06 K/UL (ref 0–0.3)
IMM GRANULOCYTES NFR BLD: 1 %
LYMPHOCYTES NFR BLD: 0.56 K/UL (ref 1.1–3.7)
LYMPHOCYTES RELATIVE PERCENT: 9 % (ref 24–43)
MCH RBC QN AUTO: 29.9 PG (ref 25.2–33.5)
MCHC RBC AUTO-ENTMCNC: 32 G/DL (ref 28.4–34.8)
MCV RBC AUTO: 93.4 FL (ref 82.6–102.9)
MONOCYTES NFR BLD: 0.74 K/UL (ref 0.1–1.2)
MONOCYTES NFR BLD: 12 % (ref 3–12)
MORPHOLOGY: ABNORMAL
NEUTROPHILS NFR BLD: 77 % (ref 36–65)
NEUTS SEG NFR BLD: 4.78 K/UL (ref 1.5–8.1)
NRBC BLD-RTO: 0 PER 100 WBC
PLATELET # BLD AUTO: 137 K/UL (ref 138–453)
PMV BLD AUTO: 10 FL (ref 8.1–13.5)
POTASSIUM SERPL-SCNC: 3.6 MMOL/L (ref 3.7–5.3)
RBC # BLD AUTO: 3.65 M/UL (ref 4.21–5.77)
SODIUM SERPL-SCNC: 128 MMOL/L (ref 136–145)
TROPONIN I SERPL HS-MCNC: 108 NG/L (ref 0–22)
TROPONIN I SERPL HS-MCNC: 117 NG/L (ref 0–22)
TROPONIN I SERPL HS-MCNC: 13 NG/L (ref 0–22)
WBC OTHER # BLD: 6.2 K/UL (ref 3.5–11.3)

## 2025-01-14 PROCEDURE — 6370000000 HC RX 637 (ALT 250 FOR IP): Performed by: INTERNAL MEDICINE

## 2025-01-14 PROCEDURE — 82550 ASSAY OF CK (CPK): CPT

## 2025-01-14 PROCEDURE — 80048 BASIC METABOLIC PNL TOTAL CA: CPT

## 2025-01-14 PROCEDURE — 6360000002 HC RX W HCPCS

## 2025-01-14 PROCEDURE — 99232 SBSQ HOSP IP/OBS MODERATE 35: CPT | Performed by: INTERNAL MEDICINE

## 2025-01-14 PROCEDURE — 71045 X-RAY EXAM CHEST 1 VIEW: CPT

## 2025-01-14 PROCEDURE — 6370000000 HC RX 637 (ALT 250 FOR IP)

## 2025-01-14 PROCEDURE — 6360000002 HC RX W HCPCS: Performed by: INTERNAL MEDICINE

## 2025-01-14 PROCEDURE — 84484 ASSAY OF TROPONIN QUANT: CPT

## 2025-01-14 PROCEDURE — 86140 C-REACTIVE PROTEIN: CPT

## 2025-01-14 PROCEDURE — 36415 COLL VENOUS BLD VENIPUNCTURE: CPT

## 2025-01-14 PROCEDURE — 2500000003 HC RX 250 WO HCPCS

## 2025-01-14 PROCEDURE — 90935 HEMODIALYSIS ONE EVALUATION: CPT

## 2025-01-14 PROCEDURE — 90935 HEMODIALYSIS ONE EVALUATION: CPT | Performed by: INTERNAL MEDICINE

## 2025-01-14 PROCEDURE — 93005 ELECTROCARDIOGRAM TRACING: CPT

## 2025-01-14 PROCEDURE — 1200000000 HC SEMI PRIVATE

## 2025-01-14 PROCEDURE — 82947 ASSAY GLUCOSE BLOOD QUANT: CPT

## 2025-01-14 PROCEDURE — 85025 COMPLETE CBC W/AUTO DIFF WBC: CPT

## 2025-01-14 RX ORDER — AMLODIPINE BESYLATE 10 MG/1
10 TABLET ORAL DAILY
Status: DISCONTINUED | OUTPATIENT
Start: 2025-01-14 | End: 2025-01-15

## 2025-01-14 RX ORDER — HEPARIN SODIUM 1000 [USP'U]/ML
1900 INJECTION, SOLUTION INTRAVENOUS; SUBCUTANEOUS PRN
Status: DISCONTINUED | OUTPATIENT
Start: 2025-01-14 | End: 2025-01-19 | Stop reason: HOSPADM

## 2025-01-14 RX ORDER — OXYCODONE HYDROCHLORIDE 5 MG/1
5 TABLET ORAL EVERY 4 HOURS PRN
Status: DISCONTINUED | OUTPATIENT
Start: 2025-01-14 | End: 2025-01-16

## 2025-01-14 RX ORDER — AMLODIPINE BESYLATE 5 MG/1
5 TABLET ORAL DAILY
Status: DISCONTINUED | OUTPATIENT
Start: 2025-01-14 | End: 2025-01-14

## 2025-01-14 RX ORDER — IPRATROPIUM BROMIDE AND ALBUTEROL SULFATE 2.5; .5 MG/3ML; MG/3ML
1 SOLUTION RESPIRATORY (INHALATION)
Status: DISCONTINUED | OUTPATIENT
Start: 2025-01-14 | End: 2025-01-15

## 2025-01-14 RX ORDER — PRAVASTATIN SODIUM 20 MG
10 TABLET ORAL NIGHTLY
Status: DISCONTINUED | OUTPATIENT
Start: 2025-01-14 | End: 2025-01-19 | Stop reason: HOSPADM

## 2025-01-14 RX ADMIN — CLINDAMYCIN PHOSPHATE 600 MG: 600 INJECTION, SOLUTION INTRAVENOUS at 16:21

## 2025-01-14 RX ADMIN — OXYCODONE 5 MG: 5 TABLET ORAL at 16:24

## 2025-01-14 RX ADMIN — IPRATROPIUM BROMIDE AND ALBUTEROL SULFATE 1 DOSE: .5; 2.5 SOLUTION RESPIRATORY (INHALATION) at 19:55

## 2025-01-14 RX ADMIN — HEPARIN SODIUM 1900 UNITS: 1000 INJECTION INTRAVENOUS; SUBCUTANEOUS at 12:53

## 2025-01-14 RX ADMIN — SODIUM CHLORIDE, PRESERVATIVE FREE 10 ML: 5 INJECTION INTRAVENOUS at 22:17

## 2025-01-14 RX ADMIN — GABAPENTIN 300 MG: 300 CAPSULE ORAL at 16:18

## 2025-01-14 RX ADMIN — HEPARIN SODIUM 5000 UNITS: 5000 INJECTION INTRAVENOUS; SUBCUTANEOUS at 05:30

## 2025-01-14 RX ADMIN — CARVEDILOL 12.5 MG: 12.5 TABLET, FILM COATED ORAL at 16:18

## 2025-01-14 RX ADMIN — HEPARIN SODIUM 1900 UNITS: 1000 INJECTION INTRAVENOUS; SUBCUTANEOUS at 12:54

## 2025-01-14 RX ADMIN — HEPARIN SODIUM 5000 UNITS: 5000 INJECTION INTRAVENOUS; SUBCUTANEOUS at 22:17

## 2025-01-14 RX ADMIN — HYDROMORPHONE HYDROCHLORIDE 0.25 MG: 1 INJECTION, SOLUTION INTRAMUSCULAR; INTRAVENOUS; SUBCUTANEOUS at 05:27

## 2025-01-14 RX ADMIN — TAMSULOSIN HYDROCHLORIDE 0.4 MG: 0.4 CAPSULE ORAL at 16:18

## 2025-01-14 RX ADMIN — HYDROMORPHONE HYDROCHLORIDE 0.25 MG: 1 INJECTION, SOLUTION INTRAMUSCULAR; INTRAVENOUS; SUBCUTANEOUS at 02:00

## 2025-01-14 RX ADMIN — CLINDAMYCIN PHOSPHATE 600 MG: 600 INJECTION, SOLUTION INTRAVENOUS at 08:54

## 2025-01-14 RX ADMIN — HYDROMORPHONE HYDROCHLORIDE 0.25 MG: 1 INJECTION, SOLUTION INTRAMUSCULAR; INTRAVENOUS; SUBCUTANEOUS at 08:33

## 2025-01-14 RX ADMIN — HEPARIN SODIUM 5000 UNITS: 5000 INJECTION INTRAVENOUS; SUBCUTANEOUS at 16:24

## 2025-01-14 RX ADMIN — HYDROMORPHONE HYDROCHLORIDE 0.25 MG: 1 INJECTION, SOLUTION INTRAMUSCULAR; INTRAVENOUS; SUBCUTANEOUS at 11:44

## 2025-01-14 ASSESSMENT — PAIN DESCRIPTION - LOCATION
LOCATION: LEG
LOCATION: LEG
LOCATION: LEG;FOOT
LOCATION: LEG
LOCATION: LEG
LOCATION: LEG;FOOT
LOCATION: LEG;FOOT
LOCATION: LEG

## 2025-01-14 ASSESSMENT — PAIN DESCRIPTION - DESCRIPTORS
DESCRIPTORS: STABBING
DESCRIPTORS: STABBING
DESCRIPTORS: DISCOMFORT
DESCRIPTORS: STABBING
DESCRIPTORS: DISCOMFORT
DESCRIPTORS: STABBING
DESCRIPTORS: DISCOMFORT
DESCRIPTORS: ACHING

## 2025-01-14 ASSESSMENT — PAIN DESCRIPTION - ORIENTATION
ORIENTATION: LEFT

## 2025-01-14 ASSESSMENT — PAIN SCALES - GENERAL
PAINLEVEL_OUTOF10: 8
PAINLEVEL_OUTOF10: 6
PAINLEVEL_OUTOF10: 5
PAINLEVEL_OUTOF10: 3
PAINLEVEL_OUTOF10: 8
PAINLEVEL_OUTOF10: 5
PAINLEVEL_OUTOF10: 7
PAINLEVEL_OUTOF10: 3
PAINLEVEL_OUTOF10: 7
PAINLEVEL_OUTOF10: 5
PAINLEVEL_OUTOF10: 5
PAINLEVEL_OUTOF10: 4
PAINLEVEL_OUTOF10: 6
PAINLEVEL_OUTOF10: 4
PAINLEVEL_OUTOF10: 6
PAINLEVEL_OUTOF10: 8

## 2025-01-14 ASSESSMENT — PAIN DESCRIPTION - PAIN TYPE: TYPE: ACUTE PAIN

## 2025-01-14 ASSESSMENT — PAIN SCALES - WONG BAKER
WONGBAKER_NUMERICALRESPONSE: NO HURT

## 2025-01-14 NOTE — CARE COORDINATION
Case Management Assessment  Initial Evaluation    Date/Time of Evaluation: 1/14/2025 10:27 AM  Assessment Completed by: Lorrie Velazquez RN    If patient is discharged prior to next notation, then this note serves as note for discharge by case management.    Patient Name: Vincenzo Darden                   YOB: 1965  Diagnosis: Cellulitis [L03.90]                   Date / Time: 1/12/2025  5:21 PM    Patient Admission Status: Inpatient   Readmission Risk (Low < 19, Mod (19-27), High > 27): Readmission Risk Score: 21.6    Current PCP: Shaikh Calix MD  PCP verified by CM? Yes    Chart Reviewed: Yes      History Provided by: Patient  Patient Orientation: Alert and Oriented, Person, Place, Situation    Patient Cognition: Alert    Hospitalization in the last 30 days (Readmission):  No    If yes, Readmission Assessment in  Navigator will be completed.    Advance Directives:      Code Status: Full Code   Patient's Primary Decision Maker is: Legal Next of Kin    Primary Decision Maker: Ashtyn Forde - Spouse - [phone]    Discharge Planning:    Patient lives with: Spouse/Significant Other Type of Home: House  Primary Care Giver: Self  Patient Support Systems include: Spouse/Significant Other   Current Financial resources: Medicare  Current community resources:    Current services prior to admission: Durable Medical Equipment            Current DME: Cane, Other (Comment) (knee scooter)            Type of Home Care services:  None    ADLS  Prior functional level: Independent in ADLs/IADLs  Current functional level: Independent in ADLs/IADLs    PT AM-PAC:   /24  OT AM-PAC:   /24    Family can provide assistance at DC: Yes  Would you like Case Management to discuss the discharge plan with any other family members/significant others, and if so, who? No  Plans to Return to Present Housing: Yes  Other Identified Issues/Barriers to RETURNING to current housing: n/a  Potential Assistance needed at discharge:

## 2025-01-15 ENCOUNTER — APPOINTMENT (OUTPATIENT)
Dept: CT IMAGING | Age: 60
DRG: 637 | End: 2025-01-15
Payer: MEDICARE

## 2025-01-15 ENCOUNTER — APPOINTMENT (OUTPATIENT)
Age: 60
DRG: 637 | End: 2025-01-15
Payer: MEDICARE

## 2025-01-15 LAB
AMMONIA PLAS-SCNC: 29 UMOL/L (ref 16–60)
ANION GAP SERPL CALCULATED.3IONS-SCNC: 16 MMOL/L (ref 9–16)
ANTI-XA UNFRAC HEPARIN: 0.13 IU/L
ANTI-XA UNFRAC HEPARIN: <0.1 IU/L
BODY TEMPERATURE: 37
BUN SERPL-MCNC: 33 MG/DL (ref 8–23)
CALCIUM SERPL-MCNC: 7.4 MG/DL (ref 8.6–10.4)
CHLORIDE SERPL-SCNC: 94 MMOL/L (ref 98–107)
CO2 SERPL-SCNC: 21 MMOL/L (ref 20–31)
COHGB MFR BLD: 1.9 % (ref 0–5)
CREAT SERPL-MCNC: 5.3 MG/DL (ref 0.7–1.2)
EKG ATRIAL RATE: 89 BPM
EKG P AXIS: 11 DEGREES
EKG P-R INTERVAL: 176 MS
EKG Q-T INTERVAL: 420 MS
EKG QRS DURATION: 152 MS
EKG QTC CALCULATION (BAZETT): 511 MS
EKG R AXIS: -66 DEGREES
EKG T AXIS: 12 DEGREES
EKG VENTRICULAR RATE: 89 BPM
ERYTHROCYTE [DISTWIDTH] IN BLOOD BY AUTOMATED COUNT: 15.8 % (ref 11.8–14.4)
FIO2 ON VENT: ABNORMAL %
FOLATE SERPL-MCNC: 13.6 NG/ML (ref 4.8–24.2)
GFR, ESTIMATED: 12 ML/MIN/1.73M2
GLUCOSE BLD-MCNC: 143 MG/DL (ref 75–110)
GLUCOSE BLD-MCNC: 151 MG/DL (ref 75–110)
GLUCOSE BLD-MCNC: 160 MG/DL (ref 75–110)
GLUCOSE BLD-MCNC: 70 MG/DL (ref 75–110)
GLUCOSE BLD-MCNC: 84 MG/DL (ref 75–110)
GLUCOSE SERPL-MCNC: 78 MG/DL (ref 74–99)
HCO3 VENOUS: 24.1 MMOL/L (ref 24–30)
HCT VFR BLD AUTO: 31.9 % (ref 40.7–50.3)
HGB BLD-MCNC: 10.4 G/DL (ref 13–17)
INR PPP: 1.1
LACTIC ACID, WHOLE BLOOD: 1.2 MMOL/L (ref 0.7–2.1)
MAGNESIUM SERPL-MCNC: 1.7 MG/DL (ref 1.6–2.4)
MCH RBC QN AUTO: 29.8 PG (ref 25.2–33.5)
MCHC RBC AUTO-ENTMCNC: 32.6 G/DL (ref 28.4–34.8)
MCV RBC AUTO: 91.4 FL (ref 82.6–102.9)
NRBC BLD-RTO: 0 PER 100 WBC
O2 SAT, VEN: 96.5 % (ref 60–85)
PARTIAL THROMBOPLASTIN TIME: 30.7 SEC (ref 23–36.5)
PCO2 VENOUS: 35 MM HG (ref 39–55)
PH VENOUS: 7.46 (ref 7.32–7.42)
PLATELET # BLD AUTO: 117 K/UL (ref 138–453)
PMV BLD AUTO: 11.3 FL (ref 8.1–13.5)
PO2 VENOUS: 81.2 MM HG (ref 30–50)
POC HCO3: 26.8 MMOL/L (ref 21–28)
POC O2 SATURATION: 74 % (ref 94–98)
POC PCO2: 37.2 MM HG (ref 35–48)
POC PH: 7.47 (ref 7.35–7.45)
POC PO2: 36.8 MM HG (ref 83–108)
POSITIVE BASE EXCESS, ART: 3 MMOL/L (ref 0–3)
POSITIVE BASE EXCESS, VEN: 0.5 MMOL/L (ref 0–2)
POTASSIUM SERPL-SCNC: 3.9 MMOL/L (ref 3.7–5.3)
PROCALCITONIN SERPL-MCNC: 1.62 NG/ML (ref 0–0.09)
PROTHROMBIN TIME: 14.3 SEC (ref 11.7–14.9)
RBC # BLD AUTO: 3.49 M/UL (ref 4.21–5.77)
SODIUM SERPL-SCNC: 131 MMOL/L (ref 136–145)
TROPONIN I SERPL HS-MCNC: 120 NG/L (ref 0–22)
TROPONIN I SERPL HS-MCNC: 133 NG/L (ref 0–22)
VIT B12 SERPL-MCNC: 373 PG/ML (ref 232–1245)
WBC OTHER # BLD: 10.6 K/UL (ref 3.5–11.3)

## 2025-01-15 PROCEDURE — 93005 ELECTROCARDIOGRAM TRACING: CPT

## 2025-01-15 PROCEDURE — 6370000000 HC RX 637 (ALT 250 FOR IP)

## 2025-01-15 PROCEDURE — 2500000003 HC RX 250 WO HCPCS

## 2025-01-15 PROCEDURE — 1200000000 HC SEMI PRIVATE

## 2025-01-15 PROCEDURE — 80048 BASIC METABOLIC PNL TOTAL CA: CPT

## 2025-01-15 PROCEDURE — 99232 SBSQ HOSP IP/OBS MODERATE 35: CPT | Performed by: INTERNAL MEDICINE

## 2025-01-15 PROCEDURE — 85520 HEPARIN ASSAY: CPT

## 2025-01-15 PROCEDURE — 82947 ASSAY GLUCOSE BLOOD QUANT: CPT

## 2025-01-15 PROCEDURE — 36415 COLL VENOUS BLD VENIPUNCTURE: CPT

## 2025-01-15 PROCEDURE — 87040 BLOOD CULTURE FOR BACTERIA: CPT

## 2025-01-15 PROCEDURE — 82607 VITAMIN B-12: CPT

## 2025-01-15 PROCEDURE — 82805 BLOOD GASES W/O2 SATURATION: CPT

## 2025-01-15 PROCEDURE — 6360000002 HC RX W HCPCS

## 2025-01-15 PROCEDURE — 82140 ASSAY OF AMMONIA: CPT

## 2025-01-15 PROCEDURE — 82746 ASSAY OF FOLIC ACID SERUM: CPT

## 2025-01-15 PROCEDURE — 93010 ELECTROCARDIOGRAM REPORT: CPT | Performed by: INTERNAL MEDICINE

## 2025-01-15 PROCEDURE — 82803 BLOOD GASES ANY COMBINATION: CPT

## 2025-01-15 PROCEDURE — 99223 1ST HOSP IP/OBS HIGH 75: CPT | Performed by: INTERNAL MEDICINE

## 2025-01-15 PROCEDURE — 6370000000 HC RX 637 (ALT 250 FOR IP): Performed by: INTERNAL MEDICINE

## 2025-01-15 PROCEDURE — 84145 PROCALCITONIN (PCT): CPT

## 2025-01-15 PROCEDURE — 85027 COMPLETE CBC AUTOMATED: CPT

## 2025-01-15 PROCEDURE — 85610 PROTHROMBIN TIME: CPT

## 2025-01-15 PROCEDURE — 83735 ASSAY OF MAGNESIUM: CPT

## 2025-01-15 PROCEDURE — 2700000000 HC OXYGEN THERAPY PER DAY

## 2025-01-15 PROCEDURE — 94640 AIRWAY INHALATION TREATMENT: CPT

## 2025-01-15 PROCEDURE — 70450 CT HEAD/BRAIN W/O DYE: CPT

## 2025-01-15 PROCEDURE — 85730 THROMBOPLASTIN TIME PARTIAL: CPT

## 2025-01-15 PROCEDURE — 99233 SBSQ HOSP IP/OBS HIGH 50: CPT | Performed by: INTERNAL MEDICINE

## 2025-01-15 PROCEDURE — 83605 ASSAY OF LACTIC ACID: CPT

## 2025-01-15 PROCEDURE — 36600 WITHDRAWAL OF ARTERIAL BLOOD: CPT

## 2025-01-15 RX ORDER — AMLODIPINE BESYLATE 5 MG/1
5 TABLET ORAL DAILY
Status: DISCONTINUED | OUTPATIENT
Start: 2025-01-16 | End: 2025-01-19 | Stop reason: HOSPADM

## 2025-01-15 RX ORDER — HEPARIN SODIUM 1000 [USP'U]/ML
4000 INJECTION, SOLUTION INTRAVENOUS; SUBCUTANEOUS ONCE
Status: COMPLETED | OUTPATIENT
Start: 2025-01-15 | End: 2025-01-15

## 2025-01-15 RX ORDER — INSULIN LISPRO 100 [IU]/ML
0-4 INJECTION, SOLUTION INTRAVENOUS; SUBCUTANEOUS
Status: DISCONTINUED | OUTPATIENT
Start: 2025-01-15 | End: 2025-01-17

## 2025-01-15 RX ORDER — MAGNESIUM SULFATE IN WATER 40 MG/ML
2000 INJECTION, SOLUTION INTRAVENOUS ONCE
Status: COMPLETED | OUTPATIENT
Start: 2025-01-15 | End: 2025-01-15

## 2025-01-15 RX ORDER — IPRATROPIUM BROMIDE AND ALBUTEROL SULFATE 2.5; .5 MG/3ML; MG/3ML
1 SOLUTION RESPIRATORY (INHALATION)
Status: DISCONTINUED | OUTPATIENT
Start: 2025-01-15 | End: 2025-01-19 | Stop reason: HOSPADM

## 2025-01-15 RX ORDER — HEPARIN SODIUM 1000 [USP'U]/ML
2000 INJECTION, SOLUTION INTRAVENOUS; SUBCUTANEOUS PRN
Status: DISCONTINUED | OUTPATIENT
Start: 2025-01-15 | End: 2025-01-15

## 2025-01-15 RX ORDER — MIDODRINE HYDROCHLORIDE 5 MG/1
10 TABLET ORAL 3 TIMES DAILY PRN
Status: DISCONTINUED | OUTPATIENT
Start: 2025-01-15 | End: 2025-01-19 | Stop reason: HOSPADM

## 2025-01-15 RX ORDER — HEPARIN SODIUM 10000 [USP'U]/100ML
5-30 INJECTION, SOLUTION INTRAVENOUS CONTINUOUS
Status: DISCONTINUED | OUTPATIENT
Start: 2025-01-15 | End: 2025-01-15

## 2025-01-15 RX ORDER — HEPARIN SODIUM 1000 [USP'U]/ML
4000 INJECTION, SOLUTION INTRAVENOUS; SUBCUTANEOUS PRN
Status: DISCONTINUED | OUTPATIENT
Start: 2025-01-15 | End: 2025-01-15

## 2025-01-15 RX ADMIN — HEPARIN SODIUM 8 UNITS/KG/HR: 10000 INJECTION, SOLUTION INTRAVENOUS at 04:38

## 2025-01-15 RX ADMIN — IPRATROPIUM BROMIDE AND ALBUTEROL SULFATE 1 DOSE: .5; 2.5 SOLUTION RESPIRATORY (INHALATION) at 08:18

## 2025-01-15 RX ADMIN — CLINDAMYCIN PHOSPHATE 600 MG: 600 INJECTION, SOLUTION INTRAVENOUS at 08:32

## 2025-01-15 RX ADMIN — CLINDAMYCIN PHOSPHATE 600 MG: 600 INJECTION, SOLUTION INTRAVENOUS at 00:03

## 2025-01-15 RX ADMIN — TAMSULOSIN HYDROCHLORIDE 0.4 MG: 0.4 CAPSULE ORAL at 10:21

## 2025-01-15 RX ADMIN — GABAPENTIN 300 MG: 300 CAPSULE ORAL at 10:21

## 2025-01-15 RX ADMIN — MAGNESIUM SULFATE HEPTAHYDRATE 2000 MG: 40 INJECTION, SOLUTION INTRAVENOUS at 14:48

## 2025-01-15 RX ADMIN — ACETAMINOPHEN 650 MG: 325 TABLET ORAL at 14:44

## 2025-01-15 RX ADMIN — ACETAMINOPHEN 650 MG: 325 TABLET ORAL at 22:37

## 2025-01-15 RX ADMIN — HEPARIN SODIUM 4000 UNITS: 1000 INJECTION INTRAVENOUS; SUBCUTANEOUS at 04:31

## 2025-01-15 RX ADMIN — PRAVASTATIN SODIUM 10 MG: 20 TABLET ORAL at 22:37

## 2025-01-15 RX ADMIN — IPRATROPIUM BROMIDE AND ALBUTEROL SULFATE 1 DOSE: .5; 2.5 SOLUTION RESPIRATORY (INHALATION) at 20:55

## 2025-01-15 RX ADMIN — MIDODRINE HYDROCHLORIDE 10 MG: 5 TABLET ORAL at 18:27

## 2025-01-15 RX ADMIN — SODIUM CHLORIDE, PRESERVATIVE FREE 10 ML: 5 INJECTION INTRAVENOUS at 22:38

## 2025-01-15 RX ADMIN — CLINDAMYCIN PHOSPHATE 600 MG: 600 INJECTION, SOLUTION INTRAVENOUS at 16:27

## 2025-01-15 ASSESSMENT — PAIN DESCRIPTION - ORIENTATION
ORIENTATION: LEFT
ORIENTATION: LEFT

## 2025-01-15 ASSESSMENT — PAIN DESCRIPTION - DESCRIPTORS
DESCRIPTORS: DISCOMFORT
DESCRIPTORS: ACHING

## 2025-01-15 ASSESSMENT — PAIN SCALES - GENERAL
PAINLEVEL_OUTOF10: 2
PAINLEVEL_OUTOF10: 3
PAINLEVEL_OUTOF10: 7
PAINLEVEL_OUTOF10: 6

## 2025-01-15 ASSESSMENT — PAIN DESCRIPTION - LOCATION
LOCATION: LEG
LOCATION: LEG

## 2025-01-15 ASSESSMENT — PAIN SCALES - WONG BAKER: WONGBAKER_NUMERICALRESPONSE: NO HURT

## 2025-01-15 NOTE — RT PROTOCOL NOTE
RT Inhaler-Nebulizer Bronchodilator Protocol Note    There is a bronchodilator order in the chart from a provider indicating to follow the RT Bronchodilator Protocol and there is an “Initiate RT Inhaler-Nebulizer Bronchodilator Protocol” order as well (see protocol at bottom of note).    CXR Findings:  XR CHEST PORTABLE    Result Date: 1/14/2025  CHF with left lower lobe airspace disease.       The findings from the last RT Protocol Assessment were as follows:   History Pulmonary Disease: Smoker 15 pack years or more  Respiratory Pattern: Dyspnea on exertion or RR 21-25 bpm  Breath Sounds: Slightly diminished and/or crackles  Cough: Strong, spontaneous, non-productive  Indication for Bronchodilator Therapy:    Bronchodilator Assessment Score: 5    Aerosolized bronchodilator medication orders have been revised according to the RT Inhaler-Nebulizer Bronchodilator Protocol below.    Respiratory Therapist to perform RT Therapy Protocol Assessment initially then follow the protocol.  Repeat RT Therapy Protocol Assessment PRN for score 0-3 or on second treatment, BID, and PRN for scores above 3.    No Indications - adjust the frequency to every 6 hours PRN wheezing or bronchospasm, if no treatments needed after 48 hours then discontinue using Per Protocol order mode.     If indication present, adjust the RT bronchodilator orders based on the Bronchodilator Assessment Score as indicated below.  Use Inhaler orders unless patient has one or more of the following: on home nebulizer, not able to hold breath for 10 seconds, is not alert and oriented, cannot activate and use MDI correctly, or respiratory rate 25 breaths per minute or more, then use the equivalent nebulizer order(s) with same Frequency and PRN reasons based on the score.  If a patient is on this medication at home then do not decrease Frequency below that used at home.    0-3 - enter or revise RT bronchodilator order(s) to equivalent RT Bronchodilator order with

## 2025-01-16 ENCOUNTER — APPOINTMENT (OUTPATIENT)
Dept: GENERAL RADIOLOGY | Age: 60
DRG: 637 | End: 2025-01-16
Payer: MEDICARE

## 2025-01-16 ENCOUNTER — APPOINTMENT (OUTPATIENT)
Dept: CT IMAGING | Age: 60
DRG: 637 | End: 2025-01-16
Payer: MEDICARE

## 2025-01-16 ENCOUNTER — APPOINTMENT (OUTPATIENT)
Age: 60
DRG: 637 | End: 2025-01-16
Payer: MEDICARE

## 2025-01-16 PROBLEM — I95.1 ORTHOSTATIC HYPOTENSION: Status: ACTIVE | Noted: 2025-01-16

## 2025-01-16 LAB
ALBUMIN SERPL-MCNC: 2.5 G/DL (ref 3.5–5.2)
ALBUMIN/GLOB SERPL: 0.8 {RATIO} (ref 1–2.5)
ALLEN TEST: POSITIVE
ALP SERPL-CCNC: 63 U/L (ref 40–129)
ALT SERPL-CCNC: 19 U/L (ref 10–50)
ANION GAP SERPL CALCULATED.3IONS-SCNC: 15 MMOL/L (ref 9–16)
AST SERPL-CCNC: 43 U/L (ref 10–50)
BASOPHILS # BLD: 0.03 K/UL (ref 0–0.2)
BASOPHILS NFR BLD: 0 % (ref 0–2)
BILIRUB DIRECT SERPL-MCNC: 0.3 MG/DL (ref 0–0.2)
BILIRUB INDIRECT SERPL-MCNC: 0.2 MG/DL (ref 0–1)
BILIRUB SERPL-MCNC: 0.5 MG/DL (ref 0–1.2)
BODY TEMPERATURE: 37
BUN BLD-MCNC: 44 MG/DL (ref 8–26)
BUN SERPL-MCNC: 46 MG/DL (ref 8–23)
CA-I BLD-SCNC: 1.03 MMOL/L (ref 1.15–1.33)
CALCIUM SERPL-MCNC: 7.2 MG/DL (ref 8.6–10.4)
CHLORIDE BLD-SCNC: 92 MMOL/L (ref 98–107)
CHLORIDE SERPL-SCNC: 93 MMOL/L (ref 98–107)
CO2 BLD CALC-SCNC: 24 MMOL/L (ref 22–30)
CO2 SERPL-SCNC: 21 MMOL/L (ref 20–31)
COHGB MFR BLD: 0.8 % (ref 0–5)
CREAT SERPL-MCNC: 7.8 MG/DL (ref 0.7–1.2)
CRP SERPL HS-MCNC: 179 MG/L (ref 0–5)
EGFR, POC: 8 ML/MIN/1.73M2
EKG ATRIAL RATE: 73 BPM
EKG P AXIS: 7 DEGREES
EKG P-R INTERVAL: 204 MS
EKG Q-T INTERVAL: 452 MS
EKG QRS DURATION: 154 MS
EKG QTC CALCULATION (BAZETT): 497 MS
EKG R AXIS: -46 DEGREES
EKG T AXIS: 11 DEGREES
EKG VENTRICULAR RATE: 73 BPM
EOSINOPHIL # BLD: 0.12 K/UL (ref 0–0.44)
EOSINOPHILS RELATIVE PERCENT: 1 % (ref 1–4)
ERYTHROCYTE [DISTWIDTH] IN BLOOD BY AUTOMATED COUNT: 15.7 % (ref 11.8–14.4)
ERYTHROCYTE [SEDIMENTATION RATE] IN BLOOD BY PHOTOMETRIC METHOD: 47 MM/HR (ref 0–20)
FIO2 ON VENT: NORMAL %
FIO2: 4
GFR, ESTIMATED: 7 ML/MIN/1.73M2
GLOBULIN SER CALC-MCNC: 3.2 G/DL
GLUCOSE BLD-MCNC: 103 MG/DL (ref 75–110)
GLUCOSE BLD-MCNC: 110 MG/DL (ref 75–110)
GLUCOSE BLD-MCNC: 114 MG/DL (ref 75–110)
GLUCOSE BLD-MCNC: 127 MG/DL (ref 75–110)
GLUCOSE BLD-MCNC: 147 MG/DL (ref 74–100)
GLUCOSE BLD-MCNC: 444 MG/DL (ref 75–110)
GLUCOSE BLD-MCNC: 99 MG/DL (ref 75–110)
GLUCOSE SERPL-MCNC: 125 MG/DL (ref 74–99)
HCO3 VENOUS: 25.3 MMOL/L (ref 24–30)
HCT VFR BLD AUTO: 27 % (ref 41–53)
HCT VFR BLD AUTO: 28.9 % (ref 40.7–50.3)
HGB BLD-MCNC: 9.5 G/DL (ref 13–17)
IMM GRANULOCYTES # BLD AUTO: 0.06 K/UL (ref 0–0.3)
IMM GRANULOCYTES NFR BLD: 1 %
LYMPHOCYTES NFR BLD: 0.72 K/UL (ref 1.1–3.7)
LYMPHOCYTES RELATIVE PERCENT: 7 % (ref 24–43)
MCH RBC QN AUTO: 29.9 PG (ref 25.2–33.5)
MCHC RBC AUTO-ENTMCNC: 32.9 G/DL (ref 28.4–34.8)
MCV RBC AUTO: 90.9 FL (ref 82.6–102.9)
MONOCYTES NFR BLD: 0.75 K/UL (ref 0.1–1.2)
MONOCYTES NFR BLD: 8 % (ref 3–12)
NEGATIVE BASE EXCESS, ART: 0.2 MMOL/L (ref 0–2)
NEGATIVE BASE EXCESS, VEN: 0.5 MMOL/L (ref 0–2)
NEUTROPHILS NFR BLD: 83 % (ref 36–65)
NEUTS SEG NFR BLD: 8.32 K/UL (ref 1.5–8.1)
NRBC BLD-RTO: 0 PER 100 WBC
O2 DELIVERY DEVICE: ABNORMAL
O2 SAT, VEN: 68.7 % (ref 60–85)
PCO2 VENOUS: 46.8 MM HG (ref 39–55)
PH VENOUS: 7.35 (ref 7.32–7.42)
PLATELET # BLD AUTO: 105 K/UL (ref 138–453)
PMV BLD AUTO: 10.8 FL (ref 8.1–13.5)
PO2 VENOUS: 37.8 MM HG (ref 30–50)
POC ANION GAP: 13 MMOL/L (ref 7–16)
POC CREATININE: 7.6 MG/DL (ref 0.51–1.19)
POC HCO3: 24.5 MMOL/L (ref 21–28)
POC HEMOGLOBIN (CALC): 9.3 G/DL (ref 13.5–17.5)
POC LACTIC ACID: 0.7 MMOL/L (ref 0.56–1.39)
POC O2 SATURATION: 93.7 % (ref 94–98)
POC PCO2: 39.3 MM HG (ref 35–48)
POC PH: 7.4 (ref 7.35–7.45)
POC PO2: 69.5 MM HG (ref 83–108)
POTASSIUM BLD-SCNC: 3.5 MMOL/L (ref 3.5–4.5)
POTASSIUM SERPL-SCNC: 3.7 MMOL/L (ref 3.7–5.3)
PROCALCITONIN SERPL-MCNC: 1.37 NG/ML (ref 0–0.09)
PROT SERPL-MCNC: 5.7 G/DL (ref 6.6–8.7)
RBC # BLD AUTO: 3.18 M/UL (ref 4.21–5.77)
RBC # BLD: ABNORMAL 10*6/UL
SAMPLE SITE: ABNORMAL
SODIUM BLD-SCNC: 127 MMOL/L (ref 138–146)
SODIUM SERPL-SCNC: 129 MMOL/L (ref 136–145)
TROPONIN I SERPL HS-MCNC: 144 NG/L (ref 0–22)
WBC OTHER # BLD: 10 K/UL (ref 3.5–11.3)

## 2025-01-16 PROCEDURE — 93005 ELECTROCARDIOGRAM TRACING: CPT

## 2025-01-16 PROCEDURE — 83605 ASSAY OF LACTIC ACID: CPT

## 2025-01-16 PROCEDURE — 6360000002 HC RX W HCPCS

## 2025-01-16 PROCEDURE — 82947 ASSAY GLUCOSE BLOOD QUANT: CPT

## 2025-01-16 PROCEDURE — 85652 RBC SED RATE AUTOMATED: CPT

## 2025-01-16 PROCEDURE — 6370000000 HC RX 637 (ALT 250 FOR IP): Performed by: INTERNAL MEDICINE

## 2025-01-16 PROCEDURE — 82805 BLOOD GASES W/O2 SATURATION: CPT

## 2025-01-16 PROCEDURE — 86140 C-REACTIVE PROTEIN: CPT

## 2025-01-16 PROCEDURE — 82330 ASSAY OF CALCIUM: CPT

## 2025-01-16 PROCEDURE — 99233 SBSQ HOSP IP/OBS HIGH 50: CPT | Performed by: INTERNAL MEDICINE

## 2025-01-16 PROCEDURE — 90935 HEMODIALYSIS ONE EVALUATION: CPT

## 2025-01-16 PROCEDURE — 6370000000 HC RX 637 (ALT 250 FOR IP)

## 2025-01-16 PROCEDURE — 85025 COMPLETE CBC W/AUTO DIFF WBC: CPT

## 2025-01-16 PROCEDURE — 2580000003 HC RX 258

## 2025-01-16 PROCEDURE — 71260 CT THORAX DX C+: CPT

## 2025-01-16 PROCEDURE — 80051 ELECTROLYTE PANEL: CPT

## 2025-01-16 PROCEDURE — 36415 COLL VENOUS BLD VENIPUNCTURE: CPT

## 2025-01-16 PROCEDURE — 99221 1ST HOSP IP/OBS SF/LOW 40: CPT | Performed by: NURSE PRACTITIONER

## 2025-01-16 PROCEDURE — 36600 WITHDRAWAL OF ARTERIAL BLOOD: CPT

## 2025-01-16 PROCEDURE — 80076 HEPATIC FUNCTION PANEL: CPT

## 2025-01-16 PROCEDURE — 6360000002 HC RX W HCPCS: Performed by: INTERNAL MEDICINE

## 2025-01-16 PROCEDURE — 71045 X-RAY EXAM CHEST 1 VIEW: CPT

## 2025-01-16 PROCEDURE — 6360000004 HC RX CONTRAST MEDICATION: Performed by: STUDENT IN AN ORGANIZED HEALTH CARE EDUCATION/TRAINING PROGRAM

## 2025-01-16 PROCEDURE — 84145 PROCALCITONIN (PCT): CPT

## 2025-01-16 PROCEDURE — 94660 CPAP INITIATION&MGMT: CPT

## 2025-01-16 PROCEDURE — 99231 SBSQ HOSP IP/OBS SF/LOW 25: CPT | Performed by: INTERNAL MEDICINE

## 2025-01-16 PROCEDURE — 82803 BLOOD GASES ANY COMBINATION: CPT

## 2025-01-16 PROCEDURE — 99233 SBSQ HOSP IP/OBS HIGH 50: CPT | Performed by: NURSE PRACTITIONER

## 2025-01-16 PROCEDURE — 84520 ASSAY OF UREA NITROGEN: CPT

## 2025-01-16 PROCEDURE — 93010 ELECTROCARDIOGRAM REPORT: CPT | Performed by: INTERNAL MEDICINE

## 2025-01-16 PROCEDURE — 2500000003 HC RX 250 WO HCPCS

## 2025-01-16 PROCEDURE — 85014 HEMATOCRIT: CPT

## 2025-01-16 PROCEDURE — 82565 ASSAY OF CREATININE: CPT

## 2025-01-16 PROCEDURE — 84484 ASSAY OF TROPONIN QUANT: CPT

## 2025-01-16 PROCEDURE — 1200000000 HC SEMI PRIVATE

## 2025-01-16 PROCEDURE — 80048 BASIC METABOLIC PNL TOTAL CA: CPT

## 2025-01-16 RX ORDER — IOPAMIDOL 755 MG/ML
75 INJECTION, SOLUTION INTRAVASCULAR
Status: COMPLETED | OUTPATIENT
Start: 2025-01-16 | End: 2025-01-16

## 2025-01-16 RX ORDER — INSULIN LISPRO 100 [IU]/ML
12 INJECTION, SOLUTION INTRAVENOUS; SUBCUTANEOUS ONCE
Status: COMPLETED | OUTPATIENT
Start: 2025-01-16 | End: 2025-01-16

## 2025-01-16 RX ORDER — LORAZEPAM 2 MG/ML
1 INJECTION INTRAMUSCULAR ONCE
Status: DISCONTINUED | OUTPATIENT
Start: 2025-01-16 | End: 2025-01-19 | Stop reason: HOSPADM

## 2025-01-16 RX ORDER — INSULIN LISPRO 100 [IU]/ML
12 INJECTION, SOLUTION INTRAVENOUS; SUBCUTANEOUS
Status: DISCONTINUED | OUTPATIENT
Start: 2025-01-17 | End: 2025-01-16

## 2025-01-16 RX ORDER — CLINDAMYCIN HYDROCHLORIDE 150 MG/1
600 CAPSULE ORAL EVERY 8 HOURS SCHEDULED
Status: DISCONTINUED | OUTPATIENT
Start: 2025-01-16 | End: 2025-01-18

## 2025-01-16 RX ADMIN — CLINDAMYCIN PHOSPHATE 600 MG: 600 INJECTION, SOLUTION INTRAVENOUS at 01:02

## 2025-01-16 RX ADMIN — TAMSULOSIN HYDROCHLORIDE 0.4 MG: 0.4 CAPSULE ORAL at 15:54

## 2025-01-16 RX ADMIN — Medication 2.5 MG: at 22:35

## 2025-01-16 RX ADMIN — INSULIN LISPRO 12 UNITS: 100 INJECTION, SOLUTION INTRAVENOUS; SUBCUTANEOUS at 22:44

## 2025-01-16 RX ADMIN — INSULIN GLARGINE 40 UNITS: 100 INJECTION, SOLUTION SUBCUTANEOUS at 22:37

## 2025-01-16 RX ADMIN — HEPARIN SODIUM 1900 UNITS: 1000 INJECTION INTRAVENOUS; SUBCUTANEOUS at 14:45

## 2025-01-16 RX ADMIN — SODIUM CHLORIDE, PRESERVATIVE FREE 10 ML: 5 INJECTION INTRAVENOUS at 22:26

## 2025-01-16 RX ADMIN — CLINDAMYCIN PHOSPHATE 600 MG: 600 INJECTION, SOLUTION INTRAVENOUS at 16:01

## 2025-01-16 RX ADMIN — HEPARIN SODIUM 1900 UNITS: 1000 INJECTION INTRAVENOUS; SUBCUTANEOUS at 14:32

## 2025-01-16 RX ADMIN — SODIUM CHLORIDE: 9 INJECTION, SOLUTION INTRAVENOUS at 01:00

## 2025-01-16 RX ADMIN — IOPAMIDOL 75 ML: 755 INJECTION, SOLUTION INTRAVENOUS at 14:39

## 2025-01-16 RX ADMIN — GABAPENTIN 300 MG: 300 CAPSULE ORAL at 09:56

## 2025-01-16 RX ADMIN — INSULIN LISPRO 4 UNITS: 100 INJECTION, SOLUTION INTRAVENOUS; SUBCUTANEOUS at 22:35

## 2025-01-16 RX ADMIN — CLINDAMYCIN PHOSPHATE 600 MG: 600 INJECTION, SOLUTION INTRAVENOUS at 09:02

## 2025-01-16 RX ADMIN — CLINDAMYCIN HYDROCHLORIDE 600 MG: 150 CAPSULE ORAL at 22:15

## 2025-01-16 RX ADMIN — ACETAMINOPHEN 650 MG: 325 TABLET ORAL at 15:54

## 2025-01-16 RX ADMIN — PRAVASTATIN SODIUM 10 MG: 20 TABLET ORAL at 22:15

## 2025-01-16 ASSESSMENT — PAIN DESCRIPTION - DESCRIPTORS: DESCRIPTORS: DISCOMFORT

## 2025-01-16 ASSESSMENT — PAIN DESCRIPTION - LOCATION: LOCATION: LEG

## 2025-01-16 ASSESSMENT — PAIN SCALES - WONG BAKER: WONGBAKER_NUMERICALRESPONSE: NO HURT

## 2025-01-16 ASSESSMENT — PAIN SCALES - GENERAL
PAINLEVEL_OUTOF10: 2
PAINLEVEL_OUTOF10: 3

## 2025-01-16 ASSESSMENT — PAIN DESCRIPTION - ORIENTATION: ORIENTATION: LEFT

## 2025-01-16 NOTE — PLAN OF CARE
Problem: Chronic Conditions and Co-morbidities  Goal: Patient's chronic conditions and co-morbidity symptoms are monitored and maintained or improved  Outcome: Progressing     Problem: Discharge Planning  Goal: Discharge to home or other facility with appropriate resources  Outcome: Progressing     Problem: Safety - Adult  Goal: Free from fall injury  Outcome: Progressing     Problem: Pain  Goal: Verbalizes/displays adequate comfort level or baseline comfort level  Outcome: Progressing     Problem: Respiratory - Adult  Goal: Achieves optimal ventilation and oxygenation  1/16/2025 0320 by Laurent Bales RN  Outcome: Progressing  1/15/2025 2057 by Rhett Espinoza RCP  Outcome: Progressing

## 2025-01-16 NOTE — CONSULTS
Orthopaedic Surgery Consult  (Dr. Meehan)    Time of Evaluation: 12:05 PM    CC/Reason for consult:  r/o LLE necrotizing fasciitis    HPI:      The patient is a 59 y.o. male that presented to [facility] emergency department earlier today due to left lower extremity weakness and pain.  The patient did miss his last dialysis appointment due to feeling poorly prior to showing up to the emergency department today.  Additionally the patient does see podiatry for their left great toe and heel wounds which have been improving.  Radiographs of the patient's left foot demonstrated osteolytic changes at the distal aspect of the left great toe as well as amputations of the distal aspect of the second and third toe and the presence of a chronic appearing residual fifth toe. Orthopedic surgery/podiatry was consulted prior to the patient's admission to the floor for concern of necrotizing fasciitis versus osteomyelitis.    On examination, the patient is resting comfortably in bed.   The patient agrees with the aforementioned. The patient states that he has very little pain to the left lower extremity.  The patient does state that he has had similar symptoms in the past that resulted from a virus.  The patient denies any current fevers or chills.  The patient does state that they follow-up with podiatry every Monday for the infections to their great toe and heel of the left foot.  Additionally the patient has underwent multiple different amputations to the distal aspect of the second and third toes of the left foot as well as the fifth toe.  The patient has a extensive past medical history consisting of coronary artery disease, type 2 diabetes, hypertension, recurrent UTIs, aortic valve disease, encephalopathy, osteomyelitis, and a right below the knee amputation in 2023.  The patient does have an extensive cardiac history consisting of a CABG procedure with the aortic valve replacement conducted on an early 2024.  At 
b/l groins radiating down b/l thighs, worsened with movement. Able to raise BUEs with no drift   Normal bulk & normal tone    SENSORY FUNCTION:  Grossly intact   CEREBELLAR FUNCTION:  No visible tremors   REFLEX FUNCTION:  BUEs 1/4   R BKA   Amputations distal aspect of L-sided 2nd, 3rd & 5th toes   STATION and GAIT  Not tested       Data:  Lab Results:   CBC:   Recent Labs     01/14/25  0520 01/15/25  0357 01/16/25  0549   WBC 6.2 10.6 10.0   HGB 10.9* 10.4* 9.5*   HCT 34.1* 31.9* 28.9*   * 117* 105*     BMP:    Recent Labs     01/14/25  0520 01/15/25  0357 01/16/25  0536 01/16/25  0549   * 131*  --  129*   K 3.6* 3.9  --  3.7   CL 94* 94*  --  93*   CO2 20 21  --  21   BUN 46* 33*  --  46*   CREATININE 6.1* 5.3* 7.6* 7.8*   GLUCOSE 160* 78  --  125*         Lab Results   Component Value Date    ALT 19 01/16/2025    AST 43 01/16/2025    AMMONIA 29 01/15/2025    TSH 2.14 01/13/2025    INR 1.1 01/15/2025    LABA1C 12.0 (H) 01/22/2024    [id number] 373 01/15/2025    FOLATE 13.6 01/15/2025    MG 1.7 01/15/2025    PHOS 3.7 10/06/2023     Lab Results   Component Value Date    CHOL 98 06/03/2024    CHOL 107 10/05/2023     Lab Results   Component Value Date    TRIG 124 06/03/2024    TRIG 118 10/05/2023     Lab Results   Component Value Date    HDL 31 (L) 06/03/2024    HDL 32 (L) 10/05/2023     Lab Results   Component Value Date    LDL 42 06/03/2024    LDL 51 10/05/2023     Lab Results   Component Value Date    VLDL 25 06/03/2024     Lab Results   Component Value Date    CHOLHDLRATIO 3.0 06/03/2024    CHOLHDLRATIO 3.3 10/05/2023         Diagnostic data reviewed:  CT HEAD (1/15/2025): No acute intracranial abnormality     CTA HEAD & NECK: Cannot be done due to ESRD    BRAIN MRI: Cannot be done due to retained epicardial wires    CAROTID DOPPLER: Ordered    ECHO: Awaited    EEG: Ordered      REPEAT ORTHOSTATIC BP (1/15/25):  Supine    130/78  Sitting      135/78  Standing  101/63                Impression:   LLE 
episode post HD 1/13, Orthotast vitals negative next day 1/14  likely orthostatic after HD vs Vasovagal  CAD/MVD s/p CABG x3 LIMA to LAD, RSVG1 to large high Diag/Ramus, RSVG2 to LPDA with AVR on 2/26/24   HFmrEF 45-50%, Biplane 55% on TTE 6/2024  PAD s/p right BKA  HTN, HLD, DM2  Obesity with BMI > 35    RECOMMENDATIONS:  Discontinue heparin drip  Follow-up 2D echo, further recs to follow based on Echo  Continue ASA, statin  Hold off antihypertensives this morning given soft BP.  Decrease amlodipine 5 on discharge  Volume management per nephrology  Replace electrolytes to maintain K > 4, Mg>2  Rest of Care per primary team      Please wait for final attestation from rounding attending.      Abhilash Oshea MD  Cardiology Service  Internal Medicine Resident   [place], [place]      Attending Cardiologist Addendum: I have reviewed and performed the history, physical, subjective, objective, assessment, and plan with the resident/fellow/NP and agree with the note. I performed the history and physical personally. I have made changes to the note above as needed.    Thank you for allowing me to participate in the care of this patient, please do not hesitate to call if you have any questions.    April Gomez DO, [facility]  Board Certified Cardiologist  Fellow of the American College of Cardiology    Obesity & Weight Loss Medicine   of Medicine [facility]   of Medicine [facility]  [facility]  [phone]           
pruritis.  Neurological: No headache/diplopia/change in muscle strength/numbness or tingling. No change in gait, balance, coordination, mood, affect, memory, mentation, behavior.  Psychiatric: No anxiety/depression.  Endocrine: No temperature intolerance. No excessive thirst, fluid intake, or urination. No tremor.  Hematologic/Lymphatic: No abnormal bruising or bleeding, blood clots or swolle lymph nodes.  Allergic/Immunologic: No nasal congestion or hives    EXAMINATION       Vitals:    01/12/25 2119 01/12/25 2150 01/12/25 2221 01/13/25 0743   BP: (!) 127/90   (!) 148/93   Pulse: 83  76 76   Resp: 18  20 20   Temp: 98.2 °F (36.8 °C)   98.3 °F (36.8 °C)   TempSrc: Oral   Oral   SpO2: 97%  97%    Weight:  127 kg (279 lb 15.8 oz)     Height:  1.829 m (6')       24HR INTAKE/OUTPUT:    Intake/Output Summary (Last 24 hours) at 1/13/2025 0942  Last data filed at 1/13/2025 0722  Gross per 24 hour   Intake 183.62 ml   Output --   Net 183.62 ml       General appearance:Awake, alert, in no acute distress  Skin: warm and dry, no rash or erythema  Eyes: conjunctivae normal and sclera anicteric  ENT: no thrush no pharyngeal congestion  orodental hygiene   Neck: No JVD, Lymphadenopathty or thyromegaly  Respiratory: vesicular breath sounds,no wheeze/crackles  Cardiovascular: S1 S2 normal, present ESM  Abdomen:Non tender/non distended.Bowel sounds present  Extremities: No Cyanosis or Clubbing, present lower extremity edema right TKA  Neurological:Alert and oriented.No abnormalities of mood, affect, memory, mentation, or behavior are noted    INVESTIGATIONS     PTH:  No results found for: \"PTH\"  abs:   CBC:   Recent Labs     01/12/25  1750 01/12/25  2223   WBC 12.5* 10.2   RBC 4.12* 3.73*   HGB 12.4* 11.2*   HCT 36.8* 34.5*   MCV 89.3 92.5   MCH 30.1 30.0   MCHC 33.7 32.5   RDW 15.6* 15.7*    153   MPV 9.8 10.2      BMP:   Recent Labs     01/12/25  1747 01/13/25  0609   * 134*   K 4.6 4.0   CL 98 102   CO2 15* 15*   BUN

## 2025-01-16 NOTE — PLAN OF CARE
5 AM, rapid response was called that patient was lethargic and apparently passed out, on my arrival patient was sleeping in the bedside chair, arousable on physical stimuli, he was alert and oriented x 4, but dozing off into sleep mid conversation, EKG was obtained revealing no acute changes, patient denied any chest pain, he denied any dizziness, reported that he \" remembers going to sleep and being woken up multiple times\", no overt seizure-like activity, between the dosing of episodes patient is alert and oriented, blood glucose was around 99, he was given some orange juice by the nursing staff, ABG obtained showed mild hypoxemia, patient was comfortable saturating around 93 on room air, no wheezes or rales on lung exam, in between dozing episodes his neuro exam is unremarkable, he did not receive any narcotics in the past 24hours, orthostatics were  negative, he denied any significant pain     Syncope spell vs Obesity hypoventilation/sleep apnea   Nursing staff reports that patient was totally fine but has been dozing off mid conversation , he was able to tolerate transfer from bed to bedside chair and was okay for 20 minutes, patient reports \"he drifted to sleep\"  - EKG did not show any av block , he is hemodynamically stable, cardiology is already on board, and a 2d echo is pending, will await on that   -poc labs done and reviewed  -f/u troponin   -will place on cpap now, given hypoxemia, he was on bipap/cpap during previous hospital admits and was advised to get a sleep study , he is safe to be placed on cpap   -cxr pending   -fall precautions until complete workup is done,   -no urgent need for brain imaging now, if symptoms persist, can order carotid dopplers, update cardiology    Jey Garcia MD  Internal Medicine Resident, PGY-2  [facility]; [place], [place]  1/16/2025, 6:01 AM

## 2025-01-16 NOTE — PLAN OF CARE
Problem: Respiratory - Adult  Goal: Achieves optimal ventilation and oxygenation  Outcome: Progressing   BRONCHOSPASM/BRONCHOCONSTRICTION     [x]         IMPROVE AERATION/BREATH SOUNDS  [x]   ADMINISTER BRONCHODILATOR THERAPY AS APPROPRIATE  [x]   ASSESS BREATH SOUNDS  []   IMPLEMENT AEROSOL/MDI PROTOCOL  [x]   PATIENT EDUCATION AS NEEDED     Detail Level: Simple Consent: The patient's consent was obtained including but not limited to risks of crusting, scabbing, scarring, blistering, darker or lighter pigmentary change, recurrence, incomplete removal and infection. Add 52 Modifier (Optional): no Medical Necessity Clause: This procedure was medically necessary because the lesions that were treated were: Post-Care Instructions: I reviewed with the patient in detail post-care instructions. The patient understands that the treated areas should be washed off 8 hours after application. Medical Necessity Information: It is in your best interest to select a reason for this procedure from the list below. All of these items fulfill various CMS LCD requirements except the new and changing color options. Strength: Canthacur Curette Text: Prior to application of canthacur the lesions were lightly pared with a 15 blade. Canthacur was applied and covered with paper tape. Patient tolerated procedure well. No complications.

## 2025-01-17 ENCOUNTER — APPOINTMENT (OUTPATIENT)
Dept: VASCULAR LAB | Age: 60
DRG: 637 | End: 2025-01-17
Payer: MEDICARE

## 2025-01-17 ENCOUNTER — APPOINTMENT (OUTPATIENT)
Age: 60
DRG: 637 | End: 2025-01-17
Payer: MEDICARE

## 2025-01-17 LAB
ANION GAP SERPL CALCULATED.3IONS-SCNC: 15 MMOL/L (ref 9–16)
BASOPHILS # BLD: <0.03 K/UL (ref 0–0.2)
BASOPHILS NFR BLD: 0 % (ref 0–2)
BUN SERPL-MCNC: 36 MG/DL (ref 8–23)
CA-I BLD-SCNC: 0.94 MMOL/L (ref 1.13–1.33)
CALCIUM SERPL-MCNC: 7.1 MG/DL (ref 8.6–10.4)
CHLORIDE SERPL-SCNC: 91 MMOL/L (ref 98–107)
CO2 SERPL-SCNC: 22 MMOL/L (ref 20–31)
CREAT SERPL-MCNC: 6.1 MG/DL (ref 0.7–1.2)
ECHO AV AREA PEAK VELOCITY: 1.3 CM2
ECHO AV AREA VTI: 1.2 CM2
ECHO AV AREA/BSA PEAK VELOCITY: 0.5 CM2/M2
ECHO AV AREA/BSA VTI: 0.5 CM2/M2
ECHO AV MEAN GRADIENT: 17 MMHG
ECHO AV MEAN VELOCITY: 1.9 M/S
ECHO AV PEAK GRADIENT: 30 MMHG
ECHO AV PEAK VELOCITY: 2.7 M/S
ECHO AV VELOCITY RATIO: 0.41
ECHO AV VTI: 62.4 CM
ECHO BSA: 2.54 M2
ECHO LV EDV A2C: 156 ML
ECHO LV EDV A4C: 154 ML
ECHO LV EDV INDEX A4C: 64 ML/M2
ECHO LV EDV NDEX A2C: 65 ML/M2
ECHO LV EJECTION FRACTION A2C: 60 %
ECHO LV EJECTION FRACTION A4C: 45 %
ECHO LV EJECTION FRACTION BIPLANE: 52 % (ref 55–100)
ECHO LV ESV A2C: 62 ML
ECHO LV ESV A4C: 86 ML
ECHO LV ESV INDEX A2C: 26 ML/M2
ECHO LV ESV INDEX A4C: 36 ML/M2
ECHO LVOT AREA: 3.1 CM2
ECHO LVOT AV VTI INDEX: 0.37
ECHO LVOT DIAM: 2 CM
ECHO LVOT MEAN GRADIENT: 2 MMHG
ECHO LVOT PEAK GRADIENT: 5 MMHG
ECHO LVOT PEAK VELOCITY: 1.1 M/S
ECHO LVOT STROKE VOLUME INDEX: 29.8 ML/M2
ECHO LVOT SV: 71.6 ML
ECHO LVOT VTI: 22.8 CM
ECHO MV AREA PHT: 1.7 CM2
ECHO MV AREA VTI: 1.4 CM2
ECHO MV LVOT VTI INDEX: 2.19
ECHO MV MAX VELOCITY: 1.3 M/S
ECHO MV MEAN GRADIENT: 4 MMHG
ECHO MV MEAN VELOCITY: 0.9 M/S
ECHO MV PEAK GRADIENT: 7 MMHG
ECHO MV PRESSURE HALF TIME (PHT): 129 MS
ECHO MV VTI: 49.9 CM
ECHO RV FREE WALL PEAK S': 7.9 CM/S
ECHO RV TAPSE: 1.5 CM (ref 1.7–?)
EOSINOPHIL # BLD: 0.38 K/UL (ref 0–0.44)
EOSINOPHILS RELATIVE PERCENT: 5 % (ref 1–4)
ERYTHROCYTE [DISTWIDTH] IN BLOOD BY AUTOMATED COUNT: 15.6 % (ref 11.8–14.4)
GFR, ESTIMATED: 10 ML/MIN/1.73M2
GLUCOSE BLD-MCNC: 102 MG/DL (ref 75–110)
GLUCOSE BLD-MCNC: 161 MG/DL (ref 75–110)
GLUCOSE BLD-MCNC: 220 MG/DL (ref 75–110)
GLUCOSE BLD-MCNC: 235 MG/DL (ref 75–110)
GLUCOSE SERPL-MCNC: 104 MG/DL (ref 74–99)
HCT VFR BLD AUTO: 28.1 % (ref 40.7–50.3)
HGB BLD-MCNC: 9.1 G/DL (ref 13–17)
IMM GRANULOCYTES # BLD AUTO: 0.05 K/UL (ref 0–0.3)
IMM GRANULOCYTES NFR BLD: 1 %
LYMPHOCYTES NFR BLD: 0.85 K/UL (ref 1.1–3.7)
LYMPHOCYTES RELATIVE PERCENT: 12 % (ref 24–43)
MAGNESIUM SERPL-MCNC: 2.2 MG/DL (ref 1.6–2.4)
MCH RBC QN AUTO: 29.4 PG (ref 25.2–33.5)
MCHC RBC AUTO-ENTMCNC: 32.4 G/DL (ref 28.4–34.8)
MCV RBC AUTO: 90.9 FL (ref 82.6–102.9)
MICROORGANISM SPEC CULT: NORMAL
MICROORGANISM SPEC CULT: NORMAL
MONOCYTES NFR BLD: 0.96 K/UL (ref 0.1–1.2)
MONOCYTES NFR BLD: 14 % (ref 3–12)
NEUTROPHILS NFR BLD: 68 % (ref 36–65)
NEUTS SEG NFR BLD: 4.75 K/UL (ref 1.5–8.1)
NRBC BLD-RTO: 0 PER 100 WBC
PLATELET # BLD AUTO: 118 K/UL (ref 138–453)
PMV BLD AUTO: 11.3 FL (ref 8.1–13.5)
POTASSIUM SERPL-SCNC: 3.4 MMOL/L (ref 3.7–5.3)
RBC # BLD AUTO: 3.09 M/UL (ref 4.21–5.77)
RBC # BLD: ABNORMAL 10*6/UL
SERVICE CMNT-IMP: NORMAL
SERVICE CMNT-IMP: NORMAL
SODIUM SERPL-SCNC: 128 MMOL/L (ref 136–145)
SPECIMEN DESCRIPTION: NORMAL
SPECIMEN DESCRIPTION: NORMAL
WBC OTHER # BLD: 7 K/UL (ref 3.5–11.3)

## 2025-01-17 PROCEDURE — 93325 DOPPLER ECHO COLOR FLOW MAPG: CPT | Performed by: INTERNAL MEDICINE

## 2025-01-17 PROCEDURE — 6370000000 HC RX 637 (ALT 250 FOR IP)

## 2025-01-17 PROCEDURE — 94640 AIRWAY INHALATION TREATMENT: CPT

## 2025-01-17 PROCEDURE — 83735 ASSAY OF MAGNESIUM: CPT

## 2025-01-17 PROCEDURE — 6360000002 HC RX W HCPCS

## 2025-01-17 PROCEDURE — 2700000000 HC OXYGEN THERAPY PER DAY

## 2025-01-17 PROCEDURE — APPSS30 APP SPLIT SHARED TIME 16-30 MINUTES: Performed by: NURSE PRACTITIONER

## 2025-01-17 PROCEDURE — 93308 TTE F-UP OR LMTD: CPT | Performed by: INTERNAL MEDICINE

## 2025-01-17 PROCEDURE — 6370000000 HC RX 637 (ALT 250 FOR IP): Performed by: INTERNAL MEDICINE

## 2025-01-17 PROCEDURE — 1200000000 HC SEMI PRIVATE

## 2025-01-17 PROCEDURE — 82330 ASSAY OF CALCIUM: CPT

## 2025-01-17 PROCEDURE — 80048 BASIC METABOLIC PNL TOTAL CA: CPT

## 2025-01-17 PROCEDURE — 99232 SBSQ HOSP IP/OBS MODERATE 35: CPT | Performed by: INTERNAL MEDICINE

## 2025-01-17 PROCEDURE — 82947 ASSAY GLUCOSE BLOOD QUANT: CPT

## 2025-01-17 PROCEDURE — 93321 DOPPLER ECHO F-UP/LMTD STD: CPT

## 2025-01-17 PROCEDURE — 2500000003 HC RX 250 WO HCPCS

## 2025-01-17 PROCEDURE — 85025 COMPLETE CBC W/AUTO DIFF WBC: CPT

## 2025-01-17 PROCEDURE — 93880 EXTRACRANIAL BILAT STUDY: CPT

## 2025-01-17 PROCEDURE — 99233 SBSQ HOSP IP/OBS HIGH 50: CPT | Performed by: NURSE PRACTITIONER

## 2025-01-17 PROCEDURE — 36415 COLL VENOUS BLD VENIPUNCTURE: CPT

## 2025-01-17 PROCEDURE — 6370000000 HC RX 637 (ALT 250 FOR IP): Performed by: NURSE PRACTITIONER

## 2025-01-17 PROCEDURE — 93321 DOPPLER ECHO F-UP/LMTD STD: CPT | Performed by: INTERNAL MEDICINE

## 2025-01-17 RX ORDER — INSULIN LISPRO 100 [IU]/ML
0-8 INJECTION, SOLUTION INTRAVENOUS; SUBCUTANEOUS
Status: DISCONTINUED | OUTPATIENT
Start: 2025-01-17 | End: 2025-01-19 | Stop reason: HOSPADM

## 2025-01-17 RX ORDER — POTASSIUM CHLORIDE 1500 MG/1
20 TABLET, EXTENDED RELEASE ORAL ONCE
Status: COMPLETED | OUTPATIENT
Start: 2025-01-17 | End: 2025-01-17

## 2025-01-17 RX ADMIN — IPRATROPIUM BROMIDE AND ALBUTEROL SULFATE 1 DOSE: .5; 2.5 SOLUTION RESPIRATORY (INHALATION) at 08:55

## 2025-01-17 RX ADMIN — SODIUM CHLORIDE, PRESERVATIVE FREE 10 ML: 5 INJECTION INTRAVENOUS at 09:08

## 2025-01-17 RX ADMIN — POTASSIUM CHLORIDE 20 MEQ: 1500 TABLET, EXTENDED RELEASE ORAL at 13:14

## 2025-01-17 RX ADMIN — CARVEDILOL 12.5 MG: 12.5 TABLET, FILM COATED ORAL at 16:37

## 2025-01-17 RX ADMIN — CLINDAMYCIN HYDROCHLORIDE 600 MG: 150 CAPSULE ORAL at 07:11

## 2025-01-17 RX ADMIN — INSULIN GLARGINE 40 UNITS: 100 INJECTION, SOLUTION SUBCUTANEOUS at 21:41

## 2025-01-17 RX ADMIN — PRAVASTATIN SODIUM 10 MG: 20 TABLET ORAL at 21:26

## 2025-01-17 RX ADMIN — ACETAMINOPHEN 650 MG: 325 TABLET ORAL at 02:05

## 2025-01-17 RX ADMIN — INSULIN LISPRO 4 UNITS: 100 INJECTION, SOLUTION INTRAVENOUS; SUBCUTANEOUS at 21:39

## 2025-01-17 RX ADMIN — CLINDAMYCIN HYDROCHLORIDE 600 MG: 150 CAPSULE ORAL at 21:43

## 2025-01-17 RX ADMIN — TAMSULOSIN HYDROCHLORIDE 0.4 MG: 0.4 CAPSULE ORAL at 09:18

## 2025-01-17 RX ADMIN — INSULIN LISPRO 2 UNITS: 100 INJECTION, SOLUTION INTRAVENOUS; SUBCUTANEOUS at 16:37

## 2025-01-17 RX ADMIN — GABAPENTIN 300 MG: 300 CAPSULE ORAL at 09:18

## 2025-01-17 RX ADMIN — PERFLUTREN 1.5 ML: 6.52 INJECTION, SUSPENSION INTRAVENOUS at 08:10

## 2025-01-17 RX ADMIN — Medication 2.5 MG: at 21:38

## 2025-01-17 RX ADMIN — SODIUM CHLORIDE, PRESERVATIVE FREE 10 ML: 5 INJECTION INTRAVENOUS at 21:43

## 2025-01-17 RX ADMIN — CLINDAMYCIN HYDROCHLORIDE 600 MG: 150 CAPSULE ORAL at 13:14

## 2025-01-17 RX ADMIN — IPRATROPIUM BROMIDE AND ALBUTEROL SULFATE 1 DOSE: .5; 2.5 SOLUTION RESPIRATORY (INHALATION) at 20:17

## 2025-01-17 ASSESSMENT — PAIN SCALES - GENERAL
PAINLEVEL_OUTOF10: 7
PAINLEVEL_OUTOF10: 4

## 2025-01-17 ASSESSMENT — PAIN DESCRIPTION - LOCATION: LOCATION: HIP

## 2025-01-17 ASSESSMENT — PAIN DESCRIPTION - ORIENTATION: ORIENTATION: RIGHT;LEFT

## 2025-01-17 ASSESSMENT — PAIN DESCRIPTION - DESCRIPTORS: DESCRIPTORS: DISCOMFORT

## 2025-01-17 NOTE — PLAN OF CARE
Problem: Chronic Conditions and Co-morbidities  Goal: Patient's chronic conditions and co-morbidity symptoms are monitored and maintained or improved  Outcome: Progressing     Problem: Discharge Planning  Goal: Discharge to home or other facility with appropriate resources  Outcome: Progressing     Problem: Safety - Adult  Goal: Free from fall injury  Outcome: Progressing     Problem: Pain  Goal: Verbalizes/displays adequate comfort level or baseline comfort level  Outcome: Progressing     Problem: Respiratory - Adult  Goal: Achieves optimal ventilation and oxygenation  Outcome: Progressing

## 2025-01-17 NOTE — PLAN OF CARE
Problem: Chronic Conditions and Co-morbidities  Goal: Patient's chronic conditions and co-morbidity symptoms are monitored and maintained or improved  1/17/2025 1652 by Anai Mcdowell RN  Outcome: Progressing  1/17/2025 0545 by Roya Baker RN  Outcome: Progressing     Problem: Discharge Planning  Goal: Discharge to home or other facility with appropriate resources  1/17/2025 1652 by Anai Mcdowell RN  Outcome: Progressing  1/17/2025 0545 by Roya Baker RN  Outcome: Progressing     Problem: Safety - Adult  Goal: Free from fall injury  1/17/2025 1652 by Anai Mcdowell RN  Outcome: Progressing  1/17/2025 0545 by Roya Baker RN  Outcome: Progressing     Problem: Pain  Goal: Verbalizes/displays adequate comfort level or baseline comfort level  1/17/2025 1652 by Anai Mcdowell RN  Outcome: Progressing  1/17/2025 0545 by Roya Baker RN  Outcome: Progressing     Problem: Respiratory - Adult  Goal: Achieves optimal ventilation and oxygenation  1/17/2025 1652 by Anai Mcdowell RN  Outcome: Progressing  1/17/2025 0545 by Roya Baker RN  Outcome: Progressing

## 2025-01-18 ENCOUNTER — APPOINTMENT (OUTPATIENT)
Dept: DIALYSIS | Age: 60
DRG: 637 | End: 2025-01-18
Payer: MEDICARE

## 2025-01-18 LAB
25(OH)D3 SERPL-MCNC: <6 NG/ML (ref 30–100)
ANION GAP SERPL CALCULATED.3IONS-SCNC: 16 MMOL/L (ref 9–16)
BASOPHILS # BLD: <0.03 K/UL (ref 0–0.2)
BASOPHILS NFR BLD: 0 % (ref 0–2)
BUN SERPL-MCNC: 51 MG/DL (ref 8–23)
CA-I BLD-SCNC: 0.92 MMOL/L (ref 1.13–1.33)
CALCIUM SERPL-MCNC: 7.2 MG/DL (ref 8.6–10.4)
CHLORIDE SERPL-SCNC: 90 MMOL/L (ref 98–107)
CO2 SERPL-SCNC: 22 MMOL/L (ref 20–31)
CREAT SERPL-MCNC: 7.3 MG/DL (ref 0.7–1.2)
CRP SERPL HS-MCNC: 119 MG/L (ref 0–5)
ECHO BSA: 2.54 M2
EOSINOPHIL # BLD: 0.45 K/UL (ref 0–0.44)
EOSINOPHILS RELATIVE PERCENT: 6 % (ref 1–4)
ERYTHROCYTE [DISTWIDTH] IN BLOOD BY AUTOMATED COUNT: 15.4 % (ref 11.8–14.4)
GFR, ESTIMATED: 8 ML/MIN/1.73M2
GLUCOSE BLD-MCNC: 142 MG/DL (ref 75–110)
GLUCOSE BLD-MCNC: 146 MG/DL (ref 75–110)
GLUCOSE BLD-MCNC: 197 MG/DL (ref 75–110)
GLUCOSE BLD-MCNC: 244 MG/DL (ref 75–110)
GLUCOSE SERPL-MCNC: 149 MG/DL (ref 74–99)
HCT VFR BLD AUTO: 29.9 % (ref 40.7–50.3)
HGB BLD-MCNC: 9.7 G/DL (ref 13–17)
IMM GRANULOCYTES # BLD AUTO: 0.04 K/UL (ref 0–0.3)
IMM GRANULOCYTES NFR BLD: 1 %
LYMPHOCYTES NFR BLD: 1.1 K/UL (ref 1.1–3.7)
LYMPHOCYTES RELATIVE PERCENT: 15 % (ref 24–43)
MCH RBC QN AUTO: 29.6 PG (ref 25.2–33.5)
MCHC RBC AUTO-ENTMCNC: 32.4 G/DL (ref 28.4–34.8)
MCV RBC AUTO: 91.2 FL (ref 82.6–102.9)
MONOCYTES NFR BLD: 0.89 K/UL (ref 0.1–1.2)
MONOCYTES NFR BLD: 12 % (ref 3–12)
NEUTROPHILS NFR BLD: 66 % (ref 36–65)
NEUTS SEG NFR BLD: 4.74 K/UL (ref 1.5–8.1)
NRBC BLD-RTO: 0 PER 100 WBC
PHOSPHATE SERPL-MCNC: 6 MG/DL (ref 2.5–4.5)
PLATELET # BLD AUTO: 143 K/UL (ref 138–453)
PMV BLD AUTO: 11.2 FL (ref 8.1–13.5)
POTASSIUM SERPL-SCNC: 3.6 MMOL/L (ref 3.7–5.3)
PTH-INTACT SERPL-MCNC: 365 PG/ML (ref 15–65)
RBC # BLD AUTO: 3.28 M/UL (ref 4.21–5.77)
RBC # BLD: ABNORMAL 10*6/UL
SODIUM SERPL-SCNC: 128 MMOL/L (ref 136–145)
VAS LEFT CCA DIST EDV: 16.2 CM/S
VAS LEFT CCA DIST PSV: 82 CM/S
VAS LEFT CCA MID EDV: 15.5 CM/S
VAS LEFT CCA MID PSV: 90 CM/S
VAS LEFT CCA PROX EDV: 22.4 CM/S
VAS LEFT CCA PROX PSV: 101 CM/S
VAS LEFT ECA EDV: 10.1 CM/S
VAS LEFT ECA PSV: 71.1 CM/S
VAS LEFT ICA DIST EDV: 35.7 CM/S
VAS LEFT ICA DIST PSV: 80.7 CM/S
VAS LEFT ICA MID EDV: 31.8 CM/S
VAS LEFT ICA MID PSV: 82.9 CM/S
VAS LEFT ICA PROX EDV: 10.4 CM/S
VAS LEFT ICA PROX PSV: 66.4 CM/S
VAS LEFT ICA/CCA PSV: 1.01
VAS LEFT VERTEBRAL EDV: 16.6 CM/S
VAS LEFT VERTEBRAL PSV: 43.6 CM/S
VAS RIGHT CCA DIST EDV: 16.9 CM/S
VAS RIGHT CCA DIST PSV: 61.5 CM/S
VAS RIGHT CCA MID EDV: 15.7 CM/S
VAS RIGHT CCA MID PSV: 63.1 CM/S
VAS RIGHT CCA PROX EDV: 15.7 CM/S
VAS RIGHT CCA PROX PSV: 59.6 CM/S
VAS RIGHT ECA EDV: 12.5 CM/S
VAS RIGHT ECA PSV: 115 CM/S
VAS RIGHT ICA DIST EDV: 16 CM/S
VAS RIGHT ICA DIST PSV: 41.7 CM/S
VAS RIGHT ICA MID EDV: 34.7 CM/S
VAS RIGHT ICA MID PSV: 75.1 CM/S
VAS RIGHT ICA PROX EDV: 18.5 CM/S
VAS RIGHT ICA PROX PSV: 49.9 CM/S
VAS RIGHT ICA/CCA PSV: 1.22
VAS RIGHT VERTEBRAL EDV: 11.6 CM/S
VAS RIGHT VERTEBRAL PSV: 57.4 CM/S
WBC OTHER # BLD: 7.2 K/UL (ref 3.5–11.3)

## 2025-01-18 PROCEDURE — 85025 COMPLETE CBC W/AUTO DIFF WBC: CPT

## 2025-01-18 PROCEDURE — 6370000000 HC RX 637 (ALT 250 FOR IP)

## 2025-01-18 PROCEDURE — 82947 ASSAY GLUCOSE BLOOD QUANT: CPT

## 2025-01-18 PROCEDURE — 84100 ASSAY OF PHOSPHORUS: CPT

## 2025-01-18 PROCEDURE — 6370000000 HC RX 637 (ALT 250 FOR IP): Performed by: INTERNAL MEDICINE

## 2025-01-18 PROCEDURE — 93880 EXTRACRANIAL BILAT STUDY: CPT | Performed by: SURGERY

## 2025-01-18 PROCEDURE — 2500000003 HC RX 250 WO HCPCS

## 2025-01-18 PROCEDURE — 1200000000 HC SEMI PRIVATE

## 2025-01-18 PROCEDURE — 80048 BASIC METABOLIC PNL TOTAL CA: CPT

## 2025-01-18 PROCEDURE — 6370000000 HC RX 637 (ALT 250 FOR IP): Performed by: NURSE PRACTITIONER

## 2025-01-18 PROCEDURE — 95819 EEG AWAKE AND ASLEEP: CPT | Performed by: PSYCHIATRY & NEUROLOGY

## 2025-01-18 PROCEDURE — 6360000002 HC RX W HCPCS: Performed by: INTERNAL MEDICINE

## 2025-01-18 PROCEDURE — 36415 COLL VENOUS BLD VENIPUNCTURE: CPT

## 2025-01-18 PROCEDURE — 90935 HEMODIALYSIS ONE EVALUATION: CPT

## 2025-01-18 PROCEDURE — 95816 EEG AWAKE AND DROWSY: CPT

## 2025-01-18 PROCEDURE — 82330 ASSAY OF CALCIUM: CPT

## 2025-01-18 PROCEDURE — 99232 SBSQ HOSP IP/OBS MODERATE 35: CPT | Performed by: INTERNAL MEDICINE

## 2025-01-18 PROCEDURE — 86140 C-REACTIVE PROTEIN: CPT

## 2025-01-18 PROCEDURE — 82306 VITAMIN D 25 HYDROXY: CPT

## 2025-01-18 PROCEDURE — 83970 ASSAY OF PARATHORMONE: CPT

## 2025-01-18 RX ORDER — METOPROLOL TARTRATE 25 MG/1
25 TABLET, FILM COATED ORAL 2 TIMES DAILY
Status: DISCONTINUED | OUTPATIENT
Start: 2025-01-18 | End: 2025-01-19 | Stop reason: HOSPADM

## 2025-01-18 RX ORDER — CLINDAMYCIN HYDROCHLORIDE 150 MG/1
600 CAPSULE ORAL EVERY 8 HOURS SCHEDULED
Status: DISCONTINUED | OUTPATIENT
Start: 2025-01-18 | End: 2025-01-19 | Stop reason: HOSPADM

## 2025-01-18 RX ORDER — LACTOBACILLUS RHAMNOSUS GG 10B CELL
1 CAPSULE ORAL
Status: DISCONTINUED | OUTPATIENT
Start: 2025-01-19 | End: 2025-01-19 | Stop reason: HOSPADM

## 2025-01-18 RX ADMIN — TAMSULOSIN HYDROCHLORIDE 0.4 MG: 0.4 CAPSULE ORAL at 14:02

## 2025-01-18 RX ADMIN — SODIUM CHLORIDE, PRESERVATIVE FREE 10 ML: 5 INJECTION INTRAVENOUS at 14:03

## 2025-01-18 RX ADMIN — SODIUM CHLORIDE, PRESERVATIVE FREE 10 ML: 5 INJECTION INTRAVENOUS at 21:52

## 2025-01-18 RX ADMIN — HEPARIN SODIUM 1900 UNITS: 1000 INJECTION INTRAVENOUS; SUBCUTANEOUS at 12:43

## 2025-01-18 RX ADMIN — CLINDAMYCIN HYDROCHLORIDE 600 MG: 150 CAPSULE ORAL at 06:49

## 2025-01-18 RX ADMIN — INSULIN LISPRO 2 UNITS: 100 INJECTION, SOLUTION INTRAVENOUS; SUBCUTANEOUS at 21:51

## 2025-01-18 RX ADMIN — AMLODIPINE BESYLATE 5 MG: 5 TABLET ORAL at 14:02

## 2025-01-18 RX ADMIN — CLINDAMYCIN HYDROCHLORIDE 600 MG: 150 CAPSULE ORAL at 21:46

## 2025-01-18 RX ADMIN — METOPROLOL TARTRATE 25 MG: 25 TABLET, FILM COATED ORAL at 21:46

## 2025-01-18 RX ADMIN — HEPARIN SODIUM 1900 UNITS: 1000 INJECTION INTRAVENOUS; SUBCUTANEOUS at 12:44

## 2025-01-18 RX ADMIN — GABAPENTIN 300 MG: 300 CAPSULE ORAL at 14:02

## 2025-01-18 RX ADMIN — INSULIN GLARGINE 40 UNITS: 100 INJECTION, SOLUTION SUBCUTANEOUS at 21:51

## 2025-01-18 RX ADMIN — PRAVASTATIN SODIUM 10 MG: 20 TABLET ORAL at 21:46

## 2025-01-18 RX ADMIN — IPRATROPIUM BROMIDE AND ALBUTEROL SULFATE 1 DOSE: .5; 2.5 SOLUTION RESPIRATORY (INHALATION) at 20:04

## 2025-01-18 RX ADMIN — CLINDAMYCIN HYDROCHLORIDE 600 MG: 150 CAPSULE ORAL at 14:02

## 2025-01-18 ASSESSMENT — PAIN SCALES - GENERAL
PAINLEVEL_OUTOF10: 0
PAINLEVEL_OUTOF10: 0

## 2025-01-18 NOTE — PLAN OF CARE
Problem: Chronic Conditions and Co-morbidities  Goal: Patient's chronic conditions and co-morbidity symptoms are monitored and maintained or improved  1/18/2025 0508 by Lorrie Rosado RN  Outcome: Progressing  1/17/2025 1652 by Anai Mcdowell RN  Outcome: Progressing     Problem: Discharge Planning  Goal: Discharge to home or other facility with appropriate resources  1/18/2025 0508 by Lorrie Rosado RN  Outcome: Progressing  1/17/2025 1652 by Anai Mcdowell RN  Outcome: Progressing     Problem: Safety - Adult  Goal: Free from fall injury  1/18/2025 0508 by Lorrie Rosado RN  Outcome: Progressing  1/17/2025 1652 by Anai Mcdowell RN  Outcome: Progressing     Problem: Pain  Goal: Verbalizes/displays adequate comfort level or baseline comfort level  1/18/2025 0508 by Lorrie Rosado RN  Outcome: Progressing  1/17/2025 1652 by Anai Mcdowell RN  Outcome: Progressing     Problem: Respiratory - Adult  Goal: Achieves optimal ventilation and oxygenation  1/18/2025 0508 by Lorrie Rosado RN  Outcome: Progressing  1/17/2025 1652 by Anai Mcdowell RN  Outcome: Progressing

## 2025-01-18 NOTE — PLAN OF CARE
Problem: Chronic Conditions and Co-morbidities  Goal: Patient's chronic conditions and co-morbidity symptoms are monitored and maintained or improved  1/18/2025 1814 by Anai Mcdowell RN  Outcome: Progressing  1/18/2025 0508 by Lorrie Rosado RN  Outcome: Progressing     Problem: Discharge Planning  Goal: Discharge to home or other facility with appropriate resources  1/18/2025 1814 by Anai Mcdowell RN  Outcome: Progressing  1/18/2025 0508 by Lorrie Rosado RN  Outcome: Progressing     Problem: Safety - Adult  Goal: Free from fall injury  1/18/2025 1814 by Anai Mcdowell RN  Outcome: Progressing  1/18/2025 0508 by Lorrie Rosado RN  Outcome: Progressing     Problem: Pain  Goal: Verbalizes/displays adequate comfort level or baseline comfort level  1/18/2025 1814 by Anai Mcdowell RN  Outcome: Progressing  1/18/2025 0508 by Lorrie Rosado RN  Outcome: Progressing     Problem: Respiratory - Adult  Goal: Achieves optimal ventilation and oxygenation  1/18/2025 1814 by Anai Mcdowell RN  Outcome: Progressing  1/18/2025 0508 by Lorrie Rosado RN  Outcome: Progressing

## 2025-01-19 VITALS
HEART RATE: 75 BPM | DIASTOLIC BLOOD PRESSURE: 86 MMHG | OXYGEN SATURATION: 95 % | BODY MASS INDEX: 34.82 KG/M2 | SYSTOLIC BLOOD PRESSURE: 158 MMHG | HEIGHT: 72 IN | WEIGHT: 257.06 LBS | RESPIRATION RATE: 16 BRPM | TEMPERATURE: 97 F

## 2025-01-19 PROBLEM — E83.39 HYPERPHOSPHATEMIA: Status: ACTIVE | Noted: 2025-01-19

## 2025-01-19 PROBLEM — E83.51 HYPOCALCEMIA: Status: ACTIVE | Noted: 2025-01-19

## 2025-01-19 PROBLEM — G89.29 CHRONIC BILATERAL LOW BACK PAIN WITH LEFT-SIDED SCIATICA: Status: ACTIVE | Noted: 2025-01-19

## 2025-01-19 PROBLEM — M54.42 CHRONIC BILATERAL LOW BACK PAIN WITH LEFT-SIDED SCIATICA: Status: ACTIVE | Noted: 2025-01-19

## 2025-01-19 LAB
ANION GAP SERPL CALCULATED.3IONS-SCNC: 15 MMOL/L (ref 9–16)
BASOPHILS # BLD: 0.03 K/UL (ref 0–0.2)
BASOPHILS NFR BLD: 0 % (ref 0–2)
BUN SERPL-MCNC: 39 MG/DL (ref 8–23)
CALCIUM SERPL-MCNC: 7.4 MG/DL (ref 8.6–10.4)
CHLORIDE SERPL-SCNC: 90 MMOL/L (ref 98–107)
CO2 SERPL-SCNC: 22 MMOL/L (ref 20–31)
CREAT SERPL-MCNC: 6 MG/DL (ref 0.7–1.2)
EOSINOPHIL # BLD: 0.44 K/UL (ref 0–0.44)
EOSINOPHILS RELATIVE PERCENT: 4 % (ref 1–4)
ERYTHROCYTE [DISTWIDTH] IN BLOOD BY AUTOMATED COUNT: 15.3 % (ref 11.8–14.4)
GFR, ESTIMATED: 10 ML/MIN/1.73M2
GLUCOSE BLD-MCNC: 124 MG/DL (ref 75–110)
GLUCOSE BLD-MCNC: 134 MG/DL (ref 75–110)
GLUCOSE BLD-MCNC: 182 MG/DL (ref 75–110)
GLUCOSE SERPL-MCNC: 122 MG/DL (ref 74–99)
HCT VFR BLD AUTO: 29.2 % (ref 40.7–50.3)
HGB BLD-MCNC: 9.5 G/DL (ref 13–17)
IMM GRANULOCYTES # BLD AUTO: 0.12 K/UL (ref 0–0.3)
IMM GRANULOCYTES NFR BLD: 1 %
LYMPHOCYTES NFR BLD: 1.27 K/UL (ref 1.1–3.7)
LYMPHOCYTES RELATIVE PERCENT: 12 % (ref 24–43)
MCH RBC QN AUTO: 29.7 PG (ref 25.2–33.5)
MCHC RBC AUTO-ENTMCNC: 32.5 G/DL (ref 28.4–34.8)
MCV RBC AUTO: 91.3 FL (ref 82.6–102.9)
MONOCYTES NFR BLD: 0.92 K/UL (ref 0.1–1.2)
MONOCYTES NFR BLD: 8 % (ref 3–12)
NEUTROPHILS NFR BLD: 75 % (ref 36–65)
NEUTS SEG NFR BLD: 8.2 K/UL (ref 1.5–8.1)
NRBC BLD-RTO: 0 PER 100 WBC
PLATELET # BLD AUTO: 193 K/UL (ref 138–453)
PMV BLD AUTO: 10.6 FL (ref 8.1–13.5)
POTASSIUM SERPL-SCNC: 3.8 MMOL/L (ref 3.7–5.3)
RBC # BLD AUTO: 3.2 M/UL (ref 4.21–5.77)
RBC # BLD: ABNORMAL 10*6/UL
SODIUM SERPL-SCNC: 127 MMOL/L (ref 136–145)
WBC OTHER # BLD: 11 K/UL (ref 3.5–11.3)

## 2025-01-19 PROCEDURE — 80048 BASIC METABOLIC PNL TOTAL CA: CPT

## 2025-01-19 PROCEDURE — 85025 COMPLETE CBC W/AUTO DIFF WBC: CPT

## 2025-01-19 PROCEDURE — 6370000000 HC RX 637 (ALT 250 FOR IP): Performed by: NURSE PRACTITIONER

## 2025-01-19 PROCEDURE — 2500000003 HC RX 250 WO HCPCS

## 2025-01-19 PROCEDURE — 94640 AIRWAY INHALATION TREATMENT: CPT

## 2025-01-19 PROCEDURE — 36415 COLL VENOUS BLD VENIPUNCTURE: CPT

## 2025-01-19 PROCEDURE — 6370000000 HC RX 637 (ALT 250 FOR IP)

## 2025-01-19 PROCEDURE — 82947 ASSAY GLUCOSE BLOOD QUANT: CPT

## 2025-01-19 PROCEDURE — 6360000002 HC RX W HCPCS

## 2025-01-19 PROCEDURE — 99239 HOSP IP/OBS DSCHRG MGMT >30: CPT | Performed by: INTERNAL MEDICINE

## 2025-01-19 PROCEDURE — 2700000000 HC OXYGEN THERAPY PER DAY

## 2025-01-19 RX ORDER — CLINDAMYCIN HYDROCHLORIDE 300 MG/1
600 CAPSULE ORAL EVERY 8 HOURS SCHEDULED
Qty: 18 CAPSULE | Refills: 0 | Status: SHIPPED | OUTPATIENT
Start: 2025-01-19 | End: 2025-01-19

## 2025-01-19 RX ORDER — GABAPENTIN 100 MG/1
200 CAPSULE ORAL 2 TIMES DAILY
Status: DISCONTINUED | OUTPATIENT
Start: 2025-01-19 | End: 2025-01-19

## 2025-01-19 RX ORDER — AMLODIPINE BESYLATE 5 MG/1
5 TABLET ORAL DAILY
Qty: 30 TABLET | Refills: 3 | Status: SHIPPED | OUTPATIENT
Start: 2025-01-20

## 2025-01-19 RX ORDER — HEPARIN SODIUM 5000 [USP'U]/ML
5000 INJECTION, SOLUTION INTRAVENOUS; SUBCUTANEOUS EVERY 8 HOURS SCHEDULED
Status: DISCONTINUED | OUTPATIENT
Start: 2025-01-19 | End: 2025-01-19 | Stop reason: HOSPADM

## 2025-01-19 RX ORDER — GABAPENTIN 100 MG/1
200 CAPSULE ORAL 2 TIMES DAILY
Qty: 120 CAPSULE | Refills: 0 | Status: CANCELLED | OUTPATIENT
Start: 2025-01-19 | End: 2025-02-18

## 2025-01-19 RX ORDER — METOPROLOL TARTRATE 25 MG/1
25 TABLET, FILM COATED ORAL 2 TIMES DAILY
Qty: 60 TABLET | Refills: 3 | Status: SHIPPED | OUTPATIENT
Start: 2025-01-19

## 2025-01-19 RX ORDER — LACTOBACILLUS RHAMNOSUS GG 10B CELL
1 CAPSULE ORAL
Qty: 7 CAPSULE | Refills: 0 | Status: SHIPPED | OUTPATIENT
Start: 2025-01-20

## 2025-01-19 RX ORDER — METOPROLOL TARTRATE 25 MG/1
25 TABLET, FILM COATED ORAL 2 TIMES DAILY
Qty: 60 TABLET | Refills: 3 | Status: SHIPPED | OUTPATIENT
Start: 2025-01-19 | End: 2025-01-19

## 2025-01-19 RX ORDER — GABAPENTIN 100 MG/1
100 CAPSULE ORAL EVERY 8 HOURS SCHEDULED
Qty: 90 CAPSULE | Refills: 0 | Status: SHIPPED | OUTPATIENT
Start: 2025-01-19 | End: 2025-02-18

## 2025-01-19 RX ORDER — LACTOBACILLUS RHAMNOSUS GG 10B CELL
1 CAPSULE ORAL
Qty: 7 CAPSULE | Refills: 0 | Status: SHIPPED | OUTPATIENT
Start: 2025-01-20 | End: 2025-01-19

## 2025-01-19 RX ORDER — TAMSULOSIN HYDROCHLORIDE 0.4 MG/1
0.4 CAPSULE ORAL DAILY
Qty: 30 CAPSULE | Refills: 3 | Status: SHIPPED | OUTPATIENT
Start: 2025-01-19

## 2025-01-19 RX ORDER — INSULIN ASPART INJECTION 100 [IU]/ML
20 INJECTION, SOLUTION SUBCUTANEOUS
Qty: 45 DEVICE | Refills: 0 | Status: SHIPPED | OUTPATIENT
Start: 2025-01-19

## 2025-01-19 RX ORDER — INSULIN DEGLUDEC 200 U/ML
40 INJECTION, SOLUTION SUBCUTANEOUS NIGHTLY
Qty: 1 ADJUSTABLE DOSE PRE-FILLED PEN SYRINGE | Refills: 0 | Status: SHIPPED | OUTPATIENT
Start: 2025-01-19

## 2025-01-19 RX ORDER — PRAVASTATIN SODIUM 10 MG
10 TABLET ORAL NIGHTLY
Qty: 30 TABLET | Refills: 3 | Status: SHIPPED | OUTPATIENT
Start: 2025-01-19 | End: 2025-01-19

## 2025-01-19 RX ORDER — GABAPENTIN 300 MG/1
300 CAPSULE ORAL DAILY
Status: DISCONTINUED | OUTPATIENT
Start: 2025-01-19 | End: 2025-01-19 | Stop reason: HOSPADM

## 2025-01-19 RX ORDER — AMLODIPINE BESYLATE 5 MG/1
5 TABLET ORAL DAILY
Qty: 30 TABLET | Refills: 3 | Status: SHIPPED | OUTPATIENT
Start: 2025-01-20 | End: 2025-01-19

## 2025-01-19 RX ORDER — PRAVASTATIN SODIUM 10 MG
10 TABLET ORAL NIGHTLY
Qty: 30 TABLET | Refills: 3 | Status: SHIPPED | OUTPATIENT
Start: 2025-01-19

## 2025-01-19 RX ORDER — CLINDAMYCIN HYDROCHLORIDE 300 MG/1
600 CAPSULE ORAL EVERY 8 HOURS SCHEDULED
Qty: 18 CAPSULE | Refills: 0 | Status: SHIPPED | OUTPATIENT
Start: 2025-01-19 | End: 2025-01-22

## 2025-01-19 RX ADMIN — Medication 1 CAPSULE: at 09:18

## 2025-01-19 RX ADMIN — IPRATROPIUM BROMIDE AND ALBUTEROL SULFATE 1 DOSE: .5; 2.5 SOLUTION RESPIRATORY (INHALATION) at 07:48

## 2025-01-19 RX ADMIN — AMLODIPINE BESYLATE 5 MG: 5 TABLET ORAL at 09:18

## 2025-01-19 RX ADMIN — GABAPENTIN 300 MG: 300 CAPSULE ORAL at 09:18

## 2025-01-19 RX ADMIN — HEPARIN SODIUM 5000 UNITS: 5000 INJECTION INTRAVENOUS; SUBCUTANEOUS at 09:19

## 2025-01-19 RX ADMIN — SODIUM CHLORIDE, PRESERVATIVE FREE 10 ML: 5 INJECTION INTRAVENOUS at 09:19

## 2025-01-19 RX ADMIN — CLINDAMYCIN HYDROCHLORIDE 600 MG: 150 CAPSULE ORAL at 06:26

## 2025-01-19 RX ADMIN — METOPROLOL TARTRATE 25 MG: 25 TABLET, FILM COATED ORAL at 09:18

## 2025-01-19 RX ADMIN — TAMSULOSIN HYDROCHLORIDE 0.4 MG: 0.4 CAPSULE ORAL at 09:18

## 2025-01-19 NOTE — CARE COORDINATION
Transition planning    Spoke with patient, he states he has [facility]. Referral sent.    Received call from Anai at [facility], she states patient is current with them and they can accept patient. CM updates patient is discharging today.

## 2025-01-19 NOTE — DISCHARGE INSTR - COC
SECTION    Prognosis: Fair     Condition at Discharge: Stable    Rehab Potential (if transferring to Rehab): Fair    Recommended Labs or Other Treatments After Discharge: Complete course of clindamycin and culturelle. Follow up with podiatry.   Wound care  Pt and ot  Dialysis as scheduled    Physician Certification: I certify the above information and transfer of Vincenzo Darden  is necessary for the continuing treatment of the diagnosis listed and that he requires Home Care for less 30 days.     Update Admission H&P: No change in H&P    PHYSICIAN SIGNATURE:  Electronically signed by Mireya Nugent MD on 1/19/25 at 12:03 PM EST

## 2025-01-19 NOTE — PROCEDURES
PROCEDURE NOTE  Date: 1/18/2025   Name: Vincenzo Darden  YOB: 1965    Procedures            Date: 1/18/2025  Referring physician: Dr. Amy Duran  Patient aged 59 y with syncope. EEG done to assess for epileptiform activity.    Introduction  This routine 30-minute EEG was recorded using the [employer] 10-20 System on a [employer] workstation at 256 samples/s. Automated spike and seizure detection algorithms were applied.    Description  During the maximal alert state, a well-regulated, symmetric, and reactive 9 Hz posterior dominant rhythm was seen. No consistent focal slowing or interhemispheric asymmetry was noted. Stage I and stage II sleep were observed. There were no interictal epileptiform discharges or electrographic seizures.    Activations  Hyperventilation was not performed. Intermittent photic stimulation was performed and demonstrated no posterior driving response.    Impression  Normal awake and sleep EEG.     EKG lead did not show clear arrhythmia, if still in concern consider formal EKG or correlation with telemetry.       No epileptiform discharges were identified. Please note the absence of such activity on this record cannot conclusively rule out an epileptic disorder. If such is still clinically suspected, a repeat study with sleep deprivation and/or prolonged sampling may be helpful.    Aliza Smith MD  Epilepsy Board Certified.  Neurology Board Certified.    Electronically Signed

## 2025-01-19 NOTE — PLAN OF CARE
Problem: Respiratory - Adult  Goal: Achieves optimal ventilation and oxygenation  1/18/2025 2022 by Lexis Mclaughlin RCP  Outcome: Progressing

## 2025-01-20 LAB
GLUCOSE BLD-MCNC: 277 MG/DL (ref 75–110)
MICROORGANISM SPEC CULT: NORMAL
MICROORGANISM SPEC CULT: NORMAL
SERVICE CMNT-IMP: NORMAL
SERVICE CMNT-IMP: NORMAL
SPECIMEN DESCRIPTION: NORMAL
SPECIMEN DESCRIPTION: NORMAL

## 2025-01-20 NOTE — PROGRESS NOTES
[facility]  [address], [address]  [place] [address]  Ph: [phone] or [phone]  FAX: [phone]    Chief Complaint: Syncopal episodes     Interval history: No syncopal episodes    HPI : I had the pleasure of seeing your patient today in neurology consultation for his symptoms. As you would recall Vincenzo Darden is a 59 y.o. male with H/O diabetic neuropathy & PVD s/p R BKA, HTN, HLD, CAD s/p CABG x 3 & AVR (2/2024), CHF, lumbar stenosis with neurogenic claudication s/p lumbar surgery (2019), ESRD on HD, cervical spondylosis, who was admitted 1/12/2025 with pain to left lower extremity for the past few days.     Patient reported pain to bilateral groins that radiates down his thighs, left thigh more than the right thigh.  Pain gets worse with any movement like trying to sit up or getting out of the bed.  He had history of lumbar stenosis with neurogenic claudication for which he underwent successful lumbar surgery in 2019.  He was feeling so miserable that he missed his last hemodialysis on 1/11/2025.  Patient has a history of diabetic neuropathy and PVD, undergoing R BKA and currently seeing a podiatrist for chronic left great toe ulcer.  He also reported abdominal pain and diarrhea.  Patient presented to [facility] ED for evaluation.     Neurology was consulted on 1/16/2025 due to syncopal event.  Patient reported first event in November 2024 when he recalled trying to stand up from sitting position followed by passing out on the bed.  No prior warning symptoms.  Did not get checked out.  On 1/13/2025 rapid response was called for syncopal episode.  RN was assisting the patient to the restroom when patient started slumping over.  RN was able to catch him and helped him to sit on.  Patient was unresponsive for almost 1 minute with right-sided gaze and pain point pupils.  No tongue bite, incontinence or seizure-like activity was reported.  Patient woke up, was alert and 
    [facility]  Internal Medicine Teaching Residency Program  Inpatient Daily Progress Note  ______________________________________________________________________________    Patient: Vincenzo Darden  YOB: 1965   MRN:[id number]    Acct: [id number]     Room: 0240/0240-01  Admit date: 1/12/2025  Today's date: 01/14/25  Number of days in the hospital: 2    SUBJECTIVE   Admitting Diagnosis: Cellulitis  CC: pain     Pt examined at bedside. Chart & results reviewed.   Feeling better but still having pain over the left leg - mostly thigh   Generally well   No other concerns or symptoms   Had syncope yesterday - BP slightly lower after dialysis - stood up and passed out while walking toilet - nurse was holding, so no fall - LOC for 20-30 seconds, no symptoms before or after. Orthostatic vitals this morning - no obvious postural drop       1/14   EKG, Troponin and repeat echo requested  Osteomyelitis of left big toe - chronic - to f/u with podiatry OP   Cellulitis improving on Clindamycin - erythema almost resolved - continue   If syncope workup okay - consider discharge home on clindamycin        ROS:  Constitutional:  negative for chills, fevers, sweats  Respiratory:  negative for cough, dyspnea on exertion, hemoptysis, shortness of breath, wheezing  Cardiovascular:  negative for chest pain, chest pressure/discomfort, lower extremity edema, palpitations  Gastrointestinal:  negative for abdominal pain, constipation, diarrhea, nausea, vomiting  Neurological:  negative for dizziness, headache  BRIEF HISTORY   The patient is a pleasant 59 y.o. male with a PMHx significant for      Type 2 diabetes mellitus  Diabetic foot wound on left great toe and heel  Right below-knee amputation  ESRD -on dialysis since June 2024 -TTS   Hypertension  HFrEF - EF 45-50 %,       presents with a chief complaint of pain, weakness and worsening swelling of the left leg.  Patient is known to 
    [facility] [place]  [address], [address]  [place] [address]  Ph: [phone] or [phone]  FAX: [phone]    Chief Complaint: Syncopal episodes     Interval history: No syncopal episodes    HPI : I had the pleasure of seeing your patient today in neurology consultation for his symptoms. As you would recall Vincenzo Darden is a 59 y.o. male with H/O diabetic neuropathy & PVD s/p R BKA, HTN, HLD, CAD s/p CABG x 3 & AVR (2/2024), CHF, lumbar stenosis with neurogenic claudication s/p lumbar surgery (2019), ESRD on HD, cervical spondylosis, who was admitted 1/12/2025 with pain to left lower extremity for the past few days.     Patient reported pain to bilateral groins that radiates down his thighs, left thigh more than the right thigh.  Pain gets worse with any movement like trying to sit up or getting out of the bed.  He had history of lumbar stenosis with neurogenic claudication for which he underwent successful lumbar surgery in 2019.  He was feeling so miserable that he missed his last hemodialysis on 1/11/2025.  Patient has a history of diabetic neuropathy and PVD, undergoing R BKA and currently seeing a podiatrist for chronic left great toe ulcer.  He also reported abdominal pain and diarrhea.  Patient presented to [facility] ED for evaluation.     Neurology was consulted on 1/16/2025 due to syncopal event.  Patient reported first event in November 2024 when he recalled trying to stand up from sitting position followed by passing out on the bed.  No prior warning symptoms.  Did not get checked out.  On 1/13/2025 rapid response was called for syncopal episode.  RN was assisting the patient to the restroom when patient started slumping over.  RN was able to catch him and helped him to sit on.  Patient was unresponsive for almost 1 minute with right-sided gaze and pain point pupils.  No tongue bite, incontinence or seizure-like activity was reported.  Patient woke up, was alert and 
    [facility]  Internal Medicine Teaching Residency Program  Inpatient Daily Progress Note  ______________________________________________________________________________    Patient: Vincenzo Darden  YOB: 1965   MRN:[id number]    Acct: [id number]     Room[facility]-01  Admit date: 1/12/2025  Today's date: 01/16/25  Number of days in the hospital: 4    SUBJECTIVE   Admitting Diagnosis: Cellulitis  CC: pain     Pt examined at bedside. Chart & results reviewed.   Vitals stable - BP on softer yesterday except high reading this morning,   BM 99, even though, Lantus withheld   Rapid overnight - patient was sleepy, informs did not pass out - no other concerns - generally feeling well.  EKG - no obvious abnormalities   CT head - no acute changes    1/16   Continue holding insulin and monitor glucose  Monitor BP   CPAP overnight   Awaiting echo   Continue clindamycin - switch to oral   Hepatic function panel add on     1/15  CT head without contrast  Repeat sepsis workup including blood cultures and UA.  Replace magnesium.  Patient unable to raise his left leg, PT reevaluation.    ROS:  Constitutional:  negative for chills, fevers, sweats  Respiratory:  negative for cough, dyspnea on exertion, hemoptysis, shortness of breath, wheezing  Cardiovascular:  negative for chest pain, chest pressure/discomfort, lower extremity edema, palpitations  Gastrointestinal:  negative for abdominal pain, constipation, diarrhea, nausea, vomiting  Neurological:  negative for dizziness, headache  BRIEF HISTORY   The patient is a pleasant 59 y.o. male with a PMHx significant for      Type 2 diabetes mellitus  Diabetic foot wound on left great toe and heel  Right below-knee amputation  ESRD -on dialysis since June 2024 -TTS   Hypertension  HFrEF - EF 45-50 %,    Chronic liver disease      presents with a chief complaint of pain, weakness and worsening swelling of the left leg.  Patient is 
    [facility]  Internal Medicine Teaching Residency Program  Inpatient Daily Progress Note  ______________________________________________________________________________    Patient: Vincenzo Darden  YOB: 1965   MRN:[id number]    Acct: [id number]     Room[facility]240-01  Admit date: 1/12/2025  Today's date: 01/19/25  Number of days in the hospital: 7    SUBJECTIVE   Admitting Diagnosis: Cellulitis  CC: pain     Pt examined at bedside. Chart & results reviewed.   Vitals stable - BP okay. Orthostatic drop not significant yesterday   Latest glucose 134   Had dialysis yesterday - 1.9 L removed - did not engage with physio yesterday after dialysis   EEG unremarkable   Feeling better - no active concerns     1/19   Glucose stable - continue same dose of insulin   OT/PT and discharge planning to home - patient refusing SNF   Clindamycin to finish on 1/22   Appreciate neurology and nephrology recs   Pain relief for the low back pain - already on Gabapentin and vladimir     1/18  Continue Lantus 40 BID and increase to MDSS   Check vitamin D, PTH, Phosphorus   Orthostatic precautions and repeat testing today after compression stockings - importance explained and patient has agreed to use it   Switch coreg to lopressor due to the orthostatic drop   OT/PT and discharge planning       1/17  Insulin resumed and switched to MDSS   Clindamycin oral   CT PE - no PE, atelectasis   BC - no growth so far   Neuro - appreciate recs - carotid dopplers and EEG   Follow up labs   Discuss with dialysis regarding volume removal and advise patient on precautions after dialysis     1/16   Continue holding insulin and monitor glucose  Monitor BP   CPAP overnight   Awaiting echo   Continue clindamycin - switch to oral   Hepatic function panel add on     1/15  CT head without contrast  Repeat sepsis workup including blood cultures and UA.  Replace magnesium.  Patient unable to raise his left leg, 
    [facility]  Internal Medicine Teaching Residency Program  Inpatient Daily Progress Note  ______________________________________________________________________________    Patient: Vincenzo Darden  YOB: 1965   MRN:[id number]    Acct: [id number]     Room: [facility]/0240-01  Admit date: 1/12/2025  Today's date: 01/15/25  Number of days in the hospital: 3    SUBJECTIVE   Admitting Diagnosis: Cellulitis  CC: pain     Pt examined at bedside. Chart & results reviewed.   Overnight, patient was wheezy, received breathing treatment.  CXR did not show any acute changes.  Troponin elevated, started on heparin infusion.  Repeat EKG did not show any new changes.  Cardiology consulted.  Sitter was also requested as patient was restless, getting out of bed    In the morning, patient was very somnolent and drowsy.   Although, following commands but dozing off in between conversation.  Patient received 1 dose of Roxicodone more than 20 hours ago.  No sedatives or narcotics were administered.  Repeated ammonia, lactic acid, electrolytes which were unremarkable.  VBG ordered, pending.  Roxicodone held.    Reevaluation during rounds, patient was much more awake and could maintain conversation.    1/15  CT head without contrast  Repeat sepsis workup including blood cultures and UA.  Replace magnesium.  Patient unable to raise his left leg, PT reevaluation.    ROS:  Constitutional:  negative for chills, fevers, sweats  Respiratory:  negative for cough, dyspnea on exertion, hemoptysis, shortness of breath, wheezing  Cardiovascular:  negative for chest pain, chest pressure/discomfort, lower extremity edema, palpitations  Gastrointestinal:  negative for abdominal pain, constipation, diarrhea, nausea, vomiting  Neurological:  negative for dizziness, headache  BRIEF HISTORY   The patient is a pleasant 59 y.o. male with a PMHx significant for      Type 2 diabetes mellitus  Diabetic foot 
  Neurology Nurse Practitioner Progress Note      INTERVAL HISTORY: This is a 59 y.o.  male admitted 1/12/2025 for Cellulitis [L03.90]. This is a follow-up neurology progress note. The patient was examined and the chart was reviewed. Discussed with the pt & RN. There were no acute events overnight. No new motor, sensory, visual or bulbar symptoms.  Patient reported some improvement in pain to LLE.     HPI: Vincenzo Darden is a 59 y.o. male with H/O diabetic neuropathy & PVD s/p R BKA, HTN, HLD, CAD s/p CABG x 3 & AVR (2/2024), CHF, lumbar stenosis with neurogenic claudication s/p lumbar surgery (2019), ESRD on HD, cervical spondylosis, who was admitted 1/12/2025 with pain to left lower extremity for the past few days.  Patient reported pain to bilateral groins that radiates down his thighs, left thigh more than the right thigh.  Pain gets worse with any movement like trying to sit up or getting out of the bed.  He had history of lumbar stenosis with neurogenic claudication for which he underwent successful lumbar surgery in 2019.  He was feeling so miserable that he missed his last hemodialysis on 1/11/2025.  Patient has a history of diabetic neuropathy and PVD, undergoing R BKA and currently seeing a podiatrist for chronic left great toe ulcer.  He also reported abdominal pain and diarrhea.  Patient presented to [facility] ED for evaluation.     Neurology was consulted on 1/16/2025 due to syncopal event.  Patient reported first event in November 2024 when he recalled trying to stand up from sitting position followed by passing out on the bed.  No prior warning symptoms.  Did not get checked out.  On 1/13/2025 rapid response was called for syncopal episode.  RN was assisting the patient to the restroom when patient started slumping over.  RN was able to catch him and helped him to sit on.  Patient was unresponsive for almost 1 minute with right-sided gaze and pain point pupils.  No tongue bite, incontinence or 
  Physician Progress Note      PATIENT:               ANASTASIYA DACOSTA  CSN #:                  [id number]  :                       1965  ADMIT DATE:       2025 5:21 PM  DISCH DATE:  RESPONDING  PROVIDER #:        REJI MERINO          QUERY TEXT:    Pt admitted with left leg cellulitis. Pt noted to have DM Type 2. If possible,   please document in progress notes and discharge summary the relationship, if   any, between cellulitis and DM.    The medical record reflects the following:  Risk Factors: DM Type 2  Clinical Indicators: per IM progress notes \"Cellulitis left leg, WBC raised at   12.5, . Type 2 diabetes mellitus- Glucose 205,  Latest A1c 12% in   2024  Treatment: IV clindamycin changed to Zyvox, XRays, podiatry consult, On   insulin degludec-added Lantus, High-dose insulin sliding scale, labs, cultures  Options provided:  -- Left leg cellulitis associated with Diabetes  -- Left leg cellulitis unrelated to Diabetes  -- Other - I will add my own diagnosis  -- Disagree - Not applicable / Not valid  -- Disagree - Clinically unable to determine / Unknown  -- Refer to Clinical Documentation Reviewer    PROVIDER RESPONSE TEXT:    Left leg cellulitis associated with Diabetes.    Query created by: Kendra Hernandez on 2025 8:31 AM      Electronically signed by:  REJI MERINO 1/15/2025 9:31 PM          
Dialysis Post Treatment Note  Patient tolerated treatment well. Denies complaints at time of discharge.   Vitals:    01/14/25 1427   BP: 126/69   Pulse: 91   Resp:    Temp:    SpO2:      Pre-Weight = 123.9 kg  Post-weight = Weight - Scale: 121.9 kg (268 lb 11.9 oz)  Total Liters Processed = Blood Volume Processed (Liters): 84 L  Rinseback Volume (mL) = Rinseback Volume (ml): 300 ml  Net Removal (mL) = 2000 ml  Length of treatment=3.5 hrs  Access:  right tunneled HD catheter    Dialysis was completed. The pt tolerated dialysis well, without incident. Ports were reversed for a better blood flow. The goal was increased to 2 L as per order of Dr Owusu.  The pt was medicated for pain during dialysis, with some relief in pain per the pt. Vitals stable post treatment. Report was given to the pts CON Smith RN. The pt left dialysis via stretcher.  
Dialysis Post Treatment Note  Vitals:    01/13/25 1300   BP: (!) 143/93   Pulse: 96   Resp: 18   Temp: 96.8 °F (36 °C)   SpO2:      Pre-Weight = 122.5 kg  Post-weight = Weight - Scale: 121.1 kg (266 lb 15.6 oz)  Total Liters Processed = Blood Volume Processed (Liters): 59.69 L  Rinseback Volume (mL) = Rinseback Volume (ml): 300 ml  Net Removal (mL) = 1100   Patient's dry weight=122.5 kg  Type of access used=right perm cath   Length of treatment=180    Post treatment all blood returned, heparin instilled sterile end caps applied.  Pt to vasc post treatment post report called to Sarah JARAMILLO on floor Leaving via .      Called lab at 1830 spoke with Ratna, states she did not have a Hep B lab so the lab tube I sent was placed as a Beta Hydroxy order.  BORA Brooke informed.  A Hep B AG will need to be drawn this evening. TL placing new HEP order.  
Dialysis Post Treatment Note  Vitals:    01/17/25 0906   BP: 118/73   Pulse: 68   Resp: 15   Temp: 97.5 °F (36.4 °C)   SpO2: 95%     Pre-Weight = 122.6kg  Post-weight = Weight - Scale: 120.2 kg (264 lb 15.9 oz)  Total Liters Processed = Blood Volume Processed (Liters): 80.04 L  Rinseback Volume (mL) = Rinseback Volume (ml): 300 ml  Net Removal (mL) =  2000  Type of access used=tunneled cath  Length of treatment=3.5hr   no issues patient tolerated treatment good   
Dialysis Post Treatment Note  Vitals:    01/18/25 1255   BP: (!) 154/75   Pulse: 64   Resp: 18   Temp: 97.5 °F (36.4 °C)   SpO2:      Pre-Weight = 118.5 kg  Post-weight = Weight - Scale: 116.6 kg (257 lb 0.9 oz)  Total Liters Processed = Blood Volume Processed (Liters): 69.9 L  Rinseback Volume (mL) = Rinseback Volume (ml): 300 ml  Net Removal (mL) =  1983 mL  Patient's dry weight= 122.5 kg  Type of access used= R Neck tunneled cath.   Length of treatment= 210 min.     Pt left HD via stretcher back up to room. Pt tolerated tx well. Pt requested 2 L oxygen midway through tx. 2 L applied as well as SPO2 monitor. SATS remained between %. Art and [place] line switched midway through tx d/t high pressures. No further issues. Report given to Anai JARAMILLO. 1.9 L removed.   
Dialysis Time Out  To be done by RN and tech or 2 RNs  Staff Names Blanquita BASSETT RN, Amie SINGH RN    [x]  Identity of the patient using 2 patient identifiers  [x]  Consent for treatment  [x]  Equipment-proper machine and dialyzer  [x]  B-Hep B status  HbSAg 1/13/25 Neg  [x]  Orders- to include bath, blood flow, dialyzer, time and fluid removal  [x]  Access-Correct site and in working order  [x]  Time for patient to ask questions.   
Dialysis Time Out  To be done by RN and tech or 2 RNs  Staff Names Marysol PHILLIP    [x]  Identity of the patient using 2 patient identifiers  [x]  Consent for treatment  [x]  Equipment-proper machine and dialyzer  [x]  B-Hep B status drawn today  1/13/25  [x]  Orders- to include bath, blood flow, dialyzer, time and fluid removal  [x]  Access-Correct site and in working order  [x]  Time for patient to ask questions.    
Dialysis Time Out  To be done by RN and tech or 2 RNs  Staff Names Myriam OLIVA RN and Destinee WALSH RN    [x]  Identity of the patient using 2 patient identifiers  [x]  Consent for treatment  [x]  Equipment-proper machine and dialyzer  [x]  B-Hep B status: 1/13/25. Non-reactive.   [x]  Orders- to include bath, blood flow, dialyzer, time and fluid removal  [x]  Access-Correct site and in working order  [x]  Time for patient to ask questions.   
Dialysis Time Out  To be done by RN and tech or 2 RNs  Staff Names jose armando george rn    [x]  Identity of the patient using 2 patient identifiers  [x]  Consent for treatment  [x]  Equipment-proper machine and dialyzer  [x]  B-Hep B status(hep b ag neg(01/13/2025))  [x]  Orders- to include bath, blood flow, dialyzer, time and fluid removal  [x]  Access-Correct site and in working order  [x]  Time for patient to ask questions.   
Dressing changed on patient's left great toe and left heel.  Patient states he was changing 2-3 times a week at home with dry dressings.  
EEG completed at bedside.  
Jean-Claude loaiza called to see if patient would be at tomorrow's appointment. She stated that even though patient is supposed to go to dialysis Tuesday, Thursday, Saturday he rarely goes to the Saturday appointment.  
Lab called to notified of high trop of 108 just now drawn at 0520 this morning. At 1242 trop redrawn and only 13. Medicine resident notified.  
MD came into the room in the morning and told pt he was being discharged today. RN explained to pt that it will take some time for the discharge orders and discharge meds etc. Pt still called his ride and his wife came up. While RN was assisting another patient this patient got up with his wife and left. Discharge order was placed. RN called pt and pt would not come back but said his scripts could be sent to his pharmacy. MD aware and scripts sent to pt home pharmacy. Peripheral Ivs were taken out prior to pt leaving.   
NEPHROLOGY PROGRESS NOTE      ASSESSMENT     ESRD TTS right tunneled catheter in [place].  Follows up with Dr. Núñez  Presented with asthenia/easy fatigability/lower extremity edema and found to have cellulitis left lower extremity for which antibiotics have been initiated  In hospital syncopal episodes so far workup negative.  Orthostatics negative  History of CABG with ejection fraction 45%  Type 2 diabetes  Essential hypertension  Right BKA    PLAN     Hemodialysis today.  Orders reviewed.  Antibiotics as per ESRD dosing  Okay to discharge from nephrology standpoint      SUBJECTIVE   Presented with cellulitis for which she is receiving antibiotics.  In hospital noted to have couple of episodes of presyncope/syncope.  Workup in progress.  Awaiting carotid Doppler studies 2.    OBJECTIVE     Vitals:    01/15/25 2056 01/16/25 0048 01/16/25 0609 01/16/25 0730   BP:  138/78  (!) 155/85   Pulse: 82 84 65    Resp:    17   Temp:    97.8 °F (36.6 °C)   TempSrc:    Oral   SpO2:  96% 100%    Weight:       Height:         24HR INTAKE/OUTPUT:  No intake or output data in the 24 hours ending 01/16/25 1003    General appearance:Awake, alert, in no acute distress  HEENT: PERRLA  Respiratory::vesicular breath sounds,no wheeze/crackles  Cardiovascular:S1 S2 normal,no gallop or organic murmur.  Abdomen:Non tender/non distended.Bowel sounds present  Extremities: No Cyanosis or Clubbing, present lower extremity edema  Neurological:Alert and oriented.No abnormalities of mood, affect, memory, mentation, or behavior are noted      MEDICATIONS     Scheduled Meds:    insulin lispro  0-4 Units SubCUTAneous 4x Daily AC & HS    [Held by provider] amLODIPine  5 mg Oral Daily    ipratropium 0.5 mg-albuterol 2.5 mg  1 Dose Inhalation BID RT    pravastatin  10 mg Oral Nightly    clindamycin (CLEOCIN) IV  600 mg IntraVENous Q8H    gabapentin  300 mg Oral Daily    sodium chloride flush  5-40 mL IntraVENous 2 times per day    [Held by provider] 
Occupational Therapy    [facility]  Occupational Therapy Not Seen Note    DATE: 2025    NAME: Vincenzo Darden  MRN: [id number]   : 1965      Patient not seen this date for Occupational Therapy due to:    Patient independent with ADLs and functional tasks with no acute OT needs. Will defer OT evaluation at this time. Please reorder OT if future needs arise. Spoke with pt who is adamant about not receiving therapy services at this time    Electronically signed by HARRISON Gleason on 2025 at 7:55 AM    
PATIENT REFUSES TO WEAR BIPAP     [x] Risks and benefits explained to patient   [x] Patient refuses to wear Bipap stating \"nope\"  [x] Patient verbalizes understanding of information presented.   
PATIENT REFUSES TO WEAR BIPAP     [x] Risks and benefits explained to patient   [x] Patient refuses to wear Bipap stating \"nope\"  [x] Patient verbalizes understanding of information presented.   
Patient called out to use the bathroom for a BM.  Writer was assisting patient to the bathroom when he stopped walking and started slumping over.  Writer called for help and patient was assisted to the chair by writer and two additional people.  Patient did not fall.  All vitals taken and stable, .  Patient was not responding and pupils were fixed to the right and unresponsive. Rapid response called. Within approximately one minute patient began responding to writer. Medicine resident arrived, rapid response cancelled.    
Patient refusing telemetry at this time.  
Patient trying to get out of bed. States he wants to sit up at bedside and eat but patient unable to sit straight up and unsafe to stay at bedside.  Patient argued about getting back into bed but finally did.  Bed alarm on.  Medicine resident messaged to reinstate bedside  due to confusion and patient safety.   
Physical Therapy        Physical Therapy Cancel Note      DATE: 2025    NAME: Vincenzo Darden  MRN: [id number]   : 1965      Patient not seen this date for Physical Therapy due to:    Back from dialysis.  He refuses mobility.      Electronically signed by Lindsey Flores PT on 2025 at 1:47 PM      
Physical Therapy        Physical Therapy Cancel Note      DATE: 2025    NAME: Vincenzo Darden  MRN: [id number]   : 1965      Patient not seen this date for Physical Therapy due to:    Unavailable; at dialysis.      Electronically signed by Lindsey Flores PT on 2025 at 12:59 PM      
Physical Therapy        Physical Therapy Cancel Note      DATE: 2025    NAME: Vincenzo Darden  MRN: [id number]   : 1965      Patient not seen this date for Physical Therapy due to:    Other:    Rapid response called on pt this AM        Electronically signed by Jamie Crawley PT on 2025 at 8:29 AM      
Physical Therapy        Physical Therapy Cancel Note      DATE: 2025    NAME: Vincenzo Darden  MRN: [id number]   : 1965      Patient not seen this date for Physical Therapy due to:    Patient independent with functional mobility. Will defer PT evaluation at this time. Please reorder PT if future needs arise.       Electronically signed by Jamie Crawley PT on 2025 at 8:04 AM      
Physical Therapy        Physical Therapy Cancel Note      DATE: 2025    NAME: Vincenzo Darden  MRN: [id number]   : 1965      Patient not seen this date for Physical Therapy due to:    Patient reports he is at baseline functional level with no acute PT needs. Pt requests to defer PT evaluation at this time. Please reorder PT if future needs arise.       Electronically signed by Missy Magdaleno PT on 2025 at 11:56 AM   
Pt pivoted from bed to chair with right leg prosthetic placed with 3 person-assist. Pt became lethargic and diaphoretic 20 minutes post chair transfer. Pt only able to respond after sternal rub and then return to lethargic state seconds later. Pt's BS taken = 99, BP= 106/56, HR= 71. A rapid response called. Pt was able to answer all orientation questions to medical team. Medical team ordered stat labs, ABG's, echo, EKG, and CPAP. Medical team deemed [facility]. Team said to continue to monitor while results of all tests post.   
Pt requesting to get into the shower, pt told RN and aide that he normally does not cover his dialysis dressing at home and just showers normally. Other Ivs wrapped and aide sat in bathroom with patient while he showered.   
Renal Dialysis Note    Patient :  Vincenzo Darden; 59 y.o. MRN# [id number]  Location:  0240/0240-01  Attending:  Mireya Nugent MD  Admit Date:  1/12/2025   Hospital Day: 6    Patient seen in dialysis  Subjective:     Patient seen and examined, no acute overnight issues. He will be going to dialysis unit shortly.  Tunnel catheter works well.  Discharge planning in progress.  Neuro recommendations noted.  Significant orthostatic hypotension noted .  Denies CP/SOB.   Na 128 K 3.6 CO2 22 calcium 7.2    Carotid dopplers noted.     Right Carotid    Common Carotid Artery: Minimal and heterogeneous plaque.   Internal Carotid Artery: Minimal stenosis. Distal ICA is tortuous.   External Carotid Artery: Patent.   Vertebral Artery: Flow is antegrade.   Left Carotid    Common Carotid Artery: Patent. Proximal CCA is tortuous.   Internal Carotid Artery: No stenosis. Proximal ICA is tortuous.    External Carotid Artery: Patent.   Vertebral Artery: Flow is antegrade.     History reviewed  Known history of ESRD on hemodialysis Tuesday Thursday Saturday at the [facility] unit via tunnel catheter under Dr. Núñez.  Came into the hospital for pain in his left leg along with worsening swelling.  Does have chronic wounds on his left leg in the past.  He had difficulty moving his leg as well.  He has had history of recurrent near syncopal episodes from orthostatic hypotension.  Neurowork-up unremarkable.    Outpatient Medications:     Medications Prior to Admission: [DISCONTINUED] metoprolol tartrate (LOPRESSOR) 25 MG tablet, Take 1 tablet by mouth 2 times daily  gabapentin (NEURONTIN) 100 MG capsule, Take 1 capsule by mouth every 8 hours for 30 days. (Patient taking differently: Take 3 capsules by mouth daily.)  carvedilol (COREG) 12.5 MG tablet, Take 1 tablet by mouth 2 times daily (with meals)  amLODIPine (NORVASC) 10 MG tablet, Take 1 tablet by mouth daily  atorvastatin (LIPITOR) 40 MG tablet, Take 1 tablet by mouth nightly  tamsulosin 
Renal Progress Note    Patient :  Vincenzo Darden; 59 y.o. MRN# [id number]  Location:  0240/0240-01  Attending:  Mireya Nugent MD  Admit Date:  1/12/2025   Hospital Day: 5    Subjective:     Patient was seen and examined.  No new issues reported overnight.  Patient normally gets dialysis as per TTS schedule.  BMP personally reviewed sodium 128, potassium 3.4, chloride 91, bicarb 21, calcium  Patient normally gets dialysis as per TTS schedule.  Had dialysis yesterday ran for 3.5 hours and got about 2 L removed.  BMP result from today reviewed electrolytes stable with potassium 3.9 and bicarb 22, calcium 7.1, BUN 36, creatinine 6.1 mg/dl.    Outpatient Medications:     Medications Prior to Admission: [DISCONTINUED] metoprolol tartrate (LOPRESSOR) 25 MG tablet, Take 1 tablet by mouth 2 times daily  gabapentin (NEURONTIN) 100 MG capsule, Take 1 capsule by mouth every 8 hours for 30 days. (Patient taking differently: Take 3 capsules by mouth daily.)  carvedilol (COREG) 12.5 MG tablet, Take 1 tablet by mouth 2 times daily (with meals)  amLODIPine (NORVASC) 10 MG tablet, Take 1 tablet by mouth daily  atorvastatin (LIPITOR) 40 MG tablet, Take 1 tablet by mouth nightly  tamsulosin (FLOMAX) 0.4 MG capsule, Take 1 capsule by mouth daily  Insulin Degludec (TRESIBA FLEXTOUCH) 200 UNIT/ML SOPN, Inject 40 Units into the skin nightly  Insulin Aspart, w/Niacinamide, (FIASP FLEXTOUCH) 100 UNIT/ML SOPN, Inject 20 Units into the skin 3 times daily (with meals) Pt has his sliding scale at home  [DISCONTINUED] TRULICITY 1.5 MG/0.5ML SC injection, Inject 0.5 mLs into the skin Once a week at 5 PM    Current Medications:     Scheduled Meds:    insulin lispro  0-8 Units SubCUTAneous 4x Daily AC & HS    clindamycin  600 mg Oral 3 times per day    LORazepam  1 mg IntraVENous Once    [Held by provider] amLODIPine  5 mg Oral Daily    ipratropium 0.5 mg-albuterol 2.5 mg  1 Dose Inhalation BID RT    pravastatin  10 mg Oral Nightly    gabapentin  300 
Renal Progress Note    Patient :  Vincenzo Darden; 59 y.o. MRN# [id number]  Location:  0240/0240-01  Attending:  Mireya Nugent MD  Admit Date:  1/12/2025   Hospital Day: 3    Subjective:     Patient was seen and examined.  No new issues reported overnight.  Patient normally gets dialysis as per TTS schedule.  Had dialysis yesterday ran for 3.5 hours and got about 2 L removed.  BMP result from today reviewed electrolytes stable with potassium 3.9 and bicarb 21.  Outpatient Medications:     Medications Prior to Admission: [DISCONTINUED] metoprolol tartrate (LOPRESSOR) 25 MG tablet, Take 1 tablet by mouth 2 times daily  gabapentin (NEURONTIN) 100 MG capsule, Take 1 capsule by mouth every 8 hours for 30 days. (Patient taking differently: Take 3 capsules by mouth daily.)  carvedilol (COREG) 12.5 MG tablet, Take 1 tablet by mouth 2 times daily (with meals)  amLODIPine (NORVASC) 10 MG tablet, Take 1 tablet by mouth daily  atorvastatin (LIPITOR) 40 MG tablet, Take 1 tablet by mouth nightly  tamsulosin (FLOMAX) 0.4 MG capsule, Take 1 capsule by mouth daily  Insulin Degludec (TRESIBA FLEXTOUCH) 200 UNIT/ML SOPN, Inject 40 Units into the skin nightly  Insulin Aspart, w/Niacinamide, (FIASP FLEXTOUCH) 100 UNIT/ML SOPN, Inject 20 Units into the skin 3 times daily (with meals) Pt has his sliding scale at home  [DISCONTINUED] TRULICITY 1.5 MG/0.5ML SC injection, Inject 0.5 mLs into the skin Once a week at 5 PM    Current Medications:     Scheduled Meds:    insulin lispro  0-4 Units SubCUTAneous 4x Daily AC & HS    [Held by provider] amLODIPine  10 mg Oral Daily    pravastatin  10 mg Oral Nightly    ipratropium 0.5 mg-albuterol 2.5 mg  1 Dose Inhalation Q4H WA RT    clindamycin (CLEOCIN) IV  600 mg IntraVENous Q8H    gabapentin  300 mg Oral Daily    sodium chloride flush  5-40 mL IntraVENous 2 times per day    [Held by provider] carvedilol  12.5 mg Oral BID WC    [Held by provider] insulin glargine  40 Units SubCUTAneous Nightly    
Renal Progress Note    Patient :  Vincenzo Darden; 59 y.o. MRN# [id number]  Location:  0240/0240-01  Attending:  Mireya Nugent MD  Admit Date:  1/12/2025   Hospital Day: 2    Subjective:     Patient was seen and examined on HD.  No new issues reported overnight.  Tolerating the procedure well.  BMP results from today reviewed sodium 128, potassium 3.6, chloride 94, bicarb 20, calcium 7.6, BUN 46, creatinine 1.6 mg/dl.  Patient normally gets dialysis as per TTS schedule.    Outpatient Medications:     Medications Prior to Admission: [DISCONTINUED] metoprolol tartrate (LOPRESSOR) 25 MG tablet, Take 1 tablet by mouth 2 times daily  gabapentin (NEURONTIN) 100 MG capsule, Take 1 capsule by mouth every 8 hours for 30 days. (Patient taking differently: Take 3 capsules by mouth daily.)  carvedilol (COREG) 12.5 MG tablet, Take 1 tablet by mouth 2 times daily (with meals)  amLODIPine (NORVASC) 10 MG tablet, Take 1 tablet by mouth daily  atorvastatin (LIPITOR) 40 MG tablet, Take 1 tablet by mouth nightly  tamsulosin (FLOMAX) 0.4 MG capsule, Take 1 capsule by mouth daily  Insulin Degludec (TRESIBA FLEXTOUCH) 200 UNIT/ML SOPN, Inject 40 Units into the skin nightly  Insulin Aspart, w/Niacinamide, (FIASP FLEXTOUCH) 100 UNIT/ML SOPN, Inject 20 Units into the skin 3 times daily (with meals) Pt has his sliding scale at home  [DISCONTINUED] TRULICITY 1.5 MG/0.5ML SC injection, Inject 0.5 mLs into the skin Once a week at 5 PM    Current Medications:     Scheduled Meds:    [Held by provider] amLODIPine  10 mg Oral Daily    pravastatin  10 mg Oral Nightly    heparin (porcine)  5,000 Units SubCUTAneous 3 times per day    clindamycin (CLEOCIN) IV  600 mg IntraVENous Q8H    gabapentin  300 mg Oral Daily    sodium chloride flush  5-40 mL IntraVENous 2 times per day    carvedilol  12.5 mg Oral BID WC    insulin glargine  40 Units SubCUTAneous Nightly    tamsulosin  0.4 mg Oral Daily    insulin lispro  0-16 Units SubCUTAneous 4x Daily AC & HS 
Spiritual Health History and Assessment/Progress Note  [facility]    (P) Initial Encounter,  ,  ,      Name: Vincenzo Darden MRN: [id number]    Age: 59 y.o.     Sex: male   Language: English   Faith: None   Cellulitis     Date: 1/15/2025            Total Time Calculated: (P) 15 min              Spiritual Assessment began in [facility] ORTHO/MED SURG        Referral/Consult From: (P) Rounding   Encounter Overview/Reason: (P) Initial Encounter  Service Provided For: (P) Patient    Pt awake upright in bed alert. Pt was calm and friendly. Pt did not shown signs of any distress or anxiety. Pt was passive with questioning. Pt does have support of wife. Pt does not have any kristina beliefs or practices.  offered support through ministry of presence Pt advised if needed support ask for spiritual care.     Kristina, Belief, Meaning:   Patient Other: None  Family/Friends No family/friends present      Importance and Influence:  Patient has no beliefs influential to healthcare decision-making identified during this visit  Family/Friends No family/friends present    Community:  Patient feels well-supported. Support system includes: Spouse/Partner  Family/Friends No family/friends present    Assessment and Plan of Care:     Patient Interventions include: Facilitated expression of thoughts and feelings and Affirmed coping skills/support systems  Family/Friends Interventions include: No family/friends present    Patient Plan of Care: Spiritual Care available upon further referral  Family/Friends Plan of Care: No family/friends present       01/15/25 1505   Encounter Summary   Encounter Overview/Reason Initial Encounter   Service Provided For Patient   Referral/Consult From [facility]   Support System Spouse   Last Encounter  01/15/25   Complexity of Encounter Low   Begin Time 1455   End Time  1510   Total Time Calculated 15 min   Spiritual/Emotional needs   Type Spiritual Support   Assessment/Intervention/Outcome 
color, turgor and pigmentation. No lesions or scars.  No cyanosis or clubbing  Neurological/Psychiatric: The patient's general behavior, level of consciousness, thought content and emotional status is normal.        Medications:  Scheduled Medications:    clindamycin (CLEOCIN) IV  600 mg IntraVENous Q8H    sodium chloride flush  5-40 mL IntraVENous 2 times per day    amLODIPine  10 mg Oral Daily    atorvastatin  40 mg Oral Nightly    carvedilol  12.5 mg Oral BID WC    insulin glargine  40 Units SubCUTAneous Nightly    tamsulosin  0.4 mg Oral Daily    insulin lispro  0-16 Units SubCUTAneous 4x Daily AC & HS     Continuous Infusions:    sodium chloride      heparin (PORCINE) Infusion 16 Units/kg/hr (01/12/25 2225)    dextrose       PRN Medicationssodium chloride flush, 5-40 mL, PRN  sodium chloride, , PRN  ondansetron, 4 mg, Q8H PRN   Or  ondansetron, 4 mg, Q6H PRN  polyethylene glycol, 17 g, Daily PRN  acetaminophen, 650 mg, Q6H PRN   Or  acetaminophen, 650 mg, Q6H PRN  heparin (porcine), 10,000 Units, PRN  heparin (porcine), 5,000 Units, PRN  HYDROmorphone, 0.25 mg, Q3H PRN  glucose, 4 tablet, PRN  dextrose bolus, 125 mL, PRN   Or  dextrose bolus, 250 mL, PRN  glucagon (rDNA), 1 mg, PRN  dextrose, , Continuous PRN        Diagnostic Labs:  CBC:   Recent Labs     01/12/25  1750 01/12/25 2223   WBC 12.5* 10.2   RBC 4.12* 3.73*   HGB 12.4* 11.2*   HCT 36.8* 34.5*   MCV 89.3 92.5   RDW 15.6* 15.7*    153     BMP:   Recent Labs     01/12/25  1747   *   K 4.6   CL 98   CO2 15*   BUN 69*   CREATININE 7.4*     BNP: No results for input(s): \"BNP\" in the last 72 hours.  PT/INR:   Recent Labs     01/12/25 2223   PROTIME 15.3*   INR 1.2     APTT:   Recent Labs     01/12/25 2223   APTT 37.7*     CARDIAC ENZYMES: No results for input(s): \"CKMB\", \"CKMBINDEX\", \"TROPONINI\" in the last 72 hours.    Invalid input(s): \"CKTOTAL;3\"  FASTING LIPID PANEL:  Lab Results   Component Value Date    CHOL 98 06/03/2024    HDL 31 
11:49 PM    LEUKOCYTESUR NEGATIVE 08/06/2024 11:49 PM    UROBILINOGEN Normal 08/06/2024 11:49 PM    BILIRUBINUR NEGATIVE 08/06/2024 11:49 PM    GLUCOSEU 2+ 08/06/2024 11:49 PM    KETUA NEGATIVE 08/06/2024 11:49 PM     Urine Potassium:    Lab Results   Component Value Date/Time    KUR 52.2 10/05/2023 08:03 AM     Urine Chloride:    Lab Results   Component Value Date/Time    CLUR 27 10/05/2023 08:03 AM     Urine Osmolarity:   Lab Results   Component Value Date/Time    OSMOU 290 06/06/2024 06:07 PM     Radiology:     Reviewed as available, none new today.     Assessment:     1.  ESRD on hemodialysis Tuesday Thursday Saturday at the [facility] unit via tunnel catheter under Dr. Núñez dry weight 124 kg current weight: 118 kg  2.  Orthostatic hypotension likely from autonomic neuropathy  3.  Type 2 diabetes  4.  Lower extremity cellulitis improving  4.  Status post right BKA  Plan:   Next hemodialysis Tuesday.   2.   Antibiotics per primary service  3.   Stable for discharge  4.   Daily BMP  5.   Will follow  Nutrition   Please ensure that patient is on a renal diet/TF. Avoid nephrotoxic drugs/contrast exposure.    Debbie Ferraro, CNP  [facility]         
PRN  heparin (porcine), 1,900 Units, PRN  heparin (porcine), 1,900 Units, PRN  sodium chloride flush, 5-40 mL, PRN  sodium chloride, , PRN  ondansetron, 4 mg, Q8H PRN   Or  ondansetron, 4 mg, Q6H PRN  polyethylene glycol, 17 g, Daily PRN  acetaminophen, 650 mg, Q6H PRN   Or  acetaminophen, 650 mg, Q6H PRN  glucose, 4 tablet, PRN  dextrose bolus, 125 mL, PRN   Or  dextrose bolus, 250 mL, PRN  glucagon (rDNA), 1 mg, PRN  dextrose, , Continuous PRN        Diagnostic Labs:  CBC:   Recent Labs     01/16/25  0549 01/17/25  0638 01/18/25  0604   WBC 10.0 7.0 7.2   RBC 3.18* 3.09* 3.28*   HGB 9.5* 9.1* 9.7*   HCT 28.9* 28.1* 29.9*   MCV 90.9 90.9 91.2   RDW 15.7* 15.6* 15.4*   * 118* 143     BMP:   Recent Labs     01/16/25  0549 01/17/25  0638 01/18/25  0604   * 128* 128*   K 3.7 3.4* 3.6*   CL 93* 91* 90*   CO2 21 22 22   BUN 46* 36* 51*   CREATININE 7.8* 6.1* 7.3*     BNP: No results for input(s): \"BNP\" in the last 72 hours.  PT/INR:   No results for input(s): \"PROTIME\", \"INR\" in the last 72 hours.    APTT:   No results for input(s): \"APTT\" in the last 72 hours.    CARDIAC ENZYMES: No results for input(s): \"CKMB\", \"CKMBINDEX\", \"TROPONINI\" in the last 72 hours.    Invalid input(s): \"CKTOTAL;3\"  FASTING LIPID PANEL:  Lab Results   Component Value Date    CHOL 98 06/03/2024    HDL 31 (L) 06/03/2024    TRIG 124 06/03/2024     LIVER PROFILE:   Recent Labs     01/16/25  0549   AST 43   ALT 19   BILIDIR 0.3*   BILITOT 0.5   ALKPHOS 63        MICROBIOLOGY:   Lab Results   Component Value Date/Time    CULTURE NO GROWTH 2 DAYS 01/15/2025 01:47 PM    CULTURE NO GROWTH 2 DAYS 01/15/2025 01:47 PM       Imaging:    XR FOOT LEFT (MIN 3 VIEWS)    Result Date: 1/12/2025  1. Erosive changes of the terminal tuft of the large toe distal phalanx. Correlate with any concern for osteomyelitis here. 2. Soft tissue swelling and superficial ulceration of the distal aspect of the large toe. 3. Superficial soft tissue ulceration 
insufficiency     Moderate to severe aortic insufficiency     Coronary artery disease involving native coronary artery of native heart without angina pectoris     Hx of CABG     Stage 3b chronic kidney disease ([facility])     Urinary retention     Chest pain     Cardiac aneurysm     Diffuse abdominal pain     Acute encephalopathy     Staphylococcus aureus bacteremia     Hyponatremia     MSSA (methicillin susceptible Staphylococcus aureus) septicemia ([facility])     Disruption or dehiscence of closure of sternum or sternotomy     PAD (peripheral artery disease) ([facility])     Skin ulcer due to diabetes mellitus ([facility])     Heart failure, diastolic, with acute decompensation ([facility])     Hyperkalemia     History of aortic valve replacement     CAMILLE (acute kidney injury) ([facility])     Wound eschar of foot     PVD (peripheral vascular disease) ([facility])     Wound, open, foot, left, initial encounter     Dyspnea     SOB (shortness of breath)     Pleural effusion     Postoperative seroma involving circulatory system after cardiac bypass     Biventricular congestive heart failure ([facility])     HFrEF (heart failure with reduced ejection fraction) ([facility])     Osteomyelitis of great toe of left foot     Ischemic cardiomyopathy     Empyema of right pleural space ([facility])     Empyema lung ([facility])     Dependence on renal dialysis ([facility])     Bilateral lower extremity edema     Essential hypertension     Osteomyelitis     Cellulitis     ESRD on dialysis ([facility])     Syncope      Plan of Treatment:   Stable. No further dizziness/syncope  Volume management per nephrology/HD  Echo pending  Continue PO ASA & statin  Continue Norvasc  Keep K >4 and Mg >2  If echo stable can be discharged with OP f/u with PCP (states he doesn't feel he needs to f/u with cardiology and has transportation issues)    Electronically signed by ADEOLA Gallegos CNP on 1/16/2025 at 11:10 AM  [email or web address]  [phone]          
cardiomyopathy     Empyema of right pleural space ([facility])     Empyema lung (HCC)     Dependence on renal dialysis ([facility])     Bilateral lower extremity edema     Essential hypertension     Osteomyelitis     Cellulitis     ESRD on dialysis ([facility])     Syncope      Plan of Treatment:   Stable. No further dizziness/syncope  Volume management per nephrology/HD  Echo completed this AM  Continue PO ASA & statin  Continue Norvasc  Keep K >4 and Mg >2  Echo pre-garcia low risk.  OK for d/c from CV standpoint with OP f/u in clinic    Electronically signed by ADEOLA Gallegos - CNP on 1/17/2025 at 9:51 AM  [facility]  [phone]          
  Hyperlipidemia  Lipitor switched to pravastatin due to the raised CK     BPH  Tamsulosin dose resumed        DVT ppx: Heparin infusion  GI ppx: Not indicated     PT/OT/SW - consulted  Discharge Plannin-48 hours if remaining stable     Dagoberto Moore MD  Internal Medicine Resident, PGY-1  [facility]; [place], [place]  2025, 6:41 AM

## 2025-01-20 NOTE — DISCHARGE SUMMARY
fever)   -Take medications as prescribed.  -Wean off narcotics (percocet/norco) as soon as possible. Do not take tylenol if still taking narcotics.  -Follow up with your podiatrist as soon as possible.   Follow up labs: NA  Follow up imaging: NA    Note that over 30 minutes was spent in preparing discharge papers, discussing discharge with patient, medication review, etc.      Dagoberto Moore MD,  Internal Medicine Resident, PGY-1  [facility]; [place], [place]  1/20/2025, 10:09 AM

## 2025-03-13 RX ORDER — GABAPENTIN 100 MG/1
CAPSULE ORAL
Qty: 90 CAPSULE | Refills: 0 | OUTPATIENT
Start: 2025-03-13

## 2025-03-26 ENCOUNTER — DOCUMENTATION (OUTPATIENT)
Dept: TRANSPLANT | Facility: HOSPITAL | Age: 60
End: 2025-03-26
Payer: MEDICARE

## 2025-03-26 NOTE — PROGRESS NOTES
Per letter from [employer]; approved txp eval 03/25/25 to 03/24/26. REF #[id number]. Ph [phone]. Fax [phone]. Listing auth via [employer]; use DX Z48.22 and cpt [phone]

## 2025-03-27 NOTE — PROGRESS NOTES
Patient attended class on ***.  Accompanied to class with ***.  Oral and written education was provided.  Patient remained attentive throughout the review session, asking appropriate questions.  Evaluation consents were signed.     HLA sensitizing questionnaire completed by patient and emailed to HLA Pathology.    PRE-TRANSPLANT EDUCATION  Patient received education regarding the following topics as part of their pre-transplant evaluation:  The evaluation process, including:   Transplant team members and roles    Required consultations and testing   Selection criteria and suitability for transplant   Listing process and receiving an organ offer   Psychosocial and financial considerations for a successful transplant   Patient responsibilities, including the necessity of adhering to a strict medical regimen  An overview of the surgical procedure   Potential medical, surgical, and psychosocial risks to transplantation, including:   Wound infection   Pneumonia   Blood clot formation   Organ rejection, failure, and possibility of re-transplantation   Lifetime immunosuppression therapy and associated risks   Arrhythmias and cardiovascular collapse   Multi-organ system failure   Death   Depression   Post-Traumatic Stress Disorder   Generalized anxiety, issues of dependence, and feelings of guilt  Available alternatives to transplantation  Donor risk factors that could affect the success of the transplant and the health of the patient, including:   Donor age   Donor medical and social history   Condition of the organ   Risk of donato cancer, HIV, Hepatitis B, Hepatitis C, or malaria if the infection is not detectable at the time of donation  Patient?s right to withdraw consent for transplantation at any time during the process  Transplants not performed in a Medicare-approved transplant center could affect the patient?s ability to have immunosuppression medication paid for under Medicare part B.   Multiple listing  options.    Patient was given the opportunity to have questions answered. Patient was provided a copy of the informed consent for transplant evaluation.    Signed evaluation informed consent received? ***

## 2025-03-28 ENCOUNTER — DOCUMENTATION (OUTPATIENT)
Facility: HOSPITAL | Age: 60
End: 2025-03-28
Payer: MEDICARE

## 2025-03-28 ENCOUNTER — APPOINTMENT (OUTPATIENT)
Facility: HOSPITAL | Age: 60
End: 2025-03-28
Payer: MEDICARE

## 2025-03-28 ENCOUNTER — OFFICE VISIT (OUTPATIENT)
Facility: HOSPITAL | Age: 60
End: 2025-03-28
Payer: MEDICARE

## 2025-03-28 ENCOUNTER — SOCIAL WORK (OUTPATIENT)
Facility: HOSPITAL | Age: 60
End: 2025-03-28
Payer: MEDICARE

## 2025-03-28 VITALS
OXYGEN SATURATION: 94 % | HEART RATE: 87 BPM | DIASTOLIC BLOOD PRESSURE: 64 MMHG | HEIGHT: 72 IN | WEIGHT: 270.8 LBS | BODY MASS INDEX: 36.68 KG/M2 | TEMPERATURE: 97.8 F | SYSTOLIC BLOOD PRESSURE: 100 MMHG

## 2025-03-28 VITALS
WEIGHT: 270.8 LBS | DIASTOLIC BLOOD PRESSURE: 64 MMHG | OXYGEN SATURATION: 94 % | HEART RATE: 87 BPM | HEIGHT: 72 IN | TEMPERATURE: 97.8 F | BODY MASS INDEX: 36.68 KG/M2 | SYSTOLIC BLOOD PRESSURE: 100 MMHG

## 2025-03-28 DIAGNOSIS — Z01.818 PRE-TRANSPLANT EVALUATION FOR KIDNEY TRANSPLANT: Primary | ICD-10-CM

## 2025-03-28 DIAGNOSIS — R79.89 OTHER SPECIFIED ABNORMAL FINDINGS OF BLOOD CHEMISTRY: ICD-10-CM

## 2025-03-28 DIAGNOSIS — I10 ESSENTIAL HYPERTENSION: ICD-10-CM

## 2025-03-28 DIAGNOSIS — N18.6 ANEMIA IN ESRD (END-STAGE RENAL DISEASE): ICD-10-CM

## 2025-03-28 DIAGNOSIS — Z12.5 ENCOUNTER FOR SCREENING FOR MALIGNANT NEOPLASM OF PROSTATE: ICD-10-CM

## 2025-03-28 DIAGNOSIS — N18.6 ESRD (END STAGE RENAL DISEASE) ON DIALYSIS (MULTI): Primary | ICD-10-CM

## 2025-03-28 DIAGNOSIS — N18.6 END STAGE RENAL DISEASE (MULTI): ICD-10-CM

## 2025-03-28 DIAGNOSIS — Z99.2 ESRD (END STAGE RENAL DISEASE) ON DIALYSIS (MULTI): Primary | ICD-10-CM

## 2025-03-28 DIAGNOSIS — Z01.818 PRE-TRANSPLANT EVALUATION FOR KIDNEY TRANSPLANT: ICD-10-CM

## 2025-03-28 DIAGNOSIS — Z51.81 ENCOUNTER FOR THERAPEUTIC DRUG LEVEL MONITORING: ICD-10-CM

## 2025-03-28 DIAGNOSIS — N18.6 ESRD (END STAGE RENAL DISEASE) (MULTI): ICD-10-CM

## 2025-03-28 DIAGNOSIS — D63.1 ANEMIA IN ESRD (END-STAGE RENAL DISEASE): ICD-10-CM

## 2025-03-28 LAB
ABO GROUP (TYPE) IN BLOOD: NORMAL
ALBUMIN SERPL BCP-MCNC: 4 G/DL (ref 3.4–5)
ALP SERPL-CCNC: 68 U/L (ref 33–136)
ALT SERPL W P-5'-P-CCNC: 9 U/L (ref 10–52)
AMYLASE SERPL-CCNC: 55 U/L (ref 29–103)
APPEARANCE UR: CLEAR
AST SERPL W P-5'-P-CCNC: 9 U/L (ref 9–39)
BILIRUB DIRECT SERPL-MCNC: 0.1 MG/DL (ref 0–0.3)
BILIRUB SERPL-MCNC: 0.3 MG/DL (ref 0–1.2)
BILIRUB UR STRIP.AUTO-MCNC: NEGATIVE MG/DL
BUN SERPL-MCNC: 49 MG/DL (ref 6–23)
C PEPTIDE SERPL-MCNC: 12.3 NG/ML (ref 0.7–3.9)
CHOLEST SERPL-MCNC: 96 MG/DL (ref 0–199)
CHOLESTEROL/HDL RATIO: 3.3
CMV IGG AVIDITY SERPL IA-RTO: NONREACTIVE %
COLOR UR: ABNORMAL
CREAT SERPL-MCNC: 7.31 MG/DL (ref 0.5–1.3)
EBV EA IGG SER QL: POSITIVE
EBV NA AB SER QL: POSITIVE
EBV VCA IGG SER IA-ACNC: POSITIVE
EBV VCA IGM SER IA-ACNC: NEGATIVE
EGFRCR SERPLBLD CKD-EPI 2021: 8 ML/MIN/1.73M*2
ERYTHROCYTE [DISTWIDTH] IN BLOOD BY AUTOMATED COUNT: 13.6 % (ref 11.5–14.5)
EST. AVERAGE GLUCOSE BLD GHB EST-MCNC: 131 MG/DL
GLUCOSE UR STRIP.AUTO-MCNC: ABNORMAL MG/DL
HBA1C MFR BLD: 6.2 %
HCT VFR BLD AUTO: 34 % (ref 41–52)
HDLC SERPL-MCNC: 28.7 MG/DL
HGB BLD-MCNC: 11.3 G/DL (ref 13.5–17.5)
HIV 1+2 AB+HIV1 P24 AG SERPL QL IA: NONREACTIVE
INR PPP: 1.1 (ref 0.9–1.1)
KETONES UR STRIP.AUTO-MCNC: NEGATIVE MG/DL
LEUKOCYTE ESTERASE UR QL STRIP.AUTO: ABNORMAL
MCH RBC QN AUTO: 30.1 PG (ref 26–34)
MCHC RBC AUTO-ENTMCNC: 33.2 G/DL (ref 32–36)
MCV RBC AUTO: 90 FL (ref 80–100)
NITRITE UR QL STRIP.AUTO: NEGATIVE
NON-HDL CHOLESTEROL: 67 MG/DL (ref 0–149)
NRBC BLD-RTO: 0 /100 WBCS (ref 0–0)
PH UR STRIP.AUTO: 7 [PH]
PHOSPHATE SERPL-MCNC: 7 MG/DL (ref 2.5–4.9)
PLATELET # BLD AUTO: 237 X10*3/UL (ref 150–450)
PROT SERPL-MCNC: 7.1 G/DL (ref 6.4–8.2)
PROT UR STRIP.AUTO-MCNC: ABNORMAL MG/DL
PROTHROMBIN TIME: 11.7 SECONDS (ref 9.8–12.4)
RBC # BLD AUTO: 3.76 X10*6/UL (ref 4.5–5.9)
RBC # UR STRIP.AUTO: ABNORMAL MG/DL
RBC #/AREA URNS AUTO: ABNORMAL /HPF
RH FACTOR (ANTIGEN D): NORMAL
SP GR UR STRIP.AUTO: 1.01
TREPONEMA PALLIDUM IGG+IGM AB [PRESENCE] IN SERUM OR PLASMA BY IMMUNOASSAY: NONREACTIVE
UROBILINOGEN UR STRIP.AUTO-MCNC: NORMAL MG/DL
VARICELLA ZOSTER IGG INDEX: 3.8 IA
VZV IGG SER QL IA: POSITIVE
WBC # BLD AUTO: 10.5 X10*3/UL (ref 4.4–11.3)
WBC #/AREA URNS AUTO: ABNORMAL /HPF

## 2025-03-28 PROCEDURE — 80323 ALKALOIDS NOS: CPT | Performed by: TRANSPLANT SURGERY

## 2025-03-28 PROCEDURE — 86900 BLOOD TYPING SEROLOGIC ABO: CPT | Performed by: TRANSPLANT SURGERY

## 2025-03-28 PROCEDURE — 84681 ASSAY OF C-PEPTIDE: CPT | Performed by: TRANSPLANT SURGERY

## 2025-03-28 PROCEDURE — 86663 EPSTEIN-BARR ANTIBODY: CPT | Performed by: TRANSPLANT SURGERY

## 2025-03-28 PROCEDURE — 84520 ASSAY OF UREA NITROGEN: CPT | Performed by: TRANSPLANT SURGERY

## 2025-03-28 PROCEDURE — 81001 URINALYSIS AUTO W/SCOPE: CPT | Performed by: TRANSPLANT SURGERY

## 2025-03-28 PROCEDURE — 86644 CMV ANTIBODY: CPT | Performed by: TRANSPLANT SURGERY

## 2025-03-28 PROCEDURE — 99214 OFFICE O/P EST MOD 30 MIN: CPT | Performed by: STUDENT IN AN ORGANIZED HEALTH CARE EDUCATION/TRAINING PROGRAM

## 2025-03-28 PROCEDURE — 86780 TREPONEMA PALLIDUM: CPT | Performed by: TRANSPLANT SURGERY

## 2025-03-28 PROCEDURE — 85610 PROTHROMBIN TIME: CPT | Performed by: TRANSPLANT SURGERY

## 2025-03-28 PROCEDURE — 87086 URINE CULTURE/COLONY COUNT: CPT | Performed by: TRANSPLANT SURGERY

## 2025-03-28 PROCEDURE — 84154 ASSAY OF PSA FREE: CPT | Performed by: TRANSPLANT SURGERY

## 2025-03-28 PROCEDURE — 84075 ASSAY ALKALINE PHOSPHATASE: CPT | Performed by: TRANSPLANT SURGERY

## 2025-03-28 PROCEDURE — 86829 HLA CLASS I/II ANTIBODY QUAL: CPT | Mod: OUT | Performed by: TRANSPLANT SURGERY

## 2025-03-28 PROCEDURE — 86825 HLA X-MATH NON-CYTOTOXIC: CPT | Mod: OUT | Performed by: TRANSPLANT SURGERY

## 2025-03-28 PROCEDURE — 85027 COMPLETE CBC AUTOMATED: CPT | Performed by: TRANSPLANT SURGERY

## 2025-03-28 PROCEDURE — 83036 HEMOGLOBIN GLYCOSYLATED A1C: CPT | Performed by: TRANSPLANT SURGERY

## 2025-03-28 PROCEDURE — 82150 ASSAY OF AMYLASE: CPT | Performed by: TRANSPLANT SURGERY

## 2025-03-28 PROCEDURE — 99215 OFFICE O/P EST HI 40 MIN: CPT | Performed by: INTERNAL MEDICINE

## 2025-03-28 PROCEDURE — 81382 HLA II TYPING 1 LOC HR: CPT | Mod: OUT | Performed by: TRANSPLANT SURGERY

## 2025-03-28 PROCEDURE — 36415 COLL VENOUS BLD VENIPUNCTURE: CPT

## 2025-03-28 PROCEDURE — 80307 DRUG TEST PRSMV CHEM ANLYZR: CPT | Performed by: TRANSPLANT SURGERY

## 2025-03-28 PROCEDURE — 84100 ASSAY OF PHOSPHORUS: CPT | Performed by: TRANSPLANT SURGERY

## 2025-03-28 PROCEDURE — 86787 VARICELLA-ZOSTER ANTIBODY: CPT | Performed by: TRANSPLANT SURGERY

## 2025-03-28 PROCEDURE — 83718 ASSAY OF LIPOPROTEIN: CPT | Performed by: TRANSPLANT SURGERY

## 2025-03-28 PROCEDURE — 87389 HIV-1 AG W/HIV-1&-2 AB AG IA: CPT | Performed by: TRANSPLANT SURGERY

## 2025-03-28 PROCEDURE — 86481 TB AG RESPONSE T-CELL SUSP: CPT | Performed by: TRANSPLANT SURGERY

## 2025-03-28 PROCEDURE — 82565 ASSAY OF CREATININE: CPT | Performed by: TRANSPLANT SURGERY

## 2025-03-28 RX ORDER — FLASH GLUCOSE SENSOR
KIT MISCELLANEOUS
COMMUNITY
Start: 2024-07-24

## 2025-03-28 RX ORDER — GABAPENTIN 100 MG/1
100 CAPSULE ORAL 3 TIMES DAILY
COMMUNITY
Start: 2024-08-19

## 2025-03-28 RX ORDER — NAPROXEN SODIUM 220 MG/1
81 TABLET, FILM COATED ORAL DAILY
COMMUNITY

## 2025-03-28 RX ORDER — ATORVASTATIN CALCIUM 40 MG/1
40 TABLET, FILM COATED ORAL DAILY
COMMUNITY
Start: 2024-08-19

## 2025-03-28 RX ORDER — CALCIUM ACETATE 667 MG/1
2 CAPSULE ORAL 3 TIMES DAILY
COMMUNITY

## 2025-03-28 RX ORDER — TAMSULOSIN HYDROCHLORIDE 0.4 MG/1
0.4 CAPSULE ORAL
COMMUNITY
Start: 2024-08-19

## 2025-03-28 RX ORDER — CARVEDILOL 12.5 MG/1
12.5 TABLET ORAL 2 TIMES DAILY
COMMUNITY

## 2025-03-28 RX ORDER — TIRZEPATIDE 2.5 MG/.5ML
2.5 INJECTION, SOLUTION SUBCUTANEOUS
COMMUNITY
Start: 2025-03-10

## 2025-03-28 RX ORDER — METOPROLOL TARTRATE 25 MG/1
25 TABLET, FILM COATED ORAL 2 TIMES DAILY
COMMUNITY
Start: 2025-01-19

## 2025-03-28 SDOH — ECONOMIC STABILITY: INCOME INSECURITY: IN THE LAST 12 MONTHS, WAS THERE A TIME WHEN YOU WERE NOT ABLE TO PAY THE MORTGAGE OR RENT ON TIME?: NO

## 2025-03-28 SDOH — ECONOMIC STABILITY: TRANSPORTATION INSECURITY
IN THE PAST 12 MONTHS, HAS LACK OF TRANSPORTATION KEPT YOU FROM MEETINGS, WORK, OR FROM GETTING THINGS NEEDED FOR DAILY LIVING?: NO

## 2025-03-28 SDOH — ECONOMIC STABILITY: TRANSPORTATION INSECURITY
IN THE PAST 12 MONTHS, HAS THE LACK OF TRANSPORTATION KEPT YOU FROM MEDICAL APPOINTMENTS OR FROM GETTING MEDICATIONS?: NO

## 2025-03-28 ASSESSMENT — PAIN SCALES - GENERAL
PAINLEVEL_OUTOF10: 0-NO PAIN
PAINLEVEL_OUTOF10: 0-NO PAIN

## 2025-03-28 ASSESSMENT — SOCIAL DETERMINANTS OF HEALTH (SDOH): HOW HARD IS IT FOR YOU TO PAY FOR THE VERY BASICS LIKE FOOD, HOUSING, MEDICAL CARE, AND HEATING?: NOT HARD AT ALL

## 2025-03-28 NOTE — PROGRESS NOTES
Kidney Patient New Evaluation Summary    Date of appointment: 2025    Name: Juni Davenport    : 1965    MRN: [id number]    Diagnosis: Diabetes Mellitus - Type II      Phase: New Eval    Referring Nephrologist: Aguilar Edwards MD   HD Unit: [facility]  Dialysis Start: ~ 9 months  Access:       Days:  TTS    PCP: Marissa Palmer DO       Endocrinologist:     Medical History/Hospitalizations:   H/O diabetic neuropathy & PVD s/p R BKA, HTN, HLD, CAD s/p CABG x 3 & AVR (2024), CHF, lumbar stenosis with neurogenic claudication s/p lumbar surgery (), ESRD on HD, cervical spondylosis, multiple syncopal episodes    Undergoing evaluation at multiple other centers?      Surgical History:   Past Surgical History:   Procedure Laterality Date    OTHER SURGICAL HISTORY  2022    Toe amputation    OTHER SURGICAL HISTORY  2022    Foot surgery    OTHER SURGICAL HISTORY  2022    Cataract surgery    OTHER SURGICAL HISTORY  2022    Knee surgery    OTHER SURGICAL HISTORY  2022    Pancreatic surgery    OTHER SURGICAL HISTORY  2022    Back surgery    OTHER SURGICAL HISTORY  2022    Eye surgery     Donors:  potential      Test/Consult Impression Next Scheduled Date   CXR 2025 CT Chest  1.  No evidence of pulmonary embolism.     2.  Mild right basilar consolidative opacities are most suggestive of   atelectasis.       EKG 2025  Normal sinus rhythm   Left axis deviation   Right bundle branch block   Possible Lateral infarct , age undetermined   Abnormal ECG   When compared with ECG of 15-CAPO-2025 06:30,   Criteria for Inferior infarct are no longer Present       Echo 2025    Left Ventricle: Low normal left ventricular systolic function with a   visually estimated EF of 45 - 50%. EF by 2D Simpsons Biplane is 52%.     Right Ventricle: Reduced systolic function. TAPSE is abnormal. TAPSE is   1.5 cm. RV Peak S' is 7.9 cm/s.     Aortic Valve: Mild  stenosis of the aortic valve. AV mean gradient is 17   mmHg. AV peak gradient is 30 mmHg. AV area by continuity VTI is 1.2 cm2.     Mitral Valve: Mild annular calcification at the posterior leaflet. Mild   stenosis noted. MV mean gradient is 4 mmHg. MV PHT is 129.0 ms. MV area by   continuity equation is 1.4 cm2.     Image quality is technically difficult. Contrast used: Definity.   Technically difficult study due to patient's body habitus, technically   difficult study due to low parasternal window and procedure performed with   the patient in a supine position.       CT Cardiac Score No results found.     NM Stress Test No results found for this or any previous visit.     Cardiac MRI No results found for this or any previous visit.     Left Heart Catheterization No results found for this or any previous visit.     Cardiology last visit impression      Pulmonary Function Test No results found for this or any previous visit.    CT Abd/Pelvis CT abdomen pelvis wo IV contrast    Result Date: 6/3/2024  1. No acute intra-abdominal or pelvic abnormality with limitations for a noncontrast CT scan. 2. Cholelithiasis. 3. Hepatomegaly. 4. Left adrenal adenoma. 5. Fat containing umbilical hernia. 6. Postsurgical changes of median sternotomy with inflammatory stranding and foci of gas within the anterior chest wall and widening of the sternum to defect below the lowest sternotomy wire.  I would suggest correlation as to the date of his sternotomy as dehiscence and abscess cannot be excluded.       Colonoscopy NEEDS    Pap N/A    Mammogram N/A    Other:

## 2025-03-28 NOTE — PROGRESS NOTES
"        TRANSPLANT NEPHROLOGY CONSULT :   KIDNEY TRANSPLANT RECIPIENT EVALUATION        SERVICE DATE: 03/28/2025     REASON FOR CONSULT/CHIEF COMPLAINT:    FOR KIDNEY TRANSPLANT RECIPIENT EVALUATION.    Referring Nephrologist: Aguilar Edwards MD   HD Unit: [facility]  Dialysis Start: ~ 9 months (10/2024)  Days:  TTS  PCP: Marissa Palmer DO                               HPI:    Mr. Davenport is a 59 y.o. male with past medical history significant for :                                   diabetic neuropathy  PVD s/p R BKA  HTN  HLD   CAD s/p CABG x 3 & AVR (2/2024)  CHF  lumbar stenosis with neurogenic claudication s/p lumbar surgery (2019)  cervical spondylosis  multiple syncopal episodes    BLOOD TYPE:  ***    Functional status :   ***    Urine output :   The patient makes urine output approximately *** per day.    Potential Donor :  ***    Last GFR /Creatinine:   No results found for: \"CREATININE\"   No results found for: \"GFRMALE\", \"NONUHFIRE\"    Hx of PRBC Transfusion  ***    Pregnancy History  ***    Recent Hospitalization/ED visit:  ***    The patient is here for kidney transplant recipient evaluation. Mr. Davenport has had multiple complications from end stage severe renal disease including anemia, secondary hyperparathyroidism, and osteodystrophy. The patient is here today for an evaluation for kidney transplantation to improve quality of life and decrease the risk of cardiovascular disease, coronary artery disease and stroke.     The patient is doing well without complaints. Denied chest pain, shortness of breath, palpitation, dyspnea on exertion, dysuria, fever, nausea, vomiting, diarrhea and flu-liked symptoms. No swelling of the extremities. No recent hospitalization or ED visit.      ROS:  Review of  14 systems was performed system by system. See HPI. Otherwise, the symptoms were negative.    PAST MEDICAL HISTORY: Please see HPI.    No past medical history on file.     PAST " SURGICAL HISTORY: Please see HPI.    Past Surgical History:   Procedure Laterality Date    OTHER SURGICAL HISTORY  07/28/2022    Toe amputation    OTHER SURGICAL HISTORY  07/28/2022    Foot surgery    OTHER SURGICAL HISTORY  07/28/2022    Cataract surgery    OTHER SURGICAL HISTORY  07/28/2022    Knee surgery    OTHER SURGICAL HISTORY  07/28/2022    Pancreatic surgery    OTHER SURGICAL HISTORY  07/28/2022    Back surgery    OTHER SURGICAL HISTORY  07/28/2022    Eye surgery        SOCIAL HISTORY: Please see our 's note for details.    Social History     Socioeconomic History    Marital status:      Spouse name: Not on file    Number of children: Not on file    Years of education: Not on file    Highest education level: Not on file   Occupational History    Not on file   Tobacco Use    Smoking status: Not on file    Smokeless tobacco: Not on file   Substance and Sexual Activity    Alcohol use: Not on file    Drug use: Not on file    Sexual activity: Not on file   Other Topics Concern    Not on file   Social History Narrative    Not on file     Social Drivers of Health     Financial Resource Strain: Low Risk  (11/29/2023)    Received from [facility]    Overall Financial Resource Strain (CARDIA)     Difficulty of Paying Living Expenses: Not hard at all   Food Insecurity: No Food Insecurity (8/7/2024)    Received from [facility]    Hunger Vital Sign     Worried About Running Out of Food in the Last Year: Never true     Ran Out of Food in the Last Year: Never true   Transportation Needs: No Transportation Needs (8/7/2024)    Received from [facility]    PRAPARE - Transportation     Lack of Transportation (Medical): No     Lack of Transportation (Non-Medical): No   Physical Activity: Insufficiently Active (11/29/2023)    Received from [facility]    Exercise Vital Sign     Days of Exercise per Week: 3 days     Minutes of Exercise per Session: 30 min    Stress: No Stress Concern Present (11/29/2023)    Received from [facility] - Occupational Stress Questionnaire     Feeling of Stress : Only a little   Social Connections: Moderately Isolated (11/29/2023)    Received from [facility]    Social Connection and Isolation Panel [NHANES]     Frequency of Communication with Friends and Family: Three times a week     Frequency of Social Gatherings with Friends and Family: Once a week     Attends [employer] Services: Never     Active Member of Clubs or Organizations: No     Attends Club or Organization Meetings: Never     Marital Status:    Intimate Partner Violence: Not At Risk (11/29/2023)    Received from [facility]    Humiliation, Afraid, Rape, and Kick questionnaire     Fear of Current or Ex-Partner: No     Emotionally Abused: No     Physically Abused: No     Sexually Abused: No   Housing Stability: Low Risk  (8/7/2024)    Received from [facility]    Housing Stability Vital Sign     Unable to Pay for Housing in the Last Year: No     Number of Places Lived in the Last Year: 1     Unstable Housing in the Last Year: No       FAMILY HISTORY:  No family history on file.    MEDICATION LIST:  Current Outpatient Medications   Medication Instructions    aspirin 81 mg, oral, Daily    atorvastatin (LIPITOR) 40 mg, Daily    calcium acetate (Phoslo) 667 mg capsule 2 capsules, oral, 3 times daily    carvedilol (COREG) 12.5 mg, oral, 2 times daily    FreeStyle Sandy 2 Sensor kit Inject under the skin.    gabapentin (NEURONTIN) 100 mg, 3 times daily    metoprolol tartrate (LOPRESSOR) 25 mg, 2 times daily    Mounjaro 2.5 mg, Once Weekly    tamsulosin (FLOMAX) 0.4 mg, Daily RT       ALLERGY  Allergies   Allergen Reactions    Dulaglutide Unknown    Penicillins Hives and Anaphylaxis    Piperacillin-Tazobactam Anaphylaxis and Shortness of breath     Other Reaction(s): Breathing Issues    Vancomycin GI  "intolerance, Hives, Nausea And Vomiting, Other and Nausea/vomiting     Pt states he has tolerated vanc previously, but last dose made him vomit.    Pt states he has tolerated vanc previously, but last dose made him vomit    States got levofloxicin and was ok    Daptomycin Unknown     Labeled as possibly causing a worsening in his CKD 3 in 6/2024    Sulfamethoxazole Itching     Other Reaction(s): Pruritis    Outside Source Comment: Other Reaction(s): Pruritis    Trimethoprim Itching       PHYSICAL EXAM:    Visit Vitals  /64   Pulse 87   Temp 36.6 °C (97.8 °F) (Temporal)   Ht 1.829 m (6')   Wt 123 kg (270 lb 12.8 oz)   SpO2 94%   BMI 36.73 kg/m²   BSA 2.5 m²        General Appearance - NAD, Good speech, oriented and alert  HEENT - Supple. Not pale. No jaundice. No cervical lymphadenopathy. Pharynx and tonsils are not injected.  CVS - RRR. Normal S1/S2. No murmur, click , rub or gallop  Lungs- clear to auscultation bilaterally  Abdomen - soft , not tender, no guarding, no rigidity. No hepatosplenomegaly. Normal bowel sounds. No masses and ascites.   Musculoskeletal /Extremities - no edema. Full ROM. No joint tenderness.   Neuro/Psych - appropriate mood and affect. Motor power V/V all extremities. CN I -XII were grossly intact.  Skin - No visible rash  Dialysis access :  {PUKACCESS:54532::\"LUE AVF\"} is clean, dry and intact. No signs of infection.    LABS:    Lab Results   Component Value Date    HGBA1C 12 (H) 01/22/2024     par    EKG:  Reviewed    Echocardiogram:   Echocardiogram 01/17/2025    Narrative    Left Ventricle: Low normal left ventricular systolic function with a  visually estimated EF of 45 - 50%. EF by 2D Simpsons Biplane is 52%.    Right Ventricle: Reduced systolic function. TAPSE is abnormal. TAPSE is  1.5 cm. RV Peak S' is 7.9 cm/s.    Aortic Valve: Mild stenosis of the aortic valve. AV mean gradient is 17  mmHg. AV peak gradient is 30 mmHg. AV area by continuity VTI is 1.2 cm2.    Mitral Valve: " Mild annular calcification at the posterior leaflet. Mild  stenosis noted. MV mean gradient is 4 mmHg. MV PHT is 129.0 ms. MV area by  continuity equation is 1.4 cm2.    Image quality is technically difficult. Contrast used: Definity.  Technically difficult study due to patient's body habitus, technically  difficult study due to low parasternal window and procedure performed with  the patient in a supine position.    Left Ventricle  Low normal left ventricular systolic function with a visually estimated EF of 45 - 50%. EF by 2D Simpsons Biplane is 52%.    Right Ventricle  Reduced systolic function. TAPSE is abnormal. TAPSE is 1.5 cm. RV Peak S' is 7.9 cm/s.    IVC/SVC  IVC diameter is less than or equal to 21 mm and decreases greater than 50% during inspiration; therefore the estimated right atrial pressure is normal (~3 mmHg). IVC size is normal.    Mitral Valve  Mild annular calcification at the posterior leaflet. Trace regurgitation. Mild stenosis noted. MV mean gradient is 4 mmHg. MV PHT is 129.0 ms. MV area by continuity equation is 1.4 cm2.    Tricuspid Valve  Valve structure is normal.    Aortic Valve  Not well visualized. No regurgitation. Mild stenosis of the aortic valve. AV mean gradient is 17 mmHg. AV peak gradient is 30 mmHg. AV area by continuity VTI is 1.2 cm2.    Pulmonic Valve  The pulmonic valve visualization is suboptimal but appears to be functioning normally.    Pericardium  No pericardial effusion.    Study Details  Image quality: technically difficult. The view(s) performed were parasternal, apical and subcostal. Technically difficult study due to patient's body habitus, technically difficult study due to low parasternal window, procedure performed with the patient in a supine position, color flow Doppler was performed and pulse wave and/or continuous wave Doppler was performed. Definity contrast was given to enhance imaging.        ASSESSMENT AND PLAN:    After completion of taking history  "and physical examination, the patient seems to be a {The patient seems to be:23123::\"suitable candidate\"} to proceed with the rest of transplant evaluation.    However, patient will need the following tests to determine the eligibility for kidney transplant per [facility]'s kidney transplant evaluation guideline :    - ***  -Update cancer screening per age/sex  - Will need to complete the rest of work up per protocol    *For AA patients :    After reviewing  the records on [facility] , we are      {aagfr:31347}    *For pancreas transplant evaluation:    The current insulin regiment is :    @[email or web address]@    =========================================================================    The case will be presented at the selection committee at the [facility].  The final decision from the committee will be sent out to notify the patient/primary care physician/ nephrologist. The above recommendations were discussed with the patient at length.     In addition, the following were also discussed:    - Risks and benefits of transplantation, both short-term and long-term    - Risk of primary graft non-function, DGF, SGF, rejection, primary disease recurrence, return to dialysis    - Risks of immunosuppression including infections, CA, CV risk    - Need for compliance with medications and medical care in general    - We reviewed the necessity of HBV vaccination and other recommended vaccines before a kidney transplant, following the Centers for Disease Control and Prevention(CDC)'s guidelines [https://www.cdc.gov/vaccines/schedules/downloads/adult/adult-combined-schedule.pdf]     Currently, the patient has received the following vaccines:      There is no immunization history on file for this patient.      The patient expressed understanding of the above and wishes to proceed.  I answered all of his questions. I urged the patient to look for living donors.    - I " have spent over 60 minutes with the patient, reviewing medical record, lab result , CXR result and other specialty's notes. More than 50% of the time was spent in counseling, explaining about the transplantation and answering the questions. I also reviewed the medical record, blood test results, imaging and previous studies which were obtained from the nephrologists. I also order the tests needed to complete the evaluation and I will review the results of those tests.    Thank you for this consultation. Please feel free to contact me for questions.      Kian Goldstein    Transplant Nephrology

## 2025-03-28 NOTE — PROGRESS NOTES
ENCOUNTER    Visit Type Initial Visit  Location: ***    What is your primary language? ***  Other languages/fluency? ***    Barriers to Communication / Understanding:   [] Language [] Vision [] Hearing [] Other      []  Present     Accompanied By: alexis Sen    Organ For Transplant: { organ for transplant:69894}    Ethnicity:  { ethnicity:25974}    ADLs Fully Independent      Instrumental ADLs Fully Independent      Level of Activity Sedentary      DME Can    Knowledge of Health { knowledge of health:77126}  ***    Why Do You Have End Stage Organ Disease Open heart surgery and dibets      When did you become aware of your diagnosis? June 2024      Knowledge of Transplant / VAD:  {YES,NO:36285} Patient Is Able To Make An Informed Decision    Patient Understands the Risks of Transplant / VAD   {YES,NO:85872} Rejection  {YES,NO:92719} Infection {YES,NO:94352} Complications  {YES,NO:62700} Low Risk of Death  ***    Patient Understands Recovery and Follow-Up from Transplant / VAD  {YES,NO:50149} Length Of State {YES,NO:24133} Appointments  {YES,NO:27356} Labs  {YES,NO:37977} Rehabilitation  ***    Patient Has Identified Goals of Transplant / VAD Yes    What is your biggest concern? None     If previously denied listing, why were you denied? Please describe that experience and how it is different now. [facility] and [facility].       Overall Compliance  {GOOD/FAIR/POOR:00647}       Amount of Daily Medications: 7   Compliance With Medications good    Managed By Patient  Organized By: [employer]      Have you ever changed the way you take a medication without talking to the doctor? ***     Understanding Of Medication  {GOOD/FAIR/POOR:29870}  Compliance With Appointments {GOOD/FAIR/POOR:41073}    What has your relationship been like with previous medical providers? ***     Fluid Restriction:   Yes [] Compliant       Dialysis:  [] What Dialysis Center [facility]  [x] Began June of 2025       [x] In Center [] Home Hemo [] Peritoneal       Attendance:  Treatment Attendance Good Treatment Time 4 hours T, TH, Sat    [x] Shortened Treatments [] Rescheduled Treatments [] Missed Treatments   -Pt reported due to health and being sick on machine.        Diet:  { yes no (Optional):94591} Patient is compliant with renal restrictions    { yes no (Optional):71522} Patient is compliant with low sodium diet       # of Binders:  [x] # of Binders per meal 2 [x] Meals per day 1      []  # of Binders per Snack [x] Snacks per day 1       Pancreas:  [] Checks blood sugar      times/day     Hypoglycemic Episodes  Outside Interventions      Liver:  Is Lactulose prescribed { yes no (Optional):36273} Dose:   Timesper day:  Is patient compliant { yes no (Optional):66321}      SOCIAL HISTORY  No       Years of Service ***     Were you involved in active combat? ***                Do you receive benefits through the [facility]? ***       Education:  education: Some College and Vocational Training    Literate Yes { education details:92401}  Computer literate Yes  Internet access Yes       Sources of Income: Pt reported SSDI $2400   Patient's Current Employment    [] Full-Time [] Part-Time [] Unemployed [] Retired     []  None [] Not working by choice [x] Not working disabled     [] Short Term Leave   [] Other   Employment History [employer] and     Will patient have paid status from employment during recovery { yes no (Optional):57135}  { employment during recovery:39328}    Spouse / SO Current Employment Full Time      Will spouse / SO have paid status from employment during recovery Yes  PTO   and FMLA    Other Sources of Income { other sources of income (Optional):35347}      Does patient have financial concerns No  If yes, does patient have any concerns about food insecurities {YES,NO:51882}     Is patient able to meet current monthly expenses Yes    Resources:  { resources  (Optional):75081}    Patient was provided information on transplant fundraising {YES,NO:83554}      Insurance:  Primary Insurance { insurance:08115}    Secondary Insurance { insurance:01233}    Prescription Coverage Copay cost per month $    Comments:     FAMILY SYSTEM    Single {YES,NO:87289}   Yes How long 10  Describe Relationship   {YES,NO:12831} How long   Describe Relationship   {YES,NO:59817} When   {YES,NO:03220} When  In a Relationship {YES,NO:56870}  How Long  Describe Relationship    Spouse / SO Name Tiffany  Age   Health   Other Caregiver Responsibilities None  Spouse / Significant others reaction to donation    Children:  Yes # Biological Daughter and son { yes no (Optional):92922} # Adopted    # Step Children    :     Child #1 Name Juni  Age 30  Health Good    Lives Out Of Town  How Much Contact Weekly    Child #2 Name  Age 28  Health Good    Lives Local  How Much Contact Weekly    Child #3 Name Age  Health    Lives { child lives location (Optional):[address]}  How Much Contact { child contact (Optional):89217}    Parents:  Raised By Both Biological Parents    Did the patient have contact with the other parent {YES,NO:80474}    Mother  Yes Age   Cause of Death   Father  Yes Age   Cause of Death    Living Parent #1  Living Parent #2    Additional Information    Siblings:  [] # Biological [] # Half Siblings [] # Step Siblings     Sibling #1  Sibling #2    Support & Recovery Plan:  Yes Adequate    Primary Support:  Neal moser Phone [phone]  Age ***  Relationship to Patient ***  If employed, can they take time off work {YES,NO:36617} ***  If so, is it paid time off {YES,NO:65568} ***  If not, will this impact your finances {YES,NO:43655} ***  Did they attend education classes {YES,NO:27041} ***  Do they have other caregiver responsibilities (child or eldercare) {YES,NO:37719} ***  Do they have their own conditions which may prevent them from  providing care for you {YES,NO:65688}  (Medical, psychological, physical limitations)  ***  Are they available on short notice {YES,NO:81866} ***  Are they reliable {YES,NO:26380} ***  Are they responsible {YES,NO:26250} ***  Are they able to understand and process new information {YES,NO:24563} ***  Do they have reliable transportation or will you allow them to use your vehicle {YES,NO:88279} ***  Are they currently involved in your care {YES,NO:90110} ***  Comments    Secondary Support:  Name Tiffany Phone *** Age *** Relationship to Patient Wife  If employed, can they take time off work {YES,NO:46289} ***  If so, is it paid time off {YES,NO:40452} ***  If not, will this impact your finances {YES,NO:54021} ***  Did they attend education classes {YES,NO:45310} ***  Do they have other caregiver responsibilities (child or eldercare) {YES,NO:77102} ***  Do they have their own conditions which may prevent them from providing care for you {YES,NO:36376}  (Medical, psychological, physical limitations)  ***  Are they available on short notice {YES,NO:17805} ***  Are they reliable {YES,NO:00438} ***  Are they responsible {YES,NO:67378} ***  Are they able to understand and process new information {YES,NO:57016} ***  Do they have reliable transportation or will you allow them to use your vehicle {YES,NO:87596} ***  Are they currently involved in your care {YES,NO:81295} ***  Comments    Alternate Support  Alternate Support    How comfortable are you asking for and/or receiving help? ***     Housing:  Yes Adequate Owns home  Type of Home House  Indicate steps into home/bed/bathroom: ***  Does Pt struggle to keep their home clean {YES,NO:45032}  Who all lives with Pt: Wife  [facility] ***  Pets Dogs      Transportation:  Yes Adequate  # Licensed Drivers in the Home 2  Does Patient Drive Yes If not, why  # Reliable Vehicles 3  Does Patient use Public Transportation No  Does Patient use Medical Transportation  "No  Comments      MENTAL HEALTH    Cognition:  { cognition:24355}    The patient reports their mood as \"depends\"     Reported Mental Health Diagnosis  { reported mental health diagnosis (Optional):64114}Denied  Family History of Mental Health Diagnosis ***  What are patient psychosocial stressors ***       Current Medications:  Yes  Mental Health Meds   Rx'd by { prescribed by (Optional):29147}  Sleep Meds   Rx'd by { prescribed by (Optional):85228}  Pain Meds [place]  Rx'd by Specialist    OTC Meds ***  Past Mental Health Medications ***      Counseling Never      Has patient ever been hospitalized for mental health reasons No   Was the hospitalization voluntary { yes no (Optional):31125} Duration   Where    When  Describe situation    Discharge Plan for Follow Up  Was Discharge Plan Completed {YES,NO:33632}  Referral to Transplant Psych No       Suicide Assessment:  History of Suicide Ideation No  [] Timeframe     Frequency {frequency (Optional):13359}  Plan Created  Intent to Follow Through  Outcome      History of Suicide Attempt No { history of suicide attempt (Optional):34285}      History of Suicidal Ideation in the past 3 months No Intensity   Duration   {frequency (Optional):67224}  Description of Plan      Plans thought of:   Intent to Follow Through:     Current Plan for Safety    Plan for Follow-Up        In the past 6 months, has anyone physically, sexually, or emotionally abused you? Denied     Ever in the past, has anyone physically, sexually, or emotionally abused you? Denied    Patient's Reported Trauma History      Does Patient Feel Safe in Home Yes        What are patient's coping behaviors Pt reported hunting and fishing.     Do you have any activities that you're unable to do now, that you hope to return to after transplant? Hunting and fishing    Hinduism / Spirituality None    Do you have any Mosque, ethical, or personal objections to accepting blood products, surgery, and/or " transplant? NOne      Thought Processes:   Attitude toward interviewer { attitude toward interviewer:67894}    Eye Contact { eye contact:67709}    Appearance { appearance:09574}    Affect { affect:39810}    Thought Process { thought process:99735}      Substance Use /Abuse History:    Current Tobacco User No  Patient uses { patient uses tobacco (Optional):64636}  Tobacco Frequency   For How Long      Former Tobacco User Yes  Describe past tobacco use and date quit  Feb 2024. Pt reported on pack and half a day for about 11 years then 10 break then start for 20 years.     Current Alcohol User No  Type of Alcohol Used   Amount  Frequency   Pattern of Alcohol Use      If alcohol use disorder is suspected, ask the following:   Continued to use the substance despite being told the substance is affecting their health    History of problems at work, school or home due to substance use    History of DUI's/legal issues related to substance use       Former Alcohol User Yes  Describe past alcohol use and date quit  Rarely maybe a beer or wine or something onspecialoccasions one in a sitting.     Has patient ever gone to CD treatment {YES,NO:17402}  If yes, When, Where and What type of Program  Attends AA meetings { attends AA meetings (Optional):46170}  { yes no (Optional):76514} Sponsor  Do support people drink alcohol {YES,NO:70008}  If yes, describe support people's use  Is alcohol kept in the home {YES,NO:25113}  Does Patient need to sign a CD contract {YES,NO:28874}      Current Illegal / Unprescribed Drug User No  Type of Illegal Drug Used   Frequency  Pattern of Drug Use      Illegal / Unprescribed Drug #2  Type of Illegal Drug Used   Frequency  Pattern of Drug Use      Continue to use the substance despite being told the substance is affecting their health    History of problems at work, school or home due to substance use      Former Illegal / Unprescribed Drug User Yes  Describe past illegal drug use  and date quit  Marijuana 5 or more years. Pt reported half joint a day. Pt reported a month.     Has patient ever gone to CD treatment {YES,NO:68704}  If yes, When, Where and What type of Program   Attends AA/NA  meetings { attends AA meetings (Optional):98248}   Is patient on a Methadone / Suboxone regiment {YES,NO:75724}  Do support people use illegal drugs {YES,NO:72369}  If yes, describe support people's use  Are illegal drugs kept in the home {YES,NO:43188}  Does Patient need to sign a CD contract No      Prescription Drug Abuse:   No Has patient experienced feelings of addiction  No Has patient experienced symptoms of withdrawal  No Has patient experienced any side effects? e.g.  hallucinations or delusions    Does Patient Meet the Criteria for Alcohol Use Disorder {YES,NO:84274} Diagnosis  Does Patient Meet [place]OT[facility] guidelines { yes no unsure:34799}  Does Patient Meet the Criteria for Illegal Drug Use Disorder {YES,NO:96994} Diagnosis  Does Patient Meet [place]OT[facility] guidelines { yes no unsure:51990}    [facility]OTC Substance Relapse Risk Factors { [facility]OTC substance relapse risk (Optional):22042}  DSM-5 Severity Factors: { DSM 5 severity (Optional):45107}      LEGAL ISSUES  No Arrests  {YES,NO:18248} Currently probation or parole { yes no (Optional):11890}   FCI {YES,NO:72341}  When   How long   Where       Citizenship:   Where were you born? ***   If outside of [place], where? ***  What year did you move to the [place]? ***    {YES,NO:90267} [place] Citizen  {YES,NO:53826} Green Card {YES,NO:95129} Visa    Any outstanding citizenship concerns? ***     Advance Directives: {YES,NO:14256}  HCPOA { HCPOA (Optional):59719}  Living Will { advance directives continued:31417}  Who is the proxy? ***     Guardian:      IMPRESSION  ***    PLAN  ***    "wine on special occasions\". Pt denied any heavier alcohol use. Pt reported he tried smoking Marijuana 5 or more years ago. Pt reported \"he would smoke half of a joint a day, for a month\". Pt scored a 9 on the SIPAT, indicating Pt is a good candidate for transplant. SW would recommend Pt for listing, while monitoring identified risk factors. Identified risk factors include patient's reported shortening treatments due to experiencing health declines or becoming ill while on his treatment machine.     PLAN  SW will follow with patient annually while monitoring risk factors.    "

## 2025-03-28 NOTE — PROGRESS NOTES
Kidney Transplant Evaluation Office Visit    Chief Complaint: Patient presents for kidney transplant evaluation    History of Present Illness:  Juni Davenport  is a 59 y.o. male presents with ESRD from ***.  Began dialysis on *** and currently tolerating HD *** via a *** without any *** hypotension or midodrine. She makes *** of estimated urine per day.    Additional medical history includes ***    In clinic today, *** he denies any chest pain, SOB, palpitations, as well as any recent fever, abdominal pain, difficulty voiding or other urinary symptoms, constipation, or poor oral intake.      Hemodialysis: *** Monday, Wednesday, Friday.   Dialysis Access: ***  Urine Status: oliguric.   Disease Etiology: *** diabetic nephropathy and hypertensive nephropathy.   Disease Complications: *** congestive heart failure, dyslipidemia and hypertension.   PVD: *** YES/NO  Prior Abdominal Surgery: *** YES/NO   Prior Malignancy: *** YES/NO   BMI: Body mass index is 36.73 kg/m².  Potential Living Donors: *** YES/NO     Review of Systems:  Cardiac: Denies chest pain, palpitations  : Normal urine output. Denies history of gross hematuria, nephrolithiasis, urinary retention, or recurrent UTIs.  Vascular: Denies personal or familial history of DVT/PE. No active claudication or non-healing LE wounds.  Extremities: LE Edema ***   Functional Status: Can walk up 2 flights of stairs    Past Medical History:  ***    Past Surgical History:  ***    Social History:  ***    Transfusions: *** None  Pregnancy: *** Not applicable  Prior transplant: Not applicable    Family History:  Mother: ***  Father: ***  Sibling: ***    Physical Exam:  Vitals:    03/28/25 1305   BP: 100/64   Pulse: 87   Temp: 36.6 °C (97.8 °F)   SpO2: 94%     Gen: A+OX3; NAD  HEENT: PERRL, sclera anicteric, MMM  Cardiac: RRR  Chest: Normal inspiratory effort  Abdomen: S/NT/ND.  Ext: No LE edema  Vascular: ***2+ palpable femoral pulses  Psychiatric: Normal  mood, affect    Assessment/Plan:  - The patient is an ***excellent candidate for kidney transplantation.  - Routine age/gender based screening  - Cardiac testing per protocol: ECHO/stress test  - Non-contrast CT scan abdomen/pelvis  ***- Poor functional status, recommend 6MWT to determine transplant candidacy***    Surgical Considerations:  ***    Transplant Education:    I had a discussion with this patient regarding 1 year graft and patient survival statistics following renal transplantation for both living and  donor allograft recipients. This data included [facility] data compared to National data readily available for review on https://www.SRTR.org. The patient also had attended the kidney transplant education class provided by the transplant institute.     The difference between allograft function was discussed comparing living donor, KDPI 0-85%, and >85% kidneys.     Further discussion included:  -The transplant selection committee process.  -The need for lifelong immunosuppressive therapy, and the side effects of these medications including the risk of infections, cancer, and lymphoma.  -The wait list time approximately is 5 years or more for  donor transplants and the statistical superiority of a living donor.     -Using identified donors with risk criteria for transmission of infection  -The possibility of utilizing  donors with known HCV antibody and/or AAKASH positivity and post-transplant treatment/surveillance protocol  -Potential transmission of infectious disease from any  donors, as well as living donors.   -The possibility of transmission of tumors and infections via the transplanted organ.  -The inability to completely test for all potential harmful tumors or infectious agents.  -The possibility of listing at multiple locations.     Surgical complications including need for reoperation(s) including but not limited to:  -Bleeding.  -Repair of leaks.  -Control  of infection.  -Blood clots in the transplant vessels.  -Possible kidney transplant removal.     The medical complications including but not limited to:  -Death.  -Cardiac.  -Pulmonary.  -Infectious.  -Neurologic.  -Other Complications.     We also discussed how the kidney transplant could function:  -Non-function and possible kidney transplant removal within the first 3 to 6 months.  -Delayed graft function (dialysis needed after transplant).  -The potential of recurrence of kidney disease leading to kidney transplant graft loss.    Time Attestation:  I spent 60 minutes with the patient, over 50 minutes in counseling and education as outlined above.    Nadine Dodd MD, [facility]  Transplant & Hepatobiliary Surgery

## 2025-03-29 LAB
BACTERIA UR CULT: NORMAL
HOLD SPECIMEN: NORMAL

## 2025-03-30 LAB
PSA FREE MFR SERPL: 67 %
PSA FREE SERPL-MCNC: 0.2 NG/ML
PSA SERPL IA-MCNC: 0.3 NG/ML (ref 0–4)

## 2025-03-31 ENCOUNTER — LAB REQUISITION (OUTPATIENT)
Dept: LAB | Facility: CLINIC | Age: 60
End: 2025-03-31
Payer: MEDICARE

## 2025-03-31 ENCOUNTER — TELEPHONE (OUTPATIENT)
Facility: HOSPITAL | Age: 60
End: 2025-03-31

## 2025-03-31 DIAGNOSIS — N18.6 END STAGE RENAL DISEASE (MULTI): ICD-10-CM

## 2025-03-31 LAB
FLOW AUTOCROSSMATCH: NORMAL
HLA CLASS I AB SCREEN,FC: NORMAL
HLA CLASS II AB SCREEN,FC: NORMAL
HLA CLS I TYP PNL BLD/T DONR HIGH RES: NORMAL
HLA RESULTS: NORMAL
HLA-DP2 QL: NORMAL
HLA-DQB1 HIGH RES: NORMAL
HLA-DRB1 HIGH RES: NORMAL
NIL(NEG) CONTROL SPOT COUNT: NORMAL
PANEL A SPOT COUNT: 0
PANEL B SPOT COUNT: 0
POS CONTROL SPOT COUNT: NORMAL
T-SPOT. TB INTERPRETATION: NEGATIVE

## 2025-03-31 NOTE — TELEPHONE ENCOUNTER
Angela from client service called regarding cancelled  labs test.   Laboratory call back number is [phone]

## 2025-04-01 ENCOUNTER — DOCUMENTATION (OUTPATIENT)
Facility: HOSPITAL | Age: 60
End: 2025-04-01
Payer: MEDICARE

## 2025-04-01 DIAGNOSIS — E11.59 TYPE 2 DIABETES MELLITUS WITH OTHER CIRCULATORY COMPLICATIONS: ICD-10-CM

## 2025-04-01 DIAGNOSIS — Z01.810 ENCOUNTER FOR PREPROCEDURAL CARDIOVASCULAR EXAMINATION: ICD-10-CM

## 2025-04-01 DIAGNOSIS — Z01.818 PRE-TRANSPLANT EVALUATION FOR KIDNEY TRANSPLANT: ICD-10-CM

## 2025-04-01 LAB
AMPHETAMINES SERPL QL SCN: NEGATIVE NG/ML
ANNOTATION COMMENT IMP: NORMAL
BARBITURATES SERPL QL SCN: NEGATIVE NG/ML
BENZODIAZ SERPL QL SCN: NEGATIVE NG/ML
BUPRENORPHINE SERPL-MCNC: NEGATIVE NG/ML
CANNABINOIDS SERPL QL SCN: NEGATIVE NG/ML
COCAINE SERPL QL SCN: NEGATIVE NG/ML
FLOW AUTOCROSSMATCH: NORMAL
HLA CLASS I AB SCREEN,FC: NORMAL
HLA CLASS II AB SCREEN,FC: NORMAL
HLA RESULTS: NORMAL
HLA RESULTS: NORMAL
METHADONE SERPL QL SCN: NEGATIVE NG/ML
METHAMPHET SERPL QL: NEGATIVE NG/ML
OPIATES SERPL QL SCN: NEGATIVE NG/ML
OXYCODONE SERPL QL: NEGATIVE NG/ML
PCP SERPL QL SCN: NEGATIVE NG/ML

## 2025-04-01 RX ORDER — REGADENOSON 0.08 MG/ML
0.4 INJECTION, SOLUTION INTRAVENOUS
OUTPATIENT
Start: 2025-04-01

## 2025-04-01 RX ORDER — AMINOPHYLLINE 25 MG/ML
125 INJECTION, SOLUTION INTRAVENOUS ONCE AS NEEDED
OUTPATIENT
Start: 2025-04-01

## 2025-04-01 NOTE — PROGRESS NOTES
Eval Clinic Note:  Patient attended evaluation appts on 03/28/25 with Dr. Dodd and Dr. Goldstein.    Listing Consent: KDPI, DCD, Hepatitis B, and Hepatitis C.  Patient was provided with a copy of the checklist of needed testing and my contact info.    LISTING EDUCATION    Patient educated regarding the following prior to placement on the transplant waiting list:  The patient?s medical condition, prognosis, and treatment plan.  The expectations and patient responsibilities while on the waiting list, including:  Keeping the transplant center informed of any changes in contact information or insurance coverage  Notifying the transplant center of any changes in medical status  Required testing and/or re-evaluation appointments while awaiting transplant  An overview of the surgical procedure, including potential risks and alternatives.  Information regarding what to expect during the inpatient admission and recovery period.  A discussion regarding organ offers and types of potential donors, including potential risks that may be associated with specific types of donors that could affect the success of the transplant or the health of the patient.  The right to refuse transplantation.     Patient was given the opportunity to have questions answered. Patient was provided a copy of the informed consent for transplant listing.    Education provided by:  Transplant Coordinator: Martine Engle RN  Transplant Physician: Dr. Triston SHARPE    Signed listing informed consent received? 04/01/25  Patient agrees to be listed for the following:  KDPI > 85% [yes]  Donors After Circulatory Death (DCD) [yes]  Donors with a Positive Core Antibody for Hepatitis B [yes]  Donors with Hepatitis C Virus to recipients with hepatitis C [no]  Donors with Hepatitis C Virus to Negative hepatitis C recipients [yes]    Patient will be discussed at an upcoming selection committee to determine eligibility to be placed on the [facility] waiting list.

## 2025-04-02 ENCOUNTER — TELEPHONE (OUTPATIENT)
Dept: TRANSPLANT | Facility: HOSPITAL | Age: 60
End: 2025-04-02
Payer: MEDICARE

## 2025-04-02 LAB
COTININE SERPL-MCNC: <5 NG/ML
NICOTINE SERPL-MCNC: <5 NG/ML

## 2025-04-03 LAB
HLA CLS I TYP PNL BLD/T DONR HIGH RES: NORMAL
HLA RESULTS: NORMAL
HLA-DP2 QL: NORMAL
HLA-DQB1 HIGH RES: NORMAL
HLA-DRB1 HIGH RES: NORMAL

## 2025-04-04 ENCOUNTER — APPOINTMENT (OUTPATIENT)
Facility: HOSPITAL | Age: 60
End: 2025-04-04
Payer: MEDICARE

## 2025-04-04 ENCOUNTER — DOCUMENTATION (OUTPATIENT)
Dept: TRANSPLANT | Facility: HOSPITAL | Age: 60
End: 2025-04-04
Payer: MEDICARE

## 2025-04-08 ENCOUNTER — DOCUMENTATION (OUTPATIENT)
Facility: HOSPITAL | Age: 60
End: 2025-04-08
Payer: MEDICARE

## 2025-04-08 DIAGNOSIS — R55 SYNCOPE, UNSPECIFIED SYNCOPE TYPE: ICD-10-CM

## 2025-04-08 DIAGNOSIS — Z01.818 PRE-TRANSPLANT EVALUATION FOR KIDNEY TRANSPLANT: ICD-10-CM

## 2025-04-09 ENCOUNTER — TELEPHONE (OUTPATIENT)
Dept: TRANSPLANT | Facility: HOSPITAL | Age: 60
End: 2025-04-09
Payer: MEDICARE

## 2025-04-09 ASSESSMENT — PATIENT HEALTH QUESTIONNAIRE - PHQ9
5. POOR APPETITE OR OVEREATING: SEVERAL DAYS
6. FEELING BAD ABOUT YOURSELF - OR THAT YOU ARE A FAILURE OR HAVE LET YOURSELF OR YOUR FAMILY DOWN: NOT AT ALL
3. TROUBLE FALLING OR STAYING ASLEEP OR SLEEPING TOO MUCH: NOT AT ALL
7. TROUBLE CONCENTRATING ON THINGS, SUCH AS READING THE NEWSPAPER OR WATCHING TELEVISION: NOT AT ALL
2. FEELING DOWN, DEPRESSED OR HOPELESS: NOT AT ALL
4. FEELING TIRED OR HAVING LITTLE ENERGY: SEVERAL DAYS
9. THOUGHTS THAT YOU WOULD BE BETTER OFF DEAD, OR OF HURTING YOURSELF: NOT AT ALL
10. IF YOU CHECKED OFF ANY PROBLEMS, HOW DIFFICULT HAVE THESE PROBLEMS MADE IT FOR YOU TO DO YOUR WORK, TAKE CARE OF THINGS AT HOME, OR GET ALONG WITH OTHER PEOPLE: SOMEWHAT DIFFICULT
SUM OF ALL RESPONSES TO PHQ9 QUESTIONS 1 & 2: 0
8. MOVING OR SPEAKING SO SLOWLY THAT OTHER PEOPLE COULD HAVE NOTICED. OR THE OPPOSITE, BEING SO FIGETY OR RESTLESS THAT YOU HAVE BEEN MOVING AROUND A LOT MORE THAN USUAL: NOT AT ALL
SUM OF ALL RESPONSES TO PHQ QUESTIONS 1-9: 2
1. LITTLE INTEREST OR PLEASURE IN DOING THINGS: NOT AT ALL

## 2025-04-09 ASSESSMENT — ANXIETY QUESTIONNAIRES
3. WORRYING TOO MUCH ABOUT DIFFERENT THINGS: NOT AT ALL
5. BEING SO RESTLESS THAT IT IS HARD TO SIT STILL: SEVERAL DAYS
4. TROUBLE RELAXING: NOT AT ALL
GAD7 TOTAL SCORE: 4
7. FEELING AFRAID AS IF SOMETHING AWFUL MIGHT HAPPEN: NOT AT ALL
1. FEELING NERVOUS, ANXIOUS, OR ON EDGE: NOT AT ALL
2. NOT BEING ABLE TO STOP OR CONTROL WORRYING: NOT AT ALL
6. BECOMING EASILY ANNOYED OR IRRITABLE: NEARLY EVERY DAY

## 2025-04-14 RX ORDER — GABAPENTIN 100 MG/1
CAPSULE ORAL
Qty: 90 CAPSULE | Refills: 0 | OUTPATIENT
Start: 2025-04-14

## 2025-04-22 ENCOUNTER — HOSPITAL ENCOUNTER (INPATIENT)
Age: 60
LOS: 2 days | Discharge: HOME | End: 2025-04-24
Attending: INTERNAL MEDICINE | Admitting: INTERNAL MEDICINE
Payer: MEDICARE

## 2025-04-22 ENCOUNTER — TELEMEDICINE (OUTPATIENT)
Dept: NEUROLOGY | Age: 60
End: 2025-04-22
Payer: MEDICARE

## 2025-04-22 DIAGNOSIS — R29.90 STROKE-LIKE SYMPTOMS: ICD-10-CM

## 2025-04-22 DIAGNOSIS — R47.89 WORD FINDING DIFFICULTY: ICD-10-CM

## 2025-04-22 DIAGNOSIS — R47.01 EXPRESSIVE APHASIA: Primary | ICD-10-CM

## 2025-04-22 DIAGNOSIS — I63.9 CEREBROVASCULAR ACCIDENT (CVA), UNSPECIFIED MECHANISM (HCC): Primary | ICD-10-CM

## 2025-04-22 PROBLEM — E11.9 DIABETES MELLITUS (HCC): Status: ACTIVE | Noted: 2025-04-22

## 2025-04-22 PROBLEM — E66.9 OBESITY (BMI 35.0-39.9 WITHOUT COMORBIDITY): Status: ACTIVE | Noted: 2025-04-22

## 2025-04-22 LAB
GLUCOSE BLD-MCNC: 111 MG/DL (ref 75–110)
GLUCOSE BLD-MCNC: 122 MG/DL (ref 75–110)
GLUCOSE BLD-MCNC: 61 MG/DL (ref 75–110)

## 2025-04-22 PROCEDURE — 6360000002 HC RX W HCPCS: Performed by: INTERNAL MEDICINE

## 2025-04-22 PROCEDURE — 2500000003 HC RX 250 WO HCPCS: Performed by: INTERNAL MEDICINE

## 2025-04-22 PROCEDURE — 6370000000 HC RX 637 (ALT 250 FOR IP): Performed by: NURSE PRACTITIONER

## 2025-04-22 PROCEDURE — 82947 ASSAY GLUCOSE BLOOD QUANT: CPT

## 2025-04-22 PROCEDURE — 6370000000 HC RX 637 (ALT 250 FOR IP)

## 2025-04-22 PROCEDURE — 6370000000 HC RX 637 (ALT 250 FOR IP): Performed by: INTERNAL MEDICINE

## 2025-04-22 PROCEDURE — 99222 1ST HOSP IP/OBS MODERATE 55: CPT | Performed by: NURSE PRACTITIONER

## 2025-04-22 PROCEDURE — 36556 INSERT NON-TUNNEL CV CATH: CPT

## 2025-04-22 PROCEDURE — 99449 NTRPROF PH1/NTRNET/EHR 31/>: CPT | Performed by: PSYCHIATRY & NEUROLOGY

## 2025-04-22 PROCEDURE — 1200000000 HC SEMI PRIVATE

## 2025-04-22 RX ORDER — ONDANSETRON 4 MG/1
4 TABLET, ORALLY DISINTEGRATING ORAL EVERY 8 HOURS PRN
Status: DISCONTINUED | OUTPATIENT
Start: 2025-04-22 | End: 2025-04-24 | Stop reason: HOSPADM

## 2025-04-22 RX ORDER — CLOPIDOGREL BISULFATE 75 MG/1
75 TABLET ORAL DAILY
Status: DISCONTINUED | OUTPATIENT
Start: 2025-04-23 | End: 2025-04-24 | Stop reason: HOSPADM

## 2025-04-22 RX ORDER — TAMSULOSIN HYDROCHLORIDE 0.4 MG/1
0.4 CAPSULE ORAL DAILY
Status: DISCONTINUED | OUTPATIENT
Start: 2025-04-22 | End: 2025-04-24 | Stop reason: HOSPADM

## 2025-04-22 RX ORDER — INSULIN GLARGINE 100 [IU]/ML
28 INJECTION, SOLUTION SUBCUTANEOUS NIGHTLY
Status: DISCONTINUED | OUTPATIENT
Start: 2025-04-22 | End: 2025-04-24 | Stop reason: HOSPADM

## 2025-04-22 RX ORDER — ONDANSETRON 2 MG/ML
4 INJECTION INTRAMUSCULAR; INTRAVENOUS EVERY 6 HOURS PRN
Status: DISCONTINUED | OUTPATIENT
Start: 2025-04-22 | End: 2025-04-24 | Stop reason: HOSPADM

## 2025-04-22 RX ORDER — AMLODIPINE BESYLATE 5 MG/1
5 TABLET ORAL DAILY
Status: DISCONTINUED | OUTPATIENT
Start: 2025-04-22 | End: 2025-04-24 | Stop reason: HOSPADM

## 2025-04-22 RX ORDER — OXYCODONE AND ACETAMINOPHEN 5; 325 MG/1; MG/1
1 TABLET ORAL EVERY 4 HOURS PRN
Refills: 0 | Status: COMPLETED | OUTPATIENT
Start: 2025-04-22 | End: 2025-04-23

## 2025-04-22 RX ORDER — ASPIRIN 300 MG/1
300 SUPPOSITORY RECTAL DAILY
Status: DISCONTINUED | OUTPATIENT
Start: 2025-04-22 | End: 2025-04-24 | Stop reason: HOSPADM

## 2025-04-22 RX ORDER — GABAPENTIN 100 MG/1
100 CAPSULE ORAL EVERY 8 HOURS SCHEDULED
Status: DISCONTINUED | OUTPATIENT
Start: 2025-04-22 | End: 2025-04-24 | Stop reason: HOSPADM

## 2025-04-22 RX ORDER — CAFFEINE 200 MG
50 TABLET ORAL ONCE
Status: COMPLETED | OUTPATIENT
Start: 2025-04-22 | End: 2025-04-22

## 2025-04-22 RX ORDER — SODIUM CHLORIDE 0.9 % (FLUSH) 0.9 %
10 SYRINGE (ML) INJECTION PRN
Status: DISCONTINUED | OUTPATIENT
Start: 2025-04-22 | End: 2025-04-24 | Stop reason: HOSPADM

## 2025-04-22 RX ORDER — HEPARIN SODIUM 5000 [USP'U]/ML
5000 INJECTION, SOLUTION INTRAVENOUS; SUBCUTANEOUS EVERY 8 HOURS SCHEDULED
Status: DISCONTINUED | OUTPATIENT
Start: 2025-04-22 | End: 2025-04-24 | Stop reason: HOSPADM

## 2025-04-22 RX ORDER — INSULIN LISPRO 100 [IU]/ML
0-4 INJECTION, SOLUTION INTRAVENOUS; SUBCUTANEOUS
Status: DISCONTINUED | OUTPATIENT
Start: 2025-04-22 | End: 2025-04-24 | Stop reason: HOSPADM

## 2025-04-22 RX ORDER — LACTOBACILLUS RHAMNOSUS GG 10B CELL
1 CAPSULE ORAL
Status: DISCONTINUED | OUTPATIENT
Start: 2025-04-23 | End: 2025-04-24 | Stop reason: HOSPADM

## 2025-04-22 RX ORDER — SODIUM CHLORIDE 0.9 % (FLUSH) 0.9 %
10 SYRINGE (ML) INJECTION EVERY 12 HOURS SCHEDULED
Status: DISCONTINUED | OUTPATIENT
Start: 2025-04-22 | End: 2025-04-24 | Stop reason: HOSPADM

## 2025-04-22 RX ORDER — ASPIRIN 81 MG/1
81 TABLET, CHEWABLE ORAL DAILY
Status: DISCONTINUED | OUTPATIENT
Start: 2025-04-22 | End: 2025-04-24 | Stop reason: HOSPADM

## 2025-04-22 RX ORDER — BENZOCAINE/MENTHOL 6 MG-10 MG
LOZENGE MUCOUS MEMBRANE 2 TIMES DAILY
Status: DISCONTINUED | OUTPATIENT
Start: 2025-04-22 | End: 2025-04-24 | Stop reason: HOSPADM

## 2025-04-22 RX ORDER — INSULIN LISPRO 100 [IU]/ML
15 INJECTION, SOLUTION INTRAVENOUS; SUBCUTANEOUS
Status: DISCONTINUED | OUTPATIENT
Start: 2025-04-22 | End: 2025-04-24 | Stop reason: HOSPADM

## 2025-04-22 RX ORDER — PRAVASTATIN SODIUM 20 MG
10 TABLET ORAL NIGHTLY
Status: DISCONTINUED | OUTPATIENT
Start: 2025-04-22 | End: 2025-04-24 | Stop reason: HOSPADM

## 2025-04-22 RX ORDER — DEXTROSE MONOHYDRATE 100 MG/ML
INJECTION, SOLUTION INTRAVENOUS CONTINUOUS PRN
Status: DISCONTINUED | OUTPATIENT
Start: 2025-04-22 | End: 2025-04-24 | Stop reason: HOSPADM

## 2025-04-22 RX ORDER — CAFFEINE 200 MG
100 TABLET ORAL ONCE
Status: DISCONTINUED | OUTPATIENT
Start: 2025-04-22 | End: 2025-04-22

## 2025-04-22 RX ORDER — ACETAMINOPHEN 325 MG/1
650 TABLET ORAL EVERY 4 HOURS PRN
Status: DISCONTINUED | OUTPATIENT
Start: 2025-04-22 | End: 2025-04-24 | Stop reason: HOSPADM

## 2025-04-22 RX ORDER — SODIUM CHLORIDE 9 MG/ML
INJECTION, SOLUTION INTRAVENOUS PRN
Status: DISCONTINUED | OUTPATIENT
Start: 2025-04-22 | End: 2025-04-24 | Stop reason: HOSPADM

## 2025-04-22 RX ORDER — METOPROLOL TARTRATE 25 MG/1
25 TABLET, FILM COATED ORAL 2 TIMES DAILY
Status: DISCONTINUED | OUTPATIENT
Start: 2025-04-22 | End: 2025-04-24 | Stop reason: HOSPADM

## 2025-04-22 RX ORDER — GLUCAGON 1 MG/ML
1 KIT INJECTION PRN
Status: DISCONTINUED | OUTPATIENT
Start: 2025-04-22 | End: 2025-04-24 | Stop reason: HOSPADM

## 2025-04-22 RX ADMIN — HEPARIN SODIUM 5000 UNITS: 5000 INJECTION INTRAVENOUS; SUBCUTANEOUS at 17:45

## 2025-04-22 RX ADMIN — CAFFEINE 50 MG: 200 TABLET ORAL at 19:43

## 2025-04-22 RX ADMIN — METOPROLOL TARTRATE 25 MG: 25 TABLET, FILM COATED ORAL at 20:41

## 2025-04-22 RX ADMIN — ACETAMINOPHEN 650 MG: 325 TABLET ORAL at 19:44

## 2025-04-22 RX ADMIN — GABAPENTIN 100 MG: 100 CAPSULE ORAL at 17:45

## 2025-04-22 RX ADMIN — OXYCODONE HYDROCHLORIDE AND ACETAMINOPHEN 1 TABLET: 5; 325 TABLET ORAL at 23:09

## 2025-04-22 RX ADMIN — SODIUM CHLORIDE, PRESERVATIVE FREE 10 ML: 5 INJECTION INTRAVENOUS at 20:42

## 2025-04-22 RX ADMIN — HYDROCORTISONE: 10 CREAM TOPICAL at 20:42

## 2025-04-22 RX ADMIN — PRAVASTATIN SODIUM 10 MG: 20 TABLET ORAL at 20:41

## 2025-04-22 ASSESSMENT — ENCOUNTER SYMPTOMS
NAUSEA: 0
WHEEZING: 0
VOMITING: 0
CHEST TIGHTNESS: 0
DIARRHEA: 0
COUGH: 0
ABDOMINAL DISTENTION: 0
CONSTIPATION: 0
STRIDOR: 0
SHORTNESS OF BREATH: 0
BLOOD IN STOOL: 0
ABDOMINAL PAIN: 0

## 2025-04-22 ASSESSMENT — PAIN DESCRIPTION - LOCATION
LOCATION: HEAD
LOCATION: HEAD

## 2025-04-22 ASSESSMENT — PAIN SCALES - GENERAL
PAINLEVEL_OUTOF10: 6
PAINLEVEL_OUTOF10: 8
PAINLEVEL_OUTOF10: 7
PAINLEVEL_OUTOF10: 4

## 2025-04-22 ASSESSMENT — PAIN DESCRIPTION - DESCRIPTORS: DESCRIPTORS: ACHING;DISCOMFORT

## 2025-04-22 ASSESSMENT — PAIN - FUNCTIONAL ASSESSMENT: PAIN_FUNCTIONAL_ASSESSMENT: PREVENTS OR INTERFERES SOME ACTIVE ACTIVITIES AND ADLS

## 2025-04-22 NOTE — CONSULTS
[facility] Neurology   IN-PATIENT SERVICE   [facility]    NEUROLOGY CONSULT NOTE            Date:   4/22/2025  Patient name:  Vincenzo Darden  Date of admission:  4/22/2025  4:15 PM  MRN:   [id number]  Account:  [id number]  YOB: 1965  PCP:    Shaikh Calix MD  Room:   [address] [place] [address]  Code Status:    Full Code    Chief Complaint:     Speech difficulty    History Obtained From:     patient, electronic medical record    History of Present Illness:     Vincenzo Darden is a 59 y.o. male with a history of traumatic end-stage diabetic myopathy (TEDM), below-knee amputation, and ESRD on TTS hemodialysis, and HFrEF who experienced acute onset word-finding difficulty during dialysis earlier today. There were no associated motor deficits, and blood pressure was reportedly in the 150s. He initially presented to [facility], where a CT head and CTA head/neck were obtained and were reportedly unremarkable. Given clinical improvement--able to speak in short phrases with occasional frustration--he was not offered IV thrombolysis. Patient was loaded on DAPT at OSH. Last known well was 08:30 am on 4/22/2025. NIHSS was 1 on initial evaluation. He was transferred to [facility] for further evaluation. On arrival, the patient's language deficits have largely resolved, although, patient's wife at bedside reports some persistent word finding difficulty.  No dysarthria.  MRI brain is contraindicated due to retained epicardial leads from prior aortic valve replacement.      Past Medical History:     Past Medical History:   Diagnosis Date    Below-knee amputation (HCC)     right leg    Diabetes mellitus (HCC)     ESRD (end stage renal disease) on dialysis ([facility])         Past Surgical History:     Past Surgical History:   Procedure Laterality Date    CABG WITH AORTIC VALVE REPLACEMENT N/A 02/26/2024    Epi-Cardial leads present. No MRI Per Dr. Cm 6/18/24 [facility]- CABG CORONARY ARTERY

## 2025-04-22 NOTE — PROGRESS NOTES
Note found under History tab in Epic from 2024 states Per Dr. Cm, radiologist from [facility], that there are retained epicardial leads from when the patient had their Aortic Valve replacement.  The patient cannot have an MRI. Dr Holland was Perfect Served. Tried to notify RN, but no answer.

## 2025-04-22 NOTE — PROGRESS NOTES
[facility] Stroke and Telestroke Consult for  [facility]  Stroke Alert through [facility] @ 11:05  4/22/2025 11:19 AM    Pt Name: Vincenzo Darden  MRN: [id number]  YOB: 1965  Date of evaluation: 4/22/2025  Primary Care Physician: Shaikh Calix MD  Reason for Evaluation: Stroke evaluation with Phone Consult, Discussion and Review of imaging    Vincnezo Darden is a 59 y.o. male who presents with history of tedm, below knee amputation and ESRD on TTHS with symptoms during dialysis of word finding difficulty without other motor deficits. Sbp in the 150's presented to [facility] for further workup including ct and cta that were read as unremarkable.      No iv tnk offered as patient with improvement able to speak in shorter sentences and then with some frustration occ.    Decision for tx to [facility] for further workup    LKW: 8:30  NIH:  1    Allergies  is allergic to vancomycin, zosyn [piperacillin sod-tazobactam so], and daptomycin.  Medications  Prior to Admission medications    Medication Sig Start Date End Date Taking? Authorizing Provider   gabapentin (NEURONTIN) 100 MG capsule Take 1 capsule by mouth every 8 hours for 30 days. 1/19/25 2/18/25  Dagoberto Moore MD   Insulin Aspart, w/Niacinamide, (FIASP FLEXTOUCH) 100 UNIT/ML SOPN Inject 20 Units into the skin 3 times daily (with meals) Pt has his sliding scale at home 1/19/25   Dagoberto Moore MD   Insulin Degludec (TRESIBA FLEXTOUCH) 200 UNIT/ML SOPN Inject 40 Units into the skin nightly 1/19/25   Dagoberto Moore MD   metoprolol tartrate (LOPRESSOR) 25 MG tablet Take 1 tablet by mouth 2 times daily 1/19/25   Dagoberto Moore MD   tamsulosin (FLOMAX) 0.4 MG capsule Take 1 capsule by mouth daily 1/19/25   Dagoberto Moore MD   amLODIPine (NORVASC) 5 MG tablet Take 1 tablet by mouth daily 1/20/25   Dagoberto Moore MD   pravastatin (PRAVACHOL) 10 MG tablet Take 1 tablet by mouth nightly 1/19/25   Dagoberto Moore MD

## 2025-04-22 NOTE — PROGRESS NOTES
[facility] [place] [facility] [place]    Shift date: 4/22/2025  Shift day: Tuesday   Shift # 2    Room # 0347/0347-02   Name: Vincenzo Darden                Buddhism: None   Place of Holiness: N/A    Referral:  Loneliness     Admit Date & Time: 4/22/2025  4:15 PM    Assessment:  Vincenzo Darden is a 59 y.o. male in the hospital because stroke like symptoms. Upon entering the room writer observes Pt sitting on edge of bed with wife supporting at beside      Intervention:  Writer introduced self and title as  Writer offered space for pt and family  to express feelings, needs, and concerns and provided a ministry presence.     Outcome:  Pt feels supported by wife. Pt talked about resent heart surgery. Pt spoke about being confident in care her at [facility]. Pt thankful for visit.     Plan:  Chaplains will remain available to offer spiritual and emotional support as needed.     04/22/25 1845   Encounter Summary   Encounter Overview/Reason Loneliness/Social Isolation   Service Provided For Patient and family together   Referral/Consult From Other (comment)  (Intake)   Support System Children;Friends/neighbors   Last Encounter  04/22/25   Complexity of Encounter Low   Begin Time 1825   End Time  1840   Total Time Calculated 15 min   Spiritual/Emotional needs   Type Other (comment)  (Loneliness)   Assessment/Intervention/Outcome   Assessment Coping;Calm   Intervention Active listening;Discussed illness injury and it’s impact;Sustaining Presence/Ministry of presence   Outcome Engaged in conversation;Expressed feelings, needs, and concerns;Expressed Gratitude   Plan and Referrals   Plan/Referrals Continue Support (comment)     Electronically signed by Bruno Lai,Chaplain Resident, on 4/22/2025 at 6:47 PM.  [facility]  [phone]

## 2025-04-22 NOTE — H&P
[facility]  Office: [phone]  Alexi Draper DO, Sam Wharton DO, Matthew Kirkpatrick DO, Shane Dennis DO, Lisbeth Owusu MD, Nurys Crisostomo MD, Jena Xie MD, Susan Danielle MD,  Shubham Friedman MD, Mireya Nugent MD, Aleida Rincon MD,  Aliza Saavedra DO, Hector Romero MD, Moncho Gilbert MD, Marlon Draper DO, Liliana Painting MD,  Soy Mccullough DO, Shila Arana MD, Kristy Wei MD, Kelly Vogel MD,  Morris Sheets MD, Chapincito Rebolledo MD, Andrea Fabian MD, Lisset Mccracken MD, René Holland MD, Nishi Ojeda MD, Andrei Johnson DO, Virginie Rivera MD, Bruce Greenfield MD, Aliza Celaya MD, Mohsin Reza, MD, Myron Teixeira MD, Shirley Waterhouse, CNP,  Mariela Gomez, CNP, Andrei Polanco, CNP,  Nan Ray, DNP, Liss Lu, CNP, Olya Kaba, CNP, Alivia Melchor, CNP, An Rosenbaum, CNP, Deena Carmona, PA-C, Kiera Holland, CNP, Deja Patterson, CNP,  Dilcia Laura, CNP, Missy Perez, CNP, Lloyd Aj, PA-C, Christa Ojeda, CNP,  Amanda Gallardo, CNS, Maddi Sun, CNP, Zoe Conner, CNP,   Roz Garner, CNP         [facility]   IN-PATIENT SERVICE   [facility]    HISTORY AND PHYSICAL EXAMINATION            Date:   4/22/2025  Patient name:  Vincenzo Darden  Date of admission:  4/22/2025  4:15 PM  MRN:   [id number]  Account:  [id number]  YOB: 1965  PCP:    Shaikh Calix MD  Room:   [address] [place] [address]  Code Status:    Full Code    Chief Complaint:     Word finding difficulty     History Obtained From:     Patient and medical records     History of Present Illness:     Vincenzo Darden is a 59 y.o. Non- / non  male pmh ESRD on HD T TH SAT, HTN, CAD,  HFrEF, ICMP, DM II on insulin GERD, chronic lower back pain with sciatica who presents in transfer from [facility] for evaluation of sudden onset of word finding difficulty while in hemodialysis today.  Telemetry neuroconsulted.  NIH= 2 at the time of telemetry  cardiomyopathy 4/22/2025 Yes    Dependence on renal dialysis 4/22/2025 Yes    ESRD on dialysis (HCC) 4/22/2025 Yes       Plan:     Patient status inpatient in the Progressive Unit/Step down    Sudden onset of word finding during hemodialysis-transferred for neurology workup.  Due to retained pacer wires status post cardiac thoracic surgery unable to obtain MRI brain without gadolinium.  Continue neurochecks every 4.  Await further neurology recommendations   Resume high intensity statin, DAPT   Check hemoglobin A1c and fasting lipids in a.m.   Repeat CT of head without contrast pending for tomorrow a.m.    Headache--> not responsive to Tylenol.  Cautious use of opioid given need for every 4 neurochecks.  Discussed with patient.  He is not happy about this.  States he needs something to relieve his headache and he \"does not care what it is\"   Neurology ordered Vivarin 50mg times one     End-stage renal-on hemodialysis-euvolemic on exam.  Nephrology consulted for additional hemodialysis orders    Diabetes type 2 insulin-dependent.  Reports takes Tresiba he thinks 50 units nightly    Obesity-lifestyle modification.  Cardiac diabetic diet    Heart failure with reduced EF-euvolemic    History of below the amputation-wears prosthetic      Consultations:   IP CONSULT TO NEUROLOGY     Patient is admitted as inpatient status because of co-morbidities listed above, severity of signs and symptoms as outlined, requirement for current medical therapies and most importantly because of direct risk to patient if care not provided in a hospital setting.  Expected length of stay > 48 hours.    ADEOLA Prasad NP  4/22/2025  4:39 PM    Copy sent to Shaikh Nieves MD

## 2025-04-22 NOTE — CARE COORDINATION
Case Management Assessment  Initial Evaluation    Date/Time of Evaluation: 4/22/2025 5:04 PM  Assessment Completed by: AISHA FRIEND RN    If patient is discharged prior to next notation, then this note serves as note for discharge by case management.    Patient Name: Vincenzo Darden                   YOB: 1965  Diagnosis: TIA (transient ischemic attack) [G45.9]  Stroke-like symptoms [R29.90]                   Date / Time: 4/22/2025  4:15 PM    Patient Admission Status: Inpatient   Readmission Risk (Low < 19, Mod (19-27), High > 27): Readmission Risk Score: 22.6    Current PCP: Shaikh Calix MD  PCP verified by CM? (P) Yes    Chart Reviewed: Yes      History Provided by: (P) Patient  Patient Orientation: (P) Alert and Oriented    Patient Cognition: (P) Alert    Hospitalization in the last 30 days (Readmission):  No    If yes, Readmission Assessment in CM Navigator will be completed.    Advance Directives:      Code Status: Full Code   Patient's Primary Decision Maker is: (P) Legal Next of Kin    Primary Decision Maker: Ashtyn Forde - Spouse - [phone]    Discharge Planning:    Patient lives with: (P) Spouse/Significant Other Type of Home: (P) House  Primary Care Giver: (P) Self  Patient Support Systems include: (P) Spouse/Significant Other   Current Financial resources: (P) Medicare  Current community resources: (P) None  Current services prior to admission: (P) Durable Medical Equipment            Current DME: (P) Cane            Type of Home Care services:  (P) None    ADLS  Prior functional level: (P) Independent in ADLs/IADLs  Current functional level: (P) Independent in ADLs/IADLs    PT AM-PAC:   /24  OT AM-PAC:   /24    Family can provide assistance at DC: (P) Yes  Would you like Case Management to discuss the discharge plan with any other family members/significant others, and if so, who? (P) No  Plans to Return to Present Housing: (P) Yes  Other Identified Issues/Barriers to

## 2025-04-23 ENCOUNTER — APPOINTMENT (OUTPATIENT)
Dept: CT IMAGING | Age: 60
End: 2025-04-23
Attending: INTERNAL MEDICINE
Payer: MEDICARE

## 2025-04-23 PROBLEM — I63.9 CEREBROVASCULAR ACCIDENT (CVA) (HCC): Status: ACTIVE | Noted: 2025-04-23

## 2025-04-23 LAB
ANION GAP SERPL CALCULATED.3IONS-SCNC: 16 MMOL/L (ref 9–16)
BUN SERPL-MCNC: 41 MG/DL (ref 8–23)
CALCIUM SERPL-MCNC: 9 MG/DL (ref 8.6–10.4)
CHLORIDE SERPL-SCNC: 95 MMOL/L (ref 98–107)
CHOLEST SERPL-MCNC: 89 MG/DL (ref 0–199)
CHOLESTEROL/HDL RATIO: 2.9
CO2 SERPL-SCNC: 22 MMOL/L (ref 20–31)
CREAT SERPL-MCNC: 6.1 MG/DL (ref 0.7–1.2)
ERYTHROCYTE [DISTWIDTH] IN BLOOD BY AUTOMATED COUNT: 14.6 % (ref 11.8–14.4)
EST. AVERAGE GLUCOSE BLD GHB EST-MCNC: 123 MG/DL
GFR, ESTIMATED: 10 ML/MIN/1.73M2
GLUCOSE BLD-MCNC: 110 MG/DL (ref 75–110)
GLUCOSE BLD-MCNC: 152 MG/DL (ref 75–110)
GLUCOSE BLD-MCNC: 61 MG/DL (ref 75–110)
GLUCOSE BLD-MCNC: 72 MG/DL (ref 75–110)
GLUCOSE BLD-MCNC: 76 MG/DL (ref 75–110)
GLUCOSE BLD-MCNC: 86 MG/DL (ref 75–110)
GLUCOSE BLD-MCNC: 89 MG/DL (ref 75–110)
GLUCOSE SERPL-MCNC: 56 MG/DL (ref 74–99)
HBA1C MFR BLD: 5.9 % (ref 4–6)
HCT VFR BLD AUTO: 38.8 % (ref 40.7–50.3)
HDLC SERPL-MCNC: 31 MG/DL
HGB BLD-MCNC: 13.1 G/DL (ref 13–17)
LDLC SERPL CALC-MCNC: 40 MG/DL (ref 0–100)
MCH RBC QN AUTO: 30.4 PG (ref 25.2–33.5)
MCHC RBC AUTO-ENTMCNC: 33.8 G/DL (ref 28.4–34.8)
MCV RBC AUTO: 90 FL (ref 82.6–102.9)
NRBC BLD-RTO: 0 PER 100 WBC
PLATELET # BLD AUTO: 194 K/UL (ref 138–453)
PMV BLD AUTO: 9.2 FL (ref 8.1–13.5)
POTASSIUM SERPL-SCNC: 5 MMOL/L (ref 3.7–5.3)
RBC # BLD AUTO: 4.31 M/UL (ref 4.21–5.77)
SODIUM SERPL-SCNC: 133 MMOL/L (ref 136–145)
TRIGL SERPL-MCNC: 91 MG/DL
VLDLC SERPL CALC-MCNC: 18 MG/DL (ref 1–30)
WBC OTHER # BLD: 8 K/UL (ref 3.5–11.3)

## 2025-04-23 PROCEDURE — 2500000003 HC RX 250 WO HCPCS: Performed by: INTERNAL MEDICINE

## 2025-04-23 PROCEDURE — 6370000000 HC RX 637 (ALT 250 FOR IP): Performed by: INTERNAL MEDICINE

## 2025-04-23 PROCEDURE — 99232 SBSQ HOSP IP/OBS MODERATE 35: CPT | Performed by: STUDENT IN AN ORGANIZED HEALTH CARE EDUCATION/TRAINING PROGRAM

## 2025-04-23 PROCEDURE — 83036 HEMOGLOBIN GLYCOSYLATED A1C: CPT

## 2025-04-23 PROCEDURE — 6370000000 HC RX 637 (ALT 250 FOR IP): Performed by: NURSE PRACTITIONER

## 2025-04-23 PROCEDURE — 70450 CT HEAD/BRAIN W/O DYE: CPT

## 2025-04-23 PROCEDURE — 85027 COMPLETE CBC AUTOMATED: CPT

## 2025-04-23 PROCEDURE — 6370000000 HC RX 637 (ALT 250 FOR IP)

## 2025-04-23 PROCEDURE — 80048 BASIC METABOLIC PNL TOTAL CA: CPT

## 2025-04-23 PROCEDURE — 80061 LIPID PANEL: CPT

## 2025-04-23 PROCEDURE — 6360000002 HC RX W HCPCS: Performed by: INTERNAL MEDICINE

## 2025-04-23 PROCEDURE — 99222 1ST HOSP IP/OBS MODERATE 55: CPT | Performed by: STUDENT IN AN ORGANIZED HEALTH CARE EDUCATION/TRAINING PROGRAM

## 2025-04-23 PROCEDURE — 82947 ASSAY GLUCOSE BLOOD QUANT: CPT

## 2025-04-23 PROCEDURE — 36415 COLL VENOUS BLD VENIPUNCTURE: CPT

## 2025-04-23 PROCEDURE — 92523 SPEECH SOUND LANG COMPREHEN: CPT

## 2025-04-23 PROCEDURE — 1200000000 HC SEMI PRIVATE

## 2025-04-23 RX ORDER — PANTOPRAZOLE SODIUM 40 MG/1
40 TABLET, DELAYED RELEASE ORAL
Status: DISCONTINUED | OUTPATIENT
Start: 2025-04-23 | End: 2025-04-24 | Stop reason: HOSPADM

## 2025-04-23 RX ORDER — CALCIUM CARBONATE 500 MG/1
500 TABLET, CHEWABLE ORAL 3 TIMES DAILY PRN
Status: DISCONTINUED | OUTPATIENT
Start: 2025-04-23 | End: 2025-04-24 | Stop reason: HOSPADM

## 2025-04-23 RX ORDER — OXYCODONE AND ACETAMINOPHEN 5; 325 MG/1; MG/1
1 TABLET ORAL ONCE
Refills: 0 | Status: COMPLETED | OUTPATIENT
Start: 2025-04-23 | End: 2025-04-23

## 2025-04-23 RX ADMIN — AMLODIPINE BESYLATE 5 MG: 5 TABLET ORAL at 07:51

## 2025-04-23 RX ADMIN — Medication 1 CAPSULE: at 07:51

## 2025-04-23 RX ADMIN — ANTACID TABLETS 500 MG: 500 TABLET, CHEWABLE ORAL at 02:10

## 2025-04-23 RX ADMIN — GABAPENTIN 100 MG: 100 CAPSULE ORAL at 01:03

## 2025-04-23 RX ADMIN — HEPARIN SODIUM 5000 UNITS: 5000 INJECTION INTRAVENOUS; SUBCUTANEOUS at 01:02

## 2025-04-23 RX ADMIN — HYDROCORTISONE: 10 CREAM TOPICAL at 21:15

## 2025-04-23 RX ADMIN — ANTACID TABLETS 500 MG: 500 TABLET, CHEWABLE ORAL at 07:47

## 2025-04-23 RX ADMIN — OXYCODONE HYDROCHLORIDE AND ACETAMINOPHEN 1 TABLET: 5; 325 TABLET ORAL at 03:24

## 2025-04-23 RX ADMIN — HYDROCORTISONE: 10 CREAM TOPICAL at 08:01

## 2025-04-23 RX ADMIN — PRAVASTATIN SODIUM 10 MG: 20 TABLET ORAL at 19:56

## 2025-04-23 RX ADMIN — HEPARIN SODIUM 5000 UNITS: 5000 INJECTION INTRAVENOUS; SUBCUTANEOUS at 21:15

## 2025-04-23 RX ADMIN — PANTOPRAZOLE SODIUM 40 MG: 40 TABLET, DELAYED RELEASE ORAL at 22:24

## 2025-04-23 RX ADMIN — METOPROLOL TARTRATE 25 MG: 25 TABLET, FILM COATED ORAL at 07:51

## 2025-04-23 RX ADMIN — HEPARIN SODIUM 5000 UNITS: 5000 INJECTION INTRAVENOUS; SUBCUTANEOUS at 14:17

## 2025-04-23 RX ADMIN — OXYCODONE HYDROCHLORIDE AND ACETAMINOPHEN 1 TABLET: 5; 325 TABLET ORAL at 07:59

## 2025-04-23 RX ADMIN — OXYCODONE HYDROCHLORIDE AND ACETAMINOPHEN 1 TABLET: 5; 325 TABLET ORAL at 20:17

## 2025-04-23 RX ADMIN — METOPROLOL TARTRATE 25 MG: 25 TABLET, FILM COATED ORAL at 19:56

## 2025-04-23 RX ADMIN — CLOPIDOGREL BISULFATE 75 MG: 75 TABLET, FILM COATED ORAL at 07:51

## 2025-04-23 RX ADMIN — GABAPENTIN 100 MG: 100 CAPSULE ORAL at 07:51

## 2025-04-23 RX ADMIN — GABAPENTIN 100 MG: 100 CAPSULE ORAL at 14:17

## 2025-04-23 RX ADMIN — TAMSULOSIN HYDROCHLORIDE 0.4 MG: 0.4 CAPSULE ORAL at 07:51

## 2025-04-23 RX ADMIN — SODIUM CHLORIDE, PRESERVATIVE FREE 10 ML: 5 INJECTION INTRAVENOUS at 07:51

## 2025-04-23 RX ADMIN — HEPARIN SODIUM 5000 UNITS: 5000 INJECTION INTRAVENOUS; SUBCUTANEOUS at 07:50

## 2025-04-23 RX ADMIN — SODIUM CHLORIDE, PRESERVATIVE FREE 10 ML: 5 INJECTION INTRAVENOUS at 19:57

## 2025-04-23 RX ADMIN — ANTACID TABLETS 500 MG: 500 TABLET, CHEWABLE ORAL at 20:37

## 2025-04-23 RX ADMIN — GABAPENTIN 100 MG: 100 CAPSULE ORAL at 21:15

## 2025-04-23 RX ADMIN — ASPIRIN 81 MG: 81 TABLET, CHEWABLE ORAL at 07:51

## 2025-04-23 ASSESSMENT — PAIN SCALES - GENERAL
PAINLEVEL_OUTOF10: 7
PAINLEVEL_OUTOF10: 3
PAINLEVEL_OUTOF10: 7
PAINLEVEL_OUTOF10: 8
PAINLEVEL_OUTOF10: 7
PAINLEVEL_OUTOF10: 4

## 2025-04-23 ASSESSMENT — PAIN DESCRIPTION - LOCATION
LOCATION: HEAD

## 2025-04-23 ASSESSMENT — PAIN DESCRIPTION - DESCRIPTORS
DESCRIPTORS: BURNING
DESCRIPTORS: ACHING

## 2025-04-23 ASSESSMENT — PAIN DESCRIPTION - FREQUENCY: FREQUENCY: CONTINUOUS

## 2025-04-23 ASSESSMENT — PAIN - FUNCTIONAL ASSESSMENT: PAIN_FUNCTIONAL_ASSESSMENT: PREVENTS OR INTERFERES SOME ACTIVE ACTIVITIES AND ADLS

## 2025-04-23 NOTE — PLAN OF CARE
Problem: Chronic Conditions and Co-morbidities  Goal: Patient's chronic conditions and co-morbidity symptoms are monitored and maintained or improved  4/23/2025 0948 by Zena Flores RN  Outcome: Progressing  4/23/2025 0400 by Morales Jerome RN  Outcome: Progressing  Flowsheets (Taken 4/22/2025 1833 by Makenna Gallardo, RN)  Care Plan - Patient's Chronic Conditions and Co-Morbidity Symptoms are Monitored and Maintained or Improved: (right BKA) --     Problem: Safety - Adult  Goal: Free from fall injury  4/23/2025 0948 by Zena Flores RN  Outcome: Progressing  4/23/2025 0400 by Morales Jerome RN  Outcome: Progressing  Flowsheets (Taken 4/22/2025 2125)  Free From Fall Injury:   Instruct family/caregiver on patient safety   Based on caregiver fall risk screen, instruct family/caregiver to ask for assistance with transferring infant if caregiver noted to have fall risk factors

## 2025-04-23 NOTE — CONSULTS
Nephrology Consult Note    Reason for Consult:  esrd needs hd  Requesting provider:  Kiera Qureshi NP      History Obtained From:  patient    History of Present Illness:              This is a 59 y.o. male with PMHx of ESRD on HD TTS, CAD s/p CABG, DM2, htn, orthostatic hypotension and near syncope, right BKA, HFrEF, hypokalemia who presents with c/f stroke during dialysis.   He had word finding difficulties while SBP was int he 150s but on arrival on 4/22 these symptoms had resolved. He is being managed with DAPT and CT head repeat since he cannot get MRI due to retained epicardial leads.   He used to see Dr. Núñez but says his HD doctor is Dr. Davis.  He reports he needs to be at [facility] on Friday for outpatient testing and he would like to be discharged prior to this, he is ok running off schedule.   HD run sheet reviewed and his goal UF was 4L on 4/22 and he was only able to get 2.2L off due to hypotension.     Past Medical History:        Diagnosis Date    Below-knee amputation (HCC)     right leg    Diabetes mellitus (HCC)     ESRD (end stage renal disease) on dialysis (HCC)        Past Surgical History:        Procedure Laterality Date    CABG WITH AORTIC VALVE REPLACEMENT N/A 02/26/2024    Epi-Cardial leads present. No MRI Per Dr. Cm 6/18/24 [facility]- CABG CORONARY ARTERY BYPASS X3, STERNAL PLATING, AORTIC VALVE REPLACEMENT 23MM INSPIRIS AORTIC VALVE, ON PUMP, JO performed by Andrei James MD at [facility] CVOR    CARDIAC PROCEDURE N/A 10/09/2023    Coronary angiography performed by Louis Ramirez MD at [facility] CARDIAC CATH LAB    CARDIAC PROCEDURE N/A 10/09/2023    Aortic root aortogram performed by Louis Ramirez MD at [facility] CARDIAC CATH LAB    IR CHEST TUBE INSERTION  7/1/2024    IR CHEST TUBE INSERTION 7/1/2024 Moy Escobar MD [facility] SPECIAL PROCEDURES    IR NONTUNNELED VASCULAR CATHETER > 5 YEARS  6/18/2024    IR NONTUNNELED VASCULAR CATHETER 6/18/2024 Marlon Rice MD [facility] SPECIAL  8.0   RBC 4.31   HGB 13.1   HCT 38.8*   MCV 90.0   MCH 30.4   MCHC 33.8   RDW 14.6*      MPV 9.2      BMP:   Recent Labs     04/23/25  0823   *   K 5.0   CL 95*   CO2 22   BUN 41*   CREATININE 6.1*   GLUCOSE 56*   CALCIUM 9.0        Phosphorus:  No results for input(s): \"PHOS\" in the last 72 hours.  Magnesium: No results for input(s): \"MG\" in the last 72 hours.  Albumin: No results for input(s): \"LABALBU\" in the last 72 hours.    IRON:    Lab Results   Component Value Date/Time    IRON 35 06/26/2024 06:14 AM     Iron Saturation:  No components found for: \"PERCENTFE\"  TIBC:    Lab Results   Component Value Date/Time    TIBC 159 06/26/2024 06:14 AM     FERRITIN:    Lab Results   Component Value Date/Time    FERRITIN 264 06/26/2024 06:14 AM     SPEP:   Lab Results   Component Value Date/Time    ALBCAL 1.7 06/17/2024 05:50 PM    ALBPCT 28 06/17/2024 05:50 PM    A1PCT 9 06/17/2024 05:50 PM    A2PCT 14 06/17/2024 05:50 PM    BETAPCT 13 06/17/2024 05:50 PM    GAMGLOB 2.2 06/17/2024 05:50 PM    GGPCT 36 06/17/2024 05:50 PM    PATH ELECTRONICALLY SIGNED. GABBIE CHAVARRIA M.D. 06/17/2024 05:50 PM     UPEP: No results found for: \"TPU\"     C3:   Lab Results   Component Value Date    C3 35 (L) 06/18/2024     C4:   Lab Results   Component Value Date    C4 24 06/18/2024     MPO ANCA:    Lab Results   Component Value Date/Time    MPO <0.3 06/17/2024 05:50 PM    .  PR3 ANCA:    Lab Results   Component Value Date/Time    PR3 <0.7 06/17/2024 05:50 PM     Urine Sodium:  No components found for: \"FAUSTO\"   Urine Creatinine:  No results found for: \"LABCREA\"  Urine Eosinophils:   Lab Results   Component Value Date/Time    UREO Eosinophils present 06/17/2024 07:32 PM     Urine Protein:  No results found for: \"TPU\"  Urine osm :   Lab Results   Component Value Date/Time    OSMOU 290 06/06/2024 06:07 PM     Urinalysis:  U/A:   Lab Results   Component Value Date/Time    NITRU NEGATIVE 08/06/2024 11:49 PM    COLORU Yellow 08/06/2024

## 2025-04-23 NOTE — PROGRESS NOTES
[facility]  Office: [phone]  Alexi Draper DO, Sam Wharton DO, Matthew Kirkpatrick DO, Shane Dennis DO, Lisbeth Owusu MD, Nurys Crisostomo MD, Jena Xie MD, Susan Danielle MD,  Shubham Friedman MD, Mireya Nugent MD, Tejas Paulino DO, Aleida Rincon MD,  Aliza Saavedra DO, Hector Romero MD, Moncho Gilbert MD, Marlon Draper DO, Liliana Painting MD,  Soy Mccullough DO, Amparo Kong MD, Shila Arana MD, Kristy Wei MD, Kelly Vogel MD,  Morris Sheets MD, Chapincito Rebolledo MD, Andrea Fabian MD, Fer Hernandez DO, Luis M Reardon MD,  Bruce Greenfield MD, Shirley Waterhouse, CNP,  Mariela Gomez, CNP, Christa Ojeda, CNP, Andrei Polanco, CNP,  Nan Ray, DNP, Liss Lu, CNP, Olya Kaba, CNP, Maddi Sun, CNP, Alivia Melchor, CNP, An Rosenbaum, CNP, Anmol Graham PA-C, Amanda Gallardo, CNS, Yamilex Latif, CNP, Zoe Conner, CNP         [facility]   IN-PATIENT SERVICE   [facility]    Progress Note    4/23/2025    1:12 PM    Name:   Vincenzo Darden  MRN:     [id number]     Acct:      [id number]   Room:   0347/0347-02   Day:  1  Admit Date:  4/22/2025  4:15 PM    PCP:   Lloyd aCnales MD  Code Status:  Full Code    Subjective:     Patient seen and examined this morning, resting comfortably.  Denied any chest pain, shortness of breath.  No headache.  Vitals within normal range however blood pressure on the higher side 157/97.  Labs reviewed, potassium 5, sodium 133.  Glucose 56.  HbA1c 5.9.  No leukocytosis, hemoglobin stable.  CT head, CT angiogram head and carotid were unremarkable at outlying facility.  Neurology on board, recommended repeat CT this morning.    Medications:     Allergies:    Allergies   Allergen Reactions    Vancomycin Nausea And Vomiting     States got levofloxicin and was ok    Zosyn [Piperacillin Sod-Tazobactam So] Anaphylaxis    Daptomycin      Labeled as possibly causing a worsening in his CKD 3 in 6/2024

## 2025-04-23 NOTE — PROGRESS NOTES
Physical Therapy        Physical Therapy Cancel Note      DATE: 2025    NAME: Vincenzo Darden  MRN: [id number]   : 1965      Patient not seen this date for Physical Therapy due to:    Other: pt leaving for CT, PT will check back this PM as time allow or 25.       Electronically signed by Michelle Esquivel PT on 2025 at 11:58 AM

## 2025-04-23 NOTE — CARE COORDINATION
Met with pt to complete a PHQ 9 assessment.  Pt states that he lives in [place] with his wife.  He states that he is feeling better today.  Pt states that he is independent at home and has a supportive wife.  He denies all depression symptoms.  Patient Health Questionnaire-9 (PHQ-9)    Over the last 2 weeks, how often have you been bothered by any of the following problems?    1. Little Interest or pleasure in doing things?   [x] Not at all  [] Several Days  [] More than half the day  []  Nearly every day    2. Feeling down, depressed or hopeless?    [x] Not at all  [] Several Days  [] More than half the day  []  Nearly every day    3. Trouble falling or staying asleep, or sleeping too much?   [x] Not at all  [] Several Days  [] More than half the day  []  Nearly every day    4. Feeling tired or having little energy?   [x] Not at all  [] Several Days  [] More than half the day  []  Nearly every day    5. Poor apettite or overeating?   [x] Not at all  [] Several Days  [] More than half the day  []  Nearly every day    6. Feeling bad about yourself-or that you are a failure or have let yourself or your family down?   [x] Not at all  [] Several Days  [] More than half the day  []  Nearly every day    7. Trouble concentrating on things, such as reading the newspaper or watching television?   [x] Not at all  [] Several Days  [] More than half the day  []  Nearly every day    8. Moving or speaking so slowly that other people could have noticed? Or the opposite-being so fidgety or restless that you have been moving around a lot more than usual?   [x] Not at all  [] Several Days  [] More than half the day  []  Nearly every day    9. Thoughts that you would be better off dead or of hurting yourself in some way?   [x] Not at all  [] Several Days  [] More than half the day  []  Nearly every day    Total Score: 0    If you checked off any problems, how difficult have these problems made it for you to do your work, take

## 2025-04-23 NOTE — PROGRESS NOTES
Facility/Department: [address] MED SURG  Initial Speech/Language/Cognitive Assessment    NAME: Vincenzo Darden  : 1965   MRN: [id number]  ADMISSION DATE: 2025  ADMITTING DIAGNOSIS: has NSTEMI (non-ST elevated myocardial infarction) ([facility]); Sepsis ([facility]); Type 2 diabetes mellitus with left diabetic foot infection ([facility]); Hypertension, essential; Smoker; Hx of BKA, left ([facility]); GERD (gastroesophageal reflux disease); COLT (obstructive sleep apnea); Class 2 obesity due to excess calories with body mass index (BMI) of 35.0 to 35.9 in adult; Acute kidney injury superimposed on CKD; Metabolic acidosis; History of type 2 diabetes mellitus; Below knee amputation ([facility]); Unstable angina ([facility]); Pyelonephritis; Persistent proteinuria; Multiple vessel coronary artery disease; Troponin level elevated; Aortic insufficiency; Moderate to severe aortic insufficiency; Coronary artery disease involving native coronary artery of native heart without angina pectoris; Hx of CABG; Stage 3b chronic kidney disease ([facility]); Urinary retention; Chest pain; Cardiac aneurysm; Diffuse abdominal pain; Acute encephalopathy; Staphylococcus aureus bacteremia; Hyponatremia; MSSA (methicillin susceptible Staphylococcus aureus) septicemia ([facility]); Disruption or dehiscence of closure of sternum or sternotomy; PAD (peripheral artery disease); Skin ulcer due to diabetes mellitus ([facility]); Heart failure, diastolic, with acute decompensation ([facility]); Hyperkalemia; History of aortic valve replacement; CAMILLE (acute kidney injury); Wound eschar of foot; PVD (peripheral vascular disease); Wound, open, foot, left, initial encounter; Dyspnea; SOB (shortness of breath); Pleural effusion; Postoperative seroma involving circulatory system after cardiac bypass; Biventricular congestive heart failure ([facility]); HFrEF (heart failure with reduced ejection fraction) ([facility]); Osteomyelitis of great toe of left foot ([facility]); Ischemic cardiomyopathy; Empyema of right pleural space ([facility]);  reported or observed    Vision/ Hearing  Vision  Vision: Impaired  Hearing  Hearing: Exceptions to WFL    Assessment:  Pt presents with no apparent cognitive deficits at this time.  No dysarthria noted, no oral motor deficits. No further ST is recommended.  Verbal and written education provided.     Recommendations:  Recommendations  Requires SLP Intervention: No  Patient Education: Yes  Patient Education Response: Verbalizes understanding  D/C Recommendations: No follow up therapy recommended post discharge       Plan:   Speech Therapy Prognosis  Prognosis: Good  Individuals consulted  Consulted and agree with results and recommendations: Patient;RN    Subjective:    Social/Functional History  Lives With: Spouse;Son  Active : Yes  Vision  Vision: Impaired  Hearing  Hearing: Exceptions to WFL           Objective:  Oral Motor   Labial: No impairment  Lingual: No impairment    Motor Speech  Apraxic Characteristics: None  Dysarthric Characteristics: None  Intelligibility: No impairment  Overall Impairment Severity: None    Cognition:      Orientation  Overall Orientation Status: Within Normal Limits  Memory  Memory: Within Functional Limits  Short-term Memory: WFL  Immediate Memory: WFL  Problem Solving  Problem Solving: Within Functional Limits  Verbal Reasoning Skills: WFL  Sequencing: WFL  Abstract Reasoning  Abstract Reasoning: Within Functional Limits  Convergent Thinking: WFL  Divergent Thinking: WFL  Safety/Judgment  Safety/Judgment: Within Functional Limits  Insight: WFL  Flexibility of Thought: WFL      Prognosis:  Speech Therapy Prognosis  Prognosis: Good  Individuals consulted  Consulted and agree with results and recommendations: Patient;RN    Education:  Patient Education: Yes  Patient Education Response: Verbalizes understanding          Therapy Time:   Individual Concurrent Group Co-treatment   Time In  1029         Time Out  1040         Minutes                 Completed by: Lyndsay Jj

## 2025-04-23 NOTE — PLAN OF CARE
Problem: Chronic Conditions and Co-morbidities  Goal: Patient's chronic conditions and co-morbidity symptoms are monitored and maintained or improved  Outcome: Progressing  Flowsheets (Taken 4/22/2025 1833 by Makenna Gallardo, RN)  Care Plan - Patient's Chronic Conditions and Co-Morbidity Symptoms are Monitored and Maintained or Improved: (right BKA) --     Problem: Safety - Adult  Goal: Free from fall injury  Outcome: Progressing  Flowsheets (Taken 4/22/2025 2125)  Free From Fall Injury:   Instruct family/caregiver on patient safety   Based on caregiver fall risk screen, instruct family/caregiver to ask for assistance with transferring infant if caregiver noted to have fall risk factors     Problem: Pain  Goal: Verbalizes/displays adequate comfort level or baseline comfort level  Outcome: Progressing     Problem: ABCDS Injury Assessment  Goal: Absence of physical injury  Outcome: Progressing

## 2025-04-24 ENCOUNTER — APPOINTMENT (OUTPATIENT)
Age: 60
End: 2025-04-24
Attending: PSYCHIATRY & NEUROLOGY
Payer: MEDICARE

## 2025-04-24 VITALS
SYSTOLIC BLOOD PRESSURE: 133 MMHG | DIASTOLIC BLOOD PRESSURE: 96 MMHG | BODY MASS INDEX: 35.21 KG/M2 | OXYGEN SATURATION: 100 % | WEIGHT: 260 LBS | HEIGHT: 72 IN | TEMPERATURE: 97.9 F | HEART RATE: 86 BPM | RESPIRATION RATE: 17 BRPM

## 2025-04-24 PROBLEM — R29.90 STROKE-LIKE SYMPTOMS: Status: RESOLVED | Noted: 2025-04-22 | Resolved: 2025-04-24

## 2025-04-24 LAB
ANION GAP SERPL CALCULATED.3IONS-SCNC: 15 MMOL/L (ref 9–16)
BUN SERPL-MCNC: 57 MG/DL (ref 8–23)
CALCIUM SERPL-MCNC: 9.1 MG/DL (ref 8.6–10.4)
CHLORIDE SERPL-SCNC: 96 MMOL/L (ref 98–107)
CO2 SERPL-SCNC: 18 MMOL/L (ref 20–31)
CREAT SERPL-MCNC: 7.8 MG/DL (ref 0.7–1.2)
GFR, ESTIMATED: 7 ML/MIN/1.73M2
GLUCOSE BLD-MCNC: 121 MG/DL (ref 75–110)
GLUCOSE BLD-MCNC: 72 MG/DL (ref 75–110)
GLUCOSE SERPL-MCNC: 79 MG/DL (ref 74–99)
HBV SURFACE AG SERPL QL IA: NONREACTIVE
POTASSIUM SERPL-SCNC: 6.9 MMOL/L (ref 3.7–5.3)
SODIUM SERPL-SCNC: 129 MMOL/L (ref 136–145)

## 2025-04-24 PROCEDURE — 95816 EEG AWAKE AND DROWSY: CPT

## 2025-04-24 PROCEDURE — 99239 HOSP IP/OBS DSCHRG MGMT >30: CPT | Performed by: STUDENT IN AN ORGANIZED HEALTH CARE EDUCATION/TRAINING PROGRAM

## 2025-04-24 PROCEDURE — 6370000000 HC RX 637 (ALT 250 FOR IP): Performed by: INTERNAL MEDICINE

## 2025-04-24 PROCEDURE — 36415 COLL VENOUS BLD VENIPUNCTURE: CPT

## 2025-04-24 PROCEDURE — 82947 ASSAY GLUCOSE BLOOD QUANT: CPT

## 2025-04-24 PROCEDURE — 90935 HEMODIALYSIS ONE EVALUATION: CPT

## 2025-04-24 PROCEDURE — 87340 HEPATITIS B SURFACE AG IA: CPT

## 2025-04-24 PROCEDURE — 95816 EEG AWAKE AND DROWSY: CPT | Performed by: PSYCHIATRY & NEUROLOGY

## 2025-04-24 PROCEDURE — 6370000000 HC RX 637 (ALT 250 FOR IP)

## 2025-04-24 PROCEDURE — 80048 BASIC METABOLIC PNL TOTAL CA: CPT

## 2025-04-24 PROCEDURE — 6360000002 HC RX W HCPCS: Performed by: STUDENT IN AN ORGANIZED HEALTH CARE EDUCATION/TRAINING PROGRAM

## 2025-04-24 PROCEDURE — 5A1D70Z PERFORMANCE OF URINARY FILTRATION, INTERMITTENT, LESS THAN 6 HOURS PER DAY: ICD-10-PCS | Performed by: STUDENT IN AN ORGANIZED HEALTH CARE EDUCATION/TRAINING PROGRAM

## 2025-04-24 PROCEDURE — 6360000002 HC RX W HCPCS: Performed by: INTERNAL MEDICINE

## 2025-04-24 PROCEDURE — 99232 SBSQ HOSP IP/OBS MODERATE 35: CPT | Performed by: PSYCHIATRY & NEUROLOGY

## 2025-04-24 PROCEDURE — 6370000000 HC RX 637 (ALT 250 FOR IP): Performed by: NURSE PRACTITIONER

## 2025-04-24 RX ORDER — HEPARIN SODIUM 1000 [USP'U]/ML
1900 INJECTION, SOLUTION INTRAVENOUS; SUBCUTANEOUS PRN
Status: DISCONTINUED | OUTPATIENT
Start: 2025-04-24 | End: 2025-04-24 | Stop reason: HOSPADM

## 2025-04-24 RX ORDER — ASPIRIN 81 MG/1
81 TABLET, CHEWABLE ORAL DAILY
Qty: 30 TABLET | Refills: 3 | Status: SHIPPED | OUTPATIENT
Start: 2025-04-25

## 2025-04-24 RX ORDER — CLOPIDOGREL BISULFATE 75 MG/1
75 TABLET ORAL DAILY
Qty: 120 TABLET | Refills: 0 | Status: SHIPPED | OUTPATIENT
Start: 2025-04-25 | End: 2025-08-23

## 2025-04-24 RX ORDER — OXYCODONE AND ACETAMINOPHEN 5; 325 MG/1; MG/1
1 TABLET ORAL ONCE
Refills: 0 | Status: COMPLETED | OUTPATIENT
Start: 2025-04-24 | End: 2025-04-24

## 2025-04-24 RX ADMIN — ONDANSETRON 4 MG: 4 TABLET, ORALLY DISINTEGRATING ORAL at 00:01

## 2025-04-24 RX ADMIN — HEPARIN SODIUM 1900 UNITS: 1000 INJECTION INTRAVENOUS; SUBCUTANEOUS at 12:00

## 2025-04-24 RX ADMIN — AMLODIPINE BESYLATE 5 MG: 5 TABLET ORAL at 14:38

## 2025-04-24 RX ADMIN — ANTACID TABLETS 500 MG: 500 TABLET, CHEWABLE ORAL at 00:00

## 2025-04-24 RX ADMIN — CLOPIDOGREL BISULFATE 75 MG: 75 TABLET, FILM COATED ORAL at 14:38

## 2025-04-24 RX ADMIN — METOPROLOL TARTRATE 25 MG: 25 TABLET, FILM COATED ORAL at 14:39

## 2025-04-24 RX ADMIN — PANTOPRAZOLE SODIUM 40 MG: 40 TABLET, DELAYED RELEASE ORAL at 05:10

## 2025-04-24 RX ADMIN — ASPIRIN 81 MG: 81 TABLET, CHEWABLE ORAL at 14:39

## 2025-04-24 RX ADMIN — GABAPENTIN 100 MG: 100 CAPSULE ORAL at 14:39

## 2025-04-24 RX ADMIN — TAMSULOSIN HYDROCHLORIDE 0.4 MG: 0.4 CAPSULE ORAL at 14:38

## 2025-04-24 RX ADMIN — GABAPENTIN 100 MG: 100 CAPSULE ORAL at 05:10

## 2025-04-24 RX ADMIN — HEPARIN SODIUM 5000 UNITS: 5000 INJECTION INTRAVENOUS; SUBCUTANEOUS at 05:10

## 2025-04-24 RX ADMIN — OXYCODONE HYDROCHLORIDE AND ACETAMINOPHEN 1 TABLET: 5; 325 TABLET ORAL at 05:50

## 2025-04-24 ASSESSMENT — PAIN SCALES - GENERAL
PAINLEVEL_OUTOF10: 0
PAINLEVEL_OUTOF10: 0
PAINLEVEL_OUTOF10: 8

## 2025-04-24 ASSESSMENT — PAIN DESCRIPTION - LOCATION: LOCATION: HEAD

## 2025-04-24 NOTE — CARE COORDINATION
Case Management   Daily Progress Note       Patient Name: Vincenzo Darden                   YOB: 1965  Diagnosis: TIA (transient ischemic attack) [G45.9]  Stroke-like symptoms [R29.90]                       GMLOS: 4.5 days  Length of Stay: 2  days    Anticipated Discharge Date: Ready for discharge    Readmission Risk (Low < 19, Mod (19-27), High > 27): Readmission Risk Score: 19.6        Current Transitional Plan    [x] Home Independently    [] Home with HC    [] Skilled Nursing Facility    [] Acute Rehabilitation    [] Long Term Acute Care (LTAC)    [] Other:     Plan for the Stay (Medical Management) : Ready for discharge, order in          Workflow Continuation (Additional Notes) : Patient denies any DME needs, plan to return home with wife independent, wife is transporting patient home.        Jeniffer Zuniga  April 24, 2025

## 2025-04-24 NOTE — DISCHARGE SUMMARY
[facility]  Office: [phone]  Alexi Draper DO, Sam Wharton DO, Matthew Kirkpatrick DO, Shane Dennis DO, Lisbeth Owusu MD, Nurys Crisostomo MD, Jena Xie MD, Susan Danielle MD,  Shubham Friedman MD, Mireya Nugent MD, Tejas Paulino DO, Aleida Rincon MD,  Aliza Saavedra DO, Hector Romero MD, Moncho Gilbert MD, Marlon Draper DO, Liliana Painting MD,  Soy Mccullough DO, Amparo Kong MD, Shial Arana MD, Kristy Wei MD, Kelly Vogel MD,  Morris Sheets MD, Chapincito Rebolledo MD, Andrea Fabian MD, Fer Hernandez DO, Luis M Reardon MD,  Bruce Greenfield MD, Shirley Waterhouse, CNP,  Mariela Gomez, CNP, Christa Ojeda, CNP, Andrei Polanco, CNP,  Nan Ray, DNP, Liss Lu, CNP, Olya Kaba, CNP, Maddi Sun, CNP, Alivia Melchor, CNP, An Rosenbaum, CNP, Anmol Graham PA-C, Amanda Gallardo, CNS, Yamilex Latif, CNP, Zoe Conner, CNP         [facility]   IN-PATIENT SERVICE   [facility]    Discharge Summary     Patient ID: Vincenzo Darden  :  1965   MRN: [id number]     ACCOUNT:  [id number]   Patient's PCP: Lloyd Canales MD  Admit Date: 2025   Discharge Date: 2025  Length of Stay: 2  Code Status:  Full Code  Admitting Physician: No admitting provider for patient encounter.  Discharge Physician: René Holland MD     Active Discharge Diagnoses:     Hospital Problem Lists:  Principal Problem (Resolved):    Stroke-like symptoms  Active Problems:    Hx of BKA, left (HCC)    GERD (gastroesophageal reflux disease)    COLT (obstructive sleep apnea)    Class 2 obesity due to excess calories with body mass index (BMI) of 35.0 to 35.9 in adult    Below knee amputation (HCC)    Ischemic cardiomyopathy    Dependence on renal dialysis    ESRD on dialysis (HCC)    Word finding difficulty    Diabetes mellitus (HCC)    Obesity (BMI 35.0-39.9 without comorbidity)    Cerebrovascular accident (CVA) (HCC)      Admission Condition:

## 2025-04-24 NOTE — PROGRESS NOTES
Dialysis Time Out  To be done by RN and tech or 2 RNs  Staff Names Ad GONZALEZ RN     [x]  Identity of the patient using 2 patient identifiers  [x]  Consent for treatment  [x]  Equipment-proper machine and dialyzer  [x]  B-Hep B status (04/24/2025) neg.   [x]  Orders- to include bath, blood flow, dialyzer, time and fluid removal  [x]  Access-Correct site and in working order  [x]  Time for patient to ask questions.

## 2025-04-24 NOTE — PROCEDURES
(NORVASC) tablet 5 mg  5 mg Oral Daily Nurys Crisostomo MD   5 mg at 04/24/25 1438    gabapentin (NEURONTIN) capsule 100 mg  100 mg Oral 3 times per day Nurys Crisostomo MD   100 mg at 04/24/25 1439    lactobacillus (CULTURELLE) capsule 1 capsule  1 capsule Oral Daily with breakfast Nurys Crisostomo MD   1 capsule at 04/23/25 0751    metoprolol tartrate (LOPRESSOR) tablet 25 mg  25 mg Oral BID Nurys Crisostomo MD   25 mg at 04/24/25 1439    pravastatin (PRAVACHOL) tablet 10 mg  10 mg Oral Nightly Nurys Crisostomo MD   10 mg at 04/23/25 1956    tamsulosin (FLOMAX) capsule 0.4 mg  0.4 mg Oral Daily Nurys Crisostomo MD   0.4 mg at 04/24/25 1438    heparin (porcine) injection 5,000 Units  5,000 Units SubCUTAneous 3 times per day Nurys Crisostomo MD   5,000 Units at 04/24/25 0510    insulin lispro (HUMALOG,ADMELOG) injection vial 0-4 Units  0-4 Units SubCUTAneous 4x Daily AC & HS Nurys Crisostomo MD        glucose chewable tablet 16 g  4 tablet Oral PRN Nurys Crisostomo MD        dextrose bolus 10% 125 mL  125 mL IntraVENous PRN Nurys Crisostomo MD        Or    dextrose bolus 10% 250 mL  250 mL IntraVENous PRN Nurys Crisostomo MD        glucagon injection 1 mg  1 mg SubCUTAneous PRN Nurys Crisostomo MD        dextrose 10 % infusion   IntraVENous Continuous PRN Nurys Crisostomo MD        insulin glargine (LANTUS) injection vial 28 Units  28 Units SubCUTAneous Nightly Nurys Crisostomo MD        insulin lispro (HUMALOG,ADMELOG) injection vial 15 Units  15 Units SubCUTAneous TID WC Nurys Crisostomo MD        acetaminophen (TYLENOL) tablet 650 mg  650 mg Oral Q4H PRN Kiera Holland APRN - NP   650 mg at 04/22/25 1944    clopidogrel (PLAVIX) tablet 75 mg  75 mg Oral Daily Trenton Short MD   75 mg at 04/24/25 1438    melatonin tablet 5 mg  5 mg Oral Once Trenton Short MD        hydrocortisone 1 % cream   Topical BID Waterhouse, Shirley Ann, APRN - CNP   Given at 04/23/25 8361        Technical Description: This is a 22  channel EEG and 1 channel of EKG performed with time-locked video. Electrodes were placed utilizing the International 10-20 system of placement.     The patient was not sleep deprived. This recording was obtained during wakefulness. EEG done on 4/24/2025     EEG Description: The dominant background activity during maximal recorded wakefulness consisted of bioccipitally dominant 6 to 7 Hz, 25-35 uV symmetric, regular activity that was reactive to eye opening. Also noted normal anterior posterior gradient with low amplitude high-frequency beta activity in anterior head regions.  This study is also significant for intermittent high amplitude 1-2 Hz delta activity noted with the bifrontal predominance.  No activation procedures done.  No sleep architecture noted.      Activation procedures:   Hyperventilation: Not done.   Photic stimulation: Not done.       The EKG channel demonstrated a normal sinus rhythm.      Electrodiagnostic interpretation  This is abnormal EEG with diffuse slowing of background in theta frequencies suggestive of mild-moderate encephalopathy of nonspecific etiologies.    No focal or epileptiform abnormalities were seen.                Mary Rubio MD 4/24/2025 5:47 PM

## 2025-04-24 NOTE — PROGRESS NOTES
Dialysis Post Treatment Note  Vitals:    04/24/25 1205   BP: (!) 170/104   Pulse: 76   Resp: 17   Temp: 97.9 °F (36.6 °C)   SpO2: 100%     Pre-Weight = 121.8kg (with prosthetic est. 7lbs)   Post-weight = Weight - Scale: 117.9 kg (260 lb) (subtracted 7 pounds for prosthetic)  Total Liters Processed = Blood Volume Processed (Liters): 88.89 L  Rinseback Volume (mL) = Rinseback Volume (ml): 300 ml  Net Removal (mL) =  2000  Patient's dry weight= 120kg  Type of access used= tunneled cath  Length of treatment= 240 min  Date of last dressing change = 04/24/2025  Outpatient dialysis unit/days= TTS   Outpatient nephrologist= Niya      Pt tolerated tx well w/o complaint. VSS pre/intra/post tx. Pt stated that he usually has headaches when he runs OP and his BP fluctuates more. Pt educated that OP unit runs a higher blood flow of 450 (we run max 400 on catheters), and a dialysate flow of 800 (max of 600 in hospital). Pt wishes to have settings/orders shared with OP unit following discharge, NEPH notifed. Report given to Radha JARAMILLO (floor).

## 2025-04-24 NOTE — PLAN OF CARE
Problem: Chronic Conditions and Co-morbidities  Goal: Patient's chronic conditions and co-morbidity symptoms are monitored and maintained or improved  4/24/2025 1707 by Radha Cao RN  Outcome: Completed  4/24/2025 0439 by Andria Thomas RN  Outcome: Progressing     Problem: Safety - Adult  Goal: Free from fall injury  4/24/2025 1707 by Radha Cao RN  Outcome: Completed  4/24/2025 0439 by Andria Thomas RN  Outcome: Progressing     Problem: Pain  Goal: Verbalizes/displays adequate comfort level or baseline comfort level  4/24/2025 1707 by Radha Cao RN  Outcome: Completed  4/24/2025 0439 by Andria Thomas RN  Outcome: Progressing     Problem: ABCDS Injury Assessment  Goal: Absence of physical injury  4/24/2025 1707 by Radha Cao RN  Outcome: Completed  4/24/2025 0439 by Andria Thomas RN  Outcome: Progressing

## 2025-04-24 NOTE — DISCHARGE INSTRUCTIONS
Follow-up outpatient with PCP, neurology and nephrology.  Hemodialysis as per schedule.  Continue medications as prescribed.    Neurology recommendation:  Selected word finding difficulty during and after dialysis intermittently but able to maintain full conversations. Back to baseline. But still could have intermittent selective word-finding difficulty. Nothing noted on exam. Non focal exam. Right leg amputation. ? Vessel imaging did not show LVO or significant stenosis. CT head did not show new lesions. LDL is 40. A1c is 5.9. Unable to obtain MRI due to epicardial leads from the prior aortic valve replacement. Continue DAPT for 3 months given extensive iCAD and plaque stabilization and then continue monotherapy for aspirin 81 mg daily and statin.

## 2025-04-24 NOTE — PROGRESS NOTES
[facility]  [address], [address]  [place] [address]  Ph: [phone] or [phone]  FAX: [phone]    Chief Complaint: Word finding difficulty     Interval history: Patient back to bed    HPI: I had the pleasure of seeing your patient today in neurology consultation for his symptoms. As you would recall Vincenzo Darden is a 59 y.o. male ESRD on hemodialysis, heart failure experience word-finding difficulty during dialysis    He experienced acute onset word-finding difficulty during dialysis . There were no associated motor deficits, and blood pressure was reportedly in the 150s. He initially presented to [facility], where a CT head and CTA head/neck were obtained and were reportedly unremarkable. Given clinical improvement--able to speak in short phrases with occasional frustration--he was not offered IV thrombolysis. Patient was loaded on DAPT at OSH. Last known well was 08:30 am on 4/22/2025. NIHSS was 1 on initial evaluation. He was transferred to [facility] for further evaluation. On arrival, the patient's language deficits have largely resolved, although, patient's wife at bedside reports some persistent word finding difficulty.  No dysarthria.      MRI brain is contraindicated due to retained epicardial leads from prior aortic valve replacement.     Past Medical History:   Diagnosis Date    Below-knee amputation (HCC)     right leg    Diabetes mellitus (HCC)     ESRD (end stage renal disease) on dialysis (HCC)      Past Surgical History:   Procedure Laterality Date    CABG WITH AORTIC VALVE REPLACEMENT N/A 02/26/2024    Epi-Cardial leads present. No MRI Per Dr. Cm 6/18/24 [facility]- CABG CORONARY ARTERY BYPASS X3, STERNAL PLATING, AORTIC VALVE REPLACEMENT 23MM INSPIRIS AORTIC VALVE, ON PUMP, JO performed by Andrei James MD at [facility]    CARDIAC PROCEDURE N/A 10/09/2023    Coronary angiography performed by Louis Ramirez MD at [facility]

## 2025-04-24 NOTE — PROGRESS NOTES
Occupational Therapy      Occupational Therapy Not Seen Note    DATE: 2025    NAME: Vincenzo Darden  MRN: [id number]   : 1965      Patient not seen this date for Occupational Therapy due to:    Hemodialysis:    Next Scheduled Treatment: 25    Electronically signed by HARRISON Machado on 2025 at 11:38 AM

## 2025-04-25 ENCOUNTER — HOSPITAL ENCOUNTER (OUTPATIENT)
Dept: RADIOLOGY | Facility: HOSPITAL | Age: 60
Discharge: HOME | End: 2025-04-25
Payer: MEDICARE

## 2025-04-25 ENCOUNTER — APPOINTMENT (OUTPATIENT)
Dept: VASCULAR MEDICINE | Facility: HOSPITAL | Age: 60
End: 2025-04-25
Payer: MEDICARE

## 2025-04-25 ENCOUNTER — HOSPITAL ENCOUNTER (OUTPATIENT)
Dept: CARDIOLOGY | Facility: HOSPITAL | Age: 60
Discharge: HOME | End: 2025-04-25
Payer: MEDICARE

## 2025-04-25 DIAGNOSIS — Z01.810 ENCOUNTER FOR PREPROCEDURAL CARDIOVASCULAR EXAMINATION: ICD-10-CM

## 2025-04-25 DIAGNOSIS — Z01.818 PRE-TRANSPLANT EVALUATION FOR KIDNEY TRANSPLANT: ICD-10-CM

## 2025-04-25 PROCEDURE — 2500000004 HC RX 250 GENERAL PHARMACY W/ HCPCS (ALT 636 FOR OP/ED): Mod: JW | Performed by: STUDENT IN AN ORGANIZED HEALTH CARE EDUCATION/TRAINING PROGRAM

## 2025-04-25 PROCEDURE — 93018 CV STRESS TEST I&R ONLY: CPT | Performed by: INTERNAL MEDICINE

## 2025-04-25 PROCEDURE — 93017 CV STRESS TEST TRACING ONLY: CPT

## 2025-04-25 PROCEDURE — 78452 HT MUSCLE IMAGE SPECT MULT: CPT

## 2025-04-25 PROCEDURE — 93016 CV STRESS TEST SUPVJ ONLY: CPT | Performed by: INTERNAL MEDICINE

## 2025-04-25 PROCEDURE — A9502 TC99M TETROFOSMIN: HCPCS | Performed by: STUDENT IN AN ORGANIZED HEALTH CARE EDUCATION/TRAINING PROGRAM

## 2025-04-25 PROCEDURE — 3430000001 HC RX 343 DIAGNOSTIC RADIOPHARMACEUTICALS: Performed by: STUDENT IN AN ORGANIZED HEALTH CARE EDUCATION/TRAINING PROGRAM

## 2025-04-25 RX ORDER — AMINOPHYLLINE 25 MG/ML
125 INJECTION, SOLUTION INTRAVENOUS ONCE AS NEEDED
Status: DISCONTINUED | OUTPATIENT
Start: 2025-04-25 | End: 2025-04-26 | Stop reason: HOSPADM

## 2025-04-25 RX ORDER — REGADENOSON 0.08 MG/ML
0.4 INJECTION, SOLUTION INTRAVENOUS
Status: COMPLETED | OUTPATIENT
Start: 2025-04-25 | End: 2025-04-25

## 2025-04-25 RX ADMIN — TETROFOSMIN 10.1 MILLICURIE: 0.23 INJECTION, POWDER, LYOPHILIZED, FOR SOLUTION INTRAVENOUS at 07:15

## 2025-04-25 RX ADMIN — REGADENOSON 0.4 MG: 0.08 INJECTION, SOLUTION INTRAVENOUS at 09:14

## 2025-04-25 RX ADMIN — AMINOPHYLLINE 250 MG: 25 INJECTION, SOLUTION INTRAVENOUS at 09:14

## 2025-04-25 RX ADMIN — TETROFOSMIN 30.6 MILLICURIE: 0.23 INJECTION, POWDER, LYOPHILIZED, FOR SOLUTION INTRAVENOUS at 08:37

## 2025-04-25 NOTE — PROGRESS NOTES
CLINICAL PHARMACY NOTE: MEDS TO BEDS    Total # of Prescriptions Filled: 2   The following medications were delivered to the patient:  Aspirin   plavix    Additional Documentation:   $2.12 jessica crane

## 2025-04-28 ENCOUNTER — HOSPITAL ENCOUNTER (OUTPATIENT)
Dept: RADIOLOGY | Facility: HOSPITAL | Age: 60
Discharge: HOME | End: 2025-04-28
Payer: MEDICARE

## 2025-04-28 ENCOUNTER — HOSPITAL ENCOUNTER (OUTPATIENT)
Dept: VASCULAR MEDICINE | Facility: HOSPITAL | Age: 60
Discharge: HOME | End: 2025-04-28
Payer: MEDICARE

## 2025-04-28 DIAGNOSIS — R55 SYNCOPE, UNSPECIFIED SYNCOPE TYPE: ICD-10-CM

## 2025-04-28 DIAGNOSIS — Z01.810 ENCOUNTER FOR PREPROCEDURAL CARDIOVASCULAR EXAMINATION: ICD-10-CM

## 2025-04-28 DIAGNOSIS — R09.89 OTHER SPECIFIED SYMPTOMS AND SIGNS INVOLVING THE CIRCULATORY AND RESPIRATORY SYSTEMS: ICD-10-CM

## 2025-04-28 DIAGNOSIS — I73.9 PERIPHERAL VASCULAR DISEASE, UNSPECIFIED (CMS-HCC): ICD-10-CM

## 2025-04-28 DIAGNOSIS — Z01.818 PRE-TRANSPLANT EVALUATION FOR KIDNEY TRANSPLANT: ICD-10-CM

## 2025-04-28 DIAGNOSIS — E11.59 TYPE 2 DIABETES MELLITUS WITH OTHER CIRCULATORY COMPLICATIONS: ICD-10-CM

## 2025-04-28 PROCEDURE — 93922 UPR/L XTREMITY ART 2 LEVELS: CPT | Performed by: INTERNAL MEDICINE

## 2025-04-28 PROCEDURE — 75571 CT HRT W/O DYE W/CA TEST: CPT

## 2025-04-28 PROCEDURE — 93880 EXTRACRANIAL BILAT STUDY: CPT | Performed by: INTERNAL MEDICINE

## 2025-04-28 PROCEDURE — 93880 EXTRACRANIAL BILAT STUDY: CPT

## 2025-04-28 PROCEDURE — 93922 UPR/L XTREMITY ART 2 LEVELS: CPT

## 2025-05-07 ENCOUNTER — DOCUMENTATION (OUTPATIENT)
Facility: HOSPITAL | Age: 60
End: 2025-05-07
Payer: MEDICARE

## 2025-05-07 DIAGNOSIS — Z01.818 PRE-TRANSPLANT EVALUATION FOR KIDNEY TRANSPLANT: ICD-10-CM

## 2025-05-07 DIAGNOSIS — I73.9 PVD (PERIPHERAL VASCULAR DISEASE) (CMS-HCC): ICD-10-CM

## 2025-05-07 NOTE — PROGRESS NOTES
Pt completed ALINA as ordered, but Vascular only obtained PVR for LLE since pt with R BKA.  Dr. Dodd would like dopplers with femoral wave forms for both lower extremities.  Per Dr. Dodd, reschedule ALINA to obtain femoral wave forms for RLE - pt aware testing will be scheduled.  Order placed.

## 2025-05-08 ENCOUNTER — TELEPHONE (OUTPATIENT)
Dept: TRANSPLANT | Facility: HOSPITAL | Age: 60
End: 2025-05-08
Payer: MEDICARE

## 2025-05-30 ENCOUNTER — APPOINTMENT (OUTPATIENT)
Dept: CARDIOLOGY | Facility: HOSPITAL | Age: 60
End: 2025-05-30
Payer: MEDICARE

## 2025-05-30 ENCOUNTER — APPOINTMENT (OUTPATIENT)
Dept: VASCULAR MEDICINE | Facility: HOSPITAL | Age: 60
End: 2025-05-30
Payer: MEDICARE

## 2025-05-30 ENCOUNTER — APPOINTMENT (OUTPATIENT)
Dept: RADIOLOGY | Facility: HOSPITAL | Age: 60
End: 2025-05-30
Payer: MEDICARE

## 2025-06-02 ENCOUNTER — TRANSCRIBE ORDERS (OUTPATIENT)
Dept: ADMINISTRATIVE | Age: 60
End: 2025-06-02

## 2025-06-02 DIAGNOSIS — G56.03 CARPAL TUNNEL SYNDROME, BILATERAL UPPER LIMBS: Primary | ICD-10-CM

## 2025-06-02 PROBLEM — N18.6 ESRD ON HEMODIALYSIS (MULTI): Status: ACTIVE | Noted: 2024-10-24

## 2025-06-02 PROBLEM — R47.81 SLURRED SPEECH: Status: ACTIVE | Noted: 2025-04-22

## 2025-06-02 PROBLEM — R94.31 ABNORMAL EKG: Status: ACTIVE | Noted: 2025-06-02

## 2025-06-02 PROBLEM — J86.9: Status: ACTIVE | Noted: 2024-06-30

## 2025-06-02 PROBLEM — D64.9 ANEMIA: Status: ACTIVE | Noted: 2025-06-02

## 2025-06-02 PROBLEM — E83.39 HYPERPHOSPHATEMIA: Status: ACTIVE | Noted: 2025-01-19

## 2025-06-02 PROBLEM — E88.09 HYPOALBUMINEMIA: Status: ACTIVE | Noted: 2025-06-02

## 2025-06-02 PROBLEM — F17.200 CURRENT SMOKER: Status: ACTIVE | Noted: 2023-01-30

## 2025-06-02 PROBLEM — I10 ESSENTIAL HYPERTENSION, BENIGN: Status: ACTIVE | Noted: 2023-10-05

## 2025-06-02 PROBLEM — Z86.19 HISTORY OF INFECTIOUS DISEASE: Status: ACTIVE | Noted: 2025-06-02

## 2025-06-02 PROBLEM — I15.1 SECONDARY HYPERTENSION DUE TO RENAL DISEASE: Status: ACTIVE | Noted: 2023-01-01

## 2025-06-02 PROBLEM — N18.30 CHRONIC KIDNEY DISEASE, STAGE 3 UNSPECIFIED (MULTI): Status: ACTIVE | Noted: 2022-09-21

## 2025-06-02 PROBLEM — S91.119A LACERATION OF TOE: Status: ACTIVE | Noted: 2022-11-04

## 2025-06-02 PROBLEM — E83.51 HYPOCALCEMIA: Status: ACTIVE | Noted: 2025-01-19

## 2025-06-02 PROBLEM — R12 HEARTBURN: Status: ACTIVE | Noted: 2025-06-02

## 2025-06-02 PROBLEM — N17.9 ACUTE RENAL FAILURE: Status: ACTIVE | Noted: 2023-10-05

## 2025-06-02 PROBLEM — M86.679 CHRONIC OSTEOMYELITIS INVOLVING ANKLE AND FOOT (MULTI): Status: ACTIVE | Noted: 2024-01-26

## 2025-06-02 PROBLEM — E11.622 SKIN ULCER DUE TO DIABETES MELLITUS (MULTI): Status: ACTIVE | Noted: 2024-06-04

## 2025-06-02 PROBLEM — M41.9 KYPHOSCOLIOSIS: Status: ACTIVE | Noted: 2025-06-02

## 2025-06-02 PROBLEM — R10.9 ABDOMINAL PAIN: Status: ACTIVE | Noted: 2025-06-02

## 2025-06-02 PROBLEM — R60.0 EDEMA OF BOTH LOWER EXTREMITIES: Status: ACTIVE | Noted: 2024-07-01

## 2025-06-02 PROBLEM — G47.33 OBSTRUCTIVE SLEEP APNEA SYNDROME: Status: ACTIVE | Noted: 2025-06-02

## 2025-06-02 PROBLEM — E87.5 HYPERKALEMIA: Status: ACTIVE | Noted: 2024-06-17

## 2025-06-02 PROBLEM — A41.01 MSSA (METHICILLIN SUSCEPTIBLE STAPHYLOCOCCUS AUREUS) SEPTICEMIA (MULTI): Status: ACTIVE | Noted: 2024-06-04

## 2025-06-02 PROBLEM — B35.1 OM (ONYCHOMYCOSIS): Status: ACTIVE | Noted: 2025-06-02

## 2025-06-02 PROBLEM — L97.529 CHRONIC ULCER OF LEFT FOOT (MULTI): Status: ACTIVE | Noted: 2023-11-29

## 2025-06-02 PROBLEM — R79.89 TROPONIN LEVEL ELEVATED: Status: ACTIVE | Noted: 2023-10-13

## 2025-06-02 PROBLEM — E11.628 TYPE 2 DIABETES MELLITUS WITH LEFT DIABETIC FOOT INFECTION: Status: ACTIVE | Noted: 2023-10-05

## 2025-06-02 PROBLEM — M86.9 OSTEOMYELITIS: Status: ACTIVE | Noted: 2024-08-06

## 2025-06-02 PROBLEM — R23.4 WOUND ESCHAR OF FOOT: Status: ACTIVE | Noted: 2024-06-18

## 2025-06-02 PROBLEM — E55.9 VITAMIN D DEFICIENCY: Status: ACTIVE | Noted: 2025-06-02

## 2025-06-02 PROBLEM — I21.4 NSTEMI (NON-ST ELEVATED MYOCARDIAL INFARCTION) (MULTI): Status: ACTIVE | Noted: 2023-10-05

## 2025-06-02 PROBLEM — T30.0 BURN INJURY: Status: ACTIVE | Noted: 2025-06-02

## 2025-06-02 PROBLEM — E78.00 PURE HYPERCHOLESTEROLEMIA: Status: ACTIVE | Noted: 2025-06-02

## 2025-06-02 PROBLEM — E78.5 DYSLIPIDEMIA: Status: ACTIVE | Noted: 2023-11-29

## 2025-06-02 PROBLEM — G93.40 ACUTE ENCEPHALOPATHY: Status: ACTIVE | Noted: 2024-06-03

## 2025-06-02 PROBLEM — I50.33 HEART FAILURE, DIASTOLIC, WITH ACUTE DECOMPENSATION: Status: ACTIVE | Noted: 2024-06-04

## 2025-06-02 PROBLEM — M43.10 ACQUIRED SPONDYLOLISTHESIS: Status: ACTIVE | Noted: 2025-06-02

## 2025-06-02 PROBLEM — I25.3 CARDIAC ANEURYSM: Status: ACTIVE | Noted: 2024-06-03

## 2025-06-02 PROBLEM — D50.9 IRON DEFICIENCY ANEMIA: Status: ACTIVE | Noted: 2022-08-31

## 2025-06-02 PROBLEM — Z89.512 HX OF BKA, LEFT (MULTI): Status: ACTIVE | Noted: 2023-10-05

## 2025-06-02 PROBLEM — M14.60 ARTHROPATHY ASSOCIATED WITH NEUROLOGICAL DISORDER: Status: ACTIVE | Noted: 2024-01-26

## 2025-06-02 PROBLEM — S91.302A WOUND, OPEN, FOOT, LEFT, INITIAL ENCOUNTER: Status: ACTIVE | Noted: 2024-06-18

## 2025-06-02 PROBLEM — L03.90 CELLULITIS: Status: ACTIVE | Noted: 2025-01-12

## 2025-06-02 PROBLEM — M19.90 OA (OSTEOARTHRITIS): Status: ACTIVE | Noted: 2025-06-02

## 2025-06-02 PROBLEM — L98.499: Status: ACTIVE | Noted: 2022-08-09

## 2025-06-02 PROBLEM — B95.61 STAPHYLOCOCCUS AUREUS BACTEREMIA: Status: ACTIVE | Noted: 2024-06-03

## 2025-06-02 PROBLEM — E87.20 METABOLIC ACIDOSIS: Status: ACTIVE | Noted: 2023-10-05

## 2025-06-02 PROBLEM — Z95.2 HISTORY OF AORTIC VALVE REPLACEMENT: Status: ACTIVE | Noted: 2024-06-17

## 2025-06-02 PROBLEM — Z98.1 HISTORY OF LUMBAR FUSION: Status: ACTIVE | Noted: 2023-01-30

## 2025-06-02 PROBLEM — G62.9 NEUROPATHY, PERIPHERAL: Status: ACTIVE | Noted: 2025-06-02

## 2025-06-02 PROBLEM — L08.9 TYPE 2 DIABETES MELLITUS WITH LEFT DIABETIC FOOT INFECTION: Status: ACTIVE | Noted: 2023-10-05

## 2025-06-02 PROBLEM — I35.1 AORTIC VALVE REGURGITATION: Status: ACTIVE | Noted: 2023-10-18

## 2025-06-02 PROBLEM — I95.1 ORTHOSTATIC HYPOTENSION: Status: ACTIVE | Noted: 2025-01-16

## 2025-06-02 PROBLEM — Z86.14 HISTORY OF METHICILLIN RESISTANT STAPHYLOCOCCUS AUREUS INFECTION: Status: ACTIVE | Noted: 2022-08-31

## 2025-06-02 PROBLEM — K21.9 GERD (GASTROESOPHAGEAL REFLUX DISEASE): Status: ACTIVE | Noted: 2023-10-05

## 2025-06-02 PROBLEM — Z86.16 PERSONAL HISTORY OF COVID-19: Status: ACTIVE | Noted: 2023-01-30

## 2025-06-02 PROBLEM — R78.81 STAPHYLOCOCCUS AUREUS BACTEREMIA: Status: ACTIVE | Noted: 2024-06-03

## 2025-06-02 PROBLEM — L02.419 CELLULITIS AND ABSCESS OF LOWER EXTREMITY: Status: ACTIVE | Noted: 2022-10-28

## 2025-06-02 PROBLEM — I20.0 UNSTABLE ANGINA PECTORIS (MULTI): Status: ACTIVE | Noted: 2023-10-05

## 2025-06-02 PROBLEM — S88.119A BELOW KNEE AMPUTATION (MULTI): Status: ACTIVE | Noted: 2023-10-05

## 2025-06-02 PROBLEM — I50.82 BIVENTRICULAR CONGESTIVE HEART FAILURE: Status: ACTIVE | Noted: 2024-06-27

## 2025-06-02 PROBLEM — R52 PAIN, UNSPECIFIED: Status: ACTIVE | Noted: 2024-02-02

## 2025-06-02 PROBLEM — I25.5 ISCHEMIC CARDIOMYOPATHY: Status: ACTIVE | Noted: 2024-06-29

## 2025-06-02 PROBLEM — G89.29 CHRONIC BILATERAL LOW BACK PAIN WITH LEFT-SIDED SCIATICA: Status: ACTIVE | Noted: 2025-01-19

## 2025-06-02 PROBLEM — R47.89 WORD FINDING DIFFICULTY: Status: ACTIVE | Noted: 2025-04-22

## 2025-06-02 PROBLEM — L03.119 CELLULITIS AND ABSCESS OF LOWER EXTREMITY: Status: ACTIVE | Noted: 2022-10-28

## 2025-06-02 PROBLEM — K85.90 ACUTE PANCREATITIS: Status: ACTIVE | Noted: 2025-06-02

## 2025-06-02 PROBLEM — L98.499 SKIN ULCER DUE TO DIABETES MELLITUS (MULTI): Status: ACTIVE | Noted: 2024-06-04

## 2025-06-02 PROBLEM — U07.1 DISEASE DUE TO SEVERE ACUTE RESPIRATORY SYNDROME CORONAVIRUS 2 (SARS-COV-2): Status: ACTIVE | Noted: 2024-01-29

## 2025-06-02 PROBLEM — I16.0 HYPERTENSIVE URGENCY: Status: ACTIVE | Noted: 2022-09-21

## 2025-06-02 PROBLEM — I13.0 HYPERTENSIVE HEART DISEASE WITH CONGESTIVE HEART FAILURE AND CHRONIC KIDNEY DISEASE: Status: ACTIVE | Noted: 2024-01-26

## 2025-06-02 PROBLEM — E87.1 HYPONATREMIA: Status: ACTIVE | Noted: 2024-06-03

## 2025-06-02 PROBLEM — R80.1 PERSISTENT PROTEINURIA: Status: ACTIVE | Noted: 2023-10-11

## 2025-06-02 PROBLEM — I63.9 CEREBROVASCULAR ACCIDENT (CVA) (MULTI): Status: ACTIVE | Noted: 2025-04-23

## 2025-06-02 PROBLEM — M54.42 CHRONIC BILATERAL LOW BACK PAIN WITH LEFT-SIDED SCIATICA: Status: ACTIVE | Noted: 2025-01-19

## 2025-06-02 PROBLEM — E78.5 HYPERLIPIDEMIA: Status: ACTIVE | Noted: 2025-06-02

## 2025-06-02 PROBLEM — R33.9 URINARY RETENTION: Status: ACTIVE | Noted: 2024-03-01

## 2025-06-02 PROBLEM — I82.409 DEEP VEIN THROMBOSIS (DVT) (MULTI): Status: ACTIVE | Noted: 2022-09-26

## 2025-06-02 PROBLEM — Z99.2 DEPENDENCE ON RENAL DIALYSIS: Status: ACTIVE | Noted: 2024-07-01

## 2025-06-02 PROBLEM — R60.9 EDEMA: Status: ACTIVE | Noted: 2023-05-04

## 2025-06-02 PROBLEM — M86.9 OSTEOMYELITIS OF GREAT TOE OF LEFT FOOT (MULTI): Status: ACTIVE | Noted: 2024-06-28

## 2025-06-02 PROBLEM — T81.328A DISRUPTION OR DEHISCENCE OF CLOSURE OF STERNUM OR STERNOTOMY: Status: ACTIVE | Noted: 2024-06-04

## 2025-06-02 PROBLEM — Z86.39 HISTORY OF TYPE 2 DIABETES MELLITUS: Status: ACTIVE | Noted: 2023-10-05

## 2025-06-02 PROBLEM — M54.30 SCIATICA: Status: ACTIVE | Noted: 2025-02-13

## 2025-06-02 PROBLEM — Z99.2 ESRD ON HEMODIALYSIS (MULTI): Status: ACTIVE | Noted: 2024-10-24

## 2025-06-02 PROBLEM — Z89.439 HISTORY OF AMPUTATION OF FOOT (MULTI): Status: ACTIVE | Noted: 2024-01-26

## 2025-06-02 PROBLEM — E11.9 DIABETES MELLITUS (MULTI): Status: ACTIVE | Noted: 2023-01-24

## 2025-06-02 PROBLEM — Z20.822 CONTACT WITH AND (SUSPECTED) EXPOSURE TO COVID-19: Status: ACTIVE | Noted: 2023-01-24

## 2025-06-02 PROBLEM — I73.9 PAD (PERIPHERAL ARTERY DISEASE): Status: ACTIVE | Noted: 2024-06-04

## 2025-06-02 PROBLEM — R06.00 DYSPNEA: Status: ACTIVE | Noted: 2024-06-26

## 2025-06-02 PROBLEM — E66.9 OBESITY WITH BODY MASS INDEX 30 OR GREATER: Status: ACTIVE | Noted: 2025-06-02

## 2025-06-02 RX ORDER — PRAVASTATIN SODIUM 10 MG/1
10 TABLET ORAL NIGHTLY
COMMUNITY

## 2025-06-02 RX ORDER — TIMOLOL MALEATE 5 MG/ML
SOLUTION/ DROPS OPHTHALMIC
COMMUNITY
Start: 2025-03-25

## 2025-06-02 RX ORDER — FUROSEMIDE 80 MG/1
TABLET ORAL
COMMUNITY
Start: 2024-08-19

## 2025-06-02 RX ORDER — CLOPIDOGREL BISULFATE 75 MG/1
TABLET ORAL
COMMUNITY
Start: 2025-04-25

## 2025-06-02 RX ORDER — BLOOD SUGAR DIAGNOSTIC
STRIP MISCELLANEOUS
COMMUNITY

## 2025-06-02 RX ORDER — LISINOPRIL 20 MG/1
20 TABLET ORAL DAILY
COMMUNITY

## 2025-06-02 RX ORDER — HYDROXYZINE HYDROCHLORIDE 10 MG/1
TABLET, FILM COATED ORAL
COMMUNITY
Start: 2025-05-07

## 2025-06-02 RX ORDER — TRIAMCINOLONE ACETONIDE 1 MG/G
CREAM TOPICAL
COMMUNITY
Start: 2025-05-07

## 2025-06-02 RX ORDER — INSULIN DEGLUDEC 200 U/ML
40 INJECTION, SOLUTION SUBCUTANEOUS
COMMUNITY
Start: 2024-08-19 | End: 2025-10-05

## 2025-06-02 RX ORDER — AMLODIPINE BESYLATE 10 MG/1
10 TABLET ORAL
COMMUNITY
Start: 2024-08-19

## 2025-06-03 ENCOUNTER — APPOINTMENT (OUTPATIENT)
Dept: VASCULAR MEDICINE | Facility: HOSPITAL | Age: 60
End: 2025-06-03
Payer: MEDICARE

## 2025-06-03 ENCOUNTER — CONSULT (OUTPATIENT)
Dept: CARDIOLOGY | Facility: HOSPITAL | Age: 60
End: 2025-06-03
Payer: MEDICARE

## 2025-06-03 VITALS
BODY MASS INDEX: 36.84 KG/M2 | DIASTOLIC BLOOD PRESSURE: 100 MMHG | HEIGHT: 72 IN | SYSTOLIC BLOOD PRESSURE: 170 MMHG | OXYGEN SATURATION: 97 % | HEART RATE: 72 BPM | WEIGHT: 272 LBS

## 2025-06-03 DIAGNOSIS — Z01.810 PRE-OPERATIVE CARDIOVASCULAR EXAMINATION: Primary | ICD-10-CM

## 2025-06-03 PROCEDURE — 93005 ELECTROCARDIOGRAM TRACING: CPT | Performed by: INTERNAL MEDICINE

## 2025-06-03 PROCEDURE — 99212 OFFICE O/P EST SF 10 MIN: CPT | Performed by: INTERNAL MEDICINE

## 2025-06-03 PROCEDURE — 99204 OFFICE O/P NEW MOD 45 MIN: CPT | Performed by: INTERNAL MEDICINE

## 2025-06-03 ASSESSMENT — PAIN SCALES - GENERAL: PAINLEVEL_OUTOF10: 0-NO PAIN

## 2025-06-03 NOTE — PATIENT INSTRUCTIONS
Thank you for attending the Cardiology clinic at [facility] today. It was nice to meet you.     Your cardiovascular examination was notable for elevated BP and a heart murmur. Your ECG showed a normal heart rhythm.    Your recent echocardiogram showed mildly impaired cardiac function.     Your recent stress test did not show signs of reduced blood supply to the heart muscle.     Continue your current medications.     I will compare your current cardiac tests to prior testing. If they are stable, you will not require additional heart testing at this point.     I=

## 2025-06-03 NOTE — PROGRESS NOTES
Subjective   Juni Davenport is a 59 y.o. male presenting for pre-renal transplant cardiology review on a background of CAD s/p CABG x3 + AVR for AI ([facility], 2/2024), ESRD on HD since 6/2024, T2DM, hyperlipidemia, HTN, Charcot foot s/p R BKA (2023).     Investigations:  TTE (1/2025):    Left Ventricle: Low normal left ventricular systolic function with a   visually estimated EF of 45 - 50%. EF by 2D Simpsons Biplane is 52%.     Right Ventricle: Reduced systolic function. TAPSE is abnormal. TAPSE is   1.5 cm. RV Peak S' is 7.9 cm/s.     Aortic Valve: Mild stenosis of the aortic valve. AV mean gradient is 17   mmHg. AV peak gradient is 30 mmHg. AV area by continuity VTI is 1.2 cm2.     Mitral Valve: Mild annular calcification at the posterior leaflet. Mild   stenosis noted. MV mean gradient is 4 mmHg. MV PHT is 129.0 ms. MV area by   continuity equation is 1.4 cm2.     Image quality is technically difficult. Contrast used: Definity.   Technically difficult study due to patient's body habitus, technically   difficult study due to low parasternal window and procedure performed with   the patient in a supine position.     Nuclear stress test (4/2025)  No evidence of inducible ischemia. Small fixed perfusion defects involving the apex and mid-apical inferolateral segments c/w prior infarction.     Chief Complaint:  Pre-renal transplant work-up.     HPI  No chest pain.  Currently on HD, issues with labile BP, plans to transition to PD.   - several syncopal episodes in dialysis.   - labile BP.   No palpitations.   No issues with leg swelling.  Walking limited by back pain.  No orthopnea or PND.    Shx - lives with wife. IADLs. Non-smoker (quit following CABG).     ROS  A 10-system review was performed and was unremarkable apart from what is presented in the HPI.     Objective   Physical Exam  Hypertensive in clinic (not taken regular medications)   Alert and orientated.   Appropriate responses, normal  "affect.  No respiratory distress at rest.   Skin warm and dry.   Normal radial pulse character and volume. Clinically SR.   Anicteric sclera, no conjunctival pallor.   No JVD or carotid bruits.  Heart sounds dual, ESM grade II-III   Chest clear on auscultation.   Calves soft, non-tender.  No pedal edema.  ECG - SR, left axis, RBBB.     Lab Review:   Lab Results   Component Value Date    BUN 49 (H) 03/28/2025    CREATININE 7.31 (H) 03/28/2025     No results found for: \"CKTOTAL\", \"CKMB\", \"CKMBINDEX\", \"TROPONINI\"  Lab Results   Component Value Date    WBC 10.5 03/28/2025    HGB 11.3 (L) 03/28/2025    HCT 34.0 (L) 03/28/2025    MCV 90 03/28/2025     03/28/2025     Lab Results   Component Value Date    CHOL 96 03/28/2025    HDL 28.7 03/28/2025     HbA1c 5.9%.   Tchol 96 mmol/dL.     Assessment/Plan   In summary, Juni Davenport is a 59 y.o. male presenting for pre-renal transplant cardiology review on a background of CAD s/p CABG x3 + AVR for AI ([facility], 2/2024), ESRD on HD since 6/2024, T2DM, hyperlipidemia, HTN, Charcot foot s/p R BKA (2023).  He is currently asymptomatic from a cardiac viewpoint with no reported chest pain or dyspnea.  On examination today he appeared euvolemic and had an audible ejection systolic murmur.  His EKG showed sinus rhythm with RBBB.  He was investigated with an echocardiogram in January that showed low normal LV systolic function [LVEF 52%] and mild bioprosthetic aortic valve stenosis [mean gradient 17 mmHg].  His recent nuclear stress test showed fixed perfusion defects consistent with the presence of regions of infarction but no evidence of inducible ischemia.  I will obtain obtain the reports of his prior cardiac investigations and if his cardiac function is stable from previous he will not require additional testing prior to OR.        "

## 2025-06-05 LAB
ATRIAL RATE: 75 BPM
P AXIS: 16 DEGREES
P OFFSET: 162 MS
P ONSET: 110 MS
PR INTERVAL: 178 MS
Q ONSET: 199 MS
QRS COUNT: 13 BEATS
QRS DURATION: 146 MS
QT INTERVAL: 418 MS
QTC CALCULATION(BAZETT): 466 MS
QTC FREDERICIA: 450 MS
R AXIS: -70 DEGREES
T AXIS: -10 DEGREES
T OFFSET: 408 MS
VENTRICULAR RATE: 75 BPM

## 2025-06-05 RX ORDER — PRAVASTATIN SODIUM 10 MG
10 TABLET ORAL NIGHTLY
Qty: 30 TABLET | Refills: 3 | OUTPATIENT
Start: 2025-06-05

## 2025-06-05 NOTE — TELEPHONE ENCOUNTER
Vincenzo Darden is calling to request a refill on the following medication(s):    Medication Request:  Requested Prescriptions     Pending Prescriptions Disp Refills    pravastatin (PRAVACHOL) 10 MG tablet [Pharmacy Med Name: pravastatin 10 mg tablet] 30 tablet 3     Sig: Take 1 tablet by mouth nightly       Last Visit Date (If Applicable):  Visit date not found    Next Visit Date:    Visit date not found

## 2025-06-17 RX ORDER — PRAVASTATIN SODIUM 10 MG
10 TABLET ORAL NIGHTLY
Qty: 30 TABLET | Refills: 3 | OUTPATIENT
Start: 2025-06-17

## 2025-06-23 RX ORDER — METOPROLOL TARTRATE 25 MG/1
25 TABLET, FILM COATED ORAL 2 TIMES DAILY
Qty: 60 TABLET | Refills: 3 | OUTPATIENT
Start: 2025-06-23

## 2025-07-02 ENCOUNTER — HOSPITAL ENCOUNTER (OUTPATIENT)
Dept: VASCULAR MEDICINE | Facility: HOSPITAL | Age: 60
Discharge: HOME | End: 2025-07-02
Payer: MEDICARE

## 2025-07-02 ENCOUNTER — APPOINTMENT (OUTPATIENT)
Dept: RADIOLOGY | Facility: HOSPITAL | Age: 60
End: 2025-07-02
Payer: MEDICARE

## 2025-07-02 DIAGNOSIS — I73.9 PVD (PERIPHERAL VASCULAR DISEASE): ICD-10-CM

## 2025-07-02 DIAGNOSIS — Z01.818 PRE-TRANSPLANT EVALUATION FOR KIDNEY TRANSPLANT: ICD-10-CM

## 2025-07-02 PROCEDURE — 93922 UPR/L XTREMITY ART 2 LEVELS: CPT | Performed by: SURGERY

## 2025-07-02 PROCEDURE — 93922 UPR/L XTREMITY ART 2 LEVELS: CPT

## 2025-07-14 ENCOUNTER — DOCUMENTATION (OUTPATIENT)
Facility: HOSPITAL | Age: 60
End: 2025-07-14
Payer: MEDICARE

## 2025-07-14 NOTE — PROGRESS NOTES
Chart Review - CT requested from [facility] to be imported in to PACS.  Also need cardiology records to be sent to cardiologist for review, and a copy of colonoscopy from [facility].  Will request records.  ADARSH left for patient, providing him the update.  Provided contact number for questions.

## 2025-07-18 ENCOUNTER — HOSPITAL ENCOUNTER (OUTPATIENT)
Dept: RADIOLOGY | Facility: EXTERNAL LOCATION | Age: 60
Discharge: HOME | End: 2025-07-18

## 2025-08-04 ENCOUNTER — DOCUMENTATION (OUTPATIENT)
Facility: HOSPITAL | Age: 60
End: 2025-08-04
Payer: MEDICARE

## 2025-08-04 NOTE — PROGRESS NOTES
Chart Review    Dr. Oleary saw patient and stated would place final risk stratification once OSH records reviewed.  Request sent to obtain records from 1/2024 to present from [facility] in [place] for:  Cardiology notes  Echo  Stress test  Heart catheterization

## 2025-08-06 ENCOUNTER — TELEPHONE (OUTPATIENT)
Facility: HOSPITAL | Age: 60
End: 2025-08-06
Payer: MEDICARE

## 2025-08-08 ENCOUNTER — DOCUMENTATION (OUTPATIENT)
Facility: HOSPITAL | Age: 60
End: 2025-08-08
Payer: MEDICARE

## 2025-08-26 ENCOUNTER — TELEPHONE (OUTPATIENT)
Facility: HOSPITAL | Age: 60
End: 2025-08-26
Payer: MEDICARE

## 2025-08-27 ENCOUNTER — DOCUMENTATION (OUTPATIENT)
Facility: HOSPITAL | Age: 60
End: 2025-08-27
Payer: MEDICARE

## (undated) DEVICE — AGENT HEMSTAT W2XL4IN OXIDIZED REGENERATED CELOS ABSRB SFT

## (undated) DEVICE — SMOKE EVACUATION TUBING WITH 8 IN INTEGRAL WAND AND SPONGE GUARD: Brand: BUFFALO FILTER

## (undated) DEVICE — SUTURE PERMA-HAND SZ 0 L18IN NONABSORBABLE BLK CT-2 L26MM C027D

## (undated) DEVICE — SUTURE VCRL + SZ 2-0 L27IN ABSRB CLR CT-1 1/2 CIR TAPERCUT VCP259H

## (undated) DEVICE — TIP APPL GEL PLT ENDO 5MMX32CM

## (undated) DEVICE — CANNULA PERF L12IN DIA20FR GWIRE DIA0.038IN ART ELONG 1 PC

## (undated) DEVICE — GOWN,SIRUS,NONRNF,SETINSLV,2XL,18/CS: Brand: MEDLINE

## (undated) DEVICE — SS SUTURE, 3 PER SLEEVE: Brand: MYO/WIRE II

## (undated) DEVICE — ADAPTER PERF L7.5IN INLET LEG 3IN Y TYP VENT CLR CODE CLMP

## (undated) DEVICE — KIT APPL 11:1 PROC W/ FIBRIJET MED CUP APPL TIP TY

## (undated) DEVICE — SUTURE PROL SZ 4-0 L36IN NONABSORBABLE BLU L26MM SH 1/2 CIR 8521H

## (undated) DEVICE — SUTURE PROL SZ 7-0 L24IN NONABSORBABLE BLU L8MM BV175-6 3/8 8735H

## (undated) DEVICE — DEVICE COMPR LNG 27 CM VASC BND

## (undated) DEVICE — 1LYRTR 16FR10ML100%SILTMPS SNP: Brand: MEDLINE INDUSTRIES, INC.

## (undated) DEVICE — BLADE,CARBON-STEEL,15,STRL,DISPOSABLE,TB: Brand: MEDLINE

## (undated) DEVICE — BLOOD TRANSFER PACK 600 CC W/ CPLR

## (undated) DEVICE — SUTURE PROL SZ 6-0 L18IN NONABSORBABLE BLU RB-2 L13MM 1/2 8714H

## (undated) DEVICE — SUTURE ETHIB EXCL BR GRN TAPR PT 2-0 30 X563H X563H

## (undated) DEVICE — .: Brand: PERFECTCUT AORTOTOMY SYSTEM

## (undated) DEVICE — WIRE PACING BIPOLAR VENTRICULAR 60CM ULTRA THIN

## (undated) DEVICE — PRESSURE MONITORING LINES 4FT. (122CM) M/F: Brand: PRESSURE MONITORING LINES

## (undated) DEVICE — PACK PROCEDURE SURG OPN HRT

## (undated) DEVICE — STRAP ARMBRD W1.5XL32IN FOAM STR YET SFT W/ HK AND LOOP

## (undated) DEVICE — CANNULA TIP SPRY MAGELLAN

## (undated) DEVICE — ANGIOGRAPHIC CATHETER: Brand: EXPO™

## (undated) DEVICE — GOWN,AURORA,NONREINFORCED,LARGE: Brand: MEDLINE

## (undated) DEVICE — SPONGE LAP W18XL18IN WHT COT 4 PLY FLD STRUNG RADPQ DISP ST 2 PER PACK

## (undated) DEVICE — APPLICATOR MEDICATED 10.5 CC SOLUTION HI LT ORNG CHLORAPREP

## (undated) DEVICE — COVER,LIGHT HANDLE,FLX,2/PK: Brand: MEDLINE INDUSTRIES, INC.

## (undated) DEVICE — GOWN,SIRUS,NONRNF,SETINSLV,XL,20/CS: Brand: MEDLINE

## (undated) DEVICE — MPS® RETROGRADE EXTENSION PRESSURE LINE W/Y-SET: Brand: MPS

## (undated) DEVICE — SUMP INTCARD W/ 1/4INCH CONN 20FRENCH 15INCH DLP

## (undated) DEVICE — WAX SURG 2.5GM HEMSTAT BNE BEESWAX PARAFFIN ISO PALMITATE

## (undated) DEVICE — PREMICRON G/W 2/0 8X75 2XHRC17 CV PF6MLP: Brand: PREMICRON

## (undated) DEVICE — GLIDESHEATH SLENDER STAINLESS STEEL KIT: Brand: GLIDESHEATH SLENDER

## (undated) DEVICE — SURGICAL PROCEDURE TRAY CRD CATH SVMMC

## (undated) DEVICE — PACK PROCEDURE SURG OPN HRT ADD ON

## (undated) DEVICE — TOWEL,OR,DSP,ST,BLUE,DLX,XR,4/PK,20PK/CS: Brand: MEDLINE

## (undated) DEVICE — CANNULA PERF RT ANGLE 5 MM LT CORONARY ART

## (undated) DEVICE — CONNECTOR PERF 1/4X1/4 STR

## (undated) DEVICE — DRAIN,WOUND,ROUND,24FR,5/16",FULL-FLUTED: Brand: MEDLINE

## (undated) DEVICE — GLOVE SURG SZ 75 CRM LTX FREE POLYISOPRENE POLYMER BEAD ANTI

## (undated) DEVICE — Device: Brand: VIRTUOSAPH PLUS WITH RADIAL INDICATION

## (undated) DEVICE — SUTURE NONABSORBABLE MONOFILAMENT 4-0 RB-1 36 IN BLU PROLENE 8557H

## (undated) DEVICE — SENSOR KIT AD INVOS 7100

## (undated) DEVICE — SUTURE VCRL + SZ 4-0 L18IN ABSRB UD L19MM PS-2 3/8 CIR PRIM VCP496H

## (undated) DEVICE — SUTURE VCRL + SZ 4-0 L27IN ABSRB UD L26MM SH 1/2 CIR VCP415H

## (undated) DEVICE — GUIDEWIRE 35/260/FC/PTFE/3J: Brand: GUIDEWIRE

## (undated) DEVICE — PREMIUM DRY TRAY LF: Brand: MEDLINE INDUSTRIES, INC.

## (undated) DEVICE — TUBE CARDIAC SUCTION 6FR SOFT TIP 10FR

## (undated) DEVICE — BNDG,ELSTC,MATRIX,STRL,6"X5YD,LF,HOOK&LP: Brand: MEDLINE

## (undated) DEVICE — BLADE OPHTH ORNG GRINDLESS SMALLER ALTERNATIVE TO NO15 GEN

## (undated) DEVICE — CATHETER,URETHRAL,REDRUBBER,STRL,18FR: Brand: MEDLINE

## (undated) DEVICE — POSITIONER,HEAD,MULTIRING,36CS: Brand: MEDLINE

## (undated) DEVICE — COR-KNOT® QUICK LOAD® SINGLES: Brand: COR-KNOT® QUICK LOAD®

## (undated) DEVICE — STERNUM BLADE, OFFSET (32.0 X 0.8 X 6.3MM)

## (undated) DEVICE — BLADE OPHTH D5MM 15DEG GRN W/ RND KNURLED HNDL MICRO-SHARP

## (undated) DEVICE — TUBING, SUCTION, 9/32" X 20', STRAIGHT: Brand: MEDLINE INDUSTRIES, INC.

## (undated) DEVICE — PREVENA INCISION MANAGEMENT SYSTEM- PEEL & PLACE DRESSING: Brand: PREVENA™ PEEL & PLACE™

## (undated) DEVICE — SUTURE PDS II SZ 0 L27IN ABSRB VLT L36MM CT-1 1/2 CIR Z340H

## (undated) DEVICE — CANNULA PERFUSION 5.5IN 9FR AORTIC ROOT

## (undated) DEVICE — COR-KNOT MINI® COMBO KITBASE PACKAGE TYPE - KITEACH STERILE PACKAGE KIT CONTAINS (2) SINGLE PATIENT USE COR-KNOT MINI® DEVICES AND (12) COR-KNOT® QUICK LOADS®.: Brand: COR-KNOT MINI®

## (undated) DEVICE — PROTECTOR ULN NRV PUR FOAM HK LOOP STRP ANATOMICALLY

## (undated) DEVICE — Device: Brand: ZDRIVE™

## (undated) DEVICE — 48" PROBE COVER W/GEL, ULTRASOUND, STERILE: Brand: SITE-RITE

## (undated) DEVICE — PUNCH AORTIC 4.0

## (undated) DEVICE — GLOVE SURG SZ 7.5 L11.73IN FNGR THK9.8MIL STRW LTX POLYMER

## (undated) DEVICE — GLOVE SURG SZ 7 CRM LTX FREE POLYISOPRENE POLYMER BEAD ANTI

## (undated) DEVICE — GLOVE SURG SZ 8 L12IN FNGR THK79MIL GRN LTX FREE

## (undated) DEVICE — KIT ANGEL PRP

## (undated) DEVICE — DRAPE SLUSH DISC W44XL66IN ST FOR RND BSIN HUSH SLUSH SYS

## (undated) DEVICE — SUTURE MCRYL SZ 4-0 L27IN ABSRB UD L19MM PS-2 1/2 CIR PRIM Y426H

## (undated) DEVICE — 3M™ IOBAN™ 2 ANTIMICROBIAL INCISE DRAPE 6651EZ: Brand: IOBAN™ 2

## (undated) DEVICE — Device

## (undated) DEVICE — RETRACTOR SURG INSRT SUT HLD OCTOBASE

## (undated) DEVICE — AVID DUAL STAGE VENOUS DRAINAGE CANNULA: Brand: AVID DUAL STAGE VENOUS DRAINAGE CANNULA

## (undated) DEVICE — PLEDGET SURG W3.5XL7MM THK1.5MM WHT PTFE RECT FIRM TFE

## (undated) DEVICE — APPLICATOR MEDICATED 26 CC SOLUTION HI LT ORNG CHLORAPREP

## (undated) DEVICE — MASTISOL ADHESIVE LIQ 2/3ML

## (undated) DEVICE — PENCIL ES L3M BTTN SWCH HOLSTER W/ BLDE ELECTRD EDGE

## (undated) DEVICE — SUMP CANN PED 12FR 0.25IN CONN INTCARD MULTIPORT VENT TIP

## (undated) DEVICE — LIQUIBAND RAPID ADHESIVE 36/CS 0.8ML: Brand: MEDLINE

## (undated) DEVICE — FOGARTY - HYDRAGRIP SURGICAL - CLAMP INSERTS: Brand: FOGARTY HYDRAJAW

## (undated) DEVICE — DRAIN SURG SGL COLL PT TB FOR ATS BG OASIS

## (undated) DEVICE — MPS® DELIVERY SET W/ARREST AGENT AND ADDITIVE CASSETTES, HEAT EXCHANGER & 10 FT. DELIVERY TUBING: Brand: MPS

## (undated) DEVICE — GLOVE SURG SZ 65 L12IN FNGR THK79MIL GRN LTX FREE